# Patient Record
Sex: MALE | Race: OTHER | NOT HISPANIC OR LATINO | Employment: OTHER | URBAN - METROPOLITAN AREA
[De-identification: names, ages, dates, MRNs, and addresses within clinical notes are randomized per-mention and may not be internally consistent; named-entity substitution may affect disease eponyms.]

---

## 2017-02-05 ENCOUNTER — HOSPITAL ENCOUNTER (INPATIENT)
Facility: HOSPITAL | Age: 82
LOS: 4 days | Discharge: HOME HEALTH | DRG: 378 | End: 2017-02-09
Attending: INTERNAL MEDICINE | Admitting: INTERNAL MEDICINE
Payer: MEDICARE

## 2017-02-05 ENCOUNTER — APPOINTMENT (EMERGENCY)
Dept: CT IMAGING | Facility: HOSPITAL | Age: 82
DRG: 378 | End: 2017-02-05
Payer: MEDICARE

## 2017-02-05 DIAGNOSIS — K92.2 ACUTE UPPER GI BLEEDING: Primary | ICD-10-CM

## 2017-02-05 PROBLEM — K27.9 PEPTIC ULCER DISEASE: Status: ACTIVE | Noted: 2017-02-05

## 2017-02-05 PROBLEM — D62 ACUTE BLOOD LOSS ANEMIA: Status: ACTIVE | Noted: 2017-02-05

## 2017-02-05 PROBLEM — R19.5 HEME POSITIVE STOOL: Status: ACTIVE | Noted: 2017-02-05

## 2017-02-05 LAB
ABO GROUP BLD: NORMAL
ALBUMIN SERPL BCP-MCNC: 3.3 G/DL (ref 3.5–5)
ALP SERPL-CCNC: 66 U/L (ref 46–116)
ALT SERPL W P-5'-P-CCNC: 30 U/L (ref 12–78)
ANION GAP SERPL CALCULATED.3IONS-SCNC: 7 MMOL/L (ref 4–13)
APTT PPP: 28 SECONDS (ref 24–36)
AST SERPL W P-5'-P-CCNC: 25 U/L (ref 5–45)
BASOPHILS # BLD AUTO: 0.03 THOUSANDS/ΜL (ref 0–0.1)
BASOPHILS NFR BLD AUTO: 0 % (ref 0–1)
BILIRUB SERPL-MCNC: 0.3 MG/DL (ref 0.2–1)
BILIRUB UR QL STRIP: NEGATIVE
BLD GP AB SCN SERPL QL: NEGATIVE
BUN SERPL-MCNC: 55 MG/DL (ref 5–25)
CALCIUM SERPL-MCNC: 9.9 MG/DL (ref 8.3–10.1)
CHLORIDE SERPL-SCNC: 99 MMOL/L (ref 100–108)
CLARITY UR: CLEAR
CO2 SERPL-SCNC: 29 MMOL/L (ref 21–32)
COLOR UR: YELLOW
CREAT SERPL-MCNC: 1.47 MG/DL (ref 0.6–1.3)
EOSINOPHIL # BLD AUTO: 0.15 THOUSAND/ΜL (ref 0–0.61)
EOSINOPHIL NFR BLD AUTO: 2 % (ref 0–6)
ERYTHROCYTE [DISTWIDTH] IN BLOOD BY AUTOMATED COUNT: 13.2 % (ref 11.6–15.1)
ETHANOL SERPL-MCNC: <3 MG/DL (ref 0–3)
GFR SERPL CREATININE-BSD FRML MDRD: 46.2 ML/MIN/1.73SQ M
GLUCOSE SERPL-MCNC: 116 MG/DL (ref 65–140)
GLUCOSE UR STRIP-MCNC: NEGATIVE MG/DL
HCT VFR BLD AUTO: 26.9 % (ref 36.5–49.3)
HCT VFR BLD AUTO: 30.1 % (ref 36.5–49.3)
HGB BLD-MCNC: 10.4 G/DL (ref 12–17)
HGB BLD-MCNC: 9.4 G/DL (ref 12–17)
HGB UR QL STRIP.AUTO: NEGATIVE
INR PPP: 1.03 (ref 0.86–1.16)
KETONES UR STRIP-MCNC: NEGATIVE MG/DL
LEUKOCYTE ESTERASE UR QL STRIP: NEGATIVE
LYMPHOCYTES # BLD AUTO: 1.85 THOUSANDS/ΜL (ref 0.6–4.47)
LYMPHOCYTES NFR BLD AUTO: 25 % (ref 14–44)
MCH RBC QN AUTO: 33.9 PG (ref 26.8–34.3)
MCHC RBC AUTO-ENTMCNC: 34.6 G/DL (ref 31.4–37.4)
MCV RBC AUTO: 98 FL (ref 82–98)
MONOCYTES # BLD AUTO: 0.72 THOUSAND/ΜL (ref 0.17–1.22)
MONOCYTES NFR BLD AUTO: 10 % (ref 4–12)
NEUTROPHILS # BLD AUTO: 4.66 THOUSANDS/ΜL (ref 1.85–7.62)
NEUTS SEG NFR BLD AUTO: 63 % (ref 43–75)
NITRITE UR QL STRIP: NEGATIVE
PH UR STRIP.AUTO: 6 [PH] (ref 4.5–8)
PLATELET # BLD AUTO: 205 THOUSANDS/UL (ref 149–390)
PMV BLD AUTO: 10.2 FL (ref 8.9–12.7)
POTASSIUM SERPL-SCNC: 3.6 MMOL/L (ref 3.5–5.3)
PROT SERPL-MCNC: 7 G/DL (ref 6.4–8.2)
PROT UR STRIP-MCNC: NEGATIVE MG/DL
PROTHROMBIN TIME: 13.4 SECONDS (ref 12–14.3)
RBC # BLD AUTO: 3.07 MILLION/UL (ref 3.88–5.62)
RH BLD: POSITIVE
SODIUM SERPL-SCNC: 135 MMOL/L (ref 136–145)
SP GR UR STRIP.AUTO: <=1.005 (ref 1–1.03)
UROBILINOGEN UR QL STRIP.AUTO: 0.2 E.U./DL
WBC # BLD AUTO: 7.41 THOUSAND/UL (ref 4.31–10.16)

## 2017-02-05 PROCEDURE — 85014 HEMATOCRIT: CPT | Performed by: INTERNAL MEDICINE

## 2017-02-05 PROCEDURE — 85730 THROMBOPLASTIN TIME PARTIAL: CPT | Performed by: PHYSICIAN ASSISTANT

## 2017-02-05 PROCEDURE — 85610 PROTHROMBIN TIME: CPT | Performed by: PHYSICIAN ASSISTANT

## 2017-02-05 PROCEDURE — 74177 CT ABD & PELVIS W/CONTRAST: CPT

## 2017-02-05 PROCEDURE — 96374 THER/PROPH/DIAG INJ IV PUSH: CPT

## 2017-02-05 PROCEDURE — C9113 INJ PANTOPRAZOLE SODIUM, VIA: HCPCS | Performed by: PHYSICIAN ASSISTANT

## 2017-02-05 PROCEDURE — 99285 EMERGENCY DEPT VISIT HI MDM: CPT

## 2017-02-05 PROCEDURE — 86923 COMPATIBILITY TEST ELECTRIC: CPT

## 2017-02-05 PROCEDURE — 96361 HYDRATE IV INFUSION ADD-ON: CPT

## 2017-02-05 PROCEDURE — 36415 COLL VENOUS BLD VENIPUNCTURE: CPT | Performed by: PHYSICIAN ASSISTANT

## 2017-02-05 PROCEDURE — 85025 COMPLETE CBC W/AUTO DIFF WBC: CPT | Performed by: PHYSICIAN ASSISTANT

## 2017-02-05 PROCEDURE — 86900 BLOOD TYPING SEROLOGIC ABO: CPT | Performed by: PHYSICIAN ASSISTANT

## 2017-02-05 PROCEDURE — 93005 ELECTROCARDIOGRAM TRACING: CPT | Performed by: PHYSICIAN ASSISTANT

## 2017-02-05 PROCEDURE — 86850 RBC ANTIBODY SCREEN: CPT | Performed by: PHYSICIAN ASSISTANT

## 2017-02-05 PROCEDURE — 86901 BLOOD TYPING SEROLOGIC RH(D): CPT | Performed by: PHYSICIAN ASSISTANT

## 2017-02-05 PROCEDURE — C9113 INJ PANTOPRAZOLE SODIUM, VIA: HCPCS | Performed by: INTERNAL MEDICINE

## 2017-02-05 PROCEDURE — 85018 HEMOGLOBIN: CPT | Performed by: INTERNAL MEDICINE

## 2017-02-05 PROCEDURE — 80320 DRUG SCREEN QUANTALCOHOLS: CPT | Performed by: PHYSICIAN ASSISTANT

## 2017-02-05 PROCEDURE — 81003 URINALYSIS AUTO W/O SCOPE: CPT | Performed by: PHYSICIAN ASSISTANT

## 2017-02-05 PROCEDURE — 80053 COMPREHEN METABOLIC PANEL: CPT | Performed by: PHYSICIAN ASSISTANT

## 2017-02-05 RX ORDER — ACETAMINOPHEN 325 MG/1
650 TABLET ORAL 4 TIMES DAILY PRN
Status: DISCONTINUED | OUTPATIENT
Start: 2017-02-05 | End: 2017-02-06

## 2017-02-05 RX ORDER — ACETAMINOPHEN 325 MG/1
TABLET ORAL
Status: COMPLETED
Start: 2017-02-05 | End: 2017-02-05

## 2017-02-05 RX ORDER — IBUPROFEN 600 MG/1
600 TABLET ORAL DAILY
COMMUNITY
End: 2017-02-09 | Stop reason: HOSPADM

## 2017-02-05 RX ORDER — DIAPER,BRIEF,INFANT-TODD,DISP
EACH MISCELLANEOUS 4 TIMES DAILY PRN
Status: DISCONTINUED | OUTPATIENT
Start: 2017-02-05 | End: 2017-02-09 | Stop reason: HOSPADM

## 2017-02-05 RX ORDER — PANTOPRAZOLE SODIUM 40 MG/1
INJECTION, POWDER, FOR SOLUTION INTRAVENOUS
Status: DISPENSED
Start: 2017-02-05 | End: 2017-02-06

## 2017-02-05 RX ORDER — FLUTICASONE PROPIONATE 50 MCG
2 SPRAY, SUSPENSION (ML) NASAL 2 TIMES DAILY
Status: DISCONTINUED | OUTPATIENT
Start: 2017-02-05 | End: 2017-02-09 | Stop reason: HOSPADM

## 2017-02-05 RX ORDER — SODIUM CHLORIDE AND POTASSIUM CHLORIDE .9; .15 G/100ML; G/100ML
125 SOLUTION INTRAVENOUS CONTINUOUS
Status: DISCONTINUED | OUTPATIENT
Start: 2017-02-05 | End: 2017-02-06

## 2017-02-05 RX ORDER — MONTELUKAST SODIUM 10 MG/1
TABLET ORAL
COMMUNITY
Start: 2015-06-17 | End: 2017-03-24 | Stop reason: ALTCHOICE

## 2017-02-05 RX ORDER — THIAMINE HYDROCHLORIDE 100 MG/ML
100 INJECTION, SOLUTION INTRAMUSCULAR; INTRAVENOUS DAILY
Status: DISCONTINUED | OUTPATIENT
Start: 2017-02-06 | End: 2017-02-06

## 2017-02-05 RX ORDER — PANTOPRAZOLE SODIUM 40 MG/1
40 INJECTION, POWDER, FOR SOLUTION INTRAVENOUS ONCE
Status: COMPLETED | OUTPATIENT
Start: 2017-02-05 | End: 2017-02-05

## 2017-02-05 RX ORDER — POLYVINYL ALCOHOL 14 MG/ML
1 SOLUTION/ DROPS OPHTHALMIC
Status: DISCONTINUED | OUTPATIENT
Start: 2017-02-05 | End: 2017-02-09 | Stop reason: HOSPADM

## 2017-02-05 RX ORDER — TRIAMTERENE AND HYDROCHLOROTHIAZIDE 37.5; 25 MG/1; MG/1
CAPSULE ORAL
COMMUNITY
End: 2017-02-09 | Stop reason: HOSPADM

## 2017-02-05 RX ORDER — SODIUM CHLORIDE 9 MG/ML
125 INJECTION, SOLUTION INTRAVENOUS CONTINUOUS
Status: DISCONTINUED | OUTPATIENT
Start: 2017-02-05 | End: 2017-02-05

## 2017-02-05 RX ORDER — POLYETHYLENE GLYCOL 3350 17 G/17G
8.5 POWDER, FOR SOLUTION ORAL DAILY
COMMUNITY
End: 2017-03-24 | Stop reason: ALTCHOICE

## 2017-02-05 RX ORDER — LORAZEPAM 2 MG/ML
0.5 INJECTION INTRAMUSCULAR EVERY 6 HOURS PRN
Status: DISCONTINUED | OUTPATIENT
Start: 2017-02-05 | End: 2017-02-06

## 2017-02-05 RX ORDER — CELECOXIB 100 MG/1
CAPSULE ORAL
COMMUNITY
Start: 2016-06-23 | End: 2017-02-09 | Stop reason: HOSPADM

## 2017-02-05 RX ORDER — NADOLOL 20 MG/1
20 TABLET ORAL DAILY
Status: DISCONTINUED | OUTPATIENT
Start: 2017-02-06 | End: 2017-02-07

## 2017-02-05 RX ADMIN — SODIUM CHLORIDE 125 ML/HR: 0.9 INJECTION, SOLUTION INTRAVENOUS at 16:13

## 2017-02-05 RX ADMIN — IODIXANOL 100 ML: 320 INJECTION, SOLUTION INTRAVASCULAR at 17:43

## 2017-02-05 RX ADMIN — PANTOPRAZOLE SODIUM 40 MG: 40 INJECTION, POWDER, FOR SOLUTION INTRAVENOUS at 16:16

## 2017-02-05 RX ADMIN — LORAZEPAM 0.5 MG: 2 INJECTION INTRAMUSCULAR; INTRAVENOUS at 23:57

## 2017-02-05 RX ADMIN — SODIUM CHLORIDE AND POTASSIUM CHLORIDE 125 ML/HR: .9; .15 SOLUTION INTRAVENOUS at 20:52

## 2017-02-05 RX ADMIN — ACETAMINOPHEN 650 MG: 325 TABLET, FILM COATED ORAL at 23:55

## 2017-02-05 RX ADMIN — SODIUM CHLORIDE 8 MG/HR: 9 INJECTION, SOLUTION INTRAVENOUS at 20:49

## 2017-02-05 RX ADMIN — ACETAMINOPHEN 650 MG: 325 TABLET ORAL at 23:55

## 2017-02-06 ENCOUNTER — ANESTHESIA (INPATIENT)
Dept: PERIOP | Facility: HOSPITAL | Age: 82
DRG: 378 | End: 2017-02-06
Payer: MEDICARE

## 2017-02-06 ENCOUNTER — ANESTHESIA EVENT (INPATIENT)
Dept: PERIOP | Facility: HOSPITAL | Age: 82
DRG: 378 | End: 2017-02-06
Payer: MEDICARE

## 2017-02-06 LAB
ABO GROUP BLD BPU: NORMAL
ANION GAP SERPL CALCULATED.3IONS-SCNC: 9 MMOL/L (ref 4–13)
ATRIAL RATE: 82 BPM
BASOPHILS # BLD AUTO: 0.03 THOUSANDS/ΜL (ref 0–0.1)
BASOPHILS NFR BLD AUTO: 1 % (ref 0–1)
BPU ID: NORMAL
BUN SERPL-MCNC: 56 MG/DL (ref 5–25)
CALCIUM SERPL-MCNC: 8.5 MG/DL (ref 8.3–10.1)
CHLORIDE SERPL-SCNC: 106 MMOL/L (ref 100–108)
CO2 SERPL-SCNC: 25 MMOL/L (ref 21–32)
CREAT SERPL-MCNC: 1.44 MG/DL (ref 0.6–1.3)
EOSINOPHIL # BLD AUTO: 0.09 THOUSAND/ΜL (ref 0–0.61)
EOSINOPHIL NFR BLD AUTO: 2 % (ref 0–6)
ERYTHROCYTE [DISTWIDTH] IN BLOOD BY AUTOMATED COUNT: 13.6 % (ref 11.6–15.1)
FOLATE SERPL-MCNC: >20 NG/ML (ref 3.1–17.5)
GFR SERPL CREATININE-BSD FRML MDRD: 47.3 ML/MIN/1.73SQ M
GLUCOSE SERPL-MCNC: 104 MG/DL (ref 65–140)
HCT VFR BLD AUTO: 24.6 % (ref 36.5–49.3)
HCT VFR BLD AUTO: 25.2 % (ref 36.5–49.3)
HCT VFR BLD AUTO: 28.6 % (ref 36.5–49.3)
HCT VFR BLD AUTO: 28.9 % (ref 36.5–49.3)
HGB BLD-MCNC: 8.2 G/DL (ref 12–17)
HGB BLD-MCNC: 8.7 G/DL (ref 12–17)
HGB BLD-MCNC: 9.7 G/DL (ref 12–17)
HGB BLD-MCNC: 9.8 G/DL (ref 12–17)
LYMPHOCYTES # BLD AUTO: 1.48 THOUSANDS/ΜL (ref 0.6–4.47)
LYMPHOCYTES NFR BLD AUTO: 28 % (ref 14–44)
MCH RBC QN AUTO: 33.2 PG (ref 26.8–34.3)
MCHC RBC AUTO-ENTMCNC: 33.3 G/DL (ref 31.4–37.4)
MCV RBC AUTO: 100 FL (ref 82–98)
MONOCYTES # BLD AUTO: 0.54 THOUSAND/ΜL (ref 0.17–1.22)
MONOCYTES NFR BLD AUTO: 10 % (ref 4–12)
NEUTROPHILS # BLD AUTO: 3.21 THOUSANDS/ΜL (ref 1.85–7.62)
NEUTS SEG NFR BLD AUTO: 59 % (ref 43–75)
P AXIS: 76 DEGREES
PLATELET # BLD AUTO: 150 THOUSANDS/UL (ref 149–390)
PMV BLD AUTO: 10.2 FL (ref 8.9–12.7)
POTASSIUM SERPL-SCNC: 3.6 MMOL/L (ref 3.5–5.3)
PR INTERVAL: 210 MS
QRS AXIS: 13 DEGREES
QRSD INTERVAL: 88 MS
QT INTERVAL: 376 MS
QTC INTERVAL: 439 MS
RBC # BLD AUTO: 2.47 MILLION/UL (ref 3.88–5.62)
SODIUM SERPL-SCNC: 140 MMOL/L (ref 136–145)
T WAVE AXIS: 78 DEGREES
UNIT DISPENSE STATUS: NORMAL
UNIT PRODUCT CODE: NORMAL
UNIT RH: NORMAL
VENTRICULAR RATE: 82 BPM
VIT B12 SERPL-MCNC: 480 PG/ML (ref 100–900)
WBC # BLD AUTO: 5.35 THOUSAND/UL (ref 4.31–10.16)

## 2017-02-06 PROCEDURE — 87077 CULTURE AEROBIC IDENTIFY: CPT | Performed by: INTERNAL MEDICINE

## 2017-02-06 PROCEDURE — 85014 HEMATOCRIT: CPT | Performed by: NURSE PRACTITIONER

## 2017-02-06 PROCEDURE — P9021 RED BLOOD CELLS UNIT: HCPCS

## 2017-02-06 PROCEDURE — 85018 HEMOGLOBIN: CPT | Performed by: NURSE PRACTITIONER

## 2017-02-06 PROCEDURE — C9113 INJ PANTOPRAZOLE SODIUM, VIA: HCPCS | Performed by: INTERNAL MEDICINE

## 2017-02-06 PROCEDURE — 80048 BASIC METABOLIC PNL TOTAL CA: CPT | Performed by: INTERNAL MEDICINE

## 2017-02-06 PROCEDURE — 88305 TISSUE EXAM BY PATHOLOGIST: CPT | Performed by: NURSE PRACTITIONER

## 2017-02-06 PROCEDURE — 82607 VITAMIN B-12: CPT | Performed by: HOSPITALIST

## 2017-02-06 PROCEDURE — 30233N1 TRANSFUSION OF NONAUTOLOGOUS RED BLOOD CELLS INTO PERIPHERAL VEIN, PERCUTANEOUS APPROACH: ICD-10-PCS | Performed by: HOSPITALIST

## 2017-02-06 PROCEDURE — 0W3P8ZZ CONTROL BLEEDING IN GASTROINTESTINAL TRACT, VIA NATURAL OR ARTIFICIAL OPENING ENDOSCOPIC: ICD-10-PCS | Performed by: INTERNAL MEDICINE

## 2017-02-06 PROCEDURE — 85025 COMPLETE CBC W/AUTO DIFF WBC: CPT | Performed by: INTERNAL MEDICINE

## 2017-02-06 PROCEDURE — 0DB68ZX EXCISION OF STOMACH, VIA NATURAL OR ARTIFICIAL OPENING ENDOSCOPIC, DIAGNOSTIC: ICD-10-PCS | Performed by: INTERNAL MEDICINE

## 2017-02-06 PROCEDURE — 3E0G8GC INTRODUCTION OF OTHER THERAPEUTIC SUBSTANCE INTO UPPER GI, VIA NATURAL OR ARTIFICIAL OPENING ENDOSCOPIC: ICD-10-PCS | Performed by: INTERNAL MEDICINE

## 2017-02-06 PROCEDURE — 82746 ASSAY OF FOLIC ACID SERUM: CPT | Performed by: HOSPITALIST

## 2017-02-06 RX ORDER — PROPOFOL 10 MG/ML
INJECTION, EMULSION INTRAVENOUS AS NEEDED
Status: DISCONTINUED | OUTPATIENT
Start: 2017-02-06 | End: 2017-02-06 | Stop reason: SURG

## 2017-02-06 RX ORDER — THIAMINE HYDROCHLORIDE 100 MG/ML
100 INJECTION, SOLUTION INTRAMUSCULAR; INTRAVENOUS DAILY
Status: DISCONTINUED | OUTPATIENT
Start: 2017-02-06 | End: 2017-02-06

## 2017-02-06 RX ORDER — SODIUM CHLORIDE 9 MG/ML
INJECTION, SOLUTION INTRAVENOUS CONTINUOUS PRN
Status: DISCONTINUED | OUTPATIENT
Start: 2017-02-06 | End: 2017-02-06

## 2017-02-06 RX ORDER — DEXTROSE, SODIUM CHLORIDE, AND POTASSIUM CHLORIDE 5; .9; .15 G/100ML; G/100ML; G/100ML
100 INJECTION INTRAVENOUS CONTINUOUS
Status: DISCONTINUED | OUTPATIENT
Start: 2017-02-06 | End: 2017-02-07

## 2017-02-06 RX ORDER — SODIUM CHLORIDE 9 MG/ML
50 INJECTION, SOLUTION INTRAVENOUS CONTINUOUS
Status: DISCONTINUED | OUTPATIENT
Start: 2017-02-06 | End: 2017-02-06

## 2017-02-06 RX ORDER — LORAZEPAM 2 MG/ML
0.5 INJECTION INTRAMUSCULAR EVERY 6 HOURS PRN
Status: DISCONTINUED | OUTPATIENT
Start: 2017-02-06 | End: 2017-02-09 | Stop reason: HOSPADM

## 2017-02-06 RX ADMIN — DEXTROSE, SODIUM CHLORIDE, AND POTASSIUM CHLORIDE 100 ML/HR: 5; .9; .15 INJECTION INTRAVENOUS at 14:17

## 2017-02-06 RX ADMIN — SODIUM CHLORIDE AND POTASSIUM CHLORIDE 125 ML/HR: .9; .15 SOLUTION INTRAVENOUS at 05:33

## 2017-02-06 RX ADMIN — PROPOFOL 50 MG: 10 INJECTION, EMULSION INTRAVENOUS at 12:23

## 2017-02-06 RX ADMIN — MENTHOL 5.4 MG: 5.4 LOZENGE ORAL at 00:06

## 2017-02-06 RX ADMIN — PROPOFOL 50 MG: 10 INJECTION, EMULSION INTRAVENOUS at 12:33

## 2017-02-06 RX ADMIN — THIAMINE HYDROCHLORIDE 100 MG: 100 INJECTION, SOLUTION INTRAMUSCULAR; INTRAVENOUS at 14:17

## 2017-02-06 RX ADMIN — PROPOFOL 50 MG: 10 INJECTION, EMULSION INTRAVENOUS at 12:36

## 2017-02-06 RX ADMIN — POLYVINYL ALCOHOL 1 DROP: 14 SOLUTION/ DROPS OPHTHALMIC at 17:21

## 2017-02-06 RX ADMIN — PROPOFOL 50 MG: 10 INJECTION, EMULSION INTRAVENOUS at 12:29

## 2017-02-06 RX ADMIN — PROPOFOL 50 MG: 10 INJECTION, EMULSION INTRAVENOUS at 12:26

## 2017-02-06 RX ADMIN — FLUTICASONE PROPIONATE 2 SPRAY: 50 SPRAY, METERED NASAL at 08:56

## 2017-02-06 RX ADMIN — SODIUM CHLORIDE 8 MG/HR: 9 INJECTION, SOLUTION INTRAVENOUS at 04:42

## 2017-02-06 RX ADMIN — PROPOFOL 50 MG: 10 INJECTION, EMULSION INTRAVENOUS at 12:39

## 2017-02-06 RX ADMIN — SODIUM CHLORIDE 8 MG/HR: 9 INJECTION, SOLUTION INTRAVENOUS at 14:06

## 2017-02-06 RX ADMIN — NADOLOL 20 MG: 20 TABLET ORAL at 08:56

## 2017-02-06 RX ADMIN — FLUTICASONE PROPIONATE 2 SPRAY: 50 SPRAY, METERED NASAL at 17:21

## 2017-02-06 RX ADMIN — SODIUM CHLORIDE: 0.9 INJECTION, SOLUTION INTRAVENOUS at 12:13

## 2017-02-06 RX ADMIN — POLYVINYL ALCOHOL 1 DROP: 14 SOLUTION/ DROPS OPHTHALMIC at 08:56

## 2017-02-07 ENCOUNTER — APPOINTMENT (INPATIENT)
Dept: RADIOLOGY | Facility: HOSPITAL | Age: 82
DRG: 378 | End: 2017-02-07
Payer: MEDICARE

## 2017-02-07 LAB
ANION GAP SERPL CALCULATED.3IONS-SCNC: 9 MMOL/L (ref 4–13)
BUN SERPL-MCNC: 40 MG/DL (ref 5–25)
CALCIUM SERPL-MCNC: 8.9 MG/DL (ref 8.3–10.1)
CHLORIDE SERPL-SCNC: 112 MMOL/L (ref 100–108)
CO2 SERPL-SCNC: 24 MMOL/L (ref 21–32)
CREAT SERPL-MCNC: 1.43 MG/DL (ref 0.6–1.3)
ERYTHROCYTE [DISTWIDTH] IN BLOOD BY AUTOMATED COUNT: 15 % (ref 11.6–15.1)
GFR SERPL CREATININE-BSD FRML MDRD: 47.7 ML/MIN/1.73SQ M
GLUCOSE SERPL-MCNC: 132 MG/DL (ref 65–140)
HCT VFR BLD AUTO: 28.4 % (ref 36.5–49.3)
HCT VFR BLD AUTO: 30 % (ref 36.5–49.3)
HGB BLD-MCNC: 9.6 G/DL (ref 12–17)
HGB BLD-MCNC: 9.9 G/DL (ref 12–17)
MCH RBC QN AUTO: 32.8 PG (ref 26.8–34.3)
MCHC RBC AUTO-ENTMCNC: 33 G/DL (ref 31.4–37.4)
MCV RBC AUTO: 99 FL (ref 82–98)
PLATELET # BLD AUTO: 165 THOUSANDS/UL (ref 149–390)
PMV BLD AUTO: 10 FL (ref 8.9–12.7)
POTASSIUM SERPL-SCNC: 3.2 MMOL/L (ref 3.5–5.3)
RBC # BLD AUTO: 3.02 MILLION/UL (ref 3.88–5.62)
SODIUM SERPL-SCNC: 145 MMOL/L (ref 136–145)
UREASE TISS QL: NEGATIVE
WBC # BLD AUTO: 5.75 THOUSAND/UL (ref 4.31–10.16)

## 2017-02-07 PROCEDURE — 85027 COMPLETE CBC AUTOMATED: CPT | Performed by: HOSPITALIST

## 2017-02-07 PROCEDURE — 71010 HB CHEST X-RAY 1 VIEW FRONTAL (PORTABLE): CPT

## 2017-02-07 PROCEDURE — G8978 MOBILITY CURRENT STATUS: HCPCS

## 2017-02-07 PROCEDURE — 97116 GAIT TRAINING THERAPY: CPT

## 2017-02-07 PROCEDURE — 97163 PT EVAL HIGH COMPLEX 45 MIN: CPT

## 2017-02-07 PROCEDURE — C9113 INJ PANTOPRAZOLE SODIUM, VIA: HCPCS | Performed by: HOSPITALIST

## 2017-02-07 PROCEDURE — G8979 MOBILITY GOAL STATUS: HCPCS

## 2017-02-07 PROCEDURE — 80048 BASIC METABOLIC PNL TOTAL CA: CPT | Performed by: HOSPITALIST

## 2017-02-07 PROCEDURE — 85014 HEMATOCRIT: CPT | Performed by: NURSE PRACTITIONER

## 2017-02-07 PROCEDURE — 85018 HEMOGLOBIN: CPT | Performed by: NURSE PRACTITIONER

## 2017-02-07 PROCEDURE — C9113 INJ PANTOPRAZOLE SODIUM, VIA: HCPCS | Performed by: INTERNAL MEDICINE

## 2017-02-07 RX ORDER — PANTOPRAZOLE SODIUM 40 MG/1
40 INJECTION, POWDER, FOR SOLUTION INTRAVENOUS EVERY 12 HOURS SCHEDULED
Status: DISCONTINUED | OUTPATIENT
Start: 2017-02-07 | End: 2017-02-09 | Stop reason: HOSPADM

## 2017-02-07 RX ORDER — ALBUTEROL SULFATE 90 UG/1
2 AEROSOL, METERED RESPIRATORY (INHALATION) EVERY 4 HOURS PRN
Status: DISCONTINUED | OUTPATIENT
Start: 2017-02-07 | End: 2017-02-09 | Stop reason: HOSPADM

## 2017-02-07 RX ORDER — POTASSIUM CHLORIDE 14.9 MG/ML
20 INJECTION INTRAVENOUS
Status: COMPLETED | OUTPATIENT
Start: 2017-02-07 | End: 2017-02-08

## 2017-02-07 RX ADMIN — DEXTROSE, SODIUM CHLORIDE, AND POTASSIUM CHLORIDE 100 ML/HR: 5; .9; .15 INJECTION INTRAVENOUS at 01:14

## 2017-02-07 RX ADMIN — POTASSIUM CHLORIDE 20 MEQ: 200 INJECTION, SOLUTION INTRAVENOUS at 08:24

## 2017-02-07 RX ADMIN — FLUTICASONE PROPIONATE 2 SPRAY: 50 SPRAY, METERED NASAL at 08:23

## 2017-02-07 RX ADMIN — PANTOPRAZOLE SODIUM 40 MG: 40 INJECTION, POWDER, FOR SOLUTION INTRAVENOUS at 20:30

## 2017-02-07 RX ADMIN — FLUTICASONE PROPIONATE 2 SPRAY: 50 SPRAY, METERED NASAL at 18:50

## 2017-02-07 RX ADMIN — THIAMINE HYDROCHLORIDE 100 MG: 100 INJECTION, SOLUTION INTRAMUSCULAR; INTRAVENOUS at 14:17

## 2017-02-07 RX ADMIN — POLYVINYL ALCOHOL 1 DROP: 14 SOLUTION/ DROPS OPHTHALMIC at 18:49

## 2017-02-07 RX ADMIN — POLYVINYL ALCOHOL 1 DROP: 14 SOLUTION/ DROPS OPHTHALMIC at 08:23

## 2017-02-07 RX ADMIN — SODIUM CHLORIDE 8 MG/HR: 9 INJECTION, SOLUTION INTRAVENOUS at 00:22

## 2017-02-07 RX ADMIN — POTASSIUM CHLORIDE 20 MEQ: 200 INJECTION, SOLUTION INTRAVENOUS at 10:24

## 2017-02-07 RX ADMIN — DEXTROSE, SODIUM CHLORIDE, AND POTASSIUM CHLORIDE 100 ML/HR: 5; .9; .15 INJECTION INTRAVENOUS at 12:10

## 2017-02-08 LAB
ANION GAP SERPL CALCULATED.3IONS-SCNC: 9 MMOL/L (ref 4–13)
BUN SERPL-MCNC: 24 MG/DL (ref 5–25)
CALCIUM SERPL-MCNC: 8.1 MG/DL (ref 8.3–10.1)
CHLORIDE SERPL-SCNC: 113 MMOL/L (ref 100–108)
CO2 SERPL-SCNC: 25 MMOL/L (ref 21–32)
CREAT SERPL-MCNC: 1.38 MG/DL (ref 0.6–1.3)
ERYTHROCYTE [DISTWIDTH] IN BLOOD BY AUTOMATED COUNT: 15.1 % (ref 11.6–15.1)
EST. AVERAGE GLUCOSE BLD GHB EST-MCNC: 105 MG/DL
FOLATE SERPL-MCNC: >20 NG/ML (ref 3.1–17.5)
GFR SERPL CREATININE-BSD FRML MDRD: 49.7 ML/MIN/1.73SQ M
GLUCOSE SERPL-MCNC: 179 MG/DL (ref 65–140)
HBA1C MFR BLD: 5.3 % (ref 4.2–6.3)
HCT VFR BLD AUTO: 27.7 % (ref 36.5–49.3)
HGB BLD-MCNC: 8.9 G/DL (ref 12–17)
MCH RBC QN AUTO: 32.5 PG (ref 26.8–34.3)
MCHC RBC AUTO-ENTMCNC: 32.1 G/DL (ref 31.4–37.4)
MCV RBC AUTO: 101 FL (ref 82–98)
PLATELET # BLD AUTO: 150 THOUSANDS/UL (ref 149–390)
PMV BLD AUTO: 10 FL (ref 8.9–12.7)
POTASSIUM SERPL-SCNC: 3.2 MMOL/L (ref 3.5–5.3)
RBC # BLD AUTO: 2.74 MILLION/UL (ref 3.88–5.62)
SODIUM SERPL-SCNC: 147 MMOL/L (ref 136–145)
VIT B12 SERPL-MCNC: 925 PG/ML (ref 100–900)
WBC # BLD AUTO: 4.67 THOUSAND/UL (ref 4.31–10.16)

## 2017-02-08 PROCEDURE — C9113 INJ PANTOPRAZOLE SODIUM, VIA: HCPCS | Performed by: HOSPITALIST

## 2017-02-08 PROCEDURE — 80048 BASIC METABOLIC PNL TOTAL CA: CPT | Performed by: HOSPITALIST

## 2017-02-08 PROCEDURE — 82607 VITAMIN B-12: CPT | Performed by: HOSPITALIST

## 2017-02-08 PROCEDURE — 83036 HEMOGLOBIN GLYCOSYLATED A1C: CPT | Performed by: HOSPITALIST

## 2017-02-08 PROCEDURE — 85027 COMPLETE CBC AUTOMATED: CPT | Performed by: HOSPITALIST

## 2017-02-08 PROCEDURE — 82746 ASSAY OF FOLIC ACID SERUM: CPT | Performed by: HOSPITALIST

## 2017-02-08 RX ORDER — POTASSIUM CHLORIDE 20 MEQ/1
40 TABLET, EXTENDED RELEASE ORAL ONCE
Status: COMPLETED | OUTPATIENT
Start: 2017-02-08 | End: 2017-02-08

## 2017-02-08 RX ORDER — THIAMINE MONONITRATE (VIT B1) 100 MG
100 TABLET ORAL DAILY
Status: DISCONTINUED | OUTPATIENT
Start: 2017-02-08 | End: 2017-02-09 | Stop reason: HOSPADM

## 2017-02-08 RX ORDER — POTASSIUM CHLORIDE 14.9 MG/ML
20 INJECTION INTRAVENOUS
Status: ACTIVE | OUTPATIENT
Start: 2017-02-08 | End: 2017-02-08

## 2017-02-08 RX ADMIN — PANTOPRAZOLE SODIUM 40 MG: 40 INJECTION, POWDER, FOR SOLUTION INTRAVENOUS at 20:52

## 2017-02-08 RX ADMIN — POTASSIUM CHLORIDE 40 MEQ: 1500 TABLET, EXTENDED RELEASE ORAL at 14:01

## 2017-02-08 RX ADMIN — POLYVINYL ALCOHOL 1 DROP: 14 SOLUTION/ DROPS OPHTHALMIC at 08:40

## 2017-02-08 RX ADMIN — Medication 100 MG: at 14:44

## 2017-02-08 RX ADMIN — PANTOPRAZOLE SODIUM 40 MG: 40 INJECTION, POWDER, FOR SOLUTION INTRAVENOUS at 08:40

## 2017-02-08 RX ADMIN — POLYVINYL ALCOHOL 1 DROP: 14 SOLUTION/ DROPS OPHTHALMIC at 17:10

## 2017-02-08 RX ADMIN — POTASSIUM CHLORIDE 40 MEQ: 1500 TABLET, EXTENDED RELEASE ORAL at 11:09

## 2017-02-08 RX ADMIN — FLUTICASONE PROPIONATE 2 SPRAY: 50 SPRAY, METERED NASAL at 17:09

## 2017-02-08 RX ADMIN — FLUTICASONE PROPIONATE 2 SPRAY: 50 SPRAY, METERED NASAL at 08:40

## 2017-02-09 VITALS
DIASTOLIC BLOOD PRESSURE: 69 MMHG | HEIGHT: 69 IN | WEIGHT: 182.76 LBS | BODY MASS INDEX: 27.07 KG/M2 | RESPIRATION RATE: 16 BRPM | OXYGEN SATURATION: 100 % | TEMPERATURE: 98 F | HEART RATE: 85 BPM | SYSTOLIC BLOOD PRESSURE: 135 MMHG

## 2017-02-09 LAB
ANION GAP SERPL CALCULATED.3IONS-SCNC: 10 MMOL/L (ref 4–13)
BASOPHILS # BLD AUTO: 0.02 THOUSANDS/ΜL (ref 0–0.1)
BASOPHILS NFR BLD AUTO: 0 % (ref 0–1)
BUN SERPL-MCNC: 21 MG/DL (ref 5–25)
CALCIUM SERPL-MCNC: 8.2 MG/DL (ref 8.3–10.1)
CHLORIDE SERPL-SCNC: 112 MMOL/L (ref 100–108)
CO2 SERPL-SCNC: 25 MMOL/L (ref 21–32)
CREAT SERPL-MCNC: 1.34 MG/DL (ref 0.6–1.3)
EOSINOPHIL # BLD AUTO: 0.23 THOUSAND/ΜL (ref 0–0.61)
EOSINOPHIL NFR BLD AUTO: 5 % (ref 0–6)
ERYTHROCYTE [DISTWIDTH] IN BLOOD BY AUTOMATED COUNT: 15 % (ref 11.6–15.1)
GFR SERPL CREATININE-BSD FRML MDRD: 51.4 ML/MIN/1.73SQ M
GLUCOSE SERPL-MCNC: 104 MG/DL (ref 65–140)
HCT VFR BLD AUTO: 27.5 % (ref 36.5–49.3)
HGB BLD-MCNC: 9 G/DL (ref 12–17)
LYMPHOCYTES # BLD AUTO: 1.17 THOUSANDS/ΜL (ref 0.6–4.47)
LYMPHOCYTES NFR BLD AUTO: 24 % (ref 14–44)
MCH RBC QN AUTO: 33.3 PG (ref 26.8–34.3)
MCHC RBC AUTO-ENTMCNC: 32.7 G/DL (ref 31.4–37.4)
MCV RBC AUTO: 102 FL (ref 82–98)
MONOCYTES # BLD AUTO: 0.68 THOUSAND/ΜL (ref 0.17–1.22)
MONOCYTES NFR BLD AUTO: 14 % (ref 4–12)
NEUTROPHILS # BLD AUTO: 2.73 THOUSANDS/ΜL (ref 1.85–7.62)
NEUTS SEG NFR BLD AUTO: 57 % (ref 43–75)
PLATELET # BLD AUTO: 163 THOUSANDS/UL (ref 149–390)
PMV BLD AUTO: 10.3 FL (ref 8.9–12.7)
POTASSIUM SERPL-SCNC: 3.5 MMOL/L (ref 3.5–5.3)
RBC # BLD AUTO: 2.7 MILLION/UL (ref 3.88–5.62)
SODIUM SERPL-SCNC: 147 MMOL/L (ref 136–145)
WBC # BLD AUTO: 4.83 THOUSAND/UL (ref 4.31–10.16)

## 2017-02-09 PROCEDURE — 80048 BASIC METABOLIC PNL TOTAL CA: CPT | Performed by: HOSPITALIST

## 2017-02-09 PROCEDURE — 85025 COMPLETE CBC W/AUTO DIFF WBC: CPT | Performed by: HOSPITALIST

## 2017-02-09 PROCEDURE — C9113 INJ PANTOPRAZOLE SODIUM, VIA: HCPCS | Performed by: HOSPITALIST

## 2017-02-09 PROCEDURE — 94760 N-INVAS EAR/PLS OXIMETRY 1: CPT

## 2017-02-09 RX ORDER — FLUTICASONE PROPIONATE 50 MCG
2 SPRAY, SUSPENSION (ML) NASAL 2 TIMES DAILY
Qty: 1 BOTTLE | Refills: 0 | Status: SHIPPED | OUTPATIENT
Start: 2017-02-09 | End: 2018-03-30

## 2017-02-09 RX ORDER — ALBUTEROL SULFATE 90 UG/1
2 AEROSOL, METERED RESPIRATORY (INHALATION) EVERY 6 HOURS PRN
Qty: 1 INHALER | Refills: 0 | Status: SHIPPED | OUTPATIENT
Start: 2017-02-09 | End: 2017-03-11

## 2017-02-09 RX ORDER — HYDROCHLOROTHIAZIDE 12.5 MG/1
12.5 CAPSULE, GELATIN COATED ORAL DAILY
Qty: 30 CAPSULE | Refills: 0 | Status: SHIPPED | OUTPATIENT
Start: 2017-02-09 | End: 2017-08-30 | Stop reason: ALTCHOICE

## 2017-02-09 RX ORDER — PANTOPRAZOLE SODIUM 40 MG/1
40 TABLET, DELAYED RELEASE ORAL
Qty: 30 TABLET | Refills: 0 | Status: SHIPPED | OUTPATIENT
Start: 2017-02-09 | End: 2018-03-30

## 2017-02-09 RX ADMIN — PANTOPRAZOLE SODIUM 40 MG: 40 INJECTION, POWDER, FOR SOLUTION INTRAVENOUS at 08:38

## 2017-02-09 RX ADMIN — Medication 100 MG: at 08:38

## 2017-02-09 RX ADMIN — POLYVINYL ALCOHOL 1 DROP: 14 SOLUTION/ DROPS OPHTHALMIC at 08:38

## 2017-02-09 RX ADMIN — FLUTICASONE PROPIONATE 2 SPRAY: 50 SPRAY, METERED NASAL at 08:38

## 2017-02-15 ENCOUNTER — ALLSCRIPTS OFFICE VISIT (OUTPATIENT)
Dept: OTHER | Facility: OTHER | Age: 82
End: 2017-02-15

## 2017-02-15 DIAGNOSIS — E87.1 HYPO-OSMOLALITY AND HYPONATREMIA: ICD-10-CM

## 2017-02-20 ENCOUNTER — APPOINTMENT (OUTPATIENT)
Dept: LAB | Facility: CLINIC | Age: 82
End: 2017-02-20
Payer: MEDICARE

## 2017-02-20 DIAGNOSIS — E87.1 HYPO-OSMOLALITY AND HYPONATREMIA: ICD-10-CM

## 2017-02-20 LAB
ANION GAP SERPL CALCULATED.3IONS-SCNC: 9 MMOL/L (ref 4–13)
BUN SERPL-MCNC: 19 MG/DL (ref 5–25)
CALCIUM SERPL-MCNC: 9.6 MG/DL (ref 8.3–10.1)
CHLORIDE SERPL-SCNC: 100 MMOL/L (ref 100–108)
CO2 SERPL-SCNC: 32 MMOL/L (ref 21–32)
CREAT SERPL-MCNC: 1.43 MG/DL (ref 0.6–1.3)
GFR SERPL CREATININE-BSD FRML MDRD: 47.7 ML/MIN/1.73SQ M
GLUCOSE SERPL-MCNC: 90 MG/DL (ref 65–140)
POTASSIUM SERPL-SCNC: 3.3 MMOL/L (ref 3.5–5.3)
SODIUM SERPL-SCNC: 141 MMOL/L (ref 136–145)

## 2017-02-20 PROCEDURE — 80048 BASIC METABOLIC PNL TOTAL CA: CPT

## 2017-02-20 PROCEDURE — 36415 COLL VENOUS BLD VENIPUNCTURE: CPT

## 2017-03-09 ENCOUNTER — GENERIC CONVERSION - ENCOUNTER (OUTPATIENT)
Dept: OTHER | Facility: OTHER | Age: 82
End: 2017-03-09

## 2017-03-10 ENCOUNTER — GENERIC CONVERSION - ENCOUNTER (OUTPATIENT)
Dept: OTHER | Facility: OTHER | Age: 82
End: 2017-03-10

## 2017-03-12 ENCOUNTER — GENERIC CONVERSION - ENCOUNTER (OUTPATIENT)
Dept: OTHER | Facility: OTHER | Age: 82
End: 2017-03-12

## 2017-03-15 ENCOUNTER — GENERIC CONVERSION - ENCOUNTER (OUTPATIENT)
Dept: OTHER | Facility: OTHER | Age: 82
End: 2017-03-15

## 2017-03-24 ENCOUNTER — APPOINTMENT (EMERGENCY)
Dept: RADIOLOGY | Facility: HOSPITAL | Age: 82
End: 2017-03-24
Payer: MEDICARE

## 2017-03-24 ENCOUNTER — HOSPITAL ENCOUNTER (EMERGENCY)
Facility: HOSPITAL | Age: 82
Discharge: HOME | End: 2017-03-24
Attending: EMERGENCY MEDICINE | Admitting: EMERGENCY MEDICINE
Payer: MEDICARE

## 2017-03-24 ENCOUNTER — APPOINTMENT (EMERGENCY)
Dept: CT IMAGING | Facility: HOSPITAL | Age: 82
End: 2017-03-24
Payer: MEDICARE

## 2017-03-24 VITALS
DIASTOLIC BLOOD PRESSURE: 85 MMHG | TEMPERATURE: 97.6 F | WEIGHT: 182 LBS | BODY MASS INDEX: 26.88 KG/M2 | SYSTOLIC BLOOD PRESSURE: 178 MMHG | OXYGEN SATURATION: 98 % | HEART RATE: 57 BPM | RESPIRATION RATE: 15 BRPM

## 2017-03-24 DIAGNOSIS — N28.1 BILATERAL RENAL CYSTS: ICD-10-CM

## 2017-03-24 DIAGNOSIS — I80.9 PHLEBITIS: ICD-10-CM

## 2017-03-24 DIAGNOSIS — I10 HYPERTENSION: Primary | ICD-10-CM

## 2017-03-24 LAB
ALBUMIN SERPL BCP-MCNC: 3 G/DL (ref 3.5–5)
ALP SERPL-CCNC: 69 U/L (ref 46–116)
ALT SERPL W P-5'-P-CCNC: 24 U/L (ref 12–78)
ANION GAP SERPL CALCULATED.3IONS-SCNC: 8 MMOL/L (ref 4–13)
APTT PPP: 25 SECONDS (ref 24–36)
AST SERPL W P-5'-P-CCNC: 19 U/L (ref 5–45)
BASOPHILS # BLD AUTO: 0.07 THOUSANDS/ΜL (ref 0–0.1)
BASOPHILS NFR BLD AUTO: 1 % (ref 0–1)
BILIRUB SERPL-MCNC: 0.3 MG/DL (ref 0.2–1)
BUN SERPL-MCNC: 20 MG/DL (ref 5–25)
CALCIUM SERPL-MCNC: 9 MG/DL (ref 8.3–10.1)
CHLORIDE SERPL-SCNC: 106 MMOL/L (ref 100–108)
CLARITY, POC: CLEAR
CO2 SERPL-SCNC: 29 MMOL/L (ref 21–32)
COLOR, POC: YELLOW
CREAT SERPL-MCNC: 1.37 MG/DL (ref 0.6–1.3)
EOSINOPHIL # BLD AUTO: 0.28 THOUSAND/ΜL (ref 0–0.61)
EOSINOPHIL NFR BLD AUTO: 5 % (ref 0–6)
ERYTHROCYTE [DISTWIDTH] IN BLOOD BY AUTOMATED COUNT: 14.2 % (ref 11.6–15.1)
EXT BILIRUBIN, UA: NORMAL
EXT BLOOD URINE: NORMAL
EXT GLUCOSE, UA: NORMAL
EXT KETONES: NORMAL
EXT NITRITE, UA: NORMAL
EXT PH, UA: 7
EXT PROTEIN, UA: NORMAL
EXT SPECIFIC GRAVITY, UA: 1.01
EXT UROBILINOGEN: 0.2
GFR SERPL CREATININE-BSD FRML MDRD: 50.1 ML/MIN/1.73SQ M
GLUCOSE SERPL-MCNC: 102 MG/DL (ref 65–140)
HCT VFR BLD AUTO: 34.6 % (ref 36.5–49.3)
HGB BLD-MCNC: 11 G/DL (ref 12–17)
INR PPP: 1.15 (ref 0.86–1.16)
LYMPHOCYTES # BLD AUTO: 1.67 THOUSANDS/ΜL (ref 0.6–4.47)
LYMPHOCYTES NFR BLD AUTO: 27 % (ref 14–44)
MCH RBC QN AUTO: 30.8 PG (ref 26.8–34.3)
MCHC RBC AUTO-ENTMCNC: 31.8 G/DL (ref 31.4–37.4)
MCV RBC AUTO: 97 FL (ref 82–98)
MONOCYTES # BLD AUTO: 0.85 THOUSAND/ΜL (ref 0.17–1.22)
MONOCYTES NFR BLD AUTO: 14 % (ref 4–12)
NEUTROPHILS # BLD AUTO: 3.38 THOUSANDS/ΜL (ref 1.85–7.62)
NEUTS SEG NFR BLD AUTO: 53 % (ref 43–75)
PLATELET # BLD AUTO: 314 THOUSANDS/UL (ref 149–390)
PMV BLD AUTO: 10 FL (ref 8.9–12.7)
POTASSIUM SERPL-SCNC: 3.9 MMOL/L (ref 3.5–5.3)
PROT SERPL-MCNC: 7.2 G/DL (ref 6.4–8.2)
PROTHROMBIN TIME: 14.6 SECONDS (ref 12–14.3)
RBC # BLD AUTO: 3.57 MILLION/UL (ref 3.88–5.62)
SODIUM SERPL-SCNC: 143 MMOL/L (ref 136–145)
TROPONIN I SERPL-MCNC: <0.02 NG/ML
WBC # BLD AUTO: 6.25 THOUSAND/UL (ref 4.31–10.16)
WBC # BLD EST: NORMAL 10*3/UL

## 2017-03-24 PROCEDURE — 80053 COMPREHEN METABOLIC PANEL: CPT | Performed by: EMERGENCY MEDICINE

## 2017-03-24 PROCEDURE — 93005 ELECTROCARDIOGRAM TRACING: CPT | Performed by: EMERGENCY MEDICINE

## 2017-03-24 PROCEDURE — 36415 COLL VENOUS BLD VENIPUNCTURE: CPT | Performed by: EMERGENCY MEDICINE

## 2017-03-24 PROCEDURE — 70450 CT HEAD/BRAIN W/O DYE: CPT

## 2017-03-24 PROCEDURE — 85730 THROMBOPLASTIN TIME PARTIAL: CPT | Performed by: EMERGENCY MEDICINE

## 2017-03-24 PROCEDURE — 71020 HB CHEST X-RAY 2VW FRONTAL&LATL: CPT

## 2017-03-24 PROCEDURE — 85025 COMPLETE CBC W/AUTO DIFF WBC: CPT | Performed by: EMERGENCY MEDICINE

## 2017-03-24 PROCEDURE — 99284 EMERGENCY DEPT VISIT MOD MDM: CPT

## 2017-03-24 PROCEDURE — 85610 PROTHROMBIN TIME: CPT | Performed by: EMERGENCY MEDICINE

## 2017-03-24 PROCEDURE — 74176 CT ABD & PELVIS W/O CONTRAST: CPT

## 2017-03-24 PROCEDURE — 81002 URINALYSIS NONAUTO W/O SCOPE: CPT | Performed by: EMERGENCY MEDICINE

## 2017-03-24 PROCEDURE — 84484 ASSAY OF TROPONIN QUANT: CPT | Performed by: EMERGENCY MEDICINE

## 2017-03-24 RX ORDER — ACETAMINOPHEN 325 MG/1
650 TABLET ORAL EVERY 6 HOURS PRN
COMMUNITY
End: 2018-03-30

## 2017-03-24 RX ORDER — CEPHALEXIN 250 MG/1
500 CAPSULE ORAL ONCE
Status: COMPLETED | OUTPATIENT
Start: 2017-03-24 | End: 2017-03-24

## 2017-03-24 RX ORDER — ACETAMINOPHEN 325 MG/1
650 TABLET ORAL ONCE
Status: COMPLETED | OUTPATIENT
Start: 2017-03-24 | End: 2017-03-24

## 2017-03-24 RX ORDER — FOLIC ACID 1 MG/1
1 TABLET ORAL DAILY
COMMUNITY
End: 2017-08-30 | Stop reason: ALTCHOICE

## 2017-03-24 RX ORDER — COLCHICINE 0.6 MG/1
0.6 TABLET ORAL DAILY
COMMUNITY
End: 2017-08-30 | Stop reason: ALTCHOICE

## 2017-03-24 RX ORDER — ATORVASTATIN CALCIUM 10 MG/1
10 TABLET, FILM COATED ORAL DAILY
COMMUNITY
End: 2017-08-30 | Stop reason: ALTCHOICE

## 2017-03-24 RX ORDER — CEPHALEXIN 500 MG/1
500 CAPSULE ORAL 3 TIMES DAILY
Qty: 21 CAPSULE | Refills: 0 | Status: SHIPPED | OUTPATIENT
Start: 2017-03-24 | End: 2017-03-31

## 2017-03-24 RX ORDER — HYDROCODONE BITARTRATE AND ACETAMINOPHEN 5; 325 MG/1; MG/1
1 TABLET ORAL EVERY 6 HOURS PRN
COMMUNITY
End: 2017-08-30 | Stop reason: ALTCHOICE

## 2017-03-24 RX ORDER — CETIRIZINE HYDROCHLORIDE 10 MG/1
10 TABLET ORAL DAILY
COMMUNITY
End: 2018-03-30

## 2017-03-24 RX ORDER — CLONIDINE HYDROCHLORIDE 0.1 MG/1
0.1 TABLET ORAL 2 TIMES DAILY
COMMUNITY
End: 2017-08-30 | Stop reason: ALTCHOICE

## 2017-03-24 RX ORDER — METOPROLOL TARTRATE 50 MG/1
50 TABLET, FILM COATED ORAL 2 TIMES DAILY
COMMUNITY
End: 2017-08-30 | Stop reason: ALTCHOICE

## 2017-03-24 RX ORDER — CLONIDINE HYDROCHLORIDE 0.1 MG/1
0.1 TABLET ORAL ONCE
Status: COMPLETED | OUTPATIENT
Start: 2017-03-24 | End: 2017-03-24

## 2017-03-24 RX ORDER — LISINOPRIL 40 MG/1
40 TABLET ORAL DAILY
COMMUNITY
End: 2017-08-30 | Stop reason: ALTCHOICE

## 2017-03-24 RX ORDER — AMLODIPINE BESYLATE 10 MG/1
10 TABLET ORAL DAILY
COMMUNITY
End: 2017-08-30 | Stop reason: ALTCHOICE

## 2017-03-24 RX ORDER — ACETAMINOPHEN 160 MG/5ML
650 SUSPENSION, ORAL (FINAL DOSE FORM) ORAL ONCE
Status: DISCONTINUED | OUTPATIENT
Start: 2017-03-24 | End: 2017-03-24

## 2017-03-24 RX ADMIN — ACETAMINOPHEN 650 MG: 325 TABLET, FILM COATED ORAL at 19:27

## 2017-03-24 RX ADMIN — CEPHALEXIN 500 MG: 250 CAPSULE ORAL at 19:27

## 2017-03-24 RX ADMIN — CLONIDINE HYDROCHLORIDE 0.1 MG: 0.1 TABLET ORAL at 18:14

## 2017-03-27 LAB
ATRIAL RATE: 59 BPM
P AXIS: 73 DEGREES
PR INTERVAL: 206 MS
QRS AXIS: 0 DEGREES
QRSD INTERVAL: 80 MS
QT INTERVAL: 448 MS
QTC INTERVAL: 443 MS
T WAVE AXIS: 59 DEGREES
VENTRICULAR RATE: 59 BPM

## 2017-04-07 ENCOUNTER — ALLSCRIPTS OFFICE VISIT (OUTPATIENT)
Dept: OTHER | Facility: OTHER | Age: 82
End: 2017-04-07

## 2017-05-09 ENCOUNTER — ALLSCRIPTS OFFICE VISIT (OUTPATIENT)
Dept: OTHER | Facility: OTHER | Age: 82
End: 2017-05-09

## 2017-05-09 ENCOUNTER — TRANSCRIBE ORDERS (OUTPATIENT)
Dept: ADMINISTRATIVE | Facility: HOSPITAL | Age: 82
End: 2017-05-09

## 2017-05-09 ENCOUNTER — APPOINTMENT (OUTPATIENT)
Dept: LAB | Facility: CLINIC | Age: 82
End: 2017-05-09
Payer: MEDICARE

## 2017-05-09 ENCOUNTER — HOSPITAL ENCOUNTER (OUTPATIENT)
Dept: NON INVASIVE DIAGNOSTICS | Facility: CLINIC | Age: 82
Discharge: HOME | End: 2017-05-09
Payer: MEDICARE

## 2017-05-09 DIAGNOSIS — H25.12 NUCLEAR SCLEROTIC CATARACT OF LEFT EYE: ICD-10-CM

## 2017-05-09 DIAGNOSIS — H25.12 NUCLEAR SCLEROTIC CATARACT OF LEFT EYE: Primary | ICD-10-CM

## 2017-05-09 LAB
ATRIAL RATE: 91 BPM
HCT VFR BLD AUTO: 35.1 % (ref 36.5–49.3)
HGB BLD-MCNC: 11.5 G/DL (ref 12–17)
P AXIS: 61 DEGREES
PR INTERVAL: 182 MS
QRS AXIS: -13 DEGREES
QRSD INTERVAL: 84 MS
QT INTERVAL: 368 MS
QTC INTERVAL: 452 MS
T WAVE AXIS: 46 DEGREES
VENTRICULAR RATE: 91 BPM

## 2017-05-09 PROCEDURE — 93005 ELECTROCARDIOGRAM TRACING: CPT

## 2017-05-09 PROCEDURE — 36415 COLL VENOUS BLD VENIPUNCTURE: CPT

## 2017-05-09 PROCEDURE — 85014 HEMATOCRIT: CPT

## 2017-05-09 PROCEDURE — 85018 HEMOGLOBIN: CPT

## 2017-05-18 ENCOUNTER — ALLSCRIPTS OFFICE VISIT (OUTPATIENT)
Dept: OTHER | Facility: OTHER | Age: 82
End: 2017-05-18

## 2017-05-23 ENCOUNTER — GENERIC CONVERSION - ENCOUNTER (OUTPATIENT)
Dept: FAMILY MEDICINE CLINIC | Facility: CLINIC | Age: 82
End: 2017-05-23

## 2017-08-30 ENCOUNTER — HOSPITAL ENCOUNTER (INPATIENT)
Facility: HOSPITAL | Age: 82
LOS: 3 days | Discharge: NURSING, REHAB OR LONG-TERM CARE | DRG: 897 | End: 2017-09-02
Attending: HOSPITALIST | Admitting: HOSPITALIST
Payer: MEDICARE

## 2017-08-30 ENCOUNTER — APPOINTMENT (EMERGENCY)
Dept: CT IMAGING | Facility: HOSPITAL | Age: 82
DRG: 897 | End: 2017-08-30
Payer: MEDICARE

## 2017-08-30 DIAGNOSIS — Z78.9 POOR TOLERANCE FOR AMBULATION: ICD-10-CM

## 2017-08-30 DIAGNOSIS — R53.1 WEAKNESS: Primary | ICD-10-CM

## 2017-08-30 PROBLEM — R26.2 AMBULATORY DYSFUNCTION: Status: ACTIVE | Noted: 2017-08-30

## 2017-08-30 PROBLEM — F10.930 ALCOHOL WITHDRAWAL SEIZURE WITHOUT COMPLICATION (HCC): Chronic | Status: ACTIVE | Noted: 2017-08-30

## 2017-08-30 PROBLEM — R56.9 ALCOHOL WITHDRAWAL SEIZURE WITHOUT COMPLICATION (HCC): Chronic | Status: ACTIVE | Noted: 2017-08-30

## 2017-08-30 PROBLEM — F10.230 ALCOHOL WITHDRAWAL SEIZURE WITHOUT COMPLICATION (HCC): Chronic | Status: ACTIVE | Noted: 2017-08-30

## 2017-08-30 LAB
ABO GROUP BLD: NORMAL
ALBUMIN SERPL BCP-MCNC: 3.5 G/DL (ref 3.5–5)
ALP SERPL-CCNC: 70 U/L (ref 46–116)
ALT SERPL W P-5'-P-CCNC: 31 U/L (ref 12–78)
ANION GAP SERPL CALCULATED.3IONS-SCNC: 16 MMOL/L (ref 4–13)
APTT PPP: 29 SECONDS (ref 23–35)
AST SERPL W P-5'-P-CCNC: 35 U/L (ref 5–45)
BACTERIA UR QL AUTO: ABNORMAL /HPF
BASOPHILS # BLD AUTO: 0.04 THOUSANDS/ΜL (ref 0–0.1)
BASOPHILS NFR BLD AUTO: 1 % (ref 0–1)
BILIRUB SERPL-MCNC: 0.4 MG/DL (ref 0.2–1)
BILIRUB UR QL STRIP: NEGATIVE
BLD GP AB SCN SERPL QL: NEGATIVE
BUN SERPL-MCNC: 20 MG/DL (ref 5–25)
CALCIUM SERPL-MCNC: 9 MG/DL (ref 8.3–10.1)
CHLORIDE SERPL-SCNC: 102 MMOL/L (ref 100–108)
CLARITY UR: CLEAR
CO2 SERPL-SCNC: 23 MMOL/L (ref 21–32)
COLOR UR: YELLOW
CREAT SERPL-MCNC: 1.44 MG/DL (ref 0.6–1.3)
EOSINOPHIL # BLD AUTO: 0.11 THOUSAND/ΜL (ref 0–0.61)
EOSINOPHIL NFR BLD AUTO: 3 % (ref 0–6)
ERYTHROCYTE [DISTWIDTH] IN BLOOD BY AUTOMATED COUNT: 18.9 % (ref 11.6–15.1)
ETHANOL EXG-MCNC: 0.11 MG/DL
GFR SERPL CREATININE-BSD FRML MDRD: 46 ML/MIN/1.73SQ M
GLUCOSE SERPL-MCNC: 170 MG/DL (ref 65–140)
GLUCOSE UR STRIP-MCNC: NEGATIVE MG/DL
HCT VFR BLD AUTO: 36.7 % (ref 36.5–49.3)
HGB BLD-MCNC: 12.7 G/DL (ref 12–17)
HGB UR QL STRIP.AUTO: ABNORMAL
INR PPP: 1 (ref 0.86–1.16)
KETONES UR STRIP-MCNC: NEGATIVE MG/DL
LEUKOCYTE ESTERASE UR QL STRIP: NEGATIVE
LYMPHOCYTES # BLD AUTO: 1.14 THOUSANDS/ΜL (ref 0.6–4.47)
LYMPHOCYTES NFR BLD AUTO: 30 % (ref 14–44)
MAGNESIUM SERPL-MCNC: 1.2 MG/DL (ref 1.6–2.6)
MCH RBC QN AUTO: 31.7 PG (ref 26.8–34.3)
MCHC RBC AUTO-ENTMCNC: 34.6 G/DL (ref 31.4–37.4)
MCV RBC AUTO: 92 FL (ref 82–98)
MONOCYTES # BLD AUTO: 0.3 THOUSAND/ΜL (ref 0.17–1.22)
MONOCYTES NFR BLD AUTO: 8 % (ref 4–12)
NEUTROPHILS # BLD AUTO: 2.21 THOUSANDS/ΜL (ref 1.85–7.62)
NEUTS SEG NFR BLD AUTO: 58 % (ref 43–75)
NITRITE UR QL STRIP: NEGATIVE
NON-SQ EPI CELLS URNS QL MICRO: ABNORMAL /HPF
NT-PROBNP SERPL-MCNC: 141 PG/ML
PH UR STRIP.AUTO: 7 [PH] (ref 4.5–8)
PLATELET # BLD AUTO: 143 THOUSANDS/UL (ref 149–390)
PMV BLD AUTO: 9.5 FL (ref 8.9–12.7)
POTASSIUM SERPL-SCNC: 4.2 MMOL/L (ref 3.5–5.3)
PROT SERPL-MCNC: 7.5 G/DL (ref 6.4–8.2)
PROT UR STRIP-MCNC: ABNORMAL MG/DL
PROTHROMBIN TIME: 13 SECONDS (ref 12.1–14.4)
RBC # BLD AUTO: 4.01 MILLION/UL (ref 3.88–5.62)
RBC #/AREA URNS AUTO: ABNORMAL /HPF
RH BLD: POSITIVE
SODIUM SERPL-SCNC: 141 MMOL/L (ref 136–145)
SP GR UR STRIP.AUTO: 1.01 (ref 1–1.03)
SPECIMEN EXPIRATION DATE: NORMAL
TROPONIN I SERPL-MCNC: <0.02 NG/ML
TSH SERPL DL<=0.05 MIU/L-ACNC: 3.11 UIU/ML (ref 0.36–3.74)
UROBILINOGEN UR QL STRIP.AUTO: 0.2 E.U./DL
WBC # BLD AUTO: 3.8 THOUSAND/UL (ref 4.31–10.16)
WBC #/AREA URNS AUTO: ABNORMAL /HPF

## 2017-08-30 PROCEDURE — 86850 RBC ANTIBODY SCREEN: CPT | Performed by: PHYSICIAN ASSISTANT

## 2017-08-30 PROCEDURE — 96365 THER/PROPH/DIAG IV INF INIT: CPT

## 2017-08-30 PROCEDURE — 36415 COLL VENOUS BLD VENIPUNCTURE: CPT | Performed by: PHYSICIAN ASSISTANT

## 2017-08-30 PROCEDURE — 93005 ELECTROCARDIOGRAM TRACING: CPT | Performed by: PHYSICIAN ASSISTANT

## 2017-08-30 PROCEDURE — 81001 URINALYSIS AUTO W/SCOPE: CPT | Performed by: PHYSICIAN ASSISTANT

## 2017-08-30 PROCEDURE — 86901 BLOOD TYPING SEROLOGIC RH(D): CPT | Performed by: PHYSICIAN ASSISTANT

## 2017-08-30 PROCEDURE — 96375 TX/PRO/DX INJ NEW DRUG ADDON: CPT

## 2017-08-30 PROCEDURE — 84443 ASSAY THYROID STIM HORMONE: CPT | Performed by: PHYSICIAN ASSISTANT

## 2017-08-30 PROCEDURE — 82075 ASSAY OF BREATH ETHANOL: CPT | Performed by: PHYSICIAN ASSISTANT

## 2017-08-30 PROCEDURE — 86618 LYME DISEASE ANTIBODY: CPT | Performed by: PHYSICIAN ASSISTANT

## 2017-08-30 PROCEDURE — 99285 EMERGENCY DEPT VISIT HI MDM: CPT

## 2017-08-30 PROCEDURE — 86900 BLOOD TYPING SEROLOGIC ABO: CPT | Performed by: PHYSICIAN ASSISTANT

## 2017-08-30 PROCEDURE — 84484 ASSAY OF TROPONIN QUANT: CPT | Performed by: PHYSICIAN ASSISTANT

## 2017-08-30 PROCEDURE — 70450 CT HEAD/BRAIN W/O DYE: CPT

## 2017-08-30 PROCEDURE — 85730 THROMBOPLASTIN TIME PARTIAL: CPT | Performed by: PHYSICIAN ASSISTANT

## 2017-08-30 PROCEDURE — 85610 PROTHROMBIN TIME: CPT | Performed by: PHYSICIAN ASSISTANT

## 2017-08-30 PROCEDURE — 85025 COMPLETE CBC W/AUTO DIFF WBC: CPT | Performed by: PHYSICIAN ASSISTANT

## 2017-08-30 PROCEDURE — 80053 COMPREHEN METABOLIC PANEL: CPT | Performed by: PHYSICIAN ASSISTANT

## 2017-08-30 PROCEDURE — 83880 ASSAY OF NATRIURETIC PEPTIDE: CPT | Performed by: PHYSICIAN ASSISTANT

## 2017-08-30 PROCEDURE — 83735 ASSAY OF MAGNESIUM: CPT | Performed by: PHYSICIAN ASSISTANT

## 2017-08-30 PROCEDURE — 96361 HYDRATE IV INFUSION ADD-ON: CPT

## 2017-08-30 RX ORDER — ONDANSETRON 2 MG/ML
INJECTION INTRAMUSCULAR; INTRAVENOUS
Status: COMPLETED
Start: 2017-08-30 | End: 2017-08-30

## 2017-08-30 RX ORDER — LORAZEPAM 2 MG/ML
1 INJECTION INTRAMUSCULAR EVERY 2 HOUR PRN
Status: DISCONTINUED | OUTPATIENT
Start: 2017-08-30 | End: 2017-09-02 | Stop reason: HOSPADM

## 2017-08-30 RX ORDER — MAGNESIUM SULFATE HEPTAHYDRATE 40 MG/ML
2 INJECTION, SOLUTION INTRAVENOUS ONCE
Status: DISCONTINUED | OUTPATIENT
Start: 2017-08-30 | End: 2017-08-30

## 2017-08-30 RX ORDER — POLYETHYLENE GLYCOL 3350 17 G/17G
17 POWDER, FOR SOLUTION ORAL DAILY
COMMUNITY

## 2017-08-30 RX ORDER — MAGNESIUM SULFATE HEPTAHYDRATE 40 MG/ML
2 INJECTION, SOLUTION INTRAVENOUS ONCE
Status: COMPLETED | OUTPATIENT
Start: 2017-08-30 | End: 2017-08-30

## 2017-08-30 RX ORDER — MINERAL OIL AND PETROLATUM 150; 830 MG/G; MG/G
OINTMENT OPHTHALMIC
Status: DISCONTINUED | OUTPATIENT
Start: 2017-08-30 | End: 2017-09-02 | Stop reason: HOSPADM

## 2017-08-30 RX ORDER — HEPARIN SODIUM 5000 [USP'U]/ML
5000 INJECTION, SOLUTION INTRAVENOUS; SUBCUTANEOUS EVERY 8 HOURS SCHEDULED
Status: DISCONTINUED | OUTPATIENT
Start: 2017-08-30 | End: 2017-09-02 | Stop reason: HOSPADM

## 2017-08-30 RX ORDER — PANTOPRAZOLE SODIUM 40 MG/1
40 TABLET, DELAYED RELEASE ORAL
Status: DISCONTINUED | OUTPATIENT
Start: 2017-08-31 | End: 2017-09-02 | Stop reason: HOSPADM

## 2017-08-30 RX ORDER — NADOLOL 20 MG/1
40 TABLET ORAL DAILY
Status: DISCONTINUED | OUTPATIENT
Start: 2017-08-31 | End: 2017-09-02 | Stop reason: HOSPADM

## 2017-08-30 RX ORDER — FLUTICASONE PROPIONATE 50 MCG
2 SPRAY, SUSPENSION (ML) NASAL 2 TIMES DAILY
Status: DISCONTINUED | OUTPATIENT
Start: 2017-08-30 | End: 2017-09-02 | Stop reason: HOSPADM

## 2017-08-30 RX ORDER — OXAZEPAM 15 MG/1
15 CAPSULE ORAL EVERY 6 HOURS SCHEDULED
Status: DISCONTINUED | OUTPATIENT
Start: 2017-08-30 | End: 2017-08-31

## 2017-08-30 RX ORDER — TRIAMTERENE AND HYDROCHLOROTHIAZIDE 37.5; 25 MG/1; MG/1
TABLET ORAL
COMMUNITY
Start: 2017-05-09 | End: 2017-09-02 | Stop reason: HOSPADM

## 2017-08-30 RX ORDER — POLYETHYLENE GLYCOL 3350 17 G/17G
17 POWDER, FOR SOLUTION ORAL DAILY
Status: DISCONTINUED | OUTPATIENT
Start: 2017-08-31 | End: 2017-09-02 | Stop reason: HOSPADM

## 2017-08-30 RX ORDER — NADOLOL 40 MG/1
TABLET ORAL
COMMUNITY
Start: 2017-05-09 | End: 2018-03-30

## 2017-08-30 RX ORDER — POLYVINYL ALCOHOL 14 MG/ML
1 SOLUTION/ DROPS OPHTHALMIC AS NEEDED
COMMUNITY
End: 2018-03-30

## 2017-08-30 RX ORDER — LORATADINE 10 MG/1
10 TABLET ORAL DAILY
Status: DISCONTINUED | OUTPATIENT
Start: 2017-08-31 | End: 2017-09-02 | Stop reason: HOSPADM

## 2017-08-30 RX ORDER — LORAZEPAM 2 MG/ML
0.5 INJECTION INTRAMUSCULAR ONCE
Status: COMPLETED | OUTPATIENT
Start: 2017-08-30 | End: 2017-08-30

## 2017-08-30 RX ORDER — SODIUM CHLORIDE 9 MG/ML
125 INJECTION, SOLUTION INTRAVENOUS CONTINUOUS
Status: DISCONTINUED | OUTPATIENT
Start: 2017-08-30 | End: 2017-08-30 | Stop reason: CLARIF

## 2017-08-30 RX ORDER — ONDANSETRON 2 MG/ML
4 INJECTION INTRAMUSCULAR; INTRAVENOUS EVERY 4 HOURS PRN
Status: DISCONTINUED | OUTPATIENT
Start: 2017-08-30 | End: 2017-09-02 | Stop reason: HOSPADM

## 2017-08-30 RX ORDER — FOLIC ACID 5 MG/ML
1 INJECTION, SOLUTION INTRAMUSCULAR; INTRAVENOUS; SUBCUTANEOUS DAILY
Status: DISCONTINUED | OUTPATIENT
Start: 2017-08-30 | End: 2017-08-30 | Stop reason: CLARIF

## 2017-08-30 RX ADMIN — ONDANSETRON 4 MG: 2 INJECTION INTRAMUSCULAR; INTRAVENOUS at 21:15

## 2017-08-30 RX ADMIN — FLUTICASONE PROPIONATE 2 SPRAY: 50 SPRAY, METERED NASAL at 21:26

## 2017-08-30 RX ADMIN — HEPARIN SODIUM 5000 UNITS: 5000 INJECTION, SOLUTION INTRAVENOUS; SUBCUTANEOUS at 21:05

## 2017-08-30 RX ADMIN — MAGNESIUM SULFATE HEPTAHYDRATE 2 G: 40 INJECTION, SOLUTION INTRAVENOUS at 16:43

## 2017-08-30 RX ADMIN — SODIUM CHLORIDE 500 ML: 0.9 INJECTION, SOLUTION INTRAVENOUS at 15:28

## 2017-08-30 RX ADMIN — MINERAL OIL AND WHITE PETROLATUM: 150; 830 OINTMENT OPHTHALMIC at 21:27

## 2017-08-30 RX ADMIN — FOLIC ACID: 5 INJECTION, SOLUTION INTRAMUSCULAR; INTRAVENOUS; SUBCUTANEOUS at 21:16

## 2017-08-30 RX ADMIN — OXAZEPAM 15 MG: 15 CAPSULE, GELATIN COATED ORAL at 20:53

## 2017-08-30 RX ADMIN — LORAZEPAM 0.5 MG: 2 INJECTION INTRAMUSCULAR; INTRAVENOUS at 19:01

## 2017-08-31 ENCOUNTER — APPOINTMENT (INPATIENT)
Dept: ULTRASOUND IMAGING | Facility: HOSPITAL | Age: 82
DRG: 897 | End: 2017-08-31
Payer: MEDICARE

## 2017-08-31 LAB
ALBUMIN SERPL BCP-MCNC: 3.1 G/DL (ref 3.5–5)
ALP SERPL-CCNC: 60 U/L (ref 46–116)
ALT SERPL W P-5'-P-CCNC: 25 U/L (ref 12–78)
ANION GAP SERPL CALCULATED.3IONS-SCNC: 11 MMOL/L (ref 4–13)
APTT PPP: 31 SECONDS (ref 23–35)
AST SERPL W P-5'-P-CCNC: 26 U/L (ref 5–45)
ATRIAL RATE: 90 BPM
B BURGDOR IGG SER IA-ACNC: 0.13
B BURGDOR IGM SER IA-ACNC: 0.17
BILIRUB SERPL-MCNC: 0.7 MG/DL (ref 0.2–1)
BUN SERPL-MCNC: 17 MG/DL (ref 5–25)
CALCIUM SERPL-MCNC: 9 MG/DL (ref 8.3–10.1)
CHLORIDE SERPL-SCNC: 105 MMOL/L (ref 100–108)
CO2 SERPL-SCNC: 26 MMOL/L (ref 21–32)
CREAT SERPL-MCNC: 1.28 MG/DL (ref 0.6–1.3)
ERYTHROCYTE [DISTWIDTH] IN BLOOD BY AUTOMATED COUNT: 19 % (ref 11.6–15.1)
GFR SERPL CREATININE-BSD FRML MDRD: 53 ML/MIN/1.73SQ M
GLUCOSE SERPL-MCNC: 119 MG/DL (ref 65–140)
HCT VFR BLD AUTO: 37.3 % (ref 36.5–49.3)
HGB BLD-MCNC: 12.4 G/DL (ref 12–17)
INR PPP: 1.03 (ref 0.86–1.16)
MAGNESIUM SERPL-MCNC: 1.7 MG/DL (ref 1.6–2.6)
MCH RBC QN AUTO: 30.8 PG (ref 26.8–34.3)
MCHC RBC AUTO-ENTMCNC: 33.2 G/DL (ref 31.4–37.4)
MCV RBC AUTO: 93 FL (ref 82–98)
P AXIS: 81 DEGREES
PHOSPHATE SERPL-MCNC: 3.2 MG/DL (ref 2.3–4.1)
PLATELET # BLD AUTO: 129 THOUSANDS/UL (ref 149–390)
PMV BLD AUTO: 10.5 FL (ref 8.9–12.7)
POTASSIUM SERPL-SCNC: 4.1 MMOL/L (ref 3.5–5.3)
PR INTERVAL: 196 MS
PROT SERPL-MCNC: 6.8 G/DL (ref 6.4–8.2)
PROTHROMBIN TIME: 13.3 SECONDS (ref 12.1–14.4)
QRS AXIS: -40 DEGREES
QRSD INTERVAL: 78 MS
QT INTERVAL: 352 MS
QTC INTERVAL: 430 MS
RBC # BLD AUTO: 4.03 MILLION/UL (ref 3.88–5.62)
SODIUM SERPL-SCNC: 142 MMOL/L (ref 136–145)
T WAVE AXIS: 75 DEGREES
VENTRICULAR RATE: 90 BPM
WBC # BLD AUTO: 3.55 THOUSAND/UL (ref 4.31–10.16)

## 2017-08-31 PROCEDURE — G8978 MOBILITY CURRENT STATUS: HCPCS

## 2017-08-31 PROCEDURE — 85610 PROTHROMBIN TIME: CPT | Performed by: NURSE PRACTITIONER

## 2017-08-31 PROCEDURE — G8979 MOBILITY GOAL STATUS: HCPCS

## 2017-08-31 PROCEDURE — G8987 SELF CARE CURRENT STATUS: HCPCS

## 2017-08-31 PROCEDURE — 97166 OT EVAL MOD COMPLEX 45 MIN: CPT

## 2017-08-31 PROCEDURE — 84100 ASSAY OF PHOSPHORUS: CPT | Performed by: NURSE PRACTITIONER

## 2017-08-31 PROCEDURE — 97163 PT EVAL HIGH COMPLEX 45 MIN: CPT

## 2017-08-31 PROCEDURE — 85027 COMPLETE CBC AUTOMATED: CPT | Performed by: NURSE PRACTITIONER

## 2017-08-31 PROCEDURE — G8988 SELF CARE GOAL STATUS: HCPCS

## 2017-08-31 PROCEDURE — 97535 SELF CARE MNGMENT TRAINING: CPT

## 2017-08-31 PROCEDURE — 83735 ASSAY OF MAGNESIUM: CPT | Performed by: NURSE PRACTITIONER

## 2017-08-31 PROCEDURE — 76705 ECHO EXAM OF ABDOMEN: CPT

## 2017-08-31 PROCEDURE — 80053 COMPREHEN METABOLIC PANEL: CPT | Performed by: NURSE PRACTITIONER

## 2017-08-31 PROCEDURE — 85730 THROMBOPLASTIN TIME PARTIAL: CPT | Performed by: NURSE PRACTITIONER

## 2017-08-31 RX ORDER — OXAZEPAM 10 MG
10 CAPSULE ORAL EVERY 6 HOURS SCHEDULED
Status: DISCONTINUED | OUTPATIENT
Start: 2017-08-31 | End: 2017-09-01

## 2017-08-31 RX ORDER — MAGNESIUM SULFATE HEPTAHYDRATE 40 MG/ML
2 INJECTION, SOLUTION INTRAVENOUS ONCE
Status: COMPLETED | OUTPATIENT
Start: 2017-08-31 | End: 2017-08-31

## 2017-08-31 RX ORDER — TRIAMTERENE AND HYDROCHLOROTHIAZIDE 37.5; 25 MG/1; MG/1
1 CAPSULE ORAL DAILY
Status: DISCONTINUED | OUTPATIENT
Start: 2017-08-31 | End: 2017-09-02 | Stop reason: HOSPADM

## 2017-08-31 RX ADMIN — HEPARIN SODIUM 5000 UNITS: 5000 INJECTION, SOLUTION INTRAVENOUS; SUBCUTANEOUS at 13:10

## 2017-08-31 RX ADMIN — OXAZEPAM 10 MG: 10 CAPSULE, GELATIN COATED ORAL at 11:41

## 2017-08-31 RX ADMIN — LORATADINE 10 MG: 10 TABLET ORAL at 08:16

## 2017-08-31 RX ADMIN — HEPARIN SODIUM 5000 UNITS: 5000 INJECTION, SOLUTION INTRAVENOUS; SUBCUTANEOUS at 06:32

## 2017-08-31 RX ADMIN — POLYETHYLENE GLYCOL 3350 17 G: 17 POWDER, FOR SOLUTION ORAL at 08:16

## 2017-08-31 RX ADMIN — TRIAMTERENE AND HYDROCHLOROTHIAZIDE 1 CAPSULE: 25; 37.5 CAPSULE ORAL at 11:38

## 2017-08-31 RX ADMIN — OXAZEPAM 15 MG: 15 CAPSULE, GELATIN COATED ORAL at 06:31

## 2017-08-31 RX ADMIN — PANTOPRAZOLE SODIUM 40 MG: 40 TABLET, DELAYED RELEASE ORAL at 08:16

## 2017-08-31 RX ADMIN — FLUTICASONE PROPIONATE 2 SPRAY: 50 SPRAY, METERED NASAL at 17:10

## 2017-08-31 RX ADMIN — MINERAL OIL AND WHITE PETROLATUM: 150; 830 OINTMENT OPHTHALMIC at 22:11

## 2017-08-31 RX ADMIN — FOLIC ACID: 5 INJECTION, SOLUTION INTRAMUSCULAR; INTRAVENOUS; SUBCUTANEOUS at 22:05

## 2017-08-31 RX ADMIN — PANTOPRAZOLE SODIUM 40 MG: 40 TABLET, DELAYED RELEASE ORAL at 16:43

## 2017-08-31 RX ADMIN — MAGNESIUM SULFATE HEPTAHYDRATE 2 G: 40 INJECTION, SOLUTION INTRAVENOUS at 08:16

## 2017-08-31 RX ADMIN — HEPARIN SODIUM 5000 UNITS: 5000 INJECTION, SOLUTION INTRAVENOUS; SUBCUTANEOUS at 22:05

## 2017-08-31 RX ADMIN — OXAZEPAM 15 MG: 15 CAPSULE, GELATIN COATED ORAL at 00:25

## 2017-08-31 RX ADMIN — NADOLOL 40 MG: 20 TABLET ORAL at 08:16

## 2017-08-31 RX ADMIN — FLUTICASONE PROPIONATE 2 SPRAY: 50 SPRAY, METERED NASAL at 08:17

## 2017-08-31 RX ADMIN — OXAZEPAM 10 MG: 10 CAPSULE, GELATIN COATED ORAL at 17:08

## 2017-09-01 LAB
ALBUMIN SERPL BCP-MCNC: 3.2 G/DL (ref 3.5–5)
ALP SERPL-CCNC: 59 U/L (ref 46–116)
ALT SERPL W P-5'-P-CCNC: 71 U/L (ref 12–78)
ANION GAP SERPL CALCULATED.3IONS-SCNC: 10 MMOL/L (ref 4–13)
AST SERPL W P-5'-P-CCNC: 100 U/L (ref 5–45)
BILIRUB SERPL-MCNC: 1 MG/DL (ref 0.2–1)
BUN SERPL-MCNC: 22 MG/DL (ref 5–25)
CALCIUM SERPL-MCNC: 8.3 MG/DL (ref 8.3–10.1)
CHLORIDE SERPL-SCNC: 103 MMOL/L (ref 100–108)
CO2 SERPL-SCNC: 25 MMOL/L (ref 21–32)
CREAT SERPL-MCNC: 1.37 MG/DL (ref 0.6–1.3)
GFR SERPL CREATININE-BSD FRML MDRD: 48 ML/MIN/1.73SQ M
GLUCOSE SERPL-MCNC: 94 MG/DL (ref 65–140)
POTASSIUM SERPL-SCNC: 3.6 MMOL/L (ref 3.5–5.3)
PROT SERPL-MCNC: 6.9 G/DL (ref 6.4–8.2)
SODIUM SERPL-SCNC: 138 MMOL/L (ref 136–145)

## 2017-09-01 PROCEDURE — 80053 COMPREHEN METABOLIC PANEL: CPT | Performed by: HOSPITALIST

## 2017-09-01 RX ORDER — IODINE/SODIUM IODIDE 2 %
TINCTURE TOPICAL AS NEEDED
Status: DISCONTINUED | OUTPATIENT
Start: 2017-09-01 | End: 2017-09-02 | Stop reason: HOSPADM

## 2017-09-01 RX ORDER — OXAZEPAM 10 MG
10 CAPSULE ORAL EVERY 12 HOURS SCHEDULED
Status: COMPLETED | OUTPATIENT
Start: 2017-09-01 | End: 2017-09-01

## 2017-09-01 RX ADMIN — PANTOPRAZOLE SODIUM 40 MG: 40 TABLET, DELAYED RELEASE ORAL at 08:35

## 2017-09-01 RX ADMIN — NADOLOL 40 MG: 20 TABLET ORAL at 08:35

## 2017-09-01 RX ADMIN — FLUTICASONE PROPIONATE 2 SPRAY: 50 SPRAY, METERED NASAL at 08:35

## 2017-09-01 RX ADMIN — PANTOPRAZOLE SODIUM 40 MG: 40 TABLET, DELAYED RELEASE ORAL at 16:48

## 2017-09-01 RX ADMIN — OXAZEPAM 10 MG: 10 CAPSULE, GELATIN COATED ORAL at 22:02

## 2017-09-01 RX ADMIN — FERRIC OXIDE RED 17.7 APPLICATION: 8; 8 LOTION TOPICAL at 16:49

## 2017-09-01 RX ADMIN — TRIAMTERENE AND HYDROCHLOROTHIAZIDE 1 CAPSULE: 25; 37.5 CAPSULE ORAL at 08:36

## 2017-09-01 RX ADMIN — LORAZEPAM 1 MG: 2 INJECTION INTRAMUSCULAR; INTRAVENOUS at 22:02

## 2017-09-01 RX ADMIN — HEPARIN SODIUM 5000 UNITS: 5000 INJECTION, SOLUTION INTRAVENOUS; SUBCUTANEOUS at 22:02

## 2017-09-01 RX ADMIN — HEPARIN SODIUM 5000 UNITS: 5000 INJECTION, SOLUTION INTRAVENOUS; SUBCUTANEOUS at 13:30

## 2017-09-01 RX ADMIN — HEPARIN SODIUM 5000 UNITS: 5000 INJECTION, SOLUTION INTRAVENOUS; SUBCUTANEOUS at 06:28

## 2017-09-01 RX ADMIN — FOLIC ACID: 5 INJECTION, SOLUTION INTRAMUSCULAR; INTRAVENOUS; SUBCUTANEOUS at 22:01

## 2017-09-01 RX ADMIN — FLUTICASONE PROPIONATE 2 SPRAY: 50 SPRAY, METERED NASAL at 16:47

## 2017-09-01 RX ADMIN — OXAZEPAM 10 MG: 10 CAPSULE, GELATIN COATED ORAL at 06:29

## 2017-09-01 RX ADMIN — OXAZEPAM 10 MG: 10 CAPSULE, GELATIN COATED ORAL at 00:34

## 2017-09-01 RX ADMIN — LORATADINE 10 MG: 10 TABLET ORAL at 08:35

## 2017-09-02 VITALS
WEIGHT: 302.03 LBS | RESPIRATION RATE: 20 BRPM | BODY MASS INDEX: 44.73 KG/M2 | DIASTOLIC BLOOD PRESSURE: 80 MMHG | SYSTOLIC BLOOD PRESSURE: 155 MMHG | HEART RATE: 91 BPM | TEMPERATURE: 98 F | HEIGHT: 69 IN | OXYGEN SATURATION: 98 %

## 2017-09-02 LAB
ANION GAP SERPL CALCULATED.3IONS-SCNC: 13 MMOL/L (ref 4–13)
BUN SERPL-MCNC: 26 MG/DL (ref 5–25)
CALCIUM SERPL-MCNC: 8.5 MG/DL (ref 8.3–10.1)
CHLORIDE SERPL-SCNC: 102 MMOL/L (ref 100–108)
CO2 SERPL-SCNC: 22 MMOL/L (ref 21–32)
CREAT SERPL-MCNC: 1.25 MG/DL (ref 0.6–1.3)
ERYTHROCYTE [DISTWIDTH] IN BLOOD BY AUTOMATED COUNT: 18.4 % (ref 11.6–15.1)
GFR SERPL CREATININE-BSD FRML MDRD: 54 ML/MIN/1.73SQ M
GLUCOSE SERPL-MCNC: 99 MG/DL (ref 65–140)
HCT VFR BLD AUTO: 36 % (ref 36.5–49.3)
HGB BLD-MCNC: 11.8 G/DL (ref 12–17)
MCH RBC QN AUTO: 31.1 PG (ref 26.8–34.3)
MCHC RBC AUTO-ENTMCNC: 32.8 G/DL (ref 31.4–37.4)
MCV RBC AUTO: 95 FL (ref 82–98)
PLATELET # BLD AUTO: 98 THOUSANDS/UL (ref 149–390)
PMV BLD AUTO: 10.2 FL (ref 8.9–12.7)
POTASSIUM SERPL-SCNC: 3.4 MMOL/L (ref 3.5–5.3)
RBC # BLD AUTO: 3.8 MILLION/UL (ref 3.88–5.62)
SODIUM SERPL-SCNC: 137 MMOL/L (ref 136–145)
WBC # BLD AUTO: 3.88 THOUSAND/UL (ref 4.31–10.16)

## 2017-09-02 PROCEDURE — 80048 BASIC METABOLIC PNL TOTAL CA: CPT | Performed by: HOSPITALIST

## 2017-09-02 PROCEDURE — 85027 COMPLETE CBC AUTOMATED: CPT | Performed by: HOSPITALIST

## 2017-09-02 PROCEDURE — 94760 N-INVAS EAR/PLS OXIMETRY 1: CPT

## 2017-09-02 RX ORDER — AMLODIPINE BESYLATE 5 MG/1
5 TABLET ORAL DAILY
Qty: 30 TABLET | Refills: 0 | Status: SHIPPED | OUTPATIENT
Start: 2017-09-02 | End: 2018-03-30 | Stop reason: SDUPTHER

## 2017-09-02 RX ORDER — FOLIC ACID 1 MG/1
1 TABLET ORAL DAILY
Qty: 30 TABLET | Refills: 0 | Status: SHIPPED | OUTPATIENT
Start: 2017-09-02 | End: 2018-03-30

## 2017-09-02 RX ORDER — LANOLIN ALCOHOL/MO/W.PET/CERES
100 CREAM (GRAM) TOPICAL DAILY
Qty: 30 TABLET | Refills: 0 | Status: SHIPPED | OUTPATIENT
Start: 2017-09-02 | End: 2018-03-30

## 2017-09-02 RX ORDER — LORAZEPAM 0.5 MG/1
0.5 TABLET ORAL EVERY 8 HOURS PRN
Qty: 15 TABLET | Refills: 0 | Status: SHIPPED | OUTPATIENT
Start: 2017-09-02 | End: 2018-03-30

## 2017-09-02 RX ADMIN — TRIAMTERENE AND HYDROCHLOROTHIAZIDE 1 CAPSULE: 25; 37.5 CAPSULE ORAL at 08:32

## 2017-09-02 RX ADMIN — NADOLOL 40 MG: 20 TABLET ORAL at 08:31

## 2017-09-02 RX ADMIN — LORATADINE 10 MG: 10 TABLET ORAL at 08:31

## 2017-09-02 RX ADMIN — FLUTICASONE PROPIONATE 2 SPRAY: 50 SPRAY, METERED NASAL at 08:31

## 2017-09-02 RX ADMIN — PANTOPRAZOLE SODIUM 40 MG: 40 TABLET, DELAYED RELEASE ORAL at 08:31

## 2017-09-05 ENCOUNTER — GENERIC CONVERSION - ENCOUNTER (OUTPATIENT)
Dept: OTHER | Facility: OTHER | Age: 82
End: 2017-09-05

## 2017-09-27 ENCOUNTER — ALLSCRIPTS OFFICE VISIT (OUTPATIENT)
Dept: OTHER | Facility: OTHER | Age: 82
End: 2017-09-27

## 2018-01-11 NOTE — MISCELLANEOUS
Message  Spoke with Stanley Fermin ([phone]) from [facility] [address] [address], she verified recent changes in Marquis's medication since his hospital discharge  I spoke to Dr Kera López and informed him that Stanley Fermin stated they discontinued Ibprophan and added Gabapentin 300mg 3x daily to his list      Stanley Leon also informed me that the patient is still not interest in an appt until the second week of May since his discharge of the hospital  She states that patient told her he has a fall last night and she wanted to report this  She stated that Genny Akins was not interested in medical services for his fall as he wasn't feeling any pain  Active Problems    1  Acute pancreatitis, unspecified pancreatitis type (577 0) (K85 9)   2  Allergic rhinitis (477 9) (J30 9)   3  Anemia of chronic disease (285 29) (D63 8)   4  Arthralgia (719 40)   5  Chronic low back pain (724 2,338 29) (M54 5,G89 29)   6  Cough (786 2) (R05)   7  Eczema (692 9) (L30 9)   8  Edema (782 3) (R60 9)   9  Esophageal reflux (530 81) (K21 9)   10  Fatigue (780 79) (R53 83)   11  Gait disturbance (781 2) (R26 9)   12  Gastritis (535 50) (K29 70)   13  Generalized osteoarthritis of multiple sites (715 09) (M15 9)   14  Hyperglycemia (790 29) (R73 9)   15  Hypertension (401 9) (I10)   16  Hypomagnesemia (275 2) (E83 42)   17  Peptic ulcer (533 90) (K27 9)   18  Peripheral neuropathy (356 9) (G62 9)   19  Polymyalgia rheumatica (725) (M35 3)   20  Rheumatoid arthritis (714 0) (M06 9)   21  Seborrhea (706 3) (L21 9)   22  Urine frequency (788 41) (R35 0)    Current Meds   1  AmLODIPine Besylate 10 MG Oral Tablet; Take 1 tablet daily; Therapy: 33QAR6660 to (Evaluate:11Jun2016)  Requested for: 20KJX5335; Last   Rx:17Jun2015 Ordered   2  Cefuroxime Axetil 500 MG Oral Tablet; Take 1 tablet twice daily Recorded   3  Hydrocortisone 2 5 % External Cream; APPLY 2-3 TIMES DAILY TO AFFECTED   AREA(S); Therapy: 50YWE7838 to (Evaluate:11Jun2016)  Requested for: 58LPU5577;  Last Rx: 17KEP7027 Ordered   4  Ibuprofen 600 MG Oral Tablet; 1 TAB DAILY Recorded   5  Ketoconazole 2 % External Shampoo; APPLY TO AFFECTED AREA(S) ONCE DAILY AS   DIRECTED; Therapy: 66BDZ8903 to (Evaluate:24Nov2014); Last Rx:57Gyz4307 Ordered   6  Nadolol 40 MG Oral Tablet; TAKE 1 TABLET DAILY; Therapy: 85GCP3188 to (Last Rx:17Jun2015)  Requested for: 04MIR1750 Ordered   7  Triamterene-HCTZ 37 5-25 MG Oral Capsule; TAKE 1 CAPSULE DAILY  Requested for:   79PYA6905; Last Rx:17Jun2015 Ordered    Allergies    1   Morphine Derivatives    Signatures   Electronically signed by : Kamila Ott, ; Apr 29 2016 10:42AM EST                       (Author)

## 2018-01-12 NOTE — MISCELLANEOUS
History of Present Illness  TCM Communication [facility]: The patient is being contacted for follow-up after hospitalization  Hospital records were reviewed  He was hospitalized at [facility]  The date of admission: 4/24/16, date of discharge: 4/27/16  Diagnosis: Abdominal Pain / Intractable vomiting with nausea / Pancreatitis / Hypertension / Alcohol Abuse with daily alcohol consumption  He was discharged to home  Smoking status was reviewed and is accurate in Social History  He refused a follow up appointment due to he wants to wait the 10 days until he receives his biopsy report from the EGD  He is in touch with GI, Dr Vidya John and has a followup scheduled with him  Patient is still complaining of mild abdominal pain but is on the GI meds as prescribed  Counseling was provided to the patient  Topics counseled included instructions for management, patient and family education and importance of compliance with treatment  Transition of Care appointment declined at this time with Dr Aranda  Communication performed and completed by Charlette Heimlich RN      Active Problems    1  Allergic rhinitis (477 9) (J30 9)   2  Anemia of chronic disease (285 29) (D63 8)   3  Arthralgia (719 40)   4  Chronic low back pain (724 2,338 29) (M54 5,G89 29)   5  Cough (786 2) (R05)   6  Eczema (692 9) (L30 9)   7  Edema (782 3) (R60 9)   8  Esophageal reflux (530 81) (K21 9)   9  Fatigue (780 79) (R53 83)   10  Gait disturbance (781 2) (R26 9)   11  Gastritis (535 50) (K29 70)   12  Generalized osteoarthritis of multiple sites (715 09) (M15 9)   13  Hyperglycemia (790 29) (R73 9)   14  Hypertension (401 9) (I10)   15  Hypomagnesemia (275 2) (E83 42)   16  Peptic ulcer (533 90) (K27 9)   17  Peripheral neuropathy (356 9) (G62 9)   18  Polymyalgia rheumatica (725) (M35 3)   19  Rheumatoid arthritis (714 0) (M06 9)   20  Seborrhea (706 3) (L21 9)   21  Urine frequency (788 41) (R35 0)    Past Medical History    1  Acute sinusitis (461 9) (J01 90)   2  History of Cough (786 2) (R05)   3  History of Diabetes Mellitus (250 00)   4  History of Emphysema (492 8) (J43 9)   5  History of Foot Pain (Soft Tissue) (729 5)   6  History of abdominal pain (V13 89) (Z87 898)   7  History of Irritable Bowel Syndrome With Diarrhea (564 1)   8  History of Prostate Cancer (V10 46)   9  History of Special screening examination for neoplasm of prostate (V76 44) (Z12 5)   10  History of Uncomplicated alcohol abuse (305 00) (F10 10)    Surgical History    1  History of Back Surgery   2  History of Cataract Surgery    Family History  Mother    1  Family history of cardiac disorder (V17 49) (Z82 49)  Father    2  Family history of cardiac disorder (V17 49) (Z82 49)  Family History    3  Family history of Diabetes Mellitus (V18 0)   4  Family history of Hypertension (V17 49)    Social History    · Former smoker (Q65 66) (X41 414)   · Marital History - Currently     Current Meds   1  AmLODIPine Besylate 10 MG Oral Tablet; Take 1 tablet daily; Therapy: 83SNQ4385 to (Evaluate:11Jun2016)  Requested for: 62OYH0616; Last   Rx:17Jun2015 Ordered   2  Cefuroxime Axetil 500 MG Oral Tablet; Take 1 tablet twice daily Recorded   3  Hydrocortisone 2 5 % External Cream; APPLY 2-3 TIMES DAILY TO AFFECTED AREA(S); Therapy: 87VJN9410 to (Evaluate:11Jun2016)  Requested for: 02TFU0824; Last   Rx:17Jun2015 Ordered   4  Ibuprofen 600 MG Oral Tablet; 1 TAB DAILY Recorded   5  Ketoconazole 2 % External Shampoo; APPLY TO AFFECTED AREA(S) ONCE DAILY AS   DIRECTED; Therapy: 90ESR4444 to (Evaluate:24Nov2014); Last Rx:67Qxm2111 Ordered   6  Nadolol 40 MG Oral Tablet; TAKE 1 TABLET DAILY; Therapy: 42MUK7834 to (Last Rx:17Jun2015)  Requested for: 11ZOI9907 Ordered   7  Triamterene-HCTZ 37 5-25 MG Oral Capsule; TAKE 1 CAPSULE DAILY  Requested for:   97SKZ8224; Last Rx:17Jun2015 Ordered    Allergies    1   Morphine Derivatives    Future Appointments    Date/Time Provider Specialty Site   07/06/2016 11:30 AM Morena Aranda Chi, DO Family Medicine [facility]     Signatures   Electronically signed by : Nikunj Palumbo, ; Apr 28 2016  3:00PM EST                       (Author)    Electronically signed by : Johnny Saldaña DO;  Apr 29 2016  9:56AM EST                       (Author)

## 2018-01-12 NOTE — MISCELLANEOUS
Assessment   1  GI bleed (578 9) (K92 2)  2  Hyponatremia (276 1) (E87 1)  3  Chronic low back pain (724 2,338 29) (M54 5,G89 29)  4  Gastritis (535 50) (K29 70)    Plan  Chronic low back pain    · Start: TraMADol HCl - 50 MG Oral Tablet; TAKE 1 TABLET EVERY 6 HOURS AS NEEDED  FOR PAIN  Rx By: Devang Fleming; Dispense: 14 Days ; #:56 Tablet; Refill: 0;For: Chronic low back pain;   JUNI = N; Print Rx  Edema    · Renew: HydroCHLOROthiazide 12 5 MG Oral Tablet; Take 1 tablet daily  Rx By: Devang Fleming; Dispense: 90 Days ; #:90 Tablet; Refill: 1;For: Edema; JUNI = N;   Verified Transmission to [address] [place] [address]; Last Updated By:   System, [employer]; 2/15/2017 3:35:27 PM  Gastritis    · Follow-up visit in 3 months Evaluation and Treatment  Follow-up  Status: Hold For -  Scheduling  Requested for: 72QTM1338  Ordered; For: Gastritis; Ordered By: Devang Fleming  Performed:   Due: 91HYG8523  Gastritis, GI bleed    · Renew: Pantoprazole Sodium 40 MG Oral Tablet Delayed Release; Take 1 tablet twice  daily  Rx By: Devang Fleming; Dispense: 90 Days ; #:180 Tablet Delayed Release; Refill: 1;For:   Gastritis, GI bleed; JUNI = N; Verified Transmission to [address] [place] [address]; Last Updated By: System, [employer]; 2/15/2017 3:30:09 PM  Hyponatremia    · (1) BASIC METABOLIC PROFILE; Status:Active; Requested for:88Ill0130;   Perform:[facility]; POT:[id number]; Ordered; For:Hyponatremia; Ordered   By:Balbir Aranda; Discussion/Summary  Discussion Summary:   Meds reconciled  cont meds from hospital -- refilled today  will try tramadol for pain  f/u in 3 months or as needed  bmp ordered for hyponatremia recheck  History of Present Illness  TCM Communication [facility]: The patient is being contacted for follow-up after hospitalization  Hospital records were reviewed  He was hospitalized at [address] [place] [address]   The date of admission: 02/05/17, date of discharge: 02/09/17, Admitting Diagnosis: Acute Upper GI Bleed, Black Tarry Stools  Diagnosis: Discharge Diagnosis: Peptic Ulcer Disease, HTN, Alcohol Abuse, Heme+ Stool, Acute Blood Loss Anemia  He was discharged to home, with home health services, Spoke with patient: he said [facility] was ther 02/14/17 and said to him "I don't know why I'm here, you're fine "  Medications were not reviewed today  He scheduled a follow up appointment  Follow-up appointments with other specialists: Needs to Followup with GI in 4 weeks  The patient is currently asymptomatic  Patient states feels fine, no complaints  Counseling was provided to the patient  Medications reviewed with patient but he does not want to take some that he was discharged on  Advised him to bring all his meds (pre and post hospitalization) and his discharge list  He states he is all confused and needs to talk to the Dr  about these meds  Topics counseled included instructions for management and importance of compliance with treatment  Communication performed and completed by ALCIRA Warner    HPI: here for jorge from hospital  had gastritis and gi bleed  doing better   meds have been changed significantly  has gi followup scheudled in 3 weeks  c/o back pain today that is chronic but has gotten much worse  since stopping pain meds  Review of Systems  Complete-Male:   Constitutional: No fever or chills, feels well, no tiredness, no recent weight gain or weight loss  Eyes: No complaints of eye pain, no red eyes, no discharge from eyes, no itchy eyes  ENT: no complaints of earache, no hearing loss, no nosebleeds, no nasal discharge, no sore throat, no hoarseness  Cardiovascular: No complaints of slow heart rate, no fast heart rate, no chest pain, no palpitations, no leg claudication, no lower extremity  Respiratory: No complaints of shortness of breath, no wheezing, no cough, no SOB on exertion, no orthopnea or PND     Gastrointestinal: No complaints of abdominal pain, no constipation, no nausea or vomiting, no diarrhea or bloody stools  Genitourinary: No complaints of dysuria, no incontinence, no hesitancy, no nocturia, no genital lesion, no testicular pain  Musculoskeletal: arthralgias and myalgias, but as noted in HPI  Integumentary: No complaints of skin rash or skin lesions, no itching, no skin wound, no dry skin  Neurological: No compliants of headache, no confusion, no convulsions, no numbness or tingling, no dizziness or fainting, no limb weakness, no difficulty walking  Psychiatric: Is not suicidal, no sleep disturbances, no anxiety or depression, no change in personality, no emotional problems  Endocrine: No complaints of proptosis, no hot flashes, no muscle weakness, no erectile dysfunction, no deepening of the voice, no feelings of weakness  Hematologic/Lymphatic: No complaints of swollen glands, no swollen glands in the neck, does not bleed easily, no easy bruising  Active Problems   1  Allergic rhinitis (477 9) (J30 9)  2  Anemia of chronic disease (285 29) (D63 8)  3  Arthralgia (719 40) (M25 50)  4  Chronic low back pain (724 2,338 29) (M54 5,G89 29)  5  Chronic sinusitis (473 9) (J32 9)  6  Cough (786 2) (R05)  7  Eczema (692 9) (L30 9)  8  Edema (782 3) (R60 9)  9  Esophageal reflux (530 81) (K21 9)  10  Fatigue (780 79) (R53 83)  11  Gait disturbance (781 2) (R26 9)  12  Gastritis (535 50) (K29 70)  13  Generalized osteoarthritis of multiple sites (715 09) (M15 9)  14  Hyperglycemia (790 29) (R73 9)  15  Hypertension (401 9) (I10)  16  Hypomagnesemia (275 2) (E83 42)  17  Nausea (787 02) (R11 0)  18  Peptic ulcer (533 90) (K27 9)  19  Peripheral neuropathy (356 9) (G62 9)  20  Polymyalgia rheumatica (725) (M35 3)  21  Rheumatoid arthritis (714 0) (M06 9)  22  Seborrhea (706 3) (L21 9)    Past Medical History   1  Acute sinusitis (461 9) (J01 90)  2  History of Cough (786 2) (R05)  3  History of Diabetes Mellitus (250 00)  4  History of Emphysema (492 8) (J43 9)  5   History of Foot Pain (Soft Tissue) (729 5)  6  History of abdominal pain (V13 89) (Z87 898)  7  History of Irritable Bowel Syndrome With Diarrhea (564 1)  8  History of Prostate Cancer (V10 46)  9  History of Special screening examination for neoplasm of prostate (V76 44) (Z12 5)  10  History of Uncomplicated alcohol abuse (305 00) (F10 10)    Surgical History   1  History of Back Surgery  2  History of Cataract Surgery  Surgical History Reviewed: The surgical history was reviewed and updated today  Family History  Mother   1  Family history of cardiac disorder (V17 49) (Z82 49)  Father   2  Family history of cardiac disorder (V17 49) (Z82 49)  Family History   3  Family history of Diabetes Mellitus (V18 0)  4  Family history of Hypertension (V17 49)  Family History Reviewed: The family history was reviewed and updated today  Social History    · Former smoker (T11 67) (R04 487)   · Lives with wife   · Marital History - Currently   Social History Reviewed: The social history was reviewed and updated today  The social history was reviewed and is unchanged  Current Meds  1  Culturelle CAPS Recorded  2  HydroCHLOROthiazide 12 5 MG Oral Tablet; Take 1 tablet daily; Therapy: 09OHY0864 to (Evaluate:15Jun2017) Recorded  3  Hydrocortisone 2 5 % External Cream; APPLY 2-3 TIMES DAILY TO AFFECTED AREA(S); Therapy: 55VLR6488 to (Evaluate:11Jun2016)  Requested for: 57NHY1940; Last   Rx:17Jun2015 Ordered  4  Ipratropium Bromide 0 03 % Nasal Solution; USE 2 SPRAYS IN EACH NOSTRIL 3 TIMES   DAILY Recorded  5  Ipratropium Bromide 0 03 % Nasal Solution; USE 2 SPRAYS IN EACH NOSTRIL 3 TIMES   DAILY Recorded  6  Ketoconazole 2 % External Shampoo; APPLY TO AFFECTED AREA(S) ONCE DAILY AS   DIRECTED; Therapy: 02SGO4960 to (Evaluate:27Fnb5456)  Requested for: 24Oct2016; Last   Rx:15Lnc7870 Ordered  7  MiraLax Oral Packet Recorded  8  Multi-Vitamin Oral Tablet Recorded  9   Pantoprazole Sodium 40 MG Oral Tablet Delayed Release; Take 1 tablet daily; Therapy: 14NMS8328 to (Evaluate:69Opl6396) Recorded  Medication List Reviewed: The medication list was reviewed and updated today  Allergies   1  Morphine Derivatives    Vitals  Signs   Recorded: 83VYB8287 03:04PM   Heart Rate: 84  Systolic: 997, LUE, Sitting  Diastolic: 60, LUE, Sitting  Height: 5 ft 8 in  Weight: 193 lb 8 0 oz  BMI Calculated: 29 42  BSA Calculated: 2 02    Physical Exam    Constitutional   General appearance: No acute distress, well appearing and well nourished  Ears, Nose, Mouth, and Throat   Otoscopic examination: Tympanic membrance translucent with normal light reflex  Canals patent without erythema  Oropharynx: Normal with no erythema, edema, exudate or lesions  Pulmonary   Respiratory effort: No increased work of breathing or signs of respiratory distress  Auscultation of lungs: Clear to auscultation, equal breath sounds bilaterally, no wheezes, no rales, no rhonci  Cardiovascular   Auscultation of heart: Normal rate and rhythm, normal S1 and S2, without murmurs           Signatures   Electronically signed by : Martin Estevez DO; Feb 15 2017  3:49PM EST                       (Author)    Electronically signed by : Martin Estevez DO; Feb 15 2017  4:07PM EST                       (Author)

## 2018-01-13 VITALS
HEIGHT: 68 IN | TEMPERATURE: 99.9 F | BODY MASS INDEX: 28.95 KG/M2 | HEART RATE: 70 BPM | SYSTOLIC BLOOD PRESSURE: 130 MMHG | DIASTOLIC BLOOD PRESSURE: 60 MMHG | RESPIRATION RATE: 15 BRPM | WEIGHT: 191 LBS

## 2018-01-14 VITALS
DIASTOLIC BLOOD PRESSURE: 82 MMHG | HEART RATE: 72 BPM | SYSTOLIC BLOOD PRESSURE: 160 MMHG | HEIGHT: 68 IN | BODY MASS INDEX: 28.04 KG/M2 | WEIGHT: 185 LBS

## 2018-01-14 VITALS
BODY MASS INDEX: 29.63 KG/M2 | HEIGHT: 68 IN | SYSTOLIC BLOOD PRESSURE: 190 MMHG | WEIGHT: 195.5 LBS | DIASTOLIC BLOOD PRESSURE: 90 MMHG | HEART RATE: 108 BPM

## 2018-01-14 VITALS
DIASTOLIC BLOOD PRESSURE: 60 MMHG | HEIGHT: 68 IN | HEART RATE: 84 BPM | BODY MASS INDEX: 29.33 KG/M2 | SYSTOLIC BLOOD PRESSURE: 120 MMHG | WEIGHT: 193.5 LBS

## 2018-01-15 NOTE — MISCELLANEOUS
Assessment    1  Rheumatoid arthritis (714 0) (M06 9)   2  Fatigue (780 79) (R53 83)   3  Accelerated hypertension (401 0) (I10)   4  Allergic rhinitis (477 9) (J30 9)    Plan  Allergic rhinitis    · Ipratropium Bromide 0 03 % Nasal Solution; USE 2 SPRAYS IN EACH NOSTRIL  3 TIMES DAILY   Rx By: Devorah Olivares; Dispense: 30 Days ; #:1 X 30 ML Bottle; Refill: 3; For: Allergic rhinitis; JUNI = N; Sent To: [facility]  Hypertension    · From  Norvasc 5 MG Oral Tablet TAKE 1 TABLET TWICE DAILY To AmLODIPine  Besylate 5 MG Oral Tablet (Norvasc) TAKE 1 TABLET TWICE DAILY   Rx By: Devorah Olivares; Dispense: 90 Days ; #:180 Tablet; Refill: 0; For: Hypertension; JUNI = N; Sent To: [facility]    Discussion/Summary  Discussion Summary:   Will keep on amlodipine 5 mg BID   cont other meds   discussed chronic pain and will start hydrocodone as needed  narcotic contract signed  Chief Complaint  Chief Complaint Free Text Note Form: here for jorge      History of Present Illness  TCM Communication [facility]: The patient is being contacted for follow-up after hospitalization  Hospital records were reviewed  He was hospitalized at [facility] on 8/30/17 until 9/2/17  Transferred to [address] [place] [address] and discharged on 9/23/17  The Patient was admitted to [facility] with ambulatory dysfunction due to chronic alcoholism and weakness  He was found to be dehydrated and suffered from hypomagnesemia  He was discharged to [address] [place], [place] [address] for short-term rehab with PRN Lorazepam, Thiamine and Folic Acid  Diagnosis: Alcohol Withdrawal Seizure without complication / Ambulatory Dysfunction / Heme Positive Stool / Peptic Ulcer Disease / Acute Blood Loss Anemia / Abdominal Pain / Intractable Vomiting with nausea / Pancreatitis / Hypertension / Alcohol Abuse Daily Alcohol Consumption   He was discharged to home, with home health services, [facility] will be evaluating patient in the home on 9/25/17  Medications reviewed and updated today  He scheduled a follow up appointment  Follow-up appointments with other specialists: No other followup appointments have been recommended  Patient states he is "tired" and is suffering from a generalized rash for several days  His fingers and joints are painful  Post hospital issues: poor medication adherence and Patient is not familiar with his meds and needs med supervision  The [facility]NA will be seeing him today and we will review meds with his nurse, the patient and the med list received from [email or web address]  Counseling was provided to the patient  Topics counseled included instructions for management and importance of compliance with treatment  Transition of Care appointment scheduled with Dr Aranda on 9/27/17  Communication performed and completed by Phyllis Garsia RN   HPI: here for jorge from hospital  for ambulatory dysfunction, malaise and etoh seizures  no acute complaints      Review of Systems  Complete-Male:   Constitutional: No fever or chills, feels well, no tiredness, no recent weight gain or weight loss  Eyes: No complaints of eye pain, no red eyes, no discharge from eyes, no itchy eyes  ENT: no complaints of earache, no hearing loss, no nosebleeds, no nasal discharge, no sore throat, no hoarseness  Cardiovascular: No complaints of slow heart rate, no fast heart rate, no chest pain, no palpitations, no leg claudication, no lower extremity  Respiratory: No complaints of shortness of breath, no wheezing, no cough, no SOB on exertion, no orthopnea or PND  Gastrointestinal: No complaints of abdominal pain, no constipation, no nausea or vomiting, no diarrhea or bloody stools  Genitourinary: No complaints of dysuria, no incontinence, no hesitancy, no nocturia, no genital lesion, no testicular pain  Musculoskeletal: No complaints of arthralgia, no myalgias, no joint swelling or stiffness, no limb pain or swelling     Integumentary: No complaints of skin rash or skin lesions, no itching, no skin wound, no dry skin  Neurological: No compliants of headache, no confusion, no convulsions, no numbness or tingling, no dizziness or fainting, no limb weakness, no difficulty walking  Psychiatric: Is not suicidal, no sleep disturbances, no anxiety or depression, no change in personality, no emotional problems  Endocrine: No complaints of proptosis, no hot flashes, no muscle weakness, no erectile dysfunction, no deepening of the voice, no feelings of weakness  Hematologic/Lymphatic: No complaints of swollen glands, no swollen glands in the neck, does not bleed easily, no easy bruising  Active Problems    1  Accelerated hypertension (401 0) (I10)   2  Allergic rhinitis (477 9) (J30 9)   3  Anemia of chronic disease (285 29) (D63 8)   4  Arthralgia (719 40) (M25 50)   5  Atrial fibrillation (427 31) (I48 91)   6  Cataract (366 9) (H26 9)   7  Chronic low back pain (724 2,338 29) (M54 5,G89 29)   8  Chronic sinusitis (473 9) (J32 9)   9  Cough (786 2) (R05)   10  Eczema (692 9) (L30 9)   11  Edema (782 3) (R60 9)   12  Edema extremities (782 3) (R60 0)   13  Esophageal reflux (530 81) (K21 9)   14  Fatigue (780 79) (R53 83)   15  Gait disturbance (781 2) (R26 9)   16  Gastritis (535 50) (K29 70)   17  Generalized osteoarthritis of multiple sites (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 09) (M15 9)   18  GI bleed (578 9) (K92 2)   19  Hyperglycemia (790 29) (R73 9)   20  Hypertension (401 9) (I10)   21  Hypokalemia (276 8) (E87 6)   22  Hypomagnesemia (275 2) (E83 42)   23  Hyponatremia (276 1) (E87 1)   24  Peptic ulcer (533 90) (K27 9)   25  Peripheral neuropathy (356 9) (G62 9)   26  Polymyalgia rheumatica (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) (M35 3)   27  Rheumatoid arthritis (714 0) (M06 9)   28  Seborrhea (706 3) (L21 9)   29  Seizure (780 39) (R56 9)    Past Medical History    1  History of Acute pancreatitis, unspecified pancreatitis type   2  Acute sinusitis (461 9) (J01 90)   3   History of Buttock wound (877 0) (S31 519A)   4  History of Cough (786 2) (R05)   5  History of Decubitus ulcer of buttock, right, unspecified pressure ulcer stage   6  History of Diabetes Mellitus (250 00)   7  History of Emphysema (492 8) (J43 9)   8  History of Foot Pain (Soft Tissue) (729 5)   9  History of abdominal pain (V13 89) (Z87 898)   10  History of nausea (V12 79) (Z87 898)   11  History of urinary frequency (V13 09) (Z87 898)   12  History of Irritable Bowel Syndrome With Diarrhea (564 1)   13  History of Prostate Cancer (V10 46)   14  History of Special screening examination for neoplasm of prostate (V76 44) (Z12 5)   15  History of Uncomplicated alcohol abuse (305 00) (F10 10)   16  History of Wound of right buttock, initial encounter (877 0) (Z78 996P)    Surgical History    1  History of Back Surgery   2  History of Cataract Surgery  Surgical History Reviewed: The surgical history was reviewed and updated today  Family History  Mother    1  Family history of cardiac disorder (V17 49) (Z82 49)  Father    2  Family history of cardiac disorder (V17 49) (Z82 49)  Family History    3  Family history of Diabetes Mellitus (V18 0)   4  Family history of Hypertension (V17 49)  Family History Reviewed: The family history was reviewed and updated today  Social History    · Former smoker (M73 93) (W96 963)   · Lives with wife   · Marital History - Currently   Social History Reviewed: The social history was reviewed and updated today  The social history was reviewed and is unchanged  Current Meds   1  Culturelle CAPS Recorded   2  Ferrous Sulfate 325 (65 Fe) MG Oral Tablet; TAKE 1 TABLET DAILY; Therapy: (Recorded:45Wlv5096) to Recorded   3  Flonase 50 MCG/ACT SUSP; USE 2 SPRAYS IN EACH NOSTRIL TWICE DAILY; Therapy: (Lorenzo Santana) to Recorded   4  Hydrocortisone 2 5 % External Cream; APPLY 2-3 TIMES DAILY TO AFFECTED AREA(S); Therapy: 97KHT7820 to (Evaluate:11Jun2016)  Requested for: 48ABF3694;  Last Rx: 87ZDE0201 Ordered   5  Ipratropium Bromide 0 03 % Nasal Solution; USE 2 SPRAYS IN EACH NOSTRIL 3 TIMES   DAILY Recorded   6  Ketoconazole 2 % External Shampoo; APPLY TO AFFECTED AREA(S) ONCE DAILY AS   DIRECTED; Therapy: 68UFX8556 to (Evaluate:14Nrv6591)  Requested for: 24Oct2016; Last   Rx:24Oct2016 Ordered   7  Magnesium Oxide 400 MG Oral Tablet; TAKE 1 TABLET DAILY; Therapy: (Milderd Osier) to Recorded   8  Melatonin 3 MG Oral Tablet; TAKE 1 TABLET Bedtime; Therapy: (Milderd Osier) to Recorded   9  MiraLax Oral Packet Recorded   10  Multi-Vitamin Oral Tablet Recorded   11  Nadolol 40 MG Oral Tablet; TAKE 1 TABLET ONCE DAILY; Therapy: 43SDT6050 to (Carter Juarez)  Requested for: 06Aug2017; Last    QS:75OEC7978; Status: ACTIVE - Renewal Denied Ordered   12  Norvasc 5 MG Oral Tablet; TAKE 1 TABLET TWICE DAILY; Therapy: (Milderd Osier) to Recorded   13  Pantoprazole Sodium 40 MG Oral Tablet Delayed Release; Take 1 tablet twice daily; Therapy: 40DAA2267 to (Evaluate:43Ijt3450)  Requested for: 59YQI9752; Last    Rx:94Bfw9765; Status: ACTIVE - Renewal Denied Ordered   14  Polyvinyl Alcohol 1 4 % Ophthalmic Solution; INSTILL 1-2 DROPS INTO AFFECTED    EYE(S)  4 TIMES DAILY AS DIRECTED; Therapy: (Recorded:26Sep2017) to Recorded   15  Potassium Chloride ER 20 MEQ Oral Tablet Extended Release; Take 1 tablet daily; Therapy: (Recorded:26Sep2017) to Recorded   16  Triamterene-HCTZ 37 5-25 MG Oral Tablet; take 1 tablet by mouth once daily; Therapy: 00IEU8115 to (Carter Juarez)  Requested for: 32Ojp1969; Last    Rx:78Kzs8225 Ordered   17  Vision Plus CAPS; Take 1 capsule twice daily; Therapy: (Recorded:26Sep2017) to Recorded  Medication List Reviewed: The medication list was reviewed and updated today  Allergies    1   Morphine Derivatives    Vitals  Signs   Recorded: 56QZY0997 11:42AM   Temperature: 97 F, Tympanic  Heart Rate: 76  Respiration: 16  Systolic: 771, RUE, Sitting  Diastolic: 70, RUE, Sitting  Height: 5 ft 8 in  Weight: 188 lb   BMI Calculated: 28 59  BSA Calculated: 1 99    Physical Exam    Constitutional   General appearance: No acute distress, well appearing and well nourished           Signatures   Electronically signed by : Caty Chaves, ; Sep 25 2017 10:08AM EST                       (Author)    Electronically signed by : Lesley Batista DO; Sep 27 2017  4:03PM EST                       (Author)

## 2018-01-15 NOTE — MISCELLANEOUS
History of Present Illness  TCM Communication  [facility]: The patient is being contacted for follow-up after hospitalization  Hospital records were reviewed  He was hospitalized at [address]  The date of admission: 08/30/17, date of discharge: 09/02/17, Was admitted with ambulatory dysfunction due to chronic alcoholism and weakness  Was found to be dehydrated and was placed on IV fluids  Hypomagnesemia was replaced as well  Was seen by physical therapy which recommended short term rehab  Diagnosis: Alcohol withdrawal seizure without complication - Ambulatory dysfunction - Heme + stools - PUD - Acute blood loss Anemia - Alcohol Abuse  He was discharged to a rehabilitation center, [facility] [place]  Medications were not reviewed today  He did not schedule a follow up appointment  Communication performed and completed by ALCRIA Coleman  Active Problems    1  Accelerated hypertension (401 0) (I10)   2  Allergic rhinitis (477 9) (J30 9)   3  Anemia of chronic disease (285 29) (D63 8)   4  Arthralgia (719 40) (M25 50)   5  Atrial fibrillation (427 31) (I48 91)   6  Cataract (366 9) (H26 9)   7  Chronic low back pain (724 2,338 29) (M54 5,G89 29)   8  Chronic sinusitis (473 9) (J32 9)   9  Cough (786 2) (R05)   10  Eczema (692 9) (L30 9)   11  Edema (782 3) (R60 9)   12  Edema extremities (782 3) (R60 0)   13  Esophageal reflux (530 81) (K21 9)   14  Fatigue (780 79) (R53 83)   15  Gait disturbance (781 2) (R26 9)   16  Gastritis (535 50) (K29 70)   17  Generalized osteoarthritis of multiple sites (715 09) (M15 9)   18  GI bleed (578 9) (K92 2)   19  Hyperglycemia (790 29) (R73 9)   20  Hypertension (401 9) (I10)   21  Hypokalemia (276 8) (E87 6)   22  Hypomagnesemia (275 2) (E83 42)   23  Hyponatremia (276 1) (E87 1)   24  Peptic ulcer (533 90) (K27 9)   25  Peripheral neuropathy (356 9) (G62 9)   26  Polymyalgia rheumatica (725) (M35 3)   27   Rheumatoid arthritis (714 0) (M06 9)   28  Seborrhea (706 3) (L21 9)   29  Seizure (780 39) (R56 9)    Past Medical History    1  History of Acute pancreatitis, unspecified pancreatitis type   2  Acute sinusitis (461 9) (J01 90)   3  History of Buttock wound (877 0) (S31 809A)   4  History of Cough (786 2) (R05)   5  History of Decubitus ulcer of buttock, right, unspecified pressure ulcer stage   6  History of Diabetes Mellitus (250 00)   7  History of Emphysema (492 8) (J43 9)   8  History of Foot Pain (Soft Tissue) (729 5)   9  History of abdominal pain (V13 89) (Z87 898)   10  History of nausea (V12 79) (Z87 898)   11  History of urinary frequency (V13 09) (Z87 898)   12  History of Irritable Bowel Syndrome With Diarrhea (564 1)   13  History of Prostate Cancer (V10 46)   14  History of Special screening examination for neoplasm of prostate (V76 44) (Z12 5)   15  History of Uncomplicated alcohol abuse (305 00) (F10 10)   16  History of Wound of right buttock, initial encounter (877 0) (D53 389C)    Surgical History    1  History of Back Surgery   2  History of Cataract Surgery    Family History  Mother    1  Family history of cardiac disorder (V17 49) (Z82 49)  Father    2  Family history of cardiac disorder (V17 49) (Z82 49)  Family History    3  Family history of Diabetes Mellitus (V18 0)   4  Family history of Hypertension (V17 49)    Social History    · Former smoker (K41 80) (X09 427)   · Lives with wife   · Marital History - Currently     Current Meds   1  Culturelle CAPS Recorded   2  Hydrocortisone 2 5 % External Cream; APPLY 2-3 TIMES DAILY TO AFFECTED AREA(S); Therapy: 55JDC5430 to (Evaluate:11Jun2016)  Requested for: 62NWI7501; Last   Rx:17Jun2015 Ordered   3  Ipratropium Bromide 0 03 % Nasal Solution; USE 2 SPRAYS IN EACH NOSTRIL 3 TIMES   DAILY Recorded   4  Ketoconazole 2 % External Shampoo; APPLY TO AFFECTED AREA(S) ONCE DAILY AS   DIRECTED;    Therapy: 68KAU8522 to (Evaluate:34Int6989)  Requested for: 77MWJ7075; Last   Rx:10Xdl9295 Ordered   5  MiraLax Oral Packet Recorded   6  Multi-Vitamin Oral Tablet Recorded   7  Nadolol 40 MG Oral Tablet; TAKE 1 TABLET ONCE DAILY; Therapy: 25YFH6234 to (Loras Litter)  Requested for: 26Ckl6451; Last   ZP:51BAM2831; Status: ACTIVE - Renewal Denied Ordered   8  Pantoprazole Sodium 40 MG Oral Tablet Delayed Release; Take 1 tablet twice daily; Therapy: 83RUO3605 to (Evaluate:91Qfe6562)  Requested for: 81MTK7768; Last   Rx:95Kxs8846; Status: ACTIVE - Renewal Denied Ordered   9  Triamterene-HCTZ 37 5-25 MG Oral Tablet; take 1 tablet by mouth once daily; Therapy: 73SFA6180 to (Loras Litter)  Requested for: 86Bvc2629; Last   NC:07AVH2781 Ordered    Allergies    1   Morphine Derivatives    Future Appointments    Date/Time Provider Specialty Site   09/18/2017 10:45 AM Bernard Aranda DO Family Medicine [facility]     Signatures   Electronically signed by : Sari Posadas DO; Sep 11 2017 10:45AM EST                       (Author)

## 2018-01-16 NOTE — MISCELLANEOUS
Message  patient notified of normal cxr      Signatures   Electronically signed by : Sergio Martinez DO; Mar 31 2016  3:43PM EST                       (Author)

## 2018-01-17 NOTE — MISCELLANEOUS
Message   Recorded as Task   Date: 07/25/2016 01:02 PM, Created By: Trinidad Villaseñor   Task Name: Follow Up   Assigned To: Balbir Aranda   Regarding Patient: Nazia Valerio, Status: Active   Comment:    Trinidad Villaseñor - 25 Jul 2016 1:02 PM     TASK CREATED  Caller: Self; General Medical Question; [phone] (Home)  PT AWARE YOU ARE OUT [facility] THIS CAN Ane Ruder  PT WANTED TO LET YOU KNOW THE GENERIC CELECOXIB IS AVAIABLE AND VERY REASONABLY PRICED  HE ALSO STATED HE STILL HAS PHLEGM AND IS WONDERING IF IT IS TIME FOR HIM TO GO TO AN ENT  CAN CALL PT AT [address] [place] [address] Rd   Balbir Aranda - 27 Jul 2016 1:01 PM     TASK EDITED                called and discussed  will refer to ent  does not need refill of celebrex at this time          Plan  Chronic sinusitis, Cough    · 1 Ismael Walker MD, [facility] Physician Referral  Consult  Status: Hold For -  Scheduling  Requested for: 03TKQ0623  Care Summary provided  : Yes    Signatures   Electronically signed by : Thierno Moreno DO; Jul 27 2016  1:02PM EST                       (Author)

## 2018-01-22 VITALS
HEART RATE: 76 BPM | SYSTOLIC BLOOD PRESSURE: 140 MMHG | HEIGHT: 68 IN | DIASTOLIC BLOOD PRESSURE: 70 MMHG | RESPIRATION RATE: 16 BRPM | BODY MASS INDEX: 28.49 KG/M2 | WEIGHT: 188 LBS | TEMPERATURE: 97 F

## 2018-03-30 ENCOUNTER — OFFICE VISIT (OUTPATIENT)
Dept: FAMILY MEDICINE CLINIC | Facility: CLINIC | Age: 83
End: 2018-03-30
Payer: MEDICARE

## 2018-03-30 VITALS
DIASTOLIC BLOOD PRESSURE: 62 MMHG | RESPIRATION RATE: 14 BRPM | TEMPERATURE: 97 F | WEIGHT: 186 LBS | SYSTOLIC BLOOD PRESSURE: 128 MMHG | HEIGHT: 69 IN | BODY MASS INDEX: 27.55 KG/M2 | HEART RATE: 87 BPM

## 2018-03-30 DIAGNOSIS — L20.9 ATOPIC DERMATITIS, UNSPECIFIED TYPE: ICD-10-CM

## 2018-03-30 DIAGNOSIS — I10 ESSENTIAL HYPERTENSION: ICD-10-CM

## 2018-03-30 DIAGNOSIS — K29.50 CHRONIC GASTRITIS WITHOUT BLEEDING, UNSPECIFIED GASTRITIS TYPE: ICD-10-CM

## 2018-03-30 DIAGNOSIS — I49.9 IRREGULAR HEART BEAT: Primary | ICD-10-CM

## 2018-03-30 DIAGNOSIS — R73.01 IMPAIRED FASTING GLUCOSE: ICD-10-CM

## 2018-03-30 PROBLEM — R56.9 SEIZURES (HCC): Status: ACTIVE | Noted: 2017-03-10

## 2018-03-30 PROBLEM — R79.89 ELEVATED TROPONIN: Status: ACTIVE | Noted: 2017-03-10

## 2018-03-30 PROBLEM — M13.0 POLYARTICULAR ARTHRITIS: Status: ACTIVE | Noted: 2017-03-15

## 2018-03-30 PROBLEM — F10.931 ALCOHOL WITHDRAWAL SYNDROME, WITH DELIRIUM (HCC): Status: ACTIVE | Noted: 2017-03-10

## 2018-03-30 PROBLEM — R60.0 EDEMA EXTREMITIES: Status: ACTIVE | Noted: 2017-04-07

## 2018-03-30 PROBLEM — R77.8 ELEVATED TROPONIN: Status: ACTIVE | Noted: 2017-03-10

## 2018-03-30 PROBLEM — F10.231 ALCOHOL WITHDRAWAL SYNDROME, WITH DELIRIUM (HCC): Status: ACTIVE | Noted: 2017-03-10

## 2018-03-30 PROBLEM — K92.2 GI BLEED: Status: ACTIVE | Noted: 2017-02-15

## 2018-03-30 PROBLEM — I63.9 ACUTE ISCHEMIC STROKE (HCC): Status: ACTIVE | Noted: 2017-03-10

## 2018-03-30 PROBLEM — E87.1 HYPONATREMIA: Status: ACTIVE | Noted: 2017-02-15

## 2018-03-30 PROBLEM — I48.91 ATRIAL FIBRILLATION (HCC): Status: ACTIVE | Noted: 2017-04-07

## 2018-03-30 PROCEDURE — 93000 ELECTROCARDIOGRAM COMPLETE: CPT | Performed by: FAMILY MEDICINE

## 2018-03-30 PROCEDURE — 99215 OFFICE O/P EST HI 40 MIN: CPT | Performed by: FAMILY MEDICINE

## 2018-03-30 RX ORDER — VALSARTAN 320 MG/1
320 TABLET ORAL DAILY
Refills: 1 | COMMUNITY
Start: 2018-01-08 | End: 2019-02-18

## 2018-03-30 RX ORDER — FERROUS SULFATE 325(65) MG
1 TABLET ORAL DAILY
COMMUNITY
End: 2018-03-30

## 2018-03-30 RX ORDER — PANTOPRAZOLE SODIUM 40 MG/1
40 TABLET, DELAYED RELEASE ORAL DAILY
Qty: 90 TABLET | Refills: 1 | Status: SHIPPED | OUTPATIENT
Start: 2018-03-30 | End: 2018-04-24 | Stop reason: SDUPTHER

## 2018-03-30 RX ORDER — POLYVINYL ALCOHOL 14 MG/ML
1-2 SOLUTION/ DROPS OPHTHALMIC 4 TIMES DAILY
COMMUNITY

## 2018-03-30 RX ORDER — NADOLOL 40 MG/1
40 TABLET ORAL DAILY
Qty: 90 TABLET | Refills: 1 | Status: SHIPPED | OUTPATIENT
Start: 2018-03-30 | End: 2018-12-11

## 2018-03-30 RX ORDER — IPRATROPIUM BROMIDE 21 UG/1
SPRAY, METERED NASAL
COMMUNITY
End: 2018-07-11 | Stop reason: SDUPTHER

## 2018-03-30 RX ORDER — TRIAMTERENE AND HYDROCHLOROTHIAZIDE 37.5; 25 MG/1; MG/1
1 TABLET ORAL DAILY
COMMUNITY
Start: 2017-05-09 | End: 2018-03-30 | Stop reason: SDUPTHER

## 2018-03-30 RX ORDER — HYDROCODONE BITARTRATE AND ACETAMINOPHEN 5; 325 MG/1; MG/1
1 TABLET ORAL 2 TIMES DAILY
COMMUNITY
Start: 2017-09-27 | End: 2018-03-30

## 2018-03-30 RX ORDER — POTASSIUM CHLORIDE 1500 MG/1
1 TABLET, FILM COATED, EXTENDED RELEASE ORAL DAILY
COMMUNITY
End: 2018-03-30

## 2018-03-30 RX ORDER — AMLODIPINE BESYLATE 5 MG/1
5 TABLET ORAL 2 TIMES DAILY
COMMUNITY
End: 2018-03-30

## 2018-03-30 RX ORDER — PANTOPRAZOLE SODIUM 40 MG/1
1 TABLET, DELAYED RELEASE ORAL 2 TIMES DAILY
COMMUNITY
Start: 2016-05-03 | End: 2018-03-30 | Stop reason: SDUPTHER

## 2018-03-30 RX ORDER — LANOLIN ALCOHOL/MO/W.PET/CERES
1 CREAM (GRAM) TOPICAL
COMMUNITY
End: 2018-03-30

## 2018-03-30 RX ORDER — TRIAMTERENE AND HYDROCHLOROTHIAZIDE 37.5; 25 MG/1; MG/1
1 TABLET ORAL DAILY
Qty: 90 TABLET | Refills: 1 | Status: SHIPPED | OUTPATIENT
Start: 2018-03-30 | End: 2018-06-12 | Stop reason: SDUPTHER

## 2018-03-30 RX ORDER — MAGNESIUM OXIDE 400 MG/1
1 TABLET ORAL DAILY
COMMUNITY
End: 2019-09-17

## 2018-03-30 NOTE — PROGRESS NOTES
Assessment/Plan:     Diagnoses and all orders for this visit:    Irregular heart beat  -     POCT ECG  -     nadolol (CORGARD) 40 mg tablet; Take 1 tablet (40 mg total) by mouth daily    Chronic gastritis without bleeding, unspecified gastritis type  -     pantoprazole (PROTONIX) 40 mg tablet; Take 1 tablet (40 mg total) by mouth daily    Essential hypertension  -     triamterene-hydrochlorothiazide (MAXZIDE-25) 37 5-25 mg per tablet; Take 1 tablet by mouth daily    Atopic dermatitis, unspecified type  -     hydrocortisone 2 5 % ointment; Apply topically 2 (two) times a day    Patient overall is doing very well today  We are going to stop his amlodipine and change it to nadolol as he was on before as hopes this will help his morning tachycardia  Will also decrease his pantoprazole from 2 to 3 times a day down to once a day  We refilled his other medication he is up-to-date currently on health maintenance  He will follow up in 3 months or as needed  Spent greater than 40-45 minutes discussing his medicines his conditions and coordinating care        Subjective:     Chief Complaint   Patient presents with    Follow-up     6 month follow up    Rapid Heart Rate     patient complains of high pulse at home and irregular heart beat   Irregular Heart Beat        Patient ID: Faina Gramajo is a [de-identified] y o  male  HPI    The following portions of the patient's history were reviewed and updated as appropriate: allergies, current medications, past family history, past medical history, past social history, past surgical history and problem list     Review of Systems   Constitutional: Negative  HENT: Negative  Eyes: Negative  Respiratory: Negative  Cardiovascular: Negative  Tachycardia   Gastrointestinal: Negative  Endocrine: Negative  Genitourinary: Negative  Musculoskeletal: Negative  Skin: Negative  Allergic/Immunologic: Negative  Neurological: Negative      Hematological: Negative  Psychiatric/Behavioral: Negative  All other systems reviewed and are negative  Objective:    Vitals:    03/30/18 1408   BP: 128/62   BP Location: Left arm   Patient Position: Sitting   Cuff Size: Standard   Pulse: 87   Resp: 14   Temp: (!) 97 °F (36 1 °C)   TempSrc: Tympanic   Weight: 84 4 kg (186 lb)   Height: 5' 9" (1 753 m)          Physical Exam   Constitutional: He is oriented to person, place, and time  He appears well-developed and well-nourished  No distress  HENT:   Head: Normocephalic  Right Ear: External ear normal    Left Ear: External ear normal    Nose: Nose normal    Mouth/Throat: Oropharynx is clear and moist    Eyes: Conjunctivae and EOM are normal  Pupils are equal, round, and reactive to light  Right eye exhibits no discharge  Left eye exhibits no discharge  Neck: Normal range of motion  Cardiovascular: Normal rate, regular rhythm and normal heart sounds  Pulmonary/Chest: Effort normal and breath sounds normal    Abdominal: Soft  Bowel sounds are normal  He exhibits no distension  There is no tenderness  Musculoskeletal: Normal range of motion  Arthritic changes to all joints   Neurological: He is alert and oriented to person, place, and time  No cranial nerve deficit  Skin: Skin is warm and dry  No rash noted  Psychiatric: He has a normal mood and affect  His behavior is normal  Judgment and thought content normal    Nursing note and vitals reviewed

## 2018-04-05 ENCOUNTER — APPOINTMENT (OUTPATIENT)
Dept: LAB | Facility: CLINIC | Age: 83
End: 2018-04-05
Payer: MEDICARE

## 2018-04-05 ENCOUNTER — PATIENT MESSAGE (OUTPATIENT)
Dept: FAMILY MEDICINE CLINIC | Facility: CLINIC | Age: 83
End: 2018-04-05

## 2018-04-05 DIAGNOSIS — I49.9 IRREGULAR HEART BEAT: ICD-10-CM

## 2018-04-05 DIAGNOSIS — L30.9 DERMATITIS: Primary | ICD-10-CM

## 2018-04-05 DIAGNOSIS — R73.01 IMPAIRED FASTING GLUCOSE: ICD-10-CM

## 2018-04-05 DIAGNOSIS — I10 ESSENTIAL HYPERTENSION: ICD-10-CM

## 2018-04-05 LAB
ALBUMIN SERPL BCP-MCNC: 3.8 G/DL (ref 3.5–5)
ALP SERPL-CCNC: 89 U/L (ref 46–116)
ALT SERPL W P-5'-P-CCNC: 16 U/L (ref 12–78)
ANION GAP SERPL CALCULATED.3IONS-SCNC: 5 MMOL/L (ref 4–13)
AST SERPL W P-5'-P-CCNC: 15 U/L (ref 5–45)
BACTERIA UR QL AUTO: NORMAL /HPF
BASOPHILS # BLD AUTO: 0.07 THOUSANDS/ΜL (ref 0–0.1)
BASOPHILS NFR BLD AUTO: 1 % (ref 0–1)
BILIRUB SERPL-MCNC: 0.39 MG/DL (ref 0.2–1)
BILIRUB UR QL STRIP: NEGATIVE
BUN SERPL-MCNC: 33 MG/DL (ref 5–25)
CALCIUM SERPL-MCNC: 9.7 MG/DL (ref 8.3–10.1)
CHLORIDE SERPL-SCNC: 106 MMOL/L (ref 100–108)
CLARITY UR: CLEAR
CO2 SERPL-SCNC: 31 MMOL/L (ref 21–32)
COLOR UR: YELLOW
CREAT SERPL-MCNC: 1.5 MG/DL (ref 0.6–1.3)
EOSINOPHIL # BLD AUTO: 0.43 THOUSAND/ΜL (ref 0–0.61)
EOSINOPHIL NFR BLD AUTO: 7 % (ref 0–6)
ERYTHROCYTE [DISTWIDTH] IN BLOOD BY AUTOMATED COUNT: 15.7 % (ref 11.6–15.1)
EST. AVERAGE GLUCOSE BLD GHB EST-MCNC: 114 MG/DL
GFR SERPL CREATININE-BSD FRML MDRD: 43 ML/MIN/1.73SQ M
GLUCOSE SERPL-MCNC: 93 MG/DL (ref 65–140)
GLUCOSE UR STRIP-MCNC: NEGATIVE MG/DL
HBA1C MFR BLD: 5.6 % (ref 4.2–6.3)
HCT VFR BLD AUTO: 39.2 % (ref 36.5–49.3)
HGB BLD-MCNC: 13.2 G/DL (ref 12–17)
HGB UR QL STRIP.AUTO: NEGATIVE
HYALINE CASTS #/AREA URNS LPF: NORMAL /LPF
KETONES UR STRIP-MCNC: NEGATIVE MG/DL
LEUKOCYTE ESTERASE UR QL STRIP: NEGATIVE
LYMPHOCYTES # BLD AUTO: 2.06 THOUSANDS/ΜL (ref 0.6–4.47)
LYMPHOCYTES NFR BLD AUTO: 32 % (ref 14–44)
MCH RBC QN AUTO: 30.4 PG (ref 26.8–34.3)
MCHC RBC AUTO-ENTMCNC: 33.7 G/DL (ref 31.4–37.4)
MCV RBC AUTO: 90 FL (ref 82–98)
MONOCYTES # BLD AUTO: 0.97 THOUSAND/ΜL (ref 0.17–1.22)
MONOCYTES NFR BLD AUTO: 15 % (ref 4–12)
NEUTROPHILS # BLD AUTO: 2.83 THOUSANDS/ΜL (ref 1.85–7.62)
NEUTS SEG NFR BLD AUTO: 45 % (ref 43–75)
NITRITE UR QL STRIP: NEGATIVE
NON-SQ EPI CELLS URNS QL MICRO: NORMAL /HPF
NRBC BLD AUTO-RTO: 0 /100 WBCS
PH UR STRIP.AUTO: 7 [PH] (ref 4.5–8)
PLATELET # BLD AUTO: 220 THOUSANDS/UL (ref 149–390)
PMV BLD AUTO: 10.8 FL (ref 8.9–12.7)
POTASSIUM SERPL-SCNC: 4.2 MMOL/L (ref 3.5–5.3)
PROT SERPL-MCNC: 8.2 G/DL (ref 6.4–8.2)
PROT UR STRIP-MCNC: ABNORMAL MG/DL
RBC # BLD AUTO: 4.34 MILLION/UL (ref 3.88–5.62)
RBC #/AREA URNS AUTO: NORMAL /HPF
SODIUM SERPL-SCNC: 142 MMOL/L (ref 136–145)
SP GR UR STRIP.AUTO: 1.02 (ref 1–1.03)
T4 FREE SERPL-MCNC: 0.93 NG/DL (ref 0.76–1.46)
TSH SERPL DL<=0.05 MIU/L-ACNC: 4.34 UIU/ML (ref 0.36–3.74)
UROBILINOGEN UR QL STRIP.AUTO: 0.2 E.U./DL
WBC # BLD AUTO: 6.37 THOUSAND/UL (ref 4.31–10.16)
WBC #/AREA URNS AUTO: NORMAL /HPF

## 2018-04-05 PROCEDURE — 84439 ASSAY OF FREE THYROXINE: CPT

## 2018-04-05 PROCEDURE — 36415 COLL VENOUS BLD VENIPUNCTURE: CPT

## 2018-04-05 PROCEDURE — 80053 COMPREHEN METABOLIC PANEL: CPT

## 2018-04-05 PROCEDURE — 84443 ASSAY THYROID STIM HORMONE: CPT

## 2018-04-05 PROCEDURE — 85025 COMPLETE CBC W/AUTO DIFF WBC: CPT

## 2018-04-05 PROCEDURE — 81001 URINALYSIS AUTO W/SCOPE: CPT

## 2018-04-05 PROCEDURE — 83036 HEMOGLOBIN GLYCOSYLATED A1C: CPT

## 2018-04-06 NOTE — TELEPHONE ENCOUNTER
From: Mary Anne Gabriel  To: Kathy Hernandez, DO  Sent: 4/5/2018 10:19 PM EDT  Subject: Prescription Question    Need script for hydrocortisone cream 2 5%  454 gm   You wrote for oit ent my mistake   Email or Cheri mendoza PU  Thanks pietro

## 2018-04-24 ENCOUNTER — TELEPHONE (OUTPATIENT)
Dept: FAMILY MEDICINE CLINIC | Facility: CLINIC | Age: 83
End: 2018-04-24

## 2018-04-24 DIAGNOSIS — K29.50 CHRONIC GASTRITIS WITHOUT BLEEDING, UNSPECIFIED GASTRITIS TYPE: ICD-10-CM

## 2018-04-24 RX ORDER — PANTOPRAZOLE SODIUM 40 MG/1
40 TABLET, DELAYED RELEASE ORAL DAILY
Qty: 90 TABLET | Refills: 1 | Status: SHIPPED | OUTPATIENT
Start: 2018-04-24 | End: 2018-07-03 | Stop reason: SDUPTHER

## 2018-06-12 DIAGNOSIS — I10 ESSENTIAL HYPERTENSION: ICD-10-CM

## 2018-06-12 RX ORDER — TRIAMTERENE AND HYDROCHLOROTHIAZIDE 37.5; 25 MG/1; MG/1
1 TABLET ORAL DAILY
Qty: 90 TABLET | Refills: 0 | Status: SHIPPED | OUTPATIENT
Start: 2018-06-12 | End: 2018-09-10 | Stop reason: SDUPTHER

## 2018-06-14 RX ORDER — TRIAMTERENE AND HYDROCHLOROTHIAZIDE 37.5; 25 MG/1; MG/1
CAPSULE ORAL
Qty: 90 CAPSULE | Refills: 0 | OUTPATIENT
Start: 2018-06-14

## 2018-07-03 ENCOUNTER — OFFICE VISIT (OUTPATIENT)
Dept: FAMILY MEDICINE CLINIC | Facility: CLINIC | Age: 83
End: 2018-07-03
Payer: MEDICARE

## 2018-07-03 VITALS
HEIGHT: 69 IN | BODY MASS INDEX: 27.4 KG/M2 | WEIGHT: 185 LBS | OXYGEN SATURATION: 95 % | SYSTOLIC BLOOD PRESSURE: 140 MMHG | DIASTOLIC BLOOD PRESSURE: 84 MMHG | HEART RATE: 77 BPM

## 2018-07-03 DIAGNOSIS — M15.9 GENERALIZED OSTEOARTHRITIS OF MULTIPLE SITES: ICD-10-CM

## 2018-07-03 DIAGNOSIS — L21.9 SEBORRHEA: ICD-10-CM

## 2018-07-03 DIAGNOSIS — I10 ACCELERATED HYPERTENSION: ICD-10-CM

## 2018-07-03 DIAGNOSIS — K29.50 CHRONIC GASTRITIS WITHOUT BLEEDING, UNSPECIFIED GASTRITIS TYPE: ICD-10-CM

## 2018-07-03 DIAGNOSIS — Z00.00 MEDICARE ANNUAL WELLNESS VISIT, SUBSEQUENT: Primary | ICD-10-CM

## 2018-07-03 PROCEDURE — G0439 PPPS, SUBSEQ VISIT: HCPCS | Performed by: FAMILY MEDICINE

## 2018-07-03 PROCEDURE — 99214 OFFICE O/P EST MOD 30 MIN: CPT | Performed by: FAMILY MEDICINE

## 2018-07-03 RX ORDER — KETOCONAZOLE 20 MG/ML
1 SHAMPOO TOPICAL 2 TIMES WEEKLY
Qty: 120 ML | Refills: 3 | Status: SHIPPED | OUTPATIENT
Start: 2018-07-05 | End: 2019-09-17

## 2018-07-03 RX ORDER — PANTOPRAZOLE SODIUM 40 MG/1
40 TABLET, DELAYED RELEASE ORAL DAILY
Qty: 90 TABLET | Refills: 1 | Status: SHIPPED | OUTPATIENT
Start: 2018-07-03 | End: 2019-04-23 | Stop reason: SDUPTHER

## 2018-07-03 RX ORDER — AMLODIPINE BESYLATE 5 MG/1
5 TABLET ORAL 2 TIMES DAILY
COMMUNITY
End: 2019-02-18

## 2018-07-03 NOTE — PROGRESS NOTES
Assessment/Plan:     Diagnoses and all orders for this visit:    Accelerated hypertension    Seborrhea  -     ketoconazole (NIZORAL) 2 % shampoo; Apply 1 application topically 2 (two) times a week    Chronic gastritis without bleeding, unspecified gastritis type  -     pantoprazole (PROTONIX) 40 mg tablet; Take 1 tablet (40 mg total) by mouth daily    Generalized osteoarthritis of multiple sites    Other orders  -     amLODIPine (NORVASC) 5 mg tablet; Take 5 mg by mouth 2 (two) times a day      patient overall is doing very well  We refilled his ketoconazole shampoo  Will continue the Protonix  He is continue other medications  We discussed his arthritis but is stable even despite his chronic pain  Follow-up in 6 months or sooner if needed      Subjective:     Chief Complaint   Patient presents with    Follow-up     check up for hypertension        Patient ID: Jesus Barrera is a 82 y o  male  Here for checkup chronic issues  Patient states he is feeling very well today  Still has some chronic arthritis pain in issues  States feels much better taking the Protonix then not in terms of stomach symptoms  Also has has seborrhea outbreak and was on ketoconazole the past and would like a refill of this medication        The following portions of the patient's history were reviewed and updated as appropriate: allergies, current medications, past family history, past medical history, past social history, past surgical history and problem list     Review of Systems   Constitutional: Negative  HENT: Negative  Eyes: Negative  Respiratory: Negative  Cardiovascular: Negative  Gastrointestinal: Negative  Endocrine: Negative  Genitourinary: Negative  Musculoskeletal: Positive for arthralgias  Skin: Negative  Allergic/Immunologic: Negative  Neurological: Negative  Hematological: Negative  Psychiatric/Behavioral: Negative  All other systems reviewed and are negative  Objective:    Vitals:    07/03/18 1107   BP: 140/84   BP Location: Left arm   Patient Position: Sitting   Cuff Size: Standard   Pulse: 77   SpO2: 95%   Weight: 83 9 kg (185 lb)   Height: 5' 9" (1 753 m)          Physical Exam   Constitutional: He is oriented to person, place, and time  He appears well-developed and well-nourished  No distress  HENT:   Head: Normocephalic  Right Ear: External ear normal    Left Ear: External ear normal    Nose: Nose normal    Mouth/Throat: Oropharynx is clear and moist    Eyes: Conjunctivae and EOM are normal  Pupils are equal, round, and reactive to light  Right eye exhibits no discharge  Left eye exhibits no discharge  Neck: Normal range of motion  Cardiovascular: Normal rate, regular rhythm and normal heart sounds  Pulmonary/Chest: Effort normal and breath sounds normal    Abdominal: Soft  Bowel sounds are normal  He exhibits no distension  There is no tenderness  Musculoskeletal: Normal range of motion  Using cane   Neurological: He is alert and oriented to person, place, and time  No cranial nerve deficit  Skin: Skin is warm and dry  No rash noted  Psychiatric: He has a normal mood and affect  His behavior is normal  Judgment and thought content normal    Nursing note and vitals reviewed

## 2018-07-03 NOTE — PROGRESS NOTES
Assessment/Plan:     Diagnoses and all orders for this visit:    Medicare annual wellness visit, subsequent    Medicare wellness visit completed for this year  See other note for acute/chronic issues  Follow-up in 6 months or as needed         Subjective:     Chief Complaint   Patient presents with    Follow-up     check up for hypertension        Patient ID: Sadie Griggs is a 82 y o  male  Here for Medicare wellness visit   please see other note for acute/chronic issues        The following portions of the patient's history were reviewed and updated as appropriate: allergies, current medications, past family history, past medical history, past social history, past surgical history and problem list     Review of Systems   Constitutional: Negative  HENT: Negative  Eyes: Negative  Respiratory: Negative  Cardiovascular: Negative  Gastrointestinal: Negative  Endocrine: Negative  Genitourinary: Negative  Musculoskeletal: Positive for arthralgias  Skin: Negative  Allergic/Immunologic: Negative  Neurological: Negative  Hematological: Negative  Psychiatric/Behavioral: Negative  All other systems reviewed and are negative  Objective:    Vitals:    07/03/18 1107   BP: 140/84   BP Location: Left arm   Patient Position: Sitting   Cuff Size: Standard   Pulse: 77   SpO2: 95%   Weight: 83 9 kg (185 lb)   Height: 5' 9" (1 753 m)          Physical Exam   Constitutional: He is oriented to person, place, and time  He appears well-developed and well-nourished  No distress  HENT:   Head: Normocephalic  Right Ear: External ear normal    Left Ear: External ear normal    Nose: Nose normal    Mouth/Throat: Oropharynx is clear and moist    Eyes: Conjunctivae and EOM are normal  Pupils are equal, round, and reactive to light  Right eye exhibits no discharge  Left eye exhibits no discharge  Neck: Normal range of motion     Cardiovascular: Normal rate, regular rhythm and normal heart sounds  Pulmonary/Chest: Effort normal and breath sounds normal    Abdominal: Soft  Bowel sounds are normal  He exhibits no distension  There is no tenderness  Musculoskeletal: Normal range of motion  Using cane  Arthritic changes to major joints   Neurological: He is alert and oriented to person, place, and time  No cranial nerve deficit  Skin: Skin is warm and dry  No rash noted  Psychiatric: He has a normal mood and affect  His behavior is normal  Judgment and thought content normal    Nursing note and vitals reviewed  Assessment and Plan:    Problem List Items Addressed This Visit     Accelerated hypertension    Relevant Medications    amLODIPine (NORVASC) 5 mg tablet    Generalized osteoarthritis of multiple sites      Other Visit Diagnoses     Medicare annual wellness visit, subsequent    -  Primary    Seborrhea        Relevant Medications    ketoconazole (NIZORAL) 2 % shampoo (Start on 7/5/2018)    Chronic gastritis without bleeding, unspecified gastritis type        Relevant Medications    pantoprazole (PROTONIX) 40 mg tablet        Health Maintenance Due   Topic Date Due    Depression Screening PHQ-9  1935    DTaP,Tdap,and Td Vaccines (1 - Tdap) 05/09/1958    Fall Risk  05/09/2002    PNEUMOCOCCAL POLYSACCHARIDE VACCINE AGE 65 AND OVER  05/09/2002    GLAUCOMA SCREENING 72 + YR  05/09/2004    INFLUENZA VACCINE  06/01/2018         HPI:  Ninoska Shin is a 82 y o  male here for his Subsequent Wellness Visit  Patient Active Problem List   Diagnosis    Abdominal pain    Alcohol abuse   daily alcohol consumption    Heme positive stool    Peptic ulcer disease    Acute blood loss anemia    Alcohol withdrawal seizure without complication (HCC)    Ambulatory dysfunction    Accelerated hypertension    Acute ischemic stroke (HCC)    Alcohol withdrawal syndrome, with delirium (HCC)    Allergic rhinitis    Anemia of chronic disease    Arthralgia    Atrial fibrillation ([facility] )    Chronic sinusitis    Eczema    Edema    Edema extremities    Elevated troponin    Gait disturbance    Generalized osteoarthritis of multiple sites    GI bleed    Hyperglycemia    Hypomagnesemia    Hyponatremia    Peripheral neuropathy    Polyarticular arthritis    Polymyalgia rheumatica (HCC)    Rheumatoid arthritis (HCC)    Seizures (HCC)     Past Medical History:   Diagnosis Date    Alcohol abuse     Alcohol withdrawal seizure without complication (HCC)     Arthritis     Asthma     CVA (cerebral vascular accident) ([facility] )     Decubitus ulcer of buttock     last assessed 07/20/16    Diabetes ([place] [address] )     Disease of thyroid gland     Duodenal ulcer     Emphysema lung (HCC)     Esophageal varices (HCC)     GERD (gastroesophageal reflux disease)     History of transfusion     Hypertension     Irritable bowel syndrome with diarrhea     Kidney stones     Prostate cancer ([place] [address] )     Urinary frequency     resolved 02/15/17     Past Surgical History:   Procedure Laterality Date    BACK SURGERY      CATARACT EXTRACTION      ESOPHAGOGASTRODUODENOSCOPY N/A 2/6/2017    Procedure: ESOPHAGOGASTRODUODENOSCOPY (EGD); Surgeon: Robin Mae MD;  Location:  MAIN OR;  Service:     AL ESOPHAGOGASTRODUODENOSCOPY TRANSORAL DIAGNOSTIC N/A 4/26/2016    Procedure: ESOPHAGOGASTRODUODENOSCOPY (EGD);   Surgeon: Elly Charles MD;  Location:  MAIN OR;  Service: Gastroenterology    TONSILLECTOMY       Family History   Problem Relation Age of Onset    Heart disease Mother         cardiac disorder    Stroke Father     Heart disease Father         cardiac disorder    Heart disease Sister     Diabetes Family     Hypertension Family      History   Smoking Status    Former Smoker    Quit date: 1980   Smokeless Tobacco    Never Used     Comment: quit 20 years ago     History   Alcohol Use    Yes     Comment: 3-4 mixed drinks vodka per night/ 2L per week      History   Drug Use No       Current Outpatient Prescriptions   Medication Sig Dispense Refill    amLODIPine (NORVASC) 5 mg tablet Take 5 mg by mouth 2 (two) times a day      hydrocortisone 2 5 % ointment Apply topically 2 (two) times a day 454 g 1    Multiple Vitamins-Minerals (VISION PLUS) CAPS Take 1 capsule by mouth 2 (two) times a day      nadolol (CORGARD) 40 mg tablet Take 1 tablet (40 mg total) by mouth daily 90 tablet 1    pantoprazole (PROTONIX) 40 mg tablet Take 1 tablet (40 mg total) by mouth daily 90 tablet 1    polyethylene glycol (MIRALAX) 17 g packet Take 17 g by mouth daily      polyvinyl alcohol (LIQUIFILM TEARS) 1 4 % ophthalmic solution Apply 1-2 drops to eye 4 (four) times a day      triamterene-hydrochlorothiazide (MAXZIDE-25) 37 5-25 mg per tablet Take 1 tablet by mouth daily 90 tablet 0    ipratropium (ATROVENT) 0 03 % nasal spray into each nostril Use 2 sprays in each nostril 3x daily      [START ON 7/5/2018] ketoconazole (NIZORAL) 2 % shampoo Apply 1 application topically 2 (two) times a week 120 mL 03    magnesium oxide (MAG-OX) 400 mg tablet Take 1 tablet by mouth daily      valsartan (DIOVAN) 320 MG tablet Take 320 mg by mouth daily  1     No current facility-administered medications for this visit  Allergies   Allergen Reactions    Morphine And Related        There is no immunization history on file for this patient  Patient Care Team:  Harinder Alfredo, DO as PCP - General      Medicare Screening Tests and Risk Assessments:  AWV Clinical     ISAR:   Previous hospitalizations?:  Yes   How many hospitalizations have you had in the last year?:  1-2       Once in a Lifetime Medicare Screening:   EKG performed?:  Yes    AAA screening performed? (if performed, please add date to Health Maintenance):  Yes       Medicare Screening Tests and Risk Assessment:   AAA Risk Assessment    None Indicated:  Yes    Osteoporosis Risk Assessment    :  Yes    Age over 48:   Yes     Alcohol use:  Yes   Low calcium diet: Yes    HIV Risk Assessment    None indicated:  Yes        Drug and Alcohol Use:   Tobacco use    Cigarettes:  former smoker    Smokeless:  never used smokeless tobacco    Tobacco use duration    Tobacco Cessation Readiness    Alcohol use    Alcohol use:  never    Alcohol Treatment Readiness   Illicit Drug Use    Drug type:  no sedative use       Diet & Exercise:   Diet   What is your diet?:  Regular, Limited junk food   How many servings a day of the following:   Exercise    Do you currently exercise?:  currently not exercising       Cognitive Impairment Screening:   Anxiety screenings preformed:  Yes    Depression screening preformed:  Yes    Depression screening results:  no significant symptoms   Cognitive Impairment Screening    Do you have difficulty learning or retaining new information?:  Yes Do you have difficulty handling new tasks?:  No   Do you have difficulty with reasoning?:  Yes Do you have difficulty with spatial ability and orientation?:  No   Do you have difficulty with language?:  No Do you have difficulty with behavior?:  No       Functional Ability/Level of Safety:   Hearing    Hearing difficulties:  No Bilateral:  normal   Left:  normal Right:  normal   Hearing Impairment Assessment    Hearing status:  No impairment   Current Activities    Status:  unlimited ADL's, unlimited IADL's, unlimited social activities, limited driving   Help needed with the folllowing:    Using the phone:  No Transportation:  No   Shopping:  No Preparing Meals:  No   Doing Housework:  No Doing Laundry:  No   Managing Medications:  No Managing Money:  No   ADL    Feeding:  Independant   Oral hygiene and Facial grooming:  Independant   Bathing:  Independant   Upper Body Dressing:  Independant   Lower Body Dressing:  Independant   Toileting:  Independant   Bed Mobility:  Independant   Fall Risk   Have you fallen in the last 12 months?:  No Are you unsteady on your feet?:  No    Are you taking any medications that may cause fatigue or dizziness?:  No    Do you rush to the bathroom potentially risking a fall?:  No   Injury History   Polypharmacy:  No Antidepressant Use:  No   Sedative Use:  No Antihypertensive Use:  Yes   Previous Fall:  No Alcohol Use:  No   Deconditioning:  No Visual Impairment:  No   Cogitive Impairment:  No Mmobility Impairment:  Yes   Postural Hypotension:  No Urinary Incontinence:  No       Home Safety:   Are there hazards in your environment?:  No   Home Safety Risk Factors   Unfamilar with surroundings:  No Uneven floors:  No   Stairs or handrail saftey risk:  No Loose rugs:  No   Household clutter:  No Poor household lighting:  No   No grab bars in bathroom:  No Further evaluation needed:  No       Advanced Directives:   Advanced Directives    Living Will:  No Durable POA for healthcare:  No   Advanced directive:  No    Patient's End of Life Decisions    Reviewed with patient:  Yes Provider agrees with end of life decisions:  Yes       Urinary Incontinence:       Glaucoma:            Provider Screening     Preventative Screening/Counseling:   Cardiovascular Screening/Counseling:   (Labs Q5 years, EKG optional one-time)   General:  Screening Current           Diabetes Screening/Counseling:   (2 tests/year if Pre-Diabetes or 1 test/year if no Diabetes)   General:  Screening Current           Colorectal Cancer Screening/Counseling:   (FOBT Q1 yr; Flex Sig Q4 yrs or Q10 yrs after Screening Colonoscopy; Screening Colonoscpy Q2 yrs High Risk or Q10 yrs Low Risk; Barium Enema Q2 yrs High Risk or Q4 yrs Low Risk)   General:  Screening Not Indicated           Prostate Cancer Screening/Counseling:   (Annual)    General:  Screening Current          Breast Cancer Screening/Counseling:   (Baseline Age 28 - 43; Annual Age 36+)         Cervical Cancer Screening/Counseling:   (Annual for High Risk or Childbearing Age with Abnormal Pap in Last 3 yrs;  Every 2 all others)         Osteoporosis Screening/Counseling:   (Every 2 Yrs if at risk or more if medically necessary)   General:  Screening Not Indicated           AAA Screening/Counseling:   (Once per Lifetime with risk factors)    General:  Screening Not Indicated           Glaucoma Screening/Counseling:   (Annual)   General:  Screening Current          HIV Screening/Counseling:   (Voluntary;  Once annually for high risk OR 3 times for Pregnancy at diagnosis of IUP; 3rd trimester; and at Labor   General:  Screening Not Indicated           Hepatitis C Screening:             Immunizations:   Influenza (annual):  Patient Declines   Pneumococcal (Once in a Lifetime):  Patient Declines   Hepatitis B Series (medium to high risk patients scheduled series):  Patient Declines   Zostavax (Medicare D Coverage, Pt >66 yo):  Patient Declines   TD (Non-Medicare Wellness  Visit required):  Patient Declines   Tdap (Non-Medicare Wellness Visit required):  Patient Declines       Other Preventative Couseling (Non-Medicare Wellness Visit Required):   nutrition counseling performed, fall prevention education provided, Increased physical activity counseling given       Referrals (Non-Medicare Wellness Visit Required):       Medical Equipment/Suppliers:

## 2018-07-11 DIAGNOSIS — J30.0 VASOMOTOR RHINITIS: Primary | ICD-10-CM

## 2018-07-11 RX ORDER — IPRATROPIUM BROMIDE 21 UG/1
SPRAY, METERED NASAL
Qty: 30 ML | Refills: 3 | Status: SHIPPED | OUTPATIENT
Start: 2018-07-11 | End: 2018-12-21 | Stop reason: SDUPTHER

## 2018-09-08 DIAGNOSIS — I10 ESSENTIAL HYPERTENSION: ICD-10-CM

## 2018-09-09 RX ORDER — TRIAMTERENE AND HYDROCHLOROTHIAZIDE 37.5; 25 MG/1; MG/1
TABLET ORAL
Qty: 90 TABLET | Refills: 0 | OUTPATIENT
Start: 2018-09-09

## 2018-09-10 DIAGNOSIS — I10 ESSENTIAL HYPERTENSION: ICD-10-CM

## 2018-09-10 RX ORDER — TRIAMTERENE AND HYDROCHLOROTHIAZIDE 37.5; 25 MG/1; MG/1
1 TABLET ORAL DAILY
Qty: 90 TABLET | Refills: 1 | Status: SHIPPED | OUTPATIENT
Start: 2018-09-10 | End: 2019-03-12 | Stop reason: SDUPTHER

## 2018-12-11 ENCOUNTER — OFFICE VISIT (OUTPATIENT)
Dept: FAMILY MEDICINE CLINIC | Facility: CLINIC | Age: 83
End: 2018-12-11
Payer: MEDICARE

## 2018-12-11 VITALS
SYSTOLIC BLOOD PRESSURE: 164 MMHG | HEART RATE: 66 BPM | HEIGHT: 69 IN | RESPIRATION RATE: 16 BRPM | DIASTOLIC BLOOD PRESSURE: 86 MMHG | WEIGHT: 186.4 LBS | BODY MASS INDEX: 27.61 KG/M2

## 2018-12-11 DIAGNOSIS — M15.9 GENERALIZED OSTEOARTHRITIS OF MULTIPLE SITES: ICD-10-CM

## 2018-12-11 DIAGNOSIS — K21.9 GASTROESOPHAGEAL REFLUX DISEASE WITHOUT ESOPHAGITIS: ICD-10-CM

## 2018-12-11 DIAGNOSIS — I10 ESSENTIAL HYPERTENSION: Primary | ICD-10-CM

## 2018-12-11 PROCEDURE — 99214 OFFICE O/P EST MOD 30 MIN: CPT | Performed by: FAMILY MEDICINE

## 2018-12-11 RX ORDER — CARVEDILOL 12.5 MG/1
12.5 TABLET ORAL 2 TIMES DAILY WITH MEALS
Qty: 60 TABLET | Refills: 1 | Status: SHIPPED | OUTPATIENT
Start: 2018-12-11 | End: 2019-02-22 | Stop reason: SDUPTHER

## 2018-12-11 NOTE — PROGRESS NOTES
Assessment/Plan:     Diagnoses and all orders for this visit:    Essential hypertension  -     carvedilol (COREG) 12 5 mg tablet; Take 1 tablet (12 5 mg total) by mouth 2 (two) times a day with meals    Generalized osteoarthritis of multiple sites    Gastroesophageal reflux disease without esophagitis      will stop nadolol and start carvedilol  Otherwise he will continue his current medications  We discussed his ibuprofen use and his stomach  He will need to continue on his Protonix for this  He declined blood work today  He will follow up in 6 months or sooner if need      Subjective:     Chief Complaint   Patient presents with    Follow-up     checkup        Patient ID: Shelly Blanco is a 82 y o  male  Patient here for checkup chronic conditions  Reports this past year has been very good for him in 1 of the best he has had in many years  He does report that he needs to stop his nadolol as it is too costly for him now  He reports his blood pressures being elevated between 710 and 713 systolic  Still has the generalized aches and pains but he is managing with ibuprofen and feels stable        The following portions of the patient's history were reviewed and updated as appropriate: allergies, current medications, past family history, past medical history, past social history, past surgical history and problem list     Review of Systems   Constitutional: Negative  HENT: Negative  Eyes: Negative  Respiratory: Negative  Cardiovascular: Negative  Gastrointestinal: Negative  Endocrine: Negative  Genitourinary: Negative  Musculoskeletal: Positive for arthralgias  Skin: Negative  Allergic/Immunologic: Negative  Neurological: Negative  Hematological: Negative  Psychiatric/Behavioral: Negative  All other systems reviewed and are negative          Objective:    Vitals:    12/11/18 1106   BP: 164/86   BP Location: Left arm   Patient Position: Sitting   Cuff Size: Standard Pulse: 66   Resp: 16   Weight: 84 6 kg (186 lb 6 4 oz)   Height: 5' 9" (1 753 m)          Physical Exam   Constitutional: He is oriented to person, place, and time  He appears well-developed and well-nourished  No distress  HENT:   Head: Normocephalic  Right Ear: External ear normal    Left Ear: External ear normal    Nose: Nose normal    Mouth/Throat: Oropharynx is clear and moist    Eyes: Pupils are equal, round, and reactive to light  Conjunctivae and EOM are normal  Right eye exhibits no discharge  Left eye exhibits no discharge  Neck: Normal range of motion  Cardiovascular: Normal rate, regular rhythm and normal heart sounds  Pulmonary/Chest: Effort normal and breath sounds normal    Abdominal: Soft  Bowel sounds are normal  He exhibits no distension  There is no tenderness  Musculoskeletal: Normal range of motion  Neurological: He is alert and oriented to person, place, and time  No cranial nerve deficit  Skin: Skin is warm and dry  No rash noted  Psychiatric: He has a normal mood and affect  His behavior is normal  Judgment and thought content normal    Nursing note and vitals reviewed

## 2018-12-21 DIAGNOSIS — J30.0 VASOMOTOR RHINITIS: ICD-10-CM

## 2018-12-21 RX ORDER — IPRATROPIUM BROMIDE 21 UG/1
SPRAY, METERED NASAL
Qty: 30 ML | Refills: 3 | Status: SHIPPED | OUTPATIENT
Start: 2018-12-21 | End: 2019-07-01 | Stop reason: SDUPTHER

## 2019-02-04 ENCOUNTER — TELEPHONE (OUTPATIENT)
Dept: FAMILY MEDICINE CLINIC | Facility: CLINIC | Age: 84
End: 2019-02-04

## 2019-02-04 NOTE — TELEPHONE ENCOUNTER
I called patient to verify mychart message and patient stated his new medication has been working well and he was trying to attach a chart to show you his blood pressure he has been tracking, but was unable to attach the file  Patient plans on dropping off his chart some time this week for you to view   MG

## 2019-02-08 DIAGNOSIS — I10 ESSENTIAL HYPERTENSION: ICD-10-CM

## 2019-02-08 RX ORDER — CARVEDILOL 12.5 MG/1
TABLET ORAL
Qty: 60 TABLET | Refills: 1 | OUTPATIENT
Start: 2019-02-08

## 2019-02-18 DIAGNOSIS — I10 ACCELERATED HYPERTENSION: Primary | ICD-10-CM

## 2019-02-18 RX ORDER — LOSARTAN POTASSIUM 50 MG/1
50 TABLET ORAL DAILY
Qty: 30 TABLET | Refills: 1 | Status: SHIPPED | OUTPATIENT
Start: 2019-02-18 | End: 2019-02-18 | Stop reason: SDUPTHER

## 2019-02-18 RX ORDER — LOSARTAN POTASSIUM 50 MG/1
50 TABLET ORAL DAILY
Qty: 30 TABLET | Refills: 1 | Status: SHIPPED | OUTPATIENT
Start: 2019-02-18 | End: 2019-04-12 | Stop reason: SDUPTHER

## 2019-02-22 DIAGNOSIS — I10 ESSENTIAL HYPERTENSION: ICD-10-CM

## 2019-02-22 RX ORDER — CARVEDILOL 12.5 MG/1
12.5 TABLET ORAL 2 TIMES DAILY WITH MEALS
Qty: 60 TABLET | Refills: 1 | Status: SHIPPED | OUTPATIENT
Start: 2019-02-22 | End: 2019-07-01 | Stop reason: SDUPTHER

## 2019-03-12 DIAGNOSIS — I10 ESSENTIAL HYPERTENSION: ICD-10-CM

## 2019-03-12 RX ORDER — TRIAMTERENE AND HYDROCHLOROTHIAZIDE 37.5; 25 MG/1; MG/1
1 TABLET ORAL DAILY
Qty: 90 TABLET | Refills: 1 | Status: SHIPPED | OUTPATIENT
Start: 2019-03-12 | End: 2019-09-09 | Stop reason: SDUPTHER

## 2019-04-09 DIAGNOSIS — K29.50 CHRONIC GASTRITIS WITHOUT BLEEDING, UNSPECIFIED GASTRITIS TYPE: ICD-10-CM

## 2019-04-09 RX ORDER — PANTOPRAZOLE SODIUM 40 MG/1
TABLET, DELAYED RELEASE ORAL
Qty: 90 TABLET | Refills: 1 | OUTPATIENT
Start: 2019-04-09

## 2019-04-12 DIAGNOSIS — I10 ACCELERATED HYPERTENSION: ICD-10-CM

## 2019-04-12 RX ORDER — LOSARTAN POTASSIUM 50 MG/1
50 TABLET ORAL DAILY
Qty: 90 TABLET | Refills: 1 | Status: SHIPPED | OUTPATIENT
Start: 2019-04-12 | End: 2019-10-28 | Stop reason: SDUPTHER

## 2019-04-23 DIAGNOSIS — K29.50 CHRONIC GASTRITIS WITHOUT BLEEDING, UNSPECIFIED GASTRITIS TYPE: ICD-10-CM

## 2019-04-23 RX ORDER — PANTOPRAZOLE SODIUM 40 MG/1
40 TABLET, DELAYED RELEASE ORAL DAILY
Qty: 90 TABLET | Refills: 1 | Status: SHIPPED | OUTPATIENT
Start: 2019-04-23 | End: 2019-10-22 | Stop reason: SDUPTHER

## 2019-07-01 DIAGNOSIS — I10 ESSENTIAL HYPERTENSION: ICD-10-CM

## 2019-07-01 DIAGNOSIS — J30.0 VASOMOTOR RHINITIS: ICD-10-CM

## 2019-07-01 RX ORDER — CARVEDILOL 12.5 MG/1
12.5 TABLET ORAL 2 TIMES DAILY WITH MEALS
Qty: 180 TABLET | Refills: 1 | Status: SHIPPED | OUTPATIENT
Start: 2019-07-01 | End: 2020-01-17 | Stop reason: SDUPTHER

## 2019-07-01 RX ORDER — IPRATROPIUM BROMIDE 21 UG/1
SPRAY, METERED NASAL
Qty: 30 ML | Refills: 3 | Status: SHIPPED | OUTPATIENT
Start: 2019-07-01 | End: 2020-01-12 | Stop reason: SDUPTHER

## 2019-08-19 ENCOUNTER — TELEPHONE (OUTPATIENT)
Dept: FAMILY MEDICINE CLINIC | Facility: CLINIC | Age: 84
End: 2019-08-19

## 2019-08-19 NOTE — TELEPHONE ENCOUNTER
Left message for patient informing that he due for his yearly medicare physical  Last medicare physical was 7/2018  [facility] due

## 2019-08-31 DIAGNOSIS — I10 ESSENTIAL HYPERTENSION: ICD-10-CM

## 2019-09-03 RX ORDER — TRIAMTERENE AND HYDROCHLOROTHIAZIDE 37.5; 25 MG/1; MG/1
TABLET ORAL
Qty: 90 TABLET | Refills: 1 | OUTPATIENT
Start: 2019-09-03

## 2019-09-09 DIAGNOSIS — I10 ESSENTIAL HYPERTENSION: ICD-10-CM

## 2019-09-09 RX ORDER — TRIAMTERENE AND HYDROCHLOROTHIAZIDE 37.5; 25 MG/1; MG/1
1 TABLET ORAL DAILY
Qty: 90 TABLET | Refills: 1 | Status: SHIPPED | OUTPATIENT
Start: 2019-09-09 | End: 2019-09-17 | Stop reason: SDUPTHER

## 2019-09-09 NOTE — TELEPHONE ENCOUNTER
Spoke with spouse Josephine Campbell and asked her to have Mihaela call our office back for an appt

## 2019-09-17 ENCOUNTER — OFFICE VISIT (OUTPATIENT)
Dept: FAMILY MEDICINE CLINIC | Facility: CLINIC | Age: 84
End: 2019-09-17
Payer: MEDICARE

## 2019-09-17 VITALS
TEMPERATURE: 98.6 F | WEIGHT: 182 LBS | RESPIRATION RATE: 15 BRPM | OXYGEN SATURATION: 97 % | DIASTOLIC BLOOD PRESSURE: 80 MMHG | SYSTOLIC BLOOD PRESSURE: 158 MMHG | HEART RATE: 80 BPM | BODY MASS INDEX: 26.96 KG/M2 | HEIGHT: 69 IN

## 2019-09-17 DIAGNOSIS — Z00.00 MEDICARE ANNUAL WELLNESS VISIT, SUBSEQUENT: Primary | ICD-10-CM

## 2019-09-17 DIAGNOSIS — I10 ESSENTIAL HYPERTENSION: ICD-10-CM

## 2019-09-17 DIAGNOSIS — M06.9 RHEUMATOID ARTHRITIS, INVOLVING UNSPECIFIED SITE, UNSPECIFIED RHEUMATOID FACTOR PRESENCE: ICD-10-CM

## 2019-09-17 DIAGNOSIS — R79.89 ABNORMAL TSH: ICD-10-CM

## 2019-09-17 DIAGNOSIS — Z13.6 SCREENING FOR CARDIOVASCULAR CONDITION: ICD-10-CM

## 2019-09-17 PROBLEM — F10.231 ALCOHOL WITHDRAWAL SYNDROME, WITH DELIRIUM (HCC): Status: RESOLVED | Noted: 2017-03-10 | Resolved: 2019-09-17

## 2019-09-17 PROBLEM — R56.9 ALCOHOL WITHDRAWAL SEIZURE WITHOUT COMPLICATION (HCC): Chronic | Status: RESOLVED | Noted: 2017-08-30 | Resolved: 2019-09-17

## 2019-09-17 PROBLEM — D62 ACUTE BLOOD LOSS ANEMIA: Status: RESOLVED | Noted: 2017-02-05 | Resolved: 2019-09-17

## 2019-09-17 PROBLEM — F10.931 ALCOHOL WITHDRAWAL SYNDROME, WITH DELIRIUM (HCC): Status: RESOLVED | Noted: 2017-03-10 | Resolved: 2019-09-17

## 2019-09-17 PROBLEM — K27.9 PEPTIC ULCER DISEASE: Status: RESOLVED | Noted: 2017-02-05 | Resolved: 2019-09-17

## 2019-09-17 PROBLEM — F10.930 ALCOHOL WITHDRAWAL SEIZURE WITHOUT COMPLICATION (HCC): Chronic | Status: RESOLVED | Noted: 2017-08-30 | Resolved: 2019-09-17

## 2019-09-17 PROBLEM — R77.8 ELEVATED TROPONIN: Status: RESOLVED | Noted: 2017-03-10 | Resolved: 2019-09-17

## 2019-09-17 PROBLEM — K92.2 GI BLEED: Status: RESOLVED | Noted: 2017-02-15 | Resolved: 2019-09-17

## 2019-09-17 PROBLEM — F10.230 ALCOHOL WITHDRAWAL SEIZURE WITHOUT COMPLICATION (HCC): Chronic | Status: RESOLVED | Noted: 2017-08-30 | Resolved: 2019-09-17

## 2019-09-17 PROBLEM — R60.0 EDEMA EXTREMITIES: Status: RESOLVED | Noted: 2017-04-07 | Resolved: 2019-09-17

## 2019-09-17 PROBLEM — R56.9 SEIZURES (HCC): Status: RESOLVED | Noted: 2017-03-10 | Resolved: 2019-09-17

## 2019-09-17 PROBLEM — R19.5 HEME POSITIVE STOOL: Status: RESOLVED | Noted: 2017-02-05 | Resolved: 2019-09-17

## 2019-09-17 PROCEDURE — G0439 PPPS, SUBSEQ VISIT: HCPCS | Performed by: FAMILY MEDICINE

## 2019-09-17 RX ORDER — TRIAMTERENE AND HYDROCHLOROTHIAZIDE 37.5; 25 MG/1; MG/1
1 TABLET ORAL DAILY
Qty: 90 TABLET | Refills: 1 | Status: SHIPPED | OUTPATIENT
Start: 2019-09-17 | End: 2020-06-19 | Stop reason: SDUPTHER

## 2019-09-17 RX ORDER — MINERAL OIL AND WHITE PETROLATUM 150; 830 MG/G; MG/G
OINTMENT OPHTHALMIC EVERY 6 HOURS PRN
COMMUNITY
Start: 2017-03-16

## 2019-09-17 RX ORDER — ASPIRIN 325 MG
325 TABLET ORAL 2 TIMES DAILY
COMMUNITY

## 2019-09-17 NOTE — PROGRESS NOTES
Assessment/Plan:     Diagnoses and all orders for this visit:    Medicare annual wellness visit, subsequent    Essential hypertension  -     CBC and differential; Future  -     triamterene-hydrochlorothiazide (MAXZIDE-25) 37 5-25 mg per tablet; Take 1 tablet by mouth daily    Abnormal TSH  -     CBC and differential; Future  -     TSH, 3rd generation with Free T4 reflex; Future    Rheumatoid arthritis, involving unspecified site, unspecified rheumatoid factor presence ([facility] [address] )    Screening for cardiovascular condition  -     CBC and differential; Future  -     Comprehensive metabolic panel; Future  -     Lipid panel; Future  -     Urinalysis with reflex to microscopic    Other orders  -     mineral oil-white petrolatum (LUBRIFRESH P M ) ophthalmic ointment; Apply topically every 6 (six) hours as needed  -     aspirin 325 mg tablet; Take 325 mg by mouth 2 (two) times a day  -     IBUPROFEN PO            Subjective:     Chief Complaint   Patient presents with    Medicare Wellness Visit     subsequent         Patient ID: Meron Celis is a 82 y o  male  Patient here today for Medicare wellness visit  He reports no acute physical complaints other than chronic joint pain with exertion  And worsening balance  States he did balance therapy in the past him to help but he did not like the strain on his joints  Otherwise he is stop taking his tram drink was he ran out his blood pressure has been slightly elevated since then but no other physical complaints      The following portions of the patient's history were reviewed and updated as appropriate: allergies, current medications, past family history, past medical history, past social history, past surgical history and problem list     Review of Systems   Constitutional: Negative  HENT: Negative  Eyes: Negative  Respiratory: Negative  Cardiovascular: Negative  Gastrointestinal: Negative  Endocrine: Negative  Genitourinary: Negative  Musculoskeletal: Positive for arthralgias and gait problem  Skin: Negative  Allergic/Immunologic: Negative  Hematological: Negative  Psychiatric/Behavioral: Negative  All other systems reviewed and are negative  Objective:    Vitals:    09/17/19 1104   BP: 158/80   BP Location: Left arm   Patient Position: Sitting   Cuff Size: Standard   Pulse: 80   Resp: 15   Temp: 98 6 °F (37 °C)   TempSrc: Tympanic   SpO2: 97%   Weight: 82 6 kg (182 lb)   Height: 5' 9" (1 753 m)          Physical Exam   Constitutional: He is oriented to person, place, and time  He appears well-developed and well-nourished  No distress  HENT:   Head: Normocephalic  Right Ear: External ear normal    Left Ear: External ear normal    Nose: Nose normal    Mouth/Throat: Oropharynx is clear and moist    Eyes: Pupils are equal, round, and reactive to light  Conjunctivae and EOM are normal  Right eye exhibits no discharge  Left eye exhibits no discharge  Neck: Normal range of motion  Cardiovascular: Normal rate, regular rhythm and normal heart sounds  Pulmonary/Chest: Effort normal and breath sounds normal    Abdominal: Soft  Bowel sounds are normal  He exhibits no distension  There is no tenderness  Musculoskeletal: Normal range of motion  Arthritic changes   Neurological: He is alert and oriented to person, place, and time  No cranial nerve deficit  Skin: Skin is warm and dry  No rash noted  Psychiatric: He has a normal mood and affect  His behavior is normal  Judgment and thought content normal    Nursing note and vitals reviewed         Assessment and Plan:     Problem List Items Addressed This Visit        Musculoskeletal and Integument    Rheumatoid arthritis ([address] )    Relevant Medications    IBUPROFEN PO      Other Visit Diagnoses     Medicare annual wellness visit, subsequent    -  Primary    Essential hypertension        Relevant Medications    triamterene-hydrochlorothiazide (MAXZIDE-25) 37 5-25 mg per tablet    Other Relevant Orders    CBC and differential    Abnormal TSH        Relevant Orders    CBC and differential    TSH, 3rd generation with Free T4 reflex    Screening for cardiovascular condition        Relevant Orders    CBC and differential    Comprehensive metabolic panel    Lipid panel    Urinalysis with reflex to microscopic           Preventive health issues were discussed with patient, and age appropriate screening tests were ordered as noted in patient's After Visit Summary  Personalized health advice and appropriate referrals for health education or preventive services given if needed, as noted in patient's After Visit Summary       History of Present Illness:     Patient presents for Medicare Annual Wellness visit    Patient Care Team:  Nelda Rose DO as PCP - General     Problem List:     Patient Active Problem List   Diagnosis    Abdominal pain    Ambulatory dysfunction    Accelerated hypertension    Acute ischemic stroke ([facility] [address] )    Allergic rhinitis    Anemia of chronic disease    Atrial fibrillation (HCC)    Chronic sinusitis    Eczema    Edema    Gait disturbance    Generalized osteoarthritis of multiple sites    Hyperglycemia    Hypomagnesemia    Hyponatremia    Peripheral neuropathy    Polyarticular arthritis    Rheumatoid arthritis ([facility] [address] )    GERD (gastroesophageal reflux disease)      Past Medical and Surgical History:     Past Medical History:   Diagnosis Date    Alcohol abuse     Alcohol withdrawal seizure without complication ([facility] [address] )     Arthritis     Asthma     CVA (cerebral vascular accident) ([facility] [address] )     Decubitus ulcer of buttock     last assessed 07/20/16    Diabetes ([facility] [address] )     Disease of thyroid gland     Duodenal ulcer     Emphysema lung (HCC)     Esophageal varices (HCC)     GERD (gastroesophageal reflux disease)     History of transfusion     Hypertension     Irritable bowel syndrome with diarrhea     Kidney stones     Prostate cancer ([facility] [address] )     Urinary frequency     resolved 02/15/17     Past Surgical History:   Procedure Laterality Date    BACK SURGERY      CATARACT EXTRACTION      ESOPHAGOGASTRODUODENOSCOPY N/A 2017    Procedure: ESOPHAGOGASTRODUODENOSCOPY (EGD); Surgeon: Lisa Bender MD;  Location:  MAIN OR;  Service:     [place] ESOPHAGOGASTRODUODENOSCOPY TRANSORAL DIAGNOSTIC N/A 2016    Procedure: ESOPHAGOGASTRODUODENOSCOPY (EGD);   Surgeon: Brinda Laguna MD;  Location:  MAIN OR;  Service: Gastroenterology    TONSILLECTOMY        Family History:     Family History   Problem Relation Age of Onset    Heart disease Mother         cardiac disorder    Stroke Father     Heart disease Father         cardiac disorder    Heart disease Sister     Diabetes Family     Hypertension Family       Social History:     Social History     Socioeconomic History    Marital status: /Civil Union     Spouse name: None    Number of children: None    Years of education: None    Highest education level: None   Occupational History    None   Social Needs    Financial resource strain: None    Food insecurity:     Worry: None     Inability: None    Transportation needs:     Medical: None     Non-medical: None   Tobacco Use    Smoking status: Former Smoker     Last attempt to quit: 1980     Years since quittin 7    Smokeless tobacco: Never Used    Tobacco comment: quit 20 years ago   Substance and Sexual Activity    Alcohol use: Yes     Comment: 3-4 mixed drinks vodka per night/ 2L per week    Drug use: No    Sexual activity: None   Lifestyle    Physical activity:     Days per week: None     Minutes per session: None    Stress: None   Relationships    Social connections:     Talks on phone: None     Gets together: None     Attends Orthodox service: None     Active member of club or organization: None     Attends meetings of clubs or organizations: None     Relationship status: None    Intimate partner violence:     Fear of current or ex partner: None     Emotionally abused: None     Physically abused: None     Forced sexual activity: None   Other Topics Concern    None   Social History Narrative    Lives with Wife       Medications and Allergies:     Current Outpatient Medications   Medication Sig Dispense Refill    aspirin 325 mg tablet Take 325 mg by mouth 2 (two) times a day      carvedilol (COREG) 12 5 mg tablet Take 1 tablet (12 5 mg total) by mouth 2 (two) times a day with meals 180 tablet 1    hydrocortisone 2 5 % ointment Apply topically 2 (two) times a day 454 g 1    IBUPROFEN PO       ipratropium (ATROVENT) 0 03 % nasal spray 2 sprays each nostril tid 30 mL 3    losartan (COZAAR) 50 mg tablet Take 1 tablet (50 mg total) by mouth daily 90 tablet 1    mineral oil-white petrolatum (LUBRIFRESH P M ) ophthalmic ointment Apply topically every 6 (six) hours as needed      Multiple Vitamins-Minerals (VISION PLUS) CAPS Take 1 capsule by mouth 2 (two) times a day      pantoprazole (PROTONIX) 40 mg tablet Take 1 tablet (40 mg total) by mouth daily 90 tablet 1    polyethylene glycol (MIRALAX) 17 g packet Take 17 g by mouth daily      polyvinyl alcohol (LIQUIFILM TEARS) 1 4 % ophthalmic solution Apply 1-2 drops to eye 4 (four) times a day      triamterene-hydrochlorothiazide (MAXZIDE-25) 37 5-25 mg per tablet Take 1 tablet by mouth daily 90 tablet 1     No current facility-administered medications for this visit  Allergies   Allergen Reactions    Morphine And Related       Immunizations: There is no immunization history on file for this patient  Health Maintenance: There are no preventive care reminders to display for this patient        Topic Date Due    INFLUENZA VACCINE  07/01/2019      Medicare Health Risk Assessment:     /80 (BP Location: Left arm, Patient Position: Sitting, Cuff Size: Standard)   Pulse 80   Temp 98 6 °F (37 °C) (Tympanic)   Resp 15   Ht 5' 9" (1 753 m)   Wt 82 6 kg (182 lb)   SpO2 97%   BMI 26 88 kg/m²      Samaria Stephenson is here for his Subsequent Wellness visit  Last Medicare Wellness visit information reviewed, patient interviewed and updates made to the record today  Health Risk Assessment:   Patient rates overall health as good  Patient feels that their physical health rating is same  Eyesight was rated as same  Hearing was rated as same  Patient feels that their emotional and mental health rating is same  Pain experienced in the last 7 days has been some  Patient's pain rating has been 4/10  Patient states that he has experienced no weight loss or gain in last 6 months  Depression Screening:   PHQ-2 Score: 0      Fall Risk Screening: In the past year, patient has experienced: no history of falling in past year      Home Safety:  Patient has trouble with stairs inside or outside of their home  Patient has working smoke alarms and has working carbon monoxide detector  Home safety hazards include: none  Nutrition:   Current diet is Regular and Limited junk food  Medications:   Patient is currently taking over-the-counter supplements  OTC medications include: see medication list  Patient is able to manage medications  Activities of Daily Living (ADLs)/Instrumental Activities of Daily Living (IADLs):   Walk and transfer into and out of bed and chair?: Yes  Dress and groom yourself?: Yes    Bathe or shower yourself?: Yes    Feed yourself?  Yes  Do your laundry/housekeeping?: Yes  Manage your money, pay your bills and track your expenses?: Yes  Make your own meals?: Yes    Do your own shopping?: Yes    Previous Hospitalizations:   Any hospitalizations or ED visits within the last 12 months?: No      Advance Care Planning:   Living will: No    Durable POA for healthcare: No    Advanced directive: Yes    Advanced directive counseling given: No    Five wishes given: No    Patient declined ACP directive: Yes    End of Life Decisions reviewed with patient: Yes    Provider agrees with end of life decisions: Yes      PREVENTIVE SCREENINGS      Cardiovascular Screening:    General: Screening Current      Diabetes Screening:     General: Screening Current      Colorectal Cancer Screening:     General: Screening Not Indicated      Prostate Cancer Screening:    General: History Prostate Cancer and Screening Not Indicated      Osteoporosis Screening:    General: Screening Not Indicated      Abdominal Aortic Aneurysm (AAA) Screening:    Risk factors include: tobacco use        General: Screening Not Indicated      Lung Cancer Screening:     General: Screening Not Indicated      Hepatitis C Screening:    General: Screening Not Indicated    Other Counseling Topics:   Alcohol use counseling, car/seat belt/driving safety, skin self-exam, sunscreen and calcium and vitamin D intake and regular weightbearing exercise         Balbir Page, DO

## 2019-10-22 DIAGNOSIS — K29.50 CHRONIC GASTRITIS WITHOUT BLEEDING, UNSPECIFIED GASTRITIS TYPE: ICD-10-CM

## 2019-10-22 RX ORDER — PANTOPRAZOLE SODIUM 40 MG/1
40 TABLET, DELAYED RELEASE ORAL DAILY
Qty: 90 TABLET | Refills: 1 | Status: SHIPPED | OUTPATIENT
Start: 2019-10-22 | End: 2020-04-20 | Stop reason: SDUPTHER

## 2019-10-28 DIAGNOSIS — I10 ACCELERATED HYPERTENSION: ICD-10-CM

## 2019-10-28 RX ORDER — LOSARTAN POTASSIUM 50 MG/1
50 TABLET ORAL DAILY
Qty: 90 TABLET | Refills: 1 | Status: SHIPPED | OUTPATIENT
Start: 2019-10-28 | End: 2020-05-18 | Stop reason: SDUPTHER

## 2019-11-21 DIAGNOSIS — J30.0 VASOMOTOR RHINITIS: ICD-10-CM

## 2019-11-21 RX ORDER — IPRATROPIUM BROMIDE 21 UG/1
SPRAY, METERED NASAL
Refills: 3 | OUTPATIENT
Start: 2019-11-21

## 2019-12-23 DIAGNOSIS — I10 ESSENTIAL HYPERTENSION: ICD-10-CM

## 2019-12-23 RX ORDER — CARVEDILOL 12.5 MG/1
TABLET ORAL
Qty: 180 TABLET | Refills: 1 | OUTPATIENT
Start: 2019-12-23

## 2019-12-25 DIAGNOSIS — J30.0 VASOMOTOR RHINITIS: ICD-10-CM

## 2019-12-27 RX ORDER — IPRATROPIUM BROMIDE 21 UG/1
SPRAY, METERED NASAL
Refills: 3 | OUTPATIENT
Start: 2019-12-27

## 2020-01-10 ENCOUNTER — OFFICE VISIT (OUTPATIENT)
Dept: FAMILY MEDICINE CLINIC | Facility: CLINIC | Age: 85
End: 2020-01-10
Payer: MEDICARE

## 2020-01-10 ENCOUNTER — APPOINTMENT (EMERGENCY)
Dept: CT IMAGING | Facility: HOSPITAL | Age: 85
End: 2020-01-10
Payer: MEDICARE

## 2020-01-10 ENCOUNTER — APPOINTMENT (EMERGENCY)
Dept: RADIOLOGY | Facility: HOSPITAL | Age: 85
End: 2020-01-10
Payer: MEDICARE

## 2020-01-10 ENCOUNTER — HOSPITAL ENCOUNTER (EMERGENCY)
Facility: HOSPITAL | Age: 85
Discharge: HOME | End: 2020-01-10
Attending: EMERGENCY MEDICINE | Admitting: EMERGENCY MEDICINE
Payer: MEDICARE

## 2020-01-10 VITALS
RESPIRATION RATE: 19 BRPM | TEMPERATURE: 97.8 F | DIASTOLIC BLOOD PRESSURE: 96 MMHG | SYSTOLIC BLOOD PRESSURE: 196 MMHG | HEART RATE: 86 BPM | OXYGEN SATURATION: 95 %

## 2020-01-10 VITALS
HEART RATE: 98 BPM | HEIGHT: 69 IN | OXYGEN SATURATION: 97 % | WEIGHT: 181 LBS | SYSTOLIC BLOOD PRESSURE: 158 MMHG | TEMPERATURE: 96.7 F | BODY MASS INDEX: 26.81 KG/M2 | DIASTOLIC BLOOD PRESSURE: 90 MMHG

## 2020-01-10 DIAGNOSIS — R06.02 SOB (SHORTNESS OF BREATH): Primary | ICD-10-CM

## 2020-01-10 DIAGNOSIS — Z72.89 CHRONIC ALCOHOL USE: ICD-10-CM

## 2020-01-10 DIAGNOSIS — F10.10 ALCOHOL ABUSE: ICD-10-CM

## 2020-01-10 DIAGNOSIS — I63.9 CEREBROVASCULAR ACCIDENT (CVA), UNSPECIFIED MECHANISM (HCC): ICD-10-CM

## 2020-01-10 DIAGNOSIS — R31.9 HEMATURIA: ICD-10-CM

## 2020-01-10 DIAGNOSIS — R55 PRE-SYNCOPE: ICD-10-CM

## 2020-01-10 DIAGNOSIS — E86.0 DEHYDRATION: Primary | ICD-10-CM

## 2020-01-10 DIAGNOSIS — I10 ACCELERATED HYPERTENSION: ICD-10-CM

## 2020-01-10 DIAGNOSIS — R53.1 GENERALIZED WEAKNESS: ICD-10-CM

## 2020-01-10 DIAGNOSIS — R10.13 EPIGASTRIC PAIN: ICD-10-CM

## 2020-01-10 DIAGNOSIS — R42 DIZZINESS: ICD-10-CM

## 2020-01-10 PROBLEM — F10.90 CHRONIC ALCOHOL USE: Status: ACTIVE | Noted: 2020-01-10

## 2020-01-10 LAB
ALBUMIN SERPL BCP-MCNC: 3.9 G/DL (ref 3.5–5)
ALP SERPL-CCNC: 72 U/L (ref 46–116)
ALT SERPL W P-5'-P-CCNC: 46 U/L (ref 12–78)
ANION GAP SERPL CALCULATED.3IONS-SCNC: 13 MMOL/L (ref 4–13)
APTT PPP: 28 SECONDS (ref 23–37)
AST SERPL W P-5'-P-CCNC: 53 U/L (ref 5–45)
BACTERIA UR QL AUTO: ABNORMAL /HPF
BASOPHILS # BLD AUTO: 0.06 THOUSANDS/ΜL (ref 0–0.1)
BASOPHILS NFR BLD AUTO: 1 % (ref 0–1)
BILIRUB SERPL-MCNC: 0.5 MG/DL (ref 0.2–1)
BILIRUB UR QL STRIP: NEGATIVE
BUN SERPL-MCNC: 30 MG/DL (ref 5–25)
CALCIUM SERPL-MCNC: 9.5 MG/DL (ref 8.3–10.1)
CHLORIDE SERPL-SCNC: 101 MMOL/L (ref 100–108)
CLARITY UR: CLEAR
CLARITY, POC: CLEAR
CO2 SERPL-SCNC: 26 MMOL/L (ref 21–32)
COLOR UR: YELLOW
COLOR, POC: ABNORMAL
CREAT SERPL-MCNC: 1.76 MG/DL (ref 0.6–1.3)
EOSINOPHIL # BLD AUTO: 0.38 THOUSAND/ΜL (ref 0–0.61)
EOSINOPHIL NFR BLD AUTO: 8 % (ref 0–6)
ERYTHROCYTE [DISTWIDTH] IN BLOOD BY AUTOMATED COUNT: 16.3 % (ref 11.6–15.1)
ETHANOL SERPL-MCNC: 96 MG/DL (ref 0–3)
EXT BILIRUBIN, UA: ABNORMAL
EXT BLOOD URINE: ABNORMAL
EXT GLUCOSE, UA: ABNORMAL
EXT KETONES: ABNORMAL
EXT NITRITE, UA: ABNORMAL
EXT PH, UA: 6
EXT PROTEIN, UA: ABNORMAL
EXT SPECIFIC GRAVITY, UA: 1.02
EXT UROBILINOGEN: 0.2
GFR SERPL CREATININE-BSD FRML MDRD: 35 ML/MIN/1.73SQ M
GLUCOSE SERPL-MCNC: 94 MG/DL (ref 65–140)
GLUCOSE UR STRIP-MCNC: NEGATIVE MG/DL
HCT VFR BLD AUTO: 39.7 % (ref 36.5–49.3)
HGB BLD-MCNC: 13.4 G/DL (ref 12–17)
HGB UR QL STRIP.AUTO: ABNORMAL
HYALINE CASTS #/AREA URNS LPF: ABNORMAL /LPF
IMM GRANULOCYTES # BLD AUTO: 0.01 THOUSAND/UL (ref 0–0.2)
IMM GRANULOCYTES NFR BLD AUTO: 0 % (ref 0–2)
INR PPP: 1 (ref 0.84–1.19)
KETONES UR STRIP-MCNC: ABNORMAL MG/DL
LEUKOCYTE ESTERASE UR QL STRIP: NEGATIVE
LYMPHOCYTES # BLD AUTO: 1.1 THOUSANDS/ΜL (ref 0.6–4.47)
LYMPHOCYTES NFR BLD AUTO: 23 % (ref 14–44)
MCH RBC QN AUTO: 33.2 PG (ref 26.8–34.3)
MCHC RBC AUTO-ENTMCNC: 33.8 G/DL (ref 31.4–37.4)
MCV RBC AUTO: 98 FL (ref 82–98)
MONOCYTES # BLD AUTO: 0.51 THOUSAND/ΜL (ref 0.17–1.22)
MONOCYTES NFR BLD AUTO: 10 % (ref 4–12)
NEUTROPHILS # BLD AUTO: 2.83 THOUSANDS/ΜL (ref 1.85–7.62)
NEUTS SEG NFR BLD AUTO: 58 % (ref 43–75)
NITRITE UR QL STRIP: NEGATIVE
NON-SQ EPI CELLS URNS QL MICRO: ABNORMAL /HPF
NRBC BLD AUTO-RTO: 0 /100 WBCS
PH UR STRIP.AUTO: 6 [PH]
PLATELET # BLD AUTO: 141 THOUSANDS/UL (ref 149–390)
PMV BLD AUTO: 9.5 FL (ref 8.9–12.7)
POTASSIUM SERPL-SCNC: 4.5 MMOL/L (ref 3.5–5.3)
PROT SERPL-MCNC: 8.5 G/DL (ref 6.4–8.2)
PROT UR STRIP-MCNC: ABNORMAL MG/DL
PROTHROMBIN TIME: 12.9 SECONDS (ref 11.6–14.5)
RBC # BLD AUTO: 4.04 MILLION/UL (ref 3.88–5.62)
RBC #/AREA URNS AUTO: ABNORMAL /HPF
SODIUM SERPL-SCNC: 140 MMOL/L (ref 136–145)
SP GR UR STRIP.AUTO: 1.02 (ref 1–1.03)
TROPONIN I SERPL-MCNC: <0.02 NG/ML
UROBILINOGEN UR QL STRIP.AUTO: 0.2 E.U./DL
WBC # BLD AUTO: 4.89 THOUSAND/UL (ref 4.31–10.16)
WBC # BLD EST: ABNORMAL 10*3/UL
WBC #/AREA URNS AUTO: ABNORMAL /HPF

## 2020-01-10 PROCEDURE — 70450 CT HEAD/BRAIN W/O DYE: CPT

## 2020-01-10 PROCEDURE — 85730 THROMBOPLASTIN TIME PARTIAL: CPT | Performed by: PHYSICIAN ASSISTANT

## 2020-01-10 PROCEDURE — 96374 THER/PROPH/DIAG INJ IV PUSH: CPT

## 2020-01-10 PROCEDURE — 93005 ELECTROCARDIOGRAM TRACING: CPT

## 2020-01-10 PROCEDURE — 36415 COLL VENOUS BLD VENIPUNCTURE: CPT

## 2020-01-10 PROCEDURE — 85025 COMPLETE CBC W/AUTO DIFF WBC: CPT

## 2020-01-10 PROCEDURE — 80053 COMPREHEN METABOLIC PANEL: CPT

## 2020-01-10 PROCEDURE — 80320 DRUG SCREEN QUANTALCOHOLS: CPT | Performed by: PHYSICIAN ASSISTANT

## 2020-01-10 PROCEDURE — 71046 X-RAY EXAM CHEST 2 VIEWS: CPT

## 2020-01-10 PROCEDURE — 99214 OFFICE O/P EST MOD 30 MIN: CPT | Performed by: FAMILY MEDICINE

## 2020-01-10 PROCEDURE — 81001 URINALYSIS AUTO W/SCOPE: CPT | Performed by: PHYSICIAN ASSISTANT

## 2020-01-10 PROCEDURE — 84484 ASSAY OF TROPONIN QUANT: CPT

## 2020-01-10 PROCEDURE — 85610 PROTHROMBIN TIME: CPT | Performed by: PHYSICIAN ASSISTANT

## 2020-01-10 PROCEDURE — 99285 EMERGENCY DEPT VISIT HI MDM: CPT

## 2020-01-10 PROCEDURE — 99285 EMERGENCY DEPT VISIT HI MDM: CPT | Performed by: PHYSICIAN ASSISTANT

## 2020-01-10 RX ORDER — MECLIZINE HYDROCHLORIDE 25 MG/1
25 TABLET ORAL 3 TIMES DAILY PRN
Qty: 15 TABLET | Refills: 0 | Status: SHIPPED | OUTPATIENT
Start: 2020-01-10

## 2020-01-10 RX ORDER — LORAZEPAM 1 MG/1
1 TABLET ORAL ONCE
Status: COMPLETED | OUTPATIENT
Start: 2020-01-10 | End: 2020-01-10

## 2020-01-10 RX ORDER — SODIUM CHLORIDE 9 MG/ML
125 INJECTION, SOLUTION INTRAVENOUS CONTINUOUS
Status: DISCONTINUED | OUTPATIENT
Start: 2020-01-10 | End: 2020-01-10 | Stop reason: HOSPADM

## 2020-01-10 RX ORDER — ONDANSETRON 2 MG/ML
4 INJECTION INTRAMUSCULAR; INTRAVENOUS ONCE
Status: COMPLETED | OUTPATIENT
Start: 2020-01-10 | End: 2020-01-10

## 2020-01-10 RX ADMIN — ONDANSETRON 4 MG: 2 INJECTION INTRAMUSCULAR; INTRAVENOUS at 14:28

## 2020-01-10 RX ADMIN — LORAZEPAM 1 MG: 1 TABLET ORAL at 14:33

## 2020-01-10 RX ADMIN — SODIUM CHLORIDE 125 ML/HR: 0.9 INJECTION, SOLUTION INTRAVENOUS at 13:07

## 2020-01-10 NOTE — PROGRESS NOTES
Terry Barreto 1935 male MRN: [id number]    Family Medicine Acute Visit    ASSESSMENT/PLAN  Problem List Items Addressed This Visit        Cardiovascular and Mediastinum    Accelerated hypertension    CVA (cerebral vascular accident) ([address] )    Pre-syncope       Other    Abdominal pain    Chronic alcohol use    SOB (shortness of breath) - Primary          81 yo male here for acute visit  Complains of SOB, pre syncope, abdominal pain, just not feeling well  Feels like he is going to pass out, something is just not right  He has a significant history of alcohol abuse, states he had a drink this morning  He drink every day- he does not count how much he drinks  Advised patient given his medical history and very concerning symptoms he should go to the ER for more urgent evaluation  Offered to call EMS  Patient initially very hesitant to go to ER  Advised him on concern for worsening symptoms and poor outcome  He finally agreed and his wife will take him for evaluation  Future Appointments   Date Time Provider Kimberly Moore   3/19/2020 10:30 AM DO JOELLEN [facility]          SUBJECTIVE  CC: Cough (upset stomach,sinus )      HPI:  Terry Barerto is a 82 y o  male who presents for sick visit,  States he got up this morning and did not feel right  He felt like he was going to fall over  Tried to eat and drink this morning- still having upset stomach  Feels SOB  He keeps having episodes of near passing out  Drinks on a daily basis-he says he doesn't count  Feels like he could vomit but he can't  No diarrhea  No fever  He states he has had stroke in the past   States he use to smoke years ago but quit           Review of Systems   Constitutional: Positive for fatigue  Negative for chills and fever  HENT: Positive for congestion and postnasal drip  Negative for rhinorrhea and sinus pain  Eyes: Negative for photophobia and visual disturbance  Respiratory: Positive for shortness of breath  Negative for cough  Cardiovascular: Negative for chest pain, palpitations and leg swelling  Gastrointestinal: Positive for nausea  Negative for abdominal pain, constipation, diarrhea and vomiting  Genitourinary: Negative for difficulty urinating and dysuria  Musculoskeletal: Negative for arthralgias and myalgias  Skin: Negative for rash  Neurological: Positive for dizziness and syncope  Historical Information   The patient history was reviewed as follows:  Past Medical History:   Diagnosis Date    Alcohol abuse     Alcohol withdrawal seizure without complication ([place] [address] )     Arthritis     Asthma     CVA (cerebral vascular accident) ([place] [address] )     Decubitus ulcer of buttock     last assessed 07/20/16    Diabetes ([place] [address] )     Disease of thyroid gland     Duodenal ulcer     Emphysema lung ([place] [address] )     Esophageal varices (HCC)     GERD (gastroesophageal reflux disease)     History of transfusion     Hypertension     Irritable bowel syndrome with diarrhea     Kidney stones     Prostate cancer ([place] [address] )     Urinary frequency     resolved 02/15/17         Past Surgical History:   Procedure Laterality Date    BACK SURGERY      CATARACT EXTRACTION      ESOPHAGOGASTRODUODENOSCOPY N/A 2/6/2017    Procedure: ESOPHAGOGASTRODUODENOSCOPY (EGD); Surgeon: Feliberto Arita MD;  Location:  MAIN OR;  Service:     [place] ESOPHAGOGASTRODUODENOSCOPY TRANSORAL DIAGNOSTIC N/A 4/26/2016    Procedure: ESOPHAGOGASTRODUODENOSCOPY (EGD);   Surgeon: Brooke De La Garza MD;  Location:  MAIN OR;  Service: Gastroenterology    TONSILLECTOMY       Family History   Problem Relation Age of Onset    Heart disease Mother         cardiac disorder    Stroke Father     Heart disease Father         cardiac disorder    Heart disease Sister     Diabetes Family     Hypertension Family       Social History   Social History     Substance and Sexual Activity   Alcohol Use Yes    Frequency: 4 or more times a week    Drinks per session: 10 or more    Binge frequency: Daily or almost daily    Comment: 3-4 mixed drinks vodka per night/ 2L per week     Social History     Substance and Sexual Activity   Drug Use No     Social History     Tobacco Use   Smoking Status Former Smoker    Last attempt to quit: Barbara Wilson Years since quittin 0   Smokeless Tobacco Never Used   Tobacco Comment    quit 20 years ago       Medications:     Current Outpatient Medications:     aspirin 325 mg tablet, Take 325 mg by mouth 2 (two) times a day, Disp: , Rfl:     carvedilol (COREG) 12 5 mg tablet, Take 1 tablet (12 5 mg total) by mouth 2 (two) times a day with meals, Disp: 180 tablet, Rfl: 1    hydrocortisone 2 5 % ointment, Apply topically 2 (two) times a day, Disp: 454 g, Rfl: 1    IBUPROFEN PO, , Disp: , Rfl:     ipratropium (ATROVENT) 0 03 % nasal spray, 2 sprays each nostril tid, Disp: 30 mL, Rfl: 3    losartan (COZAAR) 50 mg tablet, Take 1 tablet (50 mg total) by mouth daily, Disp: 90 tablet, Rfl: 1    mineral oil-white petrolatum (LUBRIFRESH P M ) ophthalmic ointment, Apply topically every 6 (six) hours as needed, Disp: , Rfl:     Multiple Vitamins-Minerals (VISION PLUS) CAPS, Take 1 capsule by mouth 2 (two) times a day, Disp: , Rfl:     pantoprazole (PROTONIX) 40 mg tablet, Take 1 tablet (40 mg total) by mouth daily, Disp: 90 tablet, Rfl: 1    polyethylene glycol (MIRALAX) 17 g packet, Take 17 g by mouth daily, Disp: , Rfl:     polyvinyl alcohol (LIQUIFILM TEARS) 1 4 % ophthalmic solution, Apply 1-2 drops to eye 4 (four) times a day, Disp: , Rfl:     triamterene-hydrochlorothiazide (MAXZIDE-25) 37 5-25 mg per tablet, Take 1 tablet by mouth daily, Disp: 90 tablet, Rfl: 1    Allergies   Allergen Reactions    Morphine And Related        OBJECTIVE  Vitals:   Vitals:    01/10/20 1133   BP: 158/90   BP Location: Left arm   Patient Position: Sitting   Cuff Size: Large   Pulse: 98   Temp: (!) 96 7 °F (35 9 °C)   TempSrc: Tympanic   SpO2: 97%   Weight: 82 1 kg (181 lb)   Height: 5' 9" (1 753 m)         Physical Exam   Constitutional: He is oriented to person, place, and time  He appears well-developed and well-nourished  HENT:   Head: Normocephalic and atraumatic  Right Ear: External ear normal    Left Ear: External ear normal    Nose: Mucosal edema and rhinorrhea present  Mouth/Throat: Oropharynx is clear and moist    Eyes: Pupils are equal, round, and reactive to light  Conjunctivae and EOM are normal    Neck: Normal range of motion  Neck supple  Cardiovascular: Normal rate, regular rhythm and normal heart sounds  No murmur heard  Pulmonary/Chest: Effort normal  No respiratory distress  He has decreased breath sounds  He has no wheezes  Abdominal: Soft  Bowel sounds are normal  He exhibits no distension  Musculoskeletal: Normal range of motion  He exhibits no edema  Neurological: He is alert and oriented to person, place, and time  Skin: Skin is warm and dry  Psychiatric: He has a normal mood and affect  His behavior is normal         BMI Counseling: Body mass index is 26 73 kg/m²  The BMI is above normal  Nutrition recommendations include reducing portion sizes  Exercise recommendations include strength training exercises            Bernice Villa DO    1/10/2020

## 2020-01-10 NOTE — ED NOTES
PAC notified of hypertension  No further orders at this time        Frances Andrade RN  01/10/20 5575

## 2020-01-10 NOTE — ED PROVIDER NOTES
History  Chief Complaint   Patient presents with    Weakness - Generalized     pt presents to ED from PCP c/o weakness for the past 2 weeks  with SOB and vomiting  Patient is an 81y/o M with h/o HTN, alcohol abuse, asthma, DM that presents to the ED with dizziness, weakness, fatigue for 2 weeks  He was seen by his PCP today and sent to the ED for further evaluation  He states he has had nausea, vomited today after eating soup, fatigue, weakness and feels off balance  He denies fevers, chills, urinary symptoms, abdominal pain  His last BM was today  No blood in stool, no dark stools  Patient states he does drink vodka daily and drank before coming to the ER  He denies h/o liver disease  He denies headaches  Patient states he has had SOB on exertion  He also states he has had multiple  Near syncopal events in the past couple weeks  History provided by:  Patient  Malaise - 7 years or greater   Severity:  Moderate  Onset quality:  Gradual  Duration:  2 weeks  Timing:  Constant  Progression:  Partially resolved  Chronicity:  New  Context: not drug use and not recent infection    Relieved by:  Nothing  Worsened by:  Nothing  Ineffective treatments:  None tried  Associated symptoms: anorexia, difficulty walking, dizziness, nausea, near-syncope, shortness of breath and vomiting    Associated symptoms: no abdominal pain, no aphasia, no chest pain, no cough, no diarrhea, no drooling, no dysuria, no numbness in extremities, no falls, no fever, no foul-smelling urine, no frequency, no headaches, no loss of consciousness, no melena, no seizures and no urgency    Risk factors: diabetes    Risk factors: no anemia, no congestive heart failure, no coronary artery disease and no new medications        Prior to Admission Medications   Prescriptions Last Dose Informant Patient Reported? Taking?    IBUPROFEN PO  Self Yes No   Multiple Vitamins-Minerals (VISION PLUS) CAPS  Self Yes No   Sig: Take 1 capsule by mouth 2 (two) times a day   aspirin 325 mg tablet  Self Yes No   Sig: Take 325 mg by mouth 2 (two) times a day   carvedilol (COREG) 12 5 mg tablet  Self No No   Sig: Take 1 tablet (12 5 mg total) by mouth 2 (two) times a day with meals   hydrocortisone 2 5 % ointment  Self No No   Sig: Apply topically 2 (two) times a day   ipratropium (ATROVENT) 0 03 % nasal spray  Self No No   Si sprays each nostril tid   losartan (COZAAR) 50 mg tablet  Self No No   Sig: Take 1 tablet (50 mg total) by mouth daily   mineral oil-white petrolatum (LUBRIFRESH P M ) ophthalmic ointment  Self Yes No   Sig: Apply topically every 6 (six) hours as needed   pantoprazole (PROTONIX) 40 mg tablet  Self No No   Sig: Take 1 tablet (40 mg total) by mouth daily   polyethylene glycol (MIRALAX) 17 g packet  Self Yes No   Sig: Take 17 g by mouth daily   polyvinyl alcohol (LIQUIFILM TEARS) 1 4 % ophthalmic solution  Self Yes No   Sig: Apply 1-2 drops to eye 4 (four) times a day   triamterene-hydrochlorothiazide (MAXZIDE-25) 37 5-25 mg per tablet  Self No No   Sig: Take 1 tablet by mouth daily      Facility-Administered Medications: None       Past Medical History:   Diagnosis Date    Alcohol abuse     Alcohol withdrawal seizure without complication (HCC)     Arthritis     Asthma     [facility] [address] [facility]     Decubitus ulcer of buttock     last assessed 16    Diabetes ([facility] [address] [facility]     Disease of thyroid gland     Duodenal ulcer     Emphysema lung (HCC)     Esophageal varices (HCC)     GERD (gastroesophageal reflux disease)     History of transfusion     Hypertension     Irritable bowel syndrome with diarrhea     Kidney stones     Prostate cancer ([facility] [address] [facility]     Urinary frequency     resolved 02/15/17       Past Surgical History:   Procedure Laterality Date    BACK SURGERY      CATARACT EXTRACTION      ESOPHAGOGASTRODUODENOSCOPY N/A 2017    Procedure: ESOPHAGOGASTRODUODENOSCOPY (EGD);   Surgeon: Hyacinth Lino MD;  Location: [facility];  Service:     CT ESOPHAGOGASTRODUODENOSCOPY TRANSORAL DIAGNOSTIC N/A 2016    Procedure: ESOPHAGOGASTRODUODENOSCOPY (EGD); Surgeon: Tawanna Owens MD;  Location:  MAIN OR;  Service: Gastroenterology    TONSILLECTOMY         Family History   Problem Relation Age of Onset    Heart disease Mother         cardiac disorder    Stroke Father     Heart disease Father         cardiac disorder    Heart disease Sister     Diabetes Family     Hypertension Family      I have reviewed and agree with the history as documented  Social History     Tobacco Use    Smoking status: Former Smoker     Last attempt to quit: 1980     Years since quittin 0    Smokeless tobacco: Never Used    Tobacco comment: quit 20 years ago   Substance Use Topics    Alcohol use: Yes     Frequency: 4 or more times a week     Drinks per session: 10 or more     Binge frequency: Daily or almost daily     Comment: 3-4 mixed drinks vodka per night/ 2L per week    Drug use: No        Review of Systems   Constitutional: Positive for fatigue  Negative for chills and fever  HENT: Positive for postnasal drip  Negative for drooling  Eyes: Negative for visual disturbance  Respiratory: Positive for shortness of breath  Negative for cough  Cardiovascular: Positive for near-syncope  Negative for chest pain, palpitations and leg swelling  Gastrointestinal: Positive for anorexia, nausea and vomiting  Negative for abdominal pain, blood in stool, constipation, diarrhea and melena  Genitourinary: Negative for dysuria, frequency and urgency  Musculoskeletal: Negative for back pain, falls and neck pain  Skin: Negative for color change and rash  Neurological: Positive for dizziness, syncope, weakness and light-headedness  Negative for seizures, loss of consciousness, facial asymmetry, numbness and headaches  Psychiatric/Behavioral: Negative for confusion  All other systems reviewed and are negative        Physical Exam  Physical Exam   Constitutional: He is oriented to person, place, and time  He appears well-developed and well-nourished  He is cooperative  He does not appear ill  No distress  HENT:   Head: Normocephalic and atraumatic  Right Ear: Hearing and tympanic membrane normal    Left Ear: Hearing and tympanic membrane normal    Nose: Nose normal    Mouth/Throat: Oropharynx is clear and moist and mucous membranes are normal    Eyes: Pupils are equal, round, and reactive to light  Conjunctivae and EOM are normal    Neck: Normal range of motion  Cardiovascular: Normal rate, regular rhythm and normal heart sounds  No murmur heard  Pulmonary/Chest: Effort normal and breath sounds normal  He has no wheezes  He has no rhonchi  He has no rales  Abdominal: Soft  Normal appearance and bowel sounds are normal  There is no tenderness  Musculoskeletal: Normal range of motion  He exhibits no edema or tenderness  Neurological: He is alert and oriented to person, place, and time  He displays tremor  No cranial nerve deficit or sensory deficit  GCS eye subscore is 4  GCS verbal subscore is 5  GCS motor subscore is 6  Skin: Skin is warm and dry  No rash noted  He is not diaphoretic  There is pallor  Nursing note and vitals reviewed        Vital Signs  ED Triage Vitals   Temperature Pulse Respirations Blood Pressure SpO2   01/10/20 1243 01/10/20 1243 01/10/20 1243 01/10/20 1244 01/10/20 1243   97 8 °F (36 6 °C) 89 18 (!) 217/102 97 %      Temp Source Heart Rate Source Patient Position - Orthostatic VS BP Location FiO2 (%)   01/10/20 1243 01/10/20 1243 01/10/20 1315 01/10/20 1243 --   Temporal Monitor Sitting Left arm       Pain Score       --                  Vitals:    01/10/20 1445 01/10/20 1500 01/10/20 1515 01/10/20 1530   BP: (!) 192/103 (!) 187/92 (!) 188/97 (!) 196/96   Pulse: 104 84 90 86   Patient Position - Orthostatic VS: Sitting Sitting Sitting Sitting         Visual Acuity  Visual Acuity      Most Recent Value   L Pupil Size (mm)  4   R Pupil Size (mm)  4          ED Medications  Medications   sodium chloride 0 9 % infusion (0 mL/hr Intravenous Stopped 1/10/20 1427)   sodium chloride 0 9 % bolus 1,000 mL (0 mL Intravenous Stopped 1/10/20 1544)   ondansetron (ZOFRAN) injection 4 mg (4 mg Intravenous Given 1/10/20 1428)   LORazepam (ATIVAN) tablet 1 mg (1 mg Oral Given 1/10/20 1433)       Diagnostic Studies  Results Reviewed     Procedure Component Value Units Date/Time    Urine Microscopic [[id number]]  (Abnormal) Collected:  01/10/20 1506    Lab Status:  Final result Specimen:  Urine, Clean Catch Updated:  01/10/20 1521     RBC, UA 0-1 /hpf      WBC, UA None Seen /hpf      Epithelial Cells None Seen /hpf      Bacteria, UA None Seen /hpf      Hyaline Casts, UA 2-4 /lpf     UA w Reflex to Microscopic w Reflex to Culture [[id number]]  (Abnormal) Collected:  01/10/20 1506    Lab Status:  Final result Specimen:  Urine, Clean Catch Updated:  01/10/20 1514     Color, UA Yellow     Clarity, UA Clear     Specific Tropic, UA 1 020     pH, UA 6 0     Leukocytes, UA Negative     Nitrite, UA Negative     Protein,  (2+) mg/dl      Glucose, UA Negative mg/dl      Ketones, UA 15 (1+) mg/dl      Urobilinogen, UA 0 2 E U /dl      Bilirubin, UA Negative     Blood, UA Small    POCT urinalysis dipstick [[id number]]  (Abnormal) Resulted:  01/10/20 1444    Lab Status:  Final result Specimen:  Urine Updated:  01/10/20 1445     Color, UA yelllow     Clarity, UA clear     Glucose, UA (Ref: Negative) Neg     Bilirubin, UA (Ref: Negative) Neg     Ketones, UA (Ref: Negative) Moderate     Spec Grav, UA (Ref:1 003-1 030) 1 020     Blood, UA (Ref: Negative) Moderate     pH, UA (Ref: 4 5-8 0) 6 0     Protein, UA (Ref: Negative) 300+     Urobilinogen, UA (Ref: 0 2- 1 0) 0 2      Leukocytes, UA (Ref: Negative) Neg     Nitrite, UA (Ref: Negative) Neg    Protime-INR [[id number]]  (Normal) Collected:  01/10/20 1301    Lab Status:  Final result Specimen:  Blood Updated:  01/10/20 1316     Protime 12 9 seconds      INR 1 00    APTT [[id number]]  (Normal) Collected:  01/10/20 1301    Lab Status:  Final result Specimen:  Blood Updated:  01/10/20 1316     PTT 28 seconds     Comprehensive metabolic panel [[id number]]  (Abnormal) Collected:  01/10/20 1259    Lab Status:  Final result Specimen:  Blood from Arm, Left Updated:  01/10/20 1315     Sodium 140 mmol/L      Potassium 4 5 mmol/L      Chloride 101 mmol/L      CO2 26 mmol/L      ANION GAP 13 mmol/L      BUN 30 mg/dL      Creatinine 1 76 mg/dL      Glucose 94 mg/dL      Calcium 9 5 mg/dL      AST 53 U/L      ALT 46 U/L      Alkaline Phosphatase 72 U/L      Total Protein 8 5 g/dL      Albumin 3 9 g/dL      Total Bilirubin 0 50 mg/dL      eGFR 35 ml/min/1 73sq m     Narrative:       [place] guidelines for Chronic Kidney Disease (CKD):     Stage 1 with normal or high GFR (GFR > 90 mL/min/1 73 square meters)    Stage 2 Mild CKD (GFR = 60-89 mL/min/1 73 square meters)    Stage 3A Moderate CKD (GFR = 45-59 mL/min/1 73 square meters)    Stage 3B Moderate CKD (GFR = 30-44 mL/min/1 73 square meters)    Stage 4 Severe CKD (GFR = 15-29 mL/min/1 73 square meters)    Stage 5 End Stage CKD (GFR <15 mL/min/1 73 square meters)  Note: GFR calculation is accurate only with a steady state creatinine    Troponin I [[id number]]  (Normal) Collected:  01/10/20 1246    Lab Status:  Final result Specimen:  Blood from Arm, Left Updated:  01/10/20 1315     Troponin I <0 02 ng/mL     Ethanol [[id number]]  (Abnormal) Collected:  01/10/20 1259    Lab Status:  Final result Specimen:  Blood Updated:  01/10/20 1315     Ethanol Lvl 96 mg/dL     CBC and differential [[id number]]  (Abnormal) Collected:  01/10/20 1246    Lab Status:  Final result Specimen:  Blood from Arm, Left Updated:  01/10/20 1259     WBC 4 89 Thousand/uL      RBC 4 04 Million/uL      Hemoglobin 13 4 g/dL      Hematocrit 39 7 %      MCV 98 fL MCH 33 2 pg      MCHC 33 8 g/dL      RDW 16 3 %      MPV 9 5 fL      Platelets 244 Thousands/uL      nRBC 0 /100 WBCs      Neutrophils Relative 58 %      Immat GRANS % 0 %      Lymphocytes Relative 23 %      Monocytes Relative 10 %      Eosinophils Relative 8 %      Basophils Relative 1 %      Neutrophils Absolute 2 83 Thousands/µL      Immature Grans Absolute 0 01 Thousand/uL      Lymphocytes Absolute 1 10 Thousands/µL      Monocytes Absolute 0 51 Thousand/µL      Eosinophils Absolute 0 38 Thousand/µL      Basophils Absolute 0 06 Thousands/µL                  CT head without contrast   Final Result by Aide Boone MD (01/10 4373)      No acute intracranial abnormality  Stable exam                   Workstation performed: [id number]         XR chest 2 views   ED Interpretation by Gian Meek PA-C (01/10 1413)   No acute abnormalities  Final Result by Jovanni Mancini DO (01/10 1519)      No acute cardiopulmonary disease  Workstation performed: [id number]                    Procedures  ECG 12 Lead Documentation Only  Date/Time: 1/10/2020 12:17 PM  Performed by: Gian Meek PA-C  Authorized by: Gian Meek PA-C     Indications / Diagnosis:  Weakness  Patient location:  ED  Previous ECG:     Previous ECG:  Unavailable  Rate:     ECG rate:  129  Rhythm:     Rhythm: sinus tachycardia    Ectopy:     Ectopy: PAC    ST segments:     ST segments:  Normal  T waves:     T waves: normal               ED Course  ED Course as of Emir 10 1615   Fri Emir 10, 2020   1419 Patient states he feels nauseated, will give zofran and crackers  He states he needs something solid in his stomach  1419 Discussed labs with patient, will give 1 liter of saline  [address] patient BCARES to help quit drinking, he declines               HEART Risk Score      Most Recent Value   History  0 Filed at: 01/10/2020 1420   ECG  0 Filed at: 01/10/2020 1420   Age  2 Filed at: 01/10/2020 1420   Risk Factors  1 Filed at: 01/10/2020 1420   Troponin  0 Filed at: 01/10/2020 1420   Heart Score Risk Calculator   History  0 Filed at: 01/10/2020 1420   ECG  0 Filed at: 01/10/2020 1420   Age  2 Filed at: 01/10/2020 1420   Risk Factors  1 Filed at: 01/10/2020 1420   Troponin  0 Filed at: 01/10/2020 1420   HEART Score  3 Filed at: 01/10/2020 1420   HEART Score  3 Filed at: 01/10/2020 1420                            MDM  Number of Diagnoses or Management Options  Alcohol abuse: established and worsening  Dehydration: new and requires workup  Dizziness: new and requires workup  Generalized weakness: new and requires workup  Hematuria: new and requires workup  Diagnosis management comments: Patient with dizziness, weakness, will check labs to r/o electrolyte abnormality, anemia, dehydration, liver disease  Patient tremors worsening in the ER, will give ativan for withdrawal symptoms  Small amount of blood in urine, will refer to urology  Patient given script for meclizine advised f/u with PCP if dizziness does not improve  Advised multivitamin since patient drinks alcohol daily, could be due to vitamin deficiency  Patient given [employer] info to help quit drinking          Amount and/or Complexity of Data Reviewed  Clinical lab tests: ordered and reviewed  Tests in the radiology section of CPT®: ordered and reviewed    Patient Progress  Patient progress: stable        Disposition  Final diagnoses:   Dehydration   Dizziness   Alcohol abuse   Generalized weakness   Hematuria     Time reflects when diagnosis was documented in both MDM as applicable and the Disposition within this note     Time User Action Codes Description Comment    1/10/2020  3:25 PM Brian Packer [E86 0] Dehydration     1/10/2020  3:25 PM Salas Fraction Add [R42] Dizziness     1/10/2020  3:26 PM Salas Fraction Add [F10 10] Alcohol abuse     1/10/2020  3:28 PM Salas Fraction Add [R53 1] Generalized weakness     1/10/2020  3:32 PM Dinora Forde Add [R31 9] Hematuria       ED Disposition     ED Disposition Condition Date/Time Comment    Discharge Stable Fri Emir 10, 2020  3:25 PM Kiran Alatorre discharge to home/self care  Follow-up Information     Follow up With Specialties Details Why Contact Info    Sho Savage DO Family Medicine Call in 3 days For recheck What Cheer Gordon Jewell MD Urology Call in 1 day for blood in urine   [facility]  [address] [place] [address] [address] [address] [phone]            Discharge Medication List as of 1/10/2020  3:32 PM      START taking these medications    Details   meclizine (ANTIVERT) 25 mg tablet Take 1 tablet (25 mg total) by mouth 3 (three) times a day as needed for dizziness, Starting Fri 1/10/2020, Normal         CONTINUE these medications which have NOT CHANGED    Details   aspirin 325 mg tablet Take 325 mg by mouth 2 (two) times a day, Historical Med      carvedilol (COREG) 12 5 mg tablet Take 1 tablet (12 5 mg total) by mouth 2 (two) times a day with meals, Starting Mon 7/1/2019, Normal      hydrocortisone 2 5 % ointment Apply topically 2 (two) times a day, Starting Fri 3/30/2018, Print      IBUPROFEN PO Historical Med      ipratropium (ATROVENT) 0 03 % nasal spray 2 sprays each nostril tid, Normal      losartan (COZAAR) 50 mg tablet Take 1 tablet (50 mg total) by mouth daily, Starting Mon 10/28/2019, Normal      mineral oil-white petrolatum (LUBRIFRESH P M ) ophthalmic ointment Apply topically every 6 (six) hours as needed, Starting Thu 3/16/2017, Historical Med      Multiple Vitamins-Minerals (VISION PLUS) CAPS Take 1 capsule by mouth 2 (two) times a day, Historical Med      pantoprazole (PROTONIX) 40 mg tablet Take 1 tablet (40 mg total) by mouth daily, Starting Tue 10/22/2019, Normal      polyethylene glycol (MIRALAX) 17 g packet Take 17 g by mouth daily, Historical Med      polyvinyl alcohol (LIQUIFILM TEARS) 1 4 % ophthalmic solution Apply 1-2 drops to eye 4 (four) times a day, Historical Med      triamterene-hydrochlorothiazide (MAXZIDE-25) 37 5-25 mg per tablet Take 1 tablet by mouth daily, Starting Tue 9/17/2019, Normal           No discharge procedures on file      ED Provider  Electronically Signed by           Elfego Silvestre PA-C  01/10/20 3018

## 2020-01-10 NOTE — DISCHARGE INSTRUCTIONS
Rest, increase fluids  Call [facility] for alcohol detox  Take meclizine as needed for dizziness  Follow up with family doctor in 3 days for recheck

## 2020-01-10 NOTE — ED NOTES
Tech at bedside assisting patient with urinal  Tech to perform urine dip     Burak Aguayo RN  01/10/20 6754

## 2020-01-10 NOTE — ED NOTES
Called [facility]   Faxed facesheet per request  Tylor Chua to f/u with patient     Frances Andrade RN  01/10/20 5932

## 2020-01-12 DIAGNOSIS — J30.0 VASOMOTOR RHINITIS: ICD-10-CM

## 2020-01-13 LAB
ATRIAL RATE: 87 BPM
P AXIS: 69 DEGREES
PR INTERVAL: 186 MS
QRS AXIS: -33 DEGREES
QRSD INTERVAL: 82 MS
QT INTERVAL: 354 MS
QTC INTERVAL: 518 MS
T WAVE AXIS: 67 DEGREES
VENTRICULAR RATE: 129 BPM

## 2020-01-13 PROCEDURE — 93010 ELECTROCARDIOGRAM REPORT: CPT | Performed by: INTERNAL MEDICINE

## 2020-01-13 RX ORDER — IPRATROPIUM BROMIDE 21 UG/1
SPRAY, METERED NASAL
Qty: 30 ML | Refills: 3 | Status: SHIPPED | OUTPATIENT
Start: 2020-01-13 | End: 2020-05-29 | Stop reason: SDUPTHER

## 2020-01-17 DIAGNOSIS — I10 ESSENTIAL HYPERTENSION: ICD-10-CM

## 2020-01-17 RX ORDER — CARVEDILOL 12.5 MG/1
12.5 TABLET ORAL 2 TIMES DAILY WITH MEALS
Qty: 180 TABLET | Refills: 1 | Status: SHIPPED | OUTPATIENT
Start: 2020-01-17 | End: 2020-05-18 | Stop reason: SDUPTHER

## 2020-04-06 ENCOUNTER — TELEPHONE (OUTPATIENT)
Dept: FAMILY MEDICINE CLINIC | Facility: CLINIC | Age: 85
End: 2020-04-06

## 2020-04-20 DIAGNOSIS — K29.50 CHRONIC GASTRITIS WITHOUT BLEEDING, UNSPECIFIED GASTRITIS TYPE: ICD-10-CM

## 2020-04-20 RX ORDER — PANTOPRAZOLE SODIUM 40 MG/1
40 TABLET, DELAYED RELEASE ORAL DAILY
Qty: 90 TABLET | Refills: 1 | Status: SHIPPED | OUTPATIENT
Start: 2020-04-20 | End: 2020-12-08 | Stop reason: SDUPTHER

## 2020-04-27 DIAGNOSIS — I10 ACCELERATED HYPERTENSION: ICD-10-CM

## 2020-04-27 RX ORDER — LOSARTAN POTASSIUM 50 MG/1
TABLET ORAL
Qty: 90 TABLET | Refills: 1 | OUTPATIENT
Start: 2020-04-27

## 2020-05-18 DIAGNOSIS — I10 ESSENTIAL HYPERTENSION: ICD-10-CM

## 2020-05-18 DIAGNOSIS — I10 ACCELERATED HYPERTENSION: ICD-10-CM

## 2020-05-18 RX ORDER — LOSARTAN POTASSIUM 50 MG/1
50 TABLET ORAL DAILY
Qty: 90 TABLET | Refills: 1 | Status: SHIPPED | OUTPATIENT
Start: 2020-05-18 | End: 2020-05-26 | Stop reason: SDUPTHER

## 2020-05-18 RX ORDER — CARVEDILOL 12.5 MG/1
12.5 TABLET ORAL 2 TIMES DAILY WITH MEALS
Qty: 180 TABLET | Refills: 1 | Status: SHIPPED | OUTPATIENT
Start: 2020-05-18 | End: 2020-11-17 | Stop reason: SDUPTHER

## 2020-05-26 DIAGNOSIS — I10 ACCELERATED HYPERTENSION: ICD-10-CM

## 2020-05-26 RX ORDER — LOSARTAN POTASSIUM 50 MG/1
50 TABLET ORAL DAILY
Qty: 90 TABLET | Refills: 1 | Status: SHIPPED | OUTPATIENT
Start: 2020-05-26 | End: 2020-12-08 | Stop reason: SDUPTHER

## 2020-05-29 DIAGNOSIS — J30.0 VASOMOTOR RHINITIS: ICD-10-CM

## 2020-05-29 RX ORDER — IPRATROPIUM BROMIDE 21 UG/1
SPRAY, METERED NASAL
Qty: 30 ML | Refills: 2 | Status: SHIPPED | OUTPATIENT
Start: 2020-05-29 | End: 2020-10-09 | Stop reason: SDUPTHER

## 2020-06-19 DIAGNOSIS — I10 ESSENTIAL HYPERTENSION: ICD-10-CM

## 2020-06-19 RX ORDER — TRIAMTERENE AND HYDROCHLOROTHIAZIDE 37.5; 25 MG/1; MG/1
1 TABLET ORAL DAILY
Qty: 90 TABLET | Refills: 0 | Status: SHIPPED | OUTPATIENT
Start: 2020-06-19 | End: 2020-09-23 | Stop reason: SDUPTHER

## 2020-06-29 ENCOUNTER — ANTICOAG VISIT (OUTPATIENT)
Dept: FAMILY MEDICINE CLINIC | Facility: CLINIC | Age: 85
End: 2020-06-29

## 2020-06-29 ENCOUNTER — TRANSITIONAL CARE MANAGEMENT (OUTPATIENT)
Dept: FAMILY MEDICINE CLINIC | Facility: CLINIC | Age: 85
End: 2020-06-29

## 2020-07-06 ENCOUNTER — TRANSITIONAL CARE MANAGEMENT (OUTPATIENT)
Dept: FAMILY MEDICINE CLINIC | Facility: CLINIC | Age: 85
End: 2020-07-06

## 2020-09-13 DIAGNOSIS — J30.0 VASOMOTOR RHINITIS: ICD-10-CM

## 2020-09-13 DIAGNOSIS — I10 ESSENTIAL HYPERTENSION: ICD-10-CM

## 2020-09-14 RX ORDER — TRIAMTERENE AND HYDROCHLOROTHIAZIDE 37.5; 25 MG/1; MG/1
TABLET ORAL
Qty: 90 TABLET | Refills: 0 | OUTPATIENT
Start: 2020-09-14

## 2020-09-14 RX ORDER — IPRATROPIUM BROMIDE 21 UG/1
SPRAY, METERED NASAL
Refills: 0 | OUTPATIENT
Start: 2020-09-14

## 2020-09-23 DIAGNOSIS — I10 ESSENTIAL HYPERTENSION: ICD-10-CM

## 2020-09-23 RX ORDER — TRIAMTERENE AND HYDROCHLOROTHIAZIDE 37.5; 25 MG/1; MG/1
1 TABLET ORAL DAILY
Qty: 90 TABLET | Refills: 1 | Status: SHIPPED | OUTPATIENT
Start: 2020-09-23 | End: 2021-03-23 | Stop reason: SDUPTHER

## 2020-10-09 DIAGNOSIS — J30.0 VASOMOTOR RHINITIS: ICD-10-CM

## 2020-10-09 RX ORDER — IPRATROPIUM BROMIDE 21 UG/1
SPRAY, METERED NASAL
Qty: 30 ML | Refills: 3 | Status: SHIPPED | OUTPATIENT
Start: 2020-10-09 | End: 2021-03-05 | Stop reason: SDUPTHER

## 2020-11-17 DIAGNOSIS — I10 ESSENTIAL HYPERTENSION: ICD-10-CM

## 2020-11-17 RX ORDER — CARVEDILOL 12.5 MG/1
12.5 TABLET ORAL 2 TIMES DAILY WITH MEALS
Qty: 180 TABLET | Refills: 1 | Status: SHIPPED | OUTPATIENT
Start: 2020-11-17 | End: 2021-08-18 | Stop reason: SDUPTHER

## 2020-12-08 DIAGNOSIS — I10 ACCELERATED HYPERTENSION: ICD-10-CM

## 2020-12-08 DIAGNOSIS — K29.50 CHRONIC GASTRITIS WITHOUT BLEEDING, UNSPECIFIED GASTRITIS TYPE: ICD-10-CM

## 2020-12-08 RX ORDER — LOSARTAN POTASSIUM 50 MG/1
50 TABLET ORAL DAILY
Qty: 90 TABLET | Refills: 0 | Status: CANCELLED | OUTPATIENT
Start: 2020-12-08

## 2020-12-08 RX ORDER — PANTOPRAZOLE SODIUM 40 MG/1
40 TABLET, DELAYED RELEASE ORAL DAILY
Qty: 90 TABLET | Refills: 1 | Status: SHIPPED | OUTPATIENT
Start: 2020-12-08 | End: 2021-08-02 | Stop reason: SDUPTHER

## 2020-12-08 RX ORDER — LOSARTAN POTASSIUM 50 MG/1
50 TABLET ORAL DAILY
Qty: 90 TABLET | Refills: 1 | Status: SHIPPED | OUTPATIENT
Start: 2020-12-08 | End: 2021-06-06 | Stop reason: SDUPTHER

## 2021-01-03 DIAGNOSIS — J30.0 VASOMOTOR RHINITIS: ICD-10-CM

## 2021-01-04 RX ORDER — IPRATROPIUM BROMIDE 21 UG/1
SPRAY, METERED NASAL
Refills: 1 | OUTPATIENT
Start: 2021-01-04

## 2021-02-12 DIAGNOSIS — Z23 ENCOUNTER FOR IMMUNIZATION: ICD-10-CM

## 2021-02-17 DIAGNOSIS — J30.0 VASOMOTOR RHINITIS: ICD-10-CM

## 2021-02-17 RX ORDER — IPRATROPIUM BROMIDE 21 UG/1
SPRAY, METERED NASAL
Refills: 1 | OUTPATIENT
Start: 2021-02-17

## 2021-03-05 DIAGNOSIS — L20.9 ATOPIC DERMATITIS, UNSPECIFIED TYPE: ICD-10-CM

## 2021-03-05 DIAGNOSIS — J30.0 VASOMOTOR RHINITIS: ICD-10-CM

## 2021-03-05 RX ORDER — IPRATROPIUM BROMIDE 21 UG/1
SPRAY, METERED NASAL
Qty: 30 ML | Refills: 1 | Status: SHIPPED | OUTPATIENT
Start: 2021-03-05 | End: 2021-05-21 | Stop reason: SDUPTHER

## 2021-03-22 DIAGNOSIS — L20.9 ATOPIC DERMATITIS, UNSPECIFIED TYPE: ICD-10-CM

## 2021-03-23 DIAGNOSIS — I10 ESSENTIAL HYPERTENSION: ICD-10-CM

## 2021-03-23 RX ORDER — TRIAMTERENE AND HYDROCHLOROTHIAZIDE 37.5; 25 MG/1; MG/1
1 TABLET ORAL DAILY
Qty: 90 TABLET | Refills: 1 | Status: SHIPPED | OUTPATIENT
Start: 2021-03-23 | End: 2021-09-27 | Stop reason: SDUPTHER

## 2021-03-30 DIAGNOSIS — J30.0 VASOMOTOR RHINITIS: ICD-10-CM

## 2021-03-30 RX ORDER — IPRATROPIUM BROMIDE 21 UG/1
SPRAY, METERED NASAL
Refills: 1 | OUTPATIENT
Start: 2021-03-30

## 2021-05-06 DIAGNOSIS — J30.0 VASOMOTOR RHINITIS: ICD-10-CM

## 2021-05-06 RX ORDER — IPRATROPIUM BROMIDE 21 UG/1
SPRAY, METERED NASAL
Refills: 1 | OUTPATIENT
Start: 2021-05-06

## 2021-05-18 DIAGNOSIS — J30.0 VASOMOTOR RHINITIS: ICD-10-CM

## 2021-05-18 RX ORDER — IPRATROPIUM BROMIDE 21 UG/1
SPRAY, METERED NASAL
Qty: 30 ML | Refills: 0 | Status: CANCELLED | OUTPATIENT
Start: 2021-05-18

## 2021-05-21 DIAGNOSIS — J30.0 VASOMOTOR RHINITIS: ICD-10-CM

## 2021-05-21 RX ORDER — IPRATROPIUM BROMIDE 21 UG/1
SPRAY, METERED NASAL
Qty: 30 ML | Refills: 3 | Status: SHIPPED | OUTPATIENT
Start: 2021-05-21 | End: 2021-10-28 | Stop reason: SDUPTHER

## 2021-06-06 DIAGNOSIS — I10 ACCELERATED HYPERTENSION: ICD-10-CM

## 2021-06-07 RX ORDER — LOSARTAN POTASSIUM 50 MG/1
50 TABLET ORAL DAILY
Qty: 90 TABLET | Refills: 1 | Status: SHIPPED | OUTPATIENT
Start: 2021-06-07 | End: 2021-11-30 | Stop reason: SDUPTHER

## 2021-08-02 DIAGNOSIS — K29.50 CHRONIC GASTRITIS WITHOUT BLEEDING, UNSPECIFIED GASTRITIS TYPE: ICD-10-CM

## 2021-08-02 RX ORDER — PANTOPRAZOLE SODIUM 40 MG/1
40 TABLET, DELAYED RELEASE ORAL DAILY
Qty: 90 TABLET | Refills: 1 | Status: SHIPPED | OUTPATIENT
Start: 2021-08-02 | End: 2022-08-04 | Stop reason: SDUPTHER

## 2021-08-18 DIAGNOSIS — I10 ESSENTIAL HYPERTENSION: ICD-10-CM

## 2021-08-18 RX ORDER — CARVEDILOL 12.5 MG/1
12.5 TABLET ORAL 2 TIMES DAILY WITH MEALS
Qty: 180 TABLET | Refills: 0 | Status: SHIPPED | OUTPATIENT
Start: 2021-08-18 | End: 2021-11-26 | Stop reason: SDUPTHER

## 2021-09-27 DIAGNOSIS — I10 ESSENTIAL HYPERTENSION: ICD-10-CM

## 2021-09-27 RX ORDER — TRIAMTERENE AND HYDROCHLOROTHIAZIDE 37.5; 25 MG/1; MG/1
1 TABLET ORAL DAILY
Qty: 90 TABLET | Refills: 1 | Status: SHIPPED | OUTPATIENT
Start: 2021-09-27 | End: 2022-03-21 | Stop reason: SDUPTHER

## 2021-10-28 DIAGNOSIS — J30.0 VASOMOTOR RHINITIS: ICD-10-CM

## 2021-10-28 DIAGNOSIS — L20.9 ATOPIC DERMATITIS, UNSPECIFIED TYPE: Primary | ICD-10-CM

## 2021-10-28 RX ORDER — IPRATROPIUM BROMIDE 21 UG/1
SPRAY, METERED NASAL
Qty: 30 ML | Refills: 3 | Status: SHIPPED | OUTPATIENT
Start: 2021-10-28 | End: 2022-03-21

## 2021-10-28 RX ORDER — KETOCONAZOLE 20 MG/ML
1 SHAMPOO TOPICAL ONCE
Qty: 120 ML | Refills: 0 | Status: SHIPPED | OUTPATIENT
Start: 2021-10-28 | End: 2022-05-24 | Stop reason: SDUPTHER

## 2021-10-28 RX ORDER — KETOCONAZOLE 20 MG/ML
1 SHAMPOO TOPICAL ONCE
COMMUNITY
End: 2021-10-28 | Stop reason: SDUPTHER

## 2021-11-05 ENCOUNTER — RA CDI HCC (OUTPATIENT)
Dept: OTHER | Facility: HOSPITAL | Age: 86
End: 2021-11-05

## 2021-11-08 PROBLEM — D69.6 THROMBOCYTOPENIA, UNSPECIFIED (HCC): Status: ACTIVE | Noted: 2021-11-08

## 2021-11-11 ENCOUNTER — OFFICE VISIT (OUTPATIENT)
Dept: FAMILY MEDICINE CLINIC | Facility: CLINIC | Age: 86
End: 2021-11-11
Payer: MEDICARE

## 2021-11-11 VITALS
RESPIRATION RATE: 20 BRPM | DIASTOLIC BLOOD PRESSURE: 98 MMHG | BODY MASS INDEX: 26.48 KG/M2 | HEIGHT: 69 IN | WEIGHT: 178.8 LBS | HEART RATE: 87 BPM | SYSTOLIC BLOOD PRESSURE: 148 MMHG | OXYGEN SATURATION: 98 % | TEMPERATURE: 98.9 F

## 2021-11-11 DIAGNOSIS — N18.30 STAGE 3 CHRONIC KIDNEY DISEASE, UNSPECIFIED WHETHER STAGE 3A OR 3B CKD (HCC): ICD-10-CM

## 2021-11-11 DIAGNOSIS — D69.6 THROMBOCYTOPENIA, UNSPECIFIED (HCC): ICD-10-CM

## 2021-11-11 DIAGNOSIS — S41.111A SKIN TEAR OF RIGHT UPPER ARM WITHOUT COMPLICATION, INITIAL ENCOUNTER: ICD-10-CM

## 2021-11-11 DIAGNOSIS — F10.20 UNCOMPLICATED ALCOHOL DEPENDENCE (HCC): ICD-10-CM

## 2021-11-11 DIAGNOSIS — M06.9 RHEUMATOID ARTHRITIS OF OTHER SITE, UNSPECIFIED WHETHER RHEUMATOID FACTOR PRESENT (HCC): ICD-10-CM

## 2021-11-11 DIAGNOSIS — R56.9 SEIZURES (HCC): ICD-10-CM

## 2021-11-11 DIAGNOSIS — Z00.00 MEDICARE ANNUAL WELLNESS VISIT, SUBSEQUENT: Primary | ICD-10-CM

## 2021-11-11 DIAGNOSIS — J44.9 CHRONIC OBSTRUCTIVE PULMONARY DISEASE, UNSPECIFIED COPD TYPE (HCC): ICD-10-CM

## 2021-11-11 DIAGNOSIS — S09.90XA INJURY OF HEAD, INITIAL ENCOUNTER: ICD-10-CM
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
## 2021-11-11 DIAGNOSIS — R55 SYNCOPE, UNSPECIFIED SYNCOPE TYPE: ICD-10-CM

## 2021-11-11 DIAGNOSIS — I10 ESSENTIAL (PRIMARY) HYPERTENSION: ICD-10-CM

## 2021-11-11 DIAGNOSIS — I48.0 PAROXYSMAL ATRIAL FIBRILLATION WITH RVR (HCC): ICD-10-CM

## 2021-11-11 DIAGNOSIS — E78.5 HYPERLIPIDEMIA, UNSPECIFIED HYPERLIPIDEMIA TYPE: ICD-10-CM

## 2021-11-11 PROBLEM — F10.21 ALCOHOL DEPENDENCE IN REMISSION (HCC): Status: ACTIVE | Noted: 2021-11-11

## 2021-11-11 PROCEDURE — G0439 PPPS, SUBSEQ VISIT: HCPCS | Performed by: FAMILY MEDICINE

## 2021-11-11 PROCEDURE — 1123F ACP DISCUSS/DSCN MKR DOCD: CPT | Performed by: FAMILY MEDICINE

## 2021-11-26 DIAGNOSIS — I10 ESSENTIAL HYPERTENSION: ICD-10-CM

## 2021-11-26 DIAGNOSIS — L20.9 ATOPIC DERMATITIS, UNSPECIFIED TYPE: ICD-10-CM

## 2021-11-26 RX ORDER — CARVEDILOL 12.5 MG/1
12.5 TABLET ORAL 2 TIMES DAILY WITH MEALS
Qty: 180 TABLET | Refills: 0 | Status: SHIPPED | OUTPATIENT
Start: 2021-11-26 | End: 2022-02-23 | Stop reason: SDUPTHER

## 2021-11-30 DIAGNOSIS — I10 ACCELERATED HYPERTENSION: ICD-10-CM

## 2021-11-30 RX ORDER — LOSARTAN POTASSIUM 50 MG/1
50 TABLET ORAL DAILY
Qty: 90 TABLET | Refills: 1 | Status: SHIPPED | OUTPATIENT
Start: 2021-11-30 | End: 2022-05-29

## 2022-02-23 DIAGNOSIS — I10 ESSENTIAL HYPERTENSION: ICD-10-CM

## 2022-02-23 RX ORDER — CARVEDILOL 12.5 MG/1
12.5 TABLET ORAL 2 TIMES DAILY WITH MEALS
Qty: 180 TABLET | Refills: 0 | Status: SHIPPED | OUTPATIENT
Start: 2022-02-23 | End: 2022-05-24 | Stop reason: SDUPTHER

## 2022-03-21 DIAGNOSIS — J30.0 VASOMOTOR RHINITIS: ICD-10-CM

## 2022-03-21 DIAGNOSIS — I10 ESSENTIAL HYPERTENSION: ICD-10-CM

## 2022-03-21 RX ORDER — TRIAMTERENE AND HYDROCHLOROTHIAZIDE 37.5; 25 MG/1; MG/1
1 TABLET ORAL DAILY
Qty: 90 TABLET | Refills: 1 | Status: SHIPPED | OUTPATIENT
Start: 2022-03-21 | End: 2022-06-20 | Stop reason: SDUPTHER

## 2022-03-21 RX ORDER — IPRATROPIUM BROMIDE 21 UG/1
SPRAY, METERED NASAL
Qty: 30 ML | Refills: 3 | Status: SHIPPED | OUTPATIENT
Start: 2022-03-21 | End: 2022-04-25 | Stop reason: SDUPTHER

## 2022-03-30 ENCOUNTER — TELEPHONE (OUTPATIENT)
Dept: FAMILY MEDICINE CLINIC | Facility: CLINIC | Age: 87
End: 2022-03-30

## 2022-03-30 NOTE — TELEPHONE ENCOUNTER
Corinamiller received a letter form [facility] (letter in chart) stating the inpratropium spray is not covered  He needs this medication and is asking that you reach out to them to inform them  Patient can be reached at [phone]

## 2022-04-25 ENCOUNTER — TELEPHONE (OUTPATIENT)
Dept: FAMILY MEDICINE CLINIC | Facility: CLINIC | Age: 87
End: 2022-04-25

## 2022-04-25 DIAGNOSIS — J30.0 VASOMOTOR RHINITIS: ICD-10-CM

## 2022-04-25 RX ORDER — IPRATROPIUM BROMIDE 21 UG/1
SPRAY, METERED NASAL
Qty: 30 ML | Refills: 3 | Status: SHIPPED | OUTPATIENT
Start: 2022-04-25

## 2022-04-25 NOTE — TELEPHONE ENCOUNTER
Called the patient; He states the pharmacy is not the problem, it's the "stupid insurance"  He states that the order is for a 30 day supply, but the Atrovent only lasts 28 days  Routing back to PCP to see if he can correct the order

## 2022-04-25 NOTE — TELEPHONE ENCOUNTER
Patient is calling for refill of Atrovent 0 03% nasal spray, his insurance is not covering this  There is a letter in his chart regarding it  Im not sure what needs to be done but patient is frustrated he had tried to call the insurance but they were no help to him  Patient uses [facility] in [place]  Please advise patient can be reached at [phone]

## 2022-04-25 NOTE — TELEPHONE ENCOUNTER
Please let the patient know I sent a refill to the pharmacy   I will need to possibly fill out paperwork  And/or see what the cost is out of pocket  But could not do any of that until the order needed to be refilled

## 2022-05-24 DIAGNOSIS — I10 ESSENTIAL HYPERTENSION: ICD-10-CM

## 2022-05-24 DIAGNOSIS — L20.9 ATOPIC DERMATITIS, UNSPECIFIED TYPE: ICD-10-CM

## 2022-05-24 RX ORDER — CARVEDILOL 12.5 MG/1
12.5 TABLET ORAL 2 TIMES DAILY WITH MEALS
Qty: 180 TABLET | Refills: 1 | Status: SHIPPED | OUTPATIENT
Start: 2022-05-24

## 2022-05-24 RX ORDER — KETOCONAZOLE 20 MG/ML
1 SHAMPOO TOPICAL ONCE
Qty: 120 ML | Refills: 1 | Status: SHIPPED | OUTPATIENT
Start: 2022-05-24 | End: 2022-05-24

## 2022-05-25 ENCOUNTER — TELEPHONE (OUTPATIENT)
Dept: FAMILY MEDICINE CLINIC | Facility: CLINIC | Age: 87
End: 2022-05-25

## 2022-05-29 DIAGNOSIS — I10 ACCELERATED HYPERTENSION: ICD-10-CM

## 2022-05-29 RX ORDER — LOSARTAN POTASSIUM 50 MG/1
TABLET ORAL
Qty: 90 TABLET | Refills: 1 | Status: SHIPPED | OUTPATIENT
Start: 2022-05-29

## 2022-06-20 DIAGNOSIS — I10 ESSENTIAL HYPERTENSION: ICD-10-CM

## 2022-06-20 RX ORDER — TRIAMTERENE AND HYDROCHLOROTHIAZIDE 37.5; 25 MG/1; MG/1
1 TABLET ORAL DAILY
Qty: 90 TABLET | Refills: 1 | Status: SHIPPED | OUTPATIENT
Start: 2022-06-20

## 2022-08-04 DIAGNOSIS — K29.50 CHRONIC GASTRITIS WITHOUT BLEEDING, UNSPECIFIED GASTRITIS TYPE: ICD-10-CM

## 2022-08-04 RX ORDER — PANTOPRAZOLE SODIUM 40 MG/1
40 TABLET, DELAYED RELEASE ORAL DAILY
Qty: 90 TABLET | Refills: 1 | Status: SHIPPED | OUTPATIENT
Start: 2022-08-04

## 2022-08-22 DIAGNOSIS — I10 ESSENTIAL HYPERTENSION: ICD-10-CM

## 2022-08-22 RX ORDER — CARVEDILOL 12.5 MG/1
12.5 TABLET ORAL 2 TIMES DAILY WITH MEALS
Qty: 180 TABLET | Refills: 1 | Status: SHIPPED | OUTPATIENT
Start: 2022-08-22 | End: 2022-08-25 | Stop reason: SDUPTHER

## 2022-08-25 DIAGNOSIS — I10 ESSENTIAL HYPERTENSION: ICD-10-CM

## 2022-08-25 RX ORDER — CARVEDILOL 12.5 MG/1
12.5 TABLET ORAL 2 TIMES DAILY WITH MEALS
Qty: 180 TABLET | Refills: 1 | Status: SHIPPED | OUTPATIENT
Start: 2022-08-25

## 2022-08-25 NOTE — TELEPHONE ENCOUNTER
Patient states that the pharmacy told him that they did not receive a refill request from us from 8/22    Can we resend this?     Thank you

## 2022-10-09 DIAGNOSIS — J30.0 VASOMOTOR RHINITIS: ICD-10-CM

## 2022-10-10 RX ORDER — IPRATROPIUM BROMIDE 21 UG/1
SPRAY, METERED NASAL
Qty: 30 ML | Refills: 3 | Status: SHIPPED | OUTPATIENT
Start: 2022-10-10 | End: 2022-10-24 | Stop reason: SDUPTHER

## 2022-10-24 DIAGNOSIS — J30.0 VASOMOTOR RHINITIS: ICD-10-CM

## 2022-10-24 RX ORDER — IPRATROPIUM BROMIDE 21 UG/1
SPRAY, METERED NASAL
Qty: 30 ML | Refills: 3 | Status: SHIPPED | OUTPATIENT
Start: 2022-10-24

## 2022-11-19 DIAGNOSIS — I10 ACCELERATED HYPERTENSION: ICD-10-CM

## 2022-11-21 RX ORDER — LOSARTAN POTASSIUM 50 MG/1
TABLET ORAL
Qty: 90 TABLET | Refills: 1 | Status: SHIPPED | OUTPATIENT
Start: 2022-11-21 | End: 2022-11-28 | Stop reason: SDUPTHER

## 2022-11-28 DIAGNOSIS — I10 ACCELERATED HYPERTENSION: ICD-10-CM

## 2022-11-28 RX ORDER — LOSARTAN POTASSIUM 50 MG/1
50 TABLET ORAL DAILY
Qty: 30 TABLET | Refills: 0 | Status: SHIPPED | OUTPATIENT
Start: 2022-11-28

## 2022-12-15 DIAGNOSIS — I10 ACCELERATED HYPERTENSION: ICD-10-CM

## 2022-12-15 DIAGNOSIS — I10 ESSENTIAL HYPERTENSION: ICD-10-CM

## 2022-12-15 RX ORDER — LOSARTAN POTASSIUM 50 MG/1
50 TABLET ORAL DAILY
Qty: 30 TABLET | Refills: 0 | Status: CANCELLED | OUTPATIENT
Start: 2022-12-15

## 2022-12-15 RX ORDER — TRIAMTERENE AND HYDROCHLOROTHIAZIDE 37.5; 25 MG/1; MG/1
1 TABLET ORAL DAILY
Qty: 90 TABLET | Refills: 1 | Status: CANCELLED | OUTPATIENT
Start: 2022-12-15

## 2022-12-15 NOTE — TELEPHONE ENCOUNTER
Can you call pt back and schedule an appointment? He has not been seen since 11/11/21 and does not have an upcoming appointment   Once he has an appointment in for an AWV we can send in refills

## 2022-12-16 DIAGNOSIS — I10 ESSENTIAL HYPERTENSION: ICD-10-CM

## 2022-12-16 RX ORDER — TRIAMTERENE AND HYDROCHLOROTHIAZIDE 37.5; 25 MG/1; MG/1
1 TABLET ORAL DAILY
Qty: 90 TABLET | Refills: 1 | Status: SHIPPED | OUTPATIENT
Start: 2022-12-16

## 2022-12-25 DIAGNOSIS — I10 ACCELERATED HYPERTENSION: ICD-10-CM

## 2022-12-27 RX ORDER — LOSARTAN POTASSIUM 50 MG/1
50 TABLET ORAL DAILY
Qty: 30 TABLET | Refills: 0 | Status: SHIPPED | OUTPATIENT
Start: 2022-12-27

## 2023-01-23 ENCOUNTER — TELEPHONE (OUTPATIENT)
Dept: FAMILY MEDICINE CLINIC | Facility: CLINIC | Age: 88
End: 2023-01-23

## 2023-01-23 ENCOUNTER — HOSPITAL ENCOUNTER (EMERGENCY)
Facility: HOSPITAL | Age: 88
Discharge: HOME | End: 2023-01-23
Attending: EMERGENCY MEDICINE

## 2023-01-23 ENCOUNTER — APPOINTMENT (EMERGENCY)
Dept: RADIOLOGY | Facility: HOSPITAL | Age: 88
End: 2023-01-23

## 2023-01-23 VITALS
TEMPERATURE: 98.7 F | SYSTOLIC BLOOD PRESSURE: 184 MMHG | HEART RATE: 84 BPM | WEIGHT: 180 LBS | HEIGHT: 69 IN | DIASTOLIC BLOOD PRESSURE: 87 MMHG | OXYGEN SATURATION: 97 % | BODY MASS INDEX: 26.66 KG/M2 | RESPIRATION RATE: 18 BRPM

## 2023-01-23 DIAGNOSIS — J40 BRONCHITIS: Primary | ICD-10-CM

## 2023-01-23 LAB
FLUAV RNA RESP QL NAA+PROBE: NEGATIVE
FLUBV RNA RESP QL NAA+PROBE: NEGATIVE
RSV RNA RESP QL NAA+PROBE: NEGATIVE
SARS-COV-2 RNA RESP QL NAA+PROBE: NEGATIVE

## 2023-01-23 RX ORDER — DOXYCYCLINE HYCLATE 100 MG/1
100 CAPSULE ORAL 2 TIMES DAILY
Qty: 14 CAPSULE | Refills: 0 | Status: SHIPPED | OUTPATIENT
Start: 2023-01-23 | End: 2023-01-30

## 2023-01-23 NOTE — ED PROVIDER NOTES
History  Chief Complaint   Patient presents with   • Cough     Pt to er with reports of congestion and coughing for a week, but "today is terrible"  Denies chest pain sob  Having trouble sleeping      This is an 28-year-old male who is a former smoker with a history of COPD who presents with a 1 week history of cough and congestion and generalized weakness  Did have a fever initially but has resolved  Room air saturation currently is 97%      History provided by:  Patient  Medical Problem  Location:  Upper respiratory  Quality:  Cough and congestion  Severity:  Moderate  Onset quality:  Gradual  Duration:  1 week  Timing:  Constant  Progression:  Waxing and waning  Chronicity:  New  Context:  Upper respiratory cough and congestion for 1 week  Associated symptoms: congestion and cough        Prior to Admission Medications   Prescriptions Last Dose Informant Patient Reported? Taking?    IBUPROFEN PO   Yes No   Multiple Vitamins-Minerals (VISION PLUS) CAPS   Yes No   Sig: Take 1 capsule by mouth 2 (two) times a day   aspirin 325 mg tablet   Yes No   Sig: Take 325 mg by mouth 2 (two) times a day   carvedilol (COREG) 12 5 mg tablet   No No   Sig: Take 1 tablet (12 5 mg total) by mouth 2 (two) times a day with meals   hydrocortisone 2 5 % ointment   No No   Sig: Apply topically 2 (two) times a day   ipratropium (ATROVENT) 0 03 % nasal spray   No No   Si SPRAYS EACH NOSTRIL 3 TIMES A DAY   ketoconazole (NIZORAL) 2 % shampoo   No No   Sig: Apply 1 application topically once for 1 dose   losartan (COZAAR) 50 mg tablet   No No   Sig: Take 1 tablet (50 mg total) by mouth daily   meclizine (ANTIVERT) 25 mg tablet   No No   Sig: Take 1 tablet (25 mg total) by mouth 3 (three) times a day as needed for dizziness   Patient not taking: Reported on 2021    mineral oil-white petrolatum (LUBRIFRESH P M ) ophthalmic ointment   Yes No   Sig: Apply topically every 6 (six) hours as needed   pantoprazole (PROTONIX) 40 mg tablet No No   Sig: Take 1 tablet (40 mg total) by mouth daily   polyethylene glycol (MIRALAX) 17 g packet   Yes No   Sig: Take 17 g by mouth daily   polyvinyl alcohol (LIQUIFILM TEARS) 1 4 % ophthalmic solution   Yes No   Sig: Apply 1-2 drops to eye 4 (four) times a day   triamterene-hydrochlorothiazide (MAXZIDE-25) 37 5-25 mg per tablet   No No   Sig: Take 1 tablet by mouth daily      Facility-Administered Medications: None       Past Medical History:   Diagnosis Date   • Alcohol abuse    • Alcohol withdrawal seizure without complication (HCC)    • Arthritis    • Asthma    • [facility]    • Decubitus ulcer of buttock     last assessed 07/20/16   • Diabetes ([facility] )    • Disease of thyroid gland    • Duodenal ulcer    • Emphysema lung (HCC)    • Esophageal varices (HCC)    • GERD (gastroesophageal reflux disease)    • History of transfusion    • Hypertension    • Irritable bowel syndrome with diarrhea    • Kidney stones    • Prostate cancer ([facility] )    • Urinary frequency     resolved 02/15/17       Past Surgical History:   Procedure Laterality Date   • BACK SURGERY     • CATARACT EXTRACTION     • ESOPHAGOGASTRODUODENOSCOPY N/A 2/6/2017    Procedure: ESOPHAGOGASTRODUODENOSCOPY (EGD); Surgeon: Marissa Saravia MD;  Location:  MAIN OR;  Service:    • AL ESOPHAGOGASTRODUODENOSCOPY TRANSORAL DIAGNOSTIC N/A 4/26/2016    Procedure: ESOPHAGOGASTRODUODENOSCOPY (EGD); Surgeon: Stanislav Villagomez MD;  Location:  MAIN OR;  Service: Gastroenterology   • TONSILLECTOMY         Family History   Problem Relation Age of Onset   • Heart disease Mother         cardiac disorder   • Stroke Father    • Heart disease Father         cardiac disorder   • Heart disease Sister    • Diabetes Family    • Hypertension Family      I have reviewed and agree with the history as documented      E-Cigarette/Vaping   • E-Cigarette Use Never User      E-Cigarette/Vaping Substances   • Nicotine No    • THC No    • CBD No    • Flavoring No    • Other No    • Unknown No      Social History     Tobacco Use   • Smoking status: Former     Types: Cigarettes     Quit date:      Years since quittin 0   • Smokeless tobacco: Never   • Tobacco comments:     quit 20 years ago   Vaping Use   • Vaping Use: Never used   Substance Use Topics   • Alcohol use: Yes     Alcohol/week: 14 0 standard drinks     Types: 14 Standard drinks or equivalent per week     Comment: 3-4 mixed drinks vodka per night/ 2L per week   • Drug use: No       Review of Systems   HENT: Positive for congestion  Respiratory: Positive for cough  Neurological: Positive for weakness  All other systems reviewed and are negative  Physical Exam  Physical Exam  Vitals and nursing note reviewed  Constitutional:       Appearance: He is not toxic-appearing or diaphoretic  HENT:      Head: Normocephalic and atraumatic  Right Ear: Tympanic membrane, ear canal and external ear normal       Left Ear: Tympanic membrane, ear canal and external ear normal       Nose: Congestion present  Eyes:      General: No scleral icterus  Right eye: No discharge  Left eye: No discharge  Extraocular Movements: Extraocular movements intact  Pupils: Pupils are equal, round, and reactive to light  Cardiovascular:      Rate and Rhythm: Normal rate  Pulses: Normal pulses  Heart sounds: No murmur heard  No friction rub  No gallop  Pulmonary:      Effort: Pulmonary effort is normal  No respiratory distress  Breath sounds: No stridor  Rhonchi present  No wheezing  Abdominal:      General: There is no distension  Palpations: Abdomen is soft  Tenderness: There is no abdominal tenderness  There is no guarding or rebound  Musculoskeletal:         General: No swelling, tenderness, deformity or signs of injury  Normal range of motion  Cervical back: Normal range of motion and neck supple  No rigidity or tenderness        Right lower leg: No edema  Left lower leg: No edema  Skin:     General: Skin is warm and dry  Coloration: Skin is not jaundiced  Findings: No bruising, erythema or rash  Neurological:      Mental Status: He is alert and oriented to person, place, and time  Cranial Nerves: No cranial nerve deficit  Sensory: No sensory deficit  Coordination: Coordination normal       Gait: Gait normal    Psychiatric:         Mood and Affect: Mood normal          Behavior: Behavior normal          Thought Content: Thought content normal          Vital Signs  ED Triage Vitals   Temperature Pulse Respirations Blood Pressure SpO2   01/23/23 1035 01/23/23 1035 01/23/23 1035 01/23/23 1035 01/23/23 1035   98 7 °F (37 1 °C) 84 18 (!) 184/87 97 %      Temp Source Heart Rate Source Patient Position - Orthostatic VS BP Location FiO2 (%)   01/23/23 1035 01/23/23 1035 -- -- --   Oral Monitor         Pain Score       01/23/23 1033       No Pain           Vitals:    01/23/23 1035   BP: (!) 184/87   Pulse: 84         Visual Acuity      ED Medications  Medications - No data to display    Diagnostic Studies  Results Reviewed     Procedure Component Value Units Date/Time    COVID/FLU/RSV [[id number]]  (Normal) Collected: 01/23/23 1036    Lab Status: Final result Specimen: Nares from Nose Updated: 01/23/23 1118     SARS-CoV-2 Negative     INFLUENZA A PCR Negative     INFLUENZA B PCR Negative     RSV PCR Negative    Narrative:      FOR PEDIATRIC PATIENTS - copy/paste COVID Guidelines URL to browser: [email or web address]    SARS-CoV-2 assay is a Nucleic Acid Amplification assay intended for the  qualitative detection of nucleic acid from SARS-CoV-2 in nasopharyngeal  swabs  Results are for the presumptive identification of SARS-CoV-2 RNA      Positive results are indicative of infection with SARS-CoV-2, the virus  causing COVID-19, but do not rule out bacterial infection or co-infection  with other viruses  Laboratories within the [place] and its  territories are required to report all positive results to the appropriate  public health authorities  Negative results do not preclude SARS-CoV-2  infection and should not be used as the sole basis for treatment or other  patient management decisions  Negative results must be combined with  clinical observations, patient history, and epidemiological information  This test has not been FDA cleared or approved  This test has been authorized by FDA under an Emergency Use Authorization  (EUA)  This test is only authorized for the duration of time the  declaration that circumstances exist justifying the authorization of the  emergency use of an in vitro diagnostic tests for detection of SARS-CoV-2  virus and/or diagnosis of COVID-19 infection under section 564(b)(1) of  the Act, 21 U  S C  201PYC-3(K)(2), unless the authorization is terminated  or revoked sooner  The test has been validated but independent review by FDA  and CLIA is pending  Test performed using [employer] GeneXpert: This RT-PCR assay targets N2,  a region unique to SARS-CoV-2  A conserved region in the E-gene was chosen  for pan-Sarbecovirus detection which includes SARS-CoV-2  According to CMS-2020-01-R, this platform meets the definition of high-throughput technology  XR chest 1 view portable   ED Interpretation by Niki Shi DO (01/23 1128)   No acute infiltrate or congestive heart failure                 Procedures  Procedures         ED Course                                             Medical Decision Making  Upper respiratory symptoms we will check swab to rule out RSV flu COVID also chest x-ray to rule out pneumonia    Amount and/or Complexity of Data Reviewed  Radiology: ordered            Disposition  Final diagnoses:   Bronchitis     Time reflects when diagnosis was documented in both MDM as applicable and the Disposition within this note     Time User Action Codes Description Comment    1/23/2023 11:31 AM Lawsonpricila Lidya Add [J40] Bronchitis       ED Disposition     ED Disposition   Discharge    Condition   Stable    Date/Time   Mon Jan 23, 2023 11:31 AM    Comment   Van Cortés discharge to home/self care  Follow-up Information     Follow up With Specialties Details Why Contact Info    Rhea Greenberg DO Family Medicine   [address]  [address]  [place] [address]  [phone]            Patient's Medications   Discharge Prescriptions    DOXYCYCLINE HYCLATE (VIBRAMYCIN) 100 MG CAPSULE    Take 1 capsule (100 mg total) by mouth 2 (two) times a day for 7 days       Start Date: 1/23/2023 End Date: 1/30/2023       Order Dose: 100 mg       Quantity: 14 capsule    Refills: 0       No discharge procedures on file      PDMP Review     None          ED Provider  Electronically Signed by           Amparo Hameed DO  01/23/23 1138

## 2023-01-23 NOTE — TELEPHONE ENCOUNTER
Patient called to schedule an appt  He has had congestion for 2 wks, but this morning he woke up worse and is having trouble catching his breath  He said he felt woozy and cannot stop coughing  I told him that he probably should go to the ER since he feels this bad  I told him I would leave a message for you

## 2023-03-06 DIAGNOSIS — I10 ACCELERATED HYPERTENSION: ICD-10-CM

## 2023-03-06 DIAGNOSIS — I10 ESSENTIAL HYPERTENSION: ICD-10-CM

## 2023-03-06 RX ORDER — LOSARTAN POTASSIUM 50 MG/1
50 TABLET ORAL DAILY
Qty: 30 TABLET | Refills: 0 | Status: SHIPPED | OUTPATIENT
Start: 2023-03-06 | End: 2023-03-15 | Stop reason: SDUPTHER

## 2023-03-06 RX ORDER — CARVEDILOL 12.5 MG/1
12.5 TABLET ORAL 2 TIMES DAILY WITH MEALS
Qty: 180 TABLET | Refills: 1 | Status: SHIPPED | OUTPATIENT
Start: 2023-03-06

## 2023-03-15 DIAGNOSIS — I10 ACCELERATED HYPERTENSION: ICD-10-CM

## 2023-03-15 DIAGNOSIS — J30.0 VASOMOTOR RHINITIS: ICD-10-CM

## 2023-03-15 RX ORDER — IPRATROPIUM BROMIDE 21 UG/1
SPRAY, METERED NASAL
Refills: 3 | OUTPATIENT
Start: 2023-03-15

## 2023-03-15 RX ORDER — LOSARTAN POTASSIUM 50 MG/1
50 TABLET ORAL DAILY
Qty: 30 TABLET | Refills: 0 | Status: SHIPPED | OUTPATIENT
Start: 2023-03-15

## 2023-03-31 DIAGNOSIS — I10 ACCELERATED HYPERTENSION: ICD-10-CM

## 2023-03-31 RX ORDER — LOSARTAN POTASSIUM 50 MG/1
50 TABLET ORAL DAILY
Qty: 30 TABLET | Refills: 0 | Status: SHIPPED | OUTPATIENT
Start: 2023-03-31

## 2023-04-03 DIAGNOSIS — J30.0 VASOMOTOR RHINITIS: ICD-10-CM

## 2023-04-03 RX ORDER — IPRATROPIUM BROMIDE 21 UG/1
SPRAY, METERED NASAL
Qty: 30 ML | Refills: 3 | Status: SHIPPED | OUTPATIENT
Start: 2023-04-03

## 2023-04-06 ENCOUNTER — RA CDI HCC (OUTPATIENT)
Dept: OTHER | Facility: HOSPITAL | Age: 88
End: 2023-04-06

## 2023-04-06 NOTE — PROGRESS NOTES
[facility]  coding opportunities       Chart reviewed, no opportunity found: CHART REVIEWED, NO OPPORTUNITY FOUND        Patients Insurance     Medicare Insurance: Medicare

## 2023-04-12 PROBLEM — F10.21 ALCOHOL DEPENDENCE IN REMISSION (HCC): Status: RESOLVED | Noted: 2021-11-11 | Resolved: 2023-04-12

## 2023-04-12 PROBLEM — R06.02 SOB (SHORTNESS OF BREATH): Status: RESOLVED | Noted: 2020-01-10 | Resolved: 2023-04-12

## 2023-04-12 PROBLEM — R56.9 SEIZURES (HCC): Status: RESOLVED | Noted: 2017-03-10 | Resolved: 2023-04-12

## 2023-04-12 PROBLEM — R55 PRE-SYNCOPE: Status: RESOLVED | Noted: 2020-01-10 | Resolved: 2023-04-12

## 2023-04-12 PROBLEM — F10.90 CHRONIC ALCOHOL USE: Status: RESOLVED | Noted: 2020-01-10 | Resolved: 2023-04-12

## 2023-05-03 ENCOUNTER — TELEPHONE (OUTPATIENT)
Dept: FAMILY MEDICINE CLINIC | Facility: CLINIC | Age: 88
End: 2023-05-03

## 2023-05-03 DIAGNOSIS — I10 ACCELERATED HYPERTENSION: ICD-10-CM

## 2023-05-03 RX ORDER — LOSARTAN POTASSIUM 50 MG/1
50 TABLET ORAL DAILY
Qty: 90 TABLET | Refills: 1 | Status: SHIPPED | OUTPATIENT
Start: 2023-05-03

## 2023-05-14 DIAGNOSIS — K29.50 CHRONIC GASTRITIS WITHOUT BLEEDING, UNSPECIFIED GASTRITIS TYPE: ICD-10-CM

## 2023-05-14 RX ORDER — PANTOPRAZOLE SODIUM 40 MG/1
TABLET, DELAYED RELEASE ORAL
Qty: 30 TABLET | Refills: 0 | Status: SHIPPED | OUTPATIENT
Start: 2023-05-14

## 2023-06-15 DIAGNOSIS — I10 ESSENTIAL HYPERTENSION: ICD-10-CM

## 2023-06-15 RX ORDER — TRIAMTERENE AND HYDROCHLOROTHIAZIDE 37.5; 25 MG/1; MG/1
TABLET ORAL
Qty: 90 TABLET | Refills: 1 | Status: SHIPPED | OUTPATIENT
Start: 2023-06-15

## 2023-06-15 RX ORDER — TRIAMTERENE AND HYDROCHLOROTHIAZIDE 37.5; 25 MG/1; MG/1
1 TABLET ORAL DAILY
Qty: 90 TABLET | Refills: 1 | OUTPATIENT
Start: 2023-06-15

## 2023-08-14 ENCOUNTER — TELEPHONE (OUTPATIENT)
Dept: FAMILY MEDICINE CLINIC | Facility: CLINIC | Age: 88
End: 2023-08-14

## 2023-08-14 NOTE — TELEPHONE ENCOUNTER
Patient's wife Derrick Cristobal called. She states that patient is currently at [address] states that the providers at [address] are not giving her information in regards to what is wrong with her . Derrick Cristobal is requesting a call back from you if this is something you can look into.     Thank you

## 2023-08-15 ENCOUNTER — TELEPHONE (OUTPATIENT)
Dept: FAMILY MEDICINE CLINIC | Facility: CLINIC | Age: 88
End: 2023-08-15

## 2023-08-15 NOTE — TELEPHONE ENCOUNTER
Jared Maldonado called and wanted you to know she is sorry she missed your call last night. However, she wanted you to know that Soha Dimas is in [facility] and has been diagnosed with Covid. She stated that they will see you soon.     Thank you

## 2023-08-26 ENCOUNTER — HOSPITAL ENCOUNTER (INPATIENT)
Facility: HOSPITAL | Age: 88
LOS: 20 days | Discharge: NURSING, REHAB OR LONG-TERM CARE | DRG: 193 | End: 2023-09-15
Attending: EMERGENCY MEDICINE | Admitting: FAMILY MEDICINE
Payer: MEDICARE

## 2023-08-26 ENCOUNTER — APPOINTMENT (EMERGENCY)
Dept: RADIOLOGY | Facility: HOSPITAL | Age: 88
DRG: 193 | End: 2023-08-26
Payer: MEDICARE

## 2023-08-26 DIAGNOSIS — M25.521 PAIN AND SWELLING OF RIGHT ELBOW: ICD-10-CM

## 2023-08-26 DIAGNOSIS — S31.000A WOUND OF SACRAL REGION, INITIAL ENCOUNTER: ICD-10-CM

## 2023-08-26 DIAGNOSIS — N17.9 AKI (ACUTE KIDNEY INJURY) (HCC): ICD-10-CM

## 2023-08-26 DIAGNOSIS — J18.9 PNEUMONIA: Primary | ICD-10-CM

## 2023-08-26 DIAGNOSIS — K81.9 CHOLECYSTITIS: ICD-10-CM

## 2023-08-26 DIAGNOSIS — K63.89 COLON DISTENTION: ICD-10-CM

## 2023-08-26 DIAGNOSIS — R50.9 FEVER: ICD-10-CM

## 2023-08-26 DIAGNOSIS — K56.2 VOLVULUS (HCC): ICD-10-CM

## 2023-08-26 DIAGNOSIS — M25.421 PAIN AND SWELLING OF RIGHT ELBOW: ICD-10-CM

## 2023-08-26 DIAGNOSIS — N30.90 CYSTITIS: ICD-10-CM

## 2023-08-26 DIAGNOSIS — I48.91 ATRIAL FIBRILLATION, UNSPECIFIED TYPE (HCC): ICD-10-CM

## 2023-08-26 LAB
ALBUMIN SERPL BCP-MCNC: 2.7 G/DL (ref 3.5–5)
ALP SERPL-CCNC: 89 U/L (ref 34–104)
ALT SERPL W P-5'-P-CCNC: 40 U/L (ref 7–52)
ANION GAP SERPL CALCULATED.3IONS-SCNC: 8 MMOL/L
APTT PPP: 43 SECONDS (ref 23–37)
AST SERPL W P-5'-P-CCNC: 25 U/L (ref 13–39)
ATRIAL RATE: 85 BPM
BASOPHILS # BLD AUTO: 0.04 THOUSANDS/ÂΜL (ref 0–0.1)
BASOPHILS NFR BLD AUTO: 0 % (ref 0–1)
BILIRUB SERPL-MCNC: 0.53 MG/DL (ref 0.2–1)
BUN SERPL-MCNC: 40 MG/DL (ref 5–25)
CALCIUM ALBUM COR SERPL-MCNC: 9.4 MG/DL (ref 8.3–10.1)
CALCIUM SERPL-MCNC: 8.4 MG/DL (ref 8.4–10.2)
CARDIAC TROPONIN I PNL SERPL HS: 33 NG/L
CHLORIDE SERPL-SCNC: 105 MMOL/L (ref 96–108)
CO2 SERPL-SCNC: 25 MMOL/L (ref 21–32)
CREAT SERPL-MCNC: 1.76 MG/DL (ref 0.6–1.3)
EOSINOPHIL # BLD AUTO: 0.1 THOUSAND/ÂΜL (ref 0–0.61)
EOSINOPHIL NFR BLD AUTO: 1 % (ref 0–6)
ERYTHROCYTE [DISTWIDTH] IN BLOOD BY AUTOMATED COUNT: 13.5 % (ref 11.6–15.1)
GFR SERPL CREATININE-BSD FRML MDRD: 34 ML/MIN/1.73SQ M
GLUCOSE SERPL-MCNC: 132 MG/DL (ref 65–140)
HCT VFR BLD AUTO: 28.3 % (ref 36.5–49.3)
HGB BLD-MCNC: 9.4 G/DL (ref 12–17)
IMM GRANULOCYTES # BLD AUTO: 0.04 THOUSAND/UL (ref 0–0.2)
IMM GRANULOCYTES NFR BLD AUTO: 0 % (ref 0–2)
INR PPP: 1.72 (ref 0.84–1.19)
LACTATE SERPL-SCNC: 0.8 MMOL/L (ref 0.5–2)
LYMPHOCYTES # BLD AUTO: 1.18 THOUSANDS/ÂΜL (ref 0.6–4.47)
LYMPHOCYTES NFR BLD AUTO: 11 % (ref 14–44)
MCH RBC QN AUTO: 32.1 PG (ref 26.8–34.3)
MCHC RBC AUTO-ENTMCNC: 33.2 G/DL (ref 31.4–37.4)
MCV RBC AUTO: 97 FL (ref 82–98)
MONOCYTES # BLD AUTO: 1.61 THOUSAND/ÂΜL (ref 0.17–1.22)
MONOCYTES NFR BLD AUTO: 15 % (ref 4–12)
NEUTROPHILS # BLD AUTO: 7.71 THOUSANDS/ÂΜL (ref 1.85–7.62)
NEUTS SEG NFR BLD AUTO: 73 % (ref 43–75)
NRBC BLD AUTO-RTO: 0 /100 WBCS
P AXIS: 69 DEGREES
PLATELET # BLD AUTO: 249 THOUSANDS/UL (ref 149–390)
PMV BLD AUTO: 10.8 FL (ref 8.9–12.7)
POTASSIUM SERPL-SCNC: 4.3 MMOL/L (ref 3.5–5.3)
PR INTERVAL: 174 MS
PROCALCITONIN SERPL-MCNC: 0.38 NG/ML
PROT SERPL-MCNC: 6.2 G/DL (ref 6.4–8.4)
PROTHROMBIN TIME: 21.1 SECONDS (ref 11.6–14.5)
QRS AXIS: -52 DEGREES
QRSD INTERVAL: 76 MS
QT INTERVAL: 368 MS
QTC INTERVAL: 437 MS
RBC # BLD AUTO: 2.93 MILLION/UL (ref 3.88–5.62)
SODIUM SERPL-SCNC: 138 MMOL/L (ref 135–147)
T WAVE AXIS: 64 DEGREES
VENTRICULAR RATE: 85 BPM
WBC # BLD AUTO: 10.68 THOUSAND/UL (ref 4.31–10.16)

## 2023-08-26 PROCEDURE — 85730 THROMBOPLASTIN TIME PARTIAL: CPT

## 2023-08-26 PROCEDURE — 36415 COLL VENOUS BLD VENIPUNCTURE: CPT

## 2023-08-26 PROCEDURE — 99285 EMERGENCY DEPT VISIT HI MDM: CPT

## 2023-08-26 PROCEDURE — 93005 ELECTROCARDIOGRAM TRACING: CPT

## 2023-08-26 PROCEDURE — 85610 PROTHROMBIN TIME: CPT

## 2023-08-26 PROCEDURE — 83605 ASSAY OF LACTIC ACID: CPT

## 2023-08-26 PROCEDURE — 80053 COMPREHEN METABOLIC PANEL: CPT

## 2023-08-26 PROCEDURE — 96374 THER/PROPH/DIAG INJ IV PUSH: CPT

## 2023-08-26 PROCEDURE — 87040 BLOOD CULTURE FOR BACTERIA: CPT

## 2023-08-26 PROCEDURE — 71045 X-RAY EXAM CHEST 1 VIEW: CPT

## 2023-08-26 PROCEDURE — 84484 ASSAY OF TROPONIN QUANT: CPT

## 2023-08-26 PROCEDURE — 85025 COMPLETE CBC W/AUTO DIFF WBC: CPT

## 2023-08-26 PROCEDURE — 84145 PROCALCITONIN (PCT): CPT

## 2023-08-26 RX ORDER — CEFTRIAXONE 1 G/50ML
1000 INJECTION, SOLUTION INTRAVENOUS ONCE
Status: COMPLETED | OUTPATIENT
Start: 2023-08-26 | End: 2023-08-26

## 2023-08-26 RX ORDER — CETIRIZINE HYDROCHLORIDE 10 MG/1
10 TABLET ORAL DAILY
COMMUNITY
End: 2023-09-15

## 2023-08-26 RX ORDER — FOLIC ACID 1 MG/1
1 TABLET ORAL DAILY
COMMUNITY

## 2023-08-26 RX ADMIN — CEFTRIAXONE 1000 MG: 1 INJECTION, SOLUTION INTRAVENOUS at 22:19

## 2023-08-27 PROBLEM — N18.32 STAGE 3B CHRONIC KIDNEY DISEASE (HCC): Status: ACTIVE | Noted: 2021-11-11

## 2023-08-27 PROBLEM — H34.8112 CENTRAL RETINAL VEIN OCCLUSION OF RIGHT EYE: Status: ACTIVE | Noted: 2023-08-27

## 2023-08-27 PROBLEM — Z86.16 HISTORY OF COVID-19: Status: ACTIVE | Noted: 2023-08-27

## 2023-08-27 PROBLEM — N30.90 CYSTITIS: Status: ACTIVE | Noted: 2023-08-27

## 2023-08-27 PROBLEM — J18.9 COMMUNITY ACQUIRED PNEUMONIA OF RIGHT LOWER LOBE OF LUNG: Status: ACTIVE | Noted: 2023-08-27

## 2023-08-27 PROBLEM — D72.829 LEUKOCYTOSIS: Status: ACTIVE | Noted: 2023-08-27

## 2023-08-27 PROBLEM — F10.11 HISTORY OF ALCOHOL ABUSE: Status: ACTIVE | Noted: 2023-08-27

## 2023-08-27 LAB
2HR DELTA HS TROPONIN: -2 NG/L
ANION GAP SERPL CALCULATED.3IONS-SCNC: 7 MMOL/L
BACTERIA UR QL AUTO: ABNORMAL /HPF
BILIRUB UR QL STRIP: NEGATIVE
BUN SERPL-MCNC: 38 MG/DL (ref 5–25)
CALCIUM SERPL-MCNC: 8.5 MG/DL (ref 8.4–10.2)
CARDIAC TROPONIN I PNL SERPL HS: 31 NG/L
CHLORIDE SERPL-SCNC: 105 MMOL/L (ref 96–108)
CLARITY UR: CLEAR
CO2 SERPL-SCNC: 25 MMOL/L (ref 21–32)
COLOR UR: YELLOW
CREAT SERPL-MCNC: 1.62 MG/DL (ref 0.6–1.3)
ERYTHROCYTE [DISTWIDTH] IN BLOOD BY AUTOMATED COUNT: 13.3 % (ref 11.6–15.1)
GFR SERPL CREATININE-BSD FRML MDRD: 37 ML/MIN/1.73SQ M
GLUCOSE SERPL-MCNC: 133 MG/DL (ref 65–140)
GLUCOSE UR STRIP-MCNC: NEGATIVE MG/DL
HCT VFR BLD AUTO: 27.9 % (ref 36.5–49.3)
HGB BLD-MCNC: 9.1 G/DL (ref 12–17)
HGB UR QL STRIP.AUTO: ABNORMAL
KETONES UR STRIP-MCNC: ABNORMAL MG/DL
L PNEUMO1 AG UR QL IA.RAPID: NEGATIVE
LEUKOCYTE ESTERASE UR QL STRIP: ABNORMAL
MCH RBC QN AUTO: 31.9 PG (ref 26.8–34.3)
MCHC RBC AUTO-ENTMCNC: 32.6 G/DL (ref 31.4–37.4)
MCV RBC AUTO: 98 FL (ref 82–98)
MUCOUS THREADS UR QL AUTO: ABNORMAL
NITRITE UR QL STRIP: NEGATIVE
NON-SQ EPI CELLS URNS QL MICRO: ABNORMAL /HPF
PH UR STRIP.AUTO: 6 [PH]
PLATELET # BLD AUTO: 259 THOUSANDS/UL (ref 149–390)
PMV BLD AUTO: 10.8 FL (ref 8.9–12.7)
POTASSIUM SERPL-SCNC: 4.2 MMOL/L (ref 3.5–5.3)
PROCALCITONIN SERPL-MCNC: 0.4 NG/ML
PROT UR STRIP-MCNC: ABNORMAL MG/DL
RBC # BLD AUTO: 2.85 MILLION/UL (ref 3.88–5.62)
RBC #/AREA URNS AUTO: ABNORMAL /HPF
S PNEUM AG UR QL: NEGATIVE
SODIUM SERPL-SCNC: 137 MMOL/L (ref 135–147)
SP GR UR STRIP.AUTO: 1.02 (ref 1–1.03)
UROBILINOGEN UR STRIP-ACNC: <2 MG/DL
WBC # BLD AUTO: 10.26 THOUSAND/UL (ref 4.31–10.16)
WBC #/AREA URNS AUTO: ABNORMAL /HPF

## 2023-08-27 PROCEDURE — 99223 1ST HOSP IP/OBS HIGH 75: CPT | Performed by: STUDENT IN AN ORGANIZED HEALTH CARE EDUCATION/TRAINING PROGRAM

## 2023-08-27 PROCEDURE — 87205 SMEAR GRAM STAIN: CPT | Performed by: STUDENT IN AN ORGANIZED HEALTH CARE EDUCATION/TRAINING PROGRAM

## 2023-08-27 PROCEDURE — 87181 SC STD AGAR DILUTION PER AGT: CPT

## 2023-08-27 PROCEDURE — 87186 SC STD MICRODIL/AGAR DIL: CPT

## 2023-08-27 PROCEDURE — 85027 COMPLETE CBC AUTOMATED: CPT | Performed by: PHYSICIAN ASSISTANT

## 2023-08-27 PROCEDURE — 81001 URINALYSIS AUTO W/SCOPE: CPT

## 2023-08-27 PROCEDURE — 84145 PROCALCITONIN (PCT): CPT | Performed by: PHYSICIAN ASSISTANT

## 2023-08-27 PROCEDURE — 87081 CULTURE SCREEN ONLY: CPT | Performed by: PHYSICIAN ASSISTANT

## 2023-08-27 PROCEDURE — 80048 BASIC METABOLIC PNL TOTAL CA: CPT | Performed by: PHYSICIAN ASSISTANT

## 2023-08-27 PROCEDURE — 87086 URINE CULTURE/COLONY COUNT: CPT

## 2023-08-27 PROCEDURE — 87449 NOS EACH ORGANISM AG IA: CPT | Performed by: PHYSICIAN ASSISTANT

## 2023-08-27 PROCEDURE — 87077 CULTURE AEROBIC IDENTIFY: CPT

## 2023-08-27 PROCEDURE — 87070 CULTURE OTHR SPECIMN AEROBIC: CPT | Performed by: STUDENT IN AN ORGANIZED HEALTH CARE EDUCATION/TRAINING PROGRAM

## 2023-08-27 RX ORDER — SODIUM CHLORIDE, SODIUM GLUCONATE, SODIUM ACETATE, POTASSIUM CHLORIDE, MAGNESIUM CHLORIDE, SODIUM PHOSPHATE, DIBASIC, AND POTASSIUM PHOSPHATE .53; .5; .37; .037; .03; .012; .00082 G/100ML; G/100ML; G/100ML; G/100ML; G/100ML; G/100ML; G/100ML
1000 INJECTION, SOLUTION INTRAVENOUS ONCE
Status: COMPLETED | OUTPATIENT
Start: 2023-08-27 | End: 2023-08-28

## 2023-08-27 RX ORDER — CEFTRIAXONE 1 G/50ML
1000 INJECTION, SOLUTION INTRAVENOUS EVERY 24 HOURS
Status: DISCONTINUED | OUTPATIENT
Start: 2023-08-27 | End: 2023-08-27

## 2023-08-27 RX ORDER — SENNOSIDES 8.6 MG
1 TABLET ORAL
Status: DISCONTINUED | OUTPATIENT
Start: 2023-08-27 | End: 2023-09-15 | Stop reason: HOSPADM

## 2023-08-27 RX ORDER — MAGNESIUM HYDROXIDE/ALUMINUM HYDROXICE/SIMETHICONE 120; 1200; 1200 MG/30ML; MG/30ML; MG/30ML
30 SUSPENSION ORAL EVERY 6 HOURS PRN
Status: DISCONTINUED | OUTPATIENT
Start: 2023-08-27 | End: 2023-09-15 | Stop reason: HOSPADM

## 2023-08-27 RX ORDER — ACETAMINOPHEN 325 MG/1
650 TABLET ORAL EVERY 6 HOURS PRN
Status: DISCONTINUED | OUTPATIENT
Start: 2023-08-27 | End: 2023-09-15 | Stop reason: HOSPADM

## 2023-08-27 RX ORDER — CEFEPIME HYDROCHLORIDE 2 G/50ML
2000 INJECTION, SOLUTION INTRAVENOUS EVERY 12 HOURS
Status: DISCONTINUED | OUTPATIENT
Start: 2023-08-27 | End: 2023-08-30

## 2023-08-27 RX ORDER — POLYETHYLENE GLYCOL 3350 17 G/17G
17 POWDER, FOR SOLUTION ORAL DAILY
Status: DISCONTINUED | OUTPATIENT
Start: 2023-08-27 | End: 2023-09-03

## 2023-08-27 RX ORDER — SODIUM CHLORIDE, SODIUM GLUCONATE, SODIUM ACETATE, POTASSIUM CHLORIDE, MAGNESIUM CHLORIDE, SODIUM PHOSPHATE, DIBASIC, AND POTASSIUM PHOSPHATE .53; .5; .37; .037; .03; .012; .00082 G/100ML; G/100ML; G/100ML; G/100ML; G/100ML; G/100ML; G/100ML
50 INJECTION, SOLUTION INTRAVENOUS CONTINUOUS
Status: DISPENSED | OUTPATIENT
Start: 2023-08-27 | End: 2023-08-28

## 2023-08-27 RX ORDER — GUAIFENESIN 600 MG/1
600 TABLET, EXTENDED RELEASE ORAL 2 TIMES DAILY
Status: DISCONTINUED | OUTPATIENT
Start: 2023-08-27 | End: 2023-09-15 | Stop reason: HOSPADM

## 2023-08-27 RX ORDER — FLUTICASONE PROPIONATE 50 MCG
1 SPRAY, SUSPENSION (ML) NASAL DAILY
Status: DISCONTINUED | OUTPATIENT
Start: 2023-08-27 | End: 2023-09-15 | Stop reason: HOSPADM

## 2023-08-27 RX ORDER — FLUTICASONE PROPIONATE 50 MCG
1 SPRAY, SUSPENSION (ML) NASAL DAILY
COMMUNITY

## 2023-08-27 RX ORDER — FOLIC ACID 1 MG/1
1 TABLET ORAL DAILY
Status: DISCONTINUED | OUTPATIENT
Start: 2023-08-27 | End: 2023-09-15 | Stop reason: HOSPADM

## 2023-08-27 RX ORDER — CARVEDILOL 12.5 MG/1
25 TABLET ORAL 2 TIMES DAILY WITH MEALS
Status: DISCONTINUED | OUTPATIENT
Start: 2023-08-27 | End: 2023-08-28

## 2023-08-27 RX ORDER — ONDANSETRON 2 MG/ML
4 INJECTION INTRAMUSCULAR; INTRAVENOUS EVERY 6 HOURS PRN
Status: DISCONTINUED | OUTPATIENT
Start: 2023-08-27 | End: 2023-09-15 | Stop reason: HOSPADM

## 2023-08-27 RX ORDER — LANOLIN ALCOHOL/MO/W.PET/CERES
400 CREAM (GRAM) TOPICAL DAILY
Status: DISCONTINUED | OUTPATIENT
Start: 2023-08-27 | End: 2023-09-15 | Stop reason: HOSPADM

## 2023-08-27 RX ORDER — LOSARTAN POTASSIUM 50 MG/1
50 TABLET ORAL DAILY
Status: DISCONTINUED | OUTPATIENT
Start: 2023-08-27 | End: 2023-08-29

## 2023-08-27 RX ORDER — PANTOPRAZOLE SODIUM 40 MG/1
40 TABLET, DELAYED RELEASE ORAL
Status: DISCONTINUED | OUTPATIENT
Start: 2023-08-27 | End: 2023-09-06

## 2023-08-27 RX ORDER — LORATADINE 10 MG/1
10 TABLET ORAL DAILY
Status: DISCONTINUED | OUTPATIENT
Start: 2023-08-27 | End: 2023-09-09

## 2023-08-27 RX ADMIN — ASPIRIN 81 MG: 81 TABLET, COATED ORAL at 08:08

## 2023-08-27 RX ADMIN — GUAIFENESIN 600 MG: 600 TABLET ORAL at 08:08

## 2023-08-27 RX ADMIN — CARVEDILOL 25 MG: 12.5 TABLET, FILM COATED ORAL at 17:07

## 2023-08-27 RX ADMIN — CEFEPIME HYDROCHLORIDE 2000 MG: 2 INJECTION, SOLUTION INTRAVENOUS at 17:08

## 2023-08-27 RX ADMIN — FLUTICASONE PROPIONATE 1 SPRAY: 50 SPRAY, METERED NASAL at 08:07

## 2023-08-27 RX ADMIN — ACETAMINOPHEN 325MG 650 MG: 325 TABLET ORAL at 07:09

## 2023-08-27 RX ADMIN — APIXABAN 2.5 MG: 2.5 TABLET, FILM COATED ORAL at 17:07

## 2023-08-27 RX ADMIN — SODIUM CHLORIDE, SODIUM GLUCONATE, SODIUM ACETATE, POTASSIUM CHLORIDE, MAGNESIUM CHLORIDE, SODIUM PHOSPHATE, DIBASIC, AND POTASSIUM PHOSPHATE 1000 ML: .53; .5; .37; .037; .03; .012; .00082 INJECTION, SOLUTION INTRAVENOUS at 12:48

## 2023-08-27 RX ADMIN — ACETAMINOPHEN 325MG 650 MG: 325 TABLET ORAL at 17:07

## 2023-08-27 RX ADMIN — FOLIC ACID 1 MG: 1 TABLET ORAL at 08:07

## 2023-08-27 RX ADMIN — POLYETHYLENE GLYCOL 3350 17 G: 17 POWDER, FOR SOLUTION ORAL at 08:08

## 2023-08-27 RX ADMIN — MAGNESIUM OXIDE TAB 400 MG (241.3 MG ELEMENTAL MG) 400 MG: 400 (241.3 MG) TAB at 08:08

## 2023-08-27 RX ADMIN — SODIUM CHLORIDE, SODIUM GLUCONATE, SODIUM ACETATE, POTASSIUM CHLORIDE, MAGNESIUM CHLORIDE, SODIUM PHOSPHATE, DIBASIC, AND POTASSIUM PHOSPHATE 50 ML/HR: .53; .5; .37; .037; .03; .012; .00082 INJECTION, SOLUTION INTRAVENOUS at 02:43

## 2023-08-27 RX ADMIN — LORATADINE 10 MG: 10 TABLET ORAL at 08:07

## 2023-08-27 RX ADMIN — SENNOSIDES 8.6 MG: 8.6 TABLET, FILM COATED ORAL at 20:58

## 2023-08-27 RX ADMIN — APIXABAN 2.5 MG: 2.5 TABLET, FILM COATED ORAL at 08:08

## 2023-08-27 RX ADMIN — LOSARTAN POTASSIUM 50 MG: 50 TABLET, FILM COATED ORAL at 08:08

## 2023-08-27 RX ADMIN — PANTOPRAZOLE SODIUM 40 MG: 40 TABLET, DELAYED RELEASE ORAL at 05:38

## 2023-08-27 RX ADMIN — GUAIFENESIN 600 MG: 600 TABLET ORAL at 17:07

## 2023-08-27 RX ADMIN — CARVEDILOL 25 MG: 12.5 TABLET, FILM COATED ORAL at 07:09

## 2023-08-27 NOTE — ASSESSMENT & PLAN NOTE
Lab Results   Component Value Date    EGFR 34 08/26/2023    EGFR 43 (L) 04/14/2023    EGFR 35 01/10/2020    CREATININE 1.76 (H) 08/26/2023    CREATININE 1.58 (H) 04/14/2023    CREATININE 1.76 (H) 01/10/2020   · Baseline creatinine 1.6-1.9  · Creatinine on admission 1.76  · Patient admits to decreased p.o. intake due to poor appetite and distaste for food, placed on gentle IV fluids overnight  · Trend BMP daily

## 2023-08-27 NOTE — PLAN OF CARE
Problem: Potential for Falls  Goal: Patient will remain free of falls  Description: INTERVENTIONS:  - Educate patient/family on patient safety including physical limitations  - Instruct patient to call for assistance with activity   - Consult OT/PT to assist with strengthening/mobility   - Keep Call bell within reach  - Keep bed low and locked with side rails adjusted as appropriate  - Keep care items and personal belongings within reach  - Initiate and maintain comfort rounds  - Make Fall Risk Sign visible to staff  - Offer Toileting every 2 Hours, in advance of need  - Initiate/Maintain bed alarm  - Obtain necessary fall risk management equipment:   - Apply yellow socks and bracelet for high fall risk patients  - Consider moving patient to room near nurses station  Outcome: Progressing     Problem: PAIN - ADULT  Goal: Verbalizes/displays adequate comfort level or baseline comfort level  Description: Interventions:  - Encourage patient to monitor pain and request assistance  - Assess pain using appropriate pain scale  - Administer analgesics based on type and severity of pain and evaluate response  - Implement non-pharmacological measures as appropriate and evaluate response  - Consider cultural and social influences on pain and pain management  - Notify physician/advanced practitioner if interventions unsuccessful or patient reports new pain  Outcome: Progressing     Problem: INFECTION - ADULT  Goal: Absence or prevention of progression during hospitalization  Description: INTERVENTIONS:  - Assess and monitor for signs and symptoms of infection  - Monitor lab/diagnostic results  - Monitor all insertion sites, i.e. indwelling lines, tubes, and drains  - Monitor endotracheal if appropriate and nasal secretions for changes in amount and color  - [place] appropriate cooling/warming therapies per order  - Administer medications as ordered  - Instruct and encourage patient and family to use good hand hygiene technique  - Identify and instruct in appropriate isolation precautions for identified infection/condition  Outcome: Progressing  Goal: Absence of fever/infection during neutropenic period  Description: INTERVENTIONS:  - Monitor WBC    Outcome: Progressing     Problem: SAFETY ADULT  Goal: Maintain or return to baseline ADL function  Description: INTERVENTIONS:  -  Assess patient's ability to carry out ADLs; assess patient's baseline for ADL function and identify physical deficits which impact ability to perform ADLs (bathing, care of mouth/teeth, toileting, grooming, dressing, etc.)  - Assess/evaluate cause of self-care deficits   - Assess range of motion  - Assess patient's mobility; develop plan if impaired  - Assess patient's need for assistive devices and provide as appropriate  - Encourage maximum independence but intervene and supervise when necessary  - Involve family in performance of ADLs  - Assess for home care needs following discharge   - Consider OT consult to assist with ADL evaluation and planning for discharge  - Provide patient education as appropriate  Outcome: Progressing  Goal: Maintains/Returns to pre admission functional level  Description: INTERVENTIONS:  - Perform BMAT or MOVE assessment daily.   - Set and communicate daily mobility goal to care team and patient/family/caregiver. - Collaborate with rehabilitation services on mobility goals if consulted  - Perform Range of Motion 3 times a day. - Reposition patient every 2 hours.   - Dangle patient 2 times a day  - Stand patient 2 times a day  - Ambulate patient 2 times a day  - Out of bed to chair 2 times a day   - Out of bed for meals 2 times a day  - Out of bed for toileting  - Record patient progress and toleration of activity level   Outcome: Progressing

## 2023-08-27 NOTE — ASSESSMENT & PLAN NOTE
· Hemoglobin 9.4 on admission  · Hemoglobin ranging 10-12 during recent [facility] admission  · No signs of active bleeding  · Trend CBC

## 2023-08-27 NOTE — ASSESSMENT & PLAN NOTE
· Patient reports dysuria and difficulty urinating   · UA with innumerable WBCs and innumerable bacteria  · Continue ceftriaxone, no prior pseudomonal or ESBL growth  · Urine culture, pending  · Urinary retention protocol

## 2023-08-27 NOTE — ASSESSMENT & PLAN NOTE
· Presents with fever, worsening cough, and dyspnea  · Procalcitonin elevated patient with leukocytosis  · Chest x-ray with questionable right lower lobe pneumonia on unofficial read, await final read  · Continue ceftriaxone as patient not severe CAP  · Urine strep and Legionella studies  · Sputum culture and Gram stain  · Respiratory protocol  · Airway clearance protocol

## 2023-08-27 NOTE — PLAN OF CARE
Problem: Potential for Falls  Goal: Patient will remain free of falls  Description: INTERVENTIONS:  - Educate patient/family on patient safety including physical limitations  - Instruct patient to call for assistance with activity   - Consult OT/PT to assist with strengthening/mobility   - Keep Call bell within reach  - Keep bed low and locked with side rails adjusted as appropriate  - Keep care items and personal belongings within reach  - Initiate and maintain comfort rounds  - Make Fall Risk Sign visible to staff  - Offer Toileting every 2 Hours, in advance of need  - Initiate/Maintain bed alarm  - Obtain necessary fall risk management equipment:   - Apply yellow socks and bracelet for high fall risk patients  - Consider moving patient to room near nurses station  Outcome: Progressing     Problem: PAIN - ADULT  Goal: Verbalizes/displays adequate comfort level or baseline comfort level  Description: Interventions:  - Encourage patient to monitor pain and request assistance  - Assess pain using appropriate pain scale  - Administer analgesics based on type and severity of pain and evaluate response  - Implement non-pharmacological measures as appropriate and evaluate response  - Consider cultural and social influences on pain and pain management  - Notify physician/advanced practitioner if interventions unsuccessful or patient reports new pain  Outcome: Progressing     Problem: INFECTION - ADULT  Goal: Absence or prevention of progression during hospitalization  Description: INTERVENTIONS:  - Assess and monitor for signs and symptoms of infection  - Monitor lab/diagnostic results  - Monitor all insertion sites, i.e. indwelling lines, tubes, and drains  - Monitor endotracheal if appropriate and nasal secretions for changes in amount and color  - [place] appropriate cooling/warming therapies per order  - Administer medications as ordered  - Instruct and encourage patient and family to use good hand hygiene technique  - Identify and instruct in appropriate isolation precautions for identified infection/condition  Outcome: Progressing  Goal: Absence of fever/infection during neutropenic period  Description: INTERVENTIONS:  - Monitor WBC    Outcome: Progressing     Problem: SAFETY ADULT  Goal: Maintain or return to baseline ADL function  Description: INTERVENTIONS:  -  Assess patient's ability to carry out ADLs; assess patient's baseline for ADL function and identify physical deficits which impact ability to perform ADLs (bathing, care of mouth/teeth, toileting, grooming, dressing, etc.)  - Assess/evaluate cause of self-care deficits   - Assess range of motion  - Assess patient's mobility; develop plan if impaired  - Assess patient's need for assistive devices and provide as appropriate  - Encourage maximum independence but intervene and supervise when necessary  - Involve family in performance of ADLs  - Assess for home care needs following discharge   - Consider OT consult to assist with ADL evaluation and planning for discharge  - Provide patient education as appropriate  Outcome: Progressing  Goal: Maintains/Returns to pre admission functional level  Description: INTERVENTIONS:  - Perform BMAT or MOVE assessment daily.   - Set and communicate daily mobility goal to care team and patient/family/caregiver. - Collaborate with rehabilitation services on mobility goals if consulted  - Perform Range of Motion 3 times a day. - Reposition patient every 2 hours.   - Dangle patient 2 times a day  - Stand patient 2 times a day  - Ambulate patient 2 times a day  - Out of bed to chair 2 times a day   - Out of bed for meals 2 times a day  - Out of bed for toileting  - Record patient progress and toleration of activity level   Outcome: Progressing     Problem: DISCHARGE PLANNING  Goal: Discharge to home or other facility with appropriate resources  Description: INTERVENTIONS:  - Identify barriers to discharge w/patient and caregiver  - Arrange for needed discharge resources and transportation as appropriate  - Identify discharge learning needs (meds, wound care, etc.)  - Arrange for interpretive services to assist at discharge as needed  - Refer to Case Management Department for coordinating discharge planning if the patient needs post-hospital services based on physician/advanced practitioner order or complex needs related to functional status, cognitive ability, or social support system  Outcome: Progressing     Problem: Knowledge Deficit  Goal: Patient/family/caregiver demonstrates understanding of disease process, treatment plan, medications, and discharge instructions  Description: Complete learning assessment and assess knowledge base.   Interventions:  - Provide teaching at level of understanding  - Provide teaching via preferred learning methods  Outcome: Progressing     Problem: Prexisting or High Potential for Compromised Skin Integrity  Goal: Skin integrity is maintained or improved  Description: INTERVENTIONS:  - Identify patients at risk for skin breakdown  - Assess and monitor skin integrity  - Assess and monitor nutrition and hydration status  - Monitor labs   - Assess for incontinence   - Turn and reposition patient  - Assist with mobility/ambulation  - Relieve pressure over bony prominences  - Avoid friction and shearing  - Provide appropriate hygiene as needed including keeping skin clean and dry  - Evaluate need for skin moisturizer/barrier cream  - Collaborate with interdisciplinary team   - Patient/family teaching  - Consider wound care consult   Outcome: Progressing     Problem: MOBILITY - ADULT  Goal: Maintain or return to baseline ADL function  Description: INTERVENTIONS:  -  Assess patient's ability to carry out ADLs; assess patient's baseline for ADL function and identify physical deficits which impact ability to perform ADLs (bathing, care of mouth/teeth, toileting, grooming, dressing, etc.)  - Assess/evaluate cause of self-care deficits   - Assess range of motion  - Assess patient's mobility; develop plan if impaired  - Assess patient's need for assistive devices and provide as appropriate  - Encourage maximum independence but intervene and supervise when necessary  - Involve family in performance of ADLs  - Assess for home care needs following discharge   - Consider OT consult to assist with ADL evaluation and planning for discharge  - Provide patient education as appropriate  Outcome: Progressing  Goal: Maintains/Returns to pre admission functional level  Description: INTERVENTIONS:  - Perform BMAT or MOVE assessment daily.   - Set and communicate daily mobility goal to care team and patient/family/caregiver. - Collaborate with rehabilitation services on mobility goals if consulted  - Perform Range of Motion 3 times a day. - Reposition patient every 2 hours.   - Dangle patient 2 times a day  - Stand patient 2 times a day  - Ambulate patient 2 times a day  - Out of bed to chair 2 times a day   - Out of bed for meals 2 times a day  - Out of bed for toileting  - Record patient progress and toleration of activity level   Outcome: Progressing

## 2023-08-27 NOTE — ASSESSMENT & PLAN NOTE
· Went into A-fib with RVR this morning August 28  · Was given IV Lopressor and Cardizem  · Home regimen: Coreg 25 Mg twice daily  · Started on Eliquis 2.5 mg twice daily for anticoagulation during hospitalization at [facility] 8/8-8/23  · Cardiology consult  · Continue home medications

## 2023-08-27 NOTE — ASSESSMENT & PLAN NOTE
· History of alcohol abuse with withdrawal seizures, has not drink since prior to [facility] hospitalization 8/8

## 2023-08-27 NOTE — ASSESSMENT & PLAN NOTE
· Home regimen: Coreg 25 Mg twice daily  · Started on Eliquis 2.5 mg twice daily for anticoagulation during hospitalization at [address] [facility] 8/8-8/23  · Continue home medications

## 2023-08-27 NOTE — ASSESSMENT & PLAN NOTE
· Seen by ophthalmology during recent [facility] admission, diagnosed with CRVO and ocular hypertension  · Patient recommended for formal evaluation outpatient of OCT macular degeneration, discussion of possible anti-VEGF therapy  · Encouraged ophthalmology follow-up outpatient

## 2023-08-27 NOTE — ASSESSMENT & PLAN NOTE
· WBC 10.68 K on admission  · Likely secondary to pneumonia and cystitis   · Continue ceftriaxone  · Blood cultures are negative to date  · U/c pending

## 2023-08-27 NOTE — ASSESSMENT & PLAN NOTE
· Hemoglobin 9.4 on admission  · Hemoglobin ranging 10-12 during recent [facility] admission  · No signs of active bleeding  · Trend CBC

## 2023-08-27 NOTE — H&P
[facility]  H&P  Name: Esteban Day 82 y.o. male I MRN: [id number]  Unit/Bed#: -01 I Date of Admission: 8/26/2023   Date of Service: 8/27/2023 I Hospital Day: 1      Assessment/Plan   * Community acquired pneumonia of right lower lobe of lung  Assessment & Plan  · Presents with fever, worsening cough, and dyspnea  · Procalcitonin elevated patient with leukocytosis  · Chest x-ray with questionable right lower lobe pneumonia on unofficial read, await final read  · Continue ceftriaxone as patient not severe CAP  · Urine strep and Legionella studies  · Sputum culture and Gram stain  · Respiratory protocol  · Airway clearance protocol    Leukocytosis  Assessment & Plan  · WBC 10.68 K on admission  · Likely secondary to pneumonia and cystitis   · Continue ceftriaxone  · Blood cultures x2, pending  · U/c pending     Cystitis  Assessment & Plan  · Patient reports dysuria and difficulty urinating   · UA with innumerable WBCs and innumerable bacteria  · Continue ceftriaxone, no prior pseudomonal or ESBL growth  · Urine culture, pending  · Urinary retention protocol    History of COVID-19  Assessment & Plan  · Diagnosed with COVID-19 during recent admit  · Received 3 days of remdesivir and IV fluids  · No evidence of pneumonia during admission  · Completed quarantine while inpatient    Anemia of chronic disease  Assessment & Plan  · Hemoglobin 9.4 on admission  · Hemoglobin ranging 10-12 during recent [facility] admission  · No signs of active bleeding  · Trend CBC    Stage 3b chronic kidney disease [facility])  Assessment & Plan  Lab Results   Component Value Date    EGFR 34 08/26/2023    EGFR 43 (L) 04/14/2023    EGFR 35 01/10/2020    CREATININE 1.76 (H) 08/26/2023    CREATININE 1.58 (H) 04/14/2023    CREATININE 1.76 (H) 01/10/2020   · Baseline creatinine 1.6-1.9  · Creatinine on admission 1.76  · Patient admits to decreased p.o. intake due to poor appetite and distaste for food, placed on gentle IV fluids overnight  · Trend BMP daily    Atrial fibrillation (HCC)  Assessment & Plan  · Home regimen: Coreg 25 Mg twice daily  · Started on Eliquis 2.5 mg twice daily for anticoagulation during hospitalization at [address] [facility] 8/8-8/23  · Continue home medications    Chronic obstructive pulmonary disease, unspecified COPD type ([address])  Assessment & Plan  · Not on maintenance inhalers outpatient  · No signs of acute exacerbation on admission  · If patient were to develop wheezing in setting of pneumonia, would start Xopenex/Atrovent nebulizers    History of alcohol abuse  Assessment & Plan  · History of alcohol abuse with withdrawal seizures, has not drink since prior to [facility] hospitalization 8/8    Central retinal vein occlusion of right eye  Assessment & Plan  · Seen by ophthalmology during recent [address] [facility] admission, diagnosed with CRVO and ocular hypertension  · Patient recommended for formal evaluation outpatient of OCT macular degeneration, discussion of possible anti-VEGF therapy  · Encouraged ophthalmology follow-up outpatient        VTE Pharmacologic Prophylaxis: VTE Score: 5 High Risk (Score >/= 5) - Pharmacological DVT Prophylaxis Ordered: apixaban (Eliquis). Sequential Compression Devices Ordered. Code Status: Level 3 - DNAR and DNI   Discussion with family: Patient declines call to his wife as he states she is currently sleeping. He would like her to be called later in the day. Anticipated Length of Stay: Patient will be admitted on an inpatient basis with an anticipated length of stay of greater than 2 midnights secondary to Community-acquired pneumonia.     Total Time Spent on Date of Encounter in care of patient: 75 minutes This time was spent on one or more of the following: performing physical exam; counseling and coordination of care; obtaining or reviewing history; documenting in the medical record; reviewing/ordering tests, medications or procedures; communicating with other healthcare professionals and discussing with patient's family/caregivers. Chief Complaint: " They told me I had a fever"    History of Present Illness:  Arnol Disla is a 82 y.o. male with a PMH of recent admission at [address] [facility] [address] 8/8 to 8/23, paroxysmal A-fib, CKD stage IIIb, anemia, and COPD who presents with fever at [facility] that onset this evening. Patient was given Tylenol prior to arrival to the ED. Patient reports ongoing cough for the last 7 weeks with acute worsening in the last couple of days. Endorses associated dyspnea. States he is unable to mobilize anything with coughing. Endorses anorexia and constipation, but denies any abdominal pain, nausea, or vomiting. He states he has no appetite as he has a distaste for the food. Denies chest pain. Endorses dysuria and difficulty urinating. Reports leg swelling at baseline. Reports difficulty with mobilization, states he requires walker and two-person assist.    Recent hospitalization at [facility] was for hyponatremia (s/d to beer potomania), TOBI, alcohol withdrawal managed with barbiturate load and CIWA protocol, severe headache with left-sided weakness and AMS with negative stroke work-up, hypertensive urgency, CRVO of right eye, and COVID-19. Review of Systems:  Review of Systems   Constitutional: Positive for appetite change and fever. Negative for chills. HENT: Negative for congestion. Respiratory: Positive for cough and shortness of breath. Cardiovascular: Positive for leg swelling. Negative for chest pain. Gastrointestinal: Positive for constipation. Negative for abdominal pain, diarrhea, nausea and vomiting. Genitourinary: Positive for difficulty urinating and dysuria. Musculoskeletal: Positive for gait problem. Neurological: Negative for weakness and numbness. All other systems reviewed and are negative.       Past Medical and Surgical History:   Past Medical History:   Diagnosis Date   • Alcohol abuse    • Alcohol withdrawal seizure without complication (HCC)    • Arthritis    • Asthma    • CVA (cerebral vascular accident) ([address])    • Decubitus ulcer of buttock     last assessed 07/20/16   • Diabetes [facility])    • Disease of thyroid gland    • Duodenal ulcer    • Emphysema lung ([address])    • Esophageal varices (HCC)    • GERD (gastroesophageal reflux disease)    • History of transfusion    • Hypertension    • Irritable bowel syndrome with diarrhea    • Kidney stones    • Prostate cancer ([address])    • Urinary frequency     resolved 02/15/17       Past Surgical History:   Procedure Laterality Date   • BACK SURGERY     • CATARACT EXTRACTION     • ESOPHAGOGASTRODUODENOSCOPY N/A 2/6/2017    Procedure: ESOPHAGOGASTRODUODENOSCOPY (EGD); Surgeon: Jearl Phalen, MD;  Location:  MAIN OR;  Service:    • CA ESOPHAGOGASTRODUODENOSCOPY TRANSORAL DIAGNOSTIC N/A 4/26/2016    Procedure: ESOPHAGOGASTRODUODENOSCOPY (EGD); Surgeon: Chloe Strickland MD;  Location:  MAIN OR;  Service: Gastroenterology   • TONSILLECTOMY         Meds/Allergies:  Prior to Admission medications    Medication Sig Start Date End Date Taking?  Authorizing Provider   apixaban (ELIQUIS) 2.5 mg Take 2.5 mg by mouth 2 (two) times a day   Yes Historical Provider, MD   Aspirin 81 MG CAPS Take 81 mg by mouth in the morning   Yes Historical Provider, MD   carvedilol (COREG) 12.5 mg tablet Take 1 tablet (12.5 mg total) by mouth 2 (two) times a day with meals  Patient taking differently: Take 25 mg by mouth 2 (two) times a day with meals 3/6/23  Yes Balbir Aranda, DO   cetirizine (ZyrTEC) 10 mg tablet Take 10 mg by mouth daily   Yes Historical Provider, MD   fluticasone (FLONASE) 50 mcg/act nasal spray 1 spray into each nostril daily   Yes Historical Provider, MD   folic acid (FOLVITE) 1 mg tablet Take 1 mg by mouth daily   Yes Historical Provider, MD   losartan (COZAAR) 50 mg tablet Take 1 tablet (50 mg total) by mouth daily 5/3/23  Yes Balbir Aranda, DO   MAGNESIUM OXIDE 400 PO Take 400 mg by mouth in the morning   Yes Historical Provider, MD   pantoprazole (PROTONIX) 40 mg tablet TAKE 1 TABLET BY MOUTH EVERY DAY 5/14/23  Yes Balbir Aranda, DO   polyvinyl alcohol (LIQUIFILM TEARS) 1.4 % ophthalmic solution Apply 1-2 drops to eye 4 (four) times a day   Yes Historical Provider, MD   fluticasone 50 mcg/actuation diskus inhaler Inhale 1 puff 2 (two) times a day Rinse mouth after use.  8/27/23 Yes Historical Provider, MD   magnesium hydroxide (MILK OF MAGNESIA) 400 mg/5 mL oral suspension Take 5 mL by mouth daily as needed for constipation    Historical Provider, MD   hydrocortisone 2.5 % ointment Apply topically 2 (two) times a day  Patient not taking: Reported on 8/26/2023 11/26/21 8/27/23  Leyda Aranda, DO   IBUPROFEN PO if needed  8/27/23  Historical Provider, MD   ipratropium (ATROVENT) 0.03 % nasal spray 2 SPRAYS EACH NOSTRIL 3 TIMES A DAY  Patient taking differently: 2 sprays into each nostril 3 (three) times a day 2 SPRAYS EACH NOSTRIL 3 TIMES A DAY 4/3/23 8/27/23  Balbir Aranda, DO   ketoconazole (NIZORAL) 2 % shampoo Apply 1 application topically once for 1 dose  Patient taking differently: Apply 1 application. topically if needed 5/24/22 8/27/23  Leyda Aranda, DO     I have reveiwed home medications using records provided by [facility]. Allergies:    Allergies   Allergen Reactions   • Morphine And Related        Social History:  Marital Status: /Civil Union   Occupation: Retired  Patient Pre-hospital Living Situation: [address]: [employer]  Patient Pre-hospital Level of Mobility: walks with walker two-person assist  Patient Pre-hospital Diet Restrictions: Regular diet  Substance Use History:   Social History     Substance and Sexual Activity   Alcohol Use Yes   • Alcohol/week: 14.0 standard drinks of alcohol   • Types: 14 Standard drinks or equivalent per week    Comment: 3-4 mixed drinks vodka per night/ 2L per week     Social History     Tobacco Use   Smoking Status Former   • Types: Cigarettes   • Quit date:    • Years since quittin.6   Smokeless Tobacco Never   Tobacco Comments    quit 20 years ago     Social History     Substance and Sexual Activity   Drug Use No       Family History:  Family History   Problem Relation Age of Onset   • Heart disease Mother         cardiac disorder   • Stroke Father    • Heart disease Father         cardiac disorder   • Heart disease Sister    • Diabetes Family    • Hypertension Family        Physical Exam:     Vitals:   Blood Pressure: 167/82 (23)  Pulse: 85 (23)  Temperature: 99 °F (37.2 °C) (23)  Temp Source: Oral (23)  Respirations: 18 (23)  Height: 5' 8" (172.7 cm) (23)  Weight - Scale: 91.5 kg (201 lb 11.5 oz) (23)  SpO2: 97 % (23)    Physical Exam  Vitals and nursing note reviewed. Constitutional:       Comments: Ill-appearing but nontoxic. Pleasant and conversational.   Felecia Raker:      Head: Normocephalic. Nose: Nose normal.      Mouth/Throat:      Mouth: Mucous membranes are dry. Eyes:      Extraocular Movements: Extraocular movements intact. Conjunctiva/sclera: Conjunctivae normal.   Cardiovascular:      Rate and Rhythm: Normal rate and regular rhythm. Pulses: Normal pulses. Heart sounds: No murmur heard. Pulmonary:      Effort: Pulmonary effort is normal.      Comments: Harsh wet nonproductive cough on exam.  Upper airway rhonchi. No rales, wheezes, rhonchi in lower airways. SPO2 stable on room air. Abdominal:      General: Abdomen is flat. Palpations: Abdomen is soft. Tenderness: There is no abdominal tenderness. There is no guarding or rebound. Musculoskeletal:         General: Normal range of motion. Cervical back: Normal range of motion. Right lower leg: Edema present. Left lower leg: Edema present. Skin:     General: Skin is warm and dry. Coloration: Skin is not pale. Neurological:      General: No focal deficit present. Mental Status: He is alert. Comments: Oriented to self, place, month, year, president   Psychiatric:         Mood and Affect: Mood normal.         Thought Content: Thought content normal.          Additional Data:     Lab Results:  Results from last 7 days   Lab Units 08/26/23 2052   WBC Thousand/uL 10.68*   HEMOGLOBIN g/dL 9.4*   HEMATOCRIT % 28.3*   PLATELETS Thousands/uL 249   NEUTROS PCT % 73   LYMPHS PCT % 11*   MONOS PCT % 15*   EOS PCT % 1     Results from last 7 days   Lab Units 08/26/23 2052   SODIUM mmol/L 138   POTASSIUM mmol/L 4.3   CHLORIDE mmol/L 105   CO2 mmol/L 25   BUN mg/dL 40*   CREATININE mg/dL 1.76*   ANION GAP mmol/L 8   CALCIUM mg/dL 8.4   ALBUMIN g/dL 2.7*   TOTAL BILIRUBIN mg/dL 0.53   ALK PHOS U/L 89   ALT U/L 40   AST U/L 25   GLUCOSE RANDOM mg/dL 132     Results from last 7 days   Lab Units 08/26/23 2052   INR  1.72*             Results from last 7 days   Lab Units 08/26/23 2052   LACTIC ACID mmol/L 0.8   PROCALCITONIN ng/ml 0.38*       Lines/Drains:  Invasive Devices     Peripheral Intravenous Line  Duration           Peripheral IV 08/26/23 Right;Ventral (anterior) Hand <1 day          Drain  Duration           External Urinary Catheter Medium <1 day                    Imaging: Personally reviewed the following imaging: Chest x-ray with questionable right lower lobe consolidation  XR chest portable    (Results Pending)       EKG and Other Studies Reviewed on Admission:   · EKG: NSR with no acute ischemia. ** Please Note: This note has been constructed using a voice recognition system.  **

## 2023-08-27 NOTE — ASSESSMENT & PLAN NOTE
· Diagnosed with COVID-19 during recent admit  · Received 3 days of remdesivir and IV fluids  · No evidence of pneumonia during admission  · Completed quarantine while inpatient

## 2023-08-27 NOTE — ASSESSMENT & PLAN NOTE
· Seen by ophthalmology during recent [address] [facility] admission, diagnosed with CRVO and ocular hypertension  · Patient recommended for formal evaluation outpatient of OCT macular degeneration, discussion of possible anti-VEGF therapy  · Encouraged ophthalmology follow-up outpatient

## 2023-08-27 NOTE — ASSESSMENT & PLAN NOTE
· Not on maintenance inhalers outpatient  · No signs of acute exacerbation on admission  · If patient were to develop wheezing in setting of pneumonia, would start Xopenex/Atrovent nebulizers

## 2023-08-27 NOTE — ED PROVIDER NOTES
History  Chief Complaint   Patient presents with   • Fever     Pt having fevers at SNF- EMS reports 101.3. This is an 81 y/o male with PMH HTN, emphysema, paroxysmal A-Fib on eliquis, alcohol use disorder, CRVO who presents to the ER today for 2 days of worsening cough and fevers that started today. Per chart review, on 8/8 patient was in [facility] for acute alcohol withdrawal and had a stroke workup, was found to have CVRO while there, hyponatremia, started on eliquis for his a fib. He also was found to have covid on admission and received remdesivir for 3 days. He was then discharged to [facility] where he has been since his discharge on 8/23 and since then has developed the cough and fever. The SNF gave him 650 mg tylenol prior to calling the ambulance. Patient admits to some mild chest pain and difficulty breathing as well as the cough and fever. He admits to some mild congestion as well. As well as pain with urination. He denies any abdominal pain, nausea, vomiting, coughing up blood, changes in bowel movements. He is unsure if anyone at his SNF is sick. Patient is alerted and oriented x 4. He presents with a POLST form that states comfort measures only, however when I asked him he said he wants all testing and medications performed/given. He does not want to be resuscitated or intubated. History provided by:  Patient   used: No        Prior to Admission Medications   Prescriptions Last Dose Informant Patient Reported? Taking?    IBUPROFEN PO Unknown Self Yes No   Sig: if needed   MAGNESIUM OXIDE 400 PO 8/26/2023  Yes Yes   Sig: Take 400 mg by mouth in the morning   apixaban (ELIQUIS) 2.5 mg 8/26/2023  Yes Yes   Sig: Take 2.5 mg by mouth 2 (two) times a day   aspirin 325 mg tablet 8/26/2023 Self Yes Yes   Sig: Take 325 mg by mouth if needed   carvedilol (COREG) 12.5 mg tablet 8/26/2023 Self No Yes   Sig: Take 1 tablet (12.5 mg total) by mouth 2 (two) times a day with meals Patient taking differently: Take 25 mg by mouth 2 (two) times a day with meals   cetirizine (ZyrTEC) 10 mg tablet 2023  Yes Yes   Sig: Take 10 mg by mouth daily   fluticasone 50 mcg/actuation diskus inhaler 2023  Yes Yes   Sig: Inhale 1 puff 2 (two) times a day Rinse mouth after use. folic acid (FOLVITE) 1 mg tablet 2023  Yes Yes   Sig: Take 1 mg by mouth daily   hydrocortisone 2.5 % ointment Unknown Self No No   Sig: Apply topically 2 (two) times a day   Patient not taking: Reported on 2023   ipratropium (ATROVENT) 0.03 % nasal spray  Self No No   Si SPRAYS EACH NOSTRIL 3 TIMES A DAY   Patient taking differently: 2 sprays into each nostril 3 (three) times a day 2 SPRAYS EACH NOSTRIL 3 TIMES A DAY   ketoconazole (NIZORAL) 2 % shampoo  Self No No   Sig: Apply 1 application topically once for 1 dose   Patient taking differently: Apply 1 application.  topically if needed   losartan (COZAAR) 50 mg tablet 2023  No Yes   Sig: Take 1 tablet (50 mg total) by mouth daily   magnesium hydroxide (MILK OF MAGNESIA) 400 mg/5 mL oral suspension Unknown  Yes No   Sig: Take 5 mL by mouth daily as needed for constipation   pantoprazole (PROTONIX) 40 mg tablet 2023  No Yes   Sig: TAKE 1 TABLET BY MOUTH EVERY DAY   polyvinyl alcohol (LIQUIFILM TEARS) 1.4 % ophthalmic solution 2023 Self Yes Yes   Sig: Apply 1-2 drops to eye 4 (four) times a day      Facility-Administered Medications: None       Past Medical History:   Diagnosis Date   • Alcohol abuse    • Alcohol withdrawal seizure without complication (HCC)    • Arthritis    • Asthma    • CVA (cerebral vascular accident) ([address])    • Decubitus ulcer of buttock     last assessed 16   • Diabetes ([address])    • Disease of thyroid gland    • Duodenal ulcer    • Emphysema lung (HCC)    • Esophageal varices (HCC)    • GERD (gastroesophageal reflux disease)    • History of transfusion    • Hypertension    • Irritable bowel syndrome with diarrhea    • Kidney stones    • Prostate cancer [facility])    • Urinary frequency     resolved 02/15/17       Past Surgical History:   Procedure Laterality Date   • BACK SURGERY     • CATARACT EXTRACTION     • ESOPHAGOGASTRODUODENOSCOPY N/A 2017    Procedure: ESOPHAGOGASTRODUODENOSCOPY (EGD); Surgeon: Jefry Mederos MD;  Location:  MAIN OR;  Service:    • WI ESOPHAGOGASTRODUODENOSCOPY TRANSORAL DIAGNOSTIC N/A 2016    Procedure: ESOPHAGOGASTRODUODENOSCOPY (EGD); Surgeon: Vincent Peterson MD;  Location:  MAIN OR;  Service: Gastroenterology   • TONSILLECTOMY         Family History   Problem Relation Age of Onset   • Heart disease Mother         cardiac disorder   • Stroke Father    • Heart disease Father         cardiac disorder   • Heart disease Sister    • Diabetes Family    • Hypertension Family      I have reviewed and agree with the history as documented. E-Cigarette/Vaping   • E-Cigarette Use Never User      E-Cigarette/Vaping Substances   • Nicotine No    • THC No    • CBD No    • Flavoring No    • Other No    • Unknown No      Social History     Tobacco Use   • Smoking status: Former     Types: Cigarettes     Quit date:      Years since quittin.6   • Smokeless tobacco: Never   • Tobacco comments:     quit 20 years ago   Vaping Use   • Vaping Use: Never used   Substance Use Topics   • Alcohol use: Yes     Alcohol/week: 14.0 standard drinks of alcohol     Types: 14 Standard drinks or equivalent per week     Comment: 3-4 mixed drinks vodka per night/ 2L per week   • Drug use: No       Review of Systems   Constitutional: Positive for fever. Negative for chills. HENT: Positive for congestion. Negative for rhinorrhea and sore throat. Respiratory: Positive for cough and shortness of breath. Negative for chest tightness. Cardiovascular: Positive for chest pain. Gastrointestinal: Negative for abdominal pain, nausea and vomiting. Genitourinary: Positive for dysuria.  Negative for difficulty urinating, frequency and urgency. Skin: Negative for color change. Neurological: Negative for headaches. Psychiatric/Behavioral: Negative for behavioral problems and sleep disturbance. All other systems reviewed and are negative. Physical Exam  Physical Exam  Vitals and nursing note reviewed. Constitutional:       General: He is awake. Appearance: Normal appearance. He is well-developed. He is ill-appearing (chronically). HENT:      Head: Normocephalic and atraumatic. Right Ear: External ear normal.      Left Ear: External ear normal.      Nose: Nose normal.      Mouth/Throat:      Lips: Pink. Pharynx: Oropharynx is clear. Uvula midline. Comments: Poor dentition throughout  Eyes:      General: No scleral icterus. Extraocular Movements: Extraocular movements intact. Cardiovascular:      Rate and Rhythm: Normal rate and regular rhythm. Heart sounds: Normal heart sounds, S1 normal and S2 normal. No murmur heard. No gallop. Pulmonary:      Effort: Pulmonary effort is normal. No tachypnea. Breath sounds: No wheezing, rhonchi or rales. Comments: Extensive cough heard on exam  Musculoskeletal:         General: Normal range of motion. Cervical back: Normal range of motion. Skin:     General: Skin is warm and dry. Neurological:      General: No focal deficit present. Mental Status: He is alert and oriented to person, place, and time. GCS: GCS eye subscore is 4. GCS verbal subscore is 5. GCS motor subscore is 6. Psychiatric:         Attention and Perception: Attention and perception normal.         Mood and Affect: Mood normal.         Behavior: Behavior normal. Behavior is cooperative.          Vital Signs  ED Triage Vitals [08/26/23 2030]   Temperature Pulse Respirations Blood Pressure SpO2   100.5 °F (38.1 °C) 84 20 146/66 96 %      Temp Source Heart Rate Source Patient Position - Orthostatic VS BP Location FiO2 (%)   Oral Monitor Lying Right arm -- Pain Score       9           Vitals:    08/26/23 2030 08/26/23 2200   BP: 146/66 142/65   Pulse: 84 82   Patient Position - Orthostatic VS: Lying Sitting         Visual Acuity      ED Medications  Medications   cefTRIAXone (ROCEPHIN) IVPB (premix in dextrose) 1,000 mg 50 mL (1,000 mg Intravenous New Bag 8/26/23 2219)       Diagnostic Studies  Results Reviewed     Procedure Component Value Units Date/Time    HS Troponin I 4hr [[id number]]     Lab Status: No result Specimen: Blood     Procalcitonin [[id number]]  (Abnormal) Collected: 08/26/23 2052    Lab Status: Final result Specimen: Blood from Arm, Right Updated: 08/26/23 2145     Procalcitonin 0.38 ng/ml     Protime-INR [[id number]]  (Abnormal) Collected: 08/26/23 2052    Lab Status: Final result Specimen: Blood from Arm, Right Updated: 08/26/23 2132     Protime 21.1 seconds      INR 1.72    APTT [[id number]]  (Abnormal) Collected: 08/26/23 2052    Lab Status: Final result Specimen: Blood from Arm, Right Updated: 08/26/23 2132     PTT 43 seconds     HS Troponin I 2hr [[id number]]     Lab Status: No result Specimen: Blood     HS Troponin 0hr (reflex protocol) [[id number]]  (Normal) Collected: 08/26/23 2052    Lab Status: Final result Specimen: Blood from Arm, Right Updated: 08/26/23 2130     hs TnI 0hr 33 ng/L     Comprehensive metabolic panel [[id number]]  (Abnormal) Collected: 08/26/23 2052    Lab Status: Final result Specimen: Blood from Arm, Right Updated: 08/26/23 2121     Sodium 138 mmol/L      Potassium 4.3 mmol/L      Chloride 105 mmol/L      CO2 25 mmol/L      ANION GAP 8 mmol/L      BUN 40 mg/dL      Creatinine 1.76 mg/dL      Glucose 132 mg/dL      Calcium 8.4 mg/dL      Corrected Calcium 9.4 mg/dL      AST 25 U/L      ALT 40 U/L      Alkaline Phosphatase 89 U/L      Total Protein 6.2 g/dL      Albumin 2.7 g/dL      Total Bilirubin 0.53 mg/dL      eGFR 34 ml/min/1.73sq m     Narrative:      [place] guidelines for Chronic Kidney Disease (CKD):   •  Stage 1 with normal or high GFR (GFR > 90 mL/min/1.73 square meters)  •  Stage 2 Mild CKD (GFR = 60-89 mL/min/1.73 square meters)  •  Stage 3A Moderate CKD (GFR = 45-59 mL/min/1.73 square meters)  •  Stage 3B Moderate CKD (GFR = 30-44 mL/min/1.73 square meters)  •  Stage 4 Severe CKD (GFR = 15-29 mL/min/1.73 square meters)  •  Stage 5 End Stage CKD (GFR <15 mL/min/1.73 square meters)  Note: GFR calculation is accurate only with a steady state creatinine    Lactic acid [[id number]]  (Normal) Collected: 08/26/23 2052    Lab Status: Final result Specimen: Blood from Arm, Right Updated: 08/26/23 2121     LACTIC ACID 0.8 mmol/L     Narrative:      Result may be elevated if tourniquet was used during collection. Blood culture #1 [[id number]] Collected: 08/26/23 2052    Lab Status: In process Specimen: Blood from Arm, Left Updated: 08/26/23 2106    Blood culture #2 [[id number]] Collected: 08/26/23 2052    Lab Status:  In process Specimen: Blood from Arm, Right Updated: 08/26/23 2106    CBC and differential [[id number]]  (Abnormal) Collected: 08/26/23 2052    Lab Status: Final result Specimen: Blood from Arm, Right Updated: 08/26/23 2104     WBC 10.68 Thousand/uL      RBC 2.93 Million/uL      Hemoglobin 9.4 g/dL      Hematocrit 28.3 %      MCV 97 fL      MCH 32.1 pg      MCHC 33.2 g/dL      RDW 13.5 %      MPV 10.8 fL      Platelets 976 Thousands/uL      nRBC 0 /100 WBCs      Neutrophils Relative 73 %      Immat GRANS % 0 %      Lymphocytes Relative 11 %      Monocytes Relative 15 %      Eosinophils Relative 1 %      Basophils Relative 0 %      Neutrophils Absolute 7.71 Thousands/µL      Immature Grans Absolute 0.04 Thousand/uL      Lymphocytes Absolute 1.18 Thousands/µL      Monocytes Absolute 1.61 Thousand/µL      Eosinophils Absolute 0.10 Thousand/µL      Basophils Absolute 0.04 Thousands/µL     UA w Reflex to Microscopic w Reflex to Culture [[id number]]     Lab Status: No result Specimen: Urine XR chest portable    (Results Pending)              Procedures  ECG 12 Lead Documentation Only    Date/Time: 8/26/2023 8:30 PM    Performed by: Priyank Suresh PA-C  Authorized by: Priyank Suresh PA-C    Indications / Diagnosis:  Cough  Patient location:  ED  Previous ECG:     Previous ECG:  Compared to current    Comparison ECG info:  Normal sinus rhythm when compared to EKG 1/10/2020 ventricular rate decreased by 44 bpm no other obvious changes noted    Similarity:  Changes noted  Interpretation:     Interpretation: normal    Rate:     ECG rate:  85    ECG rate assessment: normal    Rhythm:     Rhythm: sinus rhythm    Ectopy:     Ectopy: none    ST segments:     ST segments:  Normal             ED Course  ED Course as of 08/26/23 2342   Sat Aug 26, 2023   2213 TT [facility]             HEART Risk Score    Flowsheet Row Most Recent Value   Heart Score Risk Calculator    History 0 Filed at: 08/26/2023 2338   ECG 0 Filed at: 08/26/2023 2338   Age 2 Filed at: 08/26/2023 2338   Risk Factors 2 Filed at: 08/26/2023 2338   Troponin 0 Filed at: 08/26/2023 2338   HEART Score 4 Filed at: 08/26/2023 2338                     Initial Sepsis Screening     Row Name 08/26/23 2206                Is the patient's history suggestive of a new or worsening infection? Yes (Proceed)  -LM        Suspected source of infection pneumonia  -LM        Indicate SIRS criteria --        Are two or more of the above signs & symptoms of infection both present and new to the patient? No  -LM              User Key  (r) = Recorded By, (t) = Taken By, (c) = Cosigned By    [address] [place] [address] Name Provider Type    LM Priyank Suresh PA-C Physician Assistant                SBIRT 22yo+    Flowsheet Row Most Recent Value   Initial Alcohol Screen: US AUDIT-C     1. How often do you have a drink containing alcohol? 0 Filed at: 08/26/2023 2034   2. How many drinks containing alcohol do you have on a typical day you are drinking?   0 Filed at: 08/26/2023 2034   3a. Male UNDER 65: How often do you have five or more drinks on one occasion? 0 Filed at: 08/26/2023 2034   3b. FEMALE Any Age, or MALE 65+: How often do you have 4 or more drinks on one occassion? 0 Filed at: 08/26/2023 2034   Audit-C Score 0 Filed at: 08/26/2023 2034   LOKI: How many times in the past year have you. .. Used an illegal drug or used a prescription medication for non-medical reasons? Never Filed at: 08/26/2023 2034                    Medical Decision Making  83 y/o male here for fever, cough  Differential diagnosis including but not limited to: pneumonia, UTI, sepsis, metabolic disturbance, electrolyte abnormality   Assessment: pneumonia  Plan: heart score 4. Febrile, elevated procalcitonin and leukocytosis, does not meet sepsis criteria but due to risk factors will admit for IV antibiotics. Started ceftriaxone. Discussed case with supa cullen from inpatient hospital medicine team and patient accepted under Dr Lyric Marie service. Amount and/or Complexity of Data Reviewed  Labs: ordered. Radiology: ordered. Risk  Prescription drug management. Decision regarding hospitalization. Disposition  Final diagnoses:   Pneumonia     Time reflects when diagnosis was documented in both MDM as applicable and the Disposition within this note     Time User Action Codes Description Comment    8/26/2023 10:29 PM Ada Vaughn Add [J18.9] Pneumonia       ED Disposition     ED Disposition   Admit    Condition   Stable    Date/Time   Sat Aug 26, 2023 10:30 PM    Comment   Case was discussed with Yakelin Santana and the patient's admission status was agreed to be Admission Status: inpatient status to the service of Dr. Lyric Marie .            Follow-up Information    None         Current Discharge Medication List      CONTINUE these medications which have NOT CHANGED    Details   apixaban (ELIQUIS) 2.5 mg Take 2.5 mg by mouth 2 (two) times a day      aspirin 325 mg tablet Take 325 mg by mouth if needed      carvedilol (COREG) 12.5 mg tablet Take 1 tablet (12.5 mg total) by mouth 2 (two) times a day with meals  Qty: 180 tablet, Refills: 1    Associated Diagnoses: Essential hypertension      cetirizine (ZyrTEC) 10 mg tablet Take 10 mg by mouth daily      fluticasone 50 mcg/actuation diskus inhaler Inhale 1 puff 2 (two) times a day Rinse mouth after use. folic acid (FOLVITE) 1 mg tablet Take 1 mg by mouth daily      losartan (COZAAR) 50 mg tablet Take 1 tablet (50 mg total) by mouth daily  Qty: 90 tablet, Refills: 1    Associated Diagnoses: Accelerated hypertension      MAGNESIUM OXIDE 400 PO Take 400 mg by mouth in the morning      pantoprazole (PROTONIX) 40 mg tablet TAKE 1 TABLET BY MOUTH EVERY DAY  Qty: 30 tablet, Refills: 0    Associated Diagnoses: Chronic gastritis without bleeding, unspecified gastritis type      polyvinyl alcohol (LIQUIFILM TEARS) 1.4 % ophthalmic solution Apply 1-2 drops to eye 4 (four) times a day      hydrocortisone 2.5 % ointment Apply topically 2 (two) times a day  Qty: 454 g, Refills: 1    Associated Diagnoses: Atopic dermatitis, unspecified type      IBUPROFEN PO if needed      ipratropium (ATROVENT) 0.03 % nasal spray 2 SPRAYS EACH NOSTRIL 3 TIMES A DAY  Qty: 30 mL, Refills: 3    Associated Diagnoses: Vasomotor rhinitis      ketoconazole (NIZORAL) 2 % shampoo Apply 1 application topically once for 1 dose  Qty: 120 mL, Refills: 1    Associated Diagnoses: Atopic dermatitis, unspecified type      magnesium hydroxide (MILK OF MAGNESIA) 400 mg/5 mL oral suspension Take 5 mL by mouth daily as needed for constipation             No discharge procedures on file.     PDMP Review     None          ED Provider  Electronically Signed by           Shannan Fuller PA-C  08/26/23 8700

## 2023-08-27 NOTE — ASSESSMENT & PLAN NOTE
· Presents with fever, worsening cough, and dyspnea  · Procalcitonin elevated patient with leukocytosis  · Chest x-ray is unremarkable  · Continue ceftriaxone as patient not severe CAP  · Urine strep and Legionella studies is negative  · Sputum culture and Gram stain  · Respiratory protocol  · Airway clearance protocol

## 2023-08-27 NOTE — ASSESSMENT & PLAN NOTE
· WBC 10.68 K on admission  · Likely secondary to pneumonia  · Continue ceftriaxone  · Blood cultures x2, pending

## 2023-08-27 NOTE — ASSESSMENT & PLAN NOTE
Lab Results   Component Value Date    EGFR 35 08/28/2023    EGFR 37 08/27/2023    EGFR 34 08/26/2023    CREATININE 1.71 (H) 08/28/2023    CREATININE 1.62 (H) 08/27/2023    CREATININE 1.76 (H) 08/26/2023   · Baseline creatinine 1.6-1.9  · Creatinine on admission 1.76  · Trend BMP daily

## 2023-08-27 NOTE — ASSESSMENT & PLAN NOTE
· History of alcohol abuse with withdrawal seizures, has not drink since prior to [facility] hospitalization 8/8

## 2023-08-28 LAB
ANION GAP SERPL CALCULATED.3IONS-SCNC: 9 MMOL/L
ATRIAL RATE: 136 BPM
ATRIAL RATE: 150 BPM
ATRIAL RATE: 150 BPM
ATRIAL RATE: 85 BPM
BUN SERPL-MCNC: 37 MG/DL (ref 5–25)
CALCIUM SERPL-MCNC: 8.6 MG/DL (ref 8.4–10.2)
CHLORIDE SERPL-SCNC: 105 MMOL/L (ref 96–108)
CO2 SERPL-SCNC: 24 MMOL/L (ref 21–32)
CREAT SERPL-MCNC: 1.71 MG/DL (ref 0.6–1.3)
ERYTHROCYTE [DISTWIDTH] IN BLOOD BY AUTOMATED COUNT: 13.3 % (ref 11.6–15.1)
GFR SERPL CREATININE-BSD FRML MDRD: 35 ML/MIN/1.73SQ M
GLUCOSE SERPL-MCNC: 131 MG/DL (ref 65–140)
GLUCOSE SERPL-MCNC: 161 MG/DL (ref 65–140)
HCT VFR BLD AUTO: 29.6 % (ref 36.5–49.3)
HGB BLD-MCNC: 9.5 G/DL (ref 12–17)
MAGNESIUM SERPL-MCNC: 1.8 MG/DL (ref 1.9–2.7)
MCH RBC QN AUTO: 31.3 PG (ref 26.8–34.3)
MCHC RBC AUTO-ENTMCNC: 32.1 G/DL (ref 31.4–37.4)
MCV RBC AUTO: 97 FL (ref 82–98)
MRSA NOSE QL CULT: NORMAL
P AXIS: 58 DEGREES
P AXIS: 69 DEGREES
PLATELET # BLD AUTO: 265 THOUSANDS/UL (ref 149–390)
PMV BLD AUTO: 10.5 FL (ref 8.9–12.7)
POTASSIUM SERPL-SCNC: 4.1 MMOL/L (ref 3.5–5.3)
PR INTERVAL: 174 MS
PR INTERVAL: 192 MS
PROCALCITONIN SERPL-MCNC: 0.51 NG/ML
QRS AXIS: -24 DEGREES
QRS AXIS: -36 DEGREES
QRS AXIS: -39 DEGREES
QRS AXIS: -52 DEGREES
QRSD INTERVAL: 76 MS
QRSD INTERVAL: 80 MS
QRSD INTERVAL: 80 MS
QRSD INTERVAL: 84 MS
QT INTERVAL: 266 MS
QT INTERVAL: 268 MS
QT INTERVAL: 268 MS
QT INTERVAL: 368 MS
QTC INTERVAL: 385 MS
QTC INTERVAL: 391 MS
QTC INTERVAL: 397 MS
QTC INTERVAL: 437 MS
RBC # BLD AUTO: 3.04 MILLION/UL (ref 3.88–5.62)
SODIUM SERPL-SCNC: 138 MMOL/L (ref 135–147)
T WAVE AXIS: 62 DEGREES
T WAVE AXIS: 64 DEGREES
T WAVE AXIS: 71 DEGREES
T WAVE AXIS: 73 DEGREES
VENTRICULAR RATE: 124 BPM
VENTRICULAR RATE: 130 BPM
VENTRICULAR RATE: 132 BPM
VENTRICULAR RATE: 85 BPM
WBC # BLD AUTO: 10.82 THOUSAND/UL (ref 4.31–10.16)

## 2023-08-28 PROCEDURE — 93010 ELECTROCARDIOGRAM REPORT: CPT | Performed by: INTERNAL MEDICINE

## 2023-08-28 PROCEDURE — 84145 PROCALCITONIN (PCT): CPT | Performed by: INTERNAL MEDICINE

## 2023-08-28 PROCEDURE — 99223 1ST HOSP IP/OBS HIGH 75: CPT | Performed by: INTERNAL MEDICINE

## 2023-08-28 PROCEDURE — 99232 SBSQ HOSP IP/OBS MODERATE 35: CPT | Performed by: INTERNAL MEDICINE

## 2023-08-28 PROCEDURE — 93005 ELECTROCARDIOGRAM TRACING: CPT

## 2023-08-28 PROCEDURE — 83735 ASSAY OF MAGNESIUM: CPT | Performed by: STUDENT IN AN ORGANIZED HEALTH CARE EDUCATION/TRAINING PROGRAM

## 2023-08-28 PROCEDURE — 80048 BASIC METABOLIC PNL TOTAL CA: CPT | Performed by: STUDENT IN AN ORGANIZED HEALTH CARE EDUCATION/TRAINING PROGRAM

## 2023-08-28 PROCEDURE — 85027 COMPLETE CBC AUTOMATED: CPT | Performed by: STUDENT IN AN ORGANIZED HEALTH CARE EDUCATION/TRAINING PROGRAM

## 2023-08-28 PROCEDURE — 82948 REAGENT STRIP/BLOOD GLUCOSE: CPT

## 2023-08-28 RX ORDER — METOPROLOL TARTRATE 5 MG/5ML
2.5 INJECTION INTRAVENOUS ONCE
Status: COMPLETED | OUTPATIENT
Start: 2023-08-28 | End: 2023-08-28

## 2023-08-28 RX ORDER — METOPROLOL TARTRATE 50 MG/1
50 TABLET, FILM COATED ORAL EVERY 6 HOURS
Status: DISCONTINUED | OUTPATIENT
Start: 2023-08-28 | End: 2023-08-30

## 2023-08-28 RX ORDER — MAGNESIUM SULFATE 1 G/100ML
1 INJECTION INTRAVENOUS ONCE
Status: COMPLETED | OUTPATIENT
Start: 2023-08-28 | End: 2023-08-28

## 2023-08-28 RX ORDER — MAGNESIUM SULFATE 1 G/100ML
1 INJECTION INTRAVENOUS ONCE
Status: COMPLETED | OUTPATIENT
Start: 2023-08-28 | End: 2023-08-29

## 2023-08-28 RX ORDER — METOPROLOL TARTRATE 5 MG/5ML
5 INJECTION INTRAVENOUS ONCE
Status: DISCONTINUED | OUTPATIENT
Start: 2023-08-28 | End: 2023-08-28

## 2023-08-28 RX ORDER — SACCHAROMYCES BOULARDII 250 MG
250 CAPSULE ORAL 2 TIMES DAILY
Status: DISCONTINUED | OUTPATIENT
Start: 2023-08-28 | End: 2023-09-15 | Stop reason: HOSPADM

## 2023-08-28 RX ORDER — LEVALBUTEROL INHALATION SOLUTION 1.25 MG/3ML
1.25 SOLUTION RESPIRATORY (INHALATION) EVERY 6 HOURS PRN
Status: DISCONTINUED | OUTPATIENT
Start: 2023-08-28 | End: 2023-09-01

## 2023-08-28 RX ORDER — ACETAMINOPHEN 325 MG/1
325 TABLET ORAL ONCE
Status: COMPLETED | OUTPATIENT
Start: 2023-08-28 | End: 2023-08-28

## 2023-08-28 RX ORDER — DILTIAZEM HYDROCHLORIDE 5 MG/ML
10 INJECTION INTRAVENOUS ONCE
Status: COMPLETED | OUTPATIENT
Start: 2023-08-28 | End: 2023-08-28

## 2023-08-28 RX ADMIN — CEFEPIME HYDROCHLORIDE 2000 MG: 2 INJECTION, SOLUTION INTRAVENOUS at 05:06

## 2023-08-28 RX ADMIN — DILTIAZEM HYDROCHLORIDE 10 MG: 5 INJECTION INTRAVENOUS at 07:35

## 2023-08-28 RX ADMIN — SENNOSIDES 8.6 MG: 8.6 TABLET, FILM COATED ORAL at 21:55

## 2023-08-28 RX ADMIN — ACETAMINOPHEN 325MG 325 MG: 325 TABLET ORAL at 06:24

## 2023-08-28 RX ADMIN — ACETAMINOPHEN 325MG 650 MG: 325 TABLET ORAL at 02:48

## 2023-08-28 RX ADMIN — ASPIRIN 81 MG: 81 TABLET, COATED ORAL at 08:02

## 2023-08-28 RX ADMIN — METOPROLOL TARTRATE 2.5 MG: 1 INJECTION, SOLUTION INTRAVENOUS at 05:51

## 2023-08-28 RX ADMIN — FOLIC ACID 1 MG: 1 TABLET ORAL at 08:01

## 2023-08-28 RX ADMIN — FLUTICASONE PROPIONATE 1 SPRAY: 50 SPRAY, METERED NASAL at 07:43

## 2023-08-28 RX ADMIN — ACETAMINOPHEN 325MG 650 MG: 325 TABLET ORAL at 19:15

## 2023-08-28 RX ADMIN — GUAIFENESIN 600 MG: 600 TABLET ORAL at 08:01

## 2023-08-28 RX ADMIN — MAGNESIUM SULFATE IN DEXTROSE 1 G: 10 INJECTION, SOLUTION INTRAVENOUS at 05:34

## 2023-08-28 RX ADMIN — MAGNESIUM OXIDE TAB 400 MG (241.3 MG ELEMENTAL MG) 400 MG: 400 (241.3 MG) TAB at 08:01

## 2023-08-28 RX ADMIN — ONDANSETRON 4 MG: 2 INJECTION INTRAMUSCULAR; INTRAVENOUS at 17:48

## 2023-08-28 RX ADMIN — APIXABAN 2.5 MG: 2.5 TABLET, FILM COATED ORAL at 17:19

## 2023-08-28 RX ADMIN — METOPROLOL TARTRATE 2.5 MG: 1 INJECTION, SOLUTION INTRAVENOUS at 19:08

## 2023-08-28 RX ADMIN — APIXABAN 2.5 MG: 2.5 TABLET, FILM COATED ORAL at 08:02

## 2023-08-28 RX ADMIN — Medication 250 MG: at 21:55

## 2023-08-28 RX ADMIN — PANTOPRAZOLE SODIUM 40 MG: 40 TABLET, DELAYED RELEASE ORAL at 05:41

## 2023-08-28 RX ADMIN — LOSARTAN POTASSIUM 50 MG: 50 TABLET, FILM COATED ORAL at 08:02

## 2023-08-28 RX ADMIN — LORATADINE 10 MG: 10 TABLET ORAL at 08:02

## 2023-08-28 RX ADMIN — CEFEPIME HYDROCHLORIDE 2000 MG: 2 INJECTION, SOLUTION INTRAVENOUS at 16:18

## 2023-08-28 RX ADMIN — MAGNESIUM SULFATE IN DEXTROSE 1 G: 10 INJECTION, SOLUTION INTRAVENOUS at 21:58

## 2023-08-28 RX ADMIN — GUAIFENESIN 600 MG: 600 TABLET ORAL at 17:19

## 2023-08-28 RX ADMIN — METOPROLOL TARTRATE 2.5 MG: 1 INJECTION, SOLUTION INTRAVENOUS at 05:28

## 2023-08-28 RX ADMIN — CARVEDILOL 25 MG: 12.5 TABLET, FILM COATED ORAL at 06:25

## 2023-08-28 RX ADMIN — METOPROLOL TARTRATE 50 MG: 50 TABLET ORAL at 16:45

## 2023-08-28 RX ADMIN — METOPROLOL TARTRATE 50 MG: 50 TABLET ORAL at 21:55

## 2023-08-28 RX ADMIN — POLYETHYLENE GLYCOL 3350 17 G: 17 POWDER, FOR SOLUTION ORAL at 08:00

## 2023-08-28 NOTE — PROGRESS NOTES
[facility]  Progress Note  Name: Steven Iyer  MRN: [id number]  Unit/Bed#: -01 I Date of Admission: 8/26/2023   Date of Service: 8/28/2023 I Hospital Day: 2    Assessment/Plan   Cystitis  Assessment & Plan  · Patient reports dysuria and difficulty urinating   · UA with innumerable WBCs and innumerable bacteria  · Continue ceftriaxone, no prior pseudomonal or ESBL growth  · Urine culture, pending  · Urinary retention protocol    History of alcohol abuse  Assessment & Plan  · History of alcohol abuse with withdrawal seizures, has not drink since prior to [facility] hospitalization 8/8    History of COVID-19  Assessment & Plan  · Diagnosed with COVID-19 during recent admit  · Received 3 days of remdesivir and IV fluids  · No evidence of pneumonia during admission  · Completed quarantine while inpatient    Central retinal vein occlusion of right eye  Assessment & Plan  · Seen by ophthalmology during recent [facility] admission, diagnosed with CRVO and ocular hypertension  · Patient recommended for formal evaluation outpatient of OCT macular degeneration, discussion of possible anti-VEGF therapy  · Encouraged ophthalmology follow-up outpatient    Leukocytosis  Assessment & Plan  · WBC 10.68 K on admission  · Likely secondary to pneumonia and cystitis   · Continue ceftriaxone  · Blood cultures are negative to date  · U/c pending     Stage 3b chronic kidney disease [facility])  Assessment & Plan  Lab Results   Component Value Date    EGFR 35 08/28/2023    EGFR 37 08/27/2023    EGFR 34 08/26/2023    CREATININE 1.71 (H) 08/28/2023    CREATININE 1.62 (H) 08/27/2023    CREATININE 1.76 (H) 08/26/2023   · Baseline creatinine 1.6-1.9  · Creatinine on admission 1.76  · Trend BMP daily    Chronic obstructive pulmonary disease, unspecified COPD type ([address])  Assessment & Plan  · Not on maintenance inhalers outpatient  · No signs of acute exacerbation on admission  · If patient were to develop wheezing in setting of pneumonia, would start Xopenex/Atrovent nebulizers    Atrial fibrillation [facility])  Assessment & Plan  · Went into A-fib with RVR this morning August 28  · Was given IV Lopressor and Cardizem  · Home regimen: Coreg 25 Mg twice daily  · Started on Eliquis 2.5 mg twice daily for anticoagulation during hospitalization at [facility] 8/8-8/23  · Cardiology consult  · Continue home medications    Anemia of chronic disease  Assessment & Plan  · Hemoglobin 9.4 on admission  · Hemoglobin ranging 10-12 during recent [facility] admission  · No signs of active bleeding  · Trend CBC    * Community acquired pneumonia of right lower lobe of lung  Assessment & Plan  · Presents with fever, worsening cough, and dyspnea  · Procalcitonin elevated patient with leukocytosis  · Chest x-ray is unremarkable  · Continue ceftriaxone as patient not severe CAP  · Urine strep and Legionella studies is negative  · Sputum culture and Gram stain  · Respiratory protocol  · Airway clearance protocol           Labs & Imaging: I have personally reviewed pertinent reports. VTE Prophylaxis: in place. Code Status:   Level 3 - DNAR and DNI    Patient Centered Rounds: I have performed bedside rounds with nursing staff today. Discussions with Specialists or Other Care Team Provider: CM    Education and Discussions with Family / Patient: States he will update his wife    Current Length of Stay: 2 day(s)    Current Patient Status: Inpatient   Certification Statement: The patient will continue to require additional inpatient hospital stay due to see my assessment and plan. Subjective:   Patient is seen and examined at bedside. Denies any new complaints. Denies any chest pain, palpitation. Afebrile  All other ROS are negative. Objective:    Vitals: Blood pressure 138/88, pulse 87, temperature 99.4 °F (37.4 °C), resp. rate 16, height 5' 8" (1.727 m), weight 91.5 kg (201 lb 11.5 oz), SpO2 99 %. ,Body mass index is 30.67 kg/m². SPO2 RA Rest    Flowsheet Row ED to Hosp-Admission (Current) from 8/26/2023 in [facility]   SpO2 99 %   SpO2 Activity At Rest   O2 Device None (Room air)   O2 Flow Rate --        I&O:     Intake/Output Summary (Last 24 hours) at 8/28/2023 1211  Last data filed at 8/28/2023 0800  Gross per 24 hour   Intake 240 ml   Output --   Net 240 ml       Physical Exam:    General- Alert, lying comfortably in bed. Not in any acute distress. Neck- Supple, No JVD  CVS- irregular, S1 and S2 normal  Chest- Bilateral Air entry, No rhochi, crackles or wheezing present. Abdomen- soft, nontender, not distended, no guarding or rigidity, BS+  Extremities-  No pedal edema, No calf tenderness                         Normal ROM in all extremities. CNS-   Alert, awake and orientedx3. No focal deficits present.     Invasive Devices     Peripheral Intravenous Line  Duration           Peripheral IV 08/26/23 Right;Ventral (anterior) Hand 1 day          Drain  Duration           External Urinary Catheter Medium 1 day                      Social History  reviewed  Family History   Problem Relation Age of Onset   • Heart disease Mother         cardiac disorder   • Stroke Father    • Heart disease Father         cardiac disorder   • Heart disease Sister    • Diabetes Family    • Hypertension Family     reviewed    Meds:  Current Facility-Administered Medications   Medication Dose Route Frequency Provider Last Rate Last Admin   • acetaminophen (TYLENOL) tablet 650 mg  650 mg Oral Q6H PRN Henri Smith PA-C   650 mg at 08/28/23 0248   • aluminum-magnesium hydroxide-simethicone (MAALOX) oral suspension 30 mL  30 mL Oral Q6H PRN Henri Smith PA-C       • apixaban Roselind Fines) tablet 2.5 mg  2.5 mg Oral BID Henri Smith PA-C   2.5 mg at 08/28/23 0802   • aspirin (ECOTRIN LOW STRENGTH) EC tablet 81 mg  81 mg Oral Daily Henri Smith PA-C   81 mg at 08/28/23 0802   • carvedilol (COREG) tablet 25 mg  25 mg Oral BID With Meals Jeanne Cardoso PA-C   25 mg at 08/28/23 9343   • cefepime (MAXIPIME) IVPB (premix in dextrose) 2,000 mg 50 mL  2,000 mg Intravenous Q12H Jesus Glez  mL/hr at 08/28/23 0506 2,000 mg at 08/28/23 0506   • fluticasone (FLONASE) 50 mcg/act nasal spray 1 spray  1 spray Nasal Daily Jeanne Cardoso PA-C   1 spray at 64/50/42 6327   • folic acid (FOLVITE) tablet 1 mg  1 mg Oral Daily Jeanne Cardoso PA-C   1 mg at 08/28/23 0801   • guaiFENesin (MUCINEX) 12 hr tablet 600 mg  600 mg Oral BID Jeanne Cardoso PA-C   600 mg at 08/28/23 0801   • ipratropium (ATROVENT) 0.02 % inhalation solution 0.5 mg  0.5 mg Nebulization Q6H PRN Azalia Vieyra PA-C       • levalbuterol (XOPENEX) inhalation solution 1.25 mg  1.25 mg Nebulization Q6H PRN Azalia Vieyra PA-C       • loratadine (CLARITIN) tablet 10 mg  10 mg Oral Daily Jeanne Cardoso PA-C   10 mg at 08/28/23 5562   • losartan (COZAAR) tablet 50 mg  50 mg Oral Daily Jeanne Cardoso PA-C   50 mg at 08/28/23 3537   • magnesium Oxide (MAG-OX) tablet 400 mg  400 mg Oral Daily Jeanne Cardoso PA-C   400 mg at 08/28/23 0801   • ondansetron (ZOFRAN) injection 4 mg  4 mg Intravenous Q6H PRN Jeanne Cardoso PA-C       • pantoprazole (PROTONIX) EC tablet 40 mg  40 mg Oral Daily Before Breakfast Jeanne Cardoso PA-C   40 mg at 08/28/23 0541   • polyethylene glycol (MIRALAX) packet 17 g  17 g Oral Daily Jeanne Cardoso PA-C   17 g at 08/28/23 0800   • senna (SENOKOT) tablet 8.6 mg  1 tablet Oral HS Jeanne Cardoso PA-C   8.6 mg at 08/27/23 2058      Medications Prior to Admission   Medication   • apixaban (ELIQUIS) 2.5 mg   • Aspirin 81 MG CAPS   • carvedilol (COREG) 12.5 mg tablet   • cetirizine (ZyrTEC) 10 mg tablet   • fluticasone (FLONASE) 50 mcg/act nasal spray   • folic acid (FOLVITE) 1 mg tablet   • losartan (COZAAR) 50 mg tablet   • MAGNESIUM OXIDE 400 PO   • pantoprazole (PROTONIX) 40 mg tablet   • polyvinyl alcohol (LIQUIFILM TEARS) 1.4 % ophthalmic solution   • magnesium hydroxide (MILK OF MAGNESIA) 400 mg/5 mL oral suspension       Labs:  Results from last 7 days   Lab Units 08/28/23 0247 08/27/23 0447 08/26/23 2052   WBC Thousand/uL 10.82* 10.26* 10.68*   HEMOGLOBIN g/dL 9.5* 9.1* 9.4*   HEMATOCRIT % 29.6* 27.9* 28.3*   PLATELETS Thousands/uL 265 259 249   NEUTROS PCT %  --   --  73   LYMPHS PCT %  --   --  11*   MONOS PCT %  --   --  15*   EOS PCT %  --   --  1     Results from last 7 days   Lab Units 08/28/23 0247 08/27/23 0447 08/26/23 2052   POTASSIUM mmol/L 4.1 4.2 4.3   CHLORIDE mmol/L 105 105 105   CO2 mmol/L 24 25 25   BUN mg/dL 37* 38* 40*   CREATININE mg/dL 1.71* 1.62* 1.76*   CALCIUM mg/dL 8.6 8.5 8.4   ALK PHOS U/L  --   --  89   ALT U/L  --   --  40   AST U/L  --   --  25     Lab Results   Component Value Date    TROPONINI <0.02 01/10/2020    TROPONINI <0.02 08/30/2017    TROPONINI <0.02 03/24/2017     Results from last 7 days   Lab Units 08/26/23 2052   INR  1.72*     Lab Results   Component Value Date    BLOODCX No Growth at 24 hrs. 08/26/2023    BLOODCX No Growth at 24 hrs. 08/26/2023    URINECX >100,000 cfu/ml Gram Negative Reid Enteric Like (A) 08/27/2023         Imaging:  Results for orders placed during the hospital encounter of 08/26/23    XR chest portable    Narrative  CHEST    INDICATION:   fever, cough. COVID-positive 8/12/2023. COMPARISON: CXR 1/23/2023, abdomen CT 3/24/2017. EXAM PERFORMED/VIEWS:  XR CHEST PORTABLE. FINDINGS:    Cardiomediastinal silhouette normal.    Lungs clear. No effusion or pneumothorax. Upper abdomen normal. Moderate left glenohumeral degenerative disease. Impression  No acute cardiopulmonary disease. Workstation performed: [id number]    Results for orders placed during the hospital encounter of 01/10/20    XR chest 2 views    Narrative  CHEST    INDICATION:   Chest Pain.     COMPARISON:  3/24/2017    EXAM PERFORMED/VIEWS:  XR CHEST PA & LATERAL  Images: 2    FINDINGS:    Cardiomediastinal silhouette appears unremarkable. No congestive failure. No pneumothorax. No pneumonia. No effusions. Osseous structures appear within normal limits for patient age. Impression  No acute cardiopulmonary disease. Workstation performed: [id number]      Last 24 Hours Medication List:   Current Facility-Administered Medications   Medication Dose Route Frequency Provider Last Rate   • acetaminophen  650 mg Oral Q6H PRN Jeanne Cardoso PA-C     • aluminum-magnesium hydroxide-simethicone  30 mL Oral Q6H PRN Jeanne Cardoso PA-C     • apixaban  2.5 mg Oral BID Jeanne Cardoso PA-C     • aspirin  81 mg Oral Daily Jeanne Cardoso PA-C     • carvedilol  25 mg Oral BID With Meals Jeanne Cardoso PA-C     • cefepime  2,000 mg Intravenous Q12H Jesus Glez MD 2,000 mg (08/28/23 0506)   • fluticasone  1 spray Nasal Daily Jeanne Cardoso PA-C     • folic acid  1 mg Oral Daily Jeanne Cardoso PA-C     • guaiFENesin  600 mg Oral BID Jeanne Cardoso PA-C     • ipratropium  0.5 mg Nebulization Q6H PRN Azalia Vieyra PA-C     • levalbuterol  1.25 mg Nebulization Q6H PRN Azalia Vieyra PA-C     • loratadine  10 mg Oral Daily Jeanne Cardoso PA-C     • losartan  50 mg Oral Daily Jeanne Cardoso PA-C     • magnesium Oxide  400 mg Oral Daily Jeanne Cardoso PA-C     • ondansetron  4 mg Intravenous Q6H PRN Jeanne Cardoso PA-C     • pantoprazole  40 mg Oral Daily Before Breakfast Jeanne Cardoso PA-C     • polyethylene glycol  17 g Oral Daily Jeanne Cardoso PA-C     • senna  1 tablet Oral HS Jeanne Cardoso PA-C          Today, Patient Was Seen By: David Liu MD    ** Please Note: Dictation voice to text software may have been used in the creation of this document.  **

## 2023-08-28 NOTE — RAPID RESPONSE
Rapid Response Note  Krystina Barton 82 y.o. male MRN: [id number]  Unit/Bed#: -01 Encounter: [id number]    Rapid Response Notification(s):   Response called date/time:  8/28/2023 5:17 AM  Response team arrival date/time:  8/28/2023 5:19 AM  Response end date/time:  8/28/2023 5:40 AM  Level of care:  Medr  [facility]  Primary reason for rapid response call:  Acute change in heart rate    Rapid Response Intervention(s):   Airway:  None  Breathing:  None  Circulation:  Electrocardiogram  Medications administered: lopressor. Assessment:   · AF RVR    Plan:   · Patient here for PNA, hx of AF RVR per notes, home carvedilol continued and he has been receiving per chart review   · Noted on Lance to be tachy into the 130-140s, EKG reveals AF RVR   · Per RN staff, has been febrile and coughing, which is likely the etiology of his RVR flare   · Lopressor 2.5mg IV x1 with improvement of HR to 90-110s briefly, with return to 130s on EKG   · Will admin additional Lopressor 2.5mg IV   · Continuous telemetry   · SLIM AP present and assisted with RRT     Rapid Response Outcome:   Transfer:  Remain on floor  Primary service notified of transfer: Yes    Code Status: Level 3 (DNAR and DNI)      Family notified of transfer: yes  Family member contacted: by primary team      Background/Situation:   Krystina Barton is a 82 y.o. male who presents as RRT for AF RVR. He has a hx of same. Currently admitted for PNA, has been coughing and intermittently febrile per staff. AF RVR on EKG. Short improvement with Lopressor 2.5 x1, however refractory to the 130s. Will trial additional Lopressor 2.5mg. per staff, he is at his neurological baseline. HD stable. He is appropriate to remain on MST. Review of Systems   Respiratory: Positive for cough.         Objective:   Vitals:    08/27/23 1830 08/27/23 2021 08/28/23 0253 08/28/23 0511   BP:  163/82  147/81   Pulse: 86 85  (!) 139   Resp: Temp: 98.5 °F (36.9 °C) 97.8 °F (36.6 °C) 100 °F (37.8 °C) 99.4 °F (37.4 °C)   TempSrc:   Oral    SpO2: 95% 96%  95%   Weight:       Height:         Physical Exam  Vitals reviewed. Constitutional:       General: He is awake. Appearance: He is ill-appearing. Pulmonary:      Comments: Loose cough  Abdominal:      Palpations: Abdomen is soft. Tenderness: There is no abdominal tenderness. Genitourinary:     Comments: + castanon  Neurological:      Mental Status: He is alert. Comments: Awake and alert   At his neurological baseline per RN staff    Psychiatric:         Behavior: Behavior is cooperative. Portions of the record may have been created with voice recognition software. Occasional wrong word or "sound a like" substitutions may have occurred due to the inherent limitations of voice recognition software. Read the chart carefully and recognize, using context, where substitutions have occurred.     [facility]

## 2023-08-28 NOTE — PLAN OF CARE
Problem: Potential for Falls  Goal: Patient will remain free of falls  Description: INTERVENTIONS:  - Educate patient/family on patient safety including physical limitations  - Instruct patient to call for assistance with activity   - Consult OT/PT to assist with strengthening/mobility   - Keep Call bell within reach  - Keep bed low and locked with side rails adjusted as appropriate  - Keep care items and personal belongings within reach  - Initiate and maintain comfort rounds  - Make Fall Risk Sign visible to staff  - Offer Toileting every 2 Hours, in advance of need  - Initiate/Maintain bed alarm  - Obtain necessary fall risk management equipment:   - Apply yellow socks and bracelet for high fall risk patients  - Consider moving patient to room near nurses station  Outcome: Progressing     Problem: PAIN - ADULT  Goal: Verbalizes/displays adequate comfort level or baseline comfort level  Description: Interventions:  - Encourage patient to monitor pain and request assistance  - Assess pain using appropriate pain scale  - Administer analgesics based on type and severity of pain and evaluate response  - Implement non-pharmacological measures as appropriate and evaluate response  - Consider cultural and social influences on pain and pain management  - Notify physician/advanced practitioner if interventions unsuccessful or patient reports new pain  Outcome: Progressing     Problem: INFECTION - ADULT  Goal: Absence or prevention of progression during hospitalization  Description: INTERVENTIONS:  - Assess and monitor for signs and symptoms of infection  - Monitor lab/diagnostic results  - Monitor all insertion sites, i.e. indwelling lines, tubes, and drains  - Monitor endotracheal if appropriate and nasal secretions for changes in amount and color  - [place] appropriate cooling/warming therapies per order  - Administer medications as ordered  - Instruct and encourage patient and family to use good hand hygiene technique  - Identify and instruct in appropriate isolation precautions for identified infection/condition  Outcome: Progressing  Goal: Absence of fever/infection during neutropenic period  Description: INTERVENTIONS:  - Monitor WBC    Outcome: Progressing

## 2023-08-28 NOTE — CONSULTS
Consultation - Cardiology   Justina De Oliveira 82 y.o. male MRN: [id number]  Unit/Bed#: -01 Encounter: [id number]      Assessment:  Principal Problem:    Community acquired pneumonia of right lower lobe of lung  Active Problems:    Anemia of chronic disease    Atrial fibrillation ([address])    Chronic obstructive pulmonary disease, unspecified COPD type ([address])    Stage 3b chronic kidney disease ([address])    Leukocytosis    Central retinal vein occlusion of right eye    History of COVID-19    History of alcohol abuse    Cystitis      Plan:    1. Atrial fibrillation with rapid ventricular response - His heart rates have been uncontrolled in the setting of his febrile illness and pneumonia. For better heart rate control we are going to discontinue carvedilol and start metoprolol 50 mg every 6 hours. We will titrate as needed and as tolerated. Continue Eliquis for stroke prevention. LV function preserved. 2.  Hypertension - His blood pressure is evaded and the acuity of his illness. As stated we are changing beta-blocker to metoprolol. Continue losartan. History of Present Illness   Physician Requesting Consult: Lew Ahuja MD     Reason for Consult / Principal Problem: Atrial fibrillation    HPI: Justina De Oliveira is a 82y.o. year old male who carries a history of paroxysmal atrial fibrillation who comes in to the hospital with signs of infection that included fever, shortness of breath and cough. He is currently being treated for pneumonia. Of note patient was recently at [facility] with an altered mental status, left-sided weakness, COVID-19 and hyponatremia. During that hospitalization he was in atrial fibrillation. He had not been on anticoagulation in the past given what appears to be his chronic alcohol use, but Eliquis was started during his hospitalization.   He looks like he had issues with the change in mental status associated with his hyponatremia and alcohol withdrawal.  He has also been on Coreg 25 mg twice daily. Now during this hospitalization he is found to have rapid heart rates and his A-fib is not under control. Yesterday he was given an IV dose of metoprolol and diltiazem, but his heart rates remain elevated. Anish Uriostegui remains asymptomatic from a cardiac standpoint. He denies palpitations or any chest symptoms. No chest pain or any symptoms of angina. Currently denies any shortness of breath. No increasing signs of CHF. He has had no history of lightheadedness or syncope. Consults    Review of Systems: Please refer to the [facility] for all cardiac and pulmonary review of systems. All other 10 systems were reviewed and are negative. Historical Information   Past Medical History:   Diagnosis Date   • Alcohol abuse    • Alcohol withdrawal seizure without complication ([address])    • Arthritis    • Asthma    • CVA (cerebral vascular accident) ([address])    • Decubitus ulcer of buttock     last assessed 07/20/16   • Diabetes ([address])    • Disease of thyroid gland    • Duodenal ulcer    • Emphysema lung ([address])    • Esophageal varices (HCC)    • GERD (gastroesophageal reflux disease)    • History of transfusion    • Hypertension    • Irritable bowel syndrome with diarrhea    • Kidney stones    • Prostate cancer ([address])    • Urinary frequency     resolved 02/15/17     Past Surgical History:   Procedure Laterality Date   • BACK SURGERY     • CATARACT EXTRACTION     • ESOPHAGOGASTRODUODENOSCOPY N/A 2/6/2017    Procedure: ESOPHAGOGASTRODUODENOSCOPY (EGD); Surgeon: Rosario Quiroz MD;  Location:  MAIN OR;  Service:    • [place] ESOPHAGOGASTRODUODENOSCOPY TRANSORAL DIAGNOSTIC N/A 4/26/2016    Procedure: ESOPHAGOGASTRODUODENOSCOPY (EGD);   Surgeon: Florence Ch MD;  Location:  MAIN OR;  Service: Gastroenterology   • TONSILLECTOMY       Social History     Substance and Sexual Activity   Alcohol Use Yes   • Alcohol/week: 14.0 standard drinks of alcohol   • Types: 14 Standard drinks or equivalent per week    Comment: 3-4 mixed drinks vodka per night/ 2L per week     Social History     Substance and Sexual Activity   Drug Use No     Social History     Tobacco Use   Smoking Status Former   • Types: Cigarettes   • Quit date:    • Years since quittin.6   Smokeless Tobacco Never   Tobacco Comments    quit 20 years ago     Family History: non-contributory    Meds/Allergies   all current active meds have been reviewed  Allergies   Allergen Reactions   • Morphine And Related          Current Facility-Administered Medications:   •  acetaminophen (TYLENOL) tablet 650 mg, 650 mg, Oral, Q6H PRN, Tesfaye Medrano PA-C, 650 mg at 23 0248  •  aluminum-magnesium hydroxide-simethicone (MAALOX) oral suspension 30 mL, 30 mL, Oral, Q6H PRN, Tesfaye Medrano PA-C  •  apixaban (ELIQUIS) tablet 2.5 mg, 2.5 mg, Oral, BID, Tesfaye Medrano PA-C, 2.5 mg at 23 0802  •  aspirin (ECOTRIN LOW STRENGTH) EC tablet 81 mg, 81 mg, Oral, Daily, Tesfaye Medrano PA-C, 81 mg at 23 1022  •  carvedilol (COREG) tablet 25 mg, 25 mg, Oral, BID With Meals, Tesfaye Medrano PA-C, 25 mg at 23 1617  •  cefepime (MAXIPIME) IVPB (premix in dextrose) 2,000 mg 50 mL, 2,000 mg, Intravenous, Q12H, Hieu Streeter MD, Last Rate: 100 mL/hr at 23 1618, 2,000 mg at 23 1618  •  fluticasone (FLONASE) 50 mcg/act nasal spray 1 spray, 1 spray, Nasal, Daily, Tesfaye Medrano PA-C, 1 spray at 55/70/44 1951  •  folic acid (FOLVITE) tablet 1 mg, 1 mg, Oral, Daily, Tesfaye Medrano PA-C, 1 mg at 23 0801  •  guaiFENesin (MUCINEX) 12 hr tablet 600 mg, 600 mg, Oral, BID, Tesfaye Medrano PA-C, 600 mg at 23 3398  •  ipratropium (ATROVENT) 0.02 % inhalation solution 0.5 mg, 0.5 mg, Nebulization, Q6H PRN, Luis Daniel Garland PA-C  •  levalbuterol (XOPENEX) inhalation solution 1.25 mg, 1.25 mg, Nebulization, Q6H PRN, Luis Daniel Garland PA-C  •  loratadine (CLARITIN) tablet 10 mg, 10 mg, Oral, Daily, Tesfaye Medrano PA-C, 10 mg at 08/28/23 0802  •  losartan (COZAAR) tablet 50 mg, 50 mg, Oral, Daily, ARSEN Oliva-C, 50 mg at 08/28/23 3403  •  magnesium Oxide (MAG-OX) tablet 400 mg, 400 mg, Oral, Daily, TesfayeARSEN Otero-C, 400 mg at 08/28/23 0801  •  ondansetron (ZOFRAN) injection 4 mg, 4 mg, Intravenous, Q6H PRN, Tesfaye Medrano PA-C  •  pantoprazole (PROTONIX) EC tablet 40 mg, 40 mg, Oral, Daily Before Breakfast, ARSEN Oliva-C, 40 mg at 08/28/23 0541  •  polyethylene glycol (MIRALAX) packet 17 g, 17 g, Oral, Daily, ARSEN Oliva-C, 17 g at 08/28/23 0800  •  senna (SENOKOT) tablet 8.6 mg, 1 tablet, Oral, HS, ARSEN Oliva-C, 8.6 mg at 08/27/23 2058    Objective   Vitals: Blood pressure 144/89, pulse (!) 125, temperature 99.4 °F (37.4 °C), resp. rate 22, height 5' 8" (1.727 m), weight 91.5 kg (201 lb 11.5 oz), SpO2 98 %. , Body mass index is 30.67 kg/m².,   Orthostatic Blood Pressures    Flowsheet Row Most Recent Value   Blood Pressure 144/89 filed at 08/28/2023 1535   Patient Position - Orthostatic VS Lying filed at 08/26/2023 2353            Intake/Output Summary (Last 24 hours) at 8/28/2023 1620  Last data filed at 8/28/2023 1225  Gross per 24 hour   Intake 265 ml   Output --   Net 265 ml       Invasive Devices     Peripheral Intravenous Line  Duration           Peripheral IV 08/26/23 Right;Ventral (anterior) Hand 1 day                Physical Exam:  GEN: Edmundo Gonzales appears well, alert and oriented x 3, pleasant and cooperative, appears tired.   HEENT: pupils equal, round, and reactive to light; extraocular muscles intact  NECK: supple, no carotid bruits   HEART: Irregularly irregular, tachy S1 and S2, no significant murmurs, clicks, gallops or rubs   LUNGS: Diminished bilaterally; no wheezes, rales, or rhonchi   ABDOMEN: normal bowel sounds, soft, no tenderness, no distention  EXTREMITIES: peripheral pulses normal; no clubbing, cyanosis, or edema  NEURO: no focal findings   SKIN: normal without suspicious lesions on exposed skin    Lab Results:   Admission on 08/26/2023   Component Date Value   • Ventricular Rate 08/26/2023 85    • Atrial Rate 08/26/2023 85    • TN Interval 08/26/2023 174    • QRSD Interval 08/26/2023 76    • QT Interval 08/26/2023 368    • QTC Interval 08/26/2023 437    • P Axis 08/26/2023 69    • QRS Axis 08/26/2023 -52    • T Wave Axis 08/26/2023 64    • WBC 08/26/2023 10.68 (H)    • RBC 08/26/2023 2.93 (L)    • Hemoglobin 08/26/2023 9.4 (L)    • Hematocrit 08/26/2023 28.3 (L)    • MCV 08/26/2023 97    • MCH 08/26/2023 32.1    • MCHC 08/26/2023 33.2    • RDW 08/26/2023 13.5    • MPV 08/26/2023 10.8    • Platelets 20/94/7407 249    • nRBC 08/26/2023 0    • Neutrophils Relative 08/26/2023 73    • Immat GRANS % 08/26/2023 0    • Lymphocytes Relative 08/26/2023 11 (L)    • Monocytes Relative 08/26/2023 15 (H)    • Eosinophils Relative 08/26/2023 1    • Basophils Relative 08/26/2023 0    • Neutrophils Absolute 08/26/2023 7.71 (H)    • Immature Grans Absolute 08/26/2023 0.04    • Lymphocytes Absolute 08/26/2023 1.18    • Monocytes Absolute 08/26/2023 1.61 (H)    • Eosinophils Absolute 08/26/2023 0.10    • Basophils Absolute 08/26/2023 0.04    • Sodium 08/26/2023 138    • Potassium 08/26/2023 4.3    • Chloride 08/26/2023 105    • CO2 08/26/2023 25    • ANION GAP 08/26/2023 8    • BUN 08/26/2023 40 (H)    • Creatinine 08/26/2023 1.76 (H)    • Glucose 08/26/2023 132    • Calcium 08/26/2023 8.4    • Corrected Calcium 08/26/2023 9.4    • AST 08/26/2023 25    • ALT 08/26/2023 40    • Alkaline Phosphatase 08/26/2023 89    • Total Protein 08/26/2023 6.2 (L)    • Albumin 08/26/2023 2.7 (L)    • Total Bilirubin 08/26/2023 0.53    • eGFR 08/26/2023 34    • LACTIC ACID 08/26/2023 0.8    • Procalcitonin 08/26/2023 0.38 (H)    • Protime 08/26/2023 21.1 (H)    • INR 08/26/2023 1.72 (H)    • PTT 08/26/2023 43 (H)    • Blood Culture 08/26/2023 No Growth at 24 hrs.     • Blood Culture 08/26/2023 No Growth at 24 hrs.     • Color, UA 08/27/2023 Yellow    • Clarity, UA 08/27/2023 Clear    • Specific Gravity, UA 08/27/2023 1.020    • pH, UA 08/27/2023 6.0    • Leukocytes, UA 08/27/2023 Large (A)    • Nitrite, UA 08/27/2023 Negative    • Protein, UA 08/27/2023 100 (2+) (A)    • Glucose, UA 08/27/2023 Negative    • Ketones, UA 08/27/2023 Trace (A)    • Urobilinogen, UA 08/27/2023 <2.0    • Bilirubin, UA 08/27/2023 Negative    • Occult Blood, UA 08/27/2023 Small (A)    • hs TnI 0hr 08/26/2023 33    • hs TnI 2hr 08/26/2023 31    • Delta 2hr hsTnI 08/26/2023 -2    • MRSA Culture Only 08/27/2023 No Methicillin Resistant Staphlyococcus aureus (MRSA) isolated    • Legionella Urinary Antig* 08/27/2023 Negative    • Strep pneumoniae antigen* 08/27/2023 Negative    • RBC, UA 08/27/2023 4-10 (A)    • WBC, UA 08/27/2023 Innumerable (A)    • Epithelial Cells 08/27/2023 None Seen    • Bacteria, UA 08/27/2023 Innumerable (A)    • MUCUS THREADS 08/27/2023 None Seen    • Urine Culture 08/27/2023 >100,000 cfu/ml Escherichia coli (A)    • Procalcitonin 08/27/2023 0.40 (H)    • WBC 08/27/2023 10.26 (H)    • RBC 08/27/2023 2.85 (L)    • Hemoglobin 08/27/2023 9.1 (L)    • Hematocrit 08/27/2023 27.9 (L)    • MCV 08/27/2023 98    • MCH 08/27/2023 31.9    • MCHC 08/27/2023 32.6    • RDW 08/27/2023 13.3    • Platelets 76/36/3712 259    • MPV 08/27/2023 10.8    • Sodium 08/27/2023 137    • Potassium 08/27/2023 4.2    • Chloride 08/27/2023 105    • CO2 08/27/2023 25    • ANION GAP 08/27/2023 7    • BUN 08/27/2023 38 (H)    • Creatinine 08/27/2023 1.62 (H)    • Glucose 08/27/2023 133    • Calcium 08/27/2023 8.5    • eGFR 08/27/2023 37    • Gram Stain Result 08/27/2023 3+ Polys (A)    • Gram Stain Result 08/27/2023 2+ Gram positive rods (A)    • Gram Stain Result 08/27/2023 2+ Gram positive cocci in pairs (A)    • Gram Stain Result 08/27/2023 1+ Gram negative rods (A)    • Gram Stain Result 08/27/2023 2+ Epithelial cells per low power field (A) • Sodium 08/28/2023 138    • Potassium 08/28/2023 4.1    • Chloride 08/28/2023 105    • CO2 08/28/2023 24    • ANION GAP 08/28/2023 9    • BUN 08/28/2023 37 (H)    • Creatinine 08/28/2023 1.71 (H)    • Glucose 08/28/2023 131    • Calcium 08/28/2023 8.6    • eGFR 08/28/2023 35    • WBC 08/28/2023 10.82 (H)    • RBC 08/28/2023 3.04 (L)    • Hemoglobin 08/28/2023 9.5 (L)    • Hematocrit 08/28/2023 29.6 (L)    • MCV 08/28/2023 97    • MCH 08/28/2023 31.3    • MCHC 08/28/2023 32.1    • RDW 08/28/2023 13.3    • Platelets 65/53/9457 265    • MPV 08/28/2023 10.5    • Magnesium 08/28/2023 1.8 (L)    • Ventricular Rate 08/28/2023 124    • Atrial Rate 08/28/2023 150    • QRSD Interval 08/28/2023 80    • QT Interval 08/28/2023 268    • QTC Interval 08/28/2023 385    • QRS [place] 08/28/2023 -24    • T Wave Axis 08/28/2023 62    • Ventricular Rate 08/28/2023 132    • Atrial Rate 08/28/2023 150    • NC Interval 08/28/2023 192    • QRSD Interval 08/28/2023 84    • QT Interval 08/28/2023 268    • QTC Interval 08/28/2023 397    • P Axis 08/28/2023 58    • QRS Axis 08/28/2023 -36    • T Wave Axis 08/28/2023 71    • Ventricular Rate 08/28/2023 130    • Atrial Rate 08/28/2023 136    • QRSD Interval 08/28/2023 80    • QT Interval 08/28/2023 266    • QTC Interval 08/28/2023 391    • QRS Axis 08/28/2023 -39    • T Wave Axis 08/28/2023 73    • POC Glucose 08/28/2023 161 (H)    • Procalcitonin 08/28/2023 0.51 (H)      Labs & Results:        Results from last 7 days   Lab Units 08/28/23 0247 08/27/23 0447 08/26/23 2052   WBC Thousand/uL 10.82* 10.26* 10.68*   HEMOGLOBIN g/dL 9.5* 9.1* 9.4*   HEMATOCRIT % 29.6* 27.9* 28.3*   PLATELETS Thousands/uL 265 259 249         Results from last 7 days   Lab Units 08/28/23 0247 08/27/23 0447 08/26/23 2052   POTASSIUM mmol/L 4.1 4.2 4.3   CHLORIDE mmol/L 105 105 105   CO2 mmol/L 24 25 25   BUN mg/dL 37* 38* 40*   CREATININE mg/dL 1.71* 1.62* 1.76*   CALCIUM mg/dL 8.6 8.5 8.4   ALK PHOS U/L  --   -- 89   ALT U/L  --   --  40   AST U/L  --   --  25     Results from last 7 days   Lab Units 08/26/23 2052   INR  1.72*   PTT seconds 43*     Results from last 7 days   Lab Units 08/28/23  0247   MAGNESIUM mg/dL 1.8*         Imaging: I have personally reviewed pertinent films in PACS  XR chest portable    Result Date: 8/27/2023  Narrative: CHEST INDICATION:   fever, cough. COVID-positive 8/12/2023. COMPARISON: CXR 1/23/2023, abdomen CT 3/24/2017. EXAM PERFORMED/VIEWS:  XR CHEST PORTABLE. FINDINGS: Cardiomediastinal silhouette normal. Lungs clear. No effusion or pneumothorax. Upper abdomen normal. Moderate left glenohumeral degenerative disease. Impression: No acute cardiopulmonary disease. Workstation performed: [id number]     VAS CAROTID DUPLEX BILATERAL COMPLETE    Result Date: 8/21/2023  Narrative: This result has an attachment that is not available. Table formatting from the original result was not included. Ultrasound Study: Carotid Duplex Reason for Study: change in vision, ?amarosis fugax Technical Quality: adequate Please Note:   1) Measurement of carotid stenosis is based on velocity parameters that correlate the residual internal carotid diameter with NASCET-based stenosis levels.   2) Stenosis or occlusion may factitiously elevate the blood flow velocity on the opposite side thereby upgrading a lesser degree of stenosis.    3) Calcifications with acoustic shadowing may "mask" a significant stenosis and another modality such as MRA or CTA may be needed for better "visualization/evaluation." 4) Presumed occlusion should be verified by a different imaging modality such as CTA. Interpretation: B-mode two-dimensional vascular structure imaging, Doppler spectral analysis, and color flow Doppler imaging were performed. Peak systolic velocities (PSV in cm/sec), end diastolic velocity (EDV in cm/sec), and waveform and arterial characteristics are as described below.  RIGHT: Doppler spectral analysis: Minimal peak systolic velocity (PSV) for the common carotid is 84 cm/sec; maximum peak systolic velocity for the internal carotid is  66 with an end diastolic velocity of  08;  the external carotid PSV is 93; and the vertebral artery PSV is 48.  The ICA/CCA ratio is normal.  On B mode imaging, irregular heterogenous plaque is seen in proximal ICA.   LEFT: Doppler spectral analysis: Minimal peak systolic velocity (PSV) for the common carotid is 85 cm/sec; maximum peak systolic velocity for the internal carotid is  65 with an end diastolic velocity of  20;  the external carotid PSV is 96; and the vertebral artery PSV is 57.  The ICA/CCA ratio is normal.  On B mode imaging, irregular heterogenous plaque is seen in proximal ICA.      Impression: RIGHT internal carotid artery with <50 % stenosis, which is hemodynamically insignificant. LEFT internal carotid artery with <50 % stenosis, which is hemodynamically insignificant. Right vertebral artery with normally directed antegrade flow.     Left vertebral artery with normally directed antegrade flow.      XR chest portable    Result Date: 8/12/2023  Narrative: Chest x-ray single view. History fever. Comparison 8/10/2023. FINDINGS: Heart and mediastinum are normal. Lungs are clear. Diaphragm is normal. The bony thorax is intact. There are chest leads. Impression: IMPRESSION: Normal chest. No change Workstation:[id number]    ECHO 2D COMPLETE WITH SALINE    Result Date: 8/10/2023  Narrative: This result has an attachment that is not available. Left ventricle is normal in size. There is mild concentric hypertrophy. Systolic function is normal with an ejection fraction of 60-65%. Wall motion is within normal limits. There is grade I (mild) diastolic dysfunction and normal left atrial pressure. Right ventricle was not well visualized. Systolic function is normal. Left atrium volume index is normal. The bubble study is negative x 3. Trace aortic regurgitation. Trivial pericardial effusion. Left Ventricle Left ventricle is normal in size. There is mild concentric hypertrophy. Systolic function is normal with an ejection fraction of 60-65%. Wall motion is within normal limits. There is grade I (mild) diastolic dysfunction and normal left atrial pressure. Right Ventricle Right ventricle was not well visualized. Systolic function is normal. Left Atrium Left atrium volume index is normal. The bubble study is negative x 3. Right Atrium Right atrium was not well visualized. IVC/SVC The inferior vena cava demonstrates a diameter of <=21 mm and collapses >50%; therefore, the right atrial pressure is estimated at 0-5 mmHg. Mitral Valve The leaflets are mildly thickened. There is trace regurgitation. There is no evidence of mitral valve stenosis. Tricuspid Valve Tricuspid valve structure is normal. There is trace regurgitation. There is no evidence of tricuspid valve stenosis. Aortic Valve The aortic valve is trileaflet. There is trace regurgitation. There is no evidence of aortic valve stenosis. Pulmonic Valve Pulmonic valve structure is normal. There is mild regurgitation. There is no evidence of pulmonic valve stenosis. Ascending Aorta The aorta appears normal in size. Pericardium There is a trivial pericardial effusion. The pericardium has a fat pad. Study Details A complete 2D echocardiogram was performed. Color flow, Pulse Wave and Continuous Wave Doppler was performed and analyzed. Saline (bubble) contrast was used during the study. The study had technical difficulties. The study was difficult due to patient's uncooperativeness, clinical status and body habitus. XR CHEST PORTABLE    Result Date: 8/10/2023  Narrative: Portable erect chest 0702 hours Indication: Rule out infection. This study is compared to 3/11/2017 and 6/20/2020. Heart is normal in size. Lungs are clear. No pleural effusions are seen. Impression: Impression: No active disease.  Workstation:[id number]    MRI BRAIN W WO CONTRAST    Result Date: 8/9/2023  Narrative: History : Mental status change   Procedure: MRI of the Brain with and without IV contrast   Contrast: 16ml Dotarem   Technique: Unenhanced Sagittal T1 FLAIR, axial diffusion, susceptibility weighted images, T1/ T2 FLAIR. Gadolinium enhanced axial T2, T1 and 3D BRAVO images.   Comparison: CT study done one day earlier and MRI done 3/10/2017. FINDINGS:   Brain parenchyma: There are scattered areas of increased T2-weighted signal in the deep and subcortical white matter. Findings are nonspecific but could be secondary to small vessel ischemic disease, demyelinating lesions, Lyme disease, sarcoidosis or vascular disorders. Please note that similar findings have been described in patients who suffer from migraine headaches. There is no pathological enhancement with contrast.   Ventricles: Prominent but age-appropriate   Extra-axial spaces: Prominent but age-appropriate   Intracranial Hemorrhage: None   Midline shift: None   Calvarium and Skull base: Small joint effusion involving the left occipital condyle-C1 joint of doubtful clinical significance.   Orbits: Evidence of prior bilateral cataract surgery   Sella: Unremarkable   Paranasal Sinuses: Minimal mucosal thickening   Mastoids: Grossly patent       Impression: IMPRESSION: No intracranial mass or pathological enhancement acute infarct. Small vessel ischemic disease. [id number]    CT head chest abdomen pelvis w wo contrast    Result Date: 8/8/2023  Narrative: CT chest, abdomen and pelvis History: Upper abdominal pain, hypertensive, and left arm weakness Comparison:CT chest 6/20/2020 Technique: CT of the chest, abdomen and pelvis was performed after the intravenous administration of 90 Omni 350. Unenhanced imaging of the chest was also performed. Findings: Lungs: Normal Pleura: Normal Airways: Normal Mediastinum/Lymph nodes: Normal Cardiovascular: No evidence of aortic dissection or other acute cardiovascular pathology.  Moderate coronary artery and thoracic aortic calcification Thyroid/chest wall: Normal Liver: Normal Gallbladder/Biliary tract: Cholelithiasis without evidence of pericholecystic inflammation or biliary ductal dilatation Pancreas: Normal Spleen: Normal Adrenals: Normal Kidneys: No acute findings. A few renal cysts are noted GI tract: Normal Peritoneum/Lymph nodes: Normal Vessels: Atherosclerotic abdominal aorta with 2.8 cm infrarenal bulge Pelvis: Radiation seeds in the prostate Abdominal Wall: Normal Bones: Spinal degenerative change    Impression: Impression: No evidence of aortic dissection or other acute abnormality. Workstation:[id number]    CT head wo contrast    Result Date: 8/8/2023  Narrative: HEAD CT HISTORY: Neuro deficit, acute, stroke suspected Stroke Alert TECHNIQUE: Unenhanced CT scan of the head was performed according to departmental technique. Images were reviewed in soft tissue and bone algorithms with axial sections, sagittal and coronal reformations. Priors for comparison: 03/09/2017. FINDINGS: Examination is motion degraded, which can obscure pathology. Brain parenchyma: CEREBRUM -- Mild diffuse parenchymal volume loss. Periventricular and deep white matter hypoattenuation which is nonspecific. ASPECTS = 10. CEREBELLUM --  Suboptimally evaluated with CT due to artifact. No tonsillar depression or samanta mass effect is evident. BRAINSTEM --  Poorly assessed due to beam hardening artifact. Ventricular system, basal cisterns and extra-axial spaces: Appropriate to overall degree of parenchymal volume loss. Major arterial structures: Poorly assessed on noncontrast CT. There are intracranial arterial calcifications. Areas of varying intraluminal attenuation within the intracranial arteries could be artifactual but are ultimately indeterminate in the absence of intravenous contrast. Major venous structures: Poorly assessed on noncontrast CT. Intracranial hemorrhage: No definitive acute intracranial hemorrhage. Midline shift: No samanta midline shift is evident. Skull base and Calvarium: No depressed calvarial fracture. Paranasal sinuses and mastoid air cells: Mucoperiosteal thickening, most extensively involving the inferior aspect of the RIGHT maxillary sinus. Partially visualized dental caries. Visualized orbits: No retrobulbar hematoma. Sella: No samanta expansion of the bony sella. Impression: IMPRESSION: 1.  Periventricular and patchy deep white matter hypodensity which is nonspecific but which most commonly represents senescent and/or microvascular ischemic changes of white matter. 2. No acute intracranial hemorrhage is identified. 3. ASPECTS = 10. Reported to Dr. Marta Villegas at 2302 hours. Workstation:[id number]      EKG: Atrial fibrillation with rapid ventricular response. Telemetry review: Atrial fibrillation with rapid ventricular response. Counseling / Coordination of Care  Total floor / unit time spent today 40 minutes. Greater than 50% of total time was spent with the patient and / or family counseling and / or coordination of care.

## 2023-08-28 NOTE — NURSING NOTE
While in Pt room giving medication I noticed the HR was elevated at times in the 130's. After receiving a BP, I called for a rapid response. At this time HR was still in the 130's, instructed to give 2.5 of lopressor per ICU NP and obtain ECG.

## 2023-08-28 NOTE — CASE MANAGEMENT
Case Management Assessment & Discharge Planning Note    Patient name Benjy Rao  Location /-27 MRN [id number]  : 1935 Date 2023       Current Admission Date: 2023  Current Admission Diagnosis:Community acquired pneumonia of right lower lobe of lung   Patient Active Problem List    Diagnosis Date Noted   • Leukocytosis 2023   • Community acquired pneumonia of right lower lobe of lung 2023   • Central retinal vein occlusion of right eye 2023   • History of COVID-19 2023   • History of alcohol abuse 2023   • Cystitis 2023   • Chronic obstructive pulmonary disease, unspecified COPD type ([address]) 2021   • Stage 3b chronic kidney disease ([address]) 2021   • Thrombocytopenia, unspecified ([address]) 2021   • CVA (cerebral vascular accident) ([address]) 01/10/2020   • GERD (gastroesophageal reflux disease) 2018   • Ambulatory dysfunction 2017   • Accelerated hypertension 2017   • Atrial fibrillation ([address]) 2017   • 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   • Acute ischemic stroke ([address]) 03/10/2017   • Hyponatremia 02/15/2017   • Chronic sinusitis 2016   • Rheumatoid arthritis ([address]) 2016   • Allergic rhinitis 2015   • Generalized osteoarthritis of multiple sites 2014   • Hyperglycemia 2014   • Eczema 2013   • Anemia of chronic disease 2013   • Edema 2013   • Gait disturbance 2013   • Hypomagnesemia 2013   • Peripheral neuropathy 2013      LOS (days): 2  Geometric Mean LOS (GMLOS) (days): 2.90  Days to GMLOS:1.2     OBJECTIVE:    Risk of Unplanned Readmission Score: 20.96         Current admission status: Inpatient       Preferred Pharmacy:   [facility], [address] [address]  Phone: [phone] Fax: [phone] [facility].  [address] [facility] [place] [address]  Phone: [phone] Fax: [phone]    Primary Care Provider: John Ventura DO    Primary Insurance: MEDICARE  Secondary Insurance: Susan Ashton    ASSESSMENT:  [address], [address] Representative - Spouse   Primary Phone: [phone] (Home)               Advance Directives  Does patient have a [address]?: No  Was patient offered paperwork?: Yes (declined)  Does patient currently have a Health Care decision maker?: Yes, please see Health Care Proxy section  Does patient have Advance Directives?: No  Was patient offered paperwork?: Yes    Readmission Root Cause  30 Day Readmission: No    Patient Information  Admitted from[de-identified] Facility ([address])  Mental Status: Alert  During Assessment patient was accompanied by: Not accompanied during assessment  Assessment information provided by[de-identified] Patient, Spouse  Primary Caregiver: Self  Support Systems: Spouse/significant other  Home entry access options.  Select all that apply.: Stairs  Number of steps to enter home.: 3  Type of Current Residence: 2 story home  Upon entering residence, is there a bedroom on the main floor (no further steps)?: No  A bedroom is located on the following floor levels of residence (select all that apply):: 2nd Floor  Upon entering residence, is there a bathroom on the main floor (no further steps)?: No  Indicate which floors of current residence have a bathroom (select all the apply):: 2nd Floor  Number of steps to 2nd floor from main floor: One Flight  In the last 12 months, was there a time when you were not able to pay the mortgage or rent on time?: No  In the last 12 months, how many places have you lived?: 1  In the last 12 months, was there a time when you did not have a steady place to sleep or slept in a shelter (including now)?: No  Homeless/housing insecurity resource given?: N/A  Living Arrangements: Lives w/ Spouse/significant other    Activities of Daily Living Prior to Admission  Functional Status: Assistance  Completes ADLs independently?: No  Level of ADL dependence: Assistance  Ambulates independently?: No  Level of ambulatory dependence: Assistance  Does patient use assisted devices?: Yes  Assisted Devices (DME) used: Harriett Jeo  Does patient currently own DME?: Yes  What DME does the patient currently own?: Straight Cane  Does patient have a history of Outpatient Therapy (PT/OT)?: No  Does the patient have a history of Short-Term Rehab?: Yes ([facility])  Does patient have a history of HHC?: Yes (hx [facility])  Does patient currently have [facility]?: No    Patient Information Continued  Does patient have prescription coverage?: Yes  Within the past 12 months, you worried that your food would run out before you got the money to buy more.: Never true  Within the past 12 months, the food you bought just didn't last and you didn't have money to get more.: Never true  Food insecurity resource given?: N/A  Does patient receive dialysis treatments?: No  Does patient have a history of substance abuse?: No  Does patient have a history of Mental Health Diagnosis?: No    Means of Transportation  In the past 12 months, has lack of transportation kept you from medical appointments or from getting medications?: No  In the past 12 months, has lack of transportation kept you from meetings, work, or from getting things needed for daily living?: No  Was application for public transport provided?: N/A    DISCHARGE DETAILS:    Discharge planning discussed with[de-identified] patient and Taylor Friend (wife)  Freedom of Choice: Yes  Comments - [place] of Choice: Taylor Friend states plan is for pt to return to [facility] for rehab  CM contacted family/caregiver?: Yes  Were Treatment Team discharge recommendations reviewed with patient/caregiver?: Yes  Did patient/caregiver verbalize understanding of patient care needs?: Yes  Were patient/caregiver advised of the risks associated with not following Treatment Team discharge recommendations?: Yes    Contacts  Patient Contacts: Nathan Elena (Wife)  Relationship to Patient[de-identified] Family  Contact Method: Phone  Phone Number: [phone]  Reason/Outcome: Discharge Planning, Referral    Requested [address]         Is the patient interested in [facility] at discharge?: No    DME Referral Provided  Referral made for DME?: No    Other Referral/Resources/Interventions Provided:  Interventions: Short Term Rehab  Referral Comments: Referral to [address] [place] [address]    Treatment Team Recommendation: Short Term Rehab  Discharge Destination Plan[de-identified] Short Term Rehab  Transport at Discharge : BLS Ambulance     Additional Comments: Met with pt to discuss the role of CM and to discuss any help pt may need prior to dc. Pt was admitted from [address] [place] [address] where he was receiving rehab. CM confirmed with pt and pt's wife Nathan Elena; plan is to return to [address] referral sent. [address] [place] [address] accepted. Prior to admission to rehab pt was living with his wife in a 2 story home with 3 ALPESH. Pt performed ADL's indptly pta, pt uses a cane. Pt has a hx of [facility]. Hx of rehab at [address] called [facility] and spoke to therapist Nancy to discuss pt's prior level of functioning. Per Jennifer Lo, pt was receiving mod-max assist x2 with ADL's with use of a RW and WC. Pt requires assist x2 for transfers; stand pivot.

## 2023-08-28 NOTE — PLAN OF CARE
Problem: Potential for Falls  Goal: Patient will remain free of falls  Description: INTERVENTIONS:  - Educate patient/family on patient safety including physical limitations  - Instruct patient to call for assistance with activity   - Consult OT/PT to assist with strengthening/mobility   - Keep Call bell within reach  - Keep bed low and locked with side rails adjusted as appropriate  - Keep care items and personal belongings within reach  - Initiate and maintain comfort rounds  - Make Fall Risk Sign visible to staff  - Offer Toileting every 2 Hours, in advance of need  - Initiate/Maintain bed alarm  - Obtain necessary fall risk management equipment:   - Apply yellow socks and bracelet for high fall risk patients  - Consider moving patient to room near nurses station  Outcome: Progressing     Problem: PAIN - ADULT  Goal: Verbalizes/displays adequate comfort level or baseline comfort level  Description: Interventions:  - Encourage patient to monitor pain and request assistance  - Assess pain using appropriate pain scale  - Administer analgesics based on type and severity of pain and evaluate response  - Implement non-pharmacological measures as appropriate and evaluate response  - Consider cultural and social influences on pain and pain management  - Notify physician/advanced practitioner if interventions unsuccessful or patient reports new pain  Outcome: Progressing     Problem: INFECTION - ADULT  Goal: Absence or prevention of progression during hospitalization  Description: INTERVENTIONS:  - Assess and monitor for signs and symptoms of infection  - Monitor lab/diagnostic results  - Monitor all insertion sites, i.e. indwelling lines, tubes, and drains  - Monitor endotracheal if appropriate and nasal secretions for changes in amount and color  - [place] appropriate cooling/warming therapies per order  - Administer medications as ordered  - Instruct and encourage patient and family to use good hand hygiene technique  - Identify and instruct in appropriate isolation precautions for identified infection/condition  Outcome: Progressing  Goal: Absence of fever/infection during neutropenic period  Description: INTERVENTIONS:  - Monitor WBC    Outcome: Progressing     Problem: SAFETY ADULT  Goal: Maintain or return to baseline ADL function  Description: INTERVENTIONS:  -  Assess patient's ability to carry out ADLs; assess patient's baseline for ADL function and identify physical deficits which impact ability to perform ADLs (bathing, care of mouth/teeth, toileting, grooming, dressing, etc.)  - Assess/evaluate cause of self-care deficits   - Assess range of motion  - Assess patient's mobility; develop plan if impaired  - Assess patient's need for assistive devices and provide as appropriate  - Encourage maximum independence but intervene and supervise when necessary  - Involve family in performance of ADLs  - Assess for home care needs following discharge   - Consider OT consult to assist with ADL evaluation and planning for discharge  - Provide patient education as appropriate  Outcome: Progressing  Goal: Maintains/Returns to pre admission functional level  Description: INTERVENTIONS:  - Perform BMAT or MOVE assessment daily.   - Set and communicate daily mobility goal to care team and patient/family/caregiver. - Collaborate with rehabilitation services on mobility goals if consulted  - Perform Range of Motion 3 times a day. - Reposition patient every 2 hours.   - Dangle patient 2 times a day  - Stand patient 2 times a day  - Ambulate patient 2 times a day  - Out of bed to chair 2 times a day   - Out of bed for meals 2 times a day  - Out of bed for toileting  - Record patient progress and toleration of activity level   Outcome: Progressing     Problem: DISCHARGE PLANNING  Goal: Discharge to home or other facility with appropriate resources  Description: INTERVENTIONS:  - Identify barriers to discharge w/patient and caregiver  - Arrange for needed discharge resources and transportation as appropriate  - Identify discharge learning needs (meds, wound care, etc.)  - Arrange for interpretive services to assist at discharge as needed  - Refer to Case Management Department for coordinating discharge planning if the patient needs post-hospital services based on physician/advanced practitioner order or complex needs related to functional status, cognitive ability, or social support system  Outcome: Progressing     Problem: Knowledge Deficit  Goal: Patient/family/caregiver demonstrates understanding of disease process, treatment plan, medications, and discharge instructions  Description: Complete learning assessment and assess knowledge base.   Interventions:  - Provide teaching at level of understanding  - Provide teaching via preferred learning methods  Outcome: Progressing     Problem: Prexisting or High Potential for Compromised Skin Integrity  Goal: Skin integrity is maintained or improved  Description: INTERVENTIONS:  - Identify patients at risk for skin breakdown  - Assess and monitor skin integrity  - Assess and monitor nutrition and hydration status  - Monitor labs   - Assess for incontinence   - Turn and reposition patient  - Assist with mobility/ambulation  - Relieve pressure over bony prominences  - Avoid friction and shearing  - Provide appropriate hygiene as needed including keeping skin clean and dry  - Evaluate need for skin moisturizer/barrier cream  - Collaborate with interdisciplinary team   - Patient/family teaching  - Consider wound care consult   Outcome: Progressing     Problem: MOBILITY - ADULT  Goal: Maintain or return to baseline ADL function  Description: INTERVENTIONS:  -  Assess patient's ability to carry out ADLs; assess patient's baseline for ADL function and identify physical deficits which impact ability to perform ADLs (bathing, care of mouth/teeth, toileting, grooming, dressing, etc.)  - Assess/evaluate cause of self-care deficits   - Assess range of motion  - Assess patient's mobility; develop plan if impaired  - Assess patient's need for assistive devices and provide as appropriate  - Encourage maximum independence but intervene and supervise when necessary  - Involve family in performance of ADLs  - Assess for home care needs following discharge   - Consider OT consult to assist with ADL evaluation and planning for discharge  - Provide patient education as appropriate  Outcome: Progressing  Goal: Maintains/Returns to pre admission functional level  Description: INTERVENTIONS:  - Perform BMAT or MOVE assessment daily.   - Set and communicate daily mobility goal to care team and patient/family/caregiver. - Collaborate with rehabilitation services on mobility goals if consulted  - Perform Range of Motion 3 times a day. - Reposition patient every 2 hours.   - Dangle patient 2 times a day  - Stand patient 2 times a day  - Ambulate patient 2 times a day  - Out of bed to chair 2 times a day   - Out of bed for meals 2 times a day  - Out of bed for toileting  - Record patient progress and toleration of activity level   Outcome: Progressing

## 2023-08-29 ENCOUNTER — APPOINTMENT (INPATIENT)
Dept: RADIOLOGY | Facility: HOSPITAL | Age: 88
DRG: 193 | End: 2023-08-29
Payer: MEDICARE

## 2023-08-29 LAB
ANION GAP SERPL CALCULATED.3IONS-SCNC: 8 MMOL/L
BASOPHILS # BLD AUTO: 0.04 THOUSANDS/ÂΜL (ref 0–0.1)
BASOPHILS NFR BLD AUTO: 0 % (ref 0–1)
BUN SERPL-MCNC: 40 MG/DL (ref 5–25)
CALCIUM SERPL-MCNC: 8.4 MG/DL (ref 8.4–10.2)
CHLORIDE SERPL-SCNC: 104 MMOL/L (ref 96–108)
CO2 SERPL-SCNC: 23 MMOL/L (ref 21–32)
CREAT SERPL-MCNC: 1.85 MG/DL (ref 0.6–1.3)
EOSINOPHIL # BLD AUTO: 0.07 THOUSAND/ÂΜL (ref 0–0.61)
EOSINOPHIL NFR BLD AUTO: 1 % (ref 0–6)
ERYTHROCYTE [DISTWIDTH] IN BLOOD BY AUTOMATED COUNT: 13.6 % (ref 11.6–15.1)
GFR SERPL CREATININE-BSD FRML MDRD: 32 ML/MIN/1.73SQ M
GLUCOSE SERPL-MCNC: 124 MG/DL (ref 65–140)
HCT VFR BLD AUTO: 27 % (ref 36.5–49.3)
HGB BLD-MCNC: 8.7 G/DL (ref 12–17)
IMM GRANULOCYTES # BLD AUTO: 0.06 THOUSAND/UL (ref 0–0.2)
IMM GRANULOCYTES NFR BLD AUTO: 1 % (ref 0–2)
LYMPHOCYTES # BLD AUTO: 0.94 THOUSANDS/ÂΜL (ref 0.6–4.47)
LYMPHOCYTES NFR BLD AUTO: 9 % (ref 14–44)
MCH RBC QN AUTO: 31.8 PG (ref 26.8–34.3)
MCHC RBC AUTO-ENTMCNC: 32.2 G/DL (ref 31.4–37.4)
MCV RBC AUTO: 99 FL (ref 82–98)
MONOCYTES # BLD AUTO: 1.43 THOUSAND/ÂΜL (ref 0.17–1.22)
MONOCYTES NFR BLD AUTO: 14 % (ref 4–12)
NEUTROPHILS # BLD AUTO: 8.03 THOUSANDS/ÂΜL (ref 1.85–7.62)
NEUTS SEG NFR BLD AUTO: 75 % (ref 43–75)
NRBC BLD AUTO-RTO: 0 /100 WBCS
PLATELET # BLD AUTO: 262 THOUSANDS/UL (ref 149–390)
PMV BLD AUTO: 10.4 FL (ref 8.9–12.7)
POTASSIUM SERPL-SCNC: 3.8 MMOL/L (ref 3.5–5.3)
PROCALCITONIN SERPL-MCNC: 0.69 NG/ML
RBC # BLD AUTO: 2.74 MILLION/UL (ref 3.88–5.62)
SODIUM SERPL-SCNC: 135 MMOL/L (ref 135–147)
WBC # BLD AUTO: 10.57 THOUSAND/UL (ref 4.31–10.16)

## 2023-08-29 PROCEDURE — 99232 SBSQ HOSP IP/OBS MODERATE 35: CPT | Performed by: INTERNAL MEDICINE

## 2023-08-29 PROCEDURE — 97163 PT EVAL HIGH COMPLEX 45 MIN: CPT

## 2023-08-29 PROCEDURE — 71045 X-RAY EXAM CHEST 1 VIEW: CPT

## 2023-08-29 PROCEDURE — 99233 SBSQ HOSP IP/OBS HIGH 50: CPT | Performed by: INTERNAL MEDICINE

## 2023-08-29 PROCEDURE — 84145 PROCALCITONIN (PCT): CPT | Performed by: INTERNAL MEDICINE

## 2023-08-29 PROCEDURE — 97167 OT EVAL HIGH COMPLEX 60 MIN: CPT

## 2023-08-29 PROCEDURE — 85025 COMPLETE CBC W/AUTO DIFF WBC: CPT | Performed by: INTERNAL MEDICINE

## 2023-08-29 PROCEDURE — 80048 BASIC METABOLIC PNL TOTAL CA: CPT | Performed by: INTERNAL MEDICINE

## 2023-08-29 PROCEDURE — 97530 THERAPEUTIC ACTIVITIES: CPT

## 2023-08-29 RX ORDER — DILTIAZEM HYDROCHLORIDE 60 MG/1
60 TABLET, FILM COATED ORAL EVERY 6 HOURS SCHEDULED
Status: DISCONTINUED | OUTPATIENT
Start: 2023-08-29 | End: 2023-08-30

## 2023-08-29 RX ADMIN — FLUTICASONE PROPIONATE 1 SPRAY: 50 SPRAY, METERED NASAL at 09:14

## 2023-08-29 RX ADMIN — ACETAMINOPHEN 325MG 650 MG: 325 TABLET ORAL at 16:00

## 2023-08-29 RX ADMIN — GUAIFENESIN 600 MG: 600 TABLET ORAL at 17:02

## 2023-08-29 RX ADMIN — ASPIRIN 81 MG: 81 TABLET, COATED ORAL at 09:11

## 2023-08-29 RX ADMIN — CEFEPIME HYDROCHLORIDE 2000 MG: 2 INJECTION, SOLUTION INTRAVENOUS at 04:21

## 2023-08-29 RX ADMIN — GUAIFENESIN 600 MG: 600 TABLET ORAL at 09:12

## 2023-08-29 RX ADMIN — DILTIAZEM HYDROCHLORIDE 60 MG: 60 TABLET, FILM COATED ORAL at 16:58

## 2023-08-29 RX ADMIN — ACETAMINOPHEN 325MG 650 MG: 325 TABLET ORAL at 01:57

## 2023-08-29 RX ADMIN — PANTOPRAZOLE SODIUM 40 MG: 40 TABLET, DELAYED RELEASE ORAL at 05:48

## 2023-08-29 RX ADMIN — METOPROLOL TARTRATE 50 MG: 50 TABLET ORAL at 22:39

## 2023-08-29 RX ADMIN — LOSARTAN POTASSIUM 50 MG: 50 TABLET, FILM COATED ORAL at 09:11

## 2023-08-29 RX ADMIN — METOPROLOL TARTRATE 50 MG: 50 TABLET ORAL at 09:24

## 2023-08-29 RX ADMIN — METOPROLOL TARTRATE 50 MG: 50 TABLET ORAL at 16:58

## 2023-08-29 RX ADMIN — CEFEPIME HYDROCHLORIDE 2000 MG: 2 INJECTION, SOLUTION INTRAVENOUS at 16:58

## 2023-08-29 RX ADMIN — APIXABAN 2.5 MG: 2.5 TABLET, FILM COATED ORAL at 09:12

## 2023-08-29 RX ADMIN — POLYETHYLENE GLYCOL 3350 17 G: 17 POWDER, FOR SOLUTION ORAL at 09:12

## 2023-08-29 RX ADMIN — APIXABAN 2.5 MG: 2.5 TABLET, FILM COATED ORAL at 17:02

## 2023-08-29 RX ADMIN — LORATADINE 10 MG: 10 TABLET ORAL at 09:11

## 2023-08-29 RX ADMIN — Medication 250 MG: at 17:02

## 2023-08-29 RX ADMIN — METOPROLOL TARTRATE 50 MG: 50 TABLET ORAL at 04:24

## 2023-08-29 RX ADMIN — MAGNESIUM OXIDE TAB 400 MG (241.3 MG ELEMENTAL MG) 400 MG: 400 (241.3 MG) TAB at 09:11

## 2023-08-29 RX ADMIN — SENNOSIDES 8.6 MG: 8.6 TABLET, FILM COATED ORAL at 22:39

## 2023-08-29 RX ADMIN — Medication 250 MG: at 09:12

## 2023-08-29 RX ADMIN — FOLIC ACID 1 MG: 1 TABLET ORAL at 09:11

## 2023-08-29 NOTE — PLAN OF CARE
Problem: PHYSICAL THERAPY ADULT  Goal: Performs mobility at highest level of function for planned discharge setting. See evaluation for individualized goals. Description: Treatment/Interventions: Functional transfer training, LE strengthening/ROM, Therapeutic exercise, Endurance training, Patient/family training, Equipment eval/education, Bed mobility, Gait training          See flowsheet documentation for full assessment, interventions and recommendations. 8/29/2023 1615 by Raphael Kate PT  Note:    Problem List: Decreased strength, Decreased endurance, Impaired balance, Decreased mobility, Decreased cognition, Orthopedic restrictions, Pain  Assessment: Johnnie Alanis is a 82 y.o. Male who presents to [address] [place] [address] on 8/26/2023 from [address] [place] [address] STR w/ c/o fevers and diagnosis of community-acquired pneumonia of RLL. Orders for PT eval and treat received. Pt presents w/ comorbidities of Afib, COPD, CKD Stage 3, hx CVA, recent COVID. At baseline pt is independent, has been A x 2 for SPT. Upon evaluation, pt presents w/ the following deficits: weakness, impaired skin integrity, impaired balance, impaired hearing, impaired vision, decreased endurance and pain limiting functional mobility. Upon eval, pt requires max A x 2 for bed mobility, supervision to maintain sitting EOB. Based on this PT evaluation today, patient's discharge recommendation is for Level II. During this admission, pt would benefit from continued skilled inpatient PT in the acute care setting in order to address the abovementioned deficits to maximize function and mobility before DC from acute care.        Patient will: Perform all bed mobility tasks w/ maxx1 to improve pt's independence w/ repositioning for decrease risk of skin breakdown, Perform all transfers w/ maxx1 consistently from various height surfaces in order to improve I w/ engagement w/ real-world environments/situations, Ambulate at least 15 ft. with roller walker max A x 2 w/o LOB to facilitate return and engagement w/ previous living environment and Tolerate 3 hr OOB to faciliate upright tolerance             See flowsheet documentation for full assessment.

## 2023-08-29 NOTE — ASSESSMENT & PLAN NOTE
· Presents with fever, worsening cough, and dyspnea  · Procalcitonin elevated patient with leukocytosis  · Chest x-ray is unremarkable  · Continue cefepime   · Urine strep and Legionella studies is negative  · Sputum culture results reviewed  · Blood cultures are negative to date  · Respiratory protocol  · Airway clearance protocol

## 2023-08-29 NOTE — ASSESSMENT & PLAN NOTE
· History of alcohol abuse with withdrawal seizures, has not drink since prior to [facility] hospitalization 8/8

## 2023-08-29 NOTE — ASSESSMENT & PLAN NOTE
· Patient reports dysuria and difficulty urinating   · UA with innumerable WBCs and innumerable bacteria  · Continue cefepime  · Urine culture grew E. coli  · Urinary retention protocol  · Trend WBC and fever curve

## 2023-08-29 NOTE — PROGRESS NOTES
[facility]  Progress Note  Name: Darrius Jones  MRN: [id number]  Unit/Bed#: -01 I Date of Admission: 8/26/2023   Date of Service: 8/29/2023  Hospital Day: 3    Assessment/Plan   Cystitis  Assessment & Plan  · Patient reports dysuria and difficulty urinating   · UA with innumerable WBCs and innumerable bacteria  · Continue cefepime  · Urine culture grew E. coli  · Urinary retention protocol  · Trend WBC and fever curve    History of alcohol abuse  Assessment & Plan  · History of alcohol abuse with withdrawal seizures, has not drink since prior to [facility] hospitalization 8/8    History of COVID-19  Assessment & Plan  · Diagnosed with COVID-19 during recent admit  · Received 3 days of remdesivir and IV fluids  · No evidence of pneumonia during admission  · Completed quarantine while inpatient    Central retinal vein occlusion of right eye  Assessment & Plan  · Seen by ophthalmology during recent [facility] admission, diagnosed with CRVO and ocular hypertension  · Patient recommended for formal evaluation outpatient of OCT macular degeneration, discussion of possible anti-VEGF therapy  · Encouraged ophthalmology follow-up outpatient    Leukocytosis  Assessment & Plan  · WBC 10.68 K on admission  · Likely secondary to pneumonia and cystitis   · Continue cefepime  · Blood cultures are negative to date  · U/c growing E. coli    Stage 3b chronic kidney disease [facility])  Assessment & Plan  Lab Results   Component Value Date    EGFR 32 08/29/2023    EGFR 35 08/28/2023    EGFR 37 08/27/2023    CREATININE 1.85 (H) 08/29/2023    CREATININE 1.71 (H) 08/28/2023    CREATININE 1.62 (H) 08/27/2023   · Baseline creatinine 1.6-1.9  · Creatinine on admission 1.76  · Trend BMP daily    Chronic obstructive pulmonary disease, unspecified COPD type ([address])  Assessment & Plan  · Not on maintenance inhalers outpatient  · No signs of acute exacerbation on admission  · If patient were to develop wheezing in setting of pneumonia, would start Xopenex/Atrovent nebulizers    Atrial fibrillation [facility])  Assessment & Plan  · Went into A-fib with RVR on August 28 and was given IV Lopressor and Cardizem  · Home regimen: Coreg 25 Mg twice daily  · Started on Eliquis 2.5 mg twice daily for anticoagulation during hospitalization at [facility] 8/8-8/23  · Cardiology consult appreciated  · Coreg was discontinued and patient is started on Lopressor 50 mg p.o. every 6 hours  · Continue home medications    Anemia of chronic disease  Assessment & Plan  · Hemoglobin 9.4 on admission  · Hemoglobin ranging 10-12 during recent [facility] admission  · No signs of active bleeding  · Trend CBC    * Community acquired pneumonia of right lower lobe of lung  Assessment & Plan  · Presents with fever, worsening cough, and dyspnea  · Procalcitonin elevated patient with leukocytosis  · Chest x-ray is unremarkable  · Continue cefepime   · Urine strep and Legionella studies is negative  · Sputum culture results reviewed  · Blood cultures are negative to date  · Respiratory protocol  · Airway clearance protocol           Labs & Imaging: I have personally reviewed pertinent reports. VTE Prophylaxis: in place. Code Status:   Level 3 - DNAR and DNI    Patient Centered Rounds: I have performed bedside rounds with nursing staff today. Discussions with Specialists or Other Care Team Provider: Cardiology    Education and Discussions with Family / Patient: Daughter at bedside    Current Length of Stay: 3 day(s)    Current Patient Status: Inpatient   Certification Statement: The patient will continue to require additional inpatient hospital stay due to see my assessment and plan. Subjective:   Patient is seen and examined at bedside. Denies any new complaints. Still heart rates are uncontrolled. Afebrile  All other ROS are negative. Objective:    Vitals: Blood pressure 120/68, pulse 78, temperature 98.2 °F (36.8 °C), resp. rate 18, height 5' 8" (1.727 m), weight 91.5 kg (201 lb 11.5 oz), SpO2 97 %. ,Body mass index is 30.67 kg/m². SPO2 RA Rest    Flowsheet Row ED to Hosp-Admission (Current) from 8/26/2023 in [facility] Med Surg Unit   SpO2 97 %   SpO2 Activity At Rest   O2 Device None (Room air)   O2 Flow Rate --        I&O:     Intake/Output Summary (Last 24 hours) at 8/29/2023 0915  Last data filed at 8/28/2023 1824  Gross per 24 hour   Intake 145 ml   Output --   Net 145 ml       Physical Exam:    General- Alert, lying comfortably in bed. Not in any acute distress. Neck- Supple, No JVD  CVS- regular, S1 and S2 normal  Chest- Bilateral Air entry, No rhochi, crackles or wheezing present. Abdomen- soft, nontender, not distended, no guarding or rigidity, BS+  Extremities-  No pedal edema, No calf tenderness  CNS-   Alert, awake and orientedx3. No focal deficits present.     Invasive Devices     Peripheral Intravenous Line  Duration           Peripheral IV 08/26/23 Right;Ventral (anterior) Hand 2 days                      Social History  reviewed  Family History   Problem Relation Age of Onset   • Heart disease Mother         cardiac disorder   • Stroke Father    • Heart disease Father         cardiac disorder   • Heart disease Sister    • Diabetes Family    • Hypertension Family     reviewed    Meds:  Current Facility-Administered Medications   Medication Dose Route Frequency Provider Last Rate Last Admin   • acetaminophen (TYLENOL) tablet 650 mg  650 mg Oral Q6H PRN Kae Black PA-C   650 mg at 08/29/23 0157   • aluminum-magnesium hydroxide-simethicone (MAALOX) oral suspension 30 mL  30 mL Oral Q6H PRN Kae Black PA-C       • apixaban Rigo Wade) tablet 2.5 mg  2.5 mg Oral BID Kae Black PA-C   2.5 mg at 08/29/23 0912   • aspirin (ECOTRIN LOW STRENGTH) EC tablet 81 mg  81 mg Oral Daily Kae Black PA-C   81 mg at 08/29/23 0911   • cefepime (MAXIPIME) IVPB (premix in dextrose) 2,000 mg 50 mL  2,000 mg Intravenous Q12H Jessneia Lui  mL/hr at 08/29/23 0421 2,000 mg at 08/29/23 0421   • fluticasone (FLONASE) 50 mcg/act nasal spray 1 spray  1 spray Nasal Daily Watdalila Reyes PA-C   1 spray at 81/98/76 5173   • folic acid (FOLVITE) tablet 1 mg  1 mg Oral Daily Watt KATE ReyesC   1 mg at 08/29/23 8735   • guaiFENesin (MUCINEX) 12 hr tablet 600 mg  600 mg Oral BID Watt KATE ReyesC   600 mg at 08/29/23 7479   • ipratropium (ATROVENT) 0.02 % inhalation solution 0.5 mg  0.5 mg Nebulization Q6H PRN Codi Diallo PA-C       • levalbuterol (XOPENEX) inhalation solution 1.25 mg  1.25 mg Nebulization Q6H PRN Codi Diallo PA-C       • loratadine (CLARITIN) tablet 10 mg  10 mg Oral Daily Watt KATE ReyesC   10 mg at 08/29/23 3866   • losartan (COZAAR) tablet 50 mg  50 mg Oral Daily Watt KATE ReyesC   50 mg at 08/29/23 6255   • magnesium Oxide (MAG-OX) tablet 400 mg  400 mg Oral Daily Watt KATE ReyesC   400 mg at 08/29/23 9515   • metoprolol tartrate (LOPRESSOR) tablet 50 mg  50 mg Oral Q6H Lori Corona MD   50 mg at 08/29/23 0424   • ondansetron (ZOFRAN) injection 4 mg  4 mg Intravenous Q6H PRN WatKATE RaglandC   4 mg at 08/28/23 1748   • pantoprazole (PROTONIX) EC tablet 40 mg  40 mg Oral Daily Before Breakfast KATE RojasC   40 mg at 08/29/23 0548   • polyethylene glycol (MIRALAX) packet 17 g  17 g Oral Daily Watt KATE ReyesC   17 g at 08/29/23 9564   • saccharomyces boulardii (FLORASTOR) capsule 250 mg  250 mg Oral BID Codi Diallo PA-C   250 mg at 08/29/23 6591   • senna (SENOKOT) tablet 8.6 mg  1 tablet Oral HS Jay Reyes PA-C   8.6 mg at 08/28/23 4750      Medications Prior to Admission   Medication   • apixaban (ELIQUIS) 2.5 mg   • Aspirin 81 MG CAPS   • carvedilol (COREG) 12.5 mg tablet   • cetirizine (ZyrTEC) 10 mg tablet   • fluticasone (FLONASE) 50 mcg/act nasal spray   • folic acid (FOLVITE) 1 mg tablet   • losartan (COZAAR) 50 mg tablet   • MAGNESIUM OXIDE 400 PO   • pantoprazole (PROTONIX) 40 mg tablet   • polyvinyl alcohol (LIQUIFILM TEARS) 1.4 % ophthalmic solution   • magnesium hydroxide (MILK OF MAGNESIA) 400 mg/5 mL oral suspension       Labs:  Results from last 7 days   Lab Units 08/29/23 0421 08/28/23 0247 08/27/23 0447 08/26/23 2052   WBC Thousand/uL 10.57* 10.82* 10.26* 10.68*   HEMOGLOBIN g/dL 8.7* 9.5* 9.1* 9.4*   HEMATOCRIT % 27.0* 29.6* 27.9* 28.3*   PLATELETS Thousands/uL 262 265 259 249   NEUTROS PCT % 75  --   --  73   LYMPHS PCT % 9*  --   --  11*   MONOS PCT % 14*  --   --  15*   EOS PCT % 1  --   --  1     Results from last 7 days   Lab Units 08/29/23 0421 08/28/23 0247 08/27/23 0447 08/26/23 2052   POTASSIUM mmol/L 3.8 4.1 4.2 4.3   CHLORIDE mmol/L 104 105 105 105   CO2 mmol/L 23 24 25 25   BUN mg/dL 40* 37* 38* 40*   CREATININE mg/dL 1.85* 1.71* 1.62* 1.76*   CALCIUM mg/dL 8.4 8.6 8.5 8.4   ALK PHOS U/L  --   --   --  89   ALT U/L  --   --   --  40   AST U/L  --   --   --  25     Lab Results   Component Value Date    TROPONINI <0.02 01/10/2020    TROPONINI <0.02 08/30/2017    TROPONINI <0.02 03/24/2017     Results from last 7 days   Lab Units 08/26/23 2052   INR  1.72*     Lab Results   Component Value Date    BLOODCX No Growth at 48 hrs. 08/26/2023    BLOODCX No Growth at 48 hrs. 08/26/2023    URINECX >100,000 cfu/ml Escherichia coli (A) 08/27/2023         Imaging:  Results for orders placed during the hospital encounter of 08/26/23    XR chest portable    Narrative  CHEST    INDICATION:   fever, cough. COVID-positive 8/12/2023. COMPARISON: CXR 1/23/2023, abdomen CT 3/24/2017. EXAM PERFORMED/VIEWS:  XR CHEST PORTABLE. FINDINGS:    Cardiomediastinal silhouette normal.    Lungs clear. No effusion or pneumothorax. Upper abdomen normal. Moderate left glenohumeral degenerative disease. Impression  No acute cardiopulmonary disease.           Workstation performed: [id number]    Results for orders placed during the hospital encounter of 01/10/20    XR chest 2 views    Narrative  CHEST    INDICATION:   Chest Pain. COMPARISON:  3/24/2017    EXAM PERFORMED/VIEWS:  XR CHEST PA & LATERAL  Images: 2    FINDINGS:    Cardiomediastinal silhouette appears unremarkable. No congestive failure. No pneumothorax. No pneumonia. No effusions. Osseous structures appear within normal limits for patient age. Impression  No acute cardiopulmonary disease.         Workstation performed: [id number]      Last 24 Hours Medication List:   Current Facility-Administered Medications   Medication Dose Route Frequency Provider Last Rate   • acetaminophen  650 mg Oral Q6H PRN Carole Barriga PA-C     • aluminum-magnesium hydroxide-simethicone  30 mL Oral Q6H PRN Carole Barriga PA-C     • apixaban  2.5 mg Oral BID Carole Barriga PA-C     • aspirin  81 mg Oral Daily Carole Barriga PA-C     • cefepime  2,000 mg Intravenous Q12H Karely Quinn MD 2,000 mg (08/29/23 0421)   • fluticasone  1 spray Nasal Daily Carole Barriga PA-C     • folic acid  1 mg Oral Daily Carole Barriga PA-C     • guaiFENesin  600 mg Oral BID Carole Barriga PA-C     • ipratropium  0.5 mg Nebulization Q6H PRN Magda Lynn PA-C     • levalbuterol  1.25 mg Nebulization Q6H PRN Magda Lynn PA-C     • loratadine  10 mg Oral Daily Carole Barriga PA-C     • losartan  50 mg Oral Daily Carole Barriga PA-C     • magnesium Oxide  400 mg Oral Daily Carole Barriga PA-C     • metoprolol tartrate  50 mg Oral Q6H Candy Matthew MD     • ondansetron  4 mg Intravenous Q6H PRN Carole Barriga PA-C     • pantoprazole  40 mg Oral Daily Before Breakfast Carole Barriga PA-C     • polyethylene glycol  17 g Oral Daily Carole Barriga PA-C     • saccharomyces boulardii  250 mg Oral BID Magda Lynn PA-C     • senna  1 tablet Oral HS Carole Barriga PA-C          Today, Patient Was Seen By: Noelle Klein MD    ** Please Note: Dictation voice to text software may have been used in the creation of this document.  **

## 2023-08-29 NOTE — PLAN OF CARE
Problem: Potential for Falls  Goal: Patient will remain free of falls  Description: INTERVENTIONS:  - Educate patient/family on patient safety including physical limitations  - Instruct patient to call for assistance with activity   - Consult OT/PT to assist with strengthening/mobility   - Keep Call bell within reach  - Keep bed low and locked with side rails adjusted as appropriate  - Keep care items and personal belongings within reach  - Initiate and maintain comfort rounds  - Make Fall Risk Sign visible to staff  - Offer Toileting every 2 Hours, in advance of need  - Initiate/Maintain bed alarm  - Obtain necessary fall risk management equipment:   - Apply yellow socks and bracelet for high fall risk patients  - Consider moving patient to room near nurses station  Outcome: Progressing     Problem: INFECTION - ADULT  Goal: Absence or prevention of progression during hospitalization  Description: INTERVENTIONS:  - Assess and monitor for signs and symptoms of infection  - Monitor lab/diagnostic results  - Monitor all insertion sites, i.e. indwelling lines, tubes, and drains  - Monitor endotracheal if appropriate and nasal secretions for changes in amount and color  - [place] appropriate cooling/warming therapies per order  - Administer medications as ordered  - Instruct and encourage patient and family to use good hand hygiene technique  - Identify and instruct in appropriate isolation precautions for identified infection/condition  Outcome: Progressing  Goal: Absence of fever/infection during neutropenic period  Description: INTERVENTIONS:  - Monitor WBC    Outcome: Progressing

## 2023-08-29 NOTE — PLAN OF CARE
Problem: PAIN - ADULT  Goal: Verbalizes/displays adequate comfort level or baseline comfort level  Description: Interventions:  - Encourage patient to monitor pain and request assistance  - Assess pain using appropriate pain scale  - Administer analgesics based on type and severity of pain and evaluate response  - Implement non-pharmacological measures as appropriate and evaluate response  - Consider cultural and social influences on pain and pain management  - Notify physician/advanced practitioner if interventions unsuccessful or patient reports new pain  Outcome: Progressing     Problem: INFECTION - ADULT  Goal: Absence or prevention of progression during hospitalization  Description: INTERVENTIONS:  - Assess and monitor for signs and symptoms of infection  - Monitor lab/diagnostic results  - Monitor all insertion sites, i.e. indwelling lines, tubes, and drains  - Monitor endotracheal if appropriate and nasal secretions for changes in amount and color  - [place] appropriate cooling/warming therapies per order  - Administer medications as ordered  - Instruct and encourage patient and family to use good hand hygiene technique  - Identify and instruct in appropriate isolation precautions for identified infection/condition  Outcome: Progressing     Problem: Knowledge Deficit  Goal: Patient/family/caregiver demonstrates understanding of disease process, treatment plan, medications, and discharge instructions  Description: Complete learning assessment and assess knowledge base.   Interventions:  - Provide teaching at level of understanding  - Provide teaching via preferred learning methods  Outcome: Progressing     Problem: DISCHARGE PLANNING  Goal: Discharge to home or other facility with appropriate resources  Description: INTERVENTIONS:  - Identify barriers to discharge w/patient and caregiver  - Arrange for needed discharge resources and transportation as appropriate  - Identify discharge learning needs (meds, wound care, etc.)  - Arrange for interpretive services to assist at discharge as needed  - Refer to Case Management Department for coordinating discharge planning if the patient needs post-hospital services based on physician/advanced practitioner order or complex needs related to functional status, cognitive ability, or social support system  Outcome: Progressing

## 2023-08-29 NOTE — PLAN OF CARE
Problem: Potential for Falls  Goal: Patient will remain free of falls  Description: INTERVENTIONS:  - Educate patient/family on patient safety including physical limitations  - Instruct patient to call for assistance with activity   - Consult OT/PT to assist with strengthening/mobility   - Keep Call bell within reach  - Keep bed low and locked with side rails adjusted as appropriate  - Keep care items and personal belongings within reach  - Initiate and maintain comfort rounds  - Make Fall Risk Sign visible to staff  - Offer Toileting every 2 Hours, in advance of need  - Initiate/Maintain bed alarm  - Obtain necessary fall risk management equipment:   - Apply yellow socks and bracelet for high fall risk patients  - Consider moving patient to room near nurses station  Outcome: Progressing     Problem: PAIN - ADULT  Goal: Verbalizes/displays adequate comfort level or baseline comfort level  Description: Interventions:  - Encourage patient to monitor pain and request assistance  - Assess pain using appropriate pain scale  - Administer analgesics based on type and severity of pain and evaluate response  - Implement non-pharmacological measures as appropriate and evaluate response  - Consider cultural and social influences on pain and pain management  - Notify physician/advanced practitioner if interventions unsuccessful or patient reports new pain  Outcome: Progressing     Problem: INFECTION - ADULT  Goal: Absence or prevention of progression during hospitalization  Description: INTERVENTIONS:  - Assess and monitor for signs and symptoms of infection  - Monitor lab/diagnostic results  - Monitor all insertion sites, i.e. indwelling lines, tubes, and drains  - Monitor endotracheal if appropriate and nasal secretions for changes in amount and color  - [place] appropriate cooling/warming therapies per order  - Administer medications as ordered  - Instruct and encourage patient and family to use good hand hygiene technique  - Identify and instruct in appropriate isolation precautions for identified infection/condition  Outcome: Progressing  Goal: Absence of fever/infection during neutropenic period  Description: INTERVENTIONS:  - Monitor WBC    Outcome: Progressing     Problem: SAFETY ADULT  Goal: Maintain or return to baseline ADL function  Description: INTERVENTIONS:  -  Assess patient's ability to carry out ADLs; assess patient's baseline for ADL function and identify physical deficits which impact ability to perform ADLs (bathing, care of mouth/teeth, toileting, grooming, dressing, etc.)  - Assess/evaluate cause of self-care deficits   - Assess range of motion  - Assess patient's mobility; develop plan if impaired  - Assess patient's need for assistive devices and provide as appropriate  - Encourage maximum independence but intervene and supervise when necessary  - Involve family in performance of ADLs  - Assess for home care needs following discharge   - Consider OT consult to assist with ADL evaluation and planning for discharge  - Provide patient education as appropriate  Outcome: Progressing  Goal: Maintains/Returns to pre admission functional level  Description: INTERVENTIONS:  - Perform BMAT or MOVE assessment daily.   - Set and communicate daily mobility goal to care team and patient/family/caregiver. - Collaborate with rehabilitation services on mobility goals if consulted  - Perform Range of Motion 3 times a day. - Reposition patient every 2 hours.   - Dangle patient 2 times a day  - Stand patient 2 times a day  - Ambulate patient 2 times a day  - Out of bed to chair 2 times a day   - Out of bed for meals 2 times a day  - Out of bed for toileting  - Record patient progress and toleration of activity level   Outcome: Progressing     Problem: DISCHARGE PLANNING  Goal: Discharge to home or other facility with appropriate resources  Description: INTERVENTIONS:  - Identify barriers to discharge w/patient and caregiver  - Arrange for needed discharge resources and transportation as appropriate  - Identify discharge learning needs (meds, wound care, etc.)  - Arrange for interpretive services to assist at discharge as needed  - Refer to Case Management Department for coordinating discharge planning if the patient needs post-hospital services based on physician/advanced practitioner order or complex needs related to functional status, cognitive ability, or social support system  Outcome: Progressing     Problem: Knowledge Deficit  Goal: Patient/family/caregiver demonstrates understanding of disease process, treatment plan, medications, and discharge instructions  Description: Complete learning assessment and assess knowledge base.   Interventions:  - Provide teaching at level of understanding  - Provide teaching via preferred learning methods  Outcome: Progressing     Problem: Prexisting or High Potential for Compromised Skin Integrity  Goal: Skin integrity is maintained or improved  Description: INTERVENTIONS:  - Identify patients at risk for skin breakdown  - Assess and monitor skin integrity  - Assess and monitor nutrition and hydration status  - Monitor labs   - Assess for incontinence   - Turn and reposition patient  - Assist with mobility/ambulation  - Relieve pressure over bony prominences  - Avoid friction and shearing  - Provide appropriate hygiene as needed including keeping skin clean and dry  - Evaluate need for skin moisturizer/barrier cream  - Collaborate with interdisciplinary team   - Patient/family teaching  - Consider wound care consult   Outcome: Progressing     Problem: MOBILITY - ADULT  Goal: Maintain or return to baseline ADL function  Description: INTERVENTIONS:  -  Assess patient's ability to carry out ADLs; assess patient's baseline for ADL function and identify physical deficits which impact ability to perform ADLs (bathing, care of mouth/teeth, toileting, grooming, dressing, etc.)  - Assess/evaluate cause of self-care deficits   - Assess range of motion  - Assess patient's mobility; develop plan if impaired  - Assess patient's need for assistive devices and provide as appropriate  - Encourage maximum independence but intervene and supervise when necessary  - Involve family in performance of ADLs  - Assess for home care needs following discharge   - Consider OT consult to assist with ADL evaluation and planning for discharge  - Provide patient education as appropriate  Outcome: Progressing  Goal: Maintains/Returns to pre admission functional level  Description: INTERVENTIONS:  - Perform BMAT or MOVE assessment daily.   - Set and communicate daily mobility goal to care team and patient/family/caregiver. - Collaborate with rehabilitation services on mobility goals if consulted  - Perform Range of Motion 3 times a day. - Reposition patient every 2 hours.   - Dangle patient 2 times a day  - Stand patient 2 times a day  - Ambulate patient 2 times a day  - Out of bed to chair 2 times a day   - Out of bed for meals 2 times a day  - Out of bed for toileting  - Record patient progress and toleration of activity level   Outcome: Progressing

## 2023-08-29 NOTE — PLAN OF CARE
Problem: OCCUPATIONAL THERAPY ADULT  Goal: Performs self-care activities at highest level of function for planned discharge setting. See evaluation for individualized goals. Description: Treatment Interventions: ADL retraining, Functional transfer training, UE strengthening/ROM, Endurance training, Patient/family training, Equipment evaluation/education, Compensatory technique education, Continued evaluation          See flowsheet documentation for full assessment, interventions and recommendations. Note: Limitation: Decreased ADL status, Decreased UE strength, Decreased UE ROM, Decreased endurance, Decreased cognition, Decreased self-care trans, Decreased high-level ADLs  Prognosis: Poor  Assessment: Pt is a 82 y.o. male seen for OT evaluation at [facility], admitted 8/26/2023 w/ Community acquired pneumonia of right lower lobe of lung. OT completed extensive review of pt's medical and social history. Comorbidities affecting pt's functional performance at time of assessment include: a-fib, COPD, leukocytosis, central retinal vein occlusion of R eye, recent hx of covid-19, h/o alcohol abuse, cystitis, ambulatory dysfunction, h/o CVA, peripheral neuropathy. Personal factors affecting pt at time of IE include: steps to enter environment, limited home support, behavioral pattern, difficulty performing ADLS, difficulty performing IADLS , decreased initiation and engagement  and environment. Prior to admission, pt was at [facility]. Prior to STR, pt was independent with ADLs/Mobility & living with his wife in a multi-level home. Upon evaluation: Pt requires Max Ax2 for bed mobility, DENIS for functional mobility/transfers, Mod A for UB ADLs and Max/Total A for LB ADLS 2* the following deficits impacting occupational performance: weakness, decreased strength, decreased balance, decreased tolerance, impaired memory, impaired problem solving and increased pain.  Full objective findings from OT assessment regarding body systems outlined above. Pt to benefit from continued skilled OT tx while in the hospital to address deficits as defined above and maximize level of functional independence w/ ADL's and functional mobility. Occupational Performance areas to address include: bathing/shower, toilet hygiene, medication management, functional mobility, community mobility and clothing management. Based on findings, pt is of high complexity. The patient's raw score on the AM-PAC Daily Activity inpatient short form is 11, standardized score is 29.04, less than 39.4. Patients at this level are likely to benefit from DC to post-acute rehabilitation services. However, please refer to therapist recommendation for discharge planning given other factors that may influence destination. At this time, OT recommendations at time of discharge are DC with Level II resources.

## 2023-08-29 NOTE — ASSESSMENT & PLAN NOTE
· WBC 10.68 K on admission  · Likely secondary to pneumonia and cystitis   · Continue cefepime  · Blood cultures are negative to date  · U/c growing E. coli

## 2023-08-29 NOTE — ASSESSMENT & PLAN NOTE
· Seen by ophthalmology during recent [facility] admission, diagnosed with CRVO and ocular hypertension  · Patient recommended for formal evaluation outpatient of OCT macular degeneration, discussion of possible anti-VEGF therapy  · Encouraged ophthalmology follow-up outpatient

## 2023-08-29 NOTE — ASSESSMENT & PLAN NOTE
· Went into A-fib with RVR on August 28 and was given IV Lopressor and Cardizem  · Home regimen: Coreg 25 Mg twice daily  · Started on Eliquis 2.5 mg twice daily for anticoagulation during hospitalization at [facility] 8/8-8/23  · Cardiology consult appreciated  · Coreg was discontinued and patient is started on Lopressor 50 mg p.o. every 6 hours  · Continue home medications

## 2023-08-29 NOTE — ASSESSMENT & PLAN NOTE
Lab Results   Component Value Date    EGFR 32 08/29/2023    EGFR 35 08/28/2023    EGFR 37 08/27/2023    CREATININE 1.85 (H) 08/29/2023    CREATININE 1.71 (H) 08/28/2023    CREATININE 1.62 (H) 08/27/2023   · Baseline creatinine 1.6-1.9  · Creatinine on admission 1.76  · Trend BMP daily

## 2023-08-29 NOTE — ASSESSMENT & PLAN NOTE
· Hemoglobin 9.4 on admission  · Hemoglobin ranging 10-12 during recent [facility] admission  · No signs of active bleeding  · Trend CBC

## 2023-08-29 NOTE — OCCUPATIONAL THERAPY NOTE
Occupational Therapy Evaluation     Patient Name: Talita Henriquez  UDQCE'J Date: 8/29/2023  Problem List  Principal Problem:    Community acquired pneumonia of right lower lobe of lung  Active Problems:    Anemia of chronic disease    Atrial fibrillation ([address])    Chronic obstructive pulmonary disease, unspecified COPD type ([address])    Stage 3b chronic kidney disease ([address])    Leukocytosis    Central retinal vein occlusion of right eye    History of COVID-19    History of alcohol abuse    Cystitis    Past Medical History  Past Medical History:   Diagnosis Date    Alcohol abuse     Alcohol withdrawal seizure without complication ([address])     Arthritis     Asthma     CVA (cerebral vascular accident) ([address])     Decubitus ulcer of buttock     last assessed 07/20/16    Diabetes ([address])     Disease of thyroid gland     Duodenal ulcer     Emphysema lung (HCC)     Esophageal varices (HCC)     GERD (gastroesophageal reflux disease)     History of transfusion     Hypertension     Irritable bowel syndrome with diarrhea     Kidney stones     Prostate cancer ([address])     Urinary frequency     resolved 02/15/17     Past Surgical History  Past Surgical History:   Procedure Laterality Date    BACK SURGERY      CATARACT EXTRACTION      ESOPHAGOGASTRODUODENOSCOPY N/A 2/6/2017    Procedure: ESOPHAGOGASTRODUODENOSCOPY (EGD); Surgeon: Esther Orta MD;  Location:  MAIN OR;  Service:     OK ESOPHAGOGASTRODUODENOSCOPY TRANSORAL DIAGNOSTIC N/A 4/26/2016    Procedure: ESOPHAGOGASTRODUODENOSCOPY (EGD); Surgeon: Heaven Garcia MD;  Location:  MAIN OR;  Service: Gastroenterology    TONSILLECTOMY               08/29/23 1515   OT Last Visit   OT Visit Date 08/29/23   Note Type   Note type Evaluation   Pain Assessment   Pain Assessment Tool Pinon-Baker FACES   Pinon-Baker FACES Pain Rating 8   Pain Location/Orientation Orientation: Left; Location: [facility]   Hospital Pain Intervention(s) Repositioned; Emotional support   Restrictions/Precautions   Weight Bearing Precautions Per Order No   Other Precautions Cognitive; Bed Alarm; Fall Risk;Pain;Contact/isolation   Home Living   Type of Home Other (Comment)  (STR)   Home Equipment Walker; Wheelchair-manual   Additional Comments Prior to STR- pt as living with spouse in a 3 SH with 2 ALPESH & was independent with mobility & ADLs   Prior Function   Level of [place] Needs assistance with ADLs; Needs assistance with IADLS;Needs assistance with functional mobility   Lives With Spouse   IADLs Family/Friend/Other provides transportation   Comments Prior to STR pt was independent with ADLs/Mobility   General   Additional Pertinent History Pt at [facility] for STR beginning last Wednesday 8/23/23. Prior to STR, pt at [facility] for 2wks   Family/Caregiver Present Yes   Additional General Comments Spouse   Subjective   Subjective "I feel lousy"   ADL   Eating Assistance 7  Independent   Grooming Assistance 5  Supervision/Setup   UB Bathing Assistance 3  Moderate Assistance   LB Bathing Assistance 2  Maximal [place] 3  Moderate Assistance   LB Dressing Assistance 1  Total Assistance   Toileting Assistance  1  Total Assistance   Bed Mobility   Rolling R 2  Maximal assistance   Additional items Assist x 2; Increased time required;Verbal cues;LE management; Bedrails   Rolling L 2  Maximal assistance   Additional items Assist x 2; Increased time required;Verbal cues;LE management; Bedrails   Supine to Sit 2  Maximal assistance   Additional items Assist x 2; Increased time required;Verbal cues;LE management; Bedrails;HOB elevated   Sit to Supine 2  Maximal assistance   Additional items Assist x 2; Increased time required;Verbal cues;LE management   Additional Comments Pt sat EOB x7 min with supervision.  At times required Min A when having increased pain/fatiguing   Transfers   Sit to Stand Unable to assess   Additional Comments DENIS standing d/t pt's high pain level for L knee   Balance   Static Sitting Fair   Dynamic Sitting Fair -   Activity Tolerance   Activity Tolerance Patient limited by pain   Medical Staff Made Aware PT Nancy   Nurse Made Aware JANE Shaw   RUVAZQUEZ Assessment   RUE Assessment X  (Increased pain with UE when rolling)   LUE Assessment   LUE Assessment X  (Increased pain with UE when rolling)   Cognition   Overall Cognitive Status Impaired   Arousal/Participation Alert   Attention Within functional limits   Orientation Level Oriented to person;Oriented to place;Oriented to time;Disoriented to situation   Memory Decreased recall of precautions;Decreased recall of recent events   Following Commands Follows one step commands with increased time or repetition   Assessment   Limitation Decreased ADL status; Decreased UE strength;Decreased UE ROM; Decreased endurance;Decreased cognition;Decreased self-care trans;Decreased high-level ADLs   Prognosis Poor   Assessment Pt is a 82 y.o. male seen for OT evaluation at [facility], admitted 8/26/2023 w/ Community acquired pneumonia of right lower lobe of lung. OT completed extensive review of pt's medical and social history. Comorbidities affecting pt's functional performance at time of assessment include: a-fib, COPD, leukocytosis, central retinal vein occlusion of R eye, recent hx of covid-19, h/o alcohol abuse, cystitis, ambulatory dysfunction, h/o CVA, peripheral neuropathy. Personal factors affecting pt at time of IE include: steps to enter environment, limited home support, behavioral pattern, difficulty performing ADLS, difficulty performing IADLS , decreased initiation and engagement  and environment. Prior to admission, pt was at [facility]. Prior to STR, pt was independent with ADLs/Mobility & living with his wife in a multi-level home.  Upon evaluation: Pt requires Max Ax2 for bed mobility, DENIS for functional mobility/transfers, Mod A for UB ADLs and Max/Total A for LB ADLS 2* the following deficits impacting occupational performance: weakness, decreased strength, decreased balance, decreased tolerance, impaired memory, impaired problem solving and increased pain. Full objective findings from OT assessment regarding body systems outlined above. Pt to benefit from continued skilled OT tx while in the hospital to address deficits as defined above and maximize level of functional independence w/ ADL's and functional mobility. Occupational Performance areas to address include: bathing/shower, toilet hygiene, medication management, functional mobility, community mobility and clothing management. Based on findings, pt is of high complexity. The patient's raw score on the AM-PAC Daily Activity inpatient short form is 11, standardized score is 29.04, less than 39.4. Patients at this level are likely to benefit from DC to post-acute rehabilitation services. However, please refer to therapist recommendation for discharge planning given other factors that may influence destination. At this time, OT recommendations at time of discharge are DC with Level II resources. Goals   Patient Goals Pt wants to have less pain   Plan   Treatment Interventions ADL retraining;Functional transfer training;UE strengthening/ROM; Endurance training;Patient/family training;Equipment evaluation/education; Compensatory technique education;Continued evaluation   Goal Expiration Date 09/08/23   OT Treatment Day 0   OT Frequency 3-5x/wk   Recommendation   UB Rehab Discharge Recommendation (PT/OT) Level 2   AM-PAC Daily Activity Inpatient   Lower Body Dressing 1   Bathing 2   Toileting 1   Upper Body Dressing 2   Grooming 2   Eating 3   Daily Activity Raw Score 11   Daily Activity Standardized Score (Calc for Raw Score >=11) 29.04   AM-[facility] Applied Cognition Inpatient   Following a Speech/Presentation 3   Understanding Ordinary Conversation 3   Taking Medications 2   Remembering Where Things Are Placed or Put Away 2   Remembering List of 4-5 Errands 2   Taking Care of Complicated Tasks 2   Applied Cognition Raw Score 14 Applied Cognition Standardized Score 32.02   End of Consult   Education Provided Yes;Family or social support of family present for education by provider   Patient Position at End of Consult Supine;Bed/Chair alarm activated; All needs within reach   Nurse Communication Nurse aware of consult     Pt will achieve the following goals within 10 days. *Pt will complete UB bathing and dressing with S while sitting EOB. *Pt will complete LB bathing and dressing with Mod A & DME PRN. *Pt will complete toileting w/ Max A w/ G hygiene/thoroughness using DME PRN    *Pt will complete bed mobility with Mod Ax1, with HOB elevated & use of side rails PRN to prep for purposeful tasks    *Pt will sit on EOB for 10+ minutes with S for increased safety with seated activity tolerance during ADL tasks. *Pt will demonstrate increased activity tolerance >20 min in order to complete ADL routine.     Thanh Villarreal, OTR/L

## 2023-08-29 NOTE — PHYSICAL THERAPY NOTE
PHYSICAL THERAPY EVALUATION NOTE    Patient Name: Justina De Oliveira  DZFKK'I Date: 2023    AGE:   82 y.o. Mrn:   [id number]  ADMIT DX:  Pneumonia [J18.9]  Fever [R50.9]    Past Medical History:   Diagnosis Date    Alcohol abuse     Alcohol withdrawal seizure without complication ([address])     Arthritis     Asthma     CVA (cerebral vascular accident) ([address])     Decubitus ulcer of buttock     last assessed 16    Diabetes ([address])     Disease of thyroid gland     Duodenal ulcer     Emphysema lung (HCC)     Esophageal varices (HCC)     GERD (gastroesophageal reflux disease)     History of transfusion     Hypertension     Irritable bowel syndrome with diarrhea     Kidney stones     Prostate cancer ([address])     Urinary frequency     resolved 02/15/17     Length Of Stay: 3  PHYSICAL THERAPY EVALUATION :   Patient's identity confirmed via 2 patient identifiers (full name and ) at start of session       23 1516   PT Last Visit   PT Visit Date 23   Note Type   Note type Evaluation   Pain Assessment   Pain Assessment Tool FLACC   Pain Location/Orientation Orientation: Left; Location: [facility]   Hospital Pain Intervention(s) Repositioned; Ambulation/increased activity; Emotional support   Pain Rating: FLACC (Rest) - Face 0   Pain Rating: FLACC (Rest) - Legs 0   Pain Rating: FLACC (Rest) - Activity 0   Pain Rating: FLACC (Rest) - Cry 0   Pain Rating: FLACC (Rest) - Consolability 0   Score: FLACC (Rest) 0   Pain Rating: FLACC (Activity) - Face 1   Pain Rating: FLACC (Activity) - Legs 1   Pain Rating: FLACC (Activity) - Activity 1   Pain Rating: FLACC (Activity) - Cry 1   Pain Rating: FLACC (Activity) - Consolability 1   Score: FLACC (Activity) 5   Restrictions/Precautions   Weight Bearing Precautions Per Order No   Other Precautions Contact/isolation;Cognitive; Chair Alarm; Bed Alarm;Multiple lines; Fall Risk;Pain;Visual impairment  (Condom catheter)   Home Living   Type of Home Other (Comment)  ([place])   Home Equipment Walker; Wheelchair-manual   Additional Comments Pt currently at [facility] for STR. Prior to that, pt resides w/ wife in a 3 SH w/ 3 ALPESH, independent without AD. Prior Function   Level of [place] Needs assistance with ADLs; Needs assistance with IADLS;Needs assistance with functional mobility   Lives With Spouse   IADLs Family/Friend/Other provides transportation   General   Family/Caregiver Present Yes  (Wife, Elizabeth Munoz)   Cognition   Overall Cognitive Status Impaired   Arousal/Participation Responsive   Attention Within functional limits   Orientation Level Oriented to person;Oriented to place;Oriented to time;Disoriented to situation   Memory Decreased recall of precautions;Decreased recall of recent events   Following Commands Follows one step commands with increased time or repetition   Comments Pt cooperative, limited due to significant L knee pain   RLE Assessment   RLE Assessment WFL   Strength RLE   RLE Overall Strength 3+/5   LLE Assessment   LLE Assessment WFL   Strength LLE   LLE Overall Strength 3-/5  (limited due to L knee pain)   Vision-Basic Assessment   Current Vision Wears glasses all the time   Bed Mobility   Supine to Sit 2  Maximal assistance   Additional items Assist x 2; Increased time required;Verbal cues   Sit to Supine 2  Maximal assistance   Additional items Assist x 2; Increased time required   Additional Comments Pt sat EOB x ~7 minutes w/ supervision. Transfers   Sit to Stand Unable to assess   Additional Comments Pt w/ significant L knee pain   Balance   Static Sitting Fair   Dynamic Sitting Fair -   Activity Tolerance   Activity Tolerance Patient limited by pain   Medical Staff Made Aware SUNIL Ford   Nurse Made Aware JANE Shaw   Assessment   Problem List Decreased strength;Decreased endurance; Impaired balance;Decreased mobility; Decreased cognition;Orthopedic restrictions;Pain   Assessment Judi Hoffman Ousmane Vogt is a 82 y.o. Male who presents to [address] [place] [address] on 8/26/2023 from [address] [place] [address] STR w/ c/o fevers and diagnosis of community-acquired pneumonia of RLL. Orders for PT eval and treat received. Pt presents w/ comorbidities of Afib, COPD, CKD Stage 3, hx CVA, recent COVID. At baseline pt is independent, has been A x 2 for SPT. Upon evaluation, pt presents w/ the following deficits: weakness, impaired skin integrity, impaired balance, impaired hearing, impaired vision, decreased endurance and pain limiting functional mobility. Upon eval, pt requires max A x 2 for bed mobility, supervision to maintain sitting EOB. Based on this PT evaluation today, patient's discharge recommendation is for Level II. During this admission, pt would benefit from continued skilled inpatient PT in the acute care setting in order to address the abovementioned deficits to maximize function and mobility before DC from acute care.        Patient will: Perform all bed mobility tasks w/ maxx1 to improve pt's independence w/ repositioning for decrease risk of skin breakdown, Perform all transfers w/ maxx1 consistently from various height surfaces in order to improve I w/ engagement w/ real-world environments/situations, Ambulate at least 15 ft. with roller walker max A x 2 w/o LOB to facilitate return and engagement w/ previous living environment and Tolerate 3 hr OOB to faciliate upright tolerance   Goals   Patient Goals to  have less pain, get OOB   STG Expiration Date 09/08/23   Short Term Goal #1 Patient will: Perform all bed mobility tasks w/ maxx1 to improve pt's independence w/ repositioning for decrease risk of skin breakdown, Perform all transfers w/ maxx1 consistently from various height surfaces in order to improve I w/ engagement w/ real-world environments/situations, Ambulate at least 15 ft. with roller walker max A x 2 w/o LOB to facilitate return and engagement w/ previous living environment and Tolerate 3 hr OOB to faciliate upright tolerance   Plan   Treatment/Interventions Functional transfer training;LE strengthening/ROM; Therapeutic exercise; Endurance training;Patient/family training;Equipment eval/education; Bed mobility;Gait training   PT Frequency 2-3x/wk   Recommendation   UB Rehab Discharge Recommendation (PT/OT) Level 2   Additional Comments Pt may benefit from orthopedic consult to address subacute L knee pain (wife reports started at [facility] during last admission)   AM-PAC Basic Mobility Inpatient   Turning in Flat Bed Without Bedrails 1   Lying on Back to Sitting on Edge of Flat Bed Without Bedrails 1   Moving Bed to Chair 1   Standing Up From Chair Using Arms 1   Walk in Room 1   Climb 3-5 Stairs With Railing 1   Basic Mobility Inpatient Raw Score 6   Turning Head Towards Sound 3   Follow Simple Instructions 3   Low Function Basic Mobility Raw Score  12   Low Function Basic Mobility Standardized Score  18.33   Highest Level Of Mobility   JH-HLM Goal 2: Bed activities/Dependent transfer   JH-HLM Achieved 3: Sit at edge of bed   Additional Treatment Session   Start Time 1508   End Time 1516   Treatment Assessment Pt agreeable to participate in PT intervention. Pt is able to participate in rolling both L and R during session. He requires max A x 2 for rolling w/ cues for technique. Pt reports overnight that he was able to actually sleep while sidelying. Placed pt on R side w/ pillow between knees, pt reporting being more comfortable   Additional Treatment Day 1   End of Consult   Patient Position at End of Consult Supine;Bed/Chair alarm activated; All needs within reach         The patient's AM-PAC Basic Mobility Inpatient Short Form Raw Score is 6, Standardized Score is  . A standardized score less than 38.32 (raw score of 16) suggests the patient may benefit from discharge to post-acute rehabilitation services which may not coincide with above PT recommendations.  However please refer to therapist recommendation for discharge planning given other factors that may influence destination.     Pt would benefit from skilled inpatient PT during this admission in order to facilitate progress towards goals to maximize functional independence    Marianna Hernandez, PT, DPT

## 2023-08-29 NOTE — PROGRESS NOTES
Cardiology Progress Note - Thomas Chamberlain 82 y.o. male MRN: [id number]    Unit/Bed#: -01 Encounter: [id number]      Assessment:  Principal Problem:    Community acquired pneumonia of right lower lobe of lung  Active Problems:    Anemia of chronic disease    Atrial fibrillation (HCC)    Chronic obstructive pulmonary disease, unspecified COPD type ([address])    Stage 3b chronic kidney disease ([address])    Leukocytosis    Central retinal vein occlusion of right eye    History of COVID-19    History of alcohol abuse    Cystitis      Plan:    1. Atrial fibrillation with rapid ventricular response - His heart rates have been uncontrolled in the setting of his febrile illness and pneumonia. Yesterday we discontinued carvedilol and started metoprolol 50 mg every 6 hours. It is still elevated today and we will add diltiazem 60 mg every 6 hours. We will eventually transition to long-acting agents. We will titrate as needed and as tolerated. Continue Eliquis for stroke prevention. LV function preserved.     2. Hypertension - His blood pressure was elevated upon arrival but has improved. Patient is on losartan as well but we will discontinue this as we add diltiazem and uptitrate metoprolol.       Subjective:   Patient seen and examined. No significant events overnight. Patient's heart rates did improve overnight but then went rapid during the day today. Remains asymptomatic from an atrial fibrillation standpoint. Objective:     Vitals: Blood pressure 122/75, pulse (!) 111, temperature 98.9 °F (37.2 °C), resp. rate 18, height 5' 8" (1.727 m), weight 91.5 kg (201 lb 11.5 oz), SpO2 96 %. , Body mass index is 30.67 kg/m².,   Orthostatic Blood Pressures    Flowsheet Row Most Recent Value   Blood Pressure 122/75 filed at 08/29/2023 8134   Patient Position - Orthostatic VS Lying filed at 08/26/2023 2357            Intake/Output Summary (Last 24 hours) at 8/29/2023 1604  Last data filed at 8/28/2023 1824  Gross per 24 hour   Intake 120 ml   Output --   Net 120 ml       Telemetry review: Atrial fibrillation with rapid ventricular response. Overnight heart rates were in the 90s to low 100s, but currently in the 130s.       Physical Exam:    GEN: Ronna Lopez appears well, alert and oriented x 3, pleasant and cooperative   HEENT: pupils equal, round, and reactive to light; extraocular muscles intact  NECK: supple, no carotid bruits   HEART: irregular rhythm, tachy S1 and S2, no murmurs, clicks, gallops or rubs   LUNGS: Diminished bilaterally; no wheezes, rales, or rhonchi   ABDOMEN: normal bowel sounds, soft, no tenderness, no distention  EXTREMITIES: peripheral pulses normal; no clubbing, cyanosis, or edema  NEURO: no focal findings   SKIN: normal without suspicious lesions on exposed skin    Medications:      Current Facility-Administered Medications:   •  acetaminophen (TYLENOL) tablet 650 mg, 650 mg, Oral, Q6H PRN, Loral Murconstance, PA-C, 650 mg at 08/29/23 1600  •  aluminum-magnesium hydroxide-simethicone (MAALOX) oral suspension 30 mL, 30 mL, Oral, Q6H PRN, Loral Murconstance, PA-C  •  apixaban [facility]) tablet 2.5 mg, 2.5 mg, Oral, BID, Loral Murcontsance, PA-C, 2.5 mg at 08/29/23 4920  •  aspirin (ECOTRIN LOW STRENGTH) EC tablet 81 mg, 81 mg, Oral, Daily, Loral Murconstance, PA-C, 81 mg at 08/29/23 5751  •  cefepime (MAXIPIME) IVPB (premix in dextrose) 2,000 mg 50 mL, 2,000 mg, Intravenous, Q12H, Ángela Luna MD, Last Rate: 100 mL/hr at 08/29/23 0421, 2,000 mg at 08/29/23 0421  •  diltiazem (CARDIZEM) tablet 60 mg, 60 mg, Oral, Q6H [address] [facility], MD  •  fluticasone (FLONASE) 50 mcg/act nasal spray 1 spray, 1 spray, Nasal, Daily, Loral Cheyenne PA-C, 1 spray at 02/21/55 5904  •  folic acid (FOLVITE) tablet 1 mg, 1 mg, Oral, Daily, Loral LOLI Quinn, 1 mg at 08/29/23 1793  •  guaiFENesin (MUCINEX) 12 hr tablet 600 mg, 600 mg, Oral, BID, Loral LOLI Quinn, 600 mg at 08/29/23 3817  •  ipratropium (ATROVENT) 0.02 % inhalation solution 0.5 mg, 0.5 mg, Nebulization, Q6H PRN, Gabino Thompson PA-C  •  levalbuterol (XOPENEX) inhalation solution 1.25 mg, 1.25 mg, Nebulization, Q6H PRN, Gabino Thompson PA-C  •  loratadine (CLARITIN) tablet 10 mg, 10 mg, Oral, Daily, Joyce Verma PA-C, 10 mg at 08/29/23 1187  •  losartan (COZAAR) tablet 50 mg, 50 mg, Oral, Daily, Joyce Verma PA-C, 50 mg at 08/29/23 3961  •  magnesium Oxide (MAG-OX) tablet 400 mg, 400 mg, Oral, Daily, Joyce Verma PA-C, 400 mg at 08/29/23 0351  •  metoprolol tartrate (LOPRESSOR) tablet 50 mg, 50 mg, Oral, Q6H, Flores Beck MD, 50 mg at 08/29/23 9127  •  ondansetron [facility]) injection 4 mg, 4 mg, Intravenous, Q6H PRN, Joyce Verma PA-C, 4 mg at 08/28/23 1748  •  pantoprazole (PROTONIX) EC tablet 40 mg, 40 mg, Oral, Daily Before Breakfast, Joyce Verma PA-C, 40 mg at 08/29/23 0516  •  polyethylene glycol (MIRALAX) packet 17 g, 17 g, Oral, Daily, Joyce Verma PA-C, 17 g at 08/29/23 9033  •  saccharomyces boulardii (FLORASTOR) capsule 250 mg, 250 mg, Oral, BID, Wandy Tuttle PA-C, 250 mg at 08/29/23 7931  •  senna (SENOKOT) tablet 8.6 mg, 1 tablet, Oral, HS, Joyce Verma PA-C, 8.6 mg at 08/28/23 2155     Labs & Results:        Results from last 7 days   Lab Units 08/29/23  0421 08/28/23  0247 08/27/23  0447   WBC Thousand/uL 10.57* 10.82* 10.26*   HEMOGLOBIN g/dL 8.7* 9.5* 9.1*   HEMATOCRIT % 27.0* 29.6* 27.9*   PLATELETS Thousands/uL 262 265 259         Results from last 7 days   Lab Units 08/29/23  0421 08/28/23 0247 08/27/23 0447 08/26/23 2052   POTASSIUM mmol/L 3.8 4.1 4.2 4.3   CHLORIDE mmol/L 104 105 105 105   CO2 mmol/L 23 24 25 25   BUN mg/dL 40* 37* 38* 40*   CREATININE mg/dL 1.85* 1.71* 1.62* 1.76*   CALCIUM mg/dL 8.4 8.6 8.5 8.4   ALK PHOS U/L  --   --   --  89   ALT U/L  --   --   --  40   AST U/L  --   --   --  25     Results from last 7 days   Lab Units 08/26/23 2052   INR  1.72*   PTT seconds 43* Results from last 7 days   Lab Units 08/28/23  0247   MAGNESIUM mg/dL 1.8*         EKG personally reviewed by Thea Ojeda MD.  Atrial fibrillation with rapid ventricular response. Counseling / Coordination of Care  Total floor / unit time spent today 25 minutes. Greater than 50% of total time was spent with the patient and / or family counseling and / or coordination of care.

## 2023-08-30 LAB
ANION GAP SERPL CALCULATED.3IONS-SCNC: 8 MMOL/L
BACTERIA SPT RESP CULT: ABNORMAL
BACTERIA UR CULT: ABNORMAL
BASOPHILS # BLD AUTO: 0.04 THOUSANDS/ÂΜL (ref 0–0.1)
BASOPHILS NFR BLD AUTO: 0 % (ref 0–1)
BUN SERPL-MCNC: 48 MG/DL (ref 5–25)
CALCIUM SERPL-MCNC: 8.5 MG/DL (ref 8.4–10.2)
CHLORIDE SERPL-SCNC: 103 MMOL/L (ref 96–108)
CO2 SERPL-SCNC: 23 MMOL/L (ref 21–32)
CREAT SERPL-MCNC: 1.95 MG/DL (ref 0.6–1.3)
EOSINOPHIL # BLD AUTO: 0.18 THOUSAND/ÂΜL (ref 0–0.61)
EOSINOPHIL NFR BLD AUTO: 2 % (ref 0–6)
ERYTHROCYTE [DISTWIDTH] IN BLOOD BY AUTOMATED COUNT: 13.4 % (ref 11.6–15.1)
GFR SERPL CREATININE-BSD FRML MDRD: 30 ML/MIN/1.73SQ M
GLUCOSE SERPL-MCNC: 135 MG/DL (ref 65–140)
GRAM STN SPEC: ABNORMAL
HCT VFR BLD AUTO: 26 % (ref 36.5–49.3)
HGB BLD-MCNC: 8.4 G/DL (ref 12–17)
IMM GRANULOCYTES # BLD AUTO: 0.09 THOUSAND/UL (ref 0–0.2)
IMM GRANULOCYTES NFR BLD AUTO: 1 % (ref 0–2)
LYMPHOCYTES # BLD AUTO: 0.9 THOUSANDS/ÂΜL (ref 0.6–4.47)
LYMPHOCYTES NFR BLD AUTO: 7 % (ref 14–44)
MAGNESIUM SERPL-MCNC: 2.1 MG/DL (ref 1.9–2.7)
MCH RBC QN AUTO: 31.6 PG (ref 26.8–34.3)
MCHC RBC AUTO-ENTMCNC: 32.3 G/DL (ref 31.4–37.4)
MCV RBC AUTO: 98 FL (ref 82–98)
MONOCYTES # BLD AUTO: 1.56 THOUSAND/ÂΜL (ref 0.17–1.22)
MONOCYTES NFR BLD AUTO: 13 % (ref 4–12)
NEUTROPHILS # BLD AUTO: 9.35 THOUSANDS/ÂΜL (ref 1.85–7.62)
NEUTS SEG NFR BLD AUTO: 77 % (ref 43–75)
NRBC BLD AUTO-RTO: 0 /100 WBCS
PLATELET # BLD AUTO: 312 THOUSANDS/UL (ref 149–390)
PMV BLD AUTO: 10.3 FL (ref 8.9–12.7)
POTASSIUM SERPL-SCNC: 3.7 MMOL/L (ref 3.5–5.3)
PROCALCITONIN SERPL-MCNC: 0.75 NG/ML
RBC # BLD AUTO: 2.66 MILLION/UL (ref 3.88–5.62)
SODIUM SERPL-SCNC: 134 MMOL/L (ref 135–147)
WBC # BLD AUTO: 12.12 THOUSAND/UL (ref 4.31–10.16)

## 2023-08-30 PROCEDURE — 84145 PROCALCITONIN (PCT): CPT | Performed by: INTERNAL MEDICINE

## 2023-08-30 PROCEDURE — 93005 ELECTROCARDIOGRAM TRACING: CPT

## 2023-08-30 PROCEDURE — 99232 SBSQ HOSP IP/OBS MODERATE 35: CPT | Performed by: INTERNAL MEDICINE

## 2023-08-30 PROCEDURE — 80048 BASIC METABOLIC PNL TOTAL CA: CPT | Performed by: INTERNAL MEDICINE

## 2023-08-30 PROCEDURE — 83735 ASSAY OF MAGNESIUM: CPT | Performed by: INTERNAL MEDICINE

## 2023-08-30 PROCEDURE — 85025 COMPLETE CBC W/AUTO DIFF WBC: CPT | Performed by: INTERNAL MEDICINE

## 2023-08-30 RX ORDER — METOPROLOL TARTRATE 50 MG/1
50 TABLET, FILM COATED ORAL EVERY 12 HOURS SCHEDULED
Status: DISCONTINUED | OUTPATIENT
Start: 2023-08-30 | End: 2023-09-06

## 2023-08-30 RX ORDER — BISACODYL 10 MG
10 SUPPOSITORY, RECTAL RECTAL DAILY PRN
Status: DISCONTINUED | OUTPATIENT
Start: 2023-08-30 | End: 2023-09-04

## 2023-08-30 RX ORDER — DILTIAZEM HYDROCHLORIDE 240 MG/1
240 CAPSULE, COATED, EXTENDED RELEASE ORAL DAILY
Status: DISCONTINUED | OUTPATIENT
Start: 2023-08-30 | End: 2023-09-15 | Stop reason: HOSPADM

## 2023-08-30 RX ORDER — OXYCODONE HYDROCHLORIDE 5 MG/1
5 TABLET ORAL EVERY 6 HOURS PRN
Status: DISCONTINUED | OUTPATIENT
Start: 2023-08-30 | End: 2023-09-15 | Stop reason: HOSPADM

## 2023-08-30 RX ORDER — SODIUM CHLORIDE 9 MG/ML
75 INJECTION, SOLUTION INTRAVENOUS CONTINUOUS
Status: DISPENSED | OUTPATIENT
Start: 2023-08-30 | End: 2023-08-30

## 2023-08-30 RX ADMIN — METOPROLOL TARTRATE 50 MG: 50 TABLET ORAL at 21:08

## 2023-08-30 RX ADMIN — BISACODYL 10 MG: 10 SUPPOSITORY RECTAL at 12:32

## 2023-08-30 RX ADMIN — SENNOSIDES 8.6 MG: 8.6 TABLET, FILM COATED ORAL at 21:08

## 2023-08-30 RX ADMIN — METOPROLOL TARTRATE 50 MG: 50 TABLET ORAL at 04:41

## 2023-08-30 RX ADMIN — GUAIFENESIN 600 MG: 600 TABLET ORAL at 09:40

## 2023-08-30 RX ADMIN — APIXABAN 2.5 MG: 2.5 TABLET, FILM COATED ORAL at 18:44

## 2023-08-30 RX ADMIN — DILTIAZEM HYDROCHLORIDE 60 MG: 60 TABLET, FILM COATED ORAL at 06:00

## 2023-08-30 RX ADMIN — OXYCODONE HYDROCHLORIDE 5 MG: 5 TABLET ORAL at 18:44

## 2023-08-30 RX ADMIN — SODIUM CHLORIDE 75 ML/HR: 0.9 INJECTION, SOLUTION INTRAVENOUS at 07:40

## 2023-08-30 RX ADMIN — ACETAMINOPHEN 325MG 650 MG: 325 TABLET ORAL at 04:42

## 2023-08-30 RX ADMIN — CEFEPIME HYDROCHLORIDE 2000 MG: 2 INJECTION, SOLUTION INTRAVENOUS at 04:44

## 2023-08-30 RX ADMIN — DILTIAZEM HYDROCHLORIDE 240 MG: 240 CAPSULE, COATED, EXTENDED RELEASE ORAL at 12:32

## 2023-08-30 RX ADMIN — GUAIFENESIN 600 MG: 600 TABLET ORAL at 18:44

## 2023-08-30 RX ADMIN — MAGNESIUM OXIDE TAB 400 MG (241.3 MG ELEMENTAL MG) 400 MG: 400 (241.3 MG) TAB at 09:40

## 2023-08-30 RX ADMIN — ASPIRIN 81 MG: 81 TABLET, COATED ORAL at 09:40

## 2023-08-30 RX ADMIN — Medication 250 MG: at 18:44

## 2023-08-30 RX ADMIN — LORATADINE 10 MG: 10 TABLET ORAL at 09:40

## 2023-08-30 RX ADMIN — FLUTICASONE PROPIONATE 1 SPRAY: 50 SPRAY, METERED NASAL at 09:46

## 2023-08-30 RX ADMIN — APIXABAN 2.5 MG: 2.5 TABLET, FILM COATED ORAL at 09:39

## 2023-08-30 RX ADMIN — DILTIAZEM HYDROCHLORIDE 60 MG: 60 TABLET, FILM COATED ORAL at 00:41

## 2023-08-30 RX ADMIN — POLYETHYLENE GLYCOL 3350 17 G: 17 POWDER, FOR SOLUTION ORAL at 09:39

## 2023-08-30 RX ADMIN — FOLIC ACID 1 MG: 1 TABLET ORAL at 09:39

## 2023-08-30 RX ADMIN — PANTOPRAZOLE SODIUM 40 MG: 40 TABLET, DELAYED RELEASE ORAL at 06:00

## 2023-08-30 RX ADMIN — ERTAPENEM SODIUM 500 MG: 1 INJECTION, POWDER, LYOPHILIZED, FOR SOLUTION INTRAMUSCULAR; INTRAVENOUS at 10:39

## 2023-08-30 RX ADMIN — Medication 250 MG: at 09:39

## 2023-08-30 RX ADMIN — OXYCODONE HYDROCHLORIDE 5 MG: 5 TABLET ORAL at 09:38

## 2023-08-30 RX ADMIN — METOPROLOL TARTRATE 50 MG: 50 TABLET ORAL at 09:39

## 2023-08-30 NOTE — PLAN OF CARE
Problem: Potential for Falls  Goal: Patient will remain free of falls  Description: INTERVENTIONS:  - Educate patient/family on patient safety including physical limitations  - Instruct patient to call for assistance with activity   - Consult OT/PT to assist with strengthening/mobility   - Keep Call bell within reach  - Keep bed low and locked with side rails adjusted as appropriate  - Keep care items and personal belongings within reach  - Initiate and maintain comfort rounds  - Make Fall Risk Sign visible to staff  - Offer Toileting every 2 Hours, in advance of need  - Initiate/Maintain bed alarm  - Obtain necessary fall risk management equipment:   - Apply yellow socks and bracelet for high fall risk patients  - Consider moving patient to room near nurses station  Outcome: Progressing     Problem: PAIN - ADULT  Goal: Verbalizes/displays adequate comfort level or baseline comfort level  Description: Interventions:  - Encourage patient to monitor pain and request assistance  - Assess pain using appropriate pain scale  - Administer analgesics based on type and severity of pain and evaluate response  - Implement non-pharmacological measures as appropriate and evaluate response  - Consider cultural and social influences on pain and pain management  - Notify physician/advanced practitioner if interventions unsuccessful or patient reports new pain  Outcome: Progressing     Problem: INFECTION - ADULT  Goal: Absence or prevention of progression during hospitalization  Description: INTERVENTIONS:  - Assess and monitor for signs and symptoms of infection  - Monitor lab/diagnostic results  - Monitor all insertion sites, i.e. indwelling lines, tubes, and drains  - Monitor endotracheal if appropriate and nasal secretions for changes in amount and color  - Douglas appropriate cooling/warming therapies per order  - Administer medications as ordered  - Instruct and encourage patient and family to use good hand hygiene technique  - Identify and instruct in appropriate isolation precautions for identified infection/condition  Outcome: Progressing  Goal: Absence of fever/infection during neutropenic period  Description: INTERVENTIONS:  - Monitor WBC    Outcome: Progressing

## 2023-08-30 NOTE — ASSESSMENT & PLAN NOTE
· Seen by ophthalmology during recent [facility] admission, diagnosed with CRVO and ocular hypertension  · Patient recommended for formal evaluation outpatient of OCT macular degeneration, discussion of possible anti-VEGF therapy  · Encouraged ophthalmology follow-up outpatient

## 2023-08-30 NOTE — ASSESSMENT & PLAN NOTE
· Hemoglobin 9.4 on admission  · Hemoglobin ranging 10-12 during recent [facility] admission  · No signs of active bleeding  · Trend CBC

## 2023-08-30 NOTE — PROGRESS NOTES
[facility]  Progress Note  Name: Ridge Castanon  MRN: [id number]  Unit/Bed#: -01 I Date of Admission: 8/26/2023   Date of Service: 8/30/2023 I Hospital Day: 4    Assessment/Plan   Cystitis  Assessment & Plan  · Patient reports dysuria and difficulty urinating   · UA with innumerable WBCs and innumerable bacteria  · Urine culture grew ESBL E. coli  · Urinary retention protocol  · We will switch to Invanz  · Trend WBC and fever curve    History of alcohol abuse  Assessment & Plan  · History of alcohol abuse with withdrawal seizures, has not drink since prior to [facility] hospitalization 8/8    History of COVID-19  Assessment & Plan  · Diagnosed with COVID-19 during recent admit  · Received 3 days of remdesivir and IV fluids  · No evidence of pneumonia during admission  · Completed quarantine while inpatient    Central retinal vein occlusion of right eye  Assessment & Plan  · Seen by ophthalmology during recent [facility] admission, diagnosed with CRVO and ocular hypertension  · Patient recommended for formal evaluation outpatient of OCT macular degeneration, discussion of possible anti-VEGF therapy  · Encouraged ophthalmology follow-up outpatient    Leukocytosis  Assessment & Plan  · WBC 10.68 K on admission  · Likely secondary to pneumonia and cystitis   · Blood cultures are negative to date  · U/c growing ESBL E. Coli  · Switch to Invanz    Stage 3b chronic kidney disease [facility])  Assessment & Plan  Lab Results   Component Value Date    EGFR 30 08/30/2023    EGFR 32 08/29/2023    EGFR 35 08/28/2023    CREATININE 1.95 (H) 08/30/2023    CREATININE 1.85 (H) 08/29/2023    CREATININE 1.71 (H) 08/28/2023   · Baseline creatinine 1.6-1.9  · Creatinine on admission 1.76  · Trend BMP daily    Chronic obstructive pulmonary disease, unspecified COPD type ([address])  Assessment & Plan  · Not on maintenance inhalers outpatient  · No signs of acute exacerbation on admission  · If patient were to develop wheezing in setting of pneumonia, would start Xopenex/Atrovent nebulizers    Atrial fibrillation [facility])  Assessment & Plan  · Went into A-fib with RVR on August 28 and was given IV Lopressor and Cardizem  · Home regimen: Coreg 25 Mg twice daily  · Started on Eliquis 2.5 mg twice daily for anticoagulation during hospitalization at [address] [facility] 8/8-8/23  · Cardiology consult appreciated  · Patient converted to normal sinus rhythm  · Cardiology switched patient to Lopressor 50 mg every 12 hours and Cardizem  mg daily  · Continue home medications    Anemia of chronic disease  Assessment & Plan  · Hemoglobin 9.4 on admission  · Hemoglobin ranging 10-12 during recent [facility] admission  · No signs of active bleeding  · Trend CBC    * Community acquired pneumonia of right lower lobe of lung  Assessment & Plan  · Presents with fever, worsening cough, and dyspnea  · Procalcitonin elevated patient with leukocytosis  · Chest x-ray is unremarkable  · Urine strep and Legionella studies is negative  · Sputum culture results reviewed  · Blood cultures are negative to date  · Respiratory protocol  · Airway clearance protocol  · On Invanz           Labs & Imaging: I have personally reviewed pertinent reports. VTE Prophylaxis: in place. Code Status:   Level 3 - DNAR and DNI    Patient Centered Rounds: I have performed bedside rounds with nursing staff today. Discussions with Specialists or Other Care Team Provider: Cardiology    Education and Discussions with Family / Patient: Daughter on phone    Current Length of Stay: 4 day(s)    Current Patient Status: Inpatient   Certification Statement: The patient will continue to require additional inpatient hospital stay due to see my assessment and plan. Subjective:   Patient is seen and examined at bedside. No new complaints. Afebrile. Converted to normal sinus rhythm  All other ROS are negative.     Objective:    Vitals: Blood pressure 139/65, pulse 78, temperature 97.8 °F (36.6 °C), temperature source Oral, resp. rate 18, height 5' 8" (1.727 m), weight 91.5 kg (201 lb 11.5 oz), SpO2 97 %. ,Body mass index is 30.67 kg/m². SPO2 RA Rest    Flowsheet Row ED to Hosp-Admission (Current) from 8/26/2023 in [facility]   SpO2 97 %   SpO2 Activity At Rest   O2 Device None (Room air)   O2 Flow Rate --        I&O:     Intake/Output Summary (Last 24 hours) at 8/30/2023 1045  Last data filed at 8/30/2023 0938  Gross per 24 hour   Intake 280 ml   Output 600 ml   Net -320 ml       Physical Exam:    General- Alert, lying comfortably in bed. Not in any acute distress. Neck- Supple, No JVD  CVS- regular, S1 and S2 normal  Chest- Bilateral Air entry, decreased at bases  Abdomen- soft, nontender, not distended, no guarding or rigidity, BS+  Extremities-  No pedal edema, No calf tenderness  CNS-   Alert, awake and orientedx3. No focal deficits present. Invasive Devices     Peripheral Intravenous Line  Duration           Peripheral IV 08/30/23 Distal;Dorsal (posterior); Right Forearm <1 day          Drain  Duration           External Urinary Catheter <1 day                      Social History  reviewed  Family History   Problem Relation Age of Onset   • Heart disease Mother         cardiac disorder   • Stroke Father    • Heart disease Father         cardiac disorder   • Heart disease Sister    • Diabetes Family    • Hypertension Family     reviewed    Meds:  Current Facility-Administered Medications   Medication Dose Route Frequency Provider Last Rate Last Admin   • acetaminophen (TYLENOL) tablet 650 mg  650 mg Oral Q6H PRN [place] Kaitlin, PA-C   650 mg at 08/30/23 0442   • aluminum-magnesium hydroxide-simethicone (MAALOX) oral suspension 30 mL  30 mL Oral Q6H PRN [place] Kaitlin, PA-C       • apixaban Luster Bouquet) tablet 2.5 mg  2.5 mg Oral BID [place] Kaitlin, PA-C   2.5 mg at 08/30/23 6185   • aspirin (ECOTRIN LOW STRENGTH) EC tablet 81 mg  81 mg Oral Daily Katerin Hassan PA-C   81 mg at 08/30/23 0940   • bisacodyl (DULCOLAX) rectal suppository 10 mg  10 mg Rectal Daily PRN Daymon Skiff, MD       • diltiazem (CARDIZEM CD) 24 hr capsule 240 mg  240 mg Oral Daily Clemens Kehr Hosford, PA-C       • ertapenem Thersa Bahai) 500 mg in sodium chloride 0.9 % 50 mL IVPB  500 mg Intravenous Q24H Daymon Skiff,  mL/hr at 08/30/23 1039 500 mg at 08/30/23 1039   • fluticasone (FLONASE) 50 mcg/act nasal spray 1 spray  1 spray Nasal Daily Katerin Hassan PA-C   1 spray at 64/83/83 3662   • folic acid (FOLVITE) tablet 1 mg  1 mg Oral Daily Katerin Hassan PA-C   1 mg at 08/30/23 3366   • guaiFENesin (MUCINEX) 12 hr tablet 600 mg  600 mg Oral BID Katerin Hassan PA-C   600 mg at 08/30/23 0940   • ipratropium (ATROVENT) 0.02 % inhalation solution 0.5 mg  0.5 mg Nebulization Q6H PRN Reji Blackman PA-C       • levalbuterol (XOPENEX) inhalation solution 1.25 mg  1.25 mg Nebulization Q6H PRN Reji Blackman PA-C       • loratadine (CLARITIN) tablet 10 mg  10 mg Oral Daily Katerin Hassan PA-C   10 mg at 08/30/23 0940   • magnesium Oxide (MAG-OX) tablet 400 mg  400 mg Oral Daily Katerin Hassan PA-C   400 mg at 08/30/23 0940   • metoprolol tartrate (LOPRESSOR) tablet 50 mg  50 mg Oral Q12H [address] Jassi Holguin PA-C   50 mg at 08/30/23 0027   • ondansetron (ZOFRAN) injection 4 mg  4 mg Intravenous Q6H PRN Katerin Hassan PA-C   4 mg at 08/28/23 1748   • oxyCODONE (ROXICODONE) IR tablet 5 mg  5 mg Oral Q6H PRN Daymon Skiff, MD   5 mg at 08/30/23 1167   • pantoprazole (PROTONIX) EC tablet 40 mg  40 mg Oral Daily Before Breakfast Katerin Hassan PA-C   40 mg at 08/30/23 0600   • polyethylene glycol (MIRALAX) packet 17 g  17 g Oral Daily Katerin Hassan PA-C   17 g at 08/30/23 9893   • saccharomyces boulardii (FLORASTOR) capsule 250 mg  250 mg Oral BID Reji Blackman PA-C   250 mg at 08/30/23 6490   • senna (SENOKOT) tablet 8.6 mg  1 tablet Oral HS Joyce Verma PA-C   8.6 mg at 08/29/23 2239   • sodium chloride 0.9 % infusion  75 mL/hr Intravenous Continuous Lia Martin MD 75 mL/hr at 08/30/23 0740 75 mL/hr at 08/30/23 0740      Medications Prior to Admission   Medication   • apixaban (ELIQUIS) 2.5 mg   • Aspirin 81 MG CAPS   • carvedilol (COREG) 12.5 mg tablet   • cetirizine (ZyrTEC) 10 mg tablet   • fluticasone (FLONASE) 50 mcg/act nasal spray   • folic acid (FOLVITE) 1 mg tablet   • losartan (COZAAR) 50 mg tablet   • MAGNESIUM OXIDE 400 PO   • pantoprazole (PROTONIX) 40 mg tablet   • polyvinyl alcohol (LIQUIFILM TEARS) 1.4 % ophthalmic solution   • magnesium hydroxide (MILK OF MAGNESIA) 400 mg/5 mL oral suspension       Labs:  Results from last 7 days   Lab Units 08/30/23  0436 08/29/23 0421 08/28/23 0247 08/27/23 0447 08/26/23 2052   WBC Thousand/uL 12.12* 10.57* 10.82*   < > 10.68*   HEMOGLOBIN g/dL 8.4* 8.7* 9.5*   < > 9.4*   HEMATOCRIT % 26.0* 27.0* 29.6*   < > 28.3*   PLATELETS Thousands/uL 312 262 265   < > 249   NEUTROS PCT % 77* 75  --   --  73   LYMPHS PCT % 7* 9*  --   --  11*   MONOS PCT % 13* 14*  --   --  15*   EOS PCT % 2 1  --   --  1    < > = values in this interval not displayed. Results from last 7 days   Lab Units 08/30/23  0436 08/29/23 0421 08/28/23 0247 08/27/23 0447 08/26/23 2052   POTASSIUM mmol/L 3.7 3.8 4.1   < > 4.3   CHLORIDE mmol/L 103 104 105   < > 105   CO2 mmol/L 23 23 24   < > 25   BUN mg/dL 48* 40* 37*   < > 40*   CREATININE mg/dL 1.95* 1.85* 1.71*   < > 1.76*   CALCIUM mg/dL 8.5 8.4 8.6   < > 8.4   ALK PHOS U/L  --   --   --   --  89   ALT U/L  --   --   --   --  40   AST U/L  --   --   --   --  25    < > = values in this interval not displayed.      Lab Results   Component Value Date    TROPONINI <0.02 01/10/2020    TROPONINI <0.02 08/30/2017    TROPONINI <0.02 03/24/2017     Results from last 7 days   Lab Units 08/26/23 2052   INR  1.72*     Lab Results   Component Value Date    BLOODCX No Growth at 72 hrs. 08/26/2023    BLOODCX No Growth at 72 hrs. 08/26/2023    URINECX >100,000 cfu/ml Escherichia coli ESBL (A) 08/27/2023    SPUTUMCULTUR 2+ Growth of 08/27/2023         Imaging:  Results for orders placed during the hospital encounter of 08/26/23    XR chest portable    Narrative  CHEST    INDICATION:   SOB.    COMPARISON: 8/26/2023    EXAM PERFORMED/VIEWS:  XR CHEST PORTABLE      FINDINGS:    Cardiomediastinal silhouette appears unremarkable. The lungs are clear. No pneumothorax or pleural effusion. Osseous structures appear within normal limits for patient age. Impression  No acute cardiopulmonary disease. Workstation performed: [id number]    Results for orders placed during the hospital encounter of 01/10/20    XR chest 2 views    Narrative  CHEST    INDICATION:   Chest Pain. COMPARISON:  3/24/2017    EXAM PERFORMED/VIEWS:  XR CHEST PA & LATERAL  Images: 2    FINDINGS:    Cardiomediastinal silhouette appears unremarkable. No congestive failure. No pneumothorax. No pneumonia. No effusions. Osseous structures appear within normal limits for patient age. Impression  No acute cardiopulmonary disease.         Workstation performed: [id number]      Last 24 Hours Medication List:   Current Facility-Administered Medications   Medication Dose Route Frequency Provider Last Rate   • acetaminophen  650 mg Oral Q6H PRN Keith Valencia PA-C     • aluminum-magnesium hydroxide-simethicone  30 mL Oral Q6H PRN Keith Valencia PA-C     • apixaban  2.5 mg Oral BID Keith Valencia PA-C     • aspirin  81 mg Oral Daily Keith Valencia PA-C     • bisacodyl  10 mg Rectal Daily PRN Marien Hodgkins, MD     • diltiazem  240 mg Oral Daily Frank Holguin PA-C     • ertapenem  500 mg Intravenous Q24H Marien Hodgkins,  mg (08/30/23 1039)   • fluticasone  1 spray Nasal Daily Keith Valencia PA-C     • folic acid  1 mg Oral Daily Ketih Valencia PA-C     • guaiFENesin  600 mg Oral BID Carole Barriga PA-C     • ipratropium  0.5 mg Nebulization Q6H PRN Magda Lynn PA-C     • levalbuterol  1.25 mg Nebulization Q6H PRN Magda Lynn PA-C     • loratadine  10 mg Oral Daily Carole Barriga PA-C     • magnesium Oxide  400 mg Oral Daily Carole Barriga PA-C     • met[facility]rate  [facility]     • ondansetron  4 mg Intravenous Q6H PRN Carole Barriga PA-C     • oxyCODONE  5 mg Oral Q6H PRN Noelle Klein MD     • pantoprazole  40 mg Oral Daily Before Breakfast Carole Barriga PA-C     • polyethylene glycol  17 g Oral Daily Carole Barriga PA-C     • saccharomyces boulardii  250 mg Oral BID Magda Lynn PA-C     • senna  1 tablet Oral HS Carole Barriga PA-C     • sodium chloride  75 mL/hr Intravenous Continuous Noelle Klein MD 75 mL/hr (08/30/23 0740)        Today, Patient Was Seen By: Noelle Klein MD    ** Please Note: Dictation voice to text software may have been used in the creation of this document.  **

## 2023-08-30 NOTE — CONSULTS
Consultation - Infectious Disease   Gerri Preston 82 y.o. male MRN: [id number]  Unit/Bed#: -01 Encounter: [id number]      Assessment/Plan     Assessment:  31-year-old man with fever, urinary tract infection, CKD, leukocytosis    Plan:  1) UTI - Pt. with initial improvement on cefepime, despite this agent being listed as effective for this organism. Agree with transition to meropenem, potential suboptimal effect of cefepime may explain patient's leukocytosis present today. Regardless, patient without signs of samanta pyelonephritis, and so would treat for a total of 7 days (with today as day 0 given results of antimicrobial susceptibility testing), with transition to oral therapy on discharge.    ===> Would CONTINUE ERTAPENEM, and follow patient closely for toxicity while on this agent. Would plan for a total of 7 days or so of treatment (i.e. until 6 September 2023). Antibiotic stop date for ertapenem placed in EMR. Patient is slated for discharge prior to this date, would prescribe amoxicillin/clavulanate 875/125 mg p.o. twice daily to complete course of therapy. 2) Fever - 2/2 #1, will follow as marker of clinical improvement. 3) Leukocytosis - 2/2 #1, will follow as marker of clinical improvement. 4) CKD - Will dose abx for pt's renal function, and avoid nephrotoxic agents as able.      ===> Discussed w/ 1' team   ===> Will sign off, but please do not hesitate to contact us with further questions, or if a change in the patient's clinical status warrants.    ===> Patient does NOT need to schedule f/u with ID on d/c UNLESS a change in clinical status or a recrudescence of symptoms warrants, but was given our contact information should any issues with prescribed post-d/c treatment arise.       History of Present Illness   Physician Requesting Consult: Al Roy MD  HPI:   Briefly, this 31-year-old man with atrial fibrillation, COPD, stage III CKD, history of ethanol abuse and recent LVH hospitalization complicated by ethanol withdrawal presented to the [address] [place] [address] emergency department on 8/26 from the facility to which he had been discharged with complaints of fever. Work-up at the time of admission was notable for normal white blood cell count (10,680, now 12,000), as well as elevated creatinine (1.76, now 1.95). Chest x-ray showed some lower lobe infiltrates, and urinalysis was notable for greater than 100 white blood cells per high-powered field. Sputum culture obtained this hospitalization grew only normal respiratory juve. The patient was started on cefepime for presumed pneumonia versus UTI (of note he had a recent SARS-CoV-2 diagnosis which was treated during his prior hospitalization) however antimicrobial susceptibility testing on urine culture done on admission shows growth of an ESBL producing strain of Escherichia coli. Of note, patient is a poor historian sometimes perseverating and repeating questions which just been answered -- PMH, ROS are supplemented by discussion with medical staff, review of chart. Inpatient consult to Infectious Diseases  Consult performed by: Justina Loja MD  Consult ordered by: Paz Carmona MD          Review of Systems   A complete review of systems was done and is negative except as per the HPI.      Historical Information   Past Medical History:   Diagnosis Date   • Alcohol abuse    • Alcohol withdrawal seizure without complication ([address])    • Arthritis    • Asthma    • CVA (cerebral vascular accident) ([address])    • Decubitus ulcer of buttock     last assessed 07/20/16   • Diabetes [facility])    • Disease of thyroid gland    • Duodenal ulcer    • Emphysema lung ([address])    • Esophageal varices (HCC)    • GERD (gastroesophageal reflux disease)    • History of transfusion    • Hypertension    • Irritable bowel syndrome with diarrhea    • Kidney stones    • Prostate cancer ([address])    • Urinary frequency     resolved 02/15/17     Past Surgical History:   Procedure Laterality Date   • BACK SURGERY     • CATARACT EXTRACTION     • ESOPHAGOGASTRODUODENOSCOPY N/A 2017    Procedure: ESOPHAGOGASTRODUODENOSCOPY (EGD); Surgeon: Jovi Hope MD;  Location:  MAIN OR;  Service:    • [place] ESOPHAGOGASTRODUODENOSCOPY TRANSORAL DIAGNOSTIC N/A 2016    Procedure: ESOPHAGOGASTRODUODENOSCOPY (EGD); Surgeon: Alvin Albarado MD;  Location:  MAIN OR;  Service: Gastroenterology   • TONSILLECTOMY       Social History   Social History     Substance and Sexual Activity   Alcohol Use Yes   • Alcohol/week: 14.0 standard drinks of alcohol   • Types: 14 Standard drinks or equivalent per week    Comment: 3-4 mixed drinks vodka per night/ 2L per week     Social History     Substance and Sexual Activity   Drug Use No     E-Cigarette/Vaping   • E-Cigarette Use Never User      E-Cigarette/Vaping Substances   • Nicotine No    • THC No    • CBD No    • Flavoring No    • Other No    • Unknown No      Social History     Tobacco Use   Smoking Status Former   • Types: Cigarettes   • Quit date:    • Years since quittin.6   Smokeless Tobacco Never   Tobacco Comments    quit 20 years ago     Family History: non-contributory    Meds/Allergies   all current active meds have been reviewed    Allergies   Allergen Reactions   • Morphine And Related        Objective       Intake/Output Summary (Last 24 hours) at 2023 1422  Last data filed at 2023 0938  Gross per 24 hour   Intake 280 ml   Output 600 ml   Net -320 ml       Invasive Devices:   Peripheral IV 23 Distal;Dorsal (posterior); Right Forearm (Active)       External Urinary Catheter (Active)   Output (mL) 400 mL 23 0401       Physical Exam  Gen: AA, NAD, conversant but confused, VS reviewed  ENT: MMM  CV: No m/r/g, S1, S2  Pulm: Lungs CTAB, no respiratory distress  ABD: S/NT/DT  Skin: No rash on exposed skin      Lab Results: I have personally reviewed pertinent labs.   Imaging Studies: I have personally reviewed pertinent reports. and I have personally reviewed pertinent films in PACS  EKG, Pathology, and Other Studies: I have personally reviewed pertinent reports.

## 2023-08-30 NOTE — ASSESSMENT & PLAN NOTE
· History of alcohol abuse with withdrawal seizures, has not drink since prior to [facility] hospitalization 8/8

## 2023-08-30 NOTE — ASSESSMENT & PLAN NOTE
· Went into A-fib with RVR on August 28 and was given IV Lopressor and Cardizem  · Home regimen: Coreg 25 Mg twice daily  · Started on Eliquis 2.5 mg twice daily for anticoagulation during hospitalization at [address] [place] [address] 8/8-8/23  · Cardiology consult appreciated  · Patient converted to normal sinus rhythm  · Cardiology switched patient to Lopressor 50 mg every 12 hours and Cardizem  mg daily  · Continue home medications

## 2023-08-30 NOTE — PLAN OF CARE
Problem: PAIN - ADULT  Goal: Verbalizes/displays adequate comfort level or baseline comfort level  Description: Interventions:  - Encourage patient to monitor pain and request assistance  - Assess pain using appropriate pain scale  - Administer analgesics based on type and severity of pain and evaluate response  - Implement non-pharmacological measures as appropriate and evaluate response  - Consider cultural and social influences on pain and pain management  - Notify physician/advanced practitioner if interventions unsuccessful or patient reports new pain  Outcome: Progressing     Problem: INFECTION - ADULT  Goal: Absence or prevention of progression during hospitalization  Description: INTERVENTIONS:  - Assess and monitor for signs and symptoms of infection  - Monitor lab/diagnostic results  - Monitor all insertion sites, i.e. indwelling lines, tubes, and drains  - Monitor endotracheal if appropriate and nasal secretions for changes in amount and color  - [place] appropriate cooling/warming therapies per order  - Administer medications as ordered  - Instruct and encourage patient and family to use good hand hygiene technique  - Identify and instruct in appropriate isolation precautions for identified infection/condition  Outcome: Progressing     Problem: Prexisting or High Potential for Compromised Skin Integrity  Goal: Skin integrity is maintained or improved  Description: INTERVENTIONS:  - Identify patients at risk for skin breakdown  - Assess and monitor skin integrity  - Assess and monitor nutrition and hydration status  - Monitor labs   - Assess for incontinence   - Turn and reposition patient  - Assist with mobility/ambulation  - Relieve pressure over bony prominences  - Avoid friction and shearing  - Provide appropriate hygiene as needed including keeping skin clean and dry  - Evaluate need for skin moisturizer/barrier cream  - Collaborate with interdisciplinary team   - Patient/family teaching  - Consider wound care consult   Outcome: Progressing

## 2023-08-30 NOTE — ASSESSMENT & PLAN NOTE
· Patient reports dysuria and difficulty urinating   · UA with innumerable WBCs and innumerable bacteria  · Urine culture grew ESBL E. coli  · Urinary retention protocol  · We will switch to Invanz  · Trend WBC and fever curve

## 2023-08-30 NOTE — PLAN OF CARE
Problem: Potential for Falls  Goal: Patient will remain free of falls  Description: INTERVENTIONS:  - Educate patient/family on patient safety including physical limitations  - Instruct patient to call for assistance with activity   - Consult OT/PT to assist with strengthening/mobility   - Keep Call bell within reach  - Keep bed low and locked with side rails adjusted as appropriate  - Keep care items and personal belongings within reach  - Initiate and maintain comfort rounds  - Make Fall Risk Sign visible to staff  - Offer Toileting every 2 Hours, in advance of need  - Initiate/Maintain bed alarm  - Obtain necessary fall risk management equipment:   - Apply yellow socks and bracelet for high fall risk patients  - Consider moving patient to room near nurses station  Outcome: Progressing     Problem: PAIN - ADULT  Goal: Verbalizes/displays adequate comfort level or baseline comfort level  Description: Interventions:  - Encourage patient to monitor pain and request assistance  - Assess pain using appropriate pain scale  - Administer analgesics based on type and severity of pain and evaluate response  - Implement non-pharmacological measures as appropriate and evaluate response  - Consider cultural and social influences on pain and pain management  - Notify physician/advanced practitioner if interventions unsuccessful or patient reports new pain  Outcome: Progressing     Problem: INFECTION - ADULT  Goal: Absence or prevention of progression during hospitalization  Description: INTERVENTIONS:  - Assess and monitor for signs and symptoms of infection  - Monitor lab/diagnostic results  - Monitor all insertion sites, i.e. indwelling lines, tubes, and drains  - Monitor endotracheal if appropriate and nasal secretions for changes in amount and color  - [place] appropriate cooling/warming therapies per order  - Administer medications as ordered  - Instruct and encourage patient and family to use good hand hygiene technique  - Identify and instruct in appropriate isolation precautions for identified infection/condition  Outcome: Progressing  Goal: Absence of fever/infection during neutropenic period  Description: INTERVENTIONS:  - Monitor WBC    Outcome: Progressing     Problem: SAFETY ADULT  Goal: Maintain or return to baseline ADL function  Description: INTERVENTIONS:  -  Assess patient's ability to carry out ADLs; assess patient's baseline for ADL function and identify physical deficits which impact ability to perform ADLs (bathing, care of mouth/teeth, toileting, grooming, dressing, etc.)  - Assess/evaluate cause of self-care deficits   - Assess range of motion  - Assess patient's mobility; develop plan if impaired  - Assess patient's need for assistive devices and provide as appropriate  - Encourage maximum independence but intervene and supervise when necessary  - Involve family in performance of ADLs  - Assess for home care needs following discharge   - Consider OT consult to assist with ADL evaluation and planning for discharge  - Provide patient education as appropriate  Outcome: Progressing  Goal: Maintains/Returns to pre admission functional level  Description: INTERVENTIONS:  - Perform BMAT or MOVE assessment daily.   - Set and communicate daily mobility goal to care team and patient/family/caregiver. - Collaborate with rehabilitation services on mobility goals if consulted  - Perform Range of Motion 3 times a day. - Reposition patient every 2 hours.   - Dangle patient 2 times a day  - Stand patient 2 times a day  - Ambulate patient 2 times a day  - Out of bed to chair 2 times a day   - Out of bed for meals 2 times a day  - Out of bed for toileting  - Record patient progress and toleration of activity level   Outcome: Progressing     Problem: DISCHARGE PLANNING  Goal: Discharge to home or other facility with appropriate resources  Description: INTERVENTIONS:  - Identify barriers to discharge w/patient and caregiver  - Arrange for needed discharge resources and transportation as appropriate  - Identify discharge learning needs (meds, wound care, etc.)  - Arrange for interpretive services to assist at discharge as needed  - Refer to Case Management Department for coordinating discharge planning if the patient needs post-hospital services based on physician/advanced practitioner order or complex needs related to functional status, cognitive ability, or social support system  Outcome: Progressing     Problem: Knowledge Deficit  Goal: Patient/family/caregiver demonstrates understanding of disease process, treatment plan, medications, and discharge instructions  Description: Complete learning assessment and assess knowledge base.   Interventions:  - Provide teaching at level of understanding  - Provide teaching via preferred learning methods  Outcome: Progressing     Problem: Prexisting or High Potential for Compromised Skin Integrity  Goal: Skin integrity is maintained or improved  Description: INTERVENTIONS:  - Identify patients at risk for skin breakdown  - Assess and monitor skin integrity  - Assess and monitor nutrition and hydration status  - Monitor labs   - Assess for incontinence   - Turn and reposition patient  - Assist with mobility/ambulation  - Relieve pressure over bony prominences  - Avoid friction and shearing  - Provide appropriate hygiene as needed including keeping skin clean and dry  - Evaluate need for skin moisturizer/barrier cream  - Collaborate with interdisciplinary team   - Patient/family teaching  - Consider wound care consult   Outcome: Progressing     Problem: MOBILITY - ADULT  Goal: Maintain or return to baseline ADL function  Description: INTERVENTIONS:  -  Assess patient's ability to carry out ADLs; assess patient's baseline for ADL function and identify physical deficits which impact ability to perform ADLs (bathing, care of mouth/teeth, toileting, grooming, dressing, etc.)  - Assess/evaluate cause of self-care deficits   - Assess range of motion  - Assess patient's mobility; develop plan if impaired  - Assess patient's need for assistive devices and provide as appropriate  - Encourage maximum independence but intervene and supervise when necessary  - Involve family in performance of ADLs  - Assess for home care needs following discharge   - Consider OT consult to assist with ADL evaluation and planning for discharge  - Provide patient education as appropriate  Outcome: Progressing  Goal: Maintains/Returns to pre admission functional level  Description: INTERVENTIONS:  - Perform BMAT or MOVE assessment daily.   - Set and communicate daily mobility goal to care team and patient/family/caregiver. - Collaborate with rehabilitation services on mobility goals if consulted  - Perform Range of Motion 3 times a day. - Reposition patient every 2 hours.   - Dangle patient 2 times a day  - Stand patient 2 times a day  - Ambulate patient 2 times a day  - Out of bed to chair 2 times a day   - Out of bed for meals 2 times a day  - Out of bed for toileting  - Record patient progress and toleration of activity level   Outcome: Progressing

## 2023-08-30 NOTE — ASSESSMENT & PLAN NOTE
· WBC 10.68 K on admission  · Likely secondary to pneumonia and cystitis   · Blood cultures are negative to date  · U/c growing ESBL E. Coli  · Switch to [employer]

## 2023-08-30 NOTE — ASSESSMENT & PLAN NOTE
· Presents with fever, worsening cough, and dyspnea  · Procalcitonin elevated patient with leukocytosis  · Chest x-ray is unremarkable  · Urine strep and Legionella studies is negative  · Sputum culture results reviewed  · Blood cultures are negative to date  · Respiratory protocol  · Airway clearance protocol  · On Invanz

## 2023-08-30 NOTE — ASSESSMENT & PLAN NOTE
Lab Results   Component Value Date    EGFR 30 08/30/2023    EGFR 32 08/29/2023    EGFR 35 08/28/2023    CREATININE 1.95 (H) 08/30/2023    CREATININE 1.85 (H) 08/29/2023    CREATININE 1.71 (H) 08/28/2023   · Baseline creatinine 1.6-1.9  · Creatinine on admission 1.76  · Trend BMP daily

## 2023-08-30 NOTE — PROGRESS NOTES
Progress Note - Cardiology   Will Berumen 82 y.o. male MRN: [id number]  Unit/Bed#: -01 Encounter: [id number]        Problem List:  Principal Problem:    Community acquired pneumonia of right lower lobe of lung  Active Problems:    Anemia of chronic disease    Atrial fibrillation ([address])    Chronic obstructive pulmonary disease, unspecified COPD type ([address])    Stage 3b chronic kidney disease ([address])    Leukocytosis    Central retinal vein occlusion of right eye    History of COVID-19    History of alcohol abuse    Cystitis      Assessment:  1. Paroxysmal atrial fibrillation   a. Initial diagnosis August 2023  b. CV score 5, patient on 2.5 twice daily of Eliquis  c. 8/2023 Echo: LVEF 60 to 65%, normal left atrial size, negative bubble study  d. Converted to sinus rhythm 8/30/2023  2. Community-acquired pneumonia  3. Urinary tract infection  4. Chronic kidney disease, baseline creatinine 1.6-1.9  5. Hypertension  6. Emphysema  7. History of alcohol use with prior withdrawal seizures, currently in remission  8. History of central retinal vein occlusion August 2023  a. 8/10/2023 Echo with negative bubble study    Telemetry 8/30/2023          Plan/ Discussion:  • EKG to confirm NSR  • Change metoprolol to 50 Q 12  • Consolidate diltiazem to 240 QD long-acting  • Continue Eliquis 2.5 twice daily (age > 80, Cr > 1.5, Wt > 60 kg)    Subjective:  Overall feeling poorly however having no chest pain or chest pressure. No dizziness.   Feels as if he is having some trouble thinking    Vitals:  Vitals:    08/26/23 2030   Weight: 91.5 kg (201 lb 11.5 oz)   ,  Vitals:    08/30/23 0441 08/30/23 0600 08/30/23 0600 08/30/23 0801   BP: 128/66 124/63 124/63 139/65   BP Location:    Left arm   Pulse: 85  77 78   Resp:    18   Temp:   98.6 °F (37 °C) 97.8 °F (36.6 °C)   TempSrc:    Oral   SpO2: 95%  97% 97%   Weight:       Height:           Exam:  General: Alert awake and oriented, no acute distress  Heart:  Regular rate and rhythm, no murmurs, Normal S1, no edema    Respiratory effort/ Lungs:  Breathing comfortably on room air, clear bilaterally without wheezing, rales, crackles   Abdominal: Non-tender to palpation, + bowel sounds, soft, no masses or distension  Lower Limbs:  No edema            Telemetry:       Normal sinus rhythm, , Heart Rate 80s    Medications:    Current Facility-Administered Medications:   •  acetaminophen (TYLENOL) tablet 650 mg, 650 mg, Oral, Q6H PRN, Earna Karlie PA-C, 650 mg at 08/30/23 5326  •  aluminum-magnesium hydroxide-simethicone (MAALOX) oral suspension 30 mL, 30 mL, Oral, Q6H PRN, Gil Ziegler PA-C  •  apixaban [place] [employer]) tablet 2.5 mg, 2.5 mg, Oral, BID, Earna Karlie PA-C, 2.5 mg at 08/29/23 1702  •  aspirin (ECOTRIN LOW STRENGTH) EC tablet 81 mg, 81 mg, Oral, Daily, Earna Karlie PA-C, 81 mg at 08/29/23 0911  •  diltiazem (CARDIZEM) tablet 60 mg, 60 mg, Oral, Q6H [address], Alexis Ya MD, 60 mg at 08/30/23 0600  •  ertapenem (INVanz) 500 mg in sodium chloride 0.9 % 50 mL IVPB, 500 mg, Intravenous, Q24H, Fiorella Thomas MD  •  fluticasone (FLONASE) 50 mcg/act nasal spray 1 spray, 1 spray, Nasal, Daily, ARSEN Lara-C, 1 spray at 25/38/12 7652  •  folic acid (FOLVITE) tablet 1 mg, 1 mg, Oral, Daily, Earna Karlie PA-C, 1 mg at 08/29/23 4538  •  guaiFENesin (MUCINEX) 12 hr tablet 600 mg, 600 mg, Oral, BID, Earna Karlie, PA-C, 600 mg at 08/29/23 1702  •  ipratropium (ATROVENT) 0.02 % inhalation solution 0.5 mg, 0.5 mg, Nebulization, Q6H PRN, Jg Chaves PA-C  •  levalbuterol (XOPENEX) inhalation solution 1.25 mg, 1.25 mg, Nebulization, Q6H PRN, Jg Chaves PA-C  •  loratadine (CLARITIN) tablet 10 mg, 10 mg, Oral, Daily, Gil Ziegler PA-C, 10 mg at 08/29/23 9645  •  magnesium Oxide (MAG-OX) tablet 400 mg, 400 mg, Oral, Daily, Gil Ziegler PA-C, 400 mg at 08/29/23 3192  •  metoprolol tartrate (LOPRESSOR) tablet 50 mg, 50 mg, Oral, Q6H, Alexis Ya MD, 50 mg at 08/30/23 0441  •  ondansetron [facility]) injection 4 mg, 4 mg, Intravenous, Q6H PRN, María Pancoast, PA-C, 4 mg at 08/28/23 1748  •  pantoprazole (PROTONIX) EC tablet 40 mg, 40 mg, Oral, Daily Before Breakfast, María Pancoast, PA-C, 40 mg at 08/30/23 0600  •  polyethylene glycol (MIRALAX) packet 17 g, 17 g, Oral, Daily, María Pancoast, PA-C, 17 g at 08/29/23 3916  •  saccharomyces boulardii (FLORASTOR) capsule 250 mg, 250 mg, Oral, BID, Wandy Tuttle, PA-C, 250 mg at 08/29/23 1702  •  senna (SENOKOT) tablet 8.6 mg, 1 tablet, Oral, HS, María Pancoast, PA-C, 8.6 mg at 08/29/23 2239  •  sodium chloride 0.9 % infusion, 75 mL/hr, Intravenous, Continuous, Any Harrison MD, Last Rate: 75 mL/hr at 08/30/23 0740, 75 mL/hr at 08/30/23 0740      Labs/Data:        Results from last 7 days   Lab Units 08/30/23 0436 08/29/23 0421 08/28/23  0247   WBC Thousand/uL 12.12* 10.57* 10.82*   HEMOGLOBIN g/dL 8.4* 8.7* 9.5*   HEMATOCRIT % 26.0* 27.0* 29.6*   PLATELETS Thousands/uL 312 262 265     Results from last 7 days   Lab Units 08/30/23  0436 08/29/23 0421 08/28/23  0247   POTASSIUM mmol/L 3.7 3.8 4.1   CHLORIDE mmol/L 103 104 105   CO2 mmol/L 23 23 24   BUN mg/dL 48* 40* 37*   CREATININE mg/dL 1.95* 1.85* 1.71*

## 2023-08-31 PROBLEM — G93.40 ACUTE ENCEPHALOPATHY: Status: ACTIVE | Noted: 2023-08-31

## 2023-08-31 LAB
ANION GAP SERPL CALCULATED.3IONS-SCNC: 8 MMOL/L
ATRIAL RATE: 85 BPM
BASOPHILS # BLD AUTO: 0.05 THOUSANDS/ÂΜL (ref 0–0.1)
BASOPHILS NFR BLD AUTO: 0 % (ref 0–1)
BUN SERPL-MCNC: 54 MG/DL (ref 5–25)
CALCIUM SERPL-MCNC: 8.6 MG/DL (ref 8.4–10.2)
CHLORIDE SERPL-SCNC: 105 MMOL/L (ref 96–108)
CO2 SERPL-SCNC: 22 MMOL/L (ref 21–32)
CREAT SERPL-MCNC: 2.49 MG/DL (ref 0.6–1.3)
EOSINOPHIL # BLD AUTO: 0.11 THOUSAND/ÂΜL (ref 0–0.61)
EOSINOPHIL NFR BLD AUTO: 1 % (ref 0–6)
ERYTHROCYTE [DISTWIDTH] IN BLOOD BY AUTOMATED COUNT: 13.5 % (ref 11.6–15.1)
GFR SERPL CREATININE-BSD FRML MDRD: 22 ML/MIN/1.73SQ M
GLUCOSE SERPL-MCNC: 108 MG/DL (ref 65–140)
HCT VFR BLD AUTO: 26.3 % (ref 36.5–49.3)
HGB BLD-MCNC: 8.4 G/DL (ref 12–17)
IMM GRANULOCYTES # BLD AUTO: 0.1 THOUSAND/UL (ref 0–0.2)
IMM GRANULOCYTES NFR BLD AUTO: 1 % (ref 0–2)
LYMPHOCYTES # BLD AUTO: 0.74 THOUSANDS/ÂΜL (ref 0.6–4.47)
LYMPHOCYTES NFR BLD AUTO: 6 % (ref 14–44)
MCH RBC QN AUTO: 31.7 PG (ref 26.8–34.3)
MCHC RBC AUTO-ENTMCNC: 31.9 G/DL (ref 31.4–37.4)
MCV RBC AUTO: 99 FL (ref 82–98)
MONOCYTES # BLD AUTO: 1.79 THOUSAND/ÂΜL (ref 0.17–1.22)
MONOCYTES NFR BLD AUTO: 13 % (ref 4–12)
NEUTROPHILS # BLD AUTO: 10.78 THOUSANDS/ÂΜL (ref 1.85–7.62)
NEUTS SEG NFR BLD AUTO: 79 % (ref 43–75)
NRBC BLD AUTO-RTO: 0 /100 WBCS
P AXIS: 98 DEGREES
PLATELET # BLD AUTO: 297 THOUSANDS/UL (ref 149–390)
PMV BLD AUTO: 10.6 FL (ref 8.9–12.7)
POTASSIUM SERPL-SCNC: 3.8 MMOL/L (ref 3.5–5.3)
PR INTERVAL: 176 MS
QRS AXIS: -15 DEGREES
QRSD INTERVAL: 76 MS
QT INTERVAL: 382 MS
QTC INTERVAL: 454 MS
RBC # BLD AUTO: 2.65 MILLION/UL (ref 3.88–5.62)
SODIUM SERPL-SCNC: 135 MMOL/L (ref 135–147)
T WAVE AXIS: 56 DEGREES
VENTRICULAR RATE: 85 BPM
WBC # BLD AUTO: 13.57 THOUSAND/UL (ref 4.31–10.16)

## 2023-08-31 PROCEDURE — 85025 COMPLETE CBC W/AUTO DIFF WBC: CPT | Performed by: INTERNAL MEDICINE

## 2023-08-31 PROCEDURE — 99232 SBSQ HOSP IP/OBS MODERATE 35: CPT | Performed by: INTERNAL MEDICINE

## 2023-08-31 PROCEDURE — 93010 ELECTROCARDIOGRAM REPORT: CPT | Performed by: INTERNAL MEDICINE

## 2023-08-31 PROCEDURE — 80048 BASIC METABOLIC PNL TOTAL CA: CPT | Performed by: INTERNAL MEDICINE

## 2023-08-31 RX ORDER — SODIUM PHOSPHATE,MONO-DIBASIC 19G-7G/118
1 ENEMA (ML) RECTAL ONCE
Status: COMPLETED | OUTPATIENT
Start: 2023-08-31 | End: 2023-08-31

## 2023-08-31 RX ORDER — SODIUM CHLORIDE 9 MG/ML
75 INJECTION, SOLUTION INTRAVENOUS CONTINUOUS
Status: DISCONTINUED | OUTPATIENT
Start: 2023-08-31 | End: 2023-09-01

## 2023-08-31 RX ADMIN — BISACODYL 10 MG: 10 SUPPOSITORY RECTAL at 09:36

## 2023-08-31 RX ADMIN — DILTIAZEM HYDROCHLORIDE 240 MG: 240 CAPSULE, COATED, EXTENDED RELEASE ORAL at 11:32

## 2023-08-31 RX ADMIN — APIXABAN 2.5 MG: 2.5 TABLET, FILM COATED ORAL at 17:22

## 2023-08-31 RX ADMIN — METOPROLOL TARTRATE 50 MG: 50 TABLET ORAL at 21:44

## 2023-08-31 RX ADMIN — ACETAMINOPHEN 325MG 650 MG: 325 TABLET ORAL at 10:15

## 2023-08-31 RX ADMIN — SODIUM PHOSPHATE 1 ENEMA: 7; 19 ENEMA RECTAL at 14:22

## 2023-08-31 RX ADMIN — GUAIFENESIN 600 MG: 600 TABLET ORAL at 17:22

## 2023-08-31 RX ADMIN — PANTOPRAZOLE SODIUM 40 MG: 40 TABLET, DELAYED RELEASE ORAL at 05:44

## 2023-08-31 RX ADMIN — FOLIC ACID 1 MG: 1 TABLET ORAL at 10:15

## 2023-08-31 RX ADMIN — Medication 250 MG: at 17:22

## 2023-08-31 RX ADMIN — OXYCODONE HYDROCHLORIDE 5 MG: 5 TABLET ORAL at 07:15

## 2023-08-31 RX ADMIN — SODIUM CHLORIDE 75 ML/HR: 0.9 INJECTION, SOLUTION INTRAVENOUS at 13:45

## 2023-08-31 RX ADMIN — LORATADINE 10 MG: 10 TABLET ORAL at 10:15

## 2023-08-31 RX ADMIN — APIXABAN 2.5 MG: 2.5 TABLET, FILM COATED ORAL at 10:15

## 2023-08-31 RX ADMIN — SENNOSIDES 8.6 MG: 8.6 TABLET, FILM COATED ORAL at 21:44

## 2023-08-31 RX ADMIN — ERTAPENEM SODIUM 500 MG: 1 INJECTION, POWDER, LYOPHILIZED, FOR SOLUTION INTRAMUSCULAR; INTRAVENOUS at 10:06

## 2023-08-31 RX ADMIN — MAGNESIUM OXIDE TAB 400 MG (241.3 MG ELEMENTAL MG) 400 MG: 400 (241.3 MG) TAB at 10:15

## 2023-08-31 RX ADMIN — GUAIFENESIN 600 MG: 600 TABLET ORAL at 10:15

## 2023-08-31 RX ADMIN — Medication 250 MG: at 10:15

## 2023-08-31 RX ADMIN — ASPIRIN 81 MG: 81 TABLET, COATED ORAL at 10:15

## 2023-08-31 RX ADMIN — METOPROLOL TARTRATE 50 MG: 50 TABLET ORAL at 10:15

## 2023-08-31 NOTE — ASSESSMENT & PLAN NOTE
· Went into A-fib with RVR on August 28 and was given IV Lopressor and Cardizem  · Home regimen: Coreg 25 Mg twice daily  · Started on Eliquis 2.5 mg twice daily for anticoagulation during hospitalization at [facility] 8/8-8/23  · Cardiology consult appreciated  · Patient converted to normal sinus rhythm  · Cardiology switched patient to Lopressor 50 mg every 12 hours and Cardizem  mg daily  · Continue home medications

## 2023-08-31 NOTE — ASSESSMENT & PLAN NOTE
· Patient reports dysuria and difficulty urinating   · UA with innumerable WBCs and innumerable bacteria  · Urine culture grew ESBL E. coli  · Urinary retention protocol  · Continue Invanz  · Trend WBC and fever curve  · ID consult appreciated

## 2023-08-31 NOTE — PROGRESS NOTES
[address] [facility]  Progress Note  Name: Shyam Paz  MRN: [id number]  Unit/Bed#: -01 I Date of Admission: 8/26/2023   Date of Service: 8/31/2023 I Hospital Day: 5    Assessment/Plan   Acute encephalopathy  Assessment & Plan  Patient is confused likely acute metabolic encephalopathy in setting of UTI versus hospital delirium  Frequent reorientation  Continue IV antibiotics  Trend WBC and fever curve  Neurochecks    Cystitis  Assessment & Plan  · Patient reports dysuria and difficulty urinating   · UA with innumerable WBCs and innumerable bacteria  · Urine culture grew ESBL E. coli  · Urinary retention protocol  · Continue Invanz  · Trend WBC and fever curve  · ID consult appreciated    History of alcohol abuse  Assessment & Plan  · History of alcohol abuse with withdrawal seizures, has not drink since prior to [facility] hospitalization 8/8    History of COVID-19  Assessment & Plan  · Diagnosed with COVID-19 during recent admit  · Received 3 days of remdesivir and IV fluids  · No evidence of pneumonia during admission  · Completed quarantine while inpatient    Central retinal vein occlusion of right eye  Assessment & Plan  · Seen by ophthalmology during recent [address] [facility] admission, diagnosed with CRVO and ocular hypertension  · Patient recommended for formal evaluation outpatient of OCT macular degeneration, discussion of possible anti-VEGF therapy  · Encouraged ophthalmology follow-up outpatient    Leukocytosis  Assessment & Plan  · WBC 10.68 K on admission  · Likely secondary to pneumonia and cystitis   · Blood cultures are negative to date  · U/c growing ESBL E. Coli  · Continue to Invanz    Stage 3b chronic kidney disease [facility])  Assessment & Plan  Lab Results   Component Value Date    EGFR 22 08/31/2023    EGFR 30 08/30/2023    EGFR 32 08/29/2023    CREATININE 2.49 (H) 08/31/2023    CREATININE 1.95 (H) 08/30/2023    CREATININE 1.85 (H) 08/29/2023   · Baseline creatinine 1.6-1.9  · Creatinine on admission 1.76  · Creatinine this morning is 2.49  · Gentle IV fluids  · Trend BMP daily    Chronic obstructive pulmonary disease, unspecified COPD type ([address])  Assessment & Plan  · Not on maintenance inhalers outpatient  · No signs of acute exacerbation on admission  · If patient were to develop wheezing in setting of pneumonia, would start Xopenex/Atrovent nebulizers    Atrial fibrillation [facility])  Assessment & Plan  · Went into A-fib with RVR on August 28 and was given IV Lopressor and Cardizem  · Home regimen: Coreg 25 Mg twice daily  · Started on Eliquis 2.5 mg twice daily for anticoagulation during hospitalization at [address] [facility] 8/8-8/23  · Cardiology consult appreciated  · Patient converted to normal sinus rhythm  · Cardiology switched patient to Lopressor 50 mg every 12 hours and Cardizem  mg daily  · Continue home medications    Anemia of chronic disease  Assessment & Plan  · Hemoglobin 9.4 on admission  · Hemoglobin ranging 10-12 during recent [facility] admission  · No signs of active bleeding  · Trend CBC    * Community acquired pneumonia of right lower lobe of lung  Assessment & Plan  · Presents with fever, worsening cough, and dyspnea  · Procalcitonin elevated patient with leukocytosis  · Chest x-ray is unremarkable  · Urine strep and Legionella studies is negative  · Sputum culture results reviewed  · Blood cultures are negative to date  · Respiratory protocol  · Airway clearance protocol  · On Invanz           Labs & Imaging: I have personally reviewed pertinent reports. VTE Prophylaxis: in place. Code Status:   Level 3 - DNAR and DNI    Patient Centered Rounds: I have performed bedside rounds with nursing staff today.     Discussions with Specialists or Other Care Team Provider: CM    Education and Discussions with Family / Patient: Wife at bedside    Current Length of Stay: 5 day(s)    Current Patient Status: Inpatient   Certification Statement: The patient will continue to require additional inpatient hospital stay due to see my assessment and plan. Subjective:   Patient is seen and examined at bedside. Patient is confused this morning. Afebrile  All other ROS are negative. Objective:    Vitals: Blood pressure 159/85, pulse 100, temperature (!) 100.6 °F (38.1 °C), resp. rate 18, height 5' 8" (1.727 m), weight 91.5 kg (201 lb 11.5 oz), SpO2 92 %. ,Body mass index is 30.67 kg/m². SPO2 RA Rest    Flowsheet Row ED to Hosp-Admission (Current) from 8/26/2023 in [address] [facility] Med Surg Unit   SpO2 92 %   SpO2 Activity At Rest   O2 Device None (Room air)   O2 Flow Rate --        I&O:     Intake/Output Summary (Last 24 hours) at 8/31/2023 1341  Last data filed at 8/31/2023 0300  Gross per 24 hour   Intake 60 ml   Output --   Net 60 ml       Physical Exam:    General- Alert, lying comfortably in bed. Not in any acute distress. Neck- Supple, No JVD  CVS- regular, S1 and S2 normal  Chest- Bilateral Air entry, No rhochi, crackles or wheezing present. Abdomen- soft, nontender, not distended, no guarding or rigidity, BS+  Extremities-  No pedal edema, No calf tenderness  CNS-   Alert, awake and orientedx1. No focal deficits present. Invasive Devices     Peripheral Intravenous Line  Duration           Peripheral IV 08/30/23 Distal;Dorsal (posterior); Right Forearm 1 day          Drain  Duration           External Urinary Catheter 2 days                      Social History  reviewed  Family History   Problem Relation Age of Onset   • Heart disease Mother         cardiac disorder   • Stroke Father    • Heart disease Father         cardiac disorder   • Heart disease Sister    • Diabetes Family    • Hypertension Family     reviewed    Meds:  Current Facility-Administered Medications   Medication Dose Route Frequency Provider Last Rate Last Admin   • acetaminophen (TYLENOL) tablet 650 mg  650 mg Oral Q6H PRN Phuc Mendoza PA-C   650 mg at 08/31/23 1015   • aluminum-magnesium hydroxide-simethicone (MAALOX) oral suspension 30 mL  30 mL Oral Q6H PRN Godfrey Fernandez PA-C       • apixaban Tarry Collette) tablet 2.5 mg  2.5 mg Oral BID Godfrey Fernandez PA-C   2.5 mg at 08/31/23 1015   • aspirin (ECOTRIN LOW STRENGTH) EC tablet 81 mg  81 mg Oral Daily Godfrey Fernandez PA-C   81 mg at 08/31/23 1015   • bisacodyl (DULCOLAX) rectal suppository 10 mg  10 mg Rectal Daily PRN Wolfgang Carolina MD   10 mg at 08/31/23 0936   • diltiazem (CARDIZEM CD) 24 hr capsule 240 mg  240 mg Oral Daily Caitlin Sajan Holguin PA-C   240 mg at 08/31/23 1132   • ertapenem (INVanz) 500 mg in sodium chloride 0.9 % 50 mL IVPB  500 mg Intravenous Q24H Adria Lopez  mL/hr at 08/31/23 1006 500 mg at 08/31/23 1006   • fluticasone (FLONASE) 50 mcg/act nasal spray 1 spray  1 spray Nasal Daily Godfrey Fernandez PA-C   1 spray at 21/89/83 3024   • folic acid (FOLVITE) tablet 1 mg  1 mg Oral Daily Godfrey Fernandez PA-C   1 mg at 08/31/23 1015   • guaiFENesin (MUCINEX) 12 hr tablet 600 mg  600 mg Oral BID Godfrey Fernandez PA-C   600 mg at 08/31/23 1015   • ipratropium (ATROVENT) 0.02 % inhalation solution 0.5 mg  0.5 mg Nebulization Q6H PRN Mayra Dominguez PA-C       • levalbuterol (XOPENEX) inhalation solution 1.25 mg  1.25 mg Nebulization Q6H PRN Mayra Dominguez PA-C       • loratadine (CLARITIN) tablet 10 mg  10 mg Oral Daily Godfrey Fernandez PA-C   10 mg at 08/31/23 1015   • magnesium Oxide (MAG-OX) tablet 400 mg  400 mg Oral Daily Godfrey Fernandez PA-C   400 mg at 08/31/23 1015   • metoprolol tartrate (LOPRESSOR) tablet 50 mg  50 mg Oral Q12H [address] Jassi Holguin PA-C   50 mg at 08/31/23 1015   • ondansetron (ZOFRAN) injection 4 mg  4 mg Intravenous Q6H PRN Godfrey Fernandez PA-C   4 mg at 08/28/23 1748   • oxyCODONE (ROXICODONE) IR tablet 5 mg  5 mg Oral Q6H PRN Wolfgang Carolina MD   5 mg at 08/31/23 0715   • pantoprazole (PROTONIX) EC tablet 40 mg  40 mg Oral Daily Before Breakfast Gabbi Ottolizet PA-C   40 mg at 08/31/23 0544   • polyethylene glycol (MIRALAX) packet 17 g  17 g Oral Daily Gabbi Litten PA-C   17 g at 08/30/23 7754   • saccharomyces boulardii (FLORASTOR) capsule 250 mg  250 mg Oral BID Vesta Yaritza, PA-C   250 mg at 08/31/23 1015   • senna (SENOKOT) tablet 8.6 mg  1 tablet Oral HS Gabbi Ottolizet, PA-C   8.6 mg at 08/30/23 2108   • sodium chloride 0.9 % infusion  75 mL/hr Intravenous Continuous Martín Kuo MD       • sodium phosphate-biphosphate (FLEET) enema 1 enema  1 enema Rectal Once Martín Kuo MD          Medications Prior to Admission   Medication   • apixaban (ELIQUIS) 2.5 mg   • Aspirin 81 MG CAPS   • carvedilol (COREG) 12.5 mg tablet   • cetirizine (ZyrTEC) 10 mg tablet   • fluticasone (FLONASE) 50 mcg/act nasal spray   • folic acid (FOLVITE) 1 mg tablet   • losartan (COZAAR) 50 mg tablet   • MAGNESIUM OXIDE 400 PO   • pantoprazole (PROTONIX) 40 mg tablet   • polyvinyl alcohol (LIQUIFILM TEARS) 1.4 % ophthalmic solution   • magnesium hydroxide (MILK OF MAGNESIA) 400 mg/5 mL oral suspension       Labs:  Results from last 7 days   Lab Units 08/31/23 0335 08/30/23 0436 08/29/23  0421   WBC Thousand/uL 13.57* 12.12* 10.57*   HEMOGLOBIN g/dL 8.4* 8.4* 8.7*   HEMATOCRIT % 26.3* 26.0* 27.0*   PLATELETS Thousands/uL 297 312 262   NEUTROS PCT % 79* 77* 75   LYMPHS PCT % 6* 7* 9*   MONOS PCT % 13* 13* 14*   EOS PCT % 1 2 1     Results from last 7 days   Lab Units 08/31/23 0335 08/30/23  0436 08/29/23  0421 08/27/23 0447 08/26/23  2052   POTASSIUM mmol/L 3.8 3.7 3.8   < > 4.3   CHLORIDE mmol/L 105 103 104   < > 105   CO2 mmol/L 22 23 23   < > 25   BUN mg/dL 54* 48* 40*   < > 40*   CREATININE mg/dL 2.49* 1.95* 1.85*   < > 1.76*   CALCIUM mg/dL 8.6 8.5 8.4   < > 8.4   ALK PHOS U/L  --   --   --   --  89   ALT U/L  --   --   --   --  40   AST U/L  --   --   --   --  25    < > = values in this interval not displayed.      Lab Results   Component Value Date    TROPONINI <0.02 01/10/2020    TROPONINI <0.02 08/30/2017    TROPONINI <0.02 03/24/2017     Results from last 7 days   Lab Units 08/26/23 2052   INR  1.72*     Lab Results   Component Value Date    BLOODCX No Growth After 4 Days. 08/26/2023    BLOODCX No Growth After 4 Days. 08/26/2023    URINECX >100,000 cfu/ml Escherichia coli ESBL (A) 08/27/2023    SPUTUMCULTUR 2+ Growth of 08/27/2023         Imaging:  Results for orders placed during the hospital encounter of 08/26/23    XR chest portable    Narrative  CHEST    INDICATION:   SOB.    COMPARISON: 8/26/2023    EXAM PERFORMED/VIEWS:  XR CHEST PORTABLE      FINDINGS:    Cardiomediastinal silhouette appears unremarkable. The lungs are clear. No pneumothorax or pleural effusion. Osseous structures appear within normal limits for patient age. Impression  No acute cardiopulmonary disease. Workstation performed: [id number]    Results for orders placed during the hospital encounter of 01/10/20    XR chest 2 views    Narrative  CHEST    INDICATION:   Chest Pain. COMPARISON:  3/24/2017    EXAM PERFORMED/VIEWS:  XR CHEST PA & LATERAL  Images: 2    FINDINGS:    Cardiomediastinal silhouette appears unremarkable. No congestive failure. No pneumothorax. No pneumonia. No effusions. Osseous structures appear within normal limits for patient age. Impression  No acute cardiopulmonary disease.         Workstation performed: [id number]      Last 24 Hours Medication List:   Current Facility-Administered Medications   Medication Dose Route Frequency Provider Last Rate   • acetaminophen  650 mg Oral Q6H PRN Vena Ashly, PA-C     • aluminum-magnesium hydroxide-simethicone  30 mL Oral Q6H PRN Vena Ashly, PA-C     • apixaban  2.5 mg Oral BID Vena Ashly, PA-C     • aspirin  81 mg Oral Daily Vena Ashly, PA-C     • bisacodyl  10 mg Rectal Daily PRN Ja Soriano MD     • diltiazem  240 mg Oral Daily KATE SmithC • ertapenem  500 mg Intravenous Q24H Julia Byrd  mg (08/31/23 1006)   • fluticasone  1 spray Nasal Daily Henri Smith PA-C     • folic acid  1 mg Oral Daily Henri Smith PA-C     • guaiFENesin  600 mg Oral BID Henri Smith PA-C     • ipratropium  0.5 mg Nebulization Q6H PRN Alok Lucero PA-C     • levalbuterol  1.25 mg Nebulization Q6H PRN Alok Lucero PA-C     • loratadine  10 mg Oral Daily Henri Smith PA-C     • magnesium Oxide  400 mg Oral Daily Henri Smith PA-C     • metoprolol tartrate  50 mg Oral Q12H [address] [facility] LOLI Holguin     • ondansetron  4 mg Intravenous Q6H PRN Henri Smith PA-C     • oxyCODONE  5 mg Oral Q6H PRN Liza Nguyen MD     • pantoprazole  40 mg Oral Daily Before Breakfast Henri Smith PA-C     • polyethylene glycol  17 g Oral Daily Henri Smith PA-C     • saccharomyces boulardii  250 mg Oral BID Alok Lucero PA-C     • senna  1 tablet Oral HS Henri Smith PA-C     • sodium chloride  75 mL/hr Intravenous Continuous Liza Nguyen MD     • sodium phosphate-biphosphate  1 enema Rectal Once Liza Nguyen MD          Today, Patient Was Seen By: Liza Nguyen MD    ** Please Note: Dictation voice to text software may have been used in the creation of this document.  **

## 2023-08-31 NOTE — ASSESSMENT & PLAN NOTE
· Seen by ophthalmology during recent [facility] admission, diagnosed with CRVO and ocular hypertension  · Patient recommended for formal evaluation outpatient of OCT macular degeneration, discussion of possible anti-VEGF therapy  · Encouraged ophthalmology follow-up outpatient

## 2023-08-31 NOTE — ASSESSMENT & PLAN NOTE
Patient is confused likely acute metabolic encephalopathy in setting of UTI versus hospital delirium  Frequent reorientation  Continue IV antibiotics  Trend WBC and fever curve  Neurochecks

## 2023-08-31 NOTE — ASSESSMENT & PLAN NOTE
· History of alcohol abuse with withdrawal seizures, has not drink since prior to [facility] hospitalization 8/8

## 2023-08-31 NOTE — ASSESSMENT & PLAN NOTE
Lab Results   Component Value Date    EGFR 22 08/31/2023    EGFR 30 08/30/2023    EGFR 32 08/29/2023    CREATININE 2.49 (H) 08/31/2023    CREATININE 1.95 (H) 08/30/2023    CREATININE 1.85 (H) 08/29/2023   · Baseline creatinine 1.6-1.9  · Creatinine on admission 1.76  · Creatinine this morning is 2.49  · Gentle IV fluids  · Trend BMP daily

## 2023-08-31 NOTE — ASSESSMENT & PLAN NOTE
· Hemoglobin 9.4 on admission  · Hemoglobin ranging 10-12 during recent [facility] admission  · No signs of active bleeding  · Trend CBC

## 2023-08-31 NOTE — PLAN OF CARE
Problem: Potential for Falls  Goal: Patient will remain free of falls  Description: INTERVENTIONS:  - Educate patient/family on patient safety including physical limitations  - Instruct patient to call for assistance with activity   - Consult OT/PT to assist with strengthening/mobility   - Keep Call bell within reach  - Keep bed low and locked with side rails adjusted as appropriate  - Keep care items and personal belongings within reach  - Initiate and maintain comfort rounds  - Make Fall Risk Sign visible to staff  - Offer Toileting every 2 Hours, in advance of need  - Initiate/Maintain bed alarm  - Obtain necessary fall risk management equipment:   - Apply yellow socks and bracelet for high fall risk patients  - Consider moving patient to room near nurses station  Outcome: Progressing     Problem: PAIN - ADULT  Goal: Verbalizes/displays adequate comfort level or baseline comfort level  Description: Interventions:  - Encourage patient to monitor pain and request assistance  - Assess pain using appropriate pain scale  - Administer analgesics based on type and severity of pain and evaluate response  - Implement non-pharmacological measures as appropriate and evaluate response  - Consider cultural and social influences on pain and pain management  - Notify physician/advanced practitioner if interventions unsuccessful or patient reports new pain  Outcome: Progressing     Problem: INFECTION - ADULT  Goal: Absence or prevention of progression during hospitalization  Description: INTERVENTIONS:  - Assess and monitor for signs and symptoms of infection  - Monitor lab/diagnostic results  - Monitor all insertion sites, i.e. indwelling lines, tubes, and drains  - Monitor endotracheal if appropriate and nasal secretions for changes in amount and color  - [place] appropriate cooling/warming therapies per order  - Administer medications as ordered  - Instruct and encourage patient and family to use good hand hygiene technique  - Identify and instruct in appropriate isolation precautions for identified infection/condition  Outcome: Progressing  Goal: Absence of fever/infection during neutropenic period  Description: INTERVENTIONS:  - Monitor WBC    Outcome: Progressing     Problem: SAFETY ADULT  Goal: Maintain or return to baseline ADL function  Description: INTERVENTIONS:  -  Assess patient's ability to carry out ADLs; assess patient's baseline for ADL function and identify physical deficits which impact ability to perform ADLs (bathing, care of mouth/teeth, toileting, grooming, dressing, etc.)  - Assess/evaluate cause of self-care deficits   - Assess range of motion  - Assess patient's mobility; develop plan if impaired  - Assess patient's need for assistive devices and provide as appropriate  - Encourage maximum independence but intervene and supervise when necessary  - Involve family in performance of ADLs  - Assess for home care needs following discharge   - Consider OT consult to assist with ADL evaluation and planning for discharge  - Provide patient education as appropriate  Outcome: Progressing  Goal: Maintains/Returns to pre admission functional level  Description: INTERVENTIONS:  - Perform BMAT or MOVE assessment daily.   - Set and communicate daily mobility goal to care team and patient/family/caregiver. - Collaborate with rehabilitation services on mobility goals if consulted  - Perform Range of Motion 3 times a day. - Reposition patient every 2 hours.   - Dangle patient 2 times a day  - Stand patient 2 times a day  - Ambulate patient 2 times a day  - Out of bed to chair 2 times a day   - Out of bed for meals 2 times a day  - Out of bed for toileting  - Record patient progress and toleration of activity level   Outcome: Progressing     Problem: DISCHARGE PLANNING  Goal: Discharge to home or other facility with appropriate resources  Description: INTERVENTIONS:  - Identify barriers to discharge w/patient and caregiver  - Arrange for needed discharge resources and transportation as appropriate  - Identify discharge learning needs (meds, wound care, etc.)  - Arrange for interpretive services to assist at discharge as needed  - Refer to Case Management Department for coordinating discharge planning if the patient needs post-hospital services based on physician/advanced practitioner order or complex needs related to functional status, cognitive ability, or social support system  Outcome: Progressing     Problem: Knowledge Deficit  Goal: Patient/family/caregiver demonstrates understanding of disease process, treatment plan, medications, and discharge instructions  Description: Complete learning assessment and assess knowledge base.   Interventions:  - Provide teaching at level of understanding  - Provide teaching via preferred learning methods  Outcome: Progressing     Problem: Prexisting or High Potential for Compromised Skin Integrity  Goal: Skin integrity is maintained or improved  Description: INTERVENTIONS:  - Identify patients at risk for skin breakdown  - Assess and monitor skin integrity  - Assess and monitor nutrition and hydration status  - Monitor labs   - Assess for incontinence   - Turn and reposition patient  - Assist with mobility/ambulation  - Relieve pressure over bony prominences  - Avoid friction and shearing  - Provide appropriate hygiene as needed including keeping skin clean and dry  - Evaluate need for skin moisturizer/barrier cream  - Collaborate with interdisciplinary team   - Patient/family teaching  - Consider wound care consult   Outcome: Progressing     Problem: MOBILITY - ADULT  Goal: Maintain or return to baseline ADL function  Description: INTERVENTIONS:  -  Assess patient's ability to carry out ADLs; assess patient's baseline for ADL function and identify physical deficits which impact ability to perform ADLs (bathing, care of mouth/teeth, toileting, grooming, dressing, etc.)  - Assess/evaluate cause of self-care deficits   - Assess range of motion  - Assess patient's mobility; develop plan if impaired  - Assess patient's need for assistive devices and provide as appropriate  - Encourage maximum independence but intervene and supervise when necessary  - Involve family in performance of ADLs  - Assess for home care needs following discharge   - Consider OT consult to assist with ADL evaluation and planning for discharge  - Provide patient education as appropriate  Outcome: Progressing  Goal: Maintains/Returns to pre admission functional level  Description: INTERVENTIONS:  - Perform BMAT or MOVE assessment daily.   - Set and communicate daily mobility goal to care team and patient/family/caregiver. - Collaborate with rehabilitation services on mobility goals if consulted  - Perform Range of Motion 3 times a day. - Reposition patient every 2 hours.   - Dangle patient 2 times a day  - Stand patient 2 times a day  - Ambulate patient 2 times a day  - Out of bed to chair 2 times a day   - Out of bed for meals 2 times a day  - Out of bed for toileting  - Record patient progress and toleration of activity level   Outcome: Progressing

## 2023-08-31 NOTE — ASSESSMENT & PLAN NOTE
· WBC 10.68 K on admission  · Likely secondary to pneumonia and cystitis   · Blood cultures are negative to date  · U/c growing ESBL E. Coli  · Continue to [employer]

## 2023-08-31 NOTE — NURSING NOTE
Patient during bedside report exihibited delirium and hallucinations by says people in the room were laughing at him. Patient asked if I could see him as I "you are no answering me and you cannot hear me speaking". Patient was screaming I'm in pain, provided PRN oxy and repositioned.

## 2023-09-01 ENCOUNTER — APPOINTMENT (INPATIENT)
Dept: RADIOLOGY | Facility: HOSPITAL | Age: 88
DRG: 193 | End: 2023-09-01
Payer: MEDICARE

## 2023-09-01 LAB
ANION GAP SERPL CALCULATED.3IONS-SCNC: 12 MMOL/L
BACTERIA BLD CULT: NORMAL
BACTERIA BLD CULT: NORMAL
BASOPHILS # BLD AUTO: 0.03 THOUSANDS/ÂΜL (ref 0–0.1)
BASOPHILS NFR BLD AUTO: 0 % (ref 0–1)
BUN SERPL-MCNC: 55 MG/DL (ref 5–25)
CALCIUM SERPL-MCNC: 8.8 MG/DL (ref 8.4–10.2)
CHLORIDE SERPL-SCNC: 105 MMOL/L (ref 96–108)
CO2 SERPL-SCNC: 20 MMOL/L (ref 21–32)
CREAT SERPL-MCNC: 2.58 MG/DL (ref 0.6–1.3)
EOSINOPHIL # BLD AUTO: 0.11 THOUSAND/ÂΜL (ref 0–0.61)
EOSINOPHIL NFR BLD AUTO: 1 % (ref 0–6)
ERYTHROCYTE [DISTWIDTH] IN BLOOD BY AUTOMATED COUNT: 13.6 % (ref 11.6–15.1)
GFR SERPL CREATININE-BSD FRML MDRD: 21 ML/MIN/1.73SQ M
GLUCOSE SERPL-MCNC: 137 MG/DL (ref 65–140)
HCT VFR BLD AUTO: 29 % (ref 36.5–49.3)
HGB BLD-MCNC: 9.2 G/DL (ref 12–17)
IMM GRANULOCYTES # BLD AUTO: 0.22 THOUSAND/UL (ref 0–0.2)
IMM GRANULOCYTES NFR BLD AUTO: 1 % (ref 0–2)
LYMPHOCYTES # BLD AUTO: 0.92 THOUSANDS/ÂΜL (ref 0.6–4.47)
LYMPHOCYTES NFR BLD AUTO: 6 % (ref 14–44)
MAGNESIUM SERPL-MCNC: 2.1 MG/DL (ref 1.9–2.7)
MCH RBC QN AUTO: 31.6 PG (ref 26.8–34.3)
MCHC RBC AUTO-ENTMCNC: 31.7 G/DL (ref 31.4–37.4)
MCV RBC AUTO: 100 FL (ref 82–98)
MONOCYTES # BLD AUTO: 2.04 THOUSAND/ÂΜL (ref 0.17–1.22)
MONOCYTES NFR BLD AUTO: 13 % (ref 4–12)
NEUTROPHILS # BLD AUTO: 12.25 THOUSANDS/ÂΜL (ref 1.85–7.62)
NEUTS SEG NFR BLD AUTO: 79 % (ref 43–75)
NRBC BLD AUTO-RTO: 0 /100 WBCS
PLATELET # BLD AUTO: 365 THOUSANDS/UL (ref 149–390)
PMV BLD AUTO: 10.2 FL (ref 8.9–12.7)
POTASSIUM SERPL-SCNC: 3.7 MMOL/L (ref 3.5–5.3)
RBC # BLD AUTO: 2.91 MILLION/UL (ref 3.88–5.62)
SODIUM SERPL-SCNC: 137 MMOL/L (ref 135–147)
WBC # BLD AUTO: 15.57 THOUSAND/UL (ref 4.31–10.16)

## 2023-09-01 PROCEDURE — 85025 COMPLETE CBC W/AUTO DIFF WBC: CPT | Performed by: INTERNAL MEDICINE

## 2023-09-01 PROCEDURE — 94760 N-INVAS EAR/PLS OXIMETRY 1: CPT

## 2023-09-01 PROCEDURE — 71045 X-RAY EXAM CHEST 1 VIEW: CPT

## 2023-09-01 PROCEDURE — 83735 ASSAY OF MAGNESIUM: CPT | Performed by: INTERNAL MEDICINE

## 2023-09-01 PROCEDURE — 99232 SBSQ HOSP IP/OBS MODERATE 35: CPT | Performed by: INTERNAL MEDICINE

## 2023-09-01 PROCEDURE — 94640 AIRWAY INHALATION TREATMENT: CPT

## 2023-09-01 PROCEDURE — 94669 MECHANICAL CHEST WALL OSCILL: CPT

## 2023-09-01 PROCEDURE — 94664 DEMO&/EVAL PT USE INHALER: CPT

## 2023-09-01 PROCEDURE — 80048 BASIC METABOLIC PNL TOTAL CA: CPT | Performed by: INTERNAL MEDICINE

## 2023-09-01 PROCEDURE — 87040 BLOOD CULTURE FOR BACTERIA: CPT | Performed by: INTERNAL MEDICINE

## 2023-09-01 PROCEDURE — 92610 EVALUATE SWALLOWING FUNCTION: CPT

## 2023-09-01 PROCEDURE — 94668 MNPJ CHEST WALL SBSQ: CPT

## 2023-09-01 RX ORDER — FORMOTEROL FUMARATE 20 UG/2ML
20 SOLUTION RESPIRATORY (INHALATION)
Status: DISCONTINUED | OUTPATIENT
Start: 2023-09-01 | End: 2023-09-09

## 2023-09-01 RX ORDER — FUROSEMIDE 10 MG/ML
40 INJECTION INTRAMUSCULAR; INTRAVENOUS ONCE
Status: COMPLETED | OUTPATIENT
Start: 2023-09-01 | End: 2023-09-01

## 2023-09-01 RX ORDER — BUDESONIDE 0.5 MG/2ML
0.5 INHALANT ORAL
Status: DISCONTINUED | OUTPATIENT
Start: 2023-09-01 | End: 2023-09-09

## 2023-09-01 RX ORDER — METRONIDAZOLE 500 MG/100ML
500 INJECTION, SOLUTION INTRAVENOUS EVERY 8 HOURS
Status: DISCONTINUED | OUTPATIENT
Start: 2023-09-01 | End: 2023-09-02

## 2023-09-01 RX ORDER — LEVALBUTEROL INHALATION SOLUTION 1.25 MG/3ML
1.25 SOLUTION RESPIRATORY (INHALATION)
Status: DISCONTINUED | OUTPATIENT
Start: 2023-09-01 | End: 2023-09-09

## 2023-09-01 RX ORDER — SODIUM CHLORIDE, SODIUM LACTATE, POTASSIUM CHLORIDE, CALCIUM CHLORIDE 600; 310; 30; 20 MG/100ML; MG/100ML; MG/100ML; MG/100ML
100 INJECTION, SOLUTION INTRAVENOUS CONTINUOUS
Status: DISCONTINUED | OUTPATIENT
Start: 2023-09-01 | End: 2023-09-01

## 2023-09-01 RX ADMIN — METRONIDAZOLE 500 MG: 500 SOLUTION INTRAVENOUS at 23:09

## 2023-09-01 RX ADMIN — SENNOSIDES 8.6 MG: 8.6 TABLET, FILM COATED ORAL at 21:46

## 2023-09-01 RX ADMIN — METOPROLOL TARTRATE 50 MG: 50 TABLET ORAL at 21:46

## 2023-09-01 RX ADMIN — ACETAMINOPHEN 325MG 650 MG: 325 TABLET ORAL at 13:14

## 2023-09-01 RX ADMIN — ASPIRIN 81 MG: 81 TABLET, COATED ORAL at 08:26

## 2023-09-01 RX ADMIN — POLYETHYLENE GLYCOL 3350 17 G: 17 POWDER, FOR SOLUTION ORAL at 08:26

## 2023-09-01 RX ADMIN — LEVALBUTEROL HYDROCHLORIDE 1.25 MG: 1.25 SOLUTION RESPIRATORY (INHALATION) at 08:42

## 2023-09-01 RX ADMIN — FORMOTEROL FUMARATE DIHYDRATE 20 MCG: 20 SOLUTION RESPIRATORY (INHALATION) at 08:42

## 2023-09-01 RX ADMIN — PANTOPRAZOLE SODIUM 40 MG: 40 TABLET, DELAYED RELEASE ORAL at 05:42

## 2023-09-01 RX ADMIN — ERTAPENEM SODIUM 500 MG: 1 INJECTION, POWDER, LYOPHILIZED, FOR SOLUTION INTRAMUSCULAR; INTRAVENOUS at 09:15

## 2023-09-01 RX ADMIN — IPRATROPIUM BROMIDE 0.5 MG: 0.5 SOLUTION RESPIRATORY (INHALATION) at 08:42

## 2023-09-01 RX ADMIN — FLUTICASONE PROPIONATE 1 SPRAY: 50 SPRAY, METERED NASAL at 08:52

## 2023-09-01 RX ADMIN — FORMOTEROL FUMARATE DIHYDRATE 20 MCG: 20 SOLUTION RESPIRATORY (INHALATION) at 20:25

## 2023-09-01 RX ADMIN — BISACODYL 10 MG: 10 SUPPOSITORY RECTAL at 09:15

## 2023-09-01 RX ADMIN — APIXABAN 2.5 MG: 2.5 TABLET, FILM COATED ORAL at 18:32

## 2023-09-01 RX ADMIN — APIXABAN 2.5 MG: 2.5 TABLET, FILM COATED ORAL at 08:26

## 2023-09-01 RX ADMIN — GUAIFENESIN 600 MG: 600 TABLET ORAL at 08:26

## 2023-09-01 RX ADMIN — BUDESONIDE 0.5 MG: 0.5 INHALANT ORAL at 08:42

## 2023-09-01 RX ADMIN — Medication 250 MG: at 08:27

## 2023-09-01 RX ADMIN — LEVALBUTEROL HYDROCHLORIDE 1.25 MG: 1.25 SOLUTION RESPIRATORY (INHALATION) at 20:25

## 2023-09-01 RX ADMIN — LEVALBUTEROL HYDROCHLORIDE 1.25 MG: 1.25 SOLUTION RESPIRATORY (INHALATION) at 03:41

## 2023-09-01 RX ADMIN — IPRATROPIUM BROMIDE 0.5 MG: 0.5 SOLUTION RESPIRATORY (INHALATION) at 03:41

## 2023-09-01 RX ADMIN — LEVALBUTEROL HYDROCHLORIDE 1.25 MG: 1.25 SOLUTION RESPIRATORY (INHALATION) at 14:13

## 2023-09-01 RX ADMIN — METOPROLOL TARTRATE 50 MG: 50 TABLET ORAL at 08:26

## 2023-09-01 RX ADMIN — LORATADINE 10 MG: 10 TABLET ORAL at 08:26

## 2023-09-01 RX ADMIN — IPRATROPIUM BROMIDE 0.5 MG: 0.5 SOLUTION RESPIRATORY (INHALATION) at 14:13

## 2023-09-01 RX ADMIN — METRONIDAZOLE 500 MG: 500 SOLUTION INTRAVENOUS at 15:30

## 2023-09-01 RX ADMIN — ACETAMINOPHEN 325MG 650 MG: 325 TABLET ORAL at 08:42

## 2023-09-01 RX ADMIN — IPRATROPIUM BROMIDE 0.5 MG: 0.5 SOLUTION RESPIRATORY (INHALATION) at 20:25

## 2023-09-01 RX ADMIN — GUAIFENESIN 600 MG: 600 TABLET ORAL at 18:32

## 2023-09-01 RX ADMIN — DILTIAZEM HYDROCHLORIDE 240 MG: 240 CAPSULE, COATED, EXTENDED RELEASE ORAL at 09:14

## 2023-09-01 RX ADMIN — FOLIC ACID 1 MG: 1 TABLET ORAL at 08:26

## 2023-09-01 RX ADMIN — FUROSEMIDE 40 MG: 10 INJECTION, SOLUTION INTRAMUSCULAR; INTRAVENOUS at 15:13

## 2023-09-01 RX ADMIN — ACETAMINOPHEN 325MG 650 MG: 325 TABLET ORAL at 18:32

## 2023-09-01 RX ADMIN — MAGNESIUM OXIDE TAB 400 MG (241.3 MG ELEMENTAL MG) 400 MG: 400 (241.3 MG) TAB at 08:26

## 2023-09-01 RX ADMIN — BUDESONIDE 0.5 MG: 0.5 INHALANT ORAL at 20:25

## 2023-09-01 RX ADMIN — Medication 250 MG: at 18:32

## 2023-09-01 NOTE — ASSESSMENT & PLAN NOTE
Metoprolol 100 mg tab  Last refill: 12/17/18   Future appt: none  Last appt: 6/27/18            Patient is confused likely acute metabolic encephalopathy in setting of UTI versus hospital delirium  Frequent reorientation  Continue IV antibiotics  Trend WBC and fever curve  Neurochecks

## 2023-09-01 NOTE — PLAN OF CARE
Problem: PAIN - ADULT  Goal: Verbalizes/displays adequate comfort level or baseline comfort level  Description: Interventions:  - Encourage patient to monitor pain and request assistance  - Assess pain using appropriate pain scale  - Administer analgesics based on type and severity of pain and evaluate response  - Implement non-pharmacological measures as appropriate and evaluate response  - Consider cultural and social influences on pain and pain management  - Notify physician/advanced practitioner if interventions unsuccessful or patient reports new pain  Outcome: Progressing     Problem: INFECTION - ADULT  Goal: Absence or prevention of progression during hospitalization  Description: INTERVENTIONS:  - Assess and monitor for signs and symptoms of infection  - Monitor lab/diagnostic results  - Monitor all insertion sites, i.e. indwelling lines, tubes, and drains  - Monitor endotracheal if appropriate and nasal secretions for changes in amount and color  - [place] appropriate cooling/warming therapies per order  - Administer medications as ordered  - Instruct and encourage patient and family to use good hand hygiene technique  - Identify and instruct in appropriate isolation precautions for identified infection/condition  Outcome: Progressing     Problem: Knowledge Deficit  Goal: Patient/family/caregiver demonstrates understanding of disease process, treatment plan, medications, and discharge instructions  Description: Complete learning assessment and assess knowledge base.   Interventions:  - Provide teaching at level of understanding  - Provide teaching via preferred learning methods  Outcome: Progressing     Problem: DISCHARGE PLANNING  Goal: Discharge to home or other facility with appropriate resources  Description: INTERVENTIONS:  - Identify barriers to discharge w/patient and caregiver  - Arrange for needed discharge resources and transportation as appropriate  - Identify discharge learning needs (meds, wound care, etc.)  - Arrange for interpretive services to assist at discharge as needed  - Refer to Case Management Department for coordinating discharge planning if the patient needs post-hospital services based on physician/advanced practitioner order or complex needs related to functional status, cognitive ability, or social support system  Outcome: Progressing

## 2023-09-01 NOTE — ASSESSMENT & PLAN NOTE
· Went into A-fib with RVR on August 28 and was given IV Lopressor and Cardizem  · Home regimen: Coreg 25 Mg twice daily  · Started on Eliquis 2.5 mg twice daily for anticoagulation during hospitalization at [facility] 8/8-8/23  · Cardiology consult appreciated  · Patient converted to normal sinus rhythm  · Cardiology switched patient to Lopressor 50 mg every 12 hours and Cardizem  mg daily  · Continue home medications

## 2023-09-01 NOTE — PROGRESS NOTES
[facility]  Progress Note  Name: Eliezer Lopez  MRN: [id number]  Unit/Bed#: -01 I Date of Admission: 8/26/2023   Date of Service: 9/1/2023 I Hospital Day: 6    Assessment/Plan   Acute encephalopathy  Assessment & Plan  Patient is confused likely acute metabolic encephalopathy in setting of UTI versus hospital delirium  Frequent reorientation  Continue IV antibiotics  Trend WBC and fever curve  Neurochecks    Cystitis  Assessment & Plan  · Patient reports dysuria and difficulty urinating   · UA with innumerable WBCs and innumerable bacteria  · Urine culture grew ESBL E. coli  · Urinary retention protocol  · Continue Invanz  · Trend WBC and fever curve  · ID consult appreciated    History of alcohol abuse  Assessment & Plan  · History of alcohol abuse with withdrawal seizures, has not drink since prior to [facility] hospitalization 8/8    History of COVID-19  Assessment & Plan  · Diagnosed with COVID-19 during recent admit  · Received 3 days of remdesivir and IV fluids  · No evidence of pneumonia during admission  · Completed quarantine while inpatient    Central retinal vein occlusion of right eye  Assessment & Plan  · Seen by ophthalmology during recent [facility] admission, diagnosed with CRVO and ocular hypertension  · Patient recommended for formal evaluation outpatient of OCT macular degeneration, discussion of possible anti-VEGF therapy  · Encouraged ophthalmology follow-up outpatient    Leukocytosis  Assessment & Plan  · WBC 10.68 K on admission  · Likely secondary to pneumonia and cystitis   · Blood cultures are negative to date  · U/c growing ESBL E. Coli  · Continue to Invanz    Stage 3b chronic kidney disease [facility])  Assessment & Plan  Lab Results   Component Value Date    EGFR 21 09/01/2023    EGFR 22 08/31/2023    EGFR 30 08/30/2023    CREATININE 2.58 (H) 09/01/2023    CREATININE 2.49 (H) 08/31/2023    CREATININE 1.95 (H) 08/30/2023   · Baseline creatinine 1.6-1.9  · Creatinine on admission 1.76  · Creatinine this morning is 2.58  · Trend BMP daily    Chronic obstructive pulmonary disease, unspecified COPD type ([address])  Assessment & Plan  · Not on maintenance inhalers outpatient  · No signs of acute exacerbation on admission  · If patient were to develop wheezing in setting of pneumonia, would start Xopenex/Atrovent nebulizers    Atrial fibrillation [facility])  Assessment & Plan  · Went into A-fib with RVR on August 28 and was given IV Lopressor and Cardizem  · Home regimen: Coreg 25 Mg twice daily  · Started on Eliquis 2.5 mg twice daily for anticoagulation during hospitalization at [address] [facility] 8/8-8/23  · Cardiology consult appreciated  · Patient converted to normal sinus rhythm  · Cardiology switched patient to Lopressor 50 mg every 12 hours and Cardizem  mg daily  · Continue home medications    Anemia of chronic disease  Assessment & Plan  · Hemoglobin 9.4 on admission  · Hemoglobin ranging 10-12 during recent [facility] admission  · No signs of active bleeding  · Trend CBC    * Community acquired pneumonia of right lower lobe of lung  Assessment & Plan  · Presents with fever, worsening cough, and dyspnea  · Procalcitonin elevated patient with leukocytosis  · Chest x-ray is unremarkable  · Urine strep and Legionella studies is negative  · Sputum culture results reviewed  · Blood cultures are negative to date  · Respiratory protocol  · Airway clearance protocol  · On Invanz and will add Flagyl  · Patient is spiking fever. Will repeat blood cultures  · Speech swallow evaluation  · Repeat chest x-ray is unremarkable           Labs & Imaging: I have personally reviewed pertinent reports. VTE Prophylaxis: in place. Code Status:   Level 3 - DNAR and DNI    Patient Centered Rounds: I have performed bedside rounds with nursing staff today.     Discussions with Specialists or Other Care Team Provider: LUIS F    Current Length of Stay: 6 day(s)    Current Patient Status: Inpatient   Certification Statement: The patient will continue to require additional inpatient hospital stay due to see my assessment and plan. Subjective:   Patient is seen and examined at bedside. Patient is having generalized weakness. Febrile. All other ROS are negative. Objective:    Vitals: Blood pressure 116/79, pulse 89, temperature 99.5 °F (37.5 °C), temperature source Oral, resp. rate 18, height 5' 8" (1.727 m), weight 91.5 kg (201 lb 11.5 oz), SpO2 98 %. ,Body mass index is 30.67 kg/m². SPO2 RA Rest    Flowsheet Row ED to Hosp-Admission (Current) from 8/26/2023 in [facility]   SpO2 98 %   SpO2 Activity At Rest   O2 Device None (Room air)   O2 Flow Rate --        I&O:     Intake/Output Summary (Last 24 hours) at 9/1/2023 1418  Last data filed at 9/1/2023 1301  Gross per 24 hour   Intake 280 ml   Output --   Net 280 ml       Physical Exam:    General- Alert, lying comfortably in bed. Not in any acute distress. Neck- Supple, No JVD  CVS- regular, S1 and S2 normal  Chest- Bilateral Air entry, decreased at bases with some wheezing present  Abdomen- soft, nontender, not distended, no guarding or rigidity, BS+  Extremities-  No pedal edema, No calf tenderness  CNS-   Alert, awake and orientedx2. No focal deficits present. Invasive Devices     Peripheral Intravenous Line  Duration           Peripheral IV 08/30/23 Distal;Dorsal (posterior); Right Forearm 2 days                      Social History  reviewed  Family History   Problem Relation Age of Onset   • Heart disease Mother         cardiac disorder   • Stroke Father    • Heart disease Father         cardiac disorder   • Heart disease Sister    • Diabetes Family    • Hypertension Family     reviewed    Meds:  Current Facility-Administered Medications   Medication Dose Route Frequency Provider Last Rate Last Admin   • acetaminophen (TYLENOL) tablet 650 mg  650 mg Oral Q6H PRN Shakir Perez Kedar Farr PA-C   650 mg at 09/01/23 1314   • aluminum-magnesium hydroxide-simethicone (MAALOX) oral suspension 30 mL  30 mL Oral Q6H PRN Gil Ziegler PA-C       • apixaban Sofía Art) tablet 2.5 mg  2.5 mg Oral BID Gil Ziegler PA-C   2.5 mg at 09/01/23 3303   • aspirin (ECOTRIN LOW STRENGTH) EC tablet 81 mg  81 mg Oral Daily Gil Ziegler PA-C   81 mg at 09/01/23 1511   • bisacodyl (DULCOLAX) rectal suppository 10 mg  10 mg Rectal Daily PRN Fiorella Thomas MD   10 mg at 09/01/23 0915   • budesonide (PULMICORT) inhalation solution 0.5 mg  0.5 mg Nebulization Q12H Gil Ziegler PA-C   0.5 mg at 09/01/23 2439   • diltiazem (CARDIZEM CD) 24 hr capsule 240 mg  240 mg Oral Daily Ivis Holguin PA-C   240 mg at 09/01/23 0914   • ertapenem (INVanz) 500 mg in sodium chloride 0.9 % 50 mL IVPB  500 mg Intravenous Q24H Satya Serrano  mL/hr at 09/01/23 0915 500 mg at 09/01/23 0915   • fluticasone (FLONASE) 50 mcg/act nasal spray 1 spray  1 spray Nasal Daily Gil Ziegler PA-C   1 spray at 98/10/58 8029   • folic acid (FOLVITE) tablet 1 mg  1 mg Oral Daily Gil Ziegler PA-C   1 mg at 09/01/23 4067   • formoterol (PERFOROMIST) nebulizer solution 20 mcg  20 mcg Nebulization Q12H Gil Ziegler PA-C   20 mcg at 09/01/23 3482   • guaiFENesin (MUCINEX) 12 hr tablet 600 mg  600 mg Oral BID Gil Ziegler PA-C   600 mg at 09/01/23 3163   • ipratropium (ATROVENT) 0.02 % inhalation solution 0.5 mg  0.5 mg Nebulization TID Fiorella Thomas MD   0.5 mg at 09/01/23 1413   • levalbuterol (XOPENEX) inhalation solution 1.25 mg  1.25 mg Nebulization TID Fiorella Thomas MD   1.25 mg at 09/01/23 1413   • loratadine (CLARITIN) tablet 10 mg  10 mg Oral Daily Gil Ziegler PA-C   10 mg at 09/01/23 2603   • magnesium Oxide (MAG-OX) tablet 400 mg  400 mg Oral Daily Gil Ziegler PA-C   400 mg at 09/01/23 7073   • metoprolol tartrate (LOPRESSOR) tablet 50 mg  50 mg Oral Q12H [facility] LOLI Holguin   50 mg at 09/01/23 6382   • metroNIDAZOLE (FLAGYL) IVPB (premix) 500 mg 100 mL  500 mg Intravenous Q8H Alena Minaya MD       • ondansetron (ZOFRAN) injection 4 mg  4 mg Intravenous Q6H PRN Екатерина Quinn PA-C   4 mg at 08/28/23 1748   • oxyCODONE (ROXICODONE) IR tablet 5 mg  5 mg Oral Q6H PRN Alena Minaya MD   5 mg at 08/31/23 0715   • pantoprazole (PROTONIX) EC tablet 40 mg  40 mg Oral Daily Before Breakfast Екатерина Quinn PA-C   40 mg at 09/01/23 0542   • polyethylene glycol (MIRALAX) packet 17 g  17 g Oral Daily Екатерина Quinn PA-C   17 g at 09/01/23 5798   • saccharomyces boulardii (FLORASTOR) capsule 250 mg  250 mg Oral BID Lindy Braswell PA-C   250 mg at 09/01/23 0827   • senna (SENOKOT) tablet 8.6 mg  1 tablet Oral HS Екатерина Quinn PA-C   8.6 mg at 08/31/23 2144      Medications Prior to Admission   Medication   • apixaban (ELIQUIS) 2.5 mg   • Aspirin 81 MG CAPS   • carvedilol (COREG) 12.5 mg tablet   • cetirizine (ZyrTEC) 10 mg tablet   • fluticasone (FLONASE) 50 mcg/act nasal spray   • folic acid (FOLVITE) 1 mg tablet   • losartan (COZAAR) 50 mg tablet   • MAGNESIUM OXIDE 400 PO   • pantoprazole (PROTONIX) 40 mg tablet   • polyvinyl alcohol (LIQUIFILM TEARS) 1.4 % ophthalmic solution   • magnesium hydroxide (MILK OF MAGNESIA) 400 mg/5 mL oral suspension       Labs:  Results from last 7 days   Lab Units 09/01/23  0334 08/31/23  0335 08/30/23  0436   WBC Thousand/uL 15.57* 13.57* 12.12*   HEMOGLOBIN g/dL 9.2* 8.4* 8.4*   HEMATOCRIT % 29.0* 26.3* 26.0*   PLATELETS Thousands/uL 365 297 312   NEUTROS PCT % 79* 79* 77*   LYMPHS PCT % 6* 6* 7*   MONOS PCT % 13* 13* 13*   EOS PCT % 1 1 2     Results from last 7 days   Lab Units 09/01/23  0334 08/31/23  0335 08/30/23  0436 08/27/23  0447 08/26/23 2052   POTASSIUM mmol/L 3.7 3.8 3.7   < > 4.3   CHLORIDE mmol/L 105 105 103   < > 105   CO2 mmol/L 20* 22 23   < > 25   BUN mg/dL 55* 54* 48*   < > 40*   CREATININE mg/dL 2.58* 2.49* 1.95*   < > 1.76*   CALCIUM mg/dL 8.8 8.6 8.5   < > 8.4   ALK PHOS U/L  --   --   --   --  89   ALT U/L  --   --   --   --  40   AST U/L  --   --   --   --  25    < > = values in this interval not displayed. Lab Results   Component Value Date    TROPONINI <0.02 01/10/2020    TROPONINI <0.02 08/30/2017    TROPONINI <0.02 03/24/2017     Results from last 7 days   Lab Units 08/26/23 2052   INR  1.72*     Lab Results   Component Value Date    BLOODCX No Growth After 5 Days. 08/26/2023    BLOODCX No Growth After 5 Days. 08/26/2023    URINECX >100,000 cfu/ml Escherichia coli ESBL (A) 08/27/2023    SPUTUMCULTUR 2+ Growth of 08/27/2023         Imaging:  Results for orders placed during the hospital encounter of 08/26/23    XR chest portable    Narrative  CHEST    INDICATION:   wheezing. COVID-positive 8/12/2023. COMPARISON: CXR 8/29/2023, abdomen CT 3/24/2017. EXAM PERFORMED/VIEWS:  XR CHEST PORTABLE. FINDINGS:    Cardiomediastinal silhouette normal.    Lungs clear. No effusion or pneumothorax. Upper abdomen normal. Bones normal for age. Impression  No acute cardiopulmonary disease. Workstation performed: [id number]    Results for orders placed during the hospital encounter of 01/10/20    XR chest 2 views    Narrative  CHEST    INDICATION:   Chest Pain. COMPARISON:  3/24/2017    EXAM PERFORMED/VIEWS:  XR CHEST PA & LATERAL  Images: 2    FINDINGS:    Cardiomediastinal silhouette appears unremarkable. No congestive failure. No pneumothorax. No pneumonia. No effusions. Osseous structures appear within normal limits for patient age. Impression  No acute cardiopulmonary disease.         Workstation performed: [id number]      Last 24 Hours Medication List:   Current Facility-Administered Medications   Medication Dose Route Frequency Provider Last Rate   • acetaminophen  650 mg Oral Q6H PRN Moses Irizarry PA-C     • aluminum-magnesium hydroxide-simethicone  30 mL Oral Q6H PRN Pati Tate Gera Valera PA-C     • apixaban  2.5 mg Oral BID Earna KATE ZieglerC     • aspirin  81 mg Oral Daily Earna LOLI Ziegler     • bisacodyl  10 mg Rectal Daily PRN Fiorella Thomas MD     • budesonide  0.5 mg Nebulization Q12H Earna KATE ZieglerC     • diltiazem  240 mg Oral Daily KATE WinnC     • ertapenem  500 mg Intravenous Q24H Satya Serrano  mg (09/01/23 0915)   • fluticasone  1 spray Nasal Daily Earna KATE ZieglerC     • folic acid  1 mg Oral Daily Earna KATE ZieglerC     • formoterol  20 mcg Nebulization Q12H Earradha Ziegler PA-C     • guaiFENesin  600 mg Oral BID Earna KATE ZieglerC     • ipratropium  0.5 mg Nebulization TID Fiorella Thomas MD     • levalbuterol  1.25 mg Nebulization TID Fiorella Thomas MD     • loratadine  10 mg Oral Daily Earna KATE ZieglerC     • magnesium Oxide  400 mg Oral Daily Earna KATE ZieglerC     • metoprolol tartrate  50 mg Oral Q12H [facility] Jassi Holguin PA-C     • metroNIDAZOLE  500 mg Intravenous Q8H Fiorella Thomas MD     • ondansetron  4 mg Intravenous Q6H PRN Gil Ziegler PA-C     • oxyCODONE  5 mg Oral Q6H PRN Fiorella Thomas MD     • pantoprazole  40 mg Oral Daily Before Breakfast Gil Ziegler PA-C     • polyethylene glycol  17 g Oral Daily Dylonna LOLI Ziegler     • saccharomyces boulardii  250 mg Oral BID Jg Druze, PANileC     • senna  1 tablet Oral HS Gil Ziegler PA-C          Today, Patient Was Seen By: Fiorella Thomas MD    ** Please Note: Dictation voice to text software may have been used in the creation of this document.  **

## 2023-09-01 NOTE — ASSESSMENT & PLAN NOTE
· Presents with fever, worsening cough, and dyspnea  · Procalcitonin elevated patient with leukocytosis  · Chest x-ray is unremarkable  · Urine strep and Legionella studies is negative  · Sputum culture results reviewed  · Blood cultures are negative to date  · Respiratory protocol  · Airway clearance protocol  · On Invanz and will add Flagyl  · Patient is spiking fever.   Will repeat blood cultures  · Speech swallow evaluation  · Repeat chest x-ray is unremarkable

## 2023-09-01 NOTE — ASSESSMENT & PLAN NOTE
· Hemoglobin 9.4 on admission  · Hemoglobin ranging 10-12 during recent [facility] admission  · No signs of active bleeding  · Trend CBC

## 2023-09-01 NOTE — ASSESSMENT & PLAN NOTE
· Seen by ophthalmology during recent [facility] admission, diagnosed with CRVO and ocular hypertension  · Patient recommended for formal evaluation outpatient of OCT macular degeneration, discussion of possible anti-VEGF therapy  · Encouraged ophthalmology follow-up outpatient

## 2023-09-01 NOTE — PLAN OF CARE
Problem: Potential for Falls  Goal: Patient will remain free of falls  Description: INTERVENTIONS:  - Educate patient/family on patient safety including physical limitations  - Instruct patient to call for assistance with activity   - Consult OT/PT to assist with strengthening/mobility   - Keep Call bell within reach  - Keep bed low and locked with side rails adjusted as appropriate  - Keep care items and personal belongings within reach  - Initiate and maintain comfort rounds  - Make Fall Risk Sign visible to staff  - Offer Toileting every 2 Hours, in advance of need  - Initiate/Maintain bed alarm  - Obtain necessary fall risk management equipment:   - Apply yellow socks and bracelet for high fall risk patients  - Consider moving patient to room near nurses station  9/1/2023 0854 by Stacie Rios RN  Outcome: Progressing  9/1/2023 0854 by Stacie Rios RN  Outcome: Progressing     Problem: INFECTION - ADULT  Goal: Absence or prevention of progression during hospitalization  Description: INTERVENTIONS:  - Assess and monitor for signs and symptoms of infection  - Monitor lab/diagnostic results  - Monitor all insertion sites, i.e. indwelling lines, tubes, and drains  - Monitor endotracheal if appropriate and nasal secretions for changes in amount and color  - [place] appropriate cooling/warming therapies per order  - Administer medications as ordered  - Instruct and encourage patient and family to use good hand hygiene technique  - Identify and instruct in appropriate isolation precautions for identified infection/condition  9/1/2023 0854 by Stacie Rios RN  Outcome: Progressing  9/1/2023 0854 by Stacie Rios RN  Outcome: Progressing     Problem: Prexisting or High Potential for Compromised Skin Integrity  Goal: Skin integrity is maintained or improved  Description: INTERVENTIONS:  - Identify patients at risk for skin breakdown  - Assess and monitor skin integrity  - Assess and monitor nutrition and hydration status  - Monitor labs   - Assess for incontinence   - Turn and reposition patient  - Assist with mobility/ambulation  - Relieve pressure over bony prominences  - Avoid friction and shearing  - Provide appropriate hygiene as needed including keeping skin clean and dry  - Evaluate need for skin moisturizer/barrier cream  - Collaborate with interdisciplinary team   - Patient/family teaching  - Consider wound care consult   9/1/2023 0854 by Erlinda Mckenzie RN  Outcome: Progressing  9/1/2023 0854 by Erlinda Mckenzie RN  Outcome: Progressing

## 2023-09-01 NOTE — ASSESSMENT & PLAN NOTE
· History of alcohol abuse with withdrawal seizures, has not drink since prior to [facility] hospitalization 8/8

## 2023-09-01 NOTE — ASSESSMENT & PLAN NOTE
Lab Results   Component Value Date    EGFR 21 09/01/2023    EGFR 22 08/31/2023    EGFR 30 08/30/2023    CREATININE 2.58 (H) 09/01/2023    CREATININE 2.49 (H) 08/31/2023    CREATININE 1.95 (H) 08/30/2023   · Baseline creatinine 1.6-1.9  · Creatinine on admission 1.76  · Creatinine this morning is 2.58  · Trend BMP daily

## 2023-09-01 NOTE — SPEECH THERAPY NOTE
Speech Language/Pathology    Speech-Language Pathology Bedside Swallow Evaluation      Patient Name: Krystina Barton    CYGXE'R Date: 9/1/2023     Problem List  Principal Problem:    Community acquired pneumonia of right lower lobe of lung  Active Problems:    Anemia of chronic disease    Atrial fibrillation ([address])    Chronic obstructive pulmonary disease, unspecified COPD type ([address])    Stage 3b chronic kidney disease ([address])    Leukocytosis    Central retinal vein occlusion of right eye    History of COVID-19    History of alcohol abuse    Cystitis    Acute encephalopathy      Past Medical History  Past Medical History:   Diagnosis Date   • Alcohol abuse    • Alcohol withdrawal seizure without complication ([address])    • Arthritis    • Asthma    • CVA (cerebral vascular accident) ([address])    • Decubitus ulcer of buttock     last assessed 07/20/16   • Diabetes ([address])    • Disease of thyroid gland    • Duodenal ulcer    • Emphysema lung (HCC)    • Esophageal varices (HCC)    • GERD (gastroesophageal reflux disease)    • History of transfusion    • Hypertension    • Irritable bowel syndrome with diarrhea    • Kidney stones    • Prostate cancer ([address])    • Urinary frequency     resolved 02/15/17       Past Surgical History  Past Surgical History:   Procedure Laterality Date   • BACK SURGERY     • CATARACT EXTRACTION     • ESOPHAGOGASTRODUODENOSCOPY N/A 2/6/2017    Procedure: ESOPHAGOGASTRODUODENOSCOPY (EGD); Surgeon: Jeremy Rees MD;  Location:  MAIN OR;  Service:    • NH ESOPHAGOGASTRODUODENOSCOPY TRANSORAL DIAGNOSTIC N/A 4/26/2016    Procedure: ESOPHAGOGASTRODUODENOSCOPY (EGD); Surgeon: Annita Huerta MD;  Location:  MAIN OR;  Service: Gastroenterology   • TONSILLECTOMY         Summary   Pt presented with s/s suggestive of mild oral and suspected minimal pharyngeal dysphagia. Mildly prolonged anterior mastication w/ reduced bolus break down.  Pt w/ c/o oropharyngeal difficulties w/ dry solids, states he has been attempting order soft/moist foods though has been difficult. Improved oral manipulation of soft solids. Swallows w/ ?able delay and reduced hyo-laryngeal movement to palpation. Pt w/ cough x2 during evaluation, not timed s/p swallows or suspected related to swallow. No overt s/s aspiration s/p swallows today. S/w pt and pt's wife regarding potential dysphagia diet modification to ease oral difficulties and c/o globus sensation w/ dry material. Pt agreeable at this time. Education initiated on strategies to optimize swallow safety and s/s aspiration to notify medical team of should they arise. Pt demonstrated understanding and denied questions/concerns at this time. If ongoing concerns for aspiration 2/2 medical presentation (RLL pna and fevers), consider VBS to further assess. Risk/s for Aspiration: Mild risk      Recommended Diet: soft/level 3 diet and thin liquids   Recommended Form of Meds: As tolerated   Aspiration precautions and swallowing strategies: upright posture, only feed when fully alert, slow rate of feeding and small bites/sips  Other Recommendations: Continue frequent oral care        Current Medical Status  Pt is a 82 y.o. male who presented to [facility] with a PMH of recent admission at [address] 8/8 to 8/23, paroxysmal A-fib, CKD stage IIIb, anemia, and COPD who presents with fever at [facility] that onset this evening. Patient was given Tylenol prior to arrival to the ED. Patient reports ongoing cough for the last 7 weeks with acute worsening in the last couple of days. Endorses associated dyspnea. States he is unable to mobilize anything with coughing. Endorses anorexia and constipation, but denies any abdominal pain, nausea, or vomiting. He states he has no appetite as he has a distaste for the food. Denies chest pain. Endorses dysuria and difficulty urinating. Reports leg swelling at baseline.   Reports difficulty with mobilization, states he requires walker and two-person assist.     Recent hospitalization at [facility] was for hyponatremia (s/d to beer potomania), TOBI, alcohol withdrawal managed with barbiturate load and CIWA protocol, severe headache with left-sided weakness and AMS with negative stroke work-up, hypertensive urgency, CRVO of right eye, and COVID-19.    .    Current Precautions:  Fall  Aspiration  Contact    Allergies:  No known food allergies    Past medical history:  Please see H&P for details    Special Studies:  CXR 9/1: No acute cardiopulmonary disease.     Social/Education/Vocational Hx:  Pt lives [employer] [facility]    Swallow Information   Current Risks for Dysphagia & Aspiration: age, respiratory status  Current Symptoms/Concerns: RLL pna, coughing, fevers  Current Diet: regular diet and thin liquids   Baseline Diet: regular diet and thin liquids      Baseline Assessment   Behavior/Cognition: alert  Speech/Language Status: able to participate in conversation and able to follow commands  Patient Positioning: upright in bed  Pain Status/Interventions/Response to Interventions:  No report of or nonverbal indications of pain. Swallow Mechanism Exam  Facial: symmetrical  Labial: WFL  Lingual: WFL  Velum: symmetrical  Mandible: adequate ROM  Dentition: limited dentition and poor oral hygiene  Vocal quality:clear/adequate   Volitional Cough: strong/productive   Respiratory Status: on RA        Consistencies Assessed and Performance   Consistencies Administered: thin liquids, puree, soft solids and hard solids    Oral Stage: mild  Mastication was mild prolonged/reduced with the materials administered today. Bolus formation and transfer mld prolonged/slowed. Pt requires liquid wash to aid in formation and transfer of dry solids. No overt s/s reduced oral control. Pharyngeal Stage: minimal  Swallow Mechanics:  Swallowing initiation w/ ?able delay. Laryngeal rise was palpated and judged to be reduced.   No coughing, throat clearing, change in vocal quality or respiratory status noted today. Esophageal Concerns: none reported    Strategies and Efficacy: Liquid wash beneficial in cohesive bolus formation and transfer     Summary and Recommendations (see above)    Results Reviewed with: patient, RN, MD and family     Treatment Recommended: Yes     Frequency of treatment: As able/appropriate     Patient Stated Goal: "I've been trying to get added moisture to my food"     Dysphagia LTG  -Patient will demonstrate safe and effective oral intake (without overt s/s significant oral/pharyngeal dysphagia including s/s penetration or aspiration) for the highest appropriate diet level.      Short Term Goals:    -Pt will tolerate Dysphagia 3/advanced (dental soft) diet and thin liquid with no significant s/s oral or pharyngeal dysphagia across 1-3 diagnostic session/s.    -Patient will tolerate trials of upgraded food and/or liquid texture with no significant s/s of oral or pharyngeal dysphagia including aspiration across 1-3 diagnostic sessions     -Patient will comply with a Video/Modified Barium Swallow study for more complete assessment of swallowing anatomy/physiology/aspiration risk and to assess efficacy of treatment techniques so as to best guide treatment plan      Speech Therapy Prognosis   Prognosis: good    Prognosis Considerations: age, medical status and prior medical history

## 2023-09-02 ENCOUNTER — APPOINTMENT (INPATIENT)
Dept: RADIOLOGY | Facility: HOSPITAL | Age: 88
DRG: 193 | End: 2023-09-02
Payer: MEDICARE

## 2023-09-02 ENCOUNTER — APPOINTMENT (INPATIENT)
Dept: CT IMAGING | Facility: HOSPITAL | Age: 88
DRG: 193 | End: 2023-09-02
Payer: MEDICARE

## 2023-09-02 LAB
ALBUMIN SERPL BCP-MCNC: 2.7 G/DL (ref 3.5–5)
ALP SERPL-CCNC: 94 U/L (ref 34–104)
ALT SERPL W P-5'-P-CCNC: 24 U/L (ref 7–52)
AMORPH URATE CRY URNS QL MICRO: ABNORMAL
ANION GAP SERPL CALCULATED.3IONS-SCNC: 8 MMOL/L
AST SERPL W P-5'-P-CCNC: 32 U/L (ref 13–39)
BACTERIA UR QL AUTO: ABNORMAL /HPF
BASOPHILS # BLD AUTO: 0.04 THOUSANDS/ÂΜL (ref 0–0.1)
BASOPHILS NFR BLD AUTO: 0 % (ref 0–1)
BILIRUB SERPL-MCNC: 0.44 MG/DL (ref 0.2–1)
BILIRUB UR QL STRIP: NEGATIVE
BUN SERPL-MCNC: 68 MG/DL (ref 5–25)
CALCIUM ALBUM COR SERPL-MCNC: 9.8 MG/DL (ref 8.3–10.1)
CALCIUM SERPL-MCNC: 8.8 MG/DL (ref 8.4–10.2)
CHLORIDE SERPL-SCNC: 105 MMOL/L (ref 96–108)
CLARITY UR: ABNORMAL
CO2 SERPL-SCNC: 21 MMOL/L (ref 21–32)
COLOR UR: YELLOW
CREAT SERPL-MCNC: 3.39 MG/DL (ref 0.6–1.3)
EOSINOPHIL # BLD AUTO: 0.3 THOUSAND/ÂΜL (ref 0–0.61)
EOSINOPHIL NFR BLD AUTO: 2 % (ref 0–6)
ERYTHROCYTE [DISTWIDTH] IN BLOOD BY AUTOMATED COUNT: 13.6 % (ref 11.6–15.1)
GFR SERPL CREATININE-BSD FRML MDRD: 15 ML/MIN/1.73SQ M
GLUCOSE SERPL-MCNC: 174 MG/DL (ref 65–140)
GLUCOSE UR STRIP-MCNC: NEGATIVE MG/DL
HCT VFR BLD AUTO: 24.7 % (ref 36.5–49.3)
HGB BLD-MCNC: 8 G/DL (ref 12–17)
HGB UR QL STRIP.AUTO: ABNORMAL
HYALINE CASTS #/AREA URNS LPF: ABNORMAL /LPF
IMM GRANULOCYTES # BLD AUTO: 0.2 THOUSAND/UL (ref 0–0.2)
IMM GRANULOCYTES NFR BLD AUTO: 1 % (ref 0–2)
KETONES UR STRIP-MCNC: NEGATIVE MG/DL
LEUKOCYTE ESTERASE UR QL STRIP: NEGATIVE
LYMPHOCYTES # BLD AUTO: 1.15 THOUSANDS/ÂΜL (ref 0.6–4.47)
LYMPHOCYTES NFR BLD AUTO: 7 % (ref 14–44)
MCH RBC QN AUTO: 31.5 PG (ref 26.8–34.3)
MCHC RBC AUTO-ENTMCNC: 32.4 G/DL (ref 31.4–37.4)
MCV RBC AUTO: 97 FL (ref 82–98)
MONOCYTES # BLD AUTO: 2.12 THOUSAND/ÂΜL (ref 0.17–1.22)
MONOCYTES NFR BLD AUTO: 14 % (ref 4–12)
NEUTROPHILS # BLD AUTO: 11.76 THOUSANDS/ÂΜL (ref 1.85–7.62)
NEUTS SEG NFR BLD AUTO: 76 % (ref 43–75)
NITRITE UR QL STRIP: NEGATIVE
NON-SQ EPI CELLS URNS QL MICRO: ABNORMAL /HPF
NRBC BLD AUTO-RTO: 0 /100 WBCS
PH UR STRIP.AUTO: 5.5 [PH]
PLATELET # BLD AUTO: 352 THOUSANDS/UL (ref 149–390)
PMV BLD AUTO: 10.5 FL (ref 8.9–12.7)
POTASSIUM SERPL-SCNC: 3.7 MMOL/L (ref 3.5–5.3)
PROCALCITONIN SERPL-MCNC: 3.11 NG/ML
PROT SERPL-MCNC: 6.5 G/DL (ref 6.4–8.4)
PROT UR STRIP-MCNC: ABNORMAL MG/DL
RBC # BLD AUTO: 2.54 MILLION/UL (ref 3.88–5.62)
RBC #/AREA URNS AUTO: ABNORMAL /HPF
SODIUM SERPL-SCNC: 134 MMOL/L (ref 135–147)
SP GR UR STRIP.AUTO: 1.02 (ref 1–1.03)
UROBILINOGEN UR STRIP-ACNC: <2 MG/DL
WBC # BLD AUTO: 15.57 THOUSAND/UL (ref 4.31–10.16)
WBC #/AREA URNS AUTO: ABNORMAL /HPF

## 2023-09-02 PROCEDURE — 99223 1ST HOSP IP/OBS HIGH 75: CPT | Performed by: PHYSICIAN ASSISTANT

## 2023-09-02 PROCEDURE — 99223 1ST HOSP IP/OBS HIGH 75: CPT | Performed by: INTERNAL MEDICINE

## 2023-09-02 PROCEDURE — 99232 SBSQ HOSP IP/OBS MODERATE 35: CPT | Performed by: INTERNAL MEDICINE

## 2023-09-02 PROCEDURE — 85025 COMPLETE CBC W/AUTO DIFF WBC: CPT | Performed by: INTERNAL MEDICINE

## 2023-09-02 PROCEDURE — 94640 AIRWAY INHALATION TREATMENT: CPT

## 2023-09-02 PROCEDURE — 73080 X-RAY EXAM OF ELBOW: CPT

## 2023-09-02 PROCEDURE — 94760 N-INVAS EAR/PLS OXIMETRY 1: CPT

## 2023-09-02 PROCEDURE — 81001 URINALYSIS AUTO W/SCOPE: CPT | Performed by: INTERNAL MEDICINE

## 2023-09-02 PROCEDURE — 71250 CT THORAX DX C-: CPT

## 2023-09-02 PROCEDURE — 74176 CT ABD & PELVIS W/O CONTRAST: CPT

## 2023-09-02 PROCEDURE — G1004 CDSM NDSC: HCPCS

## 2023-09-02 PROCEDURE — 84145 PROCALCITONIN (PCT): CPT | Performed by: INTERNAL MEDICINE

## 2023-09-02 PROCEDURE — 99222 1ST HOSP IP/OBS MODERATE 55: CPT | Performed by: ORTHOPAEDIC SURGERY

## 2023-09-02 PROCEDURE — 80053 COMPREHEN METABOLIC PANEL: CPT | Performed by: INTERNAL MEDICINE

## 2023-09-02 PROCEDURE — 20605 DRAIN/INJ JOINT/BURSA W/O US: CPT | Performed by: ORTHOPAEDIC SURGERY

## 2023-09-02 PROCEDURE — 0R9L3ZX DRAINAGE OF RIGHT ELBOW JOINT, PERCUTANEOUS APPROACH, DIAGNOSTIC: ICD-10-PCS | Performed by: ORTHOPAEDIC SURGERY

## 2023-09-02 RX ORDER — ALBUMIN (HUMAN) 12.5 G/50ML
25 SOLUTION INTRAVENOUS EVERY 6 HOURS
Status: COMPLETED | OUTPATIENT
Start: 2023-09-02 | End: 2023-09-03

## 2023-09-02 RX ADMIN — METOPROLOL TARTRATE 50 MG: 50 TABLET ORAL at 09:48

## 2023-09-02 RX ADMIN — LEVALBUTEROL HYDROCHLORIDE 1.25 MG: 1.25 SOLUTION RESPIRATORY (INHALATION) at 07:50

## 2023-09-02 RX ADMIN — METRONIDAZOLE 500 MG: 500 SOLUTION INTRAVENOUS at 06:09

## 2023-09-02 RX ADMIN — METRONIDAZOLE 500 MG: 500 SOLUTION INTRAVENOUS at 14:31

## 2023-09-02 RX ADMIN — GUAIFENESIN 600 MG: 600 TABLET ORAL at 09:50

## 2023-09-02 RX ADMIN — PIPERACILLIN AND TAZOBACTAM 2.25 G: 2; .25 INJECTION, POWDER, FOR SOLUTION INTRAVENOUS at 20:28

## 2023-09-02 RX ADMIN — BUDESONIDE 0.5 MG: 0.5 INHALANT ORAL at 07:50

## 2023-09-02 RX ADMIN — IPRATROPIUM BROMIDE 0.5 MG: 0.5 SOLUTION RESPIRATORY (INHALATION) at 13:44

## 2023-09-02 RX ADMIN — ERTAPENEM SODIUM 500 MG: 1 INJECTION, POWDER, LYOPHILIZED, FOR SOLUTION INTRAMUSCULAR; INTRAVENOUS at 09:53

## 2023-09-02 RX ADMIN — APIXABAN 2.5 MG: 2.5 TABLET, FILM COATED ORAL at 17:52

## 2023-09-02 RX ADMIN — LORATADINE 10 MG: 10 TABLET ORAL at 09:50

## 2023-09-02 RX ADMIN — FOLIC ACID 1 MG: 1 TABLET ORAL at 09:49

## 2023-09-02 RX ADMIN — GUAIFENESIN 600 MG: 600 TABLET ORAL at 17:52

## 2023-09-02 RX ADMIN — ALBUMIN (HUMAN) 25 G: 0.25 INJECTION, SOLUTION INTRAVENOUS at 20:15

## 2023-09-02 RX ADMIN — MAGNESIUM OXIDE TAB 400 MG (241.3 MG ELEMENTAL MG) 400 MG: 400 (241.3 MG) TAB at 09:50

## 2023-09-02 RX ADMIN — SENNOSIDES 8.6 MG: 8.6 TABLET, FILM COATED ORAL at 21:54

## 2023-09-02 RX ADMIN — IPRATROPIUM BROMIDE 0.5 MG: 0.5 SOLUTION RESPIRATORY (INHALATION) at 07:50

## 2023-09-02 RX ADMIN — ASPIRIN 81 MG: 81 TABLET, COATED ORAL at 09:49

## 2023-09-02 RX ADMIN — OXYCODONE HYDROCHLORIDE 5 MG: 5 TABLET ORAL at 01:44

## 2023-09-02 RX ADMIN — Medication 250 MG: at 09:48

## 2023-09-02 RX ADMIN — Medication 250 MG: at 17:52

## 2023-09-02 RX ADMIN — ALBUMIN (HUMAN) 25 G: 0.25 INJECTION, SOLUTION INTRAVENOUS at 16:47

## 2023-09-02 RX ADMIN — FLUTICASONE PROPIONATE 1 SPRAY: 50 SPRAY, METERED NASAL at 09:50

## 2023-09-02 RX ADMIN — ACETAMINOPHEN 325MG 650 MG: 325 TABLET ORAL at 20:04

## 2023-09-02 RX ADMIN — FORMOTEROL FUMARATE DIHYDRATE 20 MCG: 20 SOLUTION RESPIRATORY (INHALATION) at 07:51

## 2023-09-02 RX ADMIN — PANTOPRAZOLE SODIUM 40 MG: 40 TABLET, DELAYED RELEASE ORAL at 06:09

## 2023-09-02 RX ADMIN — DILTIAZEM HYDROCHLORIDE 240 MG: 240 CAPSULE, COATED, EXTENDED RELEASE ORAL at 09:53

## 2023-09-02 RX ADMIN — APIXABAN 2.5 MG: 2.5 TABLET, FILM COATED ORAL at 09:49

## 2023-09-02 RX ADMIN — POLYETHYLENE GLYCOL 3350 17 G: 17 POWDER, FOR SOLUTION ORAL at 09:50

## 2023-09-02 RX ADMIN — OXYCODONE HYDROCHLORIDE 5 MG: 5 TABLET ORAL at 09:49

## 2023-09-02 RX ADMIN — LEVALBUTEROL HYDROCHLORIDE 1.25 MG: 1.25 SOLUTION RESPIRATORY (INHALATION) at 13:44

## 2023-09-02 RX ADMIN — METOPROLOL TARTRATE 50 MG: 50 TABLET ORAL at 20:04

## 2023-09-02 NOTE — ASSESSMENT & PLAN NOTE
· History of alcohol abuse with withdrawal seizures, has not drink since prior to [facility] hospitalization 8/8

## 2023-09-02 NOTE — ASSESSMENT & PLAN NOTE
· Went into A-fib with RVR on August 28 and was given IV Lopressor and Cardizem  · Home regimen: Coreg 25 Mg twice daily  · Started on Eliquis 2.5 mg twice daily for anticoagulation during hospitalization at [address] [facility] 8/8-8/23  · Cardiology consult appreciated  · Patient converted to normal sinus rhythm  · Cardiology switched patient to Lopressor 50 mg every 12 hours and Cardizem  mg daily  · Continue home medications

## 2023-09-02 NOTE — PLAN OF CARE
Problem: Potential for Falls  Goal: Patient will remain free of falls  Description: INTERVENTIONS:  - Educate patient/family on patient safety including physical limitations  - Instruct patient to call for assistance with activity   - Consult OT/PT to assist with strengthening/mobility   - Keep Call bell within reach  - Keep bed low and locked with side rails adjusted as appropriate  - Keep care items and personal belongings within reach  - Initiate and maintain comfort rounds  - Make Fall Risk Sign visible to staff  - Offer Toileting every 2 Hours, in advance of need  - Initiate/Maintain bed alarm  - Obtain necessary fall risk management equipment:   - Apply yellow socks and bracelet for high fall risk patients  - Consider moving patient to room near nurses station  Outcome: Progressing     Problem: PAIN - ADULT  Goal: Verbalizes/displays adequate comfort level or baseline comfort level  Description: Interventions:  - Encourage patient to monitor pain and request assistance  - Assess pain using appropriate pain scale  - Administer analgesics based on type and severity of pain and evaluate response  - Implement non-pharmacological measures as appropriate and evaluate response  - Consider cultural and social influences on pain and pain management  - Notify physician/advanced practitioner if interventions unsuccessful or patient reports new pain  Outcome: Progressing     Problem: INFECTION - ADULT  Goal: Absence or prevention of progression during hospitalization  Description: INTERVENTIONS:  - Assess and monitor for signs and symptoms of infection  - Monitor lab/diagnostic results  - Monitor all insertion sites, i.e. indwelling lines, tubes, and drains  - Monitor endotracheal if appropriate and nasal secretions for changes in amount and color  - [place] appropriate cooling/warming therapies per order  - Administer medications as ordered  - Instruct and encourage patient and family to use good hand hygiene technique  - Identify and instruct in appropriate isolation precautions for identified infection/condition  Outcome: Progressing     Problem: Prexisting or High Potential for Compromised Skin Integrity  Goal: Skin integrity is maintained or improved  Description: INTERVENTIONS:  - Identify patients at risk for skin breakdown  - Assess and monitor skin integrity  - Assess and monitor nutrition and hydration status  - Monitor labs   - Assess for incontinence   - Turn and reposition patient  - Assist with mobility/ambulation  - Relieve pressure over bony prominences  - Avoid friction and shearing  - Provide appropriate hygiene as needed including keeping skin clean and dry  - Evaluate need for skin moisturizer/barrier cream  - Collaborate with interdisciplinary team   - Patient/family teaching  - Consider wound care consult   Outcome: Progressing     Problem: Knowledge Deficit  Goal: Patient/family/caregiver demonstrates understanding of disease process, treatment plan, medications, and discharge instructions  Description: Complete learning assessment and assess knowledge base.   Interventions:  - Provide teaching at level of understanding  - Provide teaching via preferred learning methods  Outcome: Progressing     Problem: DISCHARGE PLANNING  Goal: Discharge to home or other facility with appropriate resources  Description: INTERVENTIONS:  - Identify barriers to discharge w/patient and caregiver  - Arrange for needed discharge resources and transportation as appropriate  - Identify discharge learning needs (meds, wound care, etc.)  - Arrange for interpretive services to assist at discharge as needed  - Refer to Case Management Department for coordinating discharge planning if the patient needs post-hospital services based on physician/advanced practitioner order or complex needs related to functional status, cognitive ability, or social support system  Outcome: Progressing

## 2023-09-02 NOTE — ASSESSMENT & PLAN NOTE
· Hemoglobin 9.4 on admission  · Hemoglobin ranging 10-12 during recent [facility] admission  · No signs of active bleeding  · Trend CBC

## 2023-09-02 NOTE — PROGRESS NOTES
[address]  Progress Note  Name: Kasandra Alvarado  MRN: [id number]  Unit/Bed#: -01 I Date of Admission: 8/26/2023   Date of Service: 9/2/2023 I Hospital Day: 7    Assessment/Plan   Acute encephalopathy  Assessment & Plan  Patient is confused likely acute metabolic encephalopathy in setting of UTI versus hospital delirium  Frequent reorientation  Trend WBC and fever curve  Neurochecks  Orthopedic consult appreciated for right elbow pain  Orthopedics did attempt aspiration of right olecranon bursa with less than 1 cc fluid obtained.   CT chest abdomen pelvis without contrast  Continue on IV antibiotics    Cystitis  Assessment & Plan  · Patient reports dysuria and difficulty urinating   · UA with innumerable WBCs and innumerable bacteria  · Urine culture grew ESBL E. coli  · Urinary retention protocol  · Continue Invanz  · Trend WBC and fever curve  · ID consult appreciated    History of alcohol abuse  Assessment & Plan  · History of alcohol abuse with withdrawal seizures, has not drink since prior to [facility] hospitalization 8/8    History of COVID-19  Assessment & Plan  · Diagnosed with COVID-19 during recent admit  · Received 3 days of remdesivir and IV fluids  · No evidence of pneumonia during admission  · Completed quarantine while inpatient    Central retinal vein occlusion of right eye  Assessment & Plan  · Seen by ophthalmology during recent [facility] admission, diagnosed with CRVO and ocular hypertension  · Patient recommended for formal evaluation outpatient of OCT macular degeneration, discussion of possible anti-VEGF therapy  · Encouraged ophthalmology follow-up outpatient    Leukocytosis  Assessment & Plan  · WBC 10.68 K on admission  · Likely secondary to pneumonia and cystitis   · Blood cultures are negative to date  · U/c growing ESBL E. Coli  · Continue to Invanz    Stage 3b chronic kidney disease [facility])  Assessment & Plan  Lab Results   Component Value Date    EGFR 15 09/02/2023    EGFR 21 09/01/2023    EGFR 22 08/31/2023    CREATININE 3.39 (H) 09/02/2023    CREATININE 2.58 (H) 09/01/2023    CREATININE 2.49 (H) 08/31/2023   · Baseline creatinine 1.6-1.9  · Creatinine on admission 1.76  · Patient received a dose of Lasix on September 1  · Creatinine this morning is 3.39  · Urinary retention protocol  · Nephrology consult  · Trend BMP daily    Chronic obstructive pulmonary disease, unspecified COPD type ([address])  Assessment & Plan  · Not on maintenance inhalers outpatient  · No signs of acute exacerbation on admission  · If patient were to develop wheezing in setting of pneumonia, would start Xopenex/Atrovent nebulizers    Atrial fibrillation ([address])  Assessment & Plan  · Went into A-fib with RVR on August 28 and was given IV Lopressor and Cardizem  · Home regimen: Coreg 25 Mg twice daily  · Started on Eliquis 2.5 mg twice daily for anticoagulation during hospitalization at [facility] 8/8-8/23  · Cardiology consult appreciated  · Patient converted to normal sinus rhythm  · Cardiology switched patient to Lopressor 50 mg every 12 hours and Cardizem  mg daily  · Continue home medications    Anemia of chronic disease  Assessment & Plan  · Hemoglobin 9.4 on admission  · Hemoglobin ranging 10-12 during recent [facility] admission  · No signs of active bleeding  · Trend CBC    * Community acquired pneumonia of right lower lobe of lung  Assessment & Plan  · Presents with fever, worsening cough, and dyspnea  · Procalcitonin elevated patient with leukocytosis  · Chest x-ray is unremarkable  · Urine strep and Legionella studies is negative  · Sputum culture results reviewed  · Blood cultures are negative to date  · Respiratory protocol  · Airway clearance protocol  · On Invanz and will add Flagyl  · Patient is spiking fever. Follow-up repeat blood culture results  · Speech swallow evaluation.   Patient switched to dysphagia diet  · Repeat chest x-ray is unremarkable Labs & Imaging: I have personally reviewed pertinent reports. VTE Prophylaxis: in place. Code Status:   Level 3 - DNAR and DNI    Patient Centered Rounds: I have performed bedside rounds with nursing staff today. Discussions with Specialists or Other Care Team Provider: LUIS F    Education and Discussions with Family / Patient: Attempted to call daughter and wife on phone-no response    Current Length of Stay: 7 day(s)    Current Patient Status: Inpatient   Certification Statement: The patient will continue to require additional inpatient hospital stay due to see my assessment and plan. Subjective:   Patient is seen and examined at bedside. Patient is following commands. Having fevers. Feeling weak  All other ROS are negative. Objective:    Vitals: Blood pressure 127/69, pulse (!) 118, temperature (!) 101.4 °F (38.6 °C), resp. rate (!) 24, height 5' 8" (1.727 m), weight 91.5 kg (201 lb 11.5 oz), SpO2 96 %. ,Body mass index is 30.67 kg/m². SPO2 RA Rest    Flowsheet Row ED to Hosp-Admission (Current) from 8/26/2023 in [facility]   SpO2 96 %   SpO2 Activity At Rest   O2 Device None (Room air)   O2 Flow Rate --        I&O:     Intake/Output Summary (Last 24 hours) at 9/2/2023 1500  Last data filed at 9/2/2023 0930  Gross per 24 hour   Intake 240 ml   Output --   Net 240 ml       Physical Exam:    General- Alert, lying comfortably in bed. Not in any acute distress. Neck- Supple, No JVD  CVS- regular, S1 and S2 normal  Chest- Bilateral Air entry, decreased at bases  Abdomen- soft, nontender, not distended, no guarding or rigidity, BS+  Extremities-has pedal edema, No calf tenderness  CNS-   Alert, awake. confused. No focal deficits present. Invasive Devices     Peripheral Intravenous Line  Duration           Peripheral IV 08/30/23 Distal;Dorsal (posterior); Right Forearm 3 days    Peripheral IV 09/01/23 Dorsal (posterior); Left Forearm <1 day Social History  reviewed  Family History   Problem Relation Age of Onset   • Heart disease Mother         cardiac disorder   • Stroke Father    • Heart disease Father         cardiac disorder   • Heart disease Sister    • Diabetes Family    • Hypertension Family     reviewed    Meds:  Current Facility-Administered Medications   Medication Dose Route Frequency Provider Last Rate Last Admin   • acetaminophen (TYLENOL) tablet 650 mg  650 mg Oral Q6H PRN Lily Torres PA-C   650 mg at 09/01/23 1832   • aluminum-magnesium hydroxide-simethicone (MAALOX) oral suspension 30 mL  30 mL Oral Q6H PRN Lily Torres PA-C       • apixaban (ELIQUIS) tablet 2.5 mg  2.5 mg Oral BID Lily Torres PA-C   2.5 mg at 09/02/23 0949   • aspirin (ECOTRIN LOW STRENGTH) EC tablet 81 mg  81 mg Oral Daily Lily Torres PA-C   81 mg at 09/02/23 2151   • bisacodyl (DULCOLAX) rectal suppository 10 mg  10 mg Rectal Daily PRN Darrian Shin MD   10 mg at 09/01/23 0915   • budesonide (PULMICORT) inhalation solution 0.5 mg  0.5 mg Nebulization Q12H Lily Torres PA-C   0.5 mg at 09/02/23 0750   • diltiazem (CARDIZEM CD) 24 hr capsule 240 mg  240 mg Oral Daily Jan Holguin PA-C   240 mg at 09/02/23 3194   • ertapenem (INVanz) 500 mg in sodium chloride 0.9 % 50 mL IVPB  500 mg Intravenous Q24H Dela Schlatter,  mL/hr at 09/02/23 0953 500 mg at 09/02/23 0953   • fluticasone (FLONASE) 50 mcg/act nasal spray 1 spray  1 spray Nasal Daily Lily Torres PA-C   1 spray at 35/13/33 6458   • folic acid (FOLVITE) tablet 1 mg  1 mg Oral Daily Lily Torres PA-C   1 mg at 09/02/23 3402   • formoterol (PERFOROMIST) nebulizer solution 20 mcg  20 mcg Nebulization Q12H Lily Torres PA-C   20 mcg at 09/02/23 0751   • guaiFENesin (MUCINEX) 12 hr tablet 600 mg  600 mg Oral BID Lily Torres PA-C   600 mg at 09/02/23 0950   • ipratropium (ATROVENT) 0.02 % inhalation solution 0.5 mg  0.5 mg Nebulization TID Prosper S Jaylyn Real MD   0.5 mg at 09/02/23 1344   • levalbuterol ([place]) inhalation solution 1.25 mg  1.25 mg Nebulization TID Agus Bliss MD   1.25 mg at 09/02/23 1344   • loratadine (CLARITIN) tablet 10 mg  10 mg Oral Daily Amanda Be PA-C   10 mg at 09/02/23 3950   • magnesium Oxide (MAG-OX) tablet 400 mg  400 mg Oral Daily Amanda Be PA-C   400 mg at 09/02/23 0950   • metoprolol tartrate (LOPRESSOR) tablet 50 mg  50 mg Oral Q12H [address] [facility] LOLI Holguin   50 mg at 09/02/23 6137   • metroNIDAZOLE (FLAGYL) IVPB (premix) 500 mg 100 mL  500 mg Intravenous Q8H Agus Bliss  mL/hr at 09/02/23 1431 500 mg at 09/02/23 1431   • ondansetron (ZOFRAN) injection 4 mg  4 mg Intravenous Q6H PRN Amanda Be PA-C   4 mg at 08/28/23 1748   • oxyCODONE (ROXICODONE) IR tablet 5 mg  5 mg Oral Q6H PRN Agus Bliss MD   5 mg at 09/02/23 0949   • pantoprazole (PROTONIX) EC tablet 40 mg  40 mg Oral Daily Before Breakfast Amanda Be PA-C   40 mg at 09/02/23 9669   • polyethylene glycol (MIRALAX) packet 17 g  17 g Oral Daily Amanda Be PA-C   17 g at 09/02/23 8128   • saccharomyces boulardii (FLORASTOR) capsule 250 mg  250 mg Oral BID Adelita Allred PA-C   250 mg at 09/02/23 6383   • senna (SENOKOT) tablet 8.6 mg  1 tablet Oral HS Amanda Be PA-C   8.6 mg at 09/01/23 2146      Medications Prior to Admission   Medication   • apixaban (ELIQUIS) 2.5 mg   • Aspirin 81 MG CAPS   • carvedilol (COREG) 12.5 mg tablet   • cetirizine (ZyrTEC) 10 mg tablet   • fluticasone (FLONASE) 50 mcg/act nasal spray   • folic acid (FOLVITE) 1 mg tablet   • losartan (COZAAR) 50 mg tablet   • MAGNESIUM OXIDE 400 PO   • pantoprazole (PROTONIX) 40 mg tablet   • polyvinyl alcohol (LIQUIFILM TEARS) 1.4 % ophthalmic solution   • magnesium hydroxide (MILK OF MAGNESIA) 400 mg/5 mL oral suspension       Labs:  Results from last 7 days   Lab Units 09/02/23  0336 09/01/23  0334 08/31/23  0335   WBC Thousand/uL 15.57* 15.57* 13.57*   HEMOGLOBIN g/dL 8.0* 9.2* 8.4*   HEMATOCRIT % 24.7* 29.0* 26.3*   PLATELETS Thousands/uL 352 365 297   NEUTROS PCT % 76* 79* 79*   LYMPHS PCT % 7* 6* 6*   MONOS PCT % 14* 13* 13*   EOS PCT % 2 1 1     Results from last 7 days   Lab Units 09/02/23  0336 09/01/23  0334 08/31/23  0335 08/27/23 0447 08/26/23 2052   POTASSIUM mmol/L 3.7 3.7 3.8   < > 4.3   CHLORIDE mmol/L 105 105 105   < > 105   CO2 mmol/L 21 20* 22   < > 25   BUN mg/dL 68* 55* 54*   < > 40*   CREATININE mg/dL 3.39* 2.58* 2.49*   < > 1.76*   CALCIUM mg/dL 8.8 8.8 8.6   < > 8.4   ALK PHOS U/L 94  --   --   --  89   ALT U/L 24  --   --   --  40   AST U/L 32  --   --   --  25    < > = values in this interval not displayed. Lab Results   Component Value Date    TROPONINI <0.02 01/10/2020    TROPONINI <0.02 08/30/2017    TROPONINI <0.02 03/24/2017     Results from last 7 days   Lab Units 08/26/23 2052   INR  1.72*     Lab Results   Component Value Date    BLOODCX Received in Microbiology Lab. Culture in Progress. 09/01/2023    BLOODCX Received in Microbiology Lab. Culture in Progress. 09/01/2023    BLOODCX No Growth After 5 Days. 08/26/2023    BLOODCX No Growth After 5 Days. 08/26/2023    URINECX >100,000 cfu/ml Escherichia coli ESBL (A) 08/27/2023    SPUTUMCULTUR 2+ Growth of 08/27/2023         Imaging:  Results for orders placed during the hospital encounter of 08/26/23    XR chest portable    Narrative  CHEST    INDICATION:   wheezing. COVID-positive 8/12/2023. COMPARISON: CXR 8/29/2023, abdomen CT 3/24/2017. EXAM PERFORMED/VIEWS:  XR CHEST PORTABLE. FINDINGS:    Cardiomediastinal silhouette normal.    Lungs clear. No effusion or pneumothorax. Upper abdomen normal. Bones normal for age. Impression  No acute cardiopulmonary disease.           Workstation performed: [id number]    Results for orders placed during the hospital encounter of 01/10/20    XR chest 2 views    Narrative  CHEST    INDICATION:   Chest Pain.    COMPARISON:  3/24/2017    EXAM PERFORMED/VIEWS:  XR CHEST PA & LATERAL  Images: 2    FINDINGS:    Cardiomediastinal silhouette appears unremarkable. No congestive failure. No pneumothorax. No pneumonia. No effusions. Osseous structures appear within normal limits for patient age. Impression  No acute cardiopulmonary disease.         Workstation performed: [id number]      Last 24 Hours Medication List:   Current Facility-Administered Medications   Medication Dose Route Frequency Provider Last Rate   • acetaminophen  650 mg Oral Q6H PRN Jackie Hills PA-C     • aluminum-magnesium hydroxide-simethicone  30 mL Oral Q6H PRN Jackie Hills PA-C     • apixaban  2.5 mg Oral BID Jackie Hills PA-C     • aspirin  81 mg Oral Daily Jackie Hills PA-C     • bisacodyl  10 mg Rectal Daily PRN Triny De La O MD     • budesonide  0.5 mg Nebulization Q12H Jackie Hills PA-C     • diltiazem  240 mg Oral Daily [facility]     • ertapenem  500 mg Intravenous Q24H Jayne Contreras  mg (09/02/23 3199)   • fluticasone  1 spray Nasal Daily Jackie Hills PA-C     • folic acid  1 mg Oral Daily Jackie Hills PA-C     • formoterol  20 mcg Nebulization Q12H Jackie Hills PA-C     • guaiFENesin  600 mg Oral BID Jackie Hills PA-C     • ipratropium  0.5 mg Nebulization TID Triny De La O MD     • levalbuterol  1.25 mg Nebulization TID Triny De La O MD     • loratadine  10 mg Oral Daily Jackie Hills PA-C     • magnesium Oxide  400 mg Oral Daily Jackie Hills PA-C     • metoprolol tartrate  50 mg Oral Q12H [address] Jassi Holguin PA-C     • metroNIDAZOLE  500 mg Intravenous Q8H Triny De La O  mg (09/02/23 1431)   • ondansetron  4 mg Intravenous Q6H PRN Jackie Hills PA-C     • oxyCODONE  5 mg Oral Q6H PRN Triny De La O MD     • pantoprazole  40 mg Oral Daily Before Breakfast Jackie Hills PA-C     • polyethylene glycol  17 g Oral Daily James Pritchett PA-C     • saccharomyces boulardii  250 mg Oral BID Dalila Holloway PA-C     • senna  1 tablet Oral HS James Pritchett PA-C          Today, Patient Was Seen By: Mushtaq Whitt MD    ** Please Note: Dictation voice to text software may have been used in the creation of this document.  **

## 2023-09-02 NOTE — ASSESSMENT & PLAN NOTE
· Seen by ophthalmology during recent [facility] admission, diagnosed with CRVO and ocular hypertension  · Patient recommended for formal evaluation outpatient of OCT macular degeneration, discussion of possible anti-VEGF therapy  · Encouraged ophthalmology follow-up outpatient

## 2023-09-02 NOTE — ASSESSMENT & PLAN NOTE
· Presents with fever, worsening cough, and dyspnea  · Procalcitonin elevated patient with leukocytosis  · Chest x-ray is unremarkable  · Urine strep and Legionella studies is negative  · Sputum culture results reviewed  · Blood cultures are negative to date  · Respiratory protocol  · Airway clearance protocol  · On Invanz and will add Flagyl  · Patient is spiking fever. Follow-up repeat blood culture results  · Speech swallow evaluation.   Patient switched to dysphagia diet  · Repeat chest x-ray is unremarkable

## 2023-09-02 NOTE — CONSULTS
Consultation - Nephrology   Valarie Aviles 82 y.o. male MRN: [id number]  Unit/Bed#: -01 Encounter: [id number]        HISTORY OF PRESENT ILLNESS:  Requesting Physician: Rosalinda Aiken MD  Reason for Consult: Acute kidney injury     Valarie Aviles is a 82y.o. year old male who was admitted for change in mental status originally presented on August 27 from skilled nursing facility    Patient has a history of paroxysmal atrial fibrillation, stage IIIa chronic kidney disease with a baseline creatinine 1.2, COPD who originally was being treated for a community-acquired pneumonia and was thought to have cystitis, patient also has a change in mental status that is associated with this that has actually been getting worse. Patient has a history of alcohol use.   CT scan from August 8 shows normal liver morphology, no acute findings in the kidneys and a few renal cysts    Patient is now oliguric with lower extremity edema, distended abdomen, and worsening mental status and remains febrile with a worsening creatinine    A 12 point review of systems could not be done secondary to patient's mental status    PAST MEDICAL HISTORY:  Past Medical History:   Diagnosis Date   • Alcohol abuse    • Alcohol withdrawal seizure without complication ([address])    • Arthritis    • Asthma    • CVA (cerebral vascular accident) ([address])    • Decubitus ulcer of buttock     last assessed 07/20/16   • Diabetes ([address])    • Disease of thyroid gland    • Duodenal ulcer    • Emphysema lung (HCC)    • Esophageal varices (HCC)    • GERD (gastroesophageal reflux disease)    • History of transfusion    • Hypertension    • Irritable bowel syndrome with diarrhea    • Kidney stones    • Prostate cancer ([address])    • Urinary frequency     resolved 02/15/17       PAST SURGICAL HISTORY:  Past Surgical History:   Procedure Laterality Date   • BACK SURGERY     • CATARACT EXTRACTION     • ESOPHAGOGASTRODUODENOSCOPY N/A 2/6/2017    Procedure: ESOPHAGOGASTRODUODENOSCOPY (EGD); Surgeon: Sundar Mcclellan MD;  Location:  MAIN OR;  Service:    • RI ESOPHAGOGASTRODUODENOSCOPY TRANSORAL DIAGNOSTIC N/A 2016    Procedure: ESOPHAGOGASTRODUODENOSCOPY (EGD);   Surgeon: Lucian Eid MD;  Location:  MAIN OR;  Service: Gastroenterology   • TONSILLECTOMY         ALLERGIES:  Allergies   Allergen Reactions   • Morphine And Related        SOCIAL HISTORY:  Social History     Substance and Sexual Activity   Alcohol Use Yes   • Alcohol/week: 14.0 standard drinks of alcohol   • Types: 14 Standard drinks or equivalent per week    Comment: 3-4 mixed drinks vodka per night/ 2L per week     Social History     Substance and Sexual Activity   Drug Use No     Social History     Tobacco Use   Smoking Status Former   • Types: Cigarettes   • Quit date:    • Years since quittin.6   Smokeless Tobacco Never   Tobacco Comments    quit 20 years ago       FAMILY HISTORY:  Family History   Problem Relation Age of Onset   • Heart disease Mother         cardiac disorder   • Stroke Father    • Heart disease Father         cardiac disorder   • Heart disease Sister    • Diabetes Family    • Hypertension Family        MEDICATIONS:    Current Facility-Administered Medications:   •  acetaminophen (TYLENOL) tablet 650 mg, 650 mg, Oral, Q6H PRN, ARSEN Oliva-C, 650 mg at 23 1832  •  aluminum-magnesium hydroxide-simethicone (MAALOX) oral suspension 30 mL, 30 mL, Oral, Q6H PRN, Tesfaye Medrano PA-C  •  apixaban (ELIQUIS) tablet 2.5 mg, 2.5 mg, Oral, BID, Tesfaye Medrano PA-C, 2.5 mg at 23 8877  •  aspirin (ECOTRIN LOW STRENGTH) EC tablet 81 mg, 81 mg, Oral, Daily, KATE OlivaC, 81 mg at 23 8198  •  bisacodyl (DULCOLAX) rectal suppository 10 mg, 10 mg, Rectal, Daily PRN, Agata Lee MD, 10 mg at 23 0915  •  budesonide (PULMICORT) inhalation solution 0.5 mg, 0.5 mg, Nebulization, Q12H, KATE OlivaC, 0.5 mg at 23 3509  •  diltiazem (CARDIZEM CD) 24 hr capsule 240 mg, 240 mg, Oral, Daily, Cameron Holguin PA-C, 240 mg at 09/02/23 3709  •  ertapenem (INVanz) 500 mg in sodium chloride 0.9 % 50 mL IVPB, 500 mg, Intravenous, Q24H, Brittany Guzman MD, Last Rate: 100 mL/hr at 09/02/23 0953, 500 mg at 09/02/23 0953  •  fluticasone (FLONASE) 50 mcg/act nasal spray 1 spray, 1 spray, Nasal, Daily, Kae Black PA-C, 1 spray at 71/55/65 8401  •  folic acid (FOLVITE) tablet 1 mg, 1 mg, Oral, Daily, Kae Black PA-C, 1 mg at 09/02/23 0551  •  formoterol (PERFOROMIST) nebulizer solution 20 mcg, 20 mcg, Nebulization, Q12H, Kae Black PA-C, 20 mcg at 09/02/23 6261  •  guaiFENesin (MUCINEX) 12 hr tablet 600 mg, 600 mg, Oral, BID, Kae Black PA-C, 600 mg at 09/02/23 1805  •  ipratropium (ATROVENT) 0.02 % inhalation solution 0.5 mg, 0.5 mg, Nebulization, TID, Bina Magdaleno MD, 0.5 mg at 09/02/23 1344  •  levalbuterol (XOPENEX) inhalation solution 1.25 mg, 1.25 mg, Nebulization, TID, Bina Magdaleno MD, 1.25 mg at 09/02/23 1344  •  loratadine (CLARITIN) tablet 10 mg, 10 mg, Oral, Daily, Kae Black PA-C, 10 mg at 09/02/23 6861  •  magnesium Oxide (MAG-OX) tablet 400 mg, 400 mg, Oral, Daily, Kae Black PA-C, 400 mg at 09/02/23 0950  •  metoprolol tartrate (LOPRESSOR) tablet 50 mg, 50 mg, Oral, Q12H [address], Jassi Holguin PA-C, 50 mg at 09/02/23 0948  •  metroNIDAZOLE (FLAGYL) IVPB (premix) 500 mg 100 mL, 500 mg, Intravenous, Q8H, Bina Magdaleno MD, Last Rate: 200 mL/hr at 09/02/23 1431, 500 mg at 09/02/23 1431  •  ondansetron (ZOFRAN) injection 4 mg, 4 mg, Intravenous, Q6H PRN, Kae Black PA-C, 4 mg at 08/28/23 1748  •  oxyCODONE (ROXICODONE) IR tablet 5 mg, 5 mg, Oral, Q6H PRN, Bina Magdaleno MD, 5 mg at 09/02/23 0922  •  pantoprazole (PROTONIX) EC tablet 40 mg, 40 mg, Oral, Daily Before Breakfast, Kae Black PA-C, 40 mg at 09/02/23 8475  •  polyethylene glycol (MIRALAX) packet 17 g, 17 g, Oral, Daily, Kae Black PA-C, 17 g at 09/02/23 9411  •  saccharomyces boulardii (FLORASTOR) capsule 250 mg, 250 mg, Oral, BID, Wandy Tuttle PA-C, 250 mg at 09/02/23 4450  •  senna (SENOKOT) tablet 8.6 mg, 1 tablet, Oral, HS, Kae Black PA-C, 8.6 mg at 09/01/23 2146    REVIEW OF SYSTEMS:    A 12 point review of systems was not performed due to patients mental status    OBJECTIVE:    Vitals:    09/02/23 0106 09/02/23 0915 09/02/23 1308 09/02/23 1323   BP:  129/65 136/70 127/69   BP Location:       Pulse: 86 (!) 122 93 (!) 118   Resp:  20 (!) 24 (!) 24   Temp: 98.2 °F (36.8 °C) 100.4 °F (38 °C) (!) 100.8 °F (38.2 °C) (!) 101.4 °F (38.6 °C)   TempSrc:       SpO2: 98% 96% 94% 96%   Weight:       Height:            Temp:  [97.7 °F (36.5 °C)-101.4 °F (38.6 °C)] 101.4 °F (38.6 °C)  HR:  [] 118  Resp:  [20-24] 24  BP: (127-136)/(64-70) 127/69  SpO2:  [94 %-99 %] 96 %   Body mass index is 30.67 kg/m². I/O last 3 completed shifts:   In: 280 [P.O.:280]  Out: -   I/O this shift:  In: 240 [P.O.:240]  Out: -     Weight (last 2 days)     None           Physical exam:    General: no acute distress, cooperative  Eyes: conjunctivae pink, anicteric sclerae  ENT: lips and mucous membranes moist, no exudates, normal external ears  Neck: ROM intact, no JVD  Chest: No respiratory distress, no accessory muscle use  CVS: normal rate, non pericardial friction rub  Abdomen: soft, non-tender, non-distended, normoactive bowel sounds  Extremities: no edema of both legs  Skin: no rash  Neuro: awake, alert, oriented, grossly intact  Psych:  Pleasant affect      Invasive Devices:      Lab Results:   Results from last 7 days   Lab Units 09/02/23  0336 09/01/23  1429 09/01/23  0334 08/31/23  0335 08/30/23  0436 08/29/23  0421 08/28/23  0247 08/27/23  0447 08/27/23  0211 08/26/23  2052   WBC Thousand/uL 15.57*  --  15.57* 13.57* 12.12* 10.57* 10.82*   < >  --  10.68*   HEMOGLOBIN g/dL 8.0*  --  9.2* 8.4* 8.4* 8. 7* 9.5*   < >  --  9.4*   HEMATOCRIT % 24.7*  --  29.0* 26.3* 26.0* 27.0* 29.6*   < >  --  28.3*   PLATELETS Thousands/uL 352  --  365 297 312 262 265   < >  --  249   POTASSIUM mmol/L 3.7  --  3.7 3.8 3.7 3.8 4.1   < >  --  4.3   CHLORIDE mmol/L 105  --  105 105 103 104 105   < >  --  105   CO2 mmol/L 21  --  20* 22 23 23 24   < >  --  25   BUN mg/dL 68*  --  55* 54* 48* 40* 37*   < >  --  40*   CREATININE mg/dL 3.39*  --  2.58* 2.49* 1.95* 1.85* 1.71*   < >  --  1.76*   CALCIUM mg/dL 8.8  --  8.8 8.6 8.5 8.4 8.6   < >  --  8.4   MAGNESIUM mg/dL  --   --  2.1  --  2.1  --  1.8*  --   --   --    ALK PHOS U/L 94  --   --   --   --   --   --   --   --  89   ALT U/L 24  --   --   --   --   --   --   --   --  40   AST U/L 32  --   --   --   --   --   --   --   --  25   BLOOD CULTURE   --  Received in Microbiology Lab. Culture in Progress. Received in Microbiology Lab. Culture in Progress. --   --   --   --   --   --   --  No Growth After 5 Days. No Growth After 5 Days. NITRITE UA   --   --   --   --   --   --   --   --  Negative  --    BLOOD UA   --   --   --   --   --   --   --   --  Small*  --    LEUKOCYTES UA   --   --   --   --   --   --   --   --  Large*  --     < > = values in this interval not displayed.        ASSESSMENT & PLAN    #1  Oliguric acute kidney injury with unknown etiology  • Likely in the setting of decreased effective arterial blood volume in the setting of sepsis with relative hypotension  • Repeat urinalysis  • CT of the abdomen pelvis being done without contrast given distention  • We will start albumin 25 g every 6 hours x4 doses  • Check a bladder scan and urinary retention protocol with low threshold for Villasenor catheter placement  • Continue goal-directed medical therapy for sepsis  • Needs strict intakes and outputs  • Low threshold for higher level of care  • Autoregulatory could be contributing was on losartan up until August 29  #2 change in mental status in the setting of alcohol abuse, community-acquired pneumonia and cystitis  • Has persistent fevers  • Currently on Invanz  • Infectious disease following  #3 A-fib with RVR  •  Intermittently has a heart rate that goes up to 120  #4 sepsis with fevers, tachycardia, community-acquired pneumonia and cystitis  • Getting a CT of the abdomen and pelvis now because of his distended abdomen and despite being on antibiotics not improving  #5 edema in the lower extremities as well as abdominal distention  • Has been volume expanded  • Attempted 40 mg of IV Lasix yesterday without any improvement in urine output  • Start albumin 25 g every 6 hours  • Await CT scan results  • Further recommendations going forward        Portions of the record may have been created with voice recognition software. Occasional wrong word or "sound a like" substitutions may have occurred due to the inherent limitations of voice recognition software. Read the chart carefully and recognize, using context, where substitutions have occurred. If you have any questions, please contact the dictating provider.

## 2023-09-02 NOTE — ASSESSMENT & PLAN NOTE
Lab Results   Component Value Date    EGFR 15 09/02/2023    EGFR 21 09/01/2023    EGFR 22 08/31/2023    CREATININE 3.39 (H) 09/02/2023    CREATININE 2.58 (H) 09/01/2023    CREATININE 2.49 (H) 08/31/2023   · Baseline creatinine 1.6-1.9  · Creatinine on admission 1.76  · Patient received a dose of Lasix on September 1  · Creatinine this morning is 3.39  · Urinary retention protocol  · Nephrology consult  · Trend BMP daily

## 2023-09-02 NOTE — CONSULTS
Orthopedics   Kellen Em 82 y.o. male MRN: [id number]  Unit/Bed#: -01      Chief Complaint:   Right elbow pain    HPI:  82 y.o.male complaining of right elbow pain. Review Of Systems:   · Skin: Normal  · Neuro: See HPI  · Musculoskeletal: See HPI  · 14 point review of systems negative except as stated above     Past Medical History:   Past Medical History:   Diagnosis Date   • Alcohol abuse    • Alcohol withdrawal seizure without complication ([address])    • Arthritis    • Asthma    • CVA (cerebral vascular accident) ([address])    • Decubitus ulcer of buttock     last assessed 07/20/16   • Diabetes ([address])    • Disease of thyroid gland    • Duodenal ulcer    • Emphysema lung ([address])    • Esophageal varices (HCC)    • GERD (gastroesophageal reflux disease)    • History of transfusion    • Hypertension    • Irritable bowel syndrome with diarrhea    • Kidney stones    • Prostate cancer ([address])    • Urinary frequency     resolved 02/15/17       Past Surgical History:   Past Surgical History:   Procedure Laterality Date   • BACK SURGERY     • CATARACT EXTRACTION     • ESOPHAGOGASTRODUODENOSCOPY N/A 2/6/2017    Procedure: ESOPHAGOGASTRODUODENOSCOPY (EGD); Surgeon: Chris Mckeon MD;  Location:  MAIN OR;  Service:    • CT ESOPHAGOGASTRODUODENOSCOPY TRANSORAL DIAGNOSTIC N/A 4/26/2016    Procedure: ESOPHAGOGASTRODUODENOSCOPY (EGD);   Surgeon: Monalisa Ruano MD;  Location:  MAIN OR;  Service: Gastroenterology   • TONSILLECTOMY         Family History:  Family history reviewed and non-contributory  Family History   Problem Relation Age of Onset   • Heart disease Mother         cardiac disorder   • Stroke Father    • Heart disease Father         cardiac disorder   • Heart disease Sister    • Diabetes Family    • Hypertension Family        Social History:  Social History     Socioeconomic History   • Marital status: /Civil Union     Spouse name: None   • Number of children: None   • Years of education: None   • Highest education level: None   Occupational History   • None   Tobacco Use   • Smoking status: Former     Types: Cigarettes     Quit date: 1980     Years since quittin.6   • Smokeless tobacco: Never   • Tobacco comments:     quit 20 years ago   Vaping Use   • Vaping Use: Never used   Substance and Sexual Activity   • Alcohol use: Yes     Alcohol/week: 14.0 standard drinks of alcohol     Types: 14 Standard drinks or equivalent per week     Comment: 3-4 mixed drinks vodka per night/ 2L per week   • Drug use: No   • Sexual activity: Not Currently   Other Topics Concern   • None   Social History Narrative    Lives with Wife     Social Determinants of Health     Financial Resource Strain: Low Risk  (2023)    Overall Financial Resource Strain (CARDIA)    • Difficulty of Paying Living Expenses: Not very hard   Food Insecurity: No Food Insecurity (2023)    Hunger Vital Sign    • Worried About Running Out of Food in the Last Year: Never true    • Ran Out of Food in the Last Year: Never true   Transportation Needs: No Transportation Needs (2023)    PRAPARE - Transportation    • Lack of Transportation (Medical): No    • Lack of Transportation (Non-Medical): No   Physical Activity: Not on file   Stress: Not on file   Social Connections: Not on file   Intimate Partner Violence: Not At Risk (2023)    Humiliation, Afraid, Rape, and Kick questionnaire    • Fear of Current or Ex-Partner: No    • Emotionally Abused: No    • Physically Abused: No    • Sexually Abused: No   Housing Stability: Low Risk  (2023)    Housing Stability Vital Sign    • Unable to Pay for Housing in the Last Year: No    • Number of [address] in the Last Year: 1    • Unstable Housing in the Last Year: No       Allergies:    Allergies   Allergen Reactions   • Morphine And Related            Labs:  0   Lab Value Date/Time    HCT 24.7 (L) 2023 0336    HCT 29.0 (L) 2023 0334    HCT 26.3 (L) 2023 0335    HCT 40.9 12/22/2015 1248    HCT 31.5 (L) 08/28/2014 0600    HCT 37.8 08/27/2014 0618    HGB 8.0 (L) 09/02/2023 0336    HGB 9.2 (L) 09/01/2023 0334    HGB 8.4 (L) 08/31/2023 0335    HGB 14.0 12/22/2015 1248    HGB 10.0 (L) 08/28/2014 0600    HGB 12.1 08/27/2014 0618    INR 1.72 (H) 08/26/2023 2052    INR 1.06 08/26/2014 1320    WBC 15.57 (H) 09/02/2023 0336    WBC 15.57 (H) 09/01/2023 0334    WBC 13.57 (H) 08/31/2023 0335    WBC 8.80 12/22/2015 1248    WBC 5.42 08/28/2014 0600    WBC 7.12 08/27/2014 0618    ESR 76 (H) 04/14/2023 0938    CRP 6.5 (H) 01/19/2016 1257       Meds:    Current Facility-Administered Medications:   •  acetaminophen (TYLENOL) tablet 650 mg, 650 mg, Oral, Q6H PRN, Maximus Malagon PA-C, 650 mg at 09/01/23 1832  •  aluminum-magnesium hydroxide-simethicone (MAALOX) oral suspension 30 mL, 30 mL, Oral, Q6H PRN, Maximus Malagon PA-C  •  apixaban (ELIQUIS) tablet 2.5 mg, 2.5 mg, Oral, BID, Maximus Malagon PA-C, 2.5 mg at 09/01/23 1832  •  aspirin (ECOTRIN LOW STRENGTH) EC tablet 81 mg, 81 mg, Oral, Daily, Maximus Malagon PA-C, 81 mg at 09/01/23 7811  •  bisacodyl (DULCOLAX) rectal suppository 10 mg, 10 mg, Rectal, Daily PRN, Ed Qureshi MD, 10 mg at 09/01/23 0915  •  budesonide (PULMICORT) inhalation solution 0.5 mg, 0.5 mg, Nebulization, Q12H, Maximus Malagon PA-C, 0.5 mg at 09/02/23 0750  •  diltiazem (CARDIZEM CD) 24 hr capsule 240 mg, 240 mg, Oral, Daily, Kandi Holguin PA-C, 240 mg at 09/01/23 4352  •  ertapenem (INVanz) 500 mg in sodium chloride 0.9 % 50 mL IVPB, 500 mg, Intravenous, Q24H, Nanci Potts MD, Last Rate: 100 mL/hr at 09/01/23 0915, 500 mg at 09/01/23 0915  •  fluticasone (FLONASE) 50 mcg/act nasal spray 1 spray, 1 spray, Nasal, Daily, Maximus Malagon PA-C, 1 spray at 36/62/84 4631  •  folic acid (FOLVITE) tablet 1 mg, 1 mg, Oral, Daily, Maximus Malagon PA-C, 1 mg at 09/01/23 6789  •  formoterol (PERFOROMIST) nebulizer solution 20 mcg, 20 mcg, Nebulization, Q12H, Moses Irizarry PA-C, 20 mcg at 09/02/23 9098  •  guaiFENesin (MUCINEX) 12 hr tablet 600 mg, 600 mg, Oral, BID, Moses Irizarry PA-C, 600 mg at 09/01/23 1832  •  ipratropium (ATROVENT) 0.02 % inhalation solution 0.5 mg, 0.5 mg, Nebulization, TID, Jayne Crocker MD, 0.5 mg at 09/02/23 0750  •  levalbuterol ([place]) inhalation solution 1.25 mg, 1.25 mg, Nebulization, TID, Jayne Crocker MD, 1.25 mg at 09/02/23 0750  •  loratadine (CLARITIN) tablet 10 mg, 10 mg, Oral, Daily, Moses Irizarry PA-C, 10 mg at 09/01/23 3978  •  magnesium Oxide (MAG-OX) tablet 400 mg, 400 mg, Oral, Daily, Moses Irizarry PA-C, 400 mg at 09/01/23 9003  •  metoprolol tartrate (LOPRESSOR) tablet 50 mg, 50 mg, Oral, Q12H [address], [facility], LLOI, 50 mg at 09/01/23 2146  •  metroNIDAZOLE (FLAGYL) IVPB (premix) 500 mg 100 mL, 500 mg, Intravenous, Q8H, Jayne Crocker MD, Last Rate: 200 mL/hr at 09/02/23 0609, 500 mg at 09/02/23 9949  •  ondansetron (ZOFRAN) injection 4 mg, 4 mg, Intravenous, Q6H PRN, Moses Irizarry PA-C, 4 mg at 08/28/23 1748  •  oxyCODONE (ROXICODONE) IR tablet 5 mg, 5 mg, Oral, Q6H PRN, Jayne Crocker MD, 5 mg at 09/02/23 0144  •  pantoprazole (PROTONIX) EC tablet 40 mg, 40 mg, Oral, Daily Before Breakfast, Moses Irizarry PA-C, 40 mg at 09/02/23 6606  •  polyethylene glycol (MIRALAX) packet 17 g, 17 g, Oral, Daily, Moses Irizarry PA-C, 17 g at 09/01/23 2960  •  saccharomyces boulardii (FLORASTOR) capsule 250 mg, 250 mg, Oral, BID, Wandy Tuttle, PA-C, 250 mg at 09/01/23 1832  •  senna (SENOKOT) tablet 8.6 mg, 1 tablet, Oral, HS, Moses Edie, PA-C, 8.6 mg at 09/01/23 2146    Blood Culture:   Lab Results   Component Value Date    BLOODCX Received in Microbiology Lab. Culture in Progress. 09/01/2023    BLOODCX Received in Microbiology Lab. Culture in Progress. 09/01/2023       Wound Culture:   No results found for: "WOUNDCULT"    Ins and Outs:  I/O last 24 hours:   In: 280 [P.O.:280]  Out: -           Physical Exam:   /64 (BP Location: Left arm)   Pulse 86   Temp 98.2 °F (36.8 °C)   Resp 18   Ht 5' 8" (1.727 m)   Wt 91.5 kg (201 lb 11.5 oz)   SpO2 98%   BMI 30.67 kg/m²   Gen: Alert and oriented to person, place, time  HEENT: EOMI, eyes clear, moist mucus membranes, hearing intact  Respiratory: Bilateral chest rise. No audible wheezing found  Cardiovascular: Regular Rate and Rhythm  Abdomen: soft nontender/nondistended  Musculoskeletal: right upper extremity  · Skin no active drainage  · TTP over olecrenon bursa with mild warmth and erythema  · Full active & passive ROM at elbow  · SILT m/r/u.   · Motor intact ain/pin/m/r/u,   · 2+ rad pulse      Tertiary: no tenderness over all other joints/long bones as except already stated. Radiology:   I personally reviewed the films. XR R elbow shows elbow djd with no fractures. [unfilled]    Assessment:  88 y.o.male R olecrenon bursitis    Plan:   · Attempt was made at aspiration of right olecranon bursa but only less than 1 cc was obtained for aseptic technique. Therefore this was not enough for culture and was not sent. This should still allow some drainage of the olecranon bursa through the hole from the 18-gauge needle  · Recommend antibiotics per medical team for olecranon bursitis in case there is an infectious component.   Will defer to the primary team for antibiotic choice   · We will follow clinically but currently does not appear to need operative intervention    Ramila Holley DO

## 2023-09-02 NOTE — CONSULTS
Consultation - [address] [place] [address] 82 y.o. male MRN: [id number]  Unit/Bed#: -01 Encounter: [id number]               Assessment/Plan     Distended Abdomen, CT findings    1. Cholelithiasis with possible impacted stone at the neck, gallbladder wall thickening and/or pericholecystic edema. Findings raise suspicious for acute cholecystitis given the clinical setting. Correlation with ultrasound recommended. 2. 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 3. Severely tortuous sigmoid colon with a long segment of sigmoid which appears collapsed. Large portion of the sigmoid proximal to the collapsed segment appears air-filled and distended. However, the colon further upstream from the sigmoid (descending   and proximal) does not appear distended to indicate a colonic obstruction. Furthermore there is no evidence for volvulus. Although I am not strongly suspicious, cannot completely exclude stricturing of this long sigmoid segment or more distal rectal   thickening. Follow-up may be considered. 4. Limited evaluation of the lungs due to motion without convincing evidence for pneumonia. 5. 1.8 cm saccular abdominal aortic aneurysm on the left (601/82), previously about 1 cm in retrospect.   6. New, small amount of pelvic free fluid.   7. Small pericardial effusion.    -Pt unable to talk much 2/2 encephalopathy, hx obtained from nursing and staff-with distended abdomen since last night.  WBC elevated this am   -Has been tolerating diet, ate breakfast, some bites of lunch without any nausea/vomting/pain  -+flauts, no real BMs  -RUQ US pending, LFTs normal  -Trend labs and vital signs  -Pt has been on abx, currently on zoysn    Sepsis  -evidenced by wbc, tachycardia, worsening TOBI on CKD  -presumed source was UTI  -pt has been on abx, but now with worsening wbc  -procal 3.11  -no LA noted    Acute encephalopathy  -Pt with metabolic encephalopathy in setting of UTI  -trend labs  -treat with abx  -IVF  -ID on board    Cystitis  -Cx showed E coli  -rentention protocol  -trend labs    Etoh abuse  -withdrawal seizures  -last hospitalzation was on 8/8    Covid 19  -Diagnosed with COVID-19 during recent admit  -received 3 days of remdesivir and IV fluids    Hx of prostate ca  -marlin mets noted on CT      Oliguric acute kidney injury  -likely in setting of sepsis and hypotension      History of Present Illness    Admit Date:  8/26/2023  Hospital Day:  7 days  Primary Service:  Hospitalist  Attending Provider:  Orville Franco MD  Physician Requesting Consult: Orville Franco MD  Reason for Consult / Principal Problem:   Hx and PE limited by:  Encephalopathy   HPI: Yusra Moseley is a 82 y.o. male with a PMH of recent admission at [facility] 8/8 to 8/23, paroxysmal A-fib, CKD stage IIIb, anemia, and COPD presented to the hospital on 8/27 for fevers at the  SNF. Upon admission, he was noted to have pneumonia of the R lower lobe and wbc of 10 and recent covid 19. He had rapid response called (for A fib with RVR) and was noted to have cystitis, encephalopathy. He has been treated with abx. Has hx of etoh abuse with withdrawal seizures. Admitted to [facility] on 8/8 for this withdrawal seizures  Urine cx showed E coli . ID was consulted as well - being treated for UTI since sputum cx showed normal resp juve. Last night nurses noted distended abdomen. No nausea or vomiting  Pt tolerated breakfast, however nursing says he has to be fed. He does not want to eat much  No real BMs, passing flatus, has mucous stools  Not sure when his last colonscopy was. Note from 2014 states pt refused colonscopy at the time.          Inpatient consult to Acute Care Surgery  Consult performed by: Carmelita Stuart PA-C  Consult ordered by: Orville Franco MD          Review of Systems    Not obtained 2/2 pt's mental status, see hpi for details    Historical Information   Past Medical History:   Diagnosis Date   • Alcohol abuse    • Alcohol withdrawal seizure without complication ([address])    • Arthritis    • Asthma    • CVA (cerebral vascular accident) ([address])    • Decubitus ulcer of buttock     last assessed 16   • Diabetes ([address])    • Disease of thyroid gland    • Duodenal ulcer    • Emphysema lung ([address])    • Esophageal varices (HCC)    • GERD (gastroesophageal reflux disease)    • History of transfusion    • Hypertension    • Irritable bowel syndrome with diarrhea    • Kidney stones    • Prostate cancer ([address])    • Urinary frequency     resolved 02/15/17     Past Surgical History:   Procedure Laterality Date   • BACK SURGERY     • CATARACT EXTRACTION     • ESOPHAGOGASTRODUODENOSCOPY N/A 2017    Procedure: ESOPHAGOGASTRODUODENOSCOPY (EGD); Surgeon: Jovi Hope MD;  Location:  MAIN OR;  Service:    • NE ESOPHAGOGASTRODUODENOSCOPY TRANSORAL DIAGNOSTIC N/A 2016    Procedure: ESOPHAGOGASTRODUODENOSCOPY (EGD);   Surgeon: Alvin Albarado MD;  Location:  MAIN OR;  Service: Gastroenterology   • TONSILLECTOMY       Social History   Social History     Substance and Sexual Activity   Alcohol Use Yes   • Alcohol/week: 14.0 standard drinks of alcohol   • Types: 14 Standard drinks or equivalent per week    Comment: 3-4 mixed drinks vodka per night/ 2L per week     Social History     Substance and Sexual Activity   Drug Use No     E-Cigarette/Vaping   • E-Cigarette Use Never User      E-Cigarette/Vaping Substances   • Nicotine No    • THC No    • CBD No    • Flavoring No    • Other No    • Unknown No      Social History     Tobacco Use   Smoking Status Former   • Types: Cigarettes   • Quit date:    • Years since quittin.6   Smokeless Tobacco Never   Tobacco Comments    quit 20 years ago     Family History:   Family History   Problem Relation Age of Onset   • Heart disease Mother         cardiac disorder   • Stroke Father • Heart disease Father         cardiac disorder   • Heart disease Sister    • Diabetes Family    • Hypertension Family        Meds/Allergies   all current active meds have been reviewed and current meds:   Current Facility-Administered Medications   Medication Dose Route Frequency   • acetaminophen (TYLENOL) tablet 650 mg  650 mg Oral Q6H PRN   • albumin human (FLEXBUMIN) 25 % injection 25 g  25 g Intravenous Q6H   • aluminum-magnesium hydroxide-simethicone (MAALOX) oral suspension 30 mL  30 mL Oral Q6H PRN   • aspirin (ECOTRIN LOW STRENGTH) EC tablet 81 mg  81 mg Oral Daily   • bisacodyl (DULCOLAX) rectal suppository 10 mg  10 mg Rectal Daily PRN   • budesonide (PULMICORT) inhalation solution 0.5 mg  0.5 mg Nebulization Q12H   • diltiazem (CARDIZEM CD) 24 hr capsule 240 mg  240 mg Oral Daily   • fluticasone (FLONASE) 50 mcg/act nasal spray 1 spray  1 spray Nasal Daily   • folic acid (FOLVITE) tablet 1 mg  1 mg Oral Daily   • formoterol (PERFOROMIST) nebulizer solution 20 mcg  20 mcg Nebulization Q12H   • guaiFENesin (MUCINEX) 12 hr tablet 600 mg  600 mg Oral BID   • ipratropium (ATROVENT) 0.02 % inhalation solution 0.5 mg  0.5 mg Nebulization TID   • levalbuterol (XOPENEX) inhalation solution 1.25 mg  1.25 mg Nebulization TID   • loratadine (CLARITIN) tablet 10 mg  10 mg Oral Daily   • magnesium Oxide (MAG-OX) tablet 400 mg  400 mg Oral Daily   • metoprolol tartrate (LOPRESSOR) tablet 50 mg  50 mg Oral Q12H LIGIA   • ondansetron (ZOFRAN) injection 4 mg  4 mg Intravenous Q6H PRN   • oxyCODONE (ROXICODONE) IR tablet 5 mg  5 mg Oral Q6H PRN   • pantoprazole (PROTONIX) EC tablet 40 mg  40 mg Oral Daily Before Breakfast   • piperacillin-tazobactam (ZOSYN) 2.25 g in sodium chloride 0.9 % 50 mL IVPB  2.25 g Intravenous Q6H   • polyethylene glycol (MIRALAX) packet 17 g  17 g Oral Daily   • saccharomyces boulardii (FLORASTOR) capsule 250 mg  250 mg Oral BID   • senna (SENOKOT) tablet 8.6 mg  1 tablet Oral HS       Allergies Allergen Reactions   • Morphine And Related        Objective   Temp:  [98.2 °F (36.8 °C)-101.4 °F (38.6 °C)] 101.1 °F (38.4 °C)  HR:  [] 75  Resp:  [20-24] 24  BP: (109-136)/(64-70) 109/66    Intake/Output Summary (Last 24 hours) at 9/2/2023 1831  Last data filed at 9/2/2023 0930  Gross per 24 hour   Intake 240 ml   Output --   Net 240 ml       Physical Exam  Awake, not oriented, confused  MMM, external nose normal, trachea midline, neck supple  Heart rate ireegularly irregular  Tachycardic  Lungs with diminished breath sounds, no rales  Abdomen distended, mildly taut, +BS  No guarding, no tenderness on my exam  R knee ttp  No swelling in knee joint, but he does have some pedal edema  no redness in extremities      Lab Results:   CBC:   Lab Results   Component Value Date    WBC 15.57 (H) 09/02/2023    HGB 8.0 (L) 09/02/2023    HCT 24.7 (L) 09/02/2023    MCV 97 09/02/2023     09/02/2023    RBC 2.54 (L) 09/02/2023    MCH 31.5 09/02/2023    MCHC 32.4 09/02/2023    RDW 13.6 09/02/2023    MPV 10.5 09/02/2023    NRBC 0 09/02/2023   , CMP:   Lab Results   Component Value Date    SODIUM 134 (L) 09/02/2023    K 3.7 09/02/2023     09/02/2023    CO2 21 09/02/2023    BUN 68 (H) 09/02/2023    CREATININE 3.39 (H) 09/02/2023    CALCIUM 8.8 09/02/2023    AST 32 09/02/2023    ALT 24 09/02/2023    ALKPHOS 94 09/02/2023    EGFR 15 09/02/2023     Imaging Studies: I have personally reviewed pertinent reports. EKG, Pathology, and Other Studies: I have personally reviewed pertinent reports. Counseling / Coordination of Care  Total floor / unit time spent today 15 minutes. Greater than 50% of total time was spent with the patient and / or family counseling and / or coordination of care.  A description of the counseling / coordination of care: 30mins

## 2023-09-02 NOTE — PROCEDURES
Aspiration of the right elbow was performed with aseptic technique using alcohol to cleanse the area and an 18-gauge needle to aspirate with a 10 cc syringe. This was completed successfully but only 1 cc was obtained. There was a Band-Aid and dry dressing placed.

## 2023-09-02 NOTE — ASSESSMENT & PLAN NOTE
Patient is confused likely acute metabolic encephalopathy in setting of UTI versus hospital delirium  Frequent reorientation  Trend WBC and fever curve  Neurochecks  Orthopedic consult appreciated for right elbow pain  Orthopedics did attempt aspiration of right olecranon bursa with less than 1 cc fluid obtained.   CT chest abdomen pelvis without contrast  Continue on IV antibiotics

## 2023-09-02 NOTE — PLAN OF CARE
Problem: Potential for Falls  Goal: Patient will remain free of falls  Description: INTERVENTIONS:  - Educate patient/family on patient safety including physical limitations  - Instruct patient to call for assistance with activity   - Consult OT/PT to assist with strengthening/mobility   - Keep Call bell within reach  - Keep bed low and locked with side rails adjusted as appropriate  - Keep care items and personal belongings within reach  - Initiate and maintain comfort rounds  - Make Fall Risk Sign visible to staff  - Offer Toileting every 2 Hours, in advance of need  - Initiate/Maintain bed alarm  - Obtain necessary fall risk management equipment:   - Apply yellow socks and bracelet for high fall risk patients  - Consider moving patient to room near nurses station  Outcome: Progressing     Problem: PAIN - ADULT  Goal: Verbalizes/displays adequate comfort level or baseline comfort level  Description: Interventions:  - Encourage patient to monitor pain and request assistance  - Assess pain using appropriate pain scale  - Administer analgesics based on type and severity of pain and evaluate response  - Implement non-pharmacological measures as appropriate and evaluate response  - Consider cultural and social influences on pain and pain management  - Notify physician/advanced practitioner if interventions unsuccessful or patient reports new pain  Outcome: Progressing     Problem: INFECTION - ADULT  Goal: Absence or prevention of progression during hospitalization  Description: INTERVENTIONS:  - Assess and monitor for signs and symptoms of infection  - Monitor lab/diagnostic results  - Monitor all insertion sites, i.e. indwelling lines, tubes, and drains  - Monitor endotracheal if appropriate and nasal secretions for changes in amount and color  - [place] appropriate cooling/warming therapies per order  - Administer medications as ordered  - Instruct and encourage patient and family to use good hand hygiene technique  - Identify and instruct in appropriate isolation precautions for identified infection/condition  Outcome: Progressing  Goal: Absence of fever/infection during neutropenic period  Description: INTERVENTIONS:  - Monitor WBC    Outcome: Progressing     Problem: SAFETY ADULT  Goal: Maintain or return to baseline ADL function  Description: INTERVENTIONS:  -  Assess patient's ability to carry out ADLs; assess patient's baseline for ADL function and identify physical deficits which impact ability to perform ADLs (bathing, care of mouth/teeth, toileting, grooming, dressing, etc.)  - Assess/evaluate cause of self-care deficits   - Assess range of motion  - Assess patient's mobility; develop plan if impaired  - Assess patient's need for assistive devices and provide as appropriate  - Encourage maximum independence but intervene and supervise when necessary  - Involve family in performance of ADLs  - Assess for home care needs following discharge   - Consider OT consult to assist with ADL evaluation and planning for discharge  - Provide patient education as appropriate  Outcome: Progressing  Goal: Maintains/Returns to pre admission functional level  Description: INTERVENTIONS:  - Perform BMAT or MOVE assessment daily.   - Set and communicate daily mobility goal to care team and patient/family/caregiver. - Collaborate with rehabilitation services on mobility goals if consulted  - Perform Range of Motion 3 times a day. - Reposition patient every 2 hours.   - Dangle patient 2 times a day  - Stand patient 2 times a day  - Ambulate patient 2 times a day  - Out of bed to chair 2 times a day   - Out of bed for meals 2 times a day  - Out of bed for toileting  - Record patient progress and toleration of activity level   Outcome: Progressing     Problem: DISCHARGE PLANNING  Goal: Discharge to home or other facility with appropriate resources  Description: INTERVENTIONS:  - Identify barriers to discharge w/patient and caregiver  - Arrange for needed discharge resources and transportation as appropriate  - Identify discharge learning needs (meds, wound care, etc.)  - Arrange for interpretive services to assist at discharge as needed  - Refer to Case Management Department for coordinating discharge planning if the patient needs post-hospital services based on physician/advanced practitioner order or complex needs related to functional status, cognitive ability, or social support system  Outcome: Progressing     Problem: Knowledge Deficit  Goal: Patient/family/caregiver demonstrates understanding of disease process, treatment plan, medications, and discharge instructions  Description: Complete learning assessment and assess knowledge base.   Interventions:  - Provide teaching at level of understanding  - Provide teaching via preferred learning methods  Outcome: Progressing     Problem: Prexisting or High Potential for Compromised Skin Integrity  Goal: Skin integrity is maintained or improved  Description: INTERVENTIONS:  - Identify patients at risk for skin breakdown  - Assess and monitor skin integrity  - Assess and monitor nutrition and hydration status  - Monitor labs   - Assess for incontinence   - Turn and reposition patient  - Assist with mobility/ambulation  - Relieve pressure over bony prominences  - Avoid friction and shearing  - Provide appropriate hygiene as needed including keeping skin clean and dry  - Evaluate need for skin moisturizer/barrier cream  - Collaborate with interdisciplinary team   - Patient/family teaching  - Consider wound care consult   Outcome: Progressing     Problem: MOBILITY - ADULT  Goal: Maintain or return to baseline ADL function  Description: INTERVENTIONS:  -  Assess patient's ability to carry out ADLs; assess patient's baseline for ADL function and identify physical deficits which impact ability to perform ADLs (bathing, care of mouth/teeth, toileting, grooming, dressing, etc.)  - Assess/evaluate cause of self-care deficits   - Assess range of motion  - Assess patient's mobility; develop plan if impaired  - Assess patient's need for assistive devices and provide as appropriate  - Encourage maximum independence but intervene and supervise when necessary  - Involve family in performance of ADLs  - Assess for home care needs following discharge   - Consider OT consult to assist with ADL evaluation and planning for discharge  - Provide patient education as appropriate  Outcome: Progressing  Goal: Maintains/Returns to pre admission functional level  Description: INTERVENTIONS:  - Perform BMAT or MOVE assessment daily.   - Set and communicate daily mobility goal to care team and patient/family/caregiver. - Collaborate with rehabilitation services on mobility goals if consulted  - Perform Range of Motion 3 times a day. - Reposition patient every 2 hours.   - Dangle patient 2 times a day  - Stand patient 2 times a day  - Ambulate patient 2 times a day  - Out of bed to chair 2 times a day   - Out of bed for meals 2 times a day  - Out of bed for toileting  - Record patient progress and toleration of activity level   Outcome: Progressing

## 2023-09-03 ENCOUNTER — APPOINTMENT (INPATIENT)
Dept: ULTRASOUND IMAGING | Facility: HOSPITAL | Age: 88
DRG: 193 | End: 2023-09-03
Payer: MEDICARE

## 2023-09-03 PROBLEM — R93.5 ABNORMAL COMPUTED TOMOGRAPHY ANGIOGRAPHY (CTA) OF ABDOMEN AND PELVIS: Status: ACTIVE | Noted: 2023-09-03

## 2023-09-03 PROBLEM — M25.421 ELBOW SWELLING, RIGHT: Status: ACTIVE | Noted: 2023-09-03

## 2023-09-03 LAB
ALBUMIN SERPL BCP-MCNC: 3.1 G/DL (ref 3.5–5)
ALP SERPL-CCNC: 71 U/L (ref 34–104)
ALT SERPL W P-5'-P-CCNC: 15 U/L (ref 7–52)
ANION GAP SERPL CALCULATED.3IONS-SCNC: 12 MMOL/L
AST SERPL W P-5'-P-CCNC: 19 U/L (ref 13–39)
BASOPHILS # BLD AUTO: 0.04 THOUSANDS/ÂΜL (ref 0–0.1)
BASOPHILS NFR BLD AUTO: 0 % (ref 0–1)
BILIRUB SERPL-MCNC: 0.69 MG/DL (ref 0.2–1)
BUN SERPL-MCNC: 76 MG/DL (ref 5–25)
CALCIUM ALBUM COR SERPL-MCNC: 9.6 MG/DL (ref 8.3–10.1)
CALCIUM SERPL-MCNC: 8.9 MG/DL (ref 8.4–10.2)
CHLORIDE SERPL-SCNC: 105 MMOL/L (ref 96–108)
CO2 SERPL-SCNC: 19 MMOL/L (ref 21–32)
CREAT SERPL-MCNC: 3.63 MG/DL (ref 0.6–1.3)
EOSINOPHIL # BLD AUTO: 0.22 THOUSAND/ÂΜL (ref 0–0.61)
EOSINOPHIL NFR BLD AUTO: 2 % (ref 0–6)
ERYTHROCYTE [DISTWIDTH] IN BLOOD BY AUTOMATED COUNT: 13.8 % (ref 11.6–15.1)
GFR SERPL CREATININE-BSD FRML MDRD: 14 ML/MIN/1.73SQ M
GLUCOSE SERPL-MCNC: 129 MG/DL (ref 65–140)
HCT VFR BLD AUTO: 23.8 % (ref 36.5–49.3)
HGB BLD-MCNC: 7.6 G/DL (ref 12–17)
IMM GRANULOCYTES # BLD AUTO: 0.18 THOUSAND/UL (ref 0–0.2)
IMM GRANULOCYTES NFR BLD AUTO: 1 % (ref 0–2)
LIPASE SERPL-CCNC: 22 U/L (ref 11–82)
LYMPHOCYTES # BLD AUTO: 1.11 THOUSANDS/ÂΜL (ref 0.6–4.47)
LYMPHOCYTES NFR BLD AUTO: 9 % (ref 14–44)
MAGNESIUM SERPL-MCNC: 2.1 MG/DL (ref 1.9–2.7)
MCH RBC QN AUTO: 31.3 PG (ref 26.8–34.3)
MCHC RBC AUTO-ENTMCNC: 31.9 G/DL (ref 31.4–37.4)
MCV RBC AUTO: 98 FL (ref 82–98)
MONOCYTES # BLD AUTO: 1.59 THOUSAND/ÂΜL (ref 0.17–1.22)
MONOCYTES NFR BLD AUTO: 13 % (ref 4–12)
NEUTROPHILS # BLD AUTO: 9.36 THOUSANDS/ÂΜL (ref 1.85–7.62)
NEUTS SEG NFR BLD AUTO: 75 % (ref 43–75)
NRBC BLD AUTO-RTO: 0 /100 WBCS
PLATELET # BLD AUTO: 323 THOUSANDS/UL (ref 149–390)
PMV BLD AUTO: 10.2 FL (ref 8.9–12.7)
POTASSIUM SERPL-SCNC: 3.9 MMOL/L (ref 3.5–5.3)
PROCALCITONIN SERPL-MCNC: 2.83 NG/ML
PROT SERPL-MCNC: 6.5 G/DL (ref 6.4–8.4)
RBC # BLD AUTO: 2.43 MILLION/UL (ref 3.88–5.62)
SODIUM SERPL-SCNC: 136 MMOL/L (ref 135–147)
WBC # BLD AUTO: 12.5 THOUSAND/UL (ref 4.31–10.16)

## 2023-09-03 PROCEDURE — 76705 ECHO EXAM OF ABDOMEN: CPT

## 2023-09-03 PROCEDURE — 84145 PROCALCITONIN (PCT): CPT | Performed by: INTERNAL MEDICINE

## 2023-09-03 PROCEDURE — 99232 SBSQ HOSP IP/OBS MODERATE 35: CPT | Performed by: INTERNAL MEDICINE

## 2023-09-03 PROCEDURE — 87147 CULTURE TYPE IMMUNOLOGIC: CPT | Performed by: INTERNAL MEDICINE

## 2023-09-03 PROCEDURE — 85025 COMPLETE CBC W/AUTO DIFF WBC: CPT | Performed by: INTERNAL MEDICINE

## 2023-09-03 PROCEDURE — 99233 SBSQ HOSP IP/OBS HIGH 50: CPT | Performed by: INTERNAL MEDICINE

## 2023-09-03 PROCEDURE — 87070 CULTURE OTHR SPECIMN AEROBIC: CPT | Performed by: INTERNAL MEDICINE

## 2023-09-03 PROCEDURE — 83735 ASSAY OF MAGNESIUM: CPT | Performed by: INTERNAL MEDICINE

## 2023-09-03 PROCEDURE — 94640 AIRWAY INHALATION TREATMENT: CPT

## 2023-09-03 PROCEDURE — 94762 N-INVAS EAR/PLS OXIMTRY CONT: CPT

## 2023-09-03 PROCEDURE — 0M933ZZ DRAINAGE OF RIGHT ELBOW BURSA AND LIGAMENT, PERCUTANEOUS APPROACH: ICD-10-PCS | Performed by: ORTHOPAEDIC SURGERY

## 2023-09-03 PROCEDURE — 94760 N-INVAS EAR/PLS OXIMETRY 1: CPT

## 2023-09-03 PROCEDURE — 83690 ASSAY OF LIPASE: CPT | Performed by: INTERNAL MEDICINE

## 2023-09-03 PROCEDURE — 80053 COMPREHEN METABOLIC PANEL: CPT | Performed by: INTERNAL MEDICINE

## 2023-09-03 PROCEDURE — 87040 BLOOD CULTURE FOR BACTERIA: CPT | Performed by: INTERNAL MEDICINE

## 2023-09-03 PROCEDURE — 23931 I&D UPR A/E BURSA: CPT | Performed by: ORTHOPAEDIC SURGERY

## 2023-09-03 PROCEDURE — 87205 SMEAR GRAM STAIN: CPT | Performed by: INTERNAL MEDICINE

## 2023-09-03 PROCEDURE — 99232 SBSQ HOSP IP/OBS MODERATE 35: CPT | Performed by: SURGERY

## 2023-09-03 PROCEDURE — 99232 SBSQ HOSP IP/OBS MODERATE 35: CPT | Performed by: ORTHOPAEDIC SURGERY

## 2023-09-03 PROCEDURE — 99222 1ST HOSP IP/OBS MODERATE 55: CPT | Performed by: INTERNAL MEDICINE

## 2023-09-03 RX ORDER — LACTULOSE 20 G/30ML
20 SOLUTION ORAL 2 TIMES DAILY
Status: DISCONTINUED | OUTPATIENT
Start: 2023-09-03 | End: 2023-09-04

## 2023-09-03 RX ORDER — POLYETHYLENE GLYCOL 3350 17 G/17G
17 POWDER, FOR SOLUTION ORAL 2 TIMES DAILY
Status: DISCONTINUED | OUTPATIENT
Start: 2023-09-03 | End: 2023-09-03

## 2023-09-03 RX ADMIN — LEVALBUTEROL HYDROCHLORIDE 1.25 MG: 1.25 SOLUTION RESPIRATORY (INHALATION) at 13:32

## 2023-09-03 RX ADMIN — OXYCODONE HYDROCHLORIDE 5 MG: 5 TABLET ORAL at 09:16

## 2023-09-03 RX ADMIN — PIPERACILLIN AND TAZOBACTAM 2.25 G: 2; .25 INJECTION, POWDER, FOR SOLUTION INTRAVENOUS at 23:54

## 2023-09-03 RX ADMIN — FOLIC ACID 1 MG: 1 TABLET ORAL at 09:25

## 2023-09-03 RX ADMIN — BUDESONIDE 0.5 MG: 0.5 INHALANT ORAL at 07:24

## 2023-09-03 RX ADMIN — POLYETHYLENE GLYCOL 3350 17 G: 17 POWDER, FOR SOLUTION ORAL at 09:15

## 2023-09-03 RX ADMIN — OXYCODONE HYDROCHLORIDE 5 MG: 5 TABLET ORAL at 01:03

## 2023-09-03 RX ADMIN — LACTULOSE SOLUTION USP, 10 G/15 ML 200 G: 10 SOLUTION ORAL; RECTAL at 13:58

## 2023-09-03 RX ADMIN — PIPERACILLIN AND TAZOBACTAM 2.25 G: 2; .25 INJECTION, POWDER, FOR SOLUTION INTRAVENOUS at 17:30

## 2023-09-03 RX ADMIN — Medication 250 MG: at 09:25

## 2023-09-03 RX ADMIN — LACTULOSE 20 G: 20 SOLUTION ORAL at 21:29

## 2023-09-03 RX ADMIN — IPRATROPIUM BROMIDE 0.5 MG: 0.5 SOLUTION RESPIRATORY (INHALATION) at 19:33

## 2023-09-03 RX ADMIN — ALBUMIN (HUMAN) 25 G: 0.25 INJECTION, SOLUTION INTRAVENOUS at 09:15

## 2023-09-03 RX ADMIN — ALBUMIN (HUMAN) 25 G: 0.25 INJECTION, SOLUTION INTRAVENOUS at 21:01

## 2023-09-03 RX ADMIN — BUDESONIDE 0.5 MG: 0.5 INHALANT ORAL at 19:33

## 2023-09-03 RX ADMIN — PIPERACILLIN AND TAZOBACTAM 2.25 G: 2; .25 INJECTION, POWDER, FOR SOLUTION INTRAVENOUS at 12:09

## 2023-09-03 RX ADMIN — FORMOTEROL FUMARATE DIHYDRATE 20 MCG: 20 SOLUTION RESPIRATORY (INHALATION) at 19:33

## 2023-09-03 RX ADMIN — VANCOMYCIN HYDROCHLORIDE 2000 MG: 5 INJECTION, POWDER, LYOPHILIZED, FOR SOLUTION INTRAVENOUS at 13:14

## 2023-09-03 RX ADMIN — LEVALBUTEROL HYDROCHLORIDE 1.25 MG: 1.25 SOLUTION RESPIRATORY (INHALATION) at 07:24

## 2023-09-03 RX ADMIN — MAGNESIUM OXIDE TAB 400 MG (241.3 MG ELEMENTAL MG) 400 MG: 400 (241.3 MG) TAB at 09:25

## 2023-09-03 RX ADMIN — FORMOTEROL FUMARATE DIHYDRATE 20 MCG: 20 SOLUTION RESPIRATORY (INHALATION) at 07:24

## 2023-09-03 RX ADMIN — ALBUMIN (HUMAN) 25 G: 0.25 INJECTION, SOLUTION INTRAVENOUS at 02:24

## 2023-09-03 RX ADMIN — APIXABAN 2.5 MG: 2.5 TABLET, FILM COATED ORAL at 13:13

## 2023-09-03 RX ADMIN — Medication 250 MG: at 17:27

## 2023-09-03 RX ADMIN — LACTULOSE 20 G: 20 SOLUTION ORAL at 13:13

## 2023-09-03 RX ADMIN — SENNOSIDES 8.6 MG: 8.6 TABLET, FILM COATED ORAL at 21:01

## 2023-09-03 RX ADMIN — GUAIFENESIN 600 MG: 600 TABLET ORAL at 17:27

## 2023-09-03 RX ADMIN — ALBUMIN (HUMAN) 25 G: 0.25 INJECTION, SOLUTION INTRAVENOUS at 16:43

## 2023-09-03 RX ADMIN — DILTIAZEM HYDROCHLORIDE 240 MG: 240 CAPSULE, COATED, EXTENDED RELEASE ORAL at 09:38

## 2023-09-03 RX ADMIN — LEVALBUTEROL HYDROCHLORIDE 1.25 MG: 1.25 SOLUTION RESPIRATORY (INHALATION) at 19:33

## 2023-09-03 RX ADMIN — IPRATROPIUM BROMIDE 0.5 MG: 0.5 SOLUTION RESPIRATORY (INHALATION) at 07:24

## 2023-09-03 RX ADMIN — LACTULOSE SOLUTION USP, 10 G/15 ML 200 G: 10 SOLUTION ORAL; RECTAL at 21:01

## 2023-09-03 RX ADMIN — APIXABAN 2.5 MG: 2.5 TABLET, FILM COATED ORAL at 21:01

## 2023-09-03 RX ADMIN — METOPROLOL TARTRATE 50 MG: 50 TABLET ORAL at 21:01

## 2023-09-03 RX ADMIN — ASPIRIN 81 MG: 81 TABLET, COATED ORAL at 09:25

## 2023-09-03 RX ADMIN — METOPROLOL TARTRATE 50 MG: 50 TABLET ORAL at 09:17

## 2023-09-03 RX ADMIN — ACETAMINOPHEN 325MG 650 MG: 325 TABLET ORAL at 09:17

## 2023-09-03 RX ADMIN — IPRATROPIUM BROMIDE 0.5 MG: 0.5 SOLUTION RESPIRATORY (INHALATION) at 13:32

## 2023-09-03 RX ADMIN — LORATADINE 10 MG: 10 TABLET ORAL at 09:25

## 2023-09-03 RX ADMIN — PIPERACILLIN AND TAZOBACTAM 2.25 G: 2; .25 INJECTION, POWDER, FOR SOLUTION INTRAVENOUS at 05:53

## 2023-09-03 RX ADMIN — ACETAMINOPHEN 325MG 650 MG: 325 TABLET ORAL at 22:20

## 2023-09-03 RX ADMIN — GUAIFENESIN 600 MG: 600 TABLET ORAL at 09:25

## 2023-09-03 RX ADMIN — FLUTICASONE PROPIONATE 1 SPRAY: 50 SPRAY, METERED NASAL at 09:26

## 2023-09-03 RX ADMIN — PIPERACILLIN AND TAZOBACTAM 2.25 G: 2; .25 INJECTION, POWDER, FOR SOLUTION INTRAVENOUS at 00:25

## 2023-09-03 RX ADMIN — PANTOPRAZOLE SODIUM 40 MG: 40 TABLET, DELAYED RELEASE ORAL at 06:06

## 2023-09-03 NOTE — PROGRESS NOTES
Johnnie Alanis is a 82 y.o. male who is currently ordered Vancomycin IV with management by the Pharmacy Consult service. Relevant clinical data and objective / subjective history reviewed. Vancomycin Assessment:  Indication and Goal AUC/Trough: Bone/joint infection (goal -600, trough >10), -600, trough >10  Clinical Status:  New start   Micro:     Renal Function:  SCr: 3.63 mg/dL (baseline Scr 1.5-1.7)   CrCl: 16 mL/min  Renal replacement: Not on dialysis  Days of Therapy: 1  Current Dose: 1250mg IV Q12H  Vancomycin Plan:  New Dosing: vancomycin 2000mg (25mg/kg) IV once then 750mg (10mg/kg) daily prn when random level < 15  Next Level: With AM labs at 0600 on 9/4/23  Renal Function Monitoring: Daily BMP and [place] will continue to follow closely for s/sx of nephrotoxicity, infusion reactions and appropriateness of therapy. BMP and CBC will be ordered per protocol. We will continue to follow the patient’s culture results and clinical progress daily.     Sarahy Davila, Pharmacist

## 2023-09-03 NOTE — PLAN OF CARE
Problem: Potential for Falls  Goal: Patient will remain free of falls  Description: INTERVENTIONS:  - Educate patient/family on patient safety including physical limitations  - Instruct patient to call for assistance with activity   - Consult OT/PT to assist with strengthening/mobility   - Keep Call bell within reach  - Keep bed low and locked with side rails adjusted as appropriate  - Keep care items and personal belongings within reach  - Initiate and maintain comfort rounds  - Make Fall Risk Sign visible to staff  - Offer Toileting every 2 Hours, in advance of need  - Initiate/Maintain bed alarm  - Obtain necessary fall risk management equipment:   - Apply yellow socks and bracelet for high fall risk patients  - Consider moving patient to room near nurses station  Outcome: Progressing     Problem: PAIN - ADULT  Goal: Verbalizes/displays adequate comfort level or baseline comfort level  Description: Interventions:  - Encourage patient to monitor pain and request assistance  - Assess pain using appropriate pain scale  - Administer analgesics based on type and severity of pain and evaluate response  - Implement non-pharmacological measures as appropriate and evaluate response  - Consider cultural and social influences on pain and pain management  - Notify physician/advanced practitioner if interventions unsuccessful or patient reports new pain  Outcome: Progressing     Problem: INFECTION - ADULT  Goal: Absence or prevention of progression during hospitalization  Description: INTERVENTIONS:  - Assess and monitor for signs and symptoms of infection  - Monitor lab/diagnostic results  - Monitor all insertion sites, i.e. indwelling lines, tubes, and drains  - Monitor endotracheal if appropriate and nasal secretions for changes in amount and color  - [place] appropriate cooling/warming therapies per order  - Administer medications as ordered  - Instruct and encourage patient and family to use good hand hygiene technique  - Identify and instruct in appropriate isolation precautions for identified infection/condition  Outcome: Progressing  Goal: Absence of fever/infection during neutropenic period  Description: INTERVENTIONS:  - Monitor WBC    Outcome: Progressing     Problem: SAFETY ADULT  Goal: Maintain or return to baseline ADL function  Description: INTERVENTIONS:  -  Assess patient's ability to carry out ADLs; assess patient's baseline for ADL function and identify physical deficits which impact ability to perform ADLs (bathing, care of mouth/teeth, toileting, grooming, dressing, etc.)  - Assess/evaluate cause of self-care deficits   - Assess range of motion  - Assess patient's mobility; develop plan if impaired  - Assess patient's need for assistive devices and provide as appropriate  - Encourage maximum independence but intervene and supervise when necessary  - Involve family in performance of ADLs  - Assess for home care needs following discharge   - Consider OT consult to assist with ADL evaluation and planning for discharge  - Provide patient education as appropriate  Outcome: Progressing  Goal: Maintains/Returns to pre admission functional level  Description: INTERVENTIONS:  - Perform BMAT or MOVE assessment daily.   - Set and communicate daily mobility goal to care team and patient/family/caregiver. - Collaborate with rehabilitation services on mobility goals if consulted  - Perform Range of Motion 3 times a day. - Reposition patient every 2 hours.   - Dangle patient 2 times a day  - Stand patient 2 times a day  - Ambulate patient 2 times a day  - Out of bed to chair 2 times a day   - Out of bed for meals 2 times a day  - Out of bed for toileting  - Record patient progress and toleration of activity level   Outcome: Progressing     Problem: DISCHARGE PLANNING  Goal: Discharge to home or other facility with appropriate resources  Description: INTERVENTIONS:  - Identify barriers to discharge w/patient and caregiver  - Arrange for needed discharge resources and transportation as appropriate  - Identify discharge learning needs (meds, wound care, etc.)  - Arrange for interpretive services to assist at discharge as needed  - Refer to Case Management Department for coordinating discharge planning if the patient needs post-hospital services based on physician/advanced practitioner order or complex needs related to functional status, cognitive ability, or social support system  Outcome: Progressing     Problem: Knowledge Deficit  Goal: Patient/family/caregiver demonstrates understanding of disease process, treatment plan, medications, and discharge instructions  Description: Complete learning assessment and assess knowledge base.   Interventions:  - Provide teaching at level of understanding  - Provide teaching via preferred learning methods  Outcome: Progressing     Problem: Prexisting or High Potential for Compromised Skin Integrity  Goal: Skin integrity is maintained or improved  Description: INTERVENTIONS:  - Identify patients at risk for skin breakdown  - Assess and monitor skin integrity  - Assess and monitor nutrition and hydration status  - Monitor labs   - Assess for incontinence   - Turn and reposition patient  - Assist with mobility/ambulation  - Relieve pressure over bony prominences  - Avoid friction and shearing  - Provide appropriate hygiene as needed including keeping skin clean and dry  - Evaluate need for skin moisturizer/barrier cream  - Collaborate with interdisciplinary team   - Patient/family teaching  - Consider wound care consult   Outcome: Progressing     Problem: MOBILITY - ADULT  Goal: Maintain or return to baseline ADL function  Description: INTERVENTIONS:  -  Assess patient's ability to carry out ADLs; assess patient's baseline for ADL function and identify physical deficits which impact ability to perform ADLs (bathing, care of mouth/teeth, toileting, grooming, dressing, etc.)  - Assess/evaluate cause of self-care deficits   - Assess range of motion  - Assess patient's mobility; develop plan if impaired  - Assess patient's need for assistive devices and provide as appropriate  - Encourage maximum independence but intervene and supervise when necessary  - Involve family in performance of ADLs  - Assess for home care needs following discharge   - Consider OT consult to assist with ADL evaluation and planning for discharge  - Provide patient education as appropriate  Outcome: Progressing  Goal: Maintains/Returns to pre admission functional level  Description: INTERVENTIONS:  - Perform BMAT or MOVE assessment daily.   - Set and communicate daily mobility goal to care team and patient/family/caregiver. - Collaborate with rehabilitation services on mobility goals if consulted  - Perform Range of Motion 3 times a day. - Reposition patient every 2 hours.   - Dangle patient 2 times a day  - Stand patient 2 times a day  - Ambulate patient 2 times a day  - Out of bed to chair 2 times a day   - Out of bed for meals 2 times a day  - Out of bed for toileting  - Record patient progress and toleration of activity level   Outcome: Progressing

## 2023-09-03 NOTE — ASSESSMENT & PLAN NOTE
· Patient reports dysuria and difficulty urinating   · UA with innumerable WBCs and innumerable bacteria  · Urine culture grew ESBL E. coli  · Urinary retention protocol  · Trend WBC and fever curve  · On Zosyn

## 2023-09-03 NOTE — ASSESSMENT & PLAN NOTE
· Presents with fever, worsening cough, and dyspnea  · Procalcitonin elevated patient with leukocytosis  · Chest x-ray is unremarkable  · Urine strep and Legionella studies is negative  · Sputum culture results reviewed  · Blood cultures are negative to date  · Respiratory protocol  · Airway clearance protocol  · Patient is spiking fever. Follow-up repeat blood culture results  · Speech swallow evaluation.   Patient switched to dysphagia diet  · Repeat chest x-ray is unremarkable

## 2023-09-03 NOTE — CONSULTS
Consultation - Vanessa Arcos 82 y.o. male MRN: [id number]  Unit/Bed#: -01 Encounter: [id number]      Assessment/Plan    62-year-old male with recent hospitalization at [facility] August 2023 with COVID and new onset of A-fib. Admitted 8/27 from skilled nursing facility due to fever, worsening cough and anorexia  Treated initially for pneumonia and UTI with no improvement. Remains febrile with increasing WBCs, acute on chronic renal failure and change in mental status  GI consulted for abdominal distention and abnormal CT scan findings    1. Constipation  2. Abnormal CT scan with sigmoid colon stricturing  CT scan of the chest abdomen and pelvis without contrast performed to evaluate for other source of infection. Noted to have cholelithiasis, pericholecystic fluid and mild wall thickening consistent with acute cholecystitis. LFTs normal.  Surgery has been consulted but low suspicion for acute cholecystitis. Right upper quadrant ultrasound pending  Sigmoid colon stricture but no obstruction noted. No clinical signs of acute obstruction such as abdominal pain, nausea or vomiting  Unclear if patient has had previous colonoscopy- no colonoscopy report found on review of records  Will treat constipation, follow serial abdominal exams  Would benefit from colonoscopy but given acute illness, advanced age and chronic health issues risks  outweigh benefits. Would defer at this time  - will change to p.o. lactulose  -Lactulose enemas today and tomorrow  -Serial abdominal exams  -Okay to resume diet if patient is awake enough to eat      3. Persistent fever  4. Community-acquired pneumonia  5. UTI/cystitis  6.   Septic arthritis right elbow        Inpatient consult to gastroenterology  Consult performed by: TOD Hightower  Consult ordered by: Martín Kuo MD        Physician Requesting Consult: Martín Kuo MD  Reason for Consult / Principal Problem: Abnormal CT scan    HPI: Gerri Preston is a 82y.o. year old male with PMH including hypertension, CKD, COPD, chronic EtOH, duodenal ulcer 2017 due to NSAID use. Recent [facility] hospitalization 8/8/23 through 8/23/2023 with left-sided weakness and headache. Acute CVA was ruled out however patient tested positive for COVID 8/12 and was treated with remdesivir. Developed new onset atrial fibrillation and was started on Eliquis for anticoagulation. Discharged to skilled nursing facility    Presented to ED 8/27 from SNF with fever and ongoing productive cough. Facility also reports anorexia and constipation  Treated for right lower lobe pneumonia. UTI also positive for ESBL. Currently on IV Zosyn  Persistent fevers since admission despite antibiotics. Temp 101.2 today  Blood cultures negative on admission, repeated 9/1, negative x24 hours  WBC normal at 10.6 on admission but has progressively increased-15.5 yesterday, 12.5 today  Infectious disease previously following but has signed off  Has also developed acute on chronic renal failure  LFTs normal  Developed right elbow swelling, Ortho consulted and tapped effusion. Started on IV vancomycin today 9/3 for possible septic bursitis  CT scan of the chest abdomen and pelvis without contrast performed to evaluate for other source of infection. Noted to have cholelithiasis, pericholecystic fluid and mild wall thickening consistent with acute cholecystitis. Severely tortuous sigmoid colon with long segment of sigmoid appears collapsed. Sigmoid colon proximal to collapse segment air-filled and distended. No evidence of obstruction or volvulus. Also noted to have left iliac chain lymph node adenopathy    Patient with increasing lethargy for the past 2 to 3 days. Responsive to verbal stimuli but minimal verbalization, does not respond to questions or follow commands  Abdomen distended but soft. Bowel sounds present, passing flatus per RN.   No bowel movement since admission  Was previously eating without difficulty, no nausea or vomiting    Review of Systems   Unable to perform ROS: Mental status change     Historical Information   Past Medical History:   Diagnosis Date   • Alcohol abuse    • Alcohol withdrawal seizure without complication ([address])    • Arthritis    • Asthma    • CVA (cerebral vascular accident) ([address])    • Decubitus ulcer of buttock     last assessed 16   • Diabetes ([address])    • Disease of thyroid gland    • Duodenal ulcer    • Emphysema lung ([address])    • Esophageal varices (HCC)    • GERD (gastroesophageal reflux disease)    • History of transfusion    • Hypertension    • Irritable bowel syndrome with diarrhea    • Kidney stones    • Prostate cancer ([address])    • Urinary frequency     resolved 02/15/17     Past Surgical History:   Procedure Laterality Date   • BACK SURGERY     • CATARACT EXTRACTION     • ESOPHAGOGASTRODUODENOSCOPY N/A 2017    Procedure: ESOPHAGOGASTRODUODENOSCOPY (EGD); Surgeon: Deon Leyva MD;  Location:  MAIN OR;  Service:    • SC ESOPHAGOGASTRODUODENOSCOPY TRANSORAL DIAGNOSTIC N/A 2016    Procedure: ESOPHAGOGASTRODUODENOSCOPY (EGD);   Surgeon: Arthur Lopez MD;  Location:  MAIN OR;  Service: Gastroenterology   • TONSILLECTOMY       Social History   Social History     Substance and Sexual Activity   Alcohol Use Yes   • Alcohol/week: 14.0 standard drinks of alcohol   • Types: 14 Standard drinks or equivalent per week    Comment: 3-4 mixed drinks vodka per night/ 2L per week     Social History     Substance and Sexual Activity   Drug Use No     Social History     Tobacco Use   Smoking Status Former   • Types: Cigarettes   • Quit date:    • Years since quittin.7   Smokeless Tobacco Never   Tobacco Comments    quit 20 years ago     Family History   Problem Relation Age of Onset   • Heart disease Mother         cardiac disorder   • Stroke Father    • Heart disease Father         cardiac disorder   • Heart disease Sister    • Diabetes Family    • Hypertension Family        Meds/Allergies   Current Facility-Administered Medications   Medication Dose Route Frequency   • acetaminophen (TYLENOL) tablet 650 mg  650 mg Oral Q6H PRN   • albumin human (FLEXBUMIN) 25 % injection 25 g  25 g Intravenous Q6H   • aluminum-magnesium hydroxide-simethicone (MAALOX) oral suspension 30 mL  30 mL Oral Q6H PRN   • aspirin (ECOTRIN LOW STRENGTH) EC tablet 81 mg  81 mg Oral Daily   • bisacodyl (DULCOLAX) rectal suppository 10 mg  10 mg Rectal Daily PRN   • budesonide (PULMICORT) inhalation solution 0.5 mg  0.5 mg Nebulization Q12H   • diltiazem (CARDIZEM CD) 24 hr capsule 240 mg  240 mg Oral Daily   • fluticasone (FLONASE) 50 mcg/act nasal spray 1 spray  1 spray Nasal Daily   • folic acid (FOLVITE) tablet 1 mg  1 mg Oral Daily   • formoterol (PERFOROMIST) nebulizer solution 20 mcg  20 mcg Nebulization Q12H   • guaiFENesin (MUCINEX) 12 hr tablet 600 mg  600 mg Oral BID   • ipratropium (ATROVENT) 0.02 % inhalation solution 0.5 mg  0.5 mg Nebulization TID   • levalbuterol (XOPENEX) inhalation solution 1.25 mg  1.25 mg Nebulization TID   • loratadine (CLARITIN) tablet 10 mg  10 mg Oral Daily   • magnesium Oxide (MAG-OX) tablet 400 mg  400 mg Oral Daily   • metoprolol tartrate (LOPRESSOR) tablet 50 mg  50 mg Oral Q12H LIGIA   • ondansetron (ZOFRAN) injection 4 mg  4 mg Intravenous Q6H PRN   • oxyCODONE (ROXICODONE) IR tablet 5 mg  5 mg Oral Q6H PRN   • pantoprazole (PROTONIX) EC tablet 40 mg  40 mg Oral Daily Before Breakfast   • piperacillin-tazobactam (ZOSYN) 2.25 g in sodium chloride 0.9 % 50 mL IVPB  2.25 g Intravenous Q6H   • polyethylene glycol (MIRALAX) packet 17 g  17 g Oral BID   • saccharomyces boulardii (FLORASTOR) capsule 250 mg  250 mg Oral BID   • senna (SENOKOT) tablet 8.6 mg  1 tablet Oral HS     Medications Prior to Admission   Medication   • apixaban (ELIQUIS) 2.5 mg   • Aspirin 81 MG CAPS   • carvedilol (COREG) 12.5 mg tablet   • cetirizine (ZyrTEC) 10 mg tablet   • fluticasone (FLONASE) 50 mcg/act nasal spray   • folic acid (FOLVITE) 1 mg tablet   • losartan (COZAAR) 50 mg tablet   • MAGNESIUM OXIDE 400 PO   • pantoprazole (PROTONIX) 40 mg tablet   • polyvinyl alcohol (LIQUIFILM TEARS) 1.4 % ophthalmic solution   • magnesium hydroxide (MILK OF MAGNESIA) 400 mg/5 mL oral suspension       Allergies   Allergen Reactions   • Morphine And Related            Physical Exam   Constitutional: Lethargic, awakens to verbal stimuli, moans with exam but not responding to questions or commands  HENT: WNL  Head: Normocephalic and atraumatic. Eyes: anicteric  Neck: Normal range of motion. Neck supple. Cardiovascular: Normal rate and regular rhythm. S1 and S2 normal, no murmur rub or gallop  Pulmonary/Chest: Effort normal and breath sounds normal.   Abdominal: Distended but soft, nontender with palpation. Bowel sounds present, passing gas per nursing. No BM but mucusy discharge via rectum  Musculoskeletal: Normal range of motion. Extremities: Generalized edema of extremities  Neurological: Lethargic, arouses with verbal stimuli but not responding to questions  Skin: Skin is warm and dry. Psychiatric: normal mood and affect. Lab Results:   No results displayed because visit has over 200 results. Imaging Studies: I have personally reviewed pertinent reports. EKG, Pathology, and Other Studies: I have personally reviewed pertinent reports. Counseling / Coordination of Care  Total floor / unit time spent today 45 minutes.

## 2023-09-03 NOTE — PROGRESS NOTES
Abby Ayon  82 y.o.  male  1935  mrn [id number]    Assessment/Plan:    Fever/ESBL UTI/Olecrenon bursitis    Patient admitted with fever. UA + and cultures + ESBL UTI. Patient started on meropenem and defervesced but then developed increasing WBC and fever. Patient noted to have warm swollen R olecranon bursa, which has been tapped twice now for purulent material which has not been sent for culture. He remains febrile,  I suspect either from the infectious or inflammatory olecrenon bursitis. Most common organism for olecrenon bursitis is Staph aureus. He has had adequate coverage for MSSA, but not for MRSA, would repeat blood cultures and start vanco.  I have asked nursing/primary team  to try to express some fluid and send for culture. As for CT findings in abdomen. Tolerating diet, nl lft's doubt gb disease. No clinical signs of bowel obstruction. - continue meropenem for ESBL UTI  - send blood cultures  - start vancomycin adjusted for renal function  - monitor for toxicities while on this medication  - culture elbow drainage  - follow wbc and fever curve  - if fevers persists despite abx, consider inflammatory causes of olecrenon bursitis and consider course of steroids, would give vancomycin 48 hours though and await repeat blood cultures    D/W primary team, will follow     Subjective:  Since last seen for treatment of ESBL UTI. Patient developed fevers and swelling of his R olecrenon bursa. This has been drained twice by ortho. No cultures have been sent. CT also done, concerning for acute choley/bowel obstruction.       Objective:    Tc 102  Eyes: anicteric  Cor: rrr s1s2  Lungs: cta  Abd: soft  Ext: diffuse edema  R elbow, with swollen olecranon bursa, some tenderness  Joint: no erythema, swelling or erythema     Labs:  CBC w/diff  Recent Labs     09/03/23  0221   WBC 12.50*   HGB 7.6*   HCT 23.8*      NEUTOPHILPCT 75   LYMPHOPCT 9*   MONOPCT 13*   EOSPCT 2     BMP  Recent Labs 09/03/23 0221   K 3.9      CO2 19*   BUN 76*   CREATININE 3.63*   CALCIUM 8.9     CMP  Recent Labs     09/03/23 0221   K 3.9      CO2 19*   BUN 76*   CREATININE 3.63*   CALCIUM 8.9   ALKPHOS 71   ALT 15   AST 19       .labrc    Cultures:  Lab Results   Component Value Date    BLOODCX No Growth at 24 hrs. 09/01/2023    BLOODCX No Growth at 24 hrs. 09/01/2023    BLOODCX No Growth After 5 Days. 08/26/2023    BLOODCX No Growth After 5 Days.  08/26/2023     No results found for: "WOUNDCULT"  Lab Results   Component Value Date    URINECX >100,000 cfu/ml Escherichia coli ESBL (A) 08/27/2023     Lab Results   Component Value Date    SPUTUMCULTUR 2+ Growth of 08/27/2023       MED: reviewed       Current Facility-Administered Medications:   •  acetaminophen (TYLENOL) tablet 650 mg, 650 mg, Oral, Q6H PRN, Tesfaye Medrano PA-C, 650 mg at 09/03/23 7596  •  albumin human (FLEXBUMIN) 25 % injection 25 g, 25 g, Intravenous, Q6H, Juan Shoemaker, DO, 25 g at 09/03/23 0915  •  aluminum-magnesium hydroxide-simethicone (MAALOX) oral suspension 30 mL, 30 mL, Oral, Q6H PRN, Tesfaye Medrano PA-C  •  aspirin (ECOTRIN LOW STRENGTH) EC tablet 81 mg, 81 mg, Oral, Daily, Tesfaye Medrano PA-C, 81 mg at 09/03/23 4734  •  bisacodyl (DULCOLAX) rectal suppository 10 mg, 10 mg, Rectal, Daily PRN, Agata Lee MD, 10 mg at 09/01/23 0915  •  budesonide (PULMICORT) inhalation solution 0.5 mg, 0.5 mg, Nebulization, Q12H, Tesfaye Medrano PA-C, 0.5 mg at 09/03/23 9006  •  diltiazem (CARDIZEM CD) 24 hr capsule 240 mg, 240 mg, Oral, Daily, Suzanna Holguin PA-C, 240 mg at 09/03/23 5415  •  fluticasone (FLONASE) 50 mcg/act nasal spray 1 spray, 1 spray, Nasal, Daily, Tesfaye Medrano PA-C, 1 spray at 45/60/89 3728  •  folic acid (FOLVITE) tablet 1 mg, 1 mg, Oral, Daily, Tesfaye Medrano PA-C, 1 mg at 09/03/23 5289  •  formoterol (PERFOROMIST) nebulizer solution 20 mcg, 20 mcg, Nebulization, Q12H, Tesfaye Medrano PA-C, 20 mcg at 09/03/23 0724  •  guaiFENesin (MUCINEX) 12 hr tablet 600 mg, 600 mg, Oral, BID, María Lawson, PA-C, 600 mg at 09/03/23 6108  •  ipratropium (ATROVENT) 0.02 % inhalation solution 0.5 mg, 0.5 mg, Nebulization, TID, Any Harrison MD, 0.5 mg at 09/03/23 1396  •  levalbuterol (Kathlean Fiddler) inhalation solution 1.25 mg, 1.25 mg, Nebulization, TID, Any Harrison MD, 1.25 mg at 09/03/23 5268  •  loratadine (CLARITIN) tablet 10 mg, 10 mg, Oral, Daily, María Lawson PA-C, 10 mg at 09/03/23 6048  •  magnesium Oxide (MAG-OX) tablet 400 mg, 400 mg, Oral, Daily, María Lawson PA-C, 400 mg at 09/03/23 4336  •  metoprolol tartrate (LOPRESSOR) tablet 50 mg, 50 mg, Oral, Q12H [facility], PA-C, 50 mg at 09/03/23 3616  •  ondansetron (ZOFRAN) injection 4 mg, 4 mg, Intravenous, Q6H PRN, María Lawson PA-C, 4 mg at 08/28/23 1748  •  oxyCODONE (ROXICODONE) IR tablet 5 mg, 5 mg, Oral, Q6H PRN, Any Harrison MD, 5 mg at 09/03/23 9739  •  pantoprazole (PROTONIX) EC tablet 40 mg, 40 mg, Oral, Daily Before Breakfast, María Lawson PA-C, 40 mg at 09/03/23 0606  •  piperacillin-tazobactam (ZOSYN) 2.25 g in sodium chloride 0.9 % 50 mL IVPB, 2.25 g, Intravenous, Q6H, Any Harrison MD, Last Rate: 100 mL/hr at 09/03/23 0553, 2.25 g at 09/03/23 0553  •  polyethylene glycol (MIRALAX) packet 17 g, 17 g, Oral, BID, Any Harrison MD  •  saccharomyces boulardii (FLORASTOR) capsule 250 mg, 250 mg, Oral, BID, Wandy Tuttle PA-C, 250 mg at 09/03/23 3525  •  senna (SENOKOT) tablet 8.6 mg, 1 tablet, Oral, HS, María Lawson PA-C, 8.6 mg at 09/02/23 7749  •  vancomycin (VANCOCIN) 2,000 mg in sodium chloride 0.9 % 500 mL IVPB, 25 mg/kg (Adjusted), Intravenous, Once, Any Harrison MD    Principal Problem:    Community acquired pneumonia of right lower lobe of lung  Active Problems:    Anemia of chronic disease    Atrial fibrillation (HCC)    Chronic obstructive pulmonary disease, unspecified COPD type ([address])    Stage 3b chronic kidney disease ([address])    Leukocytosis    Central retinal vein occlusion of right eye    History of COVID-19    History of alcohol abuse    Cystitis    Acute encephalopathy    Elbow swelling, right      Maximilian Parks MD

## 2023-09-03 NOTE — ASSESSMENT & PLAN NOTE
· History of alcohol abuse with withdrawal seizures, has not drink since prior to [facility] hospitalization 8/8

## 2023-09-03 NOTE — PROGRESS NOTES
Orthopedics   Arnol Disla 82 y.o. male MRN: [id number]  Unit/Bed#: -01      HPI:  82 y.o.male complaining of right elbow pain. He notes minimal change from yesterday. He is not able to communicate any other complaints. Past Medical History:   Past Medical History:   Diagnosis Date   • Alcohol abuse    • Alcohol withdrawal seizure without complication ([address])    • Arthritis    • Asthma    • CVA (cerebral vascular accident) ([address])    • Decubitus ulcer of buttock     last assessed 07/20/16   • Diabetes ([address])    • Disease of thyroid gland    • Duodenal ulcer    • Emphysema lung ([address])    • Esophageal varices (HCC)    • GERD (gastroesophageal reflux disease)    • History of transfusion    • Hypertension    • Irritable bowel syndrome with diarrhea    • Kidney stones    • Prostate cancer ([address])    • Urinary frequency     resolved 02/15/17       Past Surgical History:   Past Surgical History:   Procedure Laterality Date   • BACK SURGERY     • CATARACT EXTRACTION     • ESOPHAGOGASTRODUODENOSCOPY N/A 2/6/2017    Procedure: ESOPHAGOGASTRODUODENOSCOPY (EGD); Surgeon: Jaswinder Mccormack MD;  Location:  MAIN OR;  Service:    • ID ESOPHAGOGASTRODUODENOSCOPY TRANSORAL DIAGNOSTIC N/A 4/26/2016    Procedure: ESOPHAGOGASTRODUODENOSCOPY (EGD);   Surgeon: Joe Royal MD;  Location:  MAIN OR;  Service: Gastroenterology   • TONSILLECTOMY         Family History:  Family history reviewed and non-contributory  Family History   Problem Relation Age of Onset   • Heart disease Mother         cardiac disorder   • Stroke Father    • Heart disease Father         cardiac disorder   • Heart disease Sister    • Diabetes Family    • Hypertension Family        Social History:  Social History     Socioeconomic History   • Marital status: /Civil Union     Spouse name: None   • Number of children: None   • Years of education: None   • Highest education level: None   Occupational History   • None   Tobacco Use   • Smoking status: Former     Types: Cigarettes     Quit date: 1980     Years since quittin.7   • Smokeless tobacco: Never   • Tobacco comments:     quit 20 years ago   Vaping Use   • Vaping Use: Never used   Substance and Sexual Activity   • Alcohol use: Yes     Alcohol/week: 14.0 standard drinks of alcohol     Types: 14 Standard drinks or equivalent per week     Comment: 3-4 mixed drinks vodka per night/ 2L per week   • Drug use: No   • Sexual activity: Not Currently   Other Topics Concern   • None   Social History Narrative    Lives with Wife     Social Determinants of Health     Financial Resource Strain: Low Risk  (2023)    Overall Financial Resource Strain (CARDIA)    • Difficulty of Paying Living Expenses: Not very hard   Food Insecurity: No Food Insecurity (2023)    Hunger Vital Sign    • Worried About Running Out of Food in the Last Year: Never true    • Ran Out of Food in the Last Year: Never true   Transportation Needs: No Transportation Needs (2023)    PRAPARE - Transportation    • Lack of Transportation (Medical): No    • Lack of Transportation (Non-Medical): No   Physical Activity: Not on file   Stress: Not on file   Social Connections: Not on file   Intimate Partner Violence: Not At Risk (2023)    Humiliation, Afraid, Rape, and Kick questionnaire    • Fear of Current or Ex-Partner: No    • Emotionally Abused: No    • Physically Abused: No    • Sexually Abused: No   Housing Stability: Low Risk  (2023)    Housing Stability Vital Sign    • Unable to Pay for Housing in the Last Year: No    • Number of [address] in the Last Year: 1    • Unstable Housing in the Last Year: No       Allergies:    Allergies   Allergen Reactions   • Morphine And Related            Labs:  0   Lab Value Date/Time    HCT 23.8 (L) 2023 0221    HCT 24.7 (L) 2023 0336    HCT 29.0 (L) 2023 0334    HCT 40.9 2015 1248    HCT 31.5 (L) 2014 0600    HCT 37.8 2014 0618    HGB 7.6 (L) 09/03/2023 0221    HGB 8.0 (L) 09/02/2023 0336    HGB 9.2 (L) 09/01/2023 0334    HGB 14.0 12/22/2015 1248    HGB 10.0 (L) 08/28/2014 0600    HGB 12.1 08/27/2014 0618    INR 1.72 (H) 08/26/2023 2052    INR 1.06 08/26/2014 1320    WBC 12.50 (H) 09/03/2023 0221    WBC 15.57 (H) 09/02/2023 0336    WBC 15.57 (H) 09/01/2023 0334    WBC 8.80 12/22/2015 1248    WBC 5.42 08/28/2014 0600    WBC 7.12 08/27/2014 0618    ESR 76 (H) 04/14/2023 0938    CRP 6.5 (H) 01/19/2016 1257       Meds:    Current Facility-Administered Medications:   •  acetaminophen (TYLENOL) tablet 650 mg, 650 mg, Oral, Q6H PRN, Kae Black PA-C, 650 mg at 09/03/23 6538  •  albumin human (FLEXBUMIN) 25 % injection 25 g, 25 g, Intravenous, Q6H, Juan Shoemaker DO, 25 g at 09/03/23 0915  •  aluminum-magnesium hydroxide-simethicone (MAALOX) oral suspension 30 mL, 30 mL, Oral, Q6H PRN, Kae Black PA-C  •  aspirin (ECOTRIN LOW STRENGTH) EC tablet 81 mg, 81 mg, Oral, Daily, Kae Black PA-C, 81 mg at 09/03/23 5269  •  bisacodyl (DULCOLAX) rectal suppository 10 mg, 10 mg, Rectal, Daily PRN, Bina Magdaleno MD, 10 mg at 09/01/23 0915  •  budesonide (PULMICORT) inhalation solution 0.5 mg, 0.5 mg, Nebulization, Q12H, Kae Black PA-C, 0.5 mg at 09/03/23 2164  •  diltiazem (CARDIZEM CD) 24 hr capsule 240 mg, 240 mg, Oral, Daily, Cameron Holguin PA-C, 240 mg at 09/03/23 0094  •  fluticasone (FLONASE) 50 mcg/act nasal spray 1 spray, 1 spray, Nasal, Daily, Kae Black PA-C, 1 spray at 45/05/15 3676  •  folic acid (FOLVITE) tablet 1 mg, 1 mg, Oral, Daily, Kae Black PA-C, 1 mg at 09/03/23 4955  •  formoterol (PERFOROMIST) nebulizer solution 20 mcg, 20 mcg, Nebulization, Q12H, Kae Black PA-C, 20 mcg at 09/03/23 1253  •  guaiFENesin (MUCINEX) 12 hr tablet 600 mg, 600 mg, Oral, BID, Kae Black PA-C, 600 mg at 09/03/23 7595  •  ipratropium (ATROVENT) 0.02 % inhalation solution 0.5 mg, 0.5 mg, Nebulization, TID, Agus Bliss MD, 0.5 mg at 09/03/23 1109  •  levalbuterol [facility]) inhalation solution 1.25 mg, 1.25 mg, Nebulization, TID, Agus Bliss MD, 1.25 mg at 09/03/23 8574  •  loratadine (CLARITIN) tablet 10 mg, 10 mg, Oral, Daily, Amanda Be PA-C, 10 mg at 09/03/23 8505  •  magnesium Oxide (MAG-OX) tablet 400 mg, 400 mg, Oral, Daily, Amanda Be PA-C, 400 mg at 09/03/23 9063  •  metoprolol tartrate (LOPRESSOR) tablet 50 mg, 50 mg, Oral, Q12H [address] [facility], Jassi Holguin, LOLI, 50 mg at 09/03/23 0619  •  ondansetron (ZOFRAN) injection 4 mg, 4 mg, Intravenous, Q6H PRN, Amanda Be PA-C, 4 mg at 08/28/23 1748  •  oxyCODONE (ROXICODONE) IR tablet 5 mg, 5 mg, Oral, Q6H PRN, Agus Bliss MD, 5 mg at 09/03/23 0916  •  pantoprazole (PROTONIX) EC tablet 40 mg, 40 mg, Oral, Daily Before Breakfast, Amanda Be PA-C, 40 mg at 09/03/23 0606  •  piperacillin-tazobactam (ZOSYN) 2.25 g in sodium chloride 0.9 % 50 mL IVPB, 2.25 g, Intravenous, Q6H, Agus Bliss MD, Last Rate: 100 mL/hr at 09/03/23 0553, 2.25 g at 09/03/23 0553  •  polyethylene glycol (MIRALAX) packet 17 g, 17 g, Oral, BID, Agus Bliss MD  •  saccharomyces boulardii (FLORASTOR) capsule 250 mg, 250 mg, Oral, BID, Wandy Tuttle PA-C, 250 mg at 09/03/23 1958  •  senna (SENOKOT) tablet 8.6 mg, 1 tablet, Oral, HS, Amanda Be PA-C, 8.6 mg at 09/02/23 7614    Blood Culture:   Lab Results   Component Value Date    BLOODCX No Growth at 24 hrs. 09/01/2023    BLOODCX No Growth at 24 hrs. 09/01/2023       Wound Culture:   No results found for: "WOUNDCULT"    Ins and Outs:  I/O last 24 hours:   In: 240 [P.O.:240]  Out: 240 [Urine:240]          Physical Exam:   /71   Pulse (!) 120   Temp (!) 101.2 °F (38.4 °C)   Resp (!) 23   Ht 5' 8" (1.727 m)   Wt 91.5 kg (201 lb 11.5 oz)   SpO2 93%   BMI 30.67 kg/m²     Musculoskeletal: right upper extremity  · Skin no active drainage  · TTP over olecrenon bursa with mild warmth and erythema  · Full active & passive ROM at elbow  · SILT m/r/u.   · Motor intact ain/pin/m/r/u,   · 2+ rad pulse      Procedure:     The right elbow olecranon bursa was cleansed with alcohol with aseptic technique. An 18-gauge needle was then used to make multiple needle sticks that were connected and to open up the olecranon bursa. Manual expression of bursal fluid was done and about 6 cc of purulent fluid with crystal like material was expressed. This was done until no further expression was able to be obtained. A soft dressing was then applied. This completed the I&D of the right olecranon bursa.     Assessment:  88 y.o.male R olecrenon bursitis    Plan:   · I and D performed at bedside and dressing placed  · Continue abx per medical team  · Doubt further surgical intervention needed, but will follow     Dee Cerrato DO

## 2023-09-03 NOTE — ASSESSMENT & PLAN NOTE
Lab Results   Component Value Date    EGFR 14 09/03/2023    EGFR 15 09/02/2023    EGFR 21 09/01/2023    CREATININE 3.63 (H) 09/03/2023    CREATININE 3.39 (H) 09/02/2023    CREATININE 2.58 (H) 09/01/2023   · Baseline creatinine 1.6-1.9  · Creatinine on admission 1.76  · Patient received a dose of Lasix on September 1  · Creatinine this morning is 3.63  · Urinary retention protocol  · Nephrology consult appreciated  · Nephrology ordered albumin  · Trend BMP daily

## 2023-09-03 NOTE — ASSESSMENT & PLAN NOTE
· WBC 10.68 K on admission  · Likely secondary to pneumonia and cystitis   · Blood cultures are negative to date  · U/c growing ESBL E. Coli  · On Zosyn

## 2023-09-03 NOTE — ASSESSMENT & PLAN NOTE
· Hemoglobin 9.4 on admission  · Hemoglobin ranging 10-12 during recent [facility] admission  · No signs of active bleeding  · Trend CBC

## 2023-09-03 NOTE — PROGRESS NOTES
NEPHROLOGY PROGRESS NOTE   Denise Doshi 82 y.o. male MRN: [id number]  Unit/Bed#: -01 Encounter: [id number]      ASSESSMENT/PLAN:  1. Acute kidney injury: Suspect prerenal versus ATN in the setting of sepsis, fevers, lasix, ARB and relative hypotension  · Creatinine 1.7 on admission  · Creatinine significantly worsening over the past few days and up to 3.6 today  · Oliguric but unsure if urine output accurate   · Had trial of IV fluids which was discontinued 9/1 due to edema   · Given IV lasix 9/1  · Started on albumin 25 g every 6 hours x6 doses yesterday  · UA: 2+ protein, moderate blood, 0-1 RBC, 0-1 hyaline cast  · CT: Multiple left renal cyst, no hydronephrosis, nonspecific bilateral perinephric edema, bladder unremarkable  · Bladder scan: 145 cc  · Continue to hold losartan (last dose 8/29)  2. CKD stage IIIB: Baseline creatinine around 1.5-1.7  3. Altered mental status:  4. Sepsis due to pneumonia and cystitis  5. Lower extremity edema: Possibly due to third spacing. Chest x-ray and CT without signs of volume overload    Plan Summary:   • Continue albumin x 6 doses   • Strict I/O   • Treat underlying infection   • Check am BMP     SUBJECTIVE:  Complains of pain everywhere. Having some coughing.     OBJECTIVE:  Current Weight: Weight - Scale: 91.5 kg (201 lb 11.5 oz)  Vitals:    09/03/23 0816   BP: 131/71   Pulse: (!) 120   Resp: (!) 23   Temp: (!) 101.2 °F (38.4 °C)   SpO2: 93%       Intake/Output Summary (Last 24 hours) at 9/3/2023 1005  Last data filed at 9/3/2023 0701  Gross per 24 hour   Intake --   Output 240 ml   Net -240 ml       General: Ill-appearing  Skin:  No rash  Eyes:  Sclerae anicteric, no periorbital edema   ENT:  Moist mucous membranes  Neck:  Trachea midline, symmetric   Chest: Coarse breath sounds which improved with coughing  CVS:  Regular rate and rhythm  Abdomen:  Soft, nontender, nondistended  Neuro: Awake but little confused  Psych:  Appropriate affect  Extremities: Bilateral edema      Medications:  Scheduled Meds:  Current Facility-Administered Medications   Medication Dose Route Frequency Provider Last Rate   • acetaminophen  650 mg Oral Q6H PRN Gil Ziegler PA-C     • Albumin 25%  25 g Intravenous Q6H Juan Shoemaker DO     • aluminum-magnesium hydroxide-simethicone  30 mL Oral Q6H PRN Gil Ziegler PA-C     • aspirin  81 mg Oral Daily Gil Ziegler PA-C     • bisacodyl  10 mg Rectal Daily PRN Fiorella Thomas MD     • budesonide  0.5 mg Nebulization Q12H Gil Ziegler PA-C     • diltiazem  240 mg Oral Daily Ivis Holguin PA-C     • fluticasone  1 spray Nasal Daily Gil Ziegler PA-C     • folic acid  1 mg Oral Daily Gil Ziegler PA-C     • formoterol  20 mcg Nebulization Q12H Gil Ziegler PA-C     • guaiFENesin  600 mg Oral BID Gil Ziegler PA-C     • ipratropium  0.5 mg Nebulization TID Fiorella Thomas MD     • levalbuterol  1.25 mg Nebulization TID Fiorella Thomas MD     • loratadine  10 mg Oral Daily Gil Ziegler PA-C     • magnesium Oxide  400 mg Oral Daily Gil Ziegler PA-C     • metoprolol tartrate  50 mg Oral Q12H [address] [facility] ALCIRA Holguin PA-C     • ondansetron  4 mg Intravenous Q6H PRN Gil Ziegler PA-C     • oxyCODONE  5 mg Oral Q6H PRN Fiorella Thomas MD     • pantoprazole  40 mg Oral Daily Before Breakfast Gil Ziegler PA-C     • piperacillin-tazobactam  2.25 g Intravenous Q6H Fiorella Thomas MD 2.25 g (09/03/23 1637)   • polyethylene glycol  17 g Oral Daily Gil Ziegler PA-C     • saccharomyces boulardii  250 mg Oral BID Jg Chaves PA-C     • senna  1 tablet Oral HS Gil Ziegler PA-C         PRN Meds:.•  acetaminophen  •  aluminum-magnesium hydroxide-simethicone  •  bisacodyl  •  ondansetron  •  oxyCODONE    Laboratory Results:  Results from last 7 days   Lab Units 09/03/23  0221 09/02/23  0336 09/01/23  0334 08/31/23  0335 08/30/23  0436 08/29/23  0421 08/28/23  0247   WBC Thousand/uL 12.50* 15.57* 15.57* 13.57* 12.12* 10.57* 10.82*   HEMOGLOBIN g/dL 7.6* 8.0* 9.2* 8.4* 8.4* 8.7* 9.5*   HEMATOCRIT % 23.8* 24.7* 29.0* 26.3* 26.0* 27.0* 29.6*   PLATELETS Thousands/uL 323 352 365 297 312 262 265   SODIUM mmol/L 136 134* 137 135 134* 135 138   POTASSIUM mmol/L 3.9 3.7 3.7 3.8 3.7 3.8 4.1   CHLORIDE mmol/L 105 105 105 105 103 104 105   CO2 mmol/L 19* 21 20* 22 23 23 24   BUN mg/dL 76* 68* 55* 54* 48* 40* 37*   CREATININE mg/dL 3.63* 3.39* 2.58* 2.49* 1.95* 1.85* 1.71*   CALCIUM mg/dL 8.9 8.8 8.8 8.6 8.5 8.4 8.6   MAGNESIUM mg/dL 2.1  --  2.1  --  2.1  --  1.8*

## 2023-09-03 NOTE — PROGRESS NOTES
Progress Note - General Surgery   Tracy Cordova 82 y.o. male MRN: [id number]  Unit/Bed#: -01 Encounter: [id number]      Assessment:   Distended Abdomen, CT findings     1. Cholelithiasis with possible impacted stone at the neck, gallbladder wall thickening and/or pericholecystic edema. Findings raise suspicious for acute cholecystitis given the clinical setting. Correlation with ultrasound recommended. 2. 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 3. Severely tortuous sigmoid colon with a long segment of sigmoid which appears collapsed.  Large portion of the sigmoid proximal to the collapsed segment appears air-filled and distended. However, the colon further upstream from the sigmoid (descending   and proximal) does not appear distended to indicate a colonic obstruction. Furthermore there is no evidence for volvulus. Although I am not strongly suspicious, cannot completely exclude stricturing of this long sigmoid segment or more distal rectal   thickening. Follow-up may be considered. 4. Limited evaluation of the lungs due to motion without convincing evidence for pneumonia. 5. 1.8 cm saccular abdominal aortic aneurysm on the left (601/82), previously about 1 cm in retrospect.   6. New, small amount of pelvic free fluid.   7. Small pericardial effusion.     -Pt unable to talk much 2/2 encephalopathy, hx obtained from nursing and staff-with distended abdomen since last night.  -   -Has been tolerating diet, ate breakfast, some bites of lunch without any nausea/vomting/pain  -+flauts, no real BMs  -RUQ US pending, LFTs normal  -Trend labs and vital signs  -Pt has been on abx, currently on zoysn  -Pt not currently a good surgical candidate at this time.   Ultrasound has evidence of acute cholecystitis along with clinical symptoms, patient would likely require percutaneous cholecystostomy tube placement at this time.     Sepsis  -evidenced by wbc, tachycardia, worsening TOBI on CKD  -presumed source was UTI  -pt has been on abx, but now with worsening wbc  -procal 3.11  -no LA noted     Acute encephalopathy  -Pt with metabolic encephalopathy in setting of UTI  -trend labs  -treat with abx  -IVF  -ID on board     Cystitis  -Cx showed E coli  -rentention protocol  -trend labs     Etoh abuse  -withdrawal seizures  -last hospitalzation was on 8/8     Covid 19  -Diagnosed with COVID-19 during recent admit  -received 3 days of remdesivir and IV fluids     Hx of prostate ca  -marlin mets noted on CT        Oliguric acute kidney injury  -likely in setting of sepsis and hypotension    Plan:  -Continue to monitor vitals and lab results  -Medical management as per primary team  -Serial abdominal exams  -Await results of ultrasound. If ultrasound findings, and clinical findings continue to be concern for acute cholecystitis, patient would likely require percutaneous cholecystostomy tube placement as he is not a good surgical candidate at this time. However, clinically this does not appear to be acute cholecystitis this patient's abdomen is nontender and LFTs are within normal range. Subjective/Objective   Chief Complaint:     Subjective: Patient mumbling but unable to verbalize or interact due to encephalopathy. Abdomen is distended. No wincing or showing signs of abdominal tenderness to palpation. Objective:     Blood pressure 131/71, pulse (!) 120, temperature (!) 101.2 °F (38.4 °C), resp. rate (!) 23, height 5' 8" (1.727 m), weight 91.5 kg (201 lb 11.5 oz), SpO2 93 %. Body mass index is 30.67 kg/m². I/O       09/01 0701 09/02 0700 09/02 0701 09/03 0700 09/03 0701 09/04 0700    P. O. 280 240     Total Intake(mL/kg) 280 (3.1) 240 (2.6)     Urine (mL/kg/hr)   240 (0.5)    Total Output   240    Net +280 +240 -240           Unmeasured Urine Occurrence 2 x            Invasive Devices     Peripheral Intravenous Line  Duration           Peripheral IV 09/01/23 Dorsal (posterior); Left Forearm 1 day                Physical Exam: /71   Pulse (!) 120   Temp (!) 101.2 °F (38.4 °C)   Resp (!) 23   Ht 5' 8" (1.727 m)   Wt 91.5 kg (201 lb 11.5 oz)   SpO2 93%   BMI 30.67 kg/m²   General appearance: delirious and slowed mentation  Lungs: diminished breath sounds  Heart: regular rate and rhythm  Abdomen: Soft, mildly distended, nontender to palpation  Extremities: edema 1+ lower extremities bilaterally    Labs   Recent Results (from the past 24 hour(s))   Urinalysis with microscopic    Collection Time: 09/02/23  4:59 PM   Result Value Ref Range    Color, UA Yellow     Clarity, UA Hazy     Specific [place], UA 1.025 1.005 - 1.030    pH, UA 5.5 4.5, 5.0, 5.5, 6.0, 6.5, 7.0, 7.5, 8.0    Leukocytes, UA Negative Negative    Nitrite, UA Negative Negative    Protein,  (2+) (A) Negative mg/dl    Glucose, UA Negative Negative mg/dl    Ketones, UA Negative Negative mg/dl    Urobilinogen, UA <2.0 <2.0 mg/dl mg/dl    Bilirubin, UA Negative Negative    Occult Blood, UA Moderate (A) Negative    RBC, UA 0-1 (A) None Seen, 2-4 /hpf    WBC, UA 2-4 None Seen, 2-4, 5-60 /hpf    Epithelial Cells Occasional None Seen, Occasional /hpf    Bacteria, UA Occasional None Seen, Occasional /hpf    Hyaline Casts, UA 0-1 (A) (none) /lpf    Amorphous Crystals, UA Occasional    CBC and differential    Collection Time: 09/03/23  2:21 AM   Result Value Ref Range    WBC 12.50 (H) 4.31 - 10.16 Thousand/uL    RBC 2.43 (L) 3.88 - 5.62 Million/uL    Hemoglobin 7.6 (L) 12.0 - 17.0 g/dL    Hematocrit 23.8 (L) 36.5 - 49.3 %    MCV 98 82 - 98 fL    MCH 31.3 26.8 - 34.3 pg    MCHC 31.9 31.4 - 37.4 g/dL    RDW 13.8 11.6 - 15.1 %    MPV 10.2 8.9 - 12.7 fL    Platelets 659 099 - 952 Thousands/uL    nRBC 0 /100 WBCs    Neutrophils Relative 75 43 - 75 %    Immat GRANS % 1 0 - 2 %    Lymphocytes Relative 9 (L) 14 - 44 %    Monocytes Relative 13 (H) 4 - 12 %    Eosinophils Relative 2 0 - 6 %    Basophils Relative 0 0 - 1 %    Neutrophils Absolute 9.36 (H) 1.85 - 7.62 Thousands/µL    Immature Grans Absolute 0.18 0.00 - 0.20 Thousand/uL    Lymphocytes Absolute 1.11 0.60 - 4.47 Thousands/µL    Monocytes Absolute 1.59 (H) 0.17 - 1.22 Thousand/µL    Eosinophils Absolute 0.22 0.00 - 0.61 Thousand/µL    Basophils Absolute 0.04 0.00 - 0.10 Thousands/µL   Comprehensive metabolic panel    Collection Time: 09/03/23  2:21 AM   Result Value Ref Range    Sodium 136 135 - 147 mmol/L    Potassium 3.9 3.5 - 5.3 mmol/L    Chloride 105 96 - 108 mmol/L    CO2 19 (L) 21 - 32 mmol/L    ANION GAP 12 mmol/L    BUN 76 (H) 5 - 25 mg/dL    Creatinine 3.63 (H) 0.60 - 1.30 mg/dL    Glucose 129 65 - 140 mg/dL    Calcium 8.9 8.4 - 10.2 mg/dL    Corrected Calcium 9.6 8.3 - 10.1 mg/dL    AST 19 13 - 39 U/L    ALT 15 7 - 52 U/L    Alkaline Phosphatase 71 34 - 104 U/L    Total Protein 6.5 6.4 - 8.4 g/dL    Albumin 3.1 (L) 3.5 - 5.0 g/dL    Total Bilirubin 0.69 0.20 - 1.00 mg/dL    eGFR 14 ml/min/1.73sq m   Lipase    Collection Time: 09/03/23  2:21 AM   Result Value Ref Range    Lipase 22 11 - 82 u/L   Magnesium    Collection Time: 09/03/23  2:21 AM   Result Value Ref Range    Magnesium 2.1 1.9 - 2.7 mg/dL   Procalcitonin    Collection Time: 09/03/23  2:21 AM   Result Value Ref Range    Procalcitonin 2.83 (H) <=0.25 ng/ml        Imaging and other studies:  XR chest portable    Result Date: 8/29/2023  Impression: No acute cardiopulmonary disease. Workstation performed: [id number]     XR chest portable    Result Date: 8/27/2023  Impression: No acute cardiopulmonary disease.  Workstation performed: [id number]       VTE Pharmacologic Prophylaxis: Eliquis  VTE Mechanical Prophylaxis: sequential compression device    Paul Flores PA-C  9/3/2023

## 2023-09-03 NOTE — PROCEDURES
Procedure: The right elbow olecranon bursa was cleansed with alcohol with aseptic technique. An 18-gauge needle was then used to make multiple needle sticks that were connected and to open up the olecranon bursa. Manual expression of bursal fluid was done and about 6 cc of purulent fluid with crystal like material was expressed. This was done until no further expression was able to be obtained. A soft dressing was then applied. This completed the I&D of the right olecranon bursa.

## 2023-09-03 NOTE — ASSESSMENT & PLAN NOTE
· Seen by ophthalmology during recent [facility] admission, diagnosed with CRVO and ocular hypertension  · Patient recommended for formal evaluation outpatient of OCT macular degeneration, discussion of possible anti-VEGF therapy  · Encouraged ophthalmology follow-up outpatient

## 2023-09-03 NOTE — ASSESSMENT & PLAN NOTE
Orthopedic consult appreciated  Patient underwent arthrocentesis of right elbow  Possible olecranon bursitis  ID reevaluation appreciated  Continue on Zosyn and started on vancomycin today  Wound culture ordered  Repeat blood cultures ordered

## 2023-09-03 NOTE — ASSESSMENT & PLAN NOTE
Patient is confused likely acute metabolic encephalopathy in setting of UTI versus septic  bursitis  Frequent reorientation  Trend WBC and fever curve  Neurochecks  On IV antibiotics

## 2023-09-03 NOTE — ASSESSMENT & PLAN NOTE
CT abdomen pelvis showed-  Cholelithiasis with possible impacted stone at the neck, gallbladder wall thickening and/or pericholecystic edema. Findings raise suspicious for acute cholecystitis given the clinical setting. Correlation with ultrasound recommended.     2. 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     3. Severely tortuous sigmoid colon with a long segment of sigmoid which appears collapsed. Large portion of the sigmoid proximal to the collapsed segment appears air-filled and distended. However, the colon further upstream from the sigmoid (descending   and proximal) does not appear distended to indicate a colonic obstruction. Furthermore there is no evidence for volvulus. Although I am not strongly suspicious, cannot completely exclude stricturing of this long sigmoid segment or more distal rectal   thickening. Follow-up may be considered. Surgery and GI consult appreciated  As per surgery patient does not have abdominal pain and has normal LFTs. No evidence of acute cholecystitis.   Continue on IV Zosyn and vancomycin  Repeat blood cultures ordered

## 2023-09-03 NOTE — PROGRESS NOTES
[facility]  Progress Note  Name: Shyam Paz  MRN: [id number]  Unit/Bed#: -01 I Date of Admission: 8/26/2023   Date of Service: 9/3/2023 I Hospital Day: 8    Assessment/Plan   Abnormal computed tomography angiography (CTA) of abdomen and pelvis  Assessment & Plan  CT abdomen pelvis showed-  Cholelithiasis with possible impacted stone at the neck, gallbladder wall thickening and/or pericholecystic edema. Findings raise suspicious for acute cholecystitis given the clinical setting. Correlation with ultrasound recommended.     2. 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     3. Severely tortuous sigmoid colon with a long segment of sigmoid which appears collapsed. Large portion of the sigmoid proximal to the collapsed segment appears air-filled and distended. However, the colon further upstream from the sigmoid (descending   and proximal) does not appear distended to indicate a colonic obstruction. Furthermore there is no evidence for volvulus. Although I am not strongly suspicious, cannot completely exclude stricturing of this long sigmoid segment or more distal rectal   thickening. Follow-up may be considered. Surgery and GI consult appreciated  As per surgery patient does not have abdominal pain and has normal LFTs. No evidence of acute cholecystitis.   Continue on IV Zosyn and vancomycin  Repeat blood cultures ordered    Elbow swelling, right  Assessment & Plan  Orthopedic consult appreciated  Patient underwent arthrocentesis of right elbow  Possible olecranon bursitis  ID reevaluation appreciated  Continue on Zosyn and started on vancomycin today  Wound culture ordered  Repeat blood cultures ordered    Acute encephalopathy  Assessment & Plan  Patient is confused likely acute metabolic encephalopathy in setting of UTI versus septic  bursitis  Frequent reorientation  Trend WBC and fever curve  Neurochecks  On IV antibiotics    Cystitis  Assessment & Plan  · Patient reports dysuria and difficulty urinating   · UA with innumerable WBCs and innumerable bacteria  · Urine culture grew ESBL E. coli  · Urinary retention protocol  · Trend WBC and fever curve  · On Zosyn    History of alcohol abuse  Assessment & Plan  · History of alcohol abuse with withdrawal seizures, has not drink since prior to [facility] hospitalization 8/8    History of COVID-19  Assessment & Plan  · Diagnosed with COVID-19 during recent admit  · Received 3 days of remdesivir and IV fluids  · No evidence of pneumonia during admission  · Completed quarantine while inpatient    Central retinal vein occlusion of right eye  Assessment & Plan  · Seen by ophthalmology during recent [facility] admission, diagnosed with CRVO and ocular hypertension  · Patient recommended for formal evaluation outpatient of OCT macular degeneration, discussion of possible anti-VEGF therapy  · Encouraged ophthalmology follow-up outpatient    Leukocytosis  Assessment & Plan  · WBC 10.68 K on admission  · Likely secondary to pneumonia and cystitis   · Blood cultures are negative to date  · U/c growing ESBL E. Coli  · On Zosyn    Stage 3b chronic kidney disease [facility])  Assessment & Plan  Lab Results   Component Value Date    EGFR 14 09/03/2023    EGFR 15 09/02/2023    EGFR 21 09/01/2023    CREATININE 3.63 (H) 09/03/2023    CREATININE 3.39 (H) 09/02/2023    CREATININE 2.58 (H) 09/01/2023   · Baseline creatinine 1.6-1.9  · Creatinine on admission 1.76  · Patient received a dose of Lasix on September 1  · Creatinine this morning is 3.63  · Urinary retention protocol  · Nephrology consult appreciated  · Nephrology ordered albumin  · Trend BMP daily    Chronic obstructive pulmonary disease, unspecified COPD type ([address])  Assessment & Plan  · Not on maintenance inhalers outpatient  · No signs of acute exacerbation on admission  · If patient were to develop wheezing in setting of pneumonia, would start Xopenex/Atrovent nebulizers    Atrial fibrillation [facility])  Assessment & Plan  · Went into A-fib with RVR on August 28 and was given IV Lopressor and Cardizem  · Home regimen: Coreg 25 Mg twice daily  · Started on Eliquis 2.5 mg twice daily for anticoagulation during hospitalization at [facility] 8/8-8/23  · Cardiology consult appreciated  · Patient converted to normal sinus rhythm  · Cardiology switched patient to Lopressor 50 mg every 12 hours and Cardizem  mg daily  · Continue home medications    Anemia of chronic disease  Assessment & Plan  · Hemoglobin 9.4 on admission  · Hemoglobin ranging 10-12 during recent [facility] admission  · No signs of active bleeding  · Trend CBC    * Community acquired pneumonia of right lower lobe of lung  Assessment & Plan  · Presents with fever, worsening cough, and dyspnea  · Procalcitonin elevated patient with leukocytosis  · Chest x-ray is unremarkable  · Urine strep and Legionella studies is negative  · Sputum culture results reviewed  · Blood cultures are negative to date  · Respiratory protocol  · Airway clearance protocol  · Patient is spiking fever. Follow-up repeat blood culture results  · Speech swallow evaluation. Patient switched to dysphagia diet  · Repeat chest x-ray is unremarkable         Labs & Imaging: I have personally reviewed pertinent reports. VTE Prophylaxis: in place. Code Status:   Level 3 - DNAR and DNI    Patient Centered Rounds: I have performed bedside rounds with nursing staff today. Discussions with Specialists or Other Care Team Provider: ID and orthopedics    Education and Discussions with Family / Patient: Daughter on phone    Current Length of Stay: 8 day(s)    Current Patient Status: Inpatient   Certification Statement: The patient will continue to require additional inpatient hospital stay due to see my assessment and plan.      Subjective: Patient is seen and examined at bedside. Patient is lethargic. Having fevers. Orthopedics did bedside arthrocentesis. No abdominal pain, nausea or vomiting  All other ROS are negative. Objective:    Vitals: Blood pressure 131/71, pulse (!) 120, temperature (!) 101.2 °F (38.4 °C), resp. rate (!) 23, height 5' 8" (1.727 m), weight 91.5 kg (201 lb 11.5 oz), SpO2 93 %. ,Body mass index is 30.67 kg/m². SPO2 RA Rest    Flowsheet Row ED to Hosp-Admission (Current) from 8/26/2023 in [facility] Med Surg Unit   SpO2 93 %   SpO2 Activity At Rest   O2 Device None (Room air)   O2 Flow Rate --        I&O:     Intake/Output Summary (Last 24 hours) at 9/3/2023 1150  Last data filed at 9/3/2023 0701  Gross per 24 hour   Intake --   Output 240 ml   Net -240 ml       Physical Exam:    General- lying comfortably in bed. Not in any acute distress. Neck- Supple, No JVD  CVS- regular, S1 and S2 normal  Chest- Bilateral Air entry, No rhochi, crackles or wheezing present. Abdomen- soft, nontender, not distended, no guarding or rigidity, BS+  Extremities-  No pedal edema, No calf tenderness  Right elbow-pain point. CNS-   Open eyes to verbal commands with muffled answers. Lethargic but easily arousable    Invasive Devices     Peripheral Intravenous Line  Duration           Peripheral IV 09/01/23 Dorsal (posterior); Left Forearm 1 day                      Social History  reviewed  Family History   Problem Relation Age of Onset   • Heart disease Mother         cardiac disorder   • Stroke Father    • Heart disease Father         cardiac disorder   • Heart disease Sister    • Diabetes Family    • Hypertension Family     reviewed    Meds:  Current Facility-Administered Medications   Medication Dose Route Frequency Provider Last Rate Last Admin   • acetaminophen (TYLENOL) tablet 650 mg  650 mg Oral Q6H PRN Phuc Mendoza PA-C   650 mg at 09/03/23 0917   • albumin human (FLEXBUMIN) 25 % injection 25 g  25 g Intravenous Q6H Juan Shoemaker, DO   25 g at 09/03/23 0915   • aluminum-magnesium hydroxide-simethicone (MAALOX) oral suspension 30 mL  30 mL Oral Q6H PRN Vena Ashly, PA-C       • aspirin (ECOTRIN LOW STRENGTH) EC tablet 81 mg  81 mg Oral Daily Vena Ashly, PA-C   81 mg at 09/03/23 7237   • bisacodyl (DULCOLAX) rectal suppository 10 mg  10 mg Rectal Daily PRN Ja Soriano MD   10 mg at 09/01/23 0915   • budesonide (PULMICORT) inhalation solution 0.5 mg  0.5 mg Nebulization Q12H Vena Ashly, PA-C   0.5 mg at 09/03/23 5683   • diltiazem (CARDIZEM CD) 24 hr capsule 240 mg  240 mg Oral Daily Altagracia Holguin, PA-C   240 mg at 09/03/23 0919   • fluticasone (FLONASE) 50 mcg/act nasal spray 1 spray  1 spray Nasal Daily Vena Ashly, PA-C   1 spray at 72/99/26 8892   • folic acid (FOLVITE) tablet 1 mg  1 mg Oral Daily Vena Ashly, PA-C   1 mg at 09/03/23 2732   • formoterol (PERFOROMIST) nebulizer solution 20 mcg  20 mcg Nebulization Q12H Vena Ashly, PA-C   20 mcg at 09/03/23 2185   • guaiFENesin (MUCINEX) 12 hr tablet 600 mg  600 mg Oral BID Vena Ashly, PA-C   600 mg at 09/03/23 0085   • ipratropium (ATROVENT) 0.02 % inhalation solution 0.5 mg  0.5 mg Nebulization TID Ja Soriano MD   0.5 mg at 09/03/23 1669   • levalbuterol (XOPENEX) inhalation solution 1.25 mg  1.25 mg Nebulization TID Ja Soriano MD   1.25 mg at 09/03/23 8446   • loratadine (CLARITIN) tablet 10 mg  10 mg Oral Daily Vena Ashly, PA-C   10 mg at 09/03/23 1513   • magnesium Oxide (MAG-OX) tablet 400 mg  400 mg Oral Daily Vena Ashly, PA-C   400 mg at 09/03/23 1932   • metoprolol tartrate (LOPRESSOR) tablet 50 mg  50 mg Oral Q12H [address] Jassi Holguin PA-C   50 mg at 09/03/23 2894   • ondansetron (ZOFRAN) injection 4 mg  4 mg Intravenous Q6H PRN Brenna Limon PA-C   4 mg at 08/28/23 1748   • oxyCODONE (ROXICODONE) IR tablet 5 mg  5 mg Oral Q6H PRN Ja Soriano MD   5 mg at 09/03/23 0916   • pantoprazole (PROTONIX) EC tablet 40 mg  40 mg Oral Daily Before Breakfast Vena Ashly, PA-C   40 mg at 09/03/23 0606   • piperacillin-tazobactam (ZOSYN) 2.25 g in sodium chloride 0.9 % 50 mL IVPB  2.25 g Intravenous Q6H Ja Soriano  mL/hr at 09/03/23 0553 2.25 g at 09/03/23 0553   • polyethylene glycol (MIRALAX) packet 17 g  17 g Oral BID Ja Soriano MD       • saccharomyces boulardii (FLORASTOR) capsule 250 mg  250 mg Oral BID Espiridion Mort, PA-C   250 mg at 09/03/23 6767   • senna (SENOKOT) tablet 8.6 mg  1 tablet Oral HS Vena Ashly, PA-C   8.6 mg at 09/02/23 2154   • vancomycin (VANCOCIN) 2,000 mg in sodium chloride 0.9 % 500 mL IVPB  25 mg/kg (Adjusted) Intravenous Once Ja Soriano MD          Medications Prior to Admission   Medication   • apixaban (ELIQUIS) 2.5 mg   • Aspirin 81 MG CAPS   • carvedilol (COREG) 12.5 mg tablet   • cetirizine (ZyrTEC) 10 mg tablet   • fluticasone (FLONASE) 50 mcg/act nasal spray   • folic acid (FOLVITE) 1 mg tablet   • losartan (COZAAR) 50 mg tablet   • MAGNESIUM OXIDE 400 PO   • pantoprazole (PROTONIX) 40 mg tablet   • polyvinyl alcohol (LIQUIFILM TEARS) 1.4 % ophthalmic solution   • magnesium hydroxide (MILK OF MAGNESIA) 400 mg/5 mL oral suspension       Labs:  Results from last 7 days   Lab Units 09/03/23 0221 09/02/23 0336 09/01/23  0334   WBC Thousand/uL 12.50* 15.57* 15.57*   HEMOGLOBIN g/dL 7.6* 8.0* 9.2*   HEMATOCRIT % 23.8* 24.7* 29.0*   PLATELETS Thousands/uL 323 352 365   NEUTROS PCT % 75 76* 79*   LYMPHS PCT % 9* 7* 6*   MONOS PCT % 13* 14* 13*   EOS PCT % 2 2 1     Results from last 7 days   Lab Units 09/03/23 0221 09/02/23  0336 09/01/23  0334   POTASSIUM mmol/L 3.9 3.7 3.7   CHLORIDE mmol/L 105 105 105   CO2 mmol/L 19* 21 20*   BUN mg/dL 76* 68* 55*   CREATININE mg/dL 3.63* 3.39* 2.58*   CALCIUM mg/dL 8.9 8.8 8.8   ALK PHOS U/L 71 94  --    ALT U/L 15 24  --    AST U/L 19 32  --      Lab Results   Component Value Date    TROPONINI <0.02 01/10/2020    TROPONINI <0.02 08/30/2017    TROPONINI <0.02 03/24/2017         Lab Results   Component Value Date    BLOODCX No Growth at 24 hrs. 09/01/2023    BLOODCX No Growth at 24 hrs. 09/01/2023    BLOODCX No Growth After 5 Days. 08/26/2023    BLOODCX No Growth After 5 Days. 08/26/2023    URINECX >100,000 cfu/ml Escherichia coli ESBL (A) 08/27/2023    SPUTUMCULTUR 2+ Growth of 08/27/2023         Imaging:  Results for orders placed during the hospital encounter of 08/26/23    XR chest portable    Narrative  CHEST    INDICATION:   wheezing. COVID-positive 8/12/2023. COMPARISON: CXR 8/29/2023, abdomen CT 3/24/2017. EXAM PERFORMED/VIEWS:  XR CHEST PORTABLE. FINDINGS:    Cardiomediastinal silhouette normal.    Lungs clear. No effusion or pneumothorax. Upper abdomen normal. Bones normal for age. Impression  No acute cardiopulmonary disease. Workstation performed: [id number]    Results for orders placed during the hospital encounter of 01/10/20    XR chest 2 views    Narrative  CHEST    INDICATION:   Chest Pain. COMPARISON:  3/24/2017    EXAM PERFORMED/VIEWS:  XR CHEST PA & LATERAL  Images: 2    FINDINGS:    Cardiomediastinal silhouette appears unremarkable. No congestive failure. No pneumothorax. No pneumonia. No effusions. Osseous structures appear within normal limits for patient age. Impression  No acute cardiopulmonary disease.         Workstation performed: [id number]      Last 24 Hours Medication List:   Current Facility-Administered Medications   Medication Dose Route Frequency Provider Last Rate   • acetaminophen  650 mg Oral Q6H PRN Keith Valencia PA-C     • Albumin 25%  25 g Intravenous Q6H Juan Shoemaker DO     • aluminum-magnesium hydroxide-simethicone  30 mL Oral Q6H PRN Keith Valencia PA-C     • aspirin  81 mg Oral Daily Keith Valencia PA-C     • bisacodyl  10 mg Rectal Daily PRN Marien Hodgkins, MD     • budesonide  0.5 mg Nebulization Q12H Joan Baptiste PA-C     • diltiazem  240 mg Oral Daily Cristian Holguin PA-C     • fluticasone  1 spray Nasal Daily Joan Baptiste PA-C     • folic acid  1 mg Oral Daily Joan Baptiste PA-C     • formoterol  20 mcg Nebulization Q12H Joan Baptiste PA-C     • guaiFENesin  600 mg Oral BID Joan Baptiste PA-C     • ipratropium  0.5 mg Nebulization TID Paz Carmona MD     • levalbuterol  1.25 mg Nebulization TID Paz Carmona MD     • loratadine  10 mg Oral Daily Joan Baptiste PA-C     • magnesium Oxide  400 mg Oral Daily Joan Baptiste PA-C     • metoprolol tartrate  50 mg Oral Q12H [facility] Jassi Holguin PA-C     • ondansetron  4 mg Intravenous Q6H PRN Joan Baptiste PA-C     • oxyCODONE  5 mg Oral Q6H PRN Paz Carmona MD     • pantoprazole  40 mg Oral Daily Before Breakfast Joan Baptiste PA-C     • piperacillin-tazobactam  2.25 g Intravenous Q6H Paz Carmona MD 2.25 g (09/03/23 0553)   • polyethylene glycol  17 g Oral BID Paz Carmona MD     • saccharomyces boulardii  250 mg Oral BID Rebeca Lopez PA-C     • senna  1 tablet Oral HS Joan Baptiste PA-C     • vancomycin  25 mg/kg (Adjusted) Intravenous Once Paz Carmona MD          Today, Patient Was Seen By: Paz Carmona MD    ** Please Note: Dictation voice to text software may have been used in the creation of this document.  **

## 2023-09-03 NOTE — PLAN OF CARE
Problem: Potential for Falls  Goal: Patient will remain free of falls  Description: INTERVENTIONS:  - Educate patient/family on patient safety including physical limitations  - Instruct patient to call for assistance with activity   - Consult OT/PT to assist with strengthening/mobility   - Keep Call bell within reach  - Keep bed low and locked with side rails adjusted as appropriate  - Keep care items and personal belongings within reach  - Initiate and maintain comfort rounds  - Make Fall Risk Sign visible to staff  - Offer Toileting every 2 Hours, in advance of need  - Initiate/Maintain bed alarm  - Obtain necessary fall risk management equipment:   - Apply yellow socks and bracelet for high fall risk patients  - Consider moving patient to room near nurses station  Outcome: Progressing     Problem: PAIN - ADULT  Goal: Verbalizes/displays adequate comfort level or baseline comfort level  Description: Interventions:  - Encourage patient to monitor pain and request assistance  - Assess pain using appropriate pain scale  - Administer analgesics based on type and severity of pain and evaluate response  - Implement non-pharmacological measures as appropriate and evaluate response  - Consider cultural and social influences on pain and pain management  - Notify physician/advanced practitioner if interventions unsuccessful or patient reports new pain  Outcome: Progressing     Problem: INFECTION - ADULT  Goal: Absence or prevention of progression during hospitalization  Description: INTERVENTIONS:  - Assess and monitor for signs and symptoms of infection  - Monitor lab/diagnostic results  - Monitor all insertion sites, i.e. indwelling lines, tubes, and drains  - Monitor endotracheal if appropriate and nasal secretions for changes in amount and color  - Orange appropriate cooling/warming therapies per order  - Administer medications as ordered  - Instruct and encourage patient and family to use good hand hygiene technique  - Identify and instruct in appropriate isolation precautions for identified infection/condition  Outcome: Progressing  Goal: Absence of fever/infection during neutropenic period  Description: INTERVENTIONS:  - Monitor WBC    Outcome: Progressing     Problem: SAFETY ADULT  Goal: Maintain or return to baseline ADL function  Description: INTERVENTIONS:  -  Assess patient's ability to carry out ADLs; assess patient's baseline for ADL function and identify physical deficits which impact ability to perform ADLs (bathing, care of mouth/teeth, toileting, grooming, dressing, etc.)  - Assess/evaluate cause of self-care deficits   - Assess range of motion  - Assess patient's mobility; develop plan if impaired  - Assess patient's need for assistive devices and provide as appropriate  - Encourage maximum independence but intervene and supervise when necessary  - Involve family in performance of ADLs  - Assess for home care needs following discharge   - Consider OT consult to assist with ADL evaluation and planning for discharge  - Provide patient education as appropriate  Outcome: Progressing  Goal: Maintains/Returns to pre admission functional level  Description: INTERVENTIONS:  - Perform BMAT or MOVE assessment daily.   - Set and communicate daily mobility goal to care team and patient/family/caregiver. - Collaborate with rehabilitation services on mobility goals if consulted  - Perform Range of Motion 3 times a day. - Reposition patient every 2 hours.   - Dangle patient 2 times a day  - Stand patient 2 times a day  - Ambulate patient 2 times a day  - Out of bed to chair 2 times a day   - Out of bed for meals 2 times a day  - Out of bed for toileting  - Record patient progress and toleration of activity level   Outcome: Progressing     Problem: DISCHARGE PLANNING  Goal: Discharge to home or other facility with appropriate resources  Description: INTERVENTIONS:  - Identify barriers to discharge w/patient and caregiver  - Arrange for needed discharge resources and transportation as appropriate  - Identify discharge learning needs (meds, wound care, etc.)  - Arrange for interpretive services to assist at discharge as needed  - Refer to Case Management Department for coordinating discharge planning if the patient needs post-hospital services based on physician/advanced practitioner order or complex needs related to functional status, cognitive ability, or social support system  Outcome: Progressing     Problem: Knowledge Deficit  Goal: Patient/family/caregiver demonstrates understanding of disease process, treatment plan, medications, and discharge instructions  Description: Complete learning assessment and assess knowledge base.   Interventions:  - Provide teaching at level of understanding  - Provide teaching via preferred learning methods  Outcome: Progressing     Problem: Prexisting or High Potential for Compromised Skin Integrity  Goal: Skin integrity is maintained or improved  Description: INTERVENTIONS:  - Identify patients at risk for skin breakdown  - Assess and monitor skin integrity  - Assess and monitor nutrition and hydration status  - Monitor labs   - Assess for incontinence   - Turn and reposition patient  - Assist with mobility/ambulation  - Relieve pressure over bony prominences  - Avoid friction and shearing  - Provide appropriate hygiene as needed including keeping skin clean and dry  - Evaluate need for skin moisturizer/barrier cream  - Collaborate with interdisciplinary team   - Patient/family teaching  - Consider wound care consult   Outcome: Progressing     Problem: MOBILITY - ADULT  Goal: Maintain or return to baseline ADL function  Description: INTERVENTIONS:  -  Assess patient's ability to carry out ADLs; assess patient's baseline for ADL function and identify physical deficits which impact ability to perform ADLs (bathing, care of mouth/teeth, toileting, grooming, dressing, etc.)  - Assess/evaluate cause of self-care deficits   - Assess range of motion  - Assess patient's mobility; develop plan if impaired  - Assess patient's need for assistive devices and provide as appropriate  - Encourage maximum independence but intervene and supervise when necessary  - Involve family in performance of ADLs  - Assess for home care needs following discharge   - Consider OT consult to assist with ADL evaluation and planning for discharge  - Provide patient education as appropriate  Outcome: Progressing  Goal: Maintains/Returns to pre admission functional level  Description: INTERVENTIONS:  - Perform BMAT or MOVE assessment daily.   - Set and communicate daily mobility goal to care team and patient/family/caregiver. - Collaborate with rehabilitation services on mobility goals if consulted  - Perform Range of Motion 3 times a day. - Reposition patient every 2 hours.   - Dangle patient 2 times a day  - Stand patient 2 times a day  - Ambulate patient 2 times a day  - Out of bed to chair 2 times a day   - Out of bed for meals 2 times a day  - Out of bed for toileting  - Record patient progress and toleration of activity level   Outcome: Progressing

## 2023-09-03 NOTE — ASSESSMENT & PLAN NOTE
· Went into A-fib with RVR on August 28 and was given IV Lopressor and Cardizem  · Home regimen: Coreg 25 Mg twice daily  · Started on Eliquis 2.5 mg twice daily for anticoagulation during hospitalization at [facility] 8/8-8/23  · Cardiology consult appreciated  · Patient converted to normal sinus rhythm  · Cardiology switched patient to Lopressor 50 mg every 12 hours and Cardizem  mg daily  · Continue home medications

## 2023-09-04 PROBLEM — A41.9 SEPSIS (HCC): Status: ACTIVE | Noted: 2023-09-04

## 2023-09-04 PROBLEM — Z16.12 ESBL (EXTENDED SPECTRUM BETA-LACTAMASE) PRODUCING BACTERIA INFECTION: Status: ACTIVE | Noted: 2023-09-04

## 2023-09-04 PROBLEM — M70.20 OLECRANON BURSITIS: Status: ACTIVE | Noted: 2023-09-03

## 2023-09-04 PROBLEM — A49.9 ESBL (EXTENDED SPECTRUM BETA-LACTAMASE) PRODUCING BACTERIA INFECTION: Status: ACTIVE | Noted: 2023-09-04

## 2023-09-04 PROBLEM — N17.9 AKI (ACUTE KIDNEY INJURY) (HCC): Status: ACTIVE | Noted: 2021-11-11

## 2023-09-04 PROBLEM — J18.9 COMMUNITY ACQUIRED PNEUMONIA OF RIGHT LOWER LOBE OF LUNG: Status: RESOLVED | Noted: 2023-08-27 | Resolved: 2023-09-04

## 2023-09-04 LAB
ALBUMIN SERPL BCP-MCNC: 3.5 G/DL (ref 3.5–5)
ALP SERPL-CCNC: 69 U/L (ref 34–104)
ALT SERPL W P-5'-P-CCNC: 12 U/L (ref 7–52)
AMORPH URATE CRY URNS QL MICRO: ABNORMAL
ANION GAP SERPL CALCULATED.3IONS-SCNC: 13 MMOL/L
AST SERPL W P-5'-P-CCNC: 17 U/L (ref 13–39)
BACTERIA UR QL AUTO: ABNORMAL /HPF
BASOPHILS # BLD AUTO: 0.04 THOUSANDS/ÂΜL (ref 0–0.1)
BASOPHILS NFR BLD AUTO: 0 % (ref 0–1)
BILIRUB SERPL-MCNC: 0.9 MG/DL (ref 0.2–1)
BILIRUB UR QL STRIP: NEGATIVE
BUN SERPL-MCNC: 89 MG/DL (ref 5–25)
CALCIUM SERPL-MCNC: 9.2 MG/DL (ref 8.4–10.2)
CHLORIDE SERPL-SCNC: 107 MMOL/L (ref 96–108)
CHLORIDE UR-SCNC: <15 MMOL/L
CLARITY UR: ABNORMAL
CO2 SERPL-SCNC: 20 MMOL/L (ref 21–32)
COLOR UR: YELLOW
CREAT SERPL-MCNC: 4.33 MG/DL (ref 0.6–1.3)
CREAT UR-MCNC: 85.8 MG/DL
EOSINOPHIL # BLD AUTO: 0.17 THOUSAND/ÂΜL (ref 0–0.61)
EOSINOPHIL NFR BLD AUTO: 1 % (ref 0–6)
ERYTHROCYTE [DISTWIDTH] IN BLOOD BY AUTOMATED COUNT: 14.1 % (ref 11.6–15.1)
GFR SERPL CREATININE-BSD FRML MDRD: 11 ML/MIN/1.73SQ M
GLUCOSE SERPL-MCNC: 157 MG/DL (ref 65–140)
GLUCOSE UR STRIP-MCNC: NEGATIVE MG/DL
HCT VFR BLD AUTO: 21.8 % (ref 36.5–49.3)
HCT VFR BLD AUTO: 23.3 % (ref 36.5–49.3)
HGB BLD-MCNC: 7 G/DL (ref 12–17)
HGB BLD-MCNC: 7.6 G/DL (ref 12–17)
HGB UR QL STRIP.AUTO: ABNORMAL
IMM GRANULOCYTES # BLD AUTO: 0.21 THOUSAND/UL (ref 0–0.2)
IMM GRANULOCYTES NFR BLD AUTO: 2 % (ref 0–2)
KETONES UR STRIP-MCNC: NEGATIVE MG/DL
LEUKOCYTE ESTERASE UR QL STRIP: NEGATIVE
LYMPHOCYTES # BLD AUTO: 1.02 THOUSANDS/ÂΜL (ref 0.6–4.47)
LYMPHOCYTES NFR BLD AUTO: 7 % (ref 14–44)
MAGNESIUM SERPL-MCNC: 2.2 MG/DL (ref 1.9–2.7)
MCH RBC QN AUTO: 31.3 PG (ref 26.8–34.3)
MCHC RBC AUTO-ENTMCNC: 32.1 G/DL (ref 31.4–37.4)
MCV RBC AUTO: 97 FL (ref 82–98)
MONOCYTES # BLD AUTO: 1.58 THOUSAND/ÂΜL (ref 0.17–1.22)
MONOCYTES NFR BLD AUTO: 11 % (ref 4–12)
NEUTROPHILS # BLD AUTO: 11.02 THOUSANDS/ÂΜL (ref 1.85–7.62)
NEUTS SEG NFR BLD AUTO: 79 % (ref 43–75)
NITRITE UR QL STRIP: NEGATIVE
NON-SQ EPI CELLS URNS QL MICRO: ABNORMAL /HPF
NRBC BLD AUTO-RTO: 0 /100 WBCS
PH UR STRIP.AUTO: 5.5 [PH]
PLATELET # BLD AUTO: 296 THOUSANDS/UL (ref 149–390)
PMV BLD AUTO: 9.8 FL (ref 8.9–12.7)
POTASSIUM SERPL-SCNC: 3.3 MMOL/L (ref 3.5–5.3)
PROCALCITONIN SERPL-MCNC: 2.93 NG/ML
PROT SERPL-MCNC: 6.7 G/DL (ref 6.4–8.4)
PROT UR STRIP-MCNC: ABNORMAL MG/DL
RBC # BLD AUTO: 2.24 MILLION/UL (ref 3.88–5.62)
RBC #/AREA URNS AUTO: ABNORMAL /HPF
SODIUM 24H UR-SCNC: 16 MOL/L
SODIUM SERPL-SCNC: 140 MMOL/L (ref 135–147)
SP GR UR STRIP.AUTO: 1.02 (ref 1–1.03)
UROBILINOGEN UR STRIP-ACNC: <2 MG/DL
VANCOMYCIN SERPL-MCNC: 18.2 UG/ML (ref 10–20)
WBC # BLD AUTO: 14.04 THOUSAND/UL (ref 4.31–10.16)
WBC #/AREA URNS AUTO: ABNORMAL /HPF

## 2023-09-04 PROCEDURE — 82436 ASSAY OF URINE CHLORIDE: CPT | Performed by: NURSE PRACTITIONER

## 2023-09-04 PROCEDURE — 94760 N-INVAS EAR/PLS OXIMETRY 1: CPT

## 2023-09-04 PROCEDURE — 80053 COMPREHEN METABOLIC PANEL: CPT | Performed by: INTERNAL MEDICINE

## 2023-09-04 PROCEDURE — 99233 SBSQ HOSP IP/OBS HIGH 50: CPT | Performed by: INTERNAL MEDICINE

## 2023-09-04 PROCEDURE — 99232 SBSQ HOSP IP/OBS MODERATE 35: CPT | Performed by: INTERNAL MEDICINE

## 2023-09-04 PROCEDURE — 85014 HEMATOCRIT: CPT | Performed by: INTERNAL MEDICINE

## 2023-09-04 PROCEDURE — 82570 ASSAY OF URINE CREATININE: CPT | Performed by: NURSE PRACTITIONER

## 2023-09-04 PROCEDURE — 84300 ASSAY OF URINE SODIUM: CPT | Performed by: NURSE PRACTITIONER

## 2023-09-04 PROCEDURE — 97530 THERAPEUTIC ACTIVITIES: CPT

## 2023-09-04 PROCEDURE — 83735 ASSAY OF MAGNESIUM: CPT | Performed by: INTERNAL MEDICINE

## 2023-09-04 PROCEDURE — 84145 PROCALCITONIN (PCT): CPT | Performed by: INTERNAL MEDICINE

## 2023-09-04 PROCEDURE — 80202 ASSAY OF VANCOMYCIN: CPT | Performed by: INTERNAL MEDICINE

## 2023-09-04 PROCEDURE — 99024 POSTOP FOLLOW-UP VISIT: CPT | Performed by: ORTHOPAEDIC SURGERY

## 2023-09-04 PROCEDURE — 81001 URINALYSIS AUTO W/SCOPE: CPT | Performed by: NURSE PRACTITIONER

## 2023-09-04 PROCEDURE — 85025 COMPLETE CBC W/AUTO DIFF WBC: CPT | Performed by: INTERNAL MEDICINE

## 2023-09-04 PROCEDURE — 87205 SMEAR GRAM STAIN: CPT | Performed by: INTERNAL MEDICINE

## 2023-09-04 PROCEDURE — 85018 HEMOGLOBIN: CPT | Performed by: INTERNAL MEDICINE

## 2023-09-04 PROCEDURE — 94640 AIRWAY INHALATION TREATMENT: CPT

## 2023-09-04 PROCEDURE — 99233 SBSQ HOSP IP/OBS HIGH 50: CPT | Performed by: PHYSICIAN ASSISTANT

## 2023-09-04 RX ORDER — LACTULOSE 20 G/30ML
20 SOLUTION ORAL 2 TIMES DAILY
Status: DISCONTINUED | OUTPATIENT
Start: 2023-09-05 | End: 2023-09-05

## 2023-09-04 RX ORDER — POTASSIUM CHLORIDE 14.9 MG/ML
20 INJECTION INTRAVENOUS ONCE
Status: DISCONTINUED | OUTPATIENT
Start: 2023-09-04 | End: 2023-09-04

## 2023-09-04 RX ORDER — LACTULOSE 20 G/30ML
20 SOLUTION ORAL 2 TIMES DAILY PRN
Status: DISCONTINUED | OUTPATIENT
Start: 2023-09-04 | End: 2023-09-04

## 2023-09-04 RX ORDER — POTASSIUM CHLORIDE 20 MEQ/1
20 TABLET, EXTENDED RELEASE ORAL ONCE
Status: DISCONTINUED | OUTPATIENT
Start: 2023-09-04 | End: 2023-09-04

## 2023-09-04 RX ORDER — POTASSIUM CHLORIDE 20 MEQ/1
20 TABLET, EXTENDED RELEASE ORAL ONCE
Status: COMPLETED | OUTPATIENT
Start: 2023-09-04 | End: 2023-09-04

## 2023-09-04 RX ADMIN — POTASSIUM CHLORIDE 20 MEQ: 1500 TABLET, EXTENDED RELEASE ORAL at 12:57

## 2023-09-04 RX ADMIN — IPRATROPIUM BROMIDE 0.5 MG: 0.5 SOLUTION RESPIRATORY (INHALATION) at 13:47

## 2023-09-04 RX ADMIN — Medication 250 MG: at 17:12

## 2023-09-04 RX ADMIN — SENNOSIDES 8.6 MG: 8.6 TABLET, FILM COATED ORAL at 21:58

## 2023-09-04 RX ADMIN — FORMOTEROL FUMARATE DIHYDRATE 20 MCG: 20 SOLUTION RESPIRATORY (INHALATION) at 07:14

## 2023-09-04 RX ADMIN — BUDESONIDE 0.5 MG: 0.5 INHALANT ORAL at 07:14

## 2023-09-04 RX ADMIN — FORMOTEROL FUMARATE DIHYDRATE 20 MCG: 20 SOLUTION RESPIRATORY (INHALATION) at 19:55

## 2023-09-04 RX ADMIN — PANTOPRAZOLE SODIUM 40 MG: 40 TABLET, DELAYED RELEASE ORAL at 06:15

## 2023-09-04 RX ADMIN — MEROPENEM 500 MG: 500 INJECTION, POWDER, FOR SOLUTION INTRAVENOUS at 12:31

## 2023-09-04 RX ADMIN — LEVALBUTEROL HYDROCHLORIDE 1.25 MG: 1.25 SOLUTION RESPIRATORY (INHALATION) at 13:47

## 2023-09-04 RX ADMIN — BUDESONIDE 0.5 MG: 0.5 INHALANT ORAL at 19:55

## 2023-09-04 RX ADMIN — PIPERACILLIN AND TAZOBACTAM 2.25 G: 2; .25 INJECTION, POWDER, FOR SOLUTION INTRAVENOUS at 05:42

## 2023-09-04 RX ADMIN — LEVALBUTEROL HYDROCHLORIDE 1.25 MG: 1.25 SOLUTION RESPIRATORY (INHALATION) at 19:54

## 2023-09-04 RX ADMIN — APIXABAN 2.5 MG: 2.5 TABLET, FILM COATED ORAL at 17:12

## 2023-09-04 RX ADMIN — GUAIFENESIN 600 MG: 600 TABLET ORAL at 17:13

## 2023-09-04 RX ADMIN — METOPROLOL TARTRATE 50 MG: 50 TABLET ORAL at 21:58

## 2023-09-04 RX ADMIN — LEVALBUTEROL HYDROCHLORIDE 1.25 MG: 1.25 SOLUTION RESPIRATORY (INHALATION) at 07:14

## 2023-09-04 RX ADMIN — APIXABAN 2.5 MG: 2.5 TABLET, FILM COATED ORAL at 12:31

## 2023-09-04 RX ADMIN — MEROPENEM 500 MG: 500 INJECTION, POWDER, FOR SOLUTION INTRAVENOUS at 22:02

## 2023-09-04 RX ADMIN — IPRATROPIUM BROMIDE 0.5 MG: 0.5 SOLUTION RESPIRATORY (INHALATION) at 07:14

## 2023-09-04 RX ADMIN — IPRATROPIUM BROMIDE 0.5 MG: 0.5 SOLUTION RESPIRATORY (INHALATION) at 19:55

## 2023-09-04 RX ADMIN — LACTULOSE SOLUTION USP, 10 G/15 ML 200 G: 10 SOLUTION ORAL; RECTAL at 09:56

## 2023-09-04 NOTE — PROGRESS NOTES
Progress note - Gastroenterology   Moni Rebeca 82 y.o. male MRN: [id number]  Unit/Bed#: -01 Encounter: [id number]    ASSESSMENT and PLAN  61-year-old male with recent hospitalization at [facility] August 2023 with COVID and new onset of A-fib. Admitted 8/27 from skilled nursing facility due to fever, worsening cough and anorexia  Treated initially for pneumonia and UTI with no improvement. Remains febrile with increasing WBCs, acute on chronic renal failure and change in mental status  GI consulted for abdominal distention and abnormal CT scan findings     1. Cholelithiasis  CT scan of the abdomen 9/2 showed cholelithiasis, possible stone impaction at neck of gallbladder  Right upper quadrant ultrasound completed but not reported yet  LFTs remain normal  Surgery is following and feels unlikely acute cholecystitis  Patient denies right upper quadrant abdominal pain, no nausea or vomiting  -Check right upper quadrant ultrasound, consider HIDA scan    2. Abnormal CT scan with sigmoid colon stricturing  CT scan of the chest abdomen and pelvis without contrast performed to evaluate for other source of infection. Noted to have significant tortuosity of sigmoid colon with possible colon stricture but no obstruction noted. Significant stool burden   No clinical signs of acute obstruction such as abdominal pain, nausea or vomiting  Unclear if patient has had previous colonoscopy- no colonoscopy report found on review of records  Will treat constipation, follow serial abdominal exams  Would benefit from colonoscopy but given acute illness, advanced age and chronic health issues risks  outweigh benefits. Would defer at this time  - continue  p.o. lactulose  -Continue lactulose enemas today   -Serial abdominal exams  -N.p.o. for now awaiting right upper quadrant ultrasound report      Chief Complaint   Patient presents with   • Fever     Pt having fevers at SNF- EMS reports 101. 3. SUBJECTIVE/HPI   Tmax 101.2 yesterday morning, 99.5 this a.m.   WBCs remain elevated at 14, LFTs normal  Much more awake and alert today, answers questions appropriately  Denies abdominal pain but reports knee and joint pain  No nausea or vomiting, p.o. at present  Liquid brown stool yesterday after enema  Right upper quadrant ultrasound completed-awaiting report    /70   Pulse (!) 116   Temp 99.5 °F (37.5 °C)   Resp 18   Ht 5' 8" (1.727 m)   Wt 91.5 kg (201 lb 11.5 oz)   SpO2 98%   BMI 30.67 kg/m²     PHYSICALEXAM  General appearance: Alert today and conversing appropriately  Eyes:  no icterus   Head: Normocephalic, without obvious abnormality, atraumatic  Lungs: clear to auscultation bilaterally  Heart: regular rate and rhythm, S1, S2 normal, no murmur, click, rub or gallop  Abdomen: Abdomen distended but soft, nontender with palpation all quadrants; bowel sounds normal.  Nursing reporting brown liquid stool after enema last night  Extremities: extremities normal, atraumatic, no cyanosis or edema      Lab Results   Component Value Date    GLUCOSE 115 12/22/2015    CALCIUM 9.2 09/04/2023     12/22/2015    K 3.3 (L) 09/04/2023    CO2 20 (L) 09/04/2023     09/04/2023    BUN 89 (H) 09/04/2023    CREATININE 4.33 (H) 09/04/2023     Lab Results   Component Value Date    WBC 14.04 (H) 09/04/2023    HGB 7.0 (L) 09/04/2023    HCT 21.8 (L) 09/04/2023    MCV 97 09/04/2023     09/04/2023     Lab Results   Component Value Date    ALT 12 09/04/2023    AST 17 09/04/2023    ALKPHOS 69 09/04/2023    BILITOT 0.48 12/22/2015     Lab Results   Component Value Date    AMYLASE 101 04/27/2016     Lab Results   Component Value Date    LIPASE 22 09/03/2023       Lab Results   Component Value Date    INR 1.72 (H) 08/26/2023

## 2023-09-04 NOTE — PROGRESS NOTES
Antonio Rodriguez is a 82 y.o. male who is currently ordered Vancomycin IV with management by the Pharmacy Consult service. Relevant clinical data and objective / subjective history reviewed. Vancomycin Assessment:  Indication and Goal AUC/Trough: Bone/joint infection (goal -600, trough >10), -600, trough >10  Clinical Status: worsening  Micro:     Renal Function:  SCr: 4.33 mg/dL  CrCl: 13.4 mL/min  Renal replacement: Not on dialysis  Days of Therapy: 2  Current Dose: 750mg (10mg/kg) daily prn when random level < 15  Vancomycin Plan:  New Dosinmg (25mg/kg) IV once then 750mg (10mg/kg) daily prn when random level < 15  Next Level: 0600 on   Renal Function Monitoring: Daily BMP and [place] will continue to follow closely for s/sx of nephrotoxicity, infusion reactions and appropriateness of therapy. BMP and CBC will be ordered per protocol. We will continue to follow the patient’s culture results and clinical progress daily.     Jose Angel Best, Pharmacist

## 2023-09-04 NOTE — PLAN OF CARE
Problem: Potential for Falls  Goal: Patient will remain free of falls  Description: INTERVENTIONS:  - Educate patient/family on patient safety including physical limitations  - Instruct patient to call for assistance with activity   - Consult OT/PT to assist with strengthening/mobility   - Keep Call bell within reach  - Keep bed low and locked with side rails adjusted as appropriate  - Keep care items and personal belongings within reach  - Initiate and maintain comfort rounds  - Make Fall Risk Sign visible to staff  - Offer Toileting every 2 Hours, in advance of need  - Initiate/Maintain bed alarm  - Obtain necessary fall risk management equipment:   - Apply yellow socks and bracelet for high fall risk patients  - Consider moving patient to room near nurses station  Outcome: Progressing     Problem: PAIN - ADULT  Goal: Verbalizes/displays adequate comfort level or baseline comfort level  Description: Interventions:  - Encourage patient to monitor pain and request assistance  - Assess pain using appropriate pain scale  - Administer analgesics based on type and severity of pain and evaluate response  - Implement non-pharmacological measures as appropriate and evaluate response  - Consider cultural and social influences on pain and pain management  - Notify physician/advanced practitioner if interventions unsuccessful or patient reports new pain  Outcome: Progressing     Problem: INFECTION - ADULT  Goal: Absence or prevention of progression during hospitalization  Description: INTERVENTIONS:  - Assess and monitor for signs and symptoms of infection  - Monitor lab/diagnostic results  - Monitor all insertion sites, i.e. indwelling lines, tubes, and drains  - Monitor endotracheal if appropriate and nasal secretions for changes in amount and color  - [place] appropriate cooling/warming therapies per order  - Administer medications as ordered  - Instruct and encourage patient and family to use good hand hygiene technique  - Identify and instruct in appropriate isolation precautions for identified infection/condition  Outcome: Progressing  Goal: Absence of fever/infection during neutropenic period  Description: INTERVENTIONS:  - Monitor WBC    Outcome: Progressing     Problem: SAFETY ADULT  Goal: Maintain or return to baseline ADL function  Description: INTERVENTIONS:  -  Assess patient's ability to carry out ADLs; assess patient's baseline for ADL function and identify physical deficits which impact ability to perform ADLs (bathing, care of mouth/teeth, toileting, grooming, dressing, etc.)  - Assess/evaluate cause of self-care deficits   - Assess range of motion  - Assess patient's mobility; develop plan if impaired  - Assess patient's need for assistive devices and provide as appropriate  - Encourage maximum independence but intervene and supervise when necessary  - Involve family in performance of ADLs  - Assess for home care needs following discharge   - Consider OT consult to assist with ADL evaluation and planning for discharge  - Provide patient education as appropriate  Outcome: Progressing  Goal: Maintains/Returns to pre admission functional level  Description: INTERVENTIONS:  - Perform BMAT or MOVE assessment daily.   - Set and communicate daily mobility goal to care team and patient/family/caregiver. - Collaborate with rehabilitation services on mobility goals if consulted  - Perform Range of Motion 3 times a day. - Reposition patient every 2 hours.   - Dangle patient 2 times a day  - Stand patient 2 times a day  - Ambulate patient 2 times a day  - Out of bed to chair 2 times a day   - Out of bed for meals 2 times a day  - Out of bed for toileting  - Record patient progress and toleration of activity level   Outcome: Progressing     Problem: DISCHARGE PLANNING  Goal: Discharge to home or other facility with appropriate resources  Description: INTERVENTIONS:  - Identify barriers to discharge w/patient and caregiver  - Arrange for needed discharge resources and transportation as appropriate  - Identify discharge learning needs (meds, wound care, etc.)  - Arrange for interpretive services to assist at discharge as needed  - Refer to Case Management Department for coordinating discharge planning if the patient needs post-hospital services based on physician/advanced practitioner order or complex needs related to functional status, cognitive ability, or social support system  Outcome: Progressing     Problem: Knowledge Deficit  Goal: Patient/family/caregiver demonstrates understanding of disease process, treatment plan, medications, and discharge instructions  Description: Complete learning assessment and assess knowledge base. Interventions:  - Provide teaching at level of understanding  - Provide teaching via preferred learning methods  Outcome: Progressing     Problem: MOBILITY - ADULT  Goal: Maintain or return to baseline ADL function  Description: INTERVENTIONS:  -  Assess patient's ability to carry out ADLs; assess patient's baseline for ADL function and identify physical deficits which impact ability to perform ADLs (bathing, care of mouth/teeth, toileting, grooming, dressing, etc.)  - Assess/evaluate cause of self-care deficits   - Assess range of motion  - Assess patient's mobility; develop plan if impaired  - Assess patient's need for assistive devices and provide as appropriate  - Encourage maximum independence but intervene and supervise when necessary  - Involve family in performance of ADLs  - Assess for home care needs following discharge   - Consider OT consult to assist with ADL evaluation and planning for discharge  - Provide patient education as appropriate  Outcome: Progressing  Goal: Maintains/Returns to pre admission functional level  Description: INTERVENTIONS:  - Perform BMAT or MOVE assessment daily.   - Set and communicate daily mobility goal to care team and patient/family/caregiver.    - Collaborate with rehabilitation services on mobility goals if consulted  - Perform Range of Motion 3 times a day. - Reposition patient every 2 hours. - Dangle patient 2 times a day  - Stand patient 2 times a day  - Ambulate patient 2 times a day  - Out of bed to chair 2 times a day   - Out of bed for meals 2 times a day  - Out of bed for toileting  - Record patient progress and toleration of activity level   Outcome: Progressing     Problem: Prexisting or High Potential for Compromised Skin Integrity  Goal: Skin integrity is maintained or improved  Description: INTERVENTIONS:  - Identify patients at risk for skin breakdown  - Assess and monitor skin integrity  - Assess and monitor nutrition and hydration status  - Monitor labs   - Assess for incontinence   - Turn and reposition patient  - Assist with mobility/ambulation  - Relieve pressure over bony prominences  - Avoid friction and shearing  - Provide appropriate hygiene as needed including keeping skin clean and dry  - Evaluate need for skin moisturizer/barrier cream  - Collaborate with interdisciplinary team   - Patient/family teaching  - Consider wound care consult   Outcome: Progressing     Problem: Nutrition/Hydration-ADULT  Goal: Nutrient/Hydration intake appropriate for improving, restoring or maintaining nutritional needs  Description: Monitor and assess patient's nutrition/hydration status for malnutrition. Collaborate with interdisciplinary team and initiate plan and interventions as ordered. Monitor patient's weight and dietary intake as ordered or per policy. Utilize nutrition screening tool and intervene as necessary. Determine patient's food preferences and provide high-protein, high-caloric foods as appropriate.      INTERVENTIONS:  - Monitor oral intake, urinary output, labs, and treatment plans  - Assess nutrition and hydration status and recommend course of action  - Evaluate amount of meals eaten  - Assist patient with eating if necessary   - Allow adequate time for meals  - Recommend/ encourage appropriate diets, oral nutritional supplements, and vitamin/mineral supplements  - Order, calculate, and assess calorie counts as needed  - Recommend, monitor, and adjust tube feedings and TPN/PPN based on assessed needs  - Assess need for intravenous fluids  - Provide specific nutrition/hydration education as appropriate  - Include patient/family/caregiver in decisions related to nutrition  Outcome: Progressing

## 2023-09-04 NOTE — ASSESSMENT & PLAN NOTE
Patient is confused likely acute metabolic encephalopathy in setting of UTI versus septic  bursitis  Frequent reorientation  Trend WBC and fever curve  Follow up on Cx  Neurochecks  On IV antibiotics

## 2023-09-04 NOTE — PLAN OF CARE
Problem: PAIN - ADULT  Goal: Verbalizes/displays adequate comfort level or baseline comfort level  Description: Interventions:  - Encourage patient to monitor pain and request assistance  - Assess pain using appropriate pain scale  - Administer analgesics based on type and severity of pain and evaluate response  - Implement non-pharmacological measures as appropriate and evaluate response  - Consider cultural and social influences on pain and pain management  - Notify physician/advanced practitioner if interventions unsuccessful or patient reports new pain  Outcome: Progressing     Problem: INFECTION - ADULT  Goal: Absence or prevention of progression during hospitalization  Description: INTERVENTIONS:  - Assess and monitor for signs and symptoms of infection  - Monitor lab/diagnostic results  - Monitor all insertion sites, i.e. indwelling lines, tubes, and drains  - Monitor endotracheal if appropriate and nasal secretions for changes in amount and color  - [place] appropriate cooling/warming therapies per order  - Administer medications as ordered  - Instruct and encourage patient and family to use good hand hygiene technique  - Identify and instruct in appropriate isolation precautions for identified infection/condition  Outcome: Progressing  Goal: Absence of fever/infection during neutropenic period  Description: INTERVENTIONS:  - Monitor WBC    Outcome: Progressing

## 2023-09-04 NOTE — PROGRESS NOTES
NEPHROLOGY PROGRESS NOTE   Luis E Herrera 82 y.o. male MRN: [id number]  Unit/Bed#: -01 Encounter: [id number]  Reason for Consult: TOBI on CKD IIIB    ASSESSMENT/PLAN:  Acute kidney injury on CKD IIIB: Suspect prerenal due to relative hypotension as well as sepsis with possible progression to ATN. -Presented with creatinine of 1.7.  -Baseline creatinine 1.5-1.7.  -Creatinine increased to 4.3.  -Initially received IV hydration without improvement, discontinued 9/1 due to edema.  -Received IV Lasix on 9/1.  -Repeat UA and urine lytes. -CT scan: Multiple left renal cyst, negative for hydro, nonspecific bilateral perinephric edema, unremarkable bladder.  -Receiving IV albumin 25 g every 6 hours x 6 doses. -Continue to hold ARB, LD 08/29.  -Checking bladder scans PVR with retention protocol.  -Recommend avoiding nephrotoxins, hypotension, IV contrast.    Hypokalemia:   -Mag level within normal range.   -Continue to montitor and replace as needed. Non-gap acidosis: in the setting of worsening renal failure.   -continue to monitor for now. Consider bicarb replacement. ESBL UTI: Presenting with confusion/encephalopathy. Lower extremity edema: Suspected component of third spacing.  -Chest x-ray and CT without evidence of volume overload. -Receiving IV albumin. Blood pressure: Remains stable and acceptable. -Current medication: Cardizem 240 mg daily, metoprolol 50 mg every 12 hours. -Recommend avoiding hypotension or high fluctuations with blood pressure. Pneumonia:  -Treating with antibiotics. Cholelithiasis:  -CT scan with cholelithiasis with possible stone impaction.  -Right upper quadrant ultrasound is pending. Considering HIDA scan. -GI and surgery team is following.  -Continues on IV antibiotics. Sigmoid colon stricture: No evidence of acute discomfort. -GI team is following, continues on bowel regimen.  -Potentially will need colonoscopy, deferring at this time.     Right elbow swelling: Status post arthrocentesis. -Orthopedic and ID team following.  -Continues on IV antibiotics. -Awaiting culture results. Change in mental status: Suspected multifactorial etiology with pneumonia, cystitis, and history of alcohol abuse.  -Further management and work-up per primary care team.    Anemia:   -Hgb trending downward.  -Continue to monitor and transfuse as needed for Hgb <7.0. Other: Recent COVID infection, atrial fibrillation on beta-blocker, alcohol abuse, COPD    Disposition: Requiring additional stay due to medical needs. SUBJECTIVE:  The patient is resting in his bed. He appears comfortable. He denies chest pain or shortness of breathe.     OBJECTIVE:  Current Weight: Weight - Scale: 91.5 kg (201 lb 11.5 oz)  Vitals:    09/03/23 1345 09/03/23 1614 09/03/23 2026 09/04/23 0722   BP:  133/68 134/72 138/70   Pulse:  105 105 (!) 116   Resp:  20  18   Temp:  98.9 °F (37.2 °C) 99 °F (37.2 °C) 99.5 °F (37.5 °C)   TempSrc:       SpO2: 91% 92% 90% 98%   Weight:       Height:           Intake/Output Summary (Last 24 hours) at 9/4/2023 7563  Last data filed at 9/3/2023 1801  Gross per 24 hour   Intake 60 ml   Output --   Net 60 ml     General: NAD  Skin: warm, dry, intact, no rash  HEENT: Moist mucous membranes, sclera anicteric, normocephalic, atraumatic  Neck: No apparent JVD appreciated  Chest: lung sounds clear B/L, on RA   CVS:Regular rate and rhythm, no murmer   Abdomen: Soft, round, non-tender, +BS  Extremities: B/L LE edema present  Neuro: alert and oriented  Psych: appropriate mood and affect     Medications:    Current Facility-Administered Medications:   •  acetaminophen (TYLENOL) tablet 650 mg, 650 mg, Oral, Q6H PRN, Valerie Boswell PA-C, 650 mg at 09/03/23 2220  •  aluminum-magnesium hydroxide-simethicone (MAALOX) oral suspension 30 mL, 30 mL, Oral, Q6H PRN, Valerie Boswell PA-C  •  aspirin (ECOTRIN LOW STRENGTH) EC tablet 81 mg, 81 mg, Oral, Daily, Valerie Boswell PA-C, 81 mg at 09/03/23 8996  •  bisacodyl (DULCOLAX) rectal suppository 10 mg, 10 mg, Rectal, Daily PRN, Noelle Klein MD, 10 mg at 09/01/23 0915  •  budesonide (PULMICORT) inhalation solution 0.5 mg, 0.5 mg, Nebulization, Q12H, Carole Barriga PA-C, 0.5 mg at 09/04/23 8539  •  diltiazem (CARDIZEM CD) 24 hr capsule 240 mg, 240 mg, Oral, Daily, Kiko Holguin PA-C, 240 mg at 09/03/23 5165  •  fluticasone (FLONASE) 50 mcg/act nasal spray 1 spray, 1 spray, Nasal, Daily, Carole Barriga PA-C, 1 spray at 00/06/97 3099  •  folic acid (FOLVITE) tablet 1 mg, 1 mg, Oral, Daily, Carole Barriga PA-C, 1 mg at 09/03/23 6991  •  formoterol (PERFOROMIST) nebulizer solution 20 mcg, 20 mcg, Nebulization, Q12H, Carole Barriga PA-C, 20 mcg at 09/04/23 8241  •  guaiFENesin (MUCINEX) 12 hr tablet 600 mg, 600 mg, Oral, BID, Carole Barriga PA-C, 600 mg at 09/03/23 1727  •  ipratropium (ATROVENT) 0.02 % inhalation solution 0.5 mg, 0.5 mg, Nebulization, TID, Noelle Klein MD, 0.5 mg at 09/04/23 5761  •  lactulose oral solution 20 g, 20 g, Oral, BID, Darcie Gowers, CRNP, 20 g at 09/03/23 2129  •  lactulose retention enema 200 g, 200 g, Rectal, BID, Darcie Gowers, CRNP, 200 g at 09/03/23 2101  •  levalbuterol (Neha Ruth) inhalation solution 1.25 mg, 1.25 mg, Nebulization, TID, Noelle Klein MD, 1.25 mg at 09/04/23 8392  •  loratadine (CLARITIN) tablet 10 mg, 10 mg, Oral, Daily, Carole Barriga PA-C, 10 mg at 09/03/23 7309  •  magnesium Oxide (MAG-OX) tablet 400 mg, 400 mg, Oral, Daily, Carole Barriga PA-C, 400 mg at 09/03/23 8918  •  metoprolol tartrate (LOPRESSOR) tablet 50 mg, 50 mg, Oral, Q12H [address] [facility], Kiko Holguin PA-C, 50 mg at 09/03/23 2101  •  ondansetron (ZOFRAN) injection 4 mg, 4 mg, Intravenous, Q6H PRN, Carole Barriga PA-C, 4 mg at 08/28/23 1748  •  oxyCODONE (ROXICODONE) IR tablet 5 mg, 5 mg, Oral, Q6H PRN, Noelle Klein MD, 5 mg at 09/03/23 0916  •  pantoprazole (PROTONIX) EC tablet 40 mg, 40 mg, Oral, Daily Before Breakfast, Sofiya Forman PA-C, 40 mg at 09/04/23 0615  •  piperacillin-tazobactam (ZOSYN) 2.25 g in sodium chloride 0.9 % 50 mL IVPB, 2.25 g, Intravenous, Q6H, Magaly Dunne MD, Last Rate: 100 mL/hr at 09/04/23 0542, 2.25 g at 09/04/23 0542  •  potassium chloride (K-DUR,KLOR-CON) CR tablet 20 mEq, 20 mEq, Oral, Once, Mendoza Santillan MD  •  saccharomyces boulardii (FLORASTOR) capsule 250 mg, 250 mg, Oral, BID, Wandy Tuttle PA-C, 250 mg at 09/03/23 1727  •  senna (SENOKOT) tablet 8.6 mg, 1 tablet, Oral, HS, Sofiya Forman PA-C, 8.6 mg at 09/03/23 2101  •  vancomycin (VANCOCIN) IVPB (premix in dextrose) 750 mg 150 mL, 10 mg/kg (Adjusted), Intravenous, Daily PRN, Magaly Dunne MD    Laboratory Results:  Results from last 7 days   Lab Units 09/04/23  0527 09/03/23  0221 09/02/23  0336 09/01/23  0334   WBC Thousand/uL 14.04* 12.50* 15.57* 15.57*   HEMOGLOBIN g/dL 7.0* 7.6* 8.0* 9.2*   HEMATOCRIT % 21.8* 23.8* 24.7* 29.0*   PLATELETS Thousands/uL 296 323 352 365   SODIUM mmol/L 140 136 134* 137   POTASSIUM mmol/L 3.3* 3.9 3.7 3.7   CHLORIDE mmol/L 107 105 105 105   CO2 mmol/L 20* 19* 21 20*   BUN mg/dL 89* 76* 68* 55*   CREATININE mg/dL 4.33* 3.63* 3.39* 2.58*   CALCIUM mg/dL 9.2 8.9 8.8 8.8   MAGNESIUM mg/dL 2.2 2.1  --  2.1   ALK PHOS U/L 69 71 94  --    ALT U/L 12 15 24  --    AST U/L 17 19 32  --

## 2023-09-04 NOTE — PROGRESS NOTES
Orthopedics   Tracy Cordova 82 y.o. male MRN: [id number]  Unit/Bed#: -01      HPI:  82 y.o.male complaining of right elbow pain. He notes less pain from yesterday. He is not able to communicate any other complaints. Past Medical History:   Past Medical History:   Diagnosis Date   • Alcohol abuse    • Alcohol withdrawal seizure without complication ([address])    • Arthritis    • Asthma    • CVA (cerebral vascular accident) ([address])    • Decubitus ulcer of buttock     last assessed 07/20/16   • Diabetes ([address])    • Disease of thyroid gland    • Duodenal ulcer    • Emphysema lung ([address])    • Esophageal varices (HCC)    • GERD (gastroesophageal reflux disease)    • History of transfusion    • Hypertension    • Irritable bowel syndrome with diarrhea    • Kidney stones    • Prostate cancer ([address])    • Urinary frequency     resolved 02/15/17       Past Surgical History:   Past Surgical History:   Procedure Laterality Date   • BACK SURGERY     • CATARACT EXTRACTION     • ESOPHAGOGASTRODUODENOSCOPY N/A 2/6/2017    Procedure: ESOPHAGOGASTRODUODENOSCOPY (EGD); Surgeon: Yoanna Broussard MD;  Location:  MAIN OR;  Service:    • IA ESOPHAGOGASTRODUODENOSCOPY TRANSORAL DIAGNOSTIC N/A 4/26/2016    Procedure: ESOPHAGOGASTRODUODENOSCOPY (EGD);   Surgeon: Ankita Phillips MD;  Location:  MAIN OR;  Service: Gastroenterology   • TONSILLECTOMY         Family History:  Family history reviewed and non-contributory  Family History   Problem Relation Age of Onset   • Heart disease Mother         cardiac disorder   • Stroke Father    • Heart disease Father         cardiac disorder   • Heart disease Sister    • Diabetes Family    • Hypertension Family        Social History:  Social History     Socioeconomic History   • Marital status: /Civil Union     Spouse name: None   • Number of children: None   • Years of education: None   • Highest education level: None   Occupational History   • None   Tobacco Use   • Smoking status: Former     Types: Cigarettes     Quit date: 1980     Years since quittin.7   • Smokeless tobacco: Never   • Tobacco comments:     quit 20 years ago   Vaping Use   • Vaping Use: Never used   Substance and Sexual Activity   • Alcohol use: Yes     Alcohol/week: 14.0 standard drinks of alcohol     Types: 14 Standard drinks or equivalent per week     Comment: 3-4 mixed drinks vodka per night/ 2L per week   • Drug use: No   • Sexual activity: Not Currently   Other Topics Concern   • None   Social History Narrative    Lives with Wife     Social Determinants of Health     Financial Resource Strain: Low Risk  (2023)    Overall Financial Resource Strain (CARDIA)    • Difficulty of Paying Living Expenses: Not very hard   Food Insecurity: No Food Insecurity (2023)    Hunger Vital Sign    • Worried About Running Out of Food in the Last Year: Never true    • Ran Out of Food in the Last Year: Never true   Transportation Needs: No Transportation Needs (2023)    PRAPARE - Transportation    • Lack of Transportation (Medical): No    • Lack of Transportation (Non-Medical): No   Physical Activity: Not on file   Stress: Not on file   Social Connections: Not on file   Intimate Partner Violence: Not At Risk (2023)    Humiliation, Afraid, Rape, and Kick questionnaire    • Fear of Current or Ex-Partner: No    • Emotionally Abused: No    • Physically Abused: No    • Sexually Abused: No   Housing Stability: Low Risk  (2023)    Housing Stability Vital Sign    • Unable to Pay for Housing in the Last Year: No    • Number of [address] in the Last Year: 1    • Unstable Housing in the Last Year: No       Allergies:    Allergies   Allergen Reactions   • Morphine And Related            Labs:  0   Lab Value Date/Time    HCT 21.8 (L) 2023 0527    HCT 23.8 (L) 2023 0221    HCT 24.7 (L) 2023 0336    HCT 40.9 2015 1248    HCT 31.5 (L) 2014 0600    HCT 37.8 2014 0618    HGB 7.0 (L) 09/04/2023 0527    HGB 7.6 (L) 09/03/2023 0221    HGB 8.0 (L) 09/02/2023 0336    HGB 14.0 12/22/2015 1248    HGB 10.0 (L) 08/28/2014 0600    HGB 12.1 08/27/2014 0618    INR 1.72 (H) 08/26/2023 2052    INR 1.06 08/26/2014 1320    WBC 14.04 (H) 09/04/2023 0527    WBC 12.50 (H) 09/03/2023 0221    WBC 15.57 (H) 09/02/2023 0336    WBC 8.80 12/22/2015 1248    WBC 5.42 08/28/2014 0600    WBC 7.12 08/27/2014 0618    ESR 76 (H) 04/14/2023 0938    CRP 6.5 (H) 01/19/2016 1257       Meds:    Current Facility-Administered Medications:   •  acetaminophen (TYLENOL) tablet 650 mg, 650 mg, Oral, Q6H PRN, Jay Reyes PA-C, 650 mg at 09/03/23 2220  •  aluminum-magnesium hydroxide-simethicone (MAALOX) oral suspension 30 mL, 30 mL, Oral, Q6H PRN, Jay Reyes PA-C  •  aspirin (ECOTRIN LOW STRENGTH) EC tablet 81 mg, 81 mg, Oral, Daily, Jay Reyes PA-C, 81 mg at 09/03/23 1974  •  bisacodyl (DULCOLAX) rectal suppository 10 mg, 10 mg, Rectal, Daily PRN, Vic Diaz MD, 10 mg at 09/01/23 0915  •  budesonide (PULMICORT) inhalation solution 0.5 mg, 0.5 mg, Nebulization, Q12H, Jay Reyes PA-C, 0.5 mg at 09/04/23 9827  •  diltiazem (CARDIZEM CD) 24 hr capsule 240 mg, 240 mg, Oral, Daily, Edgar Holguin PA-C, 240 mg at 09/03/23 2978  •  fluticasone (FLONASE) 50 mcg/act nasal spray 1 spray, 1 spray, Nasal, Daily, Jay Reyes PA-C, 1 spray at 21/59/38 3643  •  folic acid (FOLVITE) tablet 1 mg, 1 mg, Oral, Daily, Jay Reyes PA-C, 1 mg at 09/03/23 0422  •  formoterol (PERFOROMIST) nebulizer solution 20 mcg, 20 mcg, Nebulization, Q12H, Jay Reyes PA-C, 20 mcg at 09/04/23 1375  •  guaiFENesin (MUCINEX) 12 hr tablet 600 mg, 600 mg, Oral, BID, Jay Reyes PA-C, 600 mg at 09/03/23 1726  •  ipratropium (ATROVENT) 0.02 % inhalation solution 0.5 mg, 0.5 mg, Nebulization, TID, Vic Diaz MD, 0.5 mg at 09/04/23 7915  •  lactulose oral solution 20 g, 20 g, Oral, BID, Jaxon Alford, CRNP, 20 g at 09/03/23 2129  •  lactulose retention enema 200 g, 200 g, Rectal, BID, Margie Monroy, CRNP, 200 g at 09/03/23 2101  •  levalbuterol (Verlean Pitch) inhalation solution 1.25 mg, 1.25 mg, Nebulization, TID, Paz Carmona MD, 1.25 mg at 09/04/23 5051  •  loratadine (CLARITIN) tablet 10 mg, 10 mg, Oral, Daily, Joan Baptiste PA-C, 10 mg at 09/03/23 9376  •  magnesium Oxide (MAG-OX) tablet 400 mg, 400 mg, Oral, Daily, Joan Baptiste PA-C, 400 mg at 09/03/23 0660  •  metoprolol tartrate (LOPRESSOR) tablet 50 mg, 50 mg, Oral, Q12H [facility] LOLI Holguin, 50 mg at 09/03/23 2101  •  ondansetron (ZOFRAN) injection 4 mg, 4 mg, Intravenous, Q6H PRN, Joan Baptiste PA-C, 4 mg at 08/28/23 1748  •  oxyCODONE (ROXICODONE) IR tablet 5 mg, 5 mg, Oral, Q6H PRN, Paz Carmona MD, 5 mg at 09/03/23 3558  •  pantoprazole (PROTONIX) EC tablet 40 mg, 40 mg, Oral, Daily Before Breakfast, Joan Baptiste PA-C, 40 mg at 09/04/23 0615  •  piperacillin-tazobactam (ZOSYN) 2.25 g in sodium chloride 0.9 % 50 mL IVPB, 2.25 g, Intravenous, Q6H, Paz Carmona MD, Last Rate: 100 mL/hr at 09/04/23 0542, 2.25 g at 09/04/23 0542  •  potassium chloride (K-DUR,KLOR-CON) CR tablet 20 mEq, 20 mEq, Oral, Once, Farheen Borrego MD  •  saccharomyces boulardii (FLORASTOR) capsule 250 mg, 250 mg, Oral, BID, Wandy Tuttle PA-C, 250 mg at 09/03/23 1727  •  senna (SENOKOT) tablet 8.6 mg, 1 tablet, Oral, HS, Joan Baptiste PA-C, 8.6 mg at 09/03/23 2101  •  vancomycin (VANCOCIN) IVPB (premix in dextrose) 750 mg 150 mL, 10 mg/kg (Adjusted), Intravenous, Daily PRN, Paz Carmona MD    Blood Culture:   Lab Results   Component Value Date    BLOODCX Received in Microbiology Lab. Culture in Progress. 09/03/2023    BLOODCX Received in Microbiology Lab. Culture in Progress. 09/03/2023       Wound Culture:   No results found for: "WOUNDCULT"    Ins and Outs:  I/O last 24 hours:   In: 61 [P.O.:60]  Out: 240 [Urine:240]          Physical Exam:   /70   Pulse (!) 116   Temp 99.5 °F (37.5 °C)   Resp 18   Ht 5' 8" (1.727 m)   Wt 91.5 kg (201 lb 11.5 oz)   SpO2 98%   BMI 30.67 kg/m²     Musculoskeletal: right upper extremity  · Skin no active drainage  · No erythema over olecrenon bursa  · Full active & passive ROM at elbow  · SILT m/r/u.   · Motor intact ain/pin/m/r/u,   · 2+ rad pulse      Assessment:  88 y.o.male R olecrenon bursitis s/p bedside I and D    Plan:   · abx per primary team  · olecrenon bursa improved with less redness and swelling  · F/u cultures  · Doubt further intervention needed, but will reassess tomorrow    Tony Andrade DO

## 2023-09-04 NOTE — ASSESSMENT & PLAN NOTE
Lab Results   Component Value Date    EGFR 11 09/04/2023    EGFR 14 09/03/2023    EGFR 15 09/02/2023    CREATININE 4.33 (H) 09/04/2023    CREATININE 3.63 (H) 09/03/2023    CREATININE 3.39 (H) 09/02/2023   · Baseline creatinine 1.6-1.9, POA- 1.7  · Likely secondary to combination of meds, decreased iv volume, possible ATN with sepsis  · Nephrology following  · Urinary retention protocol  · I/O monitoring

## 2023-09-04 NOTE — OCCUPATIONAL THERAPY NOTE
Occupational Therapy Treatment Note    Patient Name: Gordy Price  CYGSB'M Date: 9/4/2023 09/04/23 1315   OT Last Visit   OT Visit Date 09/04/23   Note Type   Note Type Treatment   Pain Assessment   Pain Assessment Tool FLACC   Effect of Pain on Daily Activities Pain with movement   Pain Rating: FLACC (Rest) - Face 0   Pain Rating: FLACC (Rest) - Legs 0   Pain Rating: FLACC (Rest) - Activity 0   Pain Rating: FLACC (Rest) - Cry 0   Pain Rating: FLACC (Rest) - Consolability 0   Score: FLACC (Rest) 0   Pain Rating: FLACC (Activity) - Face 2   Pain Rating: FLACC (Activity) - Legs 2   Pain Rating: FLACC (Activity) - Activity 2   Pain Rating: FLACC (Activity) - Cry 2   Pain Rating: FLACC (Activity) - Consolability 1   Score: FLACC (Activity) 9   Restrictions/Precautions   Weight Bearing Precautions Per Order No   Other Precautions Contact/isolation;Cognitive; Chair Alarm; Bed Alarm;Multiple lines; Fall Risk;Pain;Visual impairment   ADL   Eating Assistance 2  Maximal Assistance   Eating Deficit Beverage management   Bed Mobility   Additional Comments Dx1 to reposition shoulders in bed. Therapeutic Exercise - ROM   UE-ROM Yes   ROM- Right Upper Extremities   R Shoulder PROM   R Elbow PROM   R Wrist PROM   R Hand PROM   R Position Supine   RUE ROM Comment Limited 2* pain. Positioned comfortably supported on pillow at end of session. ROM - Left Upper Extremities    L Shoulder PROM   L Elbow PROM   L Wrist PROM   L Hand PROM   L Position Supine   LUE ROM Comment Limited 2* pain. Positioned comfortably supported on pillow at end of session. Activity Tolerance   Activity Tolerance Patient limited by pain   Assessment   Assessment Patient participated in Skilled OT session this date with interventions consisting of  bed mobility* and therapeutic exercise to: increase functional use of BUEs, increase BUE muscle strength  . Patient agreeable to OT treatment session, upon arrival patient was found supine in bed. Treatment session as follows: Pt repositioned in bed 2* L lateral lean. D to reposition shoulders into functional seated position. PROM performed to B/L UE's; limited 2* pt with high pain. Pt would benefit from coordination with PT for next session to increase performance with bed mobility and transfers. Patient continues to be functioning below baseline level, occupational performance remains limited secondary to factors listed above and increased risk for falls and injury. The patient's raw score on the AM-PAC Daily Activity inpatient short form is 11, standardized score is 29.04, less than 39.4. Patients at this level are likely to benefit from DC to post-acute rehabilitation services. From OT standpoint, recommendation at time of d/c would be level 2. Patient to benefit from continued Occupational Therapy treatment while in the hospital to address deficits as defined above and maximize level of functional independence with ADLs and functional mobility. . Pt left with call bell in reach, tray table in reach, needs met, bed alarm activated.    Plan   OT Treatment Day 1   Recommendation   UB Rehab Discharge Recommendation (PT/OT) Level 2   AM-PAC Daily Activity Inpatient   Lower Body Dressing 1   Bathing 2   Toileting 1   Upper Body Dressing 2   Grooming 2   Eating 3   Daily Activity Raw Score 11   Daily Activity Standardized Score (Calc for Raw Score >=11) 29.04   AM-PAC Applied Cognition Inpatient   Following a Speech/Presentation 3   Understanding Ordinary Conversation 3   Taking Medications 2   Remembering Where Things Are Placed or Put Away 2   Remembering List of 4-5 Errands 2   Taking Care of Complicated Tasks 2   Applied Cognition Raw Score 14   Applied Cognition Standardized Score 32.02     [employer], MS, OTR/L

## 2023-09-04 NOTE — ASSESSMENT & PLAN NOTE
· Seen by ophthalmology during recent [facility] admission, diagnosed with CRVO and ocular hypertension  · Patient recommended for formal evaluation outpatient of OCT macular degeneration, discussion of possible anti-VEGF therapy  · Encouraged ophthalmology follow-up outpatient

## 2023-09-04 NOTE — PROGRESS NOTES
[facility]  Progress Note  Name: Nam Banks  MRN: [id number]  Unit/Bed#: -01 I Date of Admission: 8/26/2023   Date of Service: 9/4/2023 I Hospital Day: 9    Assessment/Plan   Sepsis [facility])  Assessment & Plan  Meets criteria with leukocytosis, fever  Likely secondary to ESBL UTI/cholecystitis/Septic olecranon bursitis  F/u Blood Cx, wound Cx  F/u vitals  F/u CBC, CMP and procal  Abx as per ID recommendations- currently on Vanc and Zosyn- D2, total day of Abx-10      Acute encephalopathy  Assessment & Plan  Patient is confused likely acute metabolic encephalopathy in setting of UTI versus septic  bursitis  Frequent reorientation  Trend WBC and fever curve  Follow up on Cx  Neurochecks  On IV antibiotics    TOBI (acute kidney injury) [facility])  Assessment & Plan  Lab Results   Component Value Date    EGFR 11 09/04/2023    EGFR 14 09/03/2023    EGFR 15 09/02/2023    CREATININE 4.33 (H) 09/04/2023    CREATININE 3.63 (H) 09/03/2023    CREATININE 3.39 (H) 09/02/2023   · Baseline creatinine 1.6-1.9, POA- 1.7  · Likely secondary to combination of meds, decreased iv volume, possible ATN with sepsis  · Nephrology following  · Urinary retention protocol  · I/O monitoring        ESBL (extended spectrum beta-lactamase) producing bacteria infection  Assessment & Plan  Urine CX from 8/27- grew ESBL  Abx as per ID  Currently on Zosyn, previously on ertapenem D6 of Abx      Olecranon bursitis  Assessment & Plan   Zosyn and vancomycin- D2   Wound culture ordered  Repeat blood cultures ordered  ID following    Abnormal computed tomography angiography (CTA) of abdomen and pelvis  Assessment & Plan  CT abdomen pelvis showed-  Cholelithiasis with possible impacted stone at the neck, gallbladder wall thickening and/or pericholecystic edema and sigmoid colon stricturing. RUQ US pending. Called radiology for report.  NPO for now  F/u LFT  Continue PO lactulose as per GI  Serial abdominal exams      Central retinal vein occlusion of right eye  Assessment & Plan  · Seen by ophthalmology during recent [facility] admission, diagnosed with CRVO and ocular hypertension  · Patient recommended for formal evaluation outpatient of OCT macular degeneration, discussion of possible anti-VEGF therapy  · Encouraged ophthalmology follow-up outpatient    Chronic obstructive pulmonary disease, unspecified COPD type ([address])  Assessment & Plan  · Not on maintenance inhalers outpatient  · No signs of acute exacerbation on admission  · If patient were to develop wheezing in setting of pneumonia, would start Xopenex/Atrovent nebulizers    Atrial fibrillation ([address])  Assessment & Plan  · Went into A-fib with RVR on August 28 and was given IV Lopressor and Cardizem  · Home regimen: Coreg 25 Mg twice daily  · Started on Eliquis 2.5 mg twice daily for anticoagulation during hospitalization at [facility] 8/8-8/23  · Cardiology consult appreciated  · Cardiology switched patient to Lopressor 50 mg every 12 hours and Cardizem  mg daily  · Continue home medications    Anemia of chronic disease  Assessment & Plan  · Hemoglobin 9.4 on admission  · Hemoglobin ranging 10-12 during recent [facility] admission  · No signs of active bleeding  · Trend CBC  · Hgb 7 today, Will obtain consent for transfusion  · Eliquis on hold for possible procedure             VTE Pharmacologic Prophylaxis: VTE Score: 5 High Risk (Score >/= 5) - Pharmacological DVT Prophylaxis Ordered: apixaban (Eliquis). Sequential Compression Devices Ordered. Patient Centered Rounds: I performed bedside rounds with nursing staff today. Discussions with Specialists or Other Care Team Provider: ID, Nephro    Education and Discussions with Family / Patient: Updated  (wife) via phone.     Total Time Spent on Date of Encounter in care of patient: 45 minutes This time was spent on one or more of the following: performing physical exam; counseling and coordination of care; obtaining or reviewing history; documenting in the medical record; reviewing/ordering tests, medications or procedures; communicating with other healthcare professionals and discussing with patient's family/caregivers. Current Length of Stay: 9 day(s)  Current Patient Status: Inpatient   Certification Statement: The patient will continue to require additional inpatient hospital stay due to sepsis  Discharge Plan: Anticipate discharge in 48-72 hrs to pending PT eval    Code Status: Level 3 - DNAR and DNI    Subjective:   Waxing and waning mental status, states his whole body hurts. Objective:     Vitals:   Temp (24hrs), Av.1 °F (37.3 °C), Min:98.9 °F (37.2 °C), Max:99.5 °F (37.5 °C)    Temp:  [98.9 °F (37.2 °C)-99.5 °F (37.5 °C)] 99.5 °F (37.5 °C)  HR:  [105-116] 116  Resp:  [18-20] 18  BP: (133-138)/(68-72) 138/70  SpO2:  [90 %-98 %] 98 %  Body mass index is 30.67 kg/m². Input and Output Summary (last 24 hours): Intake/Output Summary (Last 24 hours) at 2023 1007  Last data filed at 9/3/2023 1801  Gross per 24 hour   Intake 60 ml   Output --   Net 60 ml       Physical Exam:   Physical Exam  Vitals reviewed. HENT:      Head: Normocephalic and atraumatic. Mouth/Throat:      Mouth: Mucous membranes are dry. Pharynx: Oropharynx is clear. Eyes:      Conjunctiva/sclera: Conjunctivae normal.   Cardiovascular:      Rate and Rhythm: Tachycardia present. Pulses: Normal pulses. Pulmonary:      Effort: Pulmonary effort is normal.   Abdominal:      General: There is distension. Palpations: Abdomen is soft. Tenderness: There is abdominal tenderness (mild diffuse). There is no guarding or rebound. Musculoskeletal:      Right lower leg: Edema present. Left lower leg: Edema present. Comments: Limited ROM- not able to lift arms  Bilateral upper limbs edematous   Skin:     General: Skin is warm and dry.       Capillary Refill: Capillary refill takes 2 to 3 seconds. Neurological:      Mental Status: He is alert. He is disoriented. Additional Data:     Labs:  Results from last 7 days   Lab Units 09/04/23  0527   WBC Thousand/uL 14.04*   HEMOGLOBIN g/dL 7.0*   HEMATOCRIT % 21.8*   PLATELETS Thousands/uL 296   NEUTROS PCT % 79*   LYMPHS PCT % 7*   MONOS PCT % 11   EOS PCT % 1     Results from last 7 days   Lab Units 09/04/23  0527   SODIUM mmol/L 140   POTASSIUM mmol/L 3.3*   CHLORIDE mmol/L 107   CO2 mmol/L 20*   BUN mg/dL 89*   CREATININE mg/dL 4.33*   ANION GAP mmol/L 13   CALCIUM mg/dL 9.2   ALBUMIN g/dL 3.5   TOTAL BILIRUBIN mg/dL 0.90   ALK PHOS U/L 69   ALT U/L 12   AST U/L 17   GLUCOSE RANDOM mg/dL 157*                 Results from last 7 days   Lab Units 09/04/23  0527 09/03/23  0221 09/02/23  0336 08/30/23  0436 08/29/23  0421   PROCALCITONIN ng/ml 2.93* 2.83* 3.11* 0.75* 0.69*       Lines/Drains:  Invasive Devices     Peripheral Intravenous Line  Duration           Peripheral IV 09/01/23 Dorsal (posterior); Left Forearm 2 days                      Imaging: Reviewed radiology reports from this admission including: chest CT scan and abdominal/pelvic CT    Recent Cultures (last 7 days):   Results from last 7 days   Lab Units 09/03/23  1141 09/03/23  1133 09/01/23  1429   BLOOD CULTURE  Received in Microbiology Lab. Culture in Progress. Received in Microbiology Lab. Culture in Progress. --  No Growth at 48 hrs. No Growth at 48 hrs.    GRAM STAIN RESULT   --  Rare Polys  No bacteria seen  --        Last 24 Hours Medication List:   Current Facility-Administered Medications   Medication Dose Route Frequency Provider Last Rate   • acetaminophen  650 mg Oral Q6H PRN Radha Saldaña PA-C     • aluminum-magnesium hydroxide-simethicone  30 mL Oral Q6H PRN Radha Saldaña PA-C     • aspirin  81 mg Oral Daily Radha Saldaña PA-C     • bisacodyl  10 mg Rectal Daily PRN Gonzalez Daniel MD     • budesonide  0.5 mg Nebulization Q12H Phuc Mendoza PA-C     • diltiazem  240 mg Oral Daily Luzmaria Holguin PA-C     • fluticasone  1 spray Nasal Daily Phuc Mendoza PA-C     • folic acid  1 mg Oral Daily Phuc Mendoza PA-C     • formoterol  20 mcg Nebulization Q12H Phuc Mendoza PA-C     • guaiFENesin  600 mg Oral BID Phuc Mendoza PA-C     • ipratropium  0.5 mg Nebulization TID Rosalinda Aiken MD     • lactulose  20 g Oral BID Paul Flight, CRNP     • lactulose  200 g Rectal BID Paul Flight, CRNP     • levalbuterol  1.25 mg Nebulization TID Rosalinda Aiken MD     • loratadine  10 mg Oral Daily Phuc Mendoza PA-C     • magnesium Oxide  400 mg Oral Daily Phuc Mendoza PA-C     • metoprolol tartrate  50 mg Oral Q12H [address] [facility] LOLI Holguin     • ondansetron  4 mg Intravenous Q6H PRN Phuc Mendoza PA-C     • oxyCODONE  5 mg Oral Q6H PRN Rosalinda Aiken MD     • pantoprazole  40 mg Oral Daily Before Breakfast Phuc Mendoza PA-C     • piperacillin-tazobactam  2.25 g Intravenous Q6H Rosalinda Aiken MD 2.25 g (09/04/23 0542)   • potassium chloride  20 mEq Oral Once Jose Masters MD     • saccharomyces boulardii  250 mg Oral BID Saul Atwood PA-C     • senna  1 tablet Oral HS Phuc Mendoza PA-C     • vancomycin  10 mg/kg (Adjusted) Intravenous Daily PRN Rosalinda Aiken MD          Today, Patient Was Seen By: Jose Masters MD    **Please Note: This note may have been constructed using a voice recognition system. **

## 2023-09-04 NOTE — PROGRESS NOTES
Progress Note - General Surgery   Thomas Chamberlain 82 y.o. male MRN: [id number]  Unit/Bed#: -01 Encounter: [id number]    Assessment/Plan:    Distended abdomen, abnormal CT findings  -Continues to have distended abdomen. Tender lower abdomen. No RUQ tenderness. Patient not complaining of pain but encephalopathic.  -Had diet yesterday without nausea/vomiting, or pain  -+BM and flatus  -Pending RUQ US results  -LFTs normal  -Currently on Zosyn  -patient not a good surgical candidate at this time and not clinically acute cholecystitis. Await RUQ US results. -Trend labs, vitals. Serial abdominal exams. Sepsis  -Leukocytosis, tachycardia, worsening TOBI on CKD   -Presumed source was UTI. Also had olecranon bursitis s/p I&D by ortho. Cultures pending.  -Cr, procal, WBC all slightly worse today  -Temp 99  -On antibiotics  -Blood cultures 9/1- no growth after 48 hrs. Repeat pending.  -Continue to trend vitals, labs    Acute encephalopathy  -Patient with metabolic encephalopathy in setting of UTI vs septic bursitis  -Improvement today per GI  -trend labs, vitals  -Continue antibiotics, IV fluids  -ID on board    Cystitis  -UA innumerable WBC and bacteria  -Culture grew ESBL E coli  -Retention protocol  -trend labs, vitals  -Continue antibiotics    ETOH abuse  -Hx of withdrawal seizures  -last hospitalization 8/8, no ETOH since per chart    TOBI on CKD  -Cr 4.33 today, increased from 3.63  -Baseline 1.6-1.9  -Retention protocol  -Patient was on lasix  -Nephrology consulted- ordered albumin  -Trend labs        Subjective/Objective   Chief Complaint: none    Subjective: Patient offers little complaints. Per GI provider encephalopathy seems slightly improved today. Patient denies abdominal pain or nausea. Objective:     Blood pressure 138/70, pulse (!) 116, temperature 99.5 °F (37.5 °C), resp. rate 18, height 5' 8" (1.727 m), weight 91.5 kg (201 lb 11.5 oz), SpO2 98 %. ,Body mass index is 30.67 kg/m².       Intake/Output Summary (Last 24 hours) at 9/4/2023 0955  Last data filed at 9/3/2023 1801  Gross per 24 hour   Intake 60 ml   Output --   Net 60 ml       Invasive Devices     Peripheral Intravenous Line  Duration           Peripheral IV 09/01/23 Dorsal (posterior); Left Forearm 2 days                Physical Exam: General appearance: alert and disoriented. no distress. Head: Normocephalic, without obvious abnormality, atraumatic  Lungs: clear to auscultation bilaterally  Heart: regular rate and rhythm, S1, S2 normal, no murmur, click, rub or gallop  Abdomen: soft, distended, tender lower abdomen, +bowel sounds    Lab, Imaging and other studies:  I have personally reviewed pertinent lab results.   , CBC:   Lab Results   Component Value Date    WBC 14.04 (H) 09/04/2023    HGB 7.0 (L) 09/04/2023    HCT 21.8 (L) 09/04/2023    MCV 97 09/04/2023     09/04/2023    RBC 2.24 (L) 09/04/2023    MCH 31.3 09/04/2023    MCHC 32.1 09/04/2023    RDW 14.1 09/04/2023    MPV 9.8 09/04/2023    NRBC 0 09/04/2023   , CMP:   Lab Results   Component Value Date    SODIUM 140 09/04/2023    K 3.3 (L) 09/04/2023     09/04/2023    CO2 20 (L) 09/04/2023    BUN 89 (H) 09/04/2023    CREATININE 4.33 (H) 09/04/2023    CALCIUM 9.2 09/04/2023    AST 17 09/04/2023    ALT 12 09/04/2023    ALKPHOS 69 09/04/2023    EGFR 11 09/04/2023   , Urinalysis: No results found for: "COLORU", "CLARITYU", "SPECGRAV", "PHUR", "LEUKOCYTESUR", "NITRITE", "PROTEINUA", "GLUCOSEU", "Steffi Sharps", "Berneda Eliud", "BLOODU"  VTE Pharmacologic Prophylaxis: Reason for no pharmacologic prophylaxis patient on Eliquis  VTE Mechanical Prophylaxis: sequential compression device

## 2023-09-04 NOTE — ASSESSMENT & PLAN NOTE
Meets criteria with leukocytosis, fever  Likely secondary to ESBL UTI/cholecystitis/Septic olecranon bursitis  F/u Blood Cx, wound Cx  F/u vitals  F/u CBC, CMP and procal  Abx as per ID recommendations- currently on Vanc and Zosyn- D2, total day of Abx-10

## 2023-09-04 NOTE — PLAN OF CARE
Problem: OCCUPATIONAL THERAPY ADULT  Goal: Performs self-care activities at highest level of function for planned discharge setting. See evaluation for individualized goals. Description:   Outcome: Progressing  Note: Limitation: Decreased ADL status, Decreased UE strength, Decreased UE ROM, Decreased endurance, Decreased cognition, Decreased self-care trans, Decreased high-level ADLs  Prognosis: Poor  Assessment: Patient participated in Skilled OT session this date with interventions consisting of  bed mobility* and therapeutic exercise to: increase functional use of BUEs, increase BUE muscle strength  . Patient agreeable to OT treatment session, upon arrival patient was found supine in bed. Treatment session as follows: Pt repositioned in bed 2* L lateral lean. D to reposition shoulders into functional seated position. PROM performed to B/L UE's; limited 2* pt with high pain. Pt would benefit from coordination with PT for next session to increase performance with bed mobility and transfers. Patient continues to be functioning below baseline level, occupational performance remains limited secondary to factors listed above and increased risk for falls and injury. The patient's raw score on the -PAC Daily Activity inpatient short form is 11, standardized score is 29.04, less than 39.4. Patients at this level are likely to benefit from DC to post-acute rehabilitation services. From OT standpoint, recommendation at time of d/c would be level 2. Patient to benefit from continued Occupational Therapy treatment while in the hospital to address deficits as defined above and maximize level of functional independence with ADLs and functional mobility. . Pt left with call bell in reach, tray table in reach, needs met, bed alarm activated.

## 2023-09-04 NOTE — ASSESSMENT & PLAN NOTE
· Hemoglobin 9.4 on admission  · Hemoglobin ranging 10-12 during recent [facility] admission  · No signs of active bleeding  · Trend CBC  · Hgb 7 today, Will obtain consent for transfusion  · Eliquis on hold for possible procedure

## 2023-09-04 NOTE — ASSESSMENT & PLAN NOTE
· Went into A-fib with RVR on August 28 and was given IV Lopressor and Cardizem  · Home regimen: Coreg 25 Mg twice daily  · Started on Eliquis 2.5 mg twice daily for anticoagulation during hospitalization at [facility] 8/8-8/23  · Cardiology consult appreciated  · Cardiology switched patient to Lopressor 50 mg every 12 hours and Cardizem  mg daily  · Continue home medications

## 2023-09-04 NOTE — PLAN OF CARE
Problem: Potential for Falls  Goal: Patient will remain free of falls  Description: INTERVENTIONS:  - Educate patient/family on patient safety including physical limitations  - Instruct patient to call for assistance with activity   - Consult OT/PT to assist with strengthening/mobility   - Keep Call bell within reach  - Keep bed low and locked with side rails adjusted as appropriate  - Keep care items and personal belongings within reach  - Initiate and maintain comfort rounds  - Make Fall Risk Sign visible to staff  - Offer Toileting every 2 Hours, in advance of need  - Initiate/Maintain bed alarm  - Obtain necessary fall risk management equipment:   - Apply yellow socks and bracelet for high fall risk patients  - Consider moving patient to room near nurses station  9/4/2023 1249 by Alex Madison RN  Outcome: Progressing  9/4/2023 1249 by Alex Madison RN  Outcome: Progressing  9/4/2023 1244 by Alex Madison RN  Outcome: Progressing     Problem: PAIN - ADULT  Goal: Verbalizes/displays adequate comfort level or baseline comfort level  Description: Interventions:  - Encourage patient to monitor pain and request assistance  - Assess pain using appropriate pain scale  - Administer analgesics based on type and severity of pain and evaluate response  - Implement non-pharmacological measures as appropriate and evaluate response  - Consider cultural and social influences on pain and pain management  - Notify physician/advanced practitioner if interventions unsuccessful or patient reports new pain  9/4/2023 1249 by Alex Madison RN  Outcome: Not Progressing  9/4/2023 1249 by Alex Madison RN  Outcome: Progressing  9/4/2023 1244 by Alex Madison RN  Outcome: Progressing     Problem: INFECTION - ADULT  Goal: Absence or prevention of progression during hospitalization  Description: INTERVENTIONS:  - Assess and monitor for signs and symptoms of infection  - Monitor lab/diagnostic results  - Monitor all insertion sites, i.e. indwelling lines, tubes, and drains  - Monitor endotracheal if appropriate and nasal secretions for changes in amount and color  - [place] appropriate cooling/warming therapies per order  - Administer medications as ordered  - Instruct and encourage patient and family to use good hand hygiene technique  - Identify and instruct in appropriate isolation precautions for identified infection/condition  9/4/2023 1249 by Shaye Sunshine RN  Outcome: Not Progressing  9/4/2023 1249 by Shaye Sunshine RN  Outcome: Progressing  9/4/2023 1244 by Shaye Sunshine RN  Outcome: Progressing  Goal: Absence of fever/infection during neutropenic period  Description: INTERVENTIONS:  - Monitor WBC    9/4/2023 1249 by Shaye Sunshine RN  Outcome: Not Progressing  9/4/2023 1249 by Shaye Sunshine RN  Outcome: Progressing  9/4/2023 1244 by Shaye Sunshine RN  Outcome: Progressing     Problem: SAFETY ADULT  Goal: Maintain or return to baseline ADL function  Description: INTERVENTIONS:  -  Assess patient's ability to carry out ADLs; assess patient's baseline for ADL function and identify physical deficits which impact ability to perform ADLs (bathing, care of mouth/teeth, toileting, grooming, dressing, etc.)  - Assess/evaluate cause of self-care deficits   - Assess range of motion  - Assess patient's mobility; develop plan if impaired  - Assess patient's need for assistive devices and provide as appropriate  - Encourage maximum independence but intervene and supervise when necessary  - Involve family in performance of ADLs  - Assess for home care needs following discharge   - Consider OT consult to assist with ADL evaluation and planning for discharge  - Provide patient education as appropriate  9/4/2023 1249 by Shaye Sunshine RN  Outcome: Not Progressing  9/4/2023 1249 by Shaye Sunshine RN  Outcome: Progressing  9/4/2023 1244 by Shaye Sunshine RN  Outcome: Progressing  Goal: Maintains/Returns to pre admission functional level  Description: INTERVENTIONS:  - Perform BMAT or MOVE assessment daily.   - Set and communicate daily mobility goal to care team and patient/family/caregiver. - Collaborate with rehabilitation services on mobility goals if consulted  - Perform Range of Motion 3 times a day. - Reposition patient every 2 hours. - Dangle patient 2 times a day  - Stand patient 2 times a day  - Ambulate patient 2 times a day  - Out of bed to chair 2 times a day   - Out of bed for meals 2 times a day  - Out of bed for toileting  - Record patient progress and toleration of activity level   9/4/2023 1249 by Idella Lanes, RN  Outcome: Not Progressing  9/4/2023 1249 by Idella Lanes, RN  Outcome: Progressing  9/4/2023 1244 by Idella Lanes, RN  Outcome: Progressing     Problem: DISCHARGE PLANNING  Goal: Discharge to home or other facility with appropriate resources  Description: INTERVENTIONS:  - Identify barriers to discharge w/patient and caregiver  - Arrange for needed discharge resources and transportation as appropriate  - Identify discharge learning needs (meds, wound care, etc.)  - Arrange for interpretive services to assist at discharge as needed  - Refer to Case Management Department for coordinating discharge planning if the patient needs post-hospital services based on physician/advanced practitioner order or complex needs related to functional status, cognitive ability, or social support system  9/4/2023 1249 by Idella Lanes, RN  Outcome: Progressing  9/4/2023 1249 by Idella Lanes, RN  Outcome: Progressing  9/4/2023 1244 by Idella Lanes, RN  Outcome: Progressing     Problem: Knowledge Deficit  Goal: Patient/family/caregiver demonstrates understanding of disease process, treatment plan, medications, and discharge instructions  Description: Complete learning assessment and assess knowledge base.   Interventions:  - Provide teaching at level of understanding  - Provide teaching via preferred learning methods  9/4/2023 1249 by Caitlin Mora RN  Outcome: Not Progressing  9/4/2023 1249 by Caitlin Mora RN  Outcome: Progressing  9/4/2023 1244 by Caitlin Mora RN  Outcome: Progressing     Problem: Prexisting or High Potential for Compromised Skin Integrity  Goal: Skin integrity is maintained or improved  Description: INTERVENTIONS:  - Identify patients at risk for skin breakdown  - Assess and monitor skin integrity  - Assess and monitor nutrition and hydration status  - Monitor labs   - Assess for incontinence   - Turn and reposition patient  - Assist with mobility/ambulation  - Relieve pressure over bony prominences  - Avoid friction and shearing  - Provide appropriate hygiene as needed including keeping skin clean and dry  - Evaluate need for skin moisturizer/barrier cream  - Collaborate with interdisciplinary team   - Patient/family teaching  - Consider wound care consult   9/4/2023 1249 by Caitlin Mora RN  Outcome: Not Progressing  9/4/2023 1249 by Caitlin Mora RN  Outcome: Progressing  9/4/2023 1244 by Caitlin Mora RN  Outcome: Progressing     Problem: MOBILITY - ADULT  Goal: Maintain or return to baseline ADL function  Description: INTERVENTIONS:  -  Assess patient's ability to carry out ADLs; assess patient's baseline for ADL function and identify physical deficits which impact ability to perform ADLs (bathing, care of mouth/teeth, toileting, grooming, dressing, etc.)  - Assess/evaluate cause of self-care deficits   - Assess range of motion  - Assess patient's mobility; develop plan if impaired  - Assess patient's need for assistive devices and provide as appropriate  - Encourage maximum independence but intervene and supervise when necessary  - Involve family in performance of ADLs  - Assess for home care needs following discharge   - Consider OT consult to assist with ADL evaluation and planning for discharge  - Provide patient education as appropriate  9/4/2023 1249 by Caitlin Mora RN  Outcome: Not Progressing  9/4/2023 1249 by Lucy Davila RN  Outcome: Progressing  9/4/2023 1244 by Lucy Davila RN  Outcome: Progressing  Goal: Maintains/Returns to pre admission functional level  Description: INTERVENTIONS:  - Perform BMAT or MOVE assessment daily.   - Set and communicate daily mobility goal to care team and patient/family/caregiver. - Collaborate with rehabilitation services on mobility goals if consulted  - Perform Range of Motion 3 times a day. - Reposition patient every 2 hours. - Dangle patient 2 times a day  - Stand patient 2 times a day  - Ambulate patient 2 times a day  - Out of bed to chair 2 times a day   - Out of bed for meals 2 times a day  - Out of bed for toileting  - Record patient progress and toleration of activity level   9/4/2023 1249 by Lucy Davila RN  Outcome: Not Progressing  9/4/2023 1249 by Lucy Davila RN  Outcome: Progressing  9/4/2023 1244 by Lucy Davila RN  Outcome: Progressing     Problem: Nutrition/Hydration-ADULT  Goal: Nutrient/Hydration intake appropriate for improving, restoring or maintaining nutritional needs  Description: Monitor and assess patient's nutrition/hydration status for malnutrition. Collaborate with interdisciplinary team and initiate plan and interventions as ordered. Monitor patient's weight and dietary intake as ordered or per policy. Utilize nutrition screening tool and intervene as necessary. Determine patient's food preferences and provide high-protein, high-caloric foods as appropriate.      INTERVENTIONS:  - Monitor oral intake, urinary output, labs, and treatment plans  - Assess nutrition and hydration status and recommend course of action  - Evaluate amount of meals eaten  - Assist patient with eating if necessary   - Allow adequate time for meals  - Recommend/ encourage appropriate diets, oral nutritional supplements, and vitamin/mineral supplements  - Order, calculate, and assess calorie counts as needed  - Recommend, monitor, and adjust tube feedings and TPN/PPN based on assessed needs  - Assess need for intravenous fluids  - Provide specific nutrition/hydration education as appropriate  - Include patient/family/caregiver in decisions related to nutrition  9/4/2023 1249 by Shaina Hoover RN  Outcome: Not Progressing  9/4/2023 1249 by Shaina Hoover RN  Outcome: Progressing  9/4/2023 1244 by Shaina Hoover RN  Outcome: Progressing

## 2023-09-04 NOTE — ASSESSMENT & PLAN NOTE
Zosyn and vancomycin- D2   Wound culture ordered  Repeat blood cultures ordered  ID following  Will change Abx zosyn to meropenem

## 2023-09-04 NOTE — ASSESSMENT & PLAN NOTE
CT abdomen pelvis showed-  Cholelithiasis with possible impacted stone at the neck, gallbladder wall thickening and/or pericholecystic edema and sigmoid colon stricturing. RUQ US pending. Called radiology for report.  NPO for now  F/u LFT  Continue PO lactulose as per GI  Serial abdominal exams

## 2023-09-05 PROBLEM — S31.000A SACRAL WOUND: Status: ACTIVE | Noted: 2023-09-05

## 2023-09-05 LAB
ALBUMIN SERPL BCP-MCNC: 3.1 G/DL (ref 3.5–5)
ALP SERPL-CCNC: 63 U/L (ref 34–104)
ALT SERPL W P-5'-P-CCNC: 9 U/L (ref 7–52)
ANION GAP SERPL CALCULATED.3IONS-SCNC: 11 MMOL/L
AST SERPL W P-5'-P-CCNC: 17 U/L (ref 13–39)
BASOPHILS # BLD AUTO: 0.05 THOUSANDS/ÂΜL (ref 0–0.1)
BASOPHILS NFR BLD AUTO: 0 % (ref 0–1)
BILIRUB SERPL-MCNC: 0.81 MG/DL (ref 0.2–1)
BUN SERPL-MCNC: 94 MG/DL (ref 5–25)
CALCIUM ALBUM COR SERPL-MCNC: 9.7 MG/DL (ref 8.3–10.1)
CALCIUM SERPL-MCNC: 9 MG/DL (ref 8.4–10.2)
CHLORIDE SERPL-SCNC: 108 MMOL/L (ref 96–108)
CO2 SERPL-SCNC: 20 MMOL/L (ref 21–32)
CREAT SERPL-MCNC: 4.67 MG/DL (ref 0.6–1.3)
EOSINOPHIL # BLD AUTO: 0.32 THOUSAND/ÂΜL (ref 0–0.61)
EOSINOPHIL NFR BLD AUTO: 2 % (ref 0–6)
ERYTHROCYTE [DISTWIDTH] IN BLOOD BY AUTOMATED COUNT: 14.2 % (ref 11.6–15.1)
GFR SERPL CREATININE-BSD FRML MDRD: 10 ML/MIN/1.73SQ M
GLUCOSE SERPL-MCNC: 141 MG/DL (ref 65–140)
HCT VFR BLD AUTO: 23.1 % (ref 36.5–49.3)
HGB BLD-MCNC: 7.4 G/DL (ref 12–17)
IMM GRANULOCYTES # BLD AUTO: 0.26 THOUSAND/UL (ref 0–0.2)
IMM GRANULOCYTES NFR BLD AUTO: 2 % (ref 0–2)
LYMPHOCYTES # BLD AUTO: 1.24 THOUSANDS/ÂΜL (ref 0.6–4.47)
LYMPHOCYTES NFR BLD AUTO: 8 % (ref 14–44)
MAGNESIUM SERPL-MCNC: 2.3 MG/DL (ref 1.9–2.7)
MCH RBC QN AUTO: 31.6 PG (ref 26.8–34.3)
MCHC RBC AUTO-ENTMCNC: 32 G/DL (ref 31.4–37.4)
MCV RBC AUTO: 99 FL (ref 82–98)
MONOCYTES # BLD AUTO: 1.73 THOUSAND/ÂΜL (ref 0.17–1.22)
MONOCYTES NFR BLD AUTO: 10 % (ref 4–12)
NEUTROPHILS # BLD AUTO: 13 THOUSANDS/ÂΜL (ref 1.85–7.62)
NEUTS SEG NFR BLD AUTO: 78 % (ref 43–75)
NRBC BLD AUTO-RTO: 0 /100 WBCS
PLATELET # BLD AUTO: 318 THOUSANDS/UL (ref 149–390)
PMV BLD AUTO: 9.9 FL (ref 8.9–12.7)
POTASSIUM SERPL-SCNC: 4 MMOL/L (ref 3.5–5.3)
PROCALCITONIN SERPL-MCNC: 2.42 NG/ML
PROT SERPL-MCNC: 6.4 G/DL (ref 6.4–8.4)
RBC # BLD AUTO: 2.34 MILLION/UL (ref 3.88–5.62)
SODIUM SERPL-SCNC: 139 MMOL/L (ref 135–147)
VANCOMYCIN SERPL-MCNC: 15.1 UG/ML (ref 10–20)
WBC # BLD AUTO: 16.6 THOUSAND/UL (ref 4.31–10.16)

## 2023-09-05 PROCEDURE — 94668 MNPJ CHEST WALL SBSQ: CPT

## 2023-09-05 PROCEDURE — 84145 PROCALCITONIN (PCT): CPT | Performed by: INTERNAL MEDICINE

## 2023-09-05 PROCEDURE — 85025 COMPLETE CBC W/AUTO DIFF WBC: CPT | Performed by: INTERNAL MEDICINE

## 2023-09-05 PROCEDURE — 84165 PROTEIN E-PHORESIS SERUM: CPT | Performed by: PHYSICIAN ASSISTANT

## 2023-09-05 PROCEDURE — 86335 IMMUNFIX E-PHORSIS/URINE/CSF: CPT | Performed by: PHYSICIAN ASSISTANT

## 2023-09-05 PROCEDURE — 94760 N-INVAS EAR/PLS OXIMETRY 1: CPT

## 2023-09-05 PROCEDURE — 83735 ASSAY OF MAGNESIUM: CPT | Performed by: INTERNAL MEDICINE

## 2023-09-05 PROCEDURE — 93005 ELECTROCARDIOGRAM TRACING: CPT

## 2023-09-05 PROCEDURE — 80053 COMPREHEN METABOLIC PANEL: CPT | Performed by: INTERNAL MEDICINE

## 2023-09-05 PROCEDURE — 80202 ASSAY OF VANCOMYCIN: CPT | Performed by: INTERNAL MEDICINE

## 2023-09-05 PROCEDURE — 99233 SBSQ HOSP IP/OBS HIGH 50: CPT | Performed by: INTERNAL MEDICINE

## 2023-09-05 PROCEDURE — 84166 PROTEIN E-PHORESIS/URINE/CSF: CPT | Performed by: PHYSICIAN ASSISTANT

## 2023-09-05 PROCEDURE — 99024 POSTOP FOLLOW-UP VISIT: CPT | Performed by: PHYSICIAN ASSISTANT

## 2023-09-05 PROCEDURE — 83521 IG LIGHT CHAINS FREE EACH: CPT | Performed by: PHYSICIAN ASSISTANT

## 2023-09-05 PROCEDURE — 99232 SBSQ HOSP IP/OBS MODERATE 35: CPT | Performed by: PHYSICIAN ASSISTANT

## 2023-09-05 PROCEDURE — 94640 AIRWAY INHALATION TREATMENT: CPT

## 2023-09-05 RX ORDER — FUROSEMIDE 10 MG/ML
40 SYRINGE (ML) INJECTION CONTINUOUS
Status: DISCONTINUED | OUTPATIENT
Start: 2023-09-05 | End: 2023-09-07

## 2023-09-05 RX ORDER — METOLAZONE 2.5 MG/1
10 TABLET ORAL EVERY 6 HOURS
Status: DISCONTINUED | OUTPATIENT
Start: 2023-09-05 | End: 2023-09-07

## 2023-09-05 RX ORDER — SODIUM BICARBONATE 650 MG/1
1300 TABLET ORAL
Status: DISCONTINUED | OUTPATIENT
Start: 2023-09-05 | End: 2023-09-07

## 2023-09-05 RX ADMIN — DAPTOMYCIN 550 MG: 500 INJECTION, POWDER, LYOPHILIZED, FOR SOLUTION INTRAVENOUS at 10:04

## 2023-09-05 RX ADMIN — FLUTICASONE PROPIONATE 1 SPRAY: 50 SPRAY, METERED NASAL at 09:23

## 2023-09-05 RX ADMIN — METOPROLOL TARTRATE 50 MG: 50 TABLET ORAL at 20:41

## 2023-09-05 RX ADMIN — APIXABAN 2.5 MG: 2.5 TABLET, FILM COATED ORAL at 18:35

## 2023-09-05 RX ADMIN — LEVALBUTEROL HYDROCHLORIDE 1.25 MG: 1.25 SOLUTION RESPIRATORY (INHALATION) at 13:51

## 2023-09-05 RX ADMIN — ASPIRIN 81 MG: 81 TABLET, COATED ORAL at 09:16

## 2023-09-05 RX ADMIN — DILTIAZEM HYDROCHLORIDE 240 MG: 240 CAPSULE, COATED, EXTENDED RELEASE ORAL at 09:16

## 2023-09-05 RX ADMIN — FORMOTEROL FUMARATE DIHYDRATE 20 MCG: 20 SOLUTION RESPIRATORY (INHALATION) at 19:40

## 2023-09-05 RX ADMIN — BUDESONIDE 0.5 MG: 0.5 INHALANT ORAL at 07:51

## 2023-09-05 RX ADMIN — SENNOSIDES 8.6 MG: 8.6 TABLET, FILM COATED ORAL at 21:07

## 2023-09-05 RX ADMIN — METOLAZONE 10 MG: 2.5 TABLET ORAL at 14:40

## 2023-09-05 RX ADMIN — Medication 250 MG: at 09:16

## 2023-09-05 RX ADMIN — MEROPENEM 500 MG: 500 INJECTION, POWDER, FOR SOLUTION INTRAVENOUS at 10:58

## 2023-09-05 RX ADMIN — Medication 40 MG/HR: at 15:50

## 2023-09-05 RX ADMIN — MEROPENEM 500 MG: 500 INJECTION, POWDER, FOR SOLUTION INTRAVENOUS at 22:27

## 2023-09-05 RX ADMIN — APIXABAN 2.5 MG: 2.5 TABLET, FILM COATED ORAL at 09:16

## 2023-09-05 RX ADMIN — IPRATROPIUM BROMIDE 0.5 MG: 0.5 SOLUTION RESPIRATORY (INHALATION) at 13:51

## 2023-09-05 RX ADMIN — Medication 250 MG: at 18:35

## 2023-09-05 RX ADMIN — IPRATROPIUM BROMIDE 0.5 MG: 0.5 SOLUTION RESPIRATORY (INHALATION) at 07:51

## 2023-09-05 RX ADMIN — LEVALBUTEROL HYDROCHLORIDE 1.25 MG: 1.25 SOLUTION RESPIRATORY (INHALATION) at 19:38

## 2023-09-05 RX ADMIN — PANTOPRAZOLE SODIUM 40 MG: 40 TABLET, DELAYED RELEASE ORAL at 06:10

## 2023-09-05 RX ADMIN — FORMOTEROL FUMARATE DIHYDRATE 20 MCG: 20 SOLUTION RESPIRATORY (INHALATION) at 07:51

## 2023-09-05 RX ADMIN — LEVALBUTEROL HYDROCHLORIDE 1.25 MG: 1.25 SOLUTION RESPIRATORY (INHALATION) at 07:51

## 2023-09-05 RX ADMIN — GUAIFENESIN 600 MG: 600 TABLET ORAL at 18:35

## 2023-09-05 RX ADMIN — FUROSEMIDE 160 MG: 10 INJECTION, SOLUTION INTRAMUSCULAR; INTRAVENOUS at 14:32

## 2023-09-05 RX ADMIN — METOPROLOL TARTRATE 50 MG: 50 TABLET ORAL at 09:16

## 2023-09-05 RX ADMIN — MAGNESIUM OXIDE TAB 400 MG (241.3 MG ELEMENTAL MG) 400 MG: 400 (241.3 MG) TAB at 09:16

## 2023-09-05 RX ADMIN — OXYCODONE HYDROCHLORIDE 5 MG: 5 TABLET ORAL at 03:44

## 2023-09-05 RX ADMIN — SODIUM BICARBONATE 650 MG TABLET 1300 MG: at 18:35

## 2023-09-05 RX ADMIN — FOLIC ACID 1 MG: 1 TABLET ORAL at 09:16

## 2023-09-05 RX ADMIN — GUAIFENESIN 600 MG: 600 TABLET ORAL at 09:16

## 2023-09-05 RX ADMIN — BUDESONIDE 0.5 MG: 0.5 INHALANT ORAL at 19:38

## 2023-09-05 RX ADMIN — IPRATROPIUM BROMIDE 0.5 MG: 0.5 SOLUTION RESPIRATORY (INHALATION) at 19:38

## 2023-09-05 RX ADMIN — METOLAZONE 10 MG: 2.5 TABLET ORAL at 20:41

## 2023-09-05 RX ADMIN — LORATADINE 10 MG: 10 TABLET ORAL at 09:16

## 2023-09-05 RX ADMIN — ACETAMINOPHEN 325MG 650 MG: 325 TABLET ORAL at 03:44

## 2023-09-05 RX ADMIN — ACETAMINOPHEN 325MG 650 MG: 325 TABLET ORAL at 20:41

## 2023-09-05 NOTE — PROGRESS NOTES
Progress Note - General Surgery   Stephanie Bhandari 82 y.o. male MRN: [id number]  Unit/Bed#: -01 Encounter: [id number]    Assessment/Plan:  Cholelithiasis  -gallstone in gallbladder neck with wall thickening, similar to previous, likely chronic findings  -Clinically no findings of acute cholecystitis  -No right upper quadrant pain or Mari sign on exam  -Patient able to tolerate diet not consistent with cholecystitis  -No LFT abnormalities  -follow exam  -No indication for surgical intervention at this time    Sepsis  -Indicated by leukocytosis, tachycardia  -Presumed source UTI (ESBL, E.Coli), also olecranon bursitis status post I&D  -No clinical findings of cholecystitis  -Continue IV antibiotics  -Trend WBC, fever curve, vital signs  -Continue supportive care    Anemia  -HGB 7.4  -HGB 12 on chart review 4/2023, 8/2023  -continue to trend    TOBI  -trend indices  -avoid nephrotoxic agents    Encephalopathy, cystitis, EtOH abuse  -medical management      Subjective/Objective   Chief Complaint: Pain    Subjective: Complains of pain everywhere,  tolerating diet, no fevers or chills    Objective    Blood pressure 124/64, pulse (!) 120, temperature (!) 97.4 °F (36.3 °C), resp. rate 16, height 5' 8" (1.727 m), weight 91.5 kg (201 lb 11.5 oz), SpO2 96 %. ,Body mass index is 30.67 kg/m². Intake/Output Summary (Last 24 hours) at 9/5/2023 1039  Last data filed at 9/4/2023 1720  Gross per 24 hour   Intake 240 ml   Output 150 ml   Net 90 ml       Invasive Devices     Peripheral Intravenous Line  Duration           Peripheral IV 09/01/23 Dorsal (posterior); Left Forearm 3 days    Peripheral IV 09/05/23 Dorsal (posterior); Right Wrist <1 day                Physical Exam:   General appearance: alert, confused  Head: Normocephalic, without obvious abnormality, atraumatic, sclerae anicteric, mucous membranes moist  Neck: no JVD and supple, symmetrical, trachea midline  Lungs: clear to auscultation, no wheezes or rales  Heart:   Regular rate and rhythm, S1-S2 normal, no murmur  Abdomen:   Obese, soft, no right upper quadrant tenderness, no Mari sign, few bowel sounds  Extremities:   No edema, redness or tenderness in the calves or thighs  Skin: Warm, dry  Nursing notes and vital signs reviewed      Lab, Imaging and other studies:  I have personally reviewed pertinent lab results.   , CBC:   Lab Results   Component Value Date    WBC 16.60 (H) 09/05/2023    HGB 7.4 (L) 09/05/2023    HCT 23.1 (L) 09/05/2023    MCV 99 (H) 09/05/2023     09/05/2023    RBC 2.34 (L) 09/05/2023    MCH 31.6 09/05/2023    MCHC 32.0 09/05/2023    RDW 14.2 09/05/2023    MPV 9.9 09/05/2023    NRBC 0 09/05/2023   , CMP:   Lab Results   Component Value Date    SODIUM 139 09/05/2023    K 4.0 09/05/2023     09/05/2023    CO2 20 (L) 09/05/2023    BUN 94 (H) 09/05/2023    CREATININE 4.67 (H) 09/05/2023    CALCIUM 9.0 09/05/2023    AST 17 09/05/2023    ALT 9 09/05/2023    ALKPHOS 63 09/05/2023    EGFR 10 09/05/2023     VTE Pharmacologic Prophylaxis: Heparin  VTE Mechanical Prophylaxis: sequential compression device     Jasmeet Luther

## 2023-09-05 NOTE — PROGRESS NOTES
Lorenzo Carroll is a 82 y.o. male who is currently ordered Vancomycin IV with management by the Pharmacy Consult service. Relevant clinical data and objective / subjective history reviewed. Vancomycin Assessment:  Indication and Goal AUC/Trough: Bone/joint infection (goal -600, trough >10), -600, trough >10  Clinical Status: worsening  Micro:     Renal Function:  SCr: 4.67 mg/dL  CrCl: 12.5 mL/min  Renal replacement: Not on dialysis  Days of Therapy: 3  Current Dose: 750mg daily PRN if level <15  Vancomycin Plan: random level resulted 15.1. Since the level is >15, no dose will be given today. New Dosing: No change  Next Level: 9/6/23 at 0600  Renal Function Monitoring: Daily BMP and [place] will continue to follow closely for s/sx of nephrotoxicity, infusion reactions and appropriateness of therapy. BMP and CBC will be ordered per protocol. We will continue to follow the patient’s culture results and clinical progress daily. Also, meropenem will be adjusted renally if necessary.     Hola Barriga, Pharmacist, PharmD, BCPS

## 2023-09-05 NOTE — ASSESSMENT & PLAN NOTE
CT abdomen pelvis showed-  Cholelithiasis with possible impacted stone at the neck, gallbladder wall thickening and/or pericholecystic edema and sigmoid colon stricturing. No abdominal tenderness, Surgery and GI recommends follow up with serial abdominal exams.  No plan for surgery as it could be likely chronic problem  F/u LFT

## 2023-09-05 NOTE — PROGRESS NOTES
[facility]  Progress Note  Name: Yessica Moffett  MRN: [id number]  Unit/Bed#: -01 I Date of Admission: 8/26/2023   Date of Service: 9/5/2023 I Hospital Day: 10    Assessment/Plan   Sepsis [facility])  Assessment & Plan  Meets criteria with leukocytosis, tachycardia  Likely secondary to ESBL UTI/Septic olecranon bursitis  F/u Blood Cx, wound Cx  F/u vitals  F/u CBC, CMP and procal  Abx as per ID recommendations- currently on Dapto and ertapenem, total day of Abx-11      Acute encephalopathy  Assessment & Plan  Patient is confused likely acute metabolic encephalopathy in setting of UTI versus septic  Bursitis Vs TOBI  Frequent reorientation  Monitor CBC, CMP  Follow up on Cx  Neurochecks  On IV antibiotics  ID, nephrology consulted, appreciate recommendations    TOBI (acute kidney injury) [facility])  Assessment & Plan  Lab Results   Component Value Date    EGFR 10 09/05/2023    EGFR 11 09/04/2023    EGFR 14 09/03/2023    CREATININE 4.67 (H) 09/05/2023    CREATININE 4.33 (H) 09/04/2023    CREATININE 3.63 (H) 09/03/2023   · Baseline creatinine 1.6-1.9, POA- 1.7  · Likely secondary to autoregulatory failure, ATN secondary to sepsis, medications  · Nephrology following  · Urinary retention protocol  · I/O monitoring  · Renally dose medications  · Changed Abx for kidney failure        Olecranon bursitis  Assessment & Plan  Continue Dapto  Wound culture ordered  Repeat blood cultures ordered  ID and ortho following      Abnormal computed tomography angiography (CTA) of abdomen and pelvis  Assessment & Plan  CT abdomen pelvis showed-  Cholelithiasis with possible impacted stone at the neck, gallbladder wall thickening and/or pericholecystic edema and sigmoid colon stricturing. No abdominal tenderness, Surgery and GI recommends follow up with serial abdominal exams.  No plan for surgery as it could be likely chronic problem  F/u LFT      Atrial fibrillation [facility])  Assessment & Plan  · Went into A-fib with RVR on  and was given IV Lopressor and Cardizem  · Home regimen: Coreg 25 Mg twice daily  · Started on Eliquis 2.5 mg twice daily for anticoagulation during hospitalization at [address] [facility] -  · Cardiology consult appreciated  · Tachycardia today, likely in the setting of TOBI,              VTE Pharmacologic Prophylaxis:   VTE Score: 5 High Risk (Score >/= 5) - Pharmacological DVT Prophylaxis Ordered: Apixaban (Eliquis). Sequential Compression Devices Ordered. Mechanical VTE Prophylaxis in Place: Yes    Patient Centered Rounds: I have performed bedside rounds with nursing staff today. Discussions with Specialists or Other Care Team Provider: ID, nephrology, ortho and general surgery    Education and Discussions with Family / Patient: will discuss with family at 2 PM    Current Length of Stay: 10 day(s)    Current Patient Status: Inpatient     Discharge Plan / Estimated Discharge Date: TBD- pending clinical improvement    Code Status: Level 3 - DNAR and DNI      Subjective:   Patient c/o pain all over. Oriented to self. No overnight acute events. Is tachycardic this morning    Objective:     Vitals:   Temp (24hrs), Av.6 °F (36.4 °C), Min:97.4 °F (36.3 °C), Max:98 °F (36.7 °C)    Temp:  [97.4 °F (36.3 °C)-98 °F (36.7 °C)] 97.4 °F (36.3 °C)  HR:  [103-141] 120  Resp:  [16-18] 16  BP: (121-132)/(54-87) 124/64  SpO2:  [94 %-98 %] 96 %  Body mass index is 30.67 kg/m². Input and Output Summary (last 24 hours): Intake/Output Summary (Last 24 hours) at 2023 1335  Last data filed at 2023 1310  Gross per 24 hour   Intake 600 ml   Output 150 ml   Net 450 ml       Physical Exam:     Physical Exam  Vitals reviewed. HENT:      Head: Normocephalic and atraumatic. Mouth/Throat:      Mouth: Mucous membranes are moist.      Pharynx: Oropharynx is clear. Eyes:      Conjunctiva/sclera: Conjunctivae normal.   Cardiovascular:      Pulses: Normal pulses.       Heart sounds: Normal heart sounds. Pulmonary:      Effort: Pulmonary effort is normal.      Breath sounds: Normal breath sounds. Abdominal:      General: Bowel sounds are normal. There is no distension. Palpations: Abdomen is soft. Tenderness: There is no abdominal tenderness. There is no guarding. Musculoskeletal:      Right lower leg: Edema present. Left lower leg: Edema present. Comments: Bilateral upper extremities edematous   Skin:     General: Skin is warm and dry. Neurological:      Mental Status: He is alert. Comments: oriented X1          Additional Data:     Labs:  Results from last 7 days   Lab Units 09/05/23  0336   WBC Thousand/uL 16.60*   HEMOGLOBIN g/dL 7.4*   HEMATOCRIT % 23.1*   PLATELETS Thousands/uL 318   NEUTROS PCT % 78*   LYMPHS PCT % 8*   MONOS PCT % 10   EOS PCT % 2     Results from last 7 days   Lab Units 09/05/23  0336   SODIUM mmol/L 139   POTASSIUM mmol/L 4.0   CHLORIDE mmol/L 108   CO2 mmol/L 20*   BUN mg/dL 94*   CREATININE mg/dL 4.67*   ANION GAP mmol/L 11   CALCIUM mg/dL 9.0   ALBUMIN g/dL 3.1*   TOTAL BILIRUBIN mg/dL 0.81   ALK PHOS U/L 63   ALT U/L 9   AST U/L 17   GLUCOSE RANDOM mg/dL 141*                 Results from last 7 days   Lab Units 09/05/23  0336 09/04/23  0527 09/03/23  0221 09/02/23  0336 08/30/23  0436   PROCALCITONIN ng/ml 2.42* 2.93* 2.83* 3.11* 0.75*       Imaging: Reviewed radiology reports from this admission including: ultrasound(s)    Recent Cultures (last 7 days):     Results from last 7 days   Lab Units 09/03/23  1141 09/03/23  1133 09/01/23  1429   BLOOD CULTURE  No Growth at 24 hrs. No Growth at 24 hrs.  --  No Growth at 72 hrs. No Growth at 72 hrs. GRAM STAIN RESULT   --  Rare Polys  No bacteria seen  --    WOUND CULTURE   --  Culture results to follow. --        Lines/Drains:  Invasive Devices     Peripheral Intravenous Line  Duration           Peripheral IV 09/01/23 Dorsal (posterior); Left Forearm 3 days    Peripheral IV 09/05/23 Dorsal (posterior); Right Wrist <1 day                Telemetry:   Telemetry Orders (From admission, onward)             24 Hour Telemetry Monitoring  Continuous x 24 Hours (Telem)        Comments: A fib with uncontrolled HR   Question:  Reason for 24 Hour Telemetry  Answer:  Arrhythmias requiring acute medical intervention / PPM or ICD malfunction                    Last 24 Hours Medication List:   Current Facility-Administered Medications   Medication Dose Route Frequency Provider Last Rate   • acetaminophen  650 mg Oral Q6H PRN Joan Baptiste PA-C     • aluminum-magnesium hydroxide-simethicone  30 mL Oral Q6H PRN Joan Baptiste PA-C     • apixaban  2.5 mg Oral BID Farheen Borrego MD     • aspirin  81 mg Oral Daily Joan Baptiste PA-C     • budesonide  0.5 mg Nebulization Q12H Joan Baptiste PA-C     • DAPTOmycin  6 mg/kg Intravenous Q48H Justina Loja  mg (09/05/23 1004)   • diltiazem  240 mg Oral Daily Cristian Holguin PA-C     • fluticasone  1 spray Nasal Daily Joan Baptiste PA-C     • folic acid  1 mg Oral Daily Joan Baptiste PA-C     • formoterol  20 mcg Nebulization Q12H Joan Baptiste PA-C     • guaiFENesin  600 mg Oral BID Joan Baptiste PA-C     • ipratropium  0.5 mg Nebulization TID Paz Carmona MD     • lactulose  200 g Rectal BID PRN Alina Willett PA-C     • levalbuterol  1.25 mg Nebulization TID Paz Carmona MD     • loratadine  10 mg Oral Daily Joan Baptiste PA-C     • magnesium Oxide  400 mg Oral Daily Joan Baptiste PA-C     • meropenem  500 mg Intravenous Q12H Farheen Borrego  mg (09/05/23 1058)   • metoprolol tartrate  50 mg Oral Q12H [address] Jassi Holguin PA-C     • ondansetron  4 mg Intravenous Q6H PRN Joan Baptiste PA-C     • oxyCODONE  5 mg Oral Q6H PRN Paz Carmona MD     • pantoprazole  40 mg Oral Daily Before Breakfast Joan Baptiste PA-C     • saccharomyces boulardii  250 mg Oral BID Rebeca Lopez, LOLI     • senna  1 tablet Oral HS Kae Black PA-C          Today, Patient Was Seen By: Aaron Mcrae MD    ** Please Note: This note has been constructed using a voice recognition system.  **

## 2023-09-05 NOTE — PLAN OF CARE
Problem: Potential for Falls  Goal: Patient will remain free of falls  Description: INTERVENTIONS:  - Educate patient/family on patient safety including physical limitations  - Instruct patient to call for assistance with activity   - Consult OT/PT to assist with strengthening/mobility   - Keep Call bell within reach  - Keep bed low and locked with side rails adjusted as appropriate  - Keep care items and personal belongings within reach  - Initiate and maintain comfort rounds  - Make Fall Risk Sign visible to staff  - Offer Toileting every 2 Hours, in advance of need  - Initiate/Maintain bed alarm  - Obtain necessary fall risk management equipment:   - Apply yellow socks and bracelet for high fall risk patients  - Consider moving patient to room near nurses station  Outcome: Progressing     Problem: PAIN - ADULT  Goal: Verbalizes/displays adequate comfort level or baseline comfort level  Description: Interventions:  - Encourage patient to monitor pain and request assistance  - Assess pain using appropriate pain scale  - Administer analgesics based on type and severity of pain and evaluate response  - Implement non-pharmacological measures as appropriate and evaluate response  - Consider cultural and social influences on pain and pain management  - Notify physician/advanced practitioner if interventions unsuccessful or patient reports new pain  Outcome: Progressing     Problem: INFECTION - ADULT  Goal: Absence or prevention of progression during hospitalization  Description: INTERVENTIONS:  - Assess and monitor for signs and symptoms of infection  - Monitor lab/diagnostic results  - Monitor all insertion sites, i.e. indwelling lines, tubes, and drains  - Monitor endotracheal if appropriate and nasal secretions for changes in amount and color  - [place] appropriate cooling/warming therapies per order  - Administer medications as ordered  - Instruct and encourage patient and family to use good hand hygiene technique  - Identify and instruct in appropriate isolation precautions for identified infection/condition  Outcome: Progressing  Goal: Absence of fever/infection during neutropenic period  Description: INTERVENTIONS:  - Monitor WBC    Outcome: Progressing     Problem: SAFETY ADULT  Goal: Maintain or return to baseline ADL function  Description: INTERVENTIONS:  -  Assess patient's ability to carry out ADLs; assess patient's baseline for ADL function and identify physical deficits which impact ability to perform ADLs (bathing, care of mouth/teeth, toileting, grooming, dressing, etc.)  - Assess/evaluate cause of self-care deficits   - Assess range of motion  - Assess patient's mobility; develop plan if impaired  - Assess patient's need for assistive devices and provide as appropriate  - Encourage maximum independence but intervene and supervise when necessary  - Involve family in performance of ADLs  - Assess for home care needs following discharge   - Consider OT consult to assist with ADL evaluation and planning for discharge  - Provide patient education as appropriate  Outcome: Progressing  Goal: Maintains/Returns to pre admission functional level  Description: INTERVENTIONS:  - Perform BMAT or MOVE assessment daily.   - Set and communicate daily mobility goal to care team and patient/family/caregiver. - Collaborate with rehabilitation services on mobility goals if consulted  - Perform Range of Motion 3 times a day. - Reposition patient every 2 hours.   - Dangle patient 2 times a day  - Stand patient 2 times a day  - Ambulate patient 2 times a day  - Out of bed to chair 2 times a day   - Out of bed for meals 2 times a day  - Out of bed for toileting  - Record patient progress and toleration of activity level   Outcome: Progressing     Problem: DISCHARGE PLANNING  Goal: Discharge to home or other facility with appropriate resources  Description: INTERVENTIONS:  - Identify barriers to discharge w/patient and caregiver  - Arrange for needed discharge resources and transportation as appropriate  - Identify discharge learning needs (meds, wound care, etc.)  - Arrange for interpretive services to assist at discharge as needed  - Refer to Case Management Department for coordinating discharge planning if the patient needs post-hospital services based on physician/advanced practitioner order or complex needs related to functional status, cognitive ability, or social support system  Outcome: Progressing     Problem: Knowledge Deficit  Goal: Patient/family/caregiver demonstrates understanding of disease process, treatment plan, medications, and discharge instructions  Description: Complete learning assessment and assess knowledge base.   Interventions:  - Provide teaching at level of understanding  - Provide teaching via preferred learning methods  Outcome: Progressing     Problem: Prexisting or High Potential for Compromised Skin Integrity  Goal: Skin integrity is maintained or improved  Description: INTERVENTIONS:  - Identify patients at risk for skin breakdown  - Assess and monitor skin integrity  - Assess and monitor nutrition and hydration status  - Monitor labs   - Assess for incontinence   - Turn and reposition patient  - Assist with mobility/ambulation  - Relieve pressure over bony prominences  - Avoid friction and shearing  - Provide appropriate hygiene as needed including keeping skin clean and dry  - Evaluate need for skin moisturizer/barrier cream  - Collaborate with interdisciplinary team   - Patient/family teaching  - Consider wound care consult   Outcome: Progressing     Problem: MOBILITY - ADULT  Goal: Maintain or return to baseline ADL function  Description: INTERVENTIONS:  -  Assess patient's ability to carry out ADLs; assess patient's baseline for ADL function and identify physical deficits which impact ability to perform ADLs (bathing, care of mouth/teeth, toileting, grooming, dressing, etc.)  - Assess/evaluate cause of self-care deficits   - Assess range of motion  - Assess patient's mobility; develop plan if impaired  - Assess patient's need for assistive devices and provide as appropriate  - Encourage maximum independence but intervene and supervise when necessary  - Involve family in performance of ADLs  - Assess for home care needs following discharge   - Consider OT consult to assist with ADL evaluation and planning for discharge  - Provide patient education as appropriate  Outcome: Progressing  Goal: Maintains/Returns to pre admission functional level  Description: INTERVENTIONS:  - Perform BMAT or MOVE assessment daily.   - Set and communicate daily mobility goal to care team and patient/family/caregiver. - Collaborate with rehabilitation services on mobility goals if consulted  - Perform Range of Motion 3 times a day. - Reposition patient every 2 hours. - Dangle patient 2 times a day  - Stand patient 2 times a day  - Ambulate patient 2 times a day  - Out of bed to chair 2 times a day   - Out of bed for meals 2 times a day  - Out of bed for toileting  - Record patient progress and toleration of activity level   Outcome: Progressing     Problem: Nutrition/Hydration-ADULT  Goal: Nutrient/Hydration intake appropriate for improving, restoring or maintaining nutritional needs  Description: Monitor and assess patient's nutrition/hydration status for malnutrition. Collaborate with interdisciplinary team and initiate plan and interventions as ordered. Monitor patient's weight and dietary intake as ordered or per policy. Utilize nutrition screening tool and intervene as necessary. Determine patient's food preferences and provide high-protein, high-caloric foods as appropriate.      INTERVENTIONS:  - Monitor oral intake, urinary output, labs, and treatment plans  - Assess nutrition and hydration status and recommend course of action  - Evaluate amount of meals eaten  - Assist patient with eating if necessary   - Allow adequate time for meals  - Recommend/ encourage appropriate diets, oral nutritional supplements, and vitamin/mineral supplements  - Order, calculate, and assess calorie counts as needed  - Recommend, monitor, and adjust tube feedings and TPN/PPN based on assessed needs  - Assess need for intravenous fluids  - Provide specific nutrition/hydration education as appropriate  - Include patient/family/caregiver in decisions related to nutrition  Outcome: Progressing

## 2023-09-05 NOTE — WOUND OSTOMY CARE
Consult Note - Wound   Gordy Pro 82 y.o. male MRN: [id number]  Unit/Bed#: -01 Encounter: [id number]    History and Present Illness:  82year old male patient admitted from SNF with fevers. Wound care consulted for right elbow. Patient history significant for anemia, a-fib, COPD, Covid-19, alcohol abuse, acute encephalopathy, ambulatory dysfunction, HTN, acute ischemic stroke, rheumatoid arthritis, and CVA. Assessment Findings:   Patient in bed for assessment, family was present, left during assessment. Patient is awake, not interactive, reacts to pain with movement. He is incontinent of urine and stool. He has nutritional supplements. He is a max assist with turning, has a poor appetite, is a total assist with feeding. Primary RN aware of HA wounds. 1. HA-deep tissue injury to the right and left posterior heels. Purple non-blanchable tissue to the right heel, periwound blanchable erythema. Left heel evolving deep tissue injury, gray and light purple area on the posterior heel. Epidermal separation with gray blistered skin, small beefy red area proximally, non-blanchable. Wounds may evolve to stage 3, stage 4 or unstageable wounds  2. HA-evolving deep tissue injury to the buttocks-sacrum. Purple non-blanchable tissue on the buttocks with epidermal separation. Gluteal cleft area with open area at this time appears superficial. Remaining wound to the sacrum and buttocks purple non-blanchable tissue, suspect wound to evolve to a stage 3, stage 4 or unstageable wound. Right buttock appears to have friction component to the pressure injury with shackled appearance  3. Slow to guy area to the anterior and inner left upper thigh, possibly due to disposable brief  4. Right and left elbow soft tissue swelling and pain.  After patient repositioned noted milky serosanguineous drainage from right elbow on incontinence pad, drainage stopped, no dressing applied, suspect gout to the elbows, per patient's wife patient had gout in the past    Manager aware of hospital acquired wounds    Skin and Wound Care Plan:   1. Allevyn life heel foams to bilateral heels, karla with T, date, and peel back daily for skin assessment, change every 3 days or with soilage or dislodgement. 2. Cleanse upper buttocks-sacrum with Remedy foaming cleanser, pat dry. Place Xeroform on wound beds to right and left buttock, cover with Allevyn life silicone bordered foam dressing, karla with T, date, change every other day and PRN  3. P-500 low air loss therapy surface  4. Turn and reposition patient Q2 hours  5. Ehob offloading cushion to chair when OOB  6. Elevate heels off of bed with pillows at all times to offload  7. Apply skin nourishing cream to the skin daily    Referral for wound management center placed     Wounds:  Wound 08/30/23 Elbow Posterior;Right (Active)   Wound Image   09/05/23 1431   Wound Description Beefy red;Edema 09/05/23 1431   Berenice-wound Assessment Clean;Dry; Intact 09/05/23 1100   Drainage Amount Small 09/05/23 1431   Drainage Description Serosanguineous;Milky 09/05/23 1431   Treatments Cleansed 09/05/23 1431   Dressing Open to air 09/05/23 1431   Patient Tolerance Tolerated poorly 09/05/23 1431   Dressing Status Clean;Dry; Intact 09/03/23 0915       Wound 08/30/23 Other (comment) Foot Anterior;Right (Active)   Wound Image    08/30/23 1028       Wound 09/05/23 Heel Left (Active)   Wound Image   09/05/23 1429   Wound Description Light purple; Intact 09/05/23 1429   Pressure Injury Stage DTPI 09/05/23 1429   Berenice-wound Assessment Erythema 09/05/23 1429   Wound Length (cm) 1.5 cm 09/05/23 1429   Wound Width (cm) 2 cm 09/05/23 1429   Wound Depth (cm) 0 cm 09/05/23 1429   Wound Surface Area (cm^2) 3 cm^2 09/05/23 1429   Wound Volume (cm^3) 0 cm^3 09/05/23 1429   Calculated Wound Volume (cm^3) 0 cm^3 09/05/23 1429   Drainage Amount None 09/05/23 1429   Treatments Elevated 09/05/23 1429   Dressing Foam, Silicon (eg.  Allevyn, etc) 09/05/23 1429   Dressing Changed New 09/05/23 1429   Patient Tolerance Tolerated well 09/05/23 1429       Wound 09/05/23 Pressure Injury Sacrum (Active)   Wound Image   09/05/23 1432   Wound Description Beefy red;Fragile;Light purple;Slough 09/05/23 1432   Pressure Injury Stage DTPI 09/05/23 1432   Berenice-wound Assessment Erythema;Fragile 09/05/23 1432   Wound Length (cm) 10 cm 09/05/23 1432   Wound Width (cm) 6.5 cm 09/05/23 1432   Wound Depth (cm) 0.1 cm 09/05/23 1432   Wound Surface Area (cm^2) 65 cm^2 09/05/23 1432   Wound Volume (cm^3) 6.5 cm^3 09/05/23 1432   Calculated Wound Volume (cm^3) 6.5 cm^3 09/05/23 1432   Drainage Amount Scant 09/05/23 1432   Drainage Description Serosanguineous 09/05/23 1432   Treatments Cleansed 09/05/23 1432   Dressing Foam, Silicon (eg. Allevyn, etc) 09/05/23 1432   Dressing Changed Changed 09/05/23 1432   Patient Tolerance Tolerated poorly 09/05/23 1432       Wound 09/05/23 Heel Right (Active)   Wound Image   09/05/23 1433   Wound Description Fragile;Light purple 09/05/23 1433   Pressure Injury Stage DTPI 09/05/23 1433   Berenice-wound Assessment Intact; Erythema 09/05/23 1433   Wound Length (cm) 1 cm 09/05/23 1433   Wound Width (cm) 1 cm 09/05/23 1433   Wound Depth (cm) 0 cm 09/05/23 1433   Wound Surface Area (cm^2) 1 cm^2 09/05/23 1433   Wound Volume (cm^3) 0 cm^3 09/05/23 1433   Calculated Wound Volume (cm^3) 0 cm^3 09/05/23 1433   Drainage Amount None 09/05/23 1433   Treatments Elevated 09/05/23 1433   Dressing Foam, Silicon (eg.  Allevyn, etc) 09/05/23 1433   Dressing Changed New 09/05/23 1433   Patient Tolerance Tolerated poorly 09/05/23 1433     Reviewed plan of care with primary RN Favian Gtz  Recommendations written as orders  Wound care team to follow weekly while admitted  Questions or concerns [address] [place] [address] Nurse    Geoff Gruber BSN, RN, [facility]

## 2023-09-05 NOTE — PLAN OF CARE
Problem: INFECTION - ADULT  Goal: Absence or prevention of progression during hospitalization  Description: INTERVENTIONS:  - Assess and monitor for signs and symptoms of infection  - Monitor lab/diagnostic results  - Monitor all insertion sites, i.e. indwelling lines, tubes, and drains  - Monitor endotracheal if appropriate and nasal secretions for changes in amount and color  - [place] appropriate cooling/warming therapies per order  - Administer medications as ordered  - Instruct and encourage patient and family to use good hand hygiene technique  - Identify and instruct in appropriate isolation precautions for identified infection/condition  Outcome: Progressing  Goal: Absence of fever/infection during neutropenic period  Description: INTERVENTIONS:  - Monitor WBC    Outcome: Progressing     Problem: PAIN - ADULT  Goal: Verbalizes/displays adequate comfort level or baseline comfort level  Description: Interventions:  - Encourage patient to monitor pain and request assistance  - Assess pain using appropriate pain scale  - Administer analgesics based on type and severity of pain and evaluate response  - Implement non-pharmacological measures as appropriate and evaluate response  - Consider cultural and social influences on pain and pain management  - Notify physician/advanced practitioner if interventions unsuccessful or patient reports new pain  Outcome: Progressing

## 2023-09-05 NOTE — ASSESSMENT & PLAN NOTE
Patient is confused likely acute metabolic encephalopathy in setting of UTI versus septic  Bursitis Vs TOBI  Frequent reorientation  Monitor CBC, CMP  Follow up on Cx  Neurochecks  On IV antibiotics  ID, nephrology consulted, appreciate recommendations

## 2023-09-05 NOTE — PROGRESS NOTES
NEPHROLOGY PROGRESS NOTE   Stacy Fields 82 y.o. male MRN: [id number]  Unit/Bed#: -01 Encounter: [id number]      ASSESSMENT/PLAN:  1. Acute kidney injury: Suspect prerenal with progression to ATN in the setting of sepsis, fevers, lasix, ARB and relative hypotension  · Creatinine 1.7 on admission  · Creatinine significantly worsening over the past few days and now up to 4.67 and is anuric  · Repeat UA: Moderate blood, +2 protein, 1-2 RBCs  · Urine sodium 16, urine chloride less than 15  · Bladder scan 100cc   · CT: Multiple left renal cyst, no hydronephrosis, nonspecific bilateral perinephric edema, bladder unremarkable  · Urine eos: pending   · Had trial of IV fluids which was discontinued 9/1 due to edema and no improvement in renal function  · Given IV lasix 9/1  · Received albumin 25 g every 6 hours x6 doses over the weekend  · Consider Lasix drip in an attempt to convert to nonoliguric state  · Continue to hold losartan (last dose 8/29)  2. CKD stage IIIB: Baseline creatinine around 1.5-1.7  3. Altered mental status  4. Sepsis due to pneumonia and cystitis. Also with olecranon bursitis status post I&D  · Antibiotics adjusted per ID due to worsening renal function  5. Lower extremity edema: Possibly due to third spacing. Chest x-ray and CT without signs of volume overload. Improving from weekend    Plan Summary:   • Consider lasix trial in attempt to convert to nonoliguric state -- will d/w attending   • Check am BMP   • 2pm family meeting to discuss goals of care     Addendum: discussed with attending, Dr Inge Porras give diuretic trial in attempt to convert to non-oliguric state:  lasix 160mg IV bolus x 1 and then start lasix drip at 40mg/h. Also add metolazone 10mg po q6h. SUBJECTIVE:  Complains of not feeling well but no specific complaints. According to nurse he has not urinated since yesterday evening and was bladder scanned overnight for 100 cc.     OBJECTIVE:  Current Weight: Weight - Scale: 91.5 kg (201 lb 11.5 oz)  Vitals:    09/05/23 0916   BP: 124/64   Pulse: (!) 120   Resp:    Temp:    SpO2:        Intake/Output Summary (Last 24 hours) at 9/5/2023 1133  Last data filed at 9/4/2023 1720  Gross per 24 hour   Intake 240 ml   Output 150 ml   Net 90 ml       General:  Ill appearing   Skin:  No rash  Eyes:  Sclerae anicteric, no periorbital edema   ENT:  Moist mucous membranes  Neck:  Trachea midline, symmetric   Chest:  Clear to auscultation bilaterally with no wheezes, rales or rhonchi  CVS:  Regular rate and rhythm  Abdomen:  Soft, nontender, nondistended  Neuro:  Lethargic   Extremities: no lower extremity edema       Medications:  Scheduled Meds:  Current Facility-Administered Medications   Medication Dose Route Frequency Provider Last Rate   • acetaminophen  650 mg Oral Q6H PRN Kae Steiner PA-C     • aluminum-magnesium hydroxide-simethicone  30 mL Oral Q6H PRN Kae Steiner PA-C     • apixaban  2.5 mg Oral BID Nena Payne MD     • aspirin  81 mg Oral Daily Kae Steiner PA-C     • budesonide  0.5 mg Nebulization Q12H Kae Steiner PA-C     • DAPTOmycin  6 mg/kg Intravenous Q48H Kelly Nunez  mg (09/05/23 1004)   • diltiazem  240 mg Oral Daily Linda Holguin PA-C     • fluticasone  1 spray Nasal Daily Kae Steiner PA-C     • folic acid  1 mg Oral Daily Kae Steiner PA-C     • formoterol  20 mcg Nebulization Q12H Kae Steiner PA-C     • guaiFENesin  600 mg Oral BID Kae Steiner PA-C     • ipratropium  0.5 mg Nebulization TID Teresa Henson MD     • lactulose  200 g Rectal BID PRN Silvia Willett PA-C     • levalbuterol  1.25 mg Nebulization TID Teresa Henson MD     • loratadine  10 mg Oral Daily Kae Steiner PA-C     • magnesium Oxide  400 mg Oral Daily Kae Steiner PA-C     • meropenem  500 mg Intravenous Q12H Nena Payne  mg (09/05/23 1058)   • metoprolol tartrate  50 mg Oral Q12H [address] ALCIRA Holguin PA-C     • ondansetron  4 mg Intravenous Q6H PRN Aggie Rios PA-C     • oxyCODONE  5 mg Oral Q6H PRN Mauricio Boone MD     • pantoprazole  40 mg Oral Daily Before Breakfast Aggie Rios PA-C     • saccharomyces boulardii  250 mg Oral BID Mar Chambers PA-C     • senna  1 tablet Oral HS Aggie Rios PA-C         PRN Meds:.•  acetaminophen  •  aluminum-magnesium hydroxide-simethicone  •  lactulose  •  ondansetron  •  oxyCODONE    Laboratory Results:  Results from last 7 days   Lab Units 09/05/23  0336 09/04/23  1234 09/04/23  0527 09/03/23  0221 09/02/23  0336 09/01/23  0334 08/31/23  0335 08/30/23  0436   WBC Thousand/uL 16.60*  --  14.04* 12.50* 15.57* 15.57* 13.57* 12.12*   HEMOGLOBIN g/dL 7.4* 7.6* 7.0* 7.6* 8.0* 9.2* 8.4* 8.4*   HEMATOCRIT % 23.1* 23.3* 21.8* 23.8* 24.7* 29.0* 26.3* 26.0*   PLATELETS Thousands/uL 318  --  296 323 352 365 297 312   SODIUM mmol/L 139  --  140 136 134* 137 135 134*   POTASSIUM mmol/L 4.0  --  3.3* 3.9 3.7 3.7 3.8 3.7   CHLORIDE mmol/L 108  --  107 105 105 105 105 103   CO2 mmol/L 20*  --  20* 19* 21 20* 22 23   BUN mg/dL 94*  --  89* 76* 68* 55* 54* 48*   CREATININE mg/dL 4.67*  --  4.33* 3.63* 3.39* 2.58* 2.49* 1.95*   CALCIUM mg/dL 9.0  --  9.2 8.9 8.8 8.8 8.6 8.5   MAGNESIUM mg/dL 2.3  --  2.2 2.1  --  2.1  --  2.1

## 2023-09-05 NOTE — ASSESSMENT & PLAN NOTE
Meets criteria with leukocytosis, tachycardia  Likely secondary to ESBL UTI/Septic olecranon bursitis  F/u Blood Cx, wound Cx  F/u vitals  F/u CBC, CMP and procal  Abx as per ID recommendations- currently on Dapto and ertapenem, total day of Abx-11

## 2023-09-05 NOTE — PROGRESS NOTES
Progress Note - Infectious Disease   Supai Findmargarette 82 y.o. male MRN: [id number]  Unit/Bed#: -01 Encounter: [id number]    Assessment:  79-year-old man with fever, urinary tract infection, CKD, leukocytosis, bursitis, acute kidney injury     Plan:  1) UTI -  patient with improvement in UTI symptoms, changed to meropenem by primary team with onset of new fever. For now, would continue this agent which is due to complete treatment of UTI on 6 September.    2) Fever, leukocytosis, bursitis, cellulitis - Patient with improvement in cellulitis clinically, however also with worsening white blood cell count today as compared with yesterday. On exam, left elbow is quite tender to palpation, however not as edematous as contralateral side. Regardless, agree with current antibiotic therapy given resolution of fever with initiation of same, will continue broad spectrum antibiotics until culture results are available. Per discussion with primary team, patient with some loose stools though reasonable to check for C. difficile, relatively low suspicion for same.    ===> Would continue broad empirical coverage while olecranon bursa cultures are pending, however will change vancomycin to daptomycin given worsening kidney function. Will observe patient for toxicity while on these agents.    ===> C. difficile testing not unreasonable, await results of same.      ===>Would ask Orthopedic Service to evaluate left elbow given tenderness to palpation. 3) Acute on CKD - Pt with worsening renal function, though this could be due to vancomycin increase his been quite rapid over the past several days, and patient's vancomycin level is well within therapeutic range at 15.  That said, will change to daptomycin to avoid potential nephrotoxicity.     ===> Discussed w/ 1' team, nephrology.  ===> Will follow     Subjective/Objective         Temp:  [97.4 °F (36.3 °C)-98 °F (36.7 °C)] 97.4 °F (36.3 °C)  HR:  [103-141] 113  Resp:  [16-18] 16  BP: (121-132)/(54-87) 129/62  SpO2:  [94 %-98 %] 96 %  Temp (24hrs), Av.6 °F (36.4 °C), Min:97.4 °F (36.3 °C), Max:98 °F (36.7 °C)  Current: Temperature: (!) 97.4 °F (36.3 °C)    Physical Exam:  Gen: AA, NAD, conversant, VS reviewed  ENT: MMM  CV: No m/r/g, S1, S2  Pulm: Lungs CTAB, no respiratory distress  ABD: S/NT/DT  Skin: No rash on exposed skin  Psych: Oriented, nl. affect   Msk:    Invasive Devices     Peripheral Intravenous Line  Duration           Peripheral IV 23 Dorsal (posterior); Left Forearm 3 days    Peripheral IV 23 Dorsal (posterior); Right Wrist <1 day                Lab, Imaging and other studies: I have personally reviewed pertinent reports.    and I have personally reviewed pertinent films in PACS

## 2023-09-05 NOTE — PROGRESS NOTES
Orthopedics   [facility] 82 y.o. male MRN: [id number]  Unit/Bed#: -01      HPI:  82 y.o.male with right olecranon bursitis/cellulitis. No complaints this morning. Not a reliable historian. No issues overnight. No documented fevers. Past Medical History:   Past Medical History:   Diagnosis Date   • Alcohol abuse    • Alcohol withdrawal seizure without complication ([address])    • Arthritis    • Asthma    • CVA (cerebral vascular accident) ([address])    • Decubitus ulcer of buttock     last assessed 07/20/16   • Diabetes ([address])    • Disease of thyroid gland    • Duodenal ulcer    • Emphysema lung ([address])    • Esophageal varices (HCC)    • GERD (gastroesophageal reflux disease)    • History of transfusion    • Hypertension    • Irritable bowel syndrome with diarrhea    • Kidney stones    • Prostate cancer ([address])    • Urinary frequency     resolved 02/15/17       Past Surgical History:   Past Surgical History:   Procedure Laterality Date   • BACK SURGERY     • CATARACT EXTRACTION     • ESOPHAGOGASTRODUODENOSCOPY N/A 2/6/2017    Procedure: ESOPHAGOGASTRODUODENOSCOPY (EGD); Surgeon: Elise Overton MD;  Location:  MAIN OR;  Service:    • MT ESOPHAGOGASTRODUODENOSCOPY TRANSORAL DIAGNOSTIC N/A 4/26/2016    Procedure: ESOPHAGOGASTRODUODENOSCOPY (EGD);   Surgeon: Leon Dominguez MD;  Location:  MAIN OR;  Service: Gastroenterology   • TONSILLECTOMY         Family History:  Family history reviewed and non-contributory  Family History   Problem Relation Age of Onset   • Heart disease Mother         cardiac disorder   • Stroke Father    • Heart disease Father         cardiac disorder   • Heart disease Sister    • Diabetes Family    • Hypertension Family        Social History:  Social History     Socioeconomic History   • Marital status: /Civil Union     Spouse name: None   • Number of children: None   • Years of education: None   • Highest education level: None   Occupational History   • None   Tobacco Use   • Smoking status: Former     Types: Cigarettes     Quit date: 1980     Years since quittin.7   • Smokeless tobacco: Never   • Tobacco comments:     quit 20 years ago   Vaping Use   • Vaping Use: Never used   Substance and Sexual Activity   • Alcohol use: Yes     Alcohol/week: 14.0 standard drinks of alcohol     Types: 14 Standard drinks or equivalent per week     Comment: 3-4 mixed drinks vodka per night/ 2L per week   • Drug use: No   • Sexual activity: Not Currently   Other Topics Concern   • None   Social History Narrative    Lives with Wife     Social Determinants of Health     Financial Resource Strain: Low Risk  (2023)    Overall Financial Resource Strain (CARDIA)    • Difficulty of Paying Living Expenses: Not very hard   Food Insecurity: No Food Insecurity (2023)    Hunger Vital Sign    • Worried About Running Out of Food in the Last Year: Never true    • Ran Out of Food in the Last Year: Never true   Transportation Needs: No Transportation Needs (2023)    PRAPARE - Transportation    • Lack of Transportation (Medical): No    • Lack of Transportation (Non-Medical): No   Physical Activity: Not on file   Stress: Not on file   Social Connections: Not on file   Intimate Partner Violence: Not At Risk (2023)    Humiliation, Afraid, Rape, and Kick questionnaire    • Fear of Current or Ex-Partner: No    • Emotionally Abused: No    • Physically Abused: No    • Sexually Abused: No   Housing Stability: Low Risk  (2023)    Housing Stability Vital Sign    • Unable to Pay for Housing in the Last Year: No    • Number of [address] in the Last Year: 1    • Unstable Housing in the Last Year: No       Allergies:    Allergies   Allergen Reactions   • Morphine And Related            Labs:  0   Lab Value Date/Time    HCT 23.1 (L) 2023 0336    HCT 23.3 (L) 2023 1234    HCT 21.8 (L) 2023 0527    HCT 40.9 2015 1248    HCT 31.5 (L) 2014 0600    HCT 37.8 2014 0618    HGB 7.4 (L) 09/05/2023 0336    HGB 7.6 (L) 09/04/2023 1234    HGB 7.0 (L) 09/04/2023 0527    HGB 14.0 12/22/2015 1248    HGB 10.0 (L) 08/28/2014 0600    HGB 12.1 08/27/2014 0618    INR 1.72 (H) 08/26/2023 2052    INR 1.06 08/26/2014 1320    WBC 16.60 (H) 09/05/2023 0336    WBC 14.04 (H) 09/04/2023 0527    WBC 12.50 (H) 09/03/2023 0221    WBC 8.80 12/22/2015 1248    WBC 5.42 08/28/2014 0600    WBC 7.12 08/27/2014 0618    ESR 76 (H) 04/14/2023 0938    CRP 6.5 (H) 01/19/2016 1257       Meds:    Current Facility-Administered Medications:   •  acetaminophen (TYLENOL) tablet 650 mg, 650 mg, Oral, Q6H PRN, Kae Steiner PA-C, 650 mg at 09/05/23 0344  •  aluminum-magnesium hydroxide-simethicone (MAALOX) oral suspension 30 mL, 30 mL, Oral, Q6H PRN, Kae Steiner PA-C  •  apixaban (ELIQUIS) tablet 2.5 mg, 2.5 mg, Oral, BID, Nena Payne MD, 2.5 mg at 09/04/23 1712  •  aspirin (ECOTRIN LOW STRENGTH) EC tablet 81 mg, 81 mg, Oral, Daily, Kae Steiner PA-C, 81 mg at 09/03/23 9787  •  budesonide (PULMICORT) inhalation solution 0.5 mg, 0.5 mg, Nebulization, Q12H, Kae Steiner PA-C, 0.5 mg at 09/04/23 1955  •  diltiazem (CARDIZEM CD) 24 hr capsule 240 mg, 240 mg, Oral, Daily, Linda Holguin PA-C, 240 mg at 09/03/23 5303  •  fluticasone (FLONASE) 50 mcg/act nasal spray 1 spray, 1 spray, Nasal, Daily, Kae Steiner, PA-C, 1 spray at 28/42/45 0997  •  folic acid (FOLVITE) tablet 1 mg, 1 mg, Oral, Daily, Kae Steiner, PA-C, 1 mg at 09/03/23 6093  •  formoterol (PERFOROMIST) nebulizer solution 20 mcg, 20 mcg, Nebulization, Q12H, Kae Steiner, PA-C, 20 mcg at 09/04/23 1955  •  guaiFENesin (MUCINEX) 12 hr tablet 600 mg, 600 mg, Oral, BID, Kae Steiner, PA-C, 600 mg at 09/04/23 1713  •  ipratropium (ATROVENT) 0.02 % inhalation solution 0.5 mg, 0.5 mg, Nebulization, TID, Teresa Henson MD, 0.5 mg at 09/04/23 1955  •  lactulose oral solution 20 g, 20 g, Oral, BID, Silvia Luna, LOLI  •  lactulose retention enema 200 g, 200 g, Rectal, BID PRN, Ameena Willett PA-C  •  levalbuterol [facility]) inhalation solution 1.25 mg, 1.25 mg, Nebulization, TID, Lew Ahuja MD, 1.25 mg at 09/04/23 1954  •  loratadine (CLARITIN) tablet 10 mg, 10 mg, Oral, Daily, Tamiko Moran PA-C, 10 mg at 09/03/23 5165  •  magnesium Oxide (MAG-OX) tablet 400 mg, 400 mg, Oral, Daily, Tamiko Moran PA-C, 400 mg at 09/03/23 2862  •  meropenem (MERREM) 500 mg in sodium chloride 0.9 % 50 mL IVPB, 500 mg, Intravenous, Q12H, Karon Ritchie MD, Last Rate: 100 mL/hr at 09/04/23 2202, 500 mg at 09/04/23 2202  •  metoprolol tartrate (LOPRESSOR) tablet 50 mg, 50 mg, Oral, Q12H [facility], Kalyani Angel Holguin PA-C, 50 mg at 09/04/23 2158  •  ondansetron [facility]) injection 4 mg, 4 mg, Intravenous, Q6H PRN, Tamiko Moran PA-C, 4 mg at 08/28/23 1748  •  oxyCODONE (ROXICODONE) IR tablet 5 mg, 5 mg, Oral, Q6H PRN, Lew Ahuja MD, 5 mg at 09/05/23 0344  •  pantoprazole (PROTONIX) EC tablet 40 mg, 40 mg, Oral, Daily Before Breakfast, Tamiko Moran PA-C, 40 mg at 09/05/23 5941  •  saccharomyces boulardii (FLORASTOR) capsule 250 mg, 250 mg, Oral, BID, Wandy Tuttle PA-C, 250 mg at 09/04/23 1712  •  senna (SENOKOT) tablet 8.6 mg, 1 tablet, Oral, HS, Tamiko oMran PA-C, 8.6 mg at 09/04/23 2158  •  vancomycin (VANCOCIN) IVPB (premix in dextrose) 750 mg 150 mL, 10 mg/kg (Adjusted), Intravenous, Daily PRN, Lew Ahuja MD    Blood Culture:   Lab Results   Component Value Date    BLOODCX No Growth at 24 hrs. 09/03/2023    BLOODCX No Growth at 24 hrs. 09/03/2023       Wound Culture:   Lab Results   Component Value Date    WOUNDCULT Culture too young- will reincubate 09/03/2023       Ins and Outs:  I/O last 24 hours:   In: 240 [P.O.:240]  Out: 150 [Urine:150]          Physical Exam:   /62   Pulse (!) 113   Temp (!) 97.4 °F (36.3 °C)   Resp 16   Ht 5' 8" (1.727 m)   Wt 91.5 kg (201 lb 11.5 oz) SpO2 96%   BMI 30.67 kg/m²     Musculoskeletal: right upper extremity  · Skin no active drainage  · No erythema over olecrenon bursa  · No reaction or complaints with palpation to olecranon bursa  · Full active & passive ROM at elbow  · SILT m/r/u.   · Motor intact ain/pin/m/r/u,   · 2+ rad pulse      Assessment:  88 y.o.male R olecrenon bursitis s/p bedside I and D    Plan:   · abx per primary team  · olecrenon bursa improved with less redness and swelling  · F/u cultures. Not finalized yet. · No surgical intervention recommended at this time. We will continue to follow until cultures are finalized. Can follow-up as outpatient.     Lj Forde PA-C

## 2023-09-05 NOTE — DISCHARGE INSTR - OTHER ORDERS
Skin and Wound Care Plan:   1. Allevyn life heel foams to bilateral heels, karla with T, date, and peel back daily for skin assessment, change every 3 days or with soilage or dislodgement. 2. Cleanse upper buttocks-sacrum and right elbow with Remedy foaming cleanser, pat dry. Place Xeroform on wound beds to right and left buttock, cover with Allevyn life silicone bordered foam dressing, karla with T, date, change every other day and PRN  3. P-500 low air loss therapy surface  4. Turn and reposition patient Q2 hours  5. Ehob offloading cushion to chair when OOB  6. Elevate heels off of bed with pillows at all times to offload  7.  Apply skin nourishing cream to the skin daily

## 2023-09-05 NOTE — ASSESSMENT & PLAN NOTE
· Went into A-fib with RVR on August 28 and was given IV Lopressor and Cardizem  · Home regimen: Coreg 25 Mg twice daily  · Started on Eliquis 2.5 mg twice daily for anticoagulation during hospitalization at [address] 8/8-8/23  · Cardiology consult appreciated  · Tachycardia today, likely in the setting of TOBI,

## 2023-09-05 NOTE — ASSESSMENT & PLAN NOTE
Lab Results   Component Value Date    EGFR 10 09/05/2023    EGFR 11 09/04/2023    EGFR 14 09/03/2023    CREATININE 4.67 (H) 09/05/2023    CREATININE 4.33 (H) 09/04/2023    CREATININE 3.63 (H) 09/03/2023   · Baseline creatinine 1.6-1.9, POA- 1.7  · Likely secondary to autoregulatory failure, ATN secondary to sepsis, medications  · Nephrology following  · Urinary retention protocol  · I/O monitoring  · Renally dose medications  · Changed Abx for kidney failure

## 2023-09-06 ENCOUNTER — APPOINTMENT (INPATIENT)
Dept: RADIOLOGY | Facility: HOSPITAL | Age: 88
DRG: 193 | End: 2023-09-06
Payer: MEDICARE

## 2023-09-06 PROBLEM — M79.89 SWELLING OF FINGER, LEFT: Status: ACTIVE | Noted: 2023-09-06

## 2023-09-06 LAB
ALBUMIN SERPL BCP-MCNC: 2.9 G/DL (ref 3.5–5)
ALP SERPL-CCNC: 65 U/L (ref 34–104)
ALT SERPL W P-5'-P-CCNC: 8 U/L (ref 7–52)
ANION GAP SERPL CALCULATED.3IONS-SCNC: 14 MMOL/L
APTT PPP: 47 SECONDS (ref 23–37)
AST SERPL W P-5'-P-CCNC: 12 U/L (ref 13–39)
ATRIAL RATE: 241 BPM
BACTERIA BLD CULT: NORMAL
BACTERIA BLD CULT: NORMAL
BACTERIA WND AEROBE CULT: ABNORMAL
BASOPHILS # BLD AUTO: 0.03 THOUSANDS/ÂΜL (ref 0–0.1)
BASOPHILS NFR BLD AUTO: 0 % (ref 0–1)
BILIRUB SERPL-MCNC: 0.8 MG/DL (ref 0.2–1)
BUN SERPL-MCNC: 101 MG/DL (ref 5–25)
CALCIUM ALBUM COR SERPL-MCNC: 10 MG/DL (ref 8.3–10.1)
CALCIUM SERPL-MCNC: 9.1 MG/DL (ref 8.4–10.2)
CHLORIDE SERPL-SCNC: 105 MMOL/L (ref 96–108)
CK SERPL-CCNC: 72 U/L (ref 39–308)
CO2 SERPL-SCNC: 21 MMOL/L (ref 21–32)
CREAT SERPL-MCNC: 4.93 MG/DL (ref 0.6–1.3)
EOSINOPHIL # BLD AUTO: 0.34 THOUSAND/ÂΜL (ref 0–0.61)
EOSINOPHIL NFR BLD AUTO: 2 % (ref 0–6)
EOSINOPHIL NFR URNS MANUAL: 0 %
ERYTHROCYTE [DISTWIDTH] IN BLOOD BY AUTOMATED COUNT: 13.9 % (ref 11.6–15.1)
ERYTHROCYTE [DISTWIDTH] IN BLOOD BY AUTOMATED COUNT: 14.1 % (ref 11.6–15.1)
FERRITIN SERPL-MCNC: 535 NG/ML (ref 24–336)
GFR SERPL CREATININE-BSD FRML MDRD: 9 ML/MIN/1.73SQ M
GLUCOSE SERPL-MCNC: 145 MG/DL (ref 65–140)
GRAM STN SPEC: ABNORMAL
GRAM STN SPEC: ABNORMAL
HCT VFR BLD AUTO: 22.4 % (ref 36.5–49.3)
HCT VFR BLD AUTO: 24.6 % (ref 36.5–49.3)
HGB BLD-MCNC: 7.3 G/DL (ref 12–17)
HGB BLD-MCNC: 8.1 G/DL (ref 12–17)
IMM GRANULOCYTES # BLD AUTO: 0.26 THOUSAND/UL (ref 0–0.2)
IMM GRANULOCYTES NFR BLD AUTO: 2 % (ref 0–2)
INR PPP: 2.67 (ref 0.84–1.19)
IRON SERPL-MCNC: 18 UG/DL (ref 50–212)
KAPPA LC FREE SER-MCNC: 167.2 MG/L (ref 3.3–19.4)
KAPPA LC FREE/LAMBDA FREE SER: 0.84 {RATIO} (ref 0.26–1.65)
LAMBDA LC FREE SERPL-MCNC: 198 MG/L (ref 5.7–26.3)
LYMPHOCYTES # BLD AUTO: 1.14 THOUSANDS/ÂΜL (ref 0.6–4.47)
LYMPHOCYTES NFR BLD AUTO: 7 % (ref 14–44)
MAGNESIUM SERPL-MCNC: 2.1 MG/DL (ref 1.9–2.7)
MCH RBC QN AUTO: 31.3 PG (ref 26.8–34.3)
MCH RBC QN AUTO: 31.4 PG (ref 26.8–34.3)
MCHC RBC AUTO-ENTMCNC: 32.6 G/DL (ref 31.4–37.4)
MCHC RBC AUTO-ENTMCNC: 32.9 G/DL (ref 31.4–37.4)
MCV RBC AUTO: 95 FL (ref 82–98)
MCV RBC AUTO: 96 FL (ref 82–98)
MONOCYTES # BLD AUTO: 1.4 THOUSAND/ÂΜL (ref 0.17–1.22)
MONOCYTES NFR BLD AUTO: 9 % (ref 4–12)
NEUTROPHILS # BLD AUTO: 13.15 THOUSANDS/ÂΜL (ref 1.85–7.62)
NEUTS SEG NFR BLD AUTO: 80 % (ref 43–75)
NRBC BLD AUTO-RTO: 0 /100 WBCS
PHOSPHATE SERPL-MCNC: 5.8 MG/DL (ref 2.3–4.1)
PLATELET # BLD AUTO: 330 THOUSANDS/UL (ref 149–390)
PLATELET # BLD AUTO: 355 THOUSANDS/UL (ref 149–390)
PMV BLD AUTO: 9.6 FL (ref 8.9–12.7)
PMV BLD AUTO: 9.7 FL (ref 8.9–12.7)
POTASSIUM SERPL-SCNC: 3.1 MMOL/L (ref 3.5–5.3)
PROT SERPL-MCNC: 6.2 G/DL (ref 6.4–8.4)
PROTHROMBIN TIME: 29.8 SECONDS (ref 11.6–14.5)
QRS AXIS: -18 DEGREES
QRSD INTERVAL: 86 MS
QT INTERVAL: 326 MS
QTC INTERVAL: 436 MS
RBC # BLD AUTO: 2.33 MILLION/UL (ref 3.88–5.62)
RBC # BLD AUTO: 2.58 MILLION/UL (ref 3.88–5.62)
SODIUM SERPL-SCNC: 140 MMOL/L (ref 135–147)
T WAVE AXIS: 75 DEGREES
TIBC SERPL-MCNC: <73 UG/DL (ref 250–450)
UIBC SERPL-MCNC: <55 UG/DL (ref 155–355)
VENTRICULAR RATE: 108 BPM
WBC # BLD AUTO: 16.32 THOUSAND/UL (ref 4.31–10.16)
WBC # BLD AUTO: 18.94 THOUSAND/UL (ref 4.31–10.16)

## 2023-09-06 PROCEDURE — 84100 ASSAY OF PHOSPHORUS: CPT | Performed by: PHYSICIAN ASSISTANT

## 2023-09-06 PROCEDURE — 85027 COMPLETE CBC AUTOMATED: CPT | Performed by: INTERNAL MEDICINE

## 2023-09-06 PROCEDURE — 85610 PROTHROMBIN TIME: CPT | Performed by: INTERNAL MEDICINE

## 2023-09-06 PROCEDURE — 83735 ASSAY OF MAGNESIUM: CPT | Performed by: INTERNAL MEDICINE

## 2023-09-06 PROCEDURE — 82550 ASSAY OF CK (CPK): CPT | Performed by: PHYSICIAN ASSISTANT

## 2023-09-06 PROCEDURE — 87205 SMEAR GRAM STAIN: CPT | Performed by: PHYSICIAN ASSISTANT

## 2023-09-06 PROCEDURE — 71045 X-RAY EXAM CHEST 1 VIEW: CPT

## 2023-09-06 PROCEDURE — 93010 ELECTROCARDIOGRAM REPORT: CPT | Performed by: INTERNAL MEDICINE

## 2023-09-06 PROCEDURE — 85025 COMPLETE CBC W/AUTO DIFF WBC: CPT | Performed by: INTERNAL MEDICINE

## 2023-09-06 PROCEDURE — 74018 RADEX ABDOMEN 1 VIEW: CPT

## 2023-09-06 PROCEDURE — 94760 N-INVAS EAR/PLS OXIMETRY 1: CPT

## 2023-09-06 PROCEDURE — 80053 COMPREHEN METABOLIC PANEL: CPT | Performed by: INTERNAL MEDICINE

## 2023-09-06 PROCEDURE — 99232 SBSQ HOSP IP/OBS MODERATE 35: CPT | Performed by: STUDENT IN AN ORGANIZED HEALTH CARE EDUCATION/TRAINING PROGRAM

## 2023-09-06 PROCEDURE — 99233 SBSQ HOSP IP/OBS HIGH 50: CPT | Performed by: INTERNAL MEDICINE

## 2023-09-06 PROCEDURE — 83540 ASSAY OF IRON: CPT | Performed by: PHYSICIAN ASSISTANT

## 2023-09-06 PROCEDURE — 82728 ASSAY OF FERRITIN: CPT | Performed by: PHYSICIAN ASSISTANT

## 2023-09-06 PROCEDURE — 83550 IRON BINDING TEST: CPT | Performed by: PHYSICIAN ASSISTANT

## 2023-09-06 PROCEDURE — 94640 AIRWAY INHALATION TREATMENT: CPT

## 2023-09-06 PROCEDURE — 87070 CULTURE OTHR SPECIMN AEROBIC: CPT | Performed by: PHYSICIAN ASSISTANT

## 2023-09-06 PROCEDURE — 20605 DRAIN/INJ JOINT/BURSA W/O US: CPT | Performed by: PHYSICIAN ASSISTANT

## 2023-09-06 PROCEDURE — 85730 THROMBOPLASTIN TIME PARTIAL: CPT | Performed by: INTERNAL MEDICINE

## 2023-09-06 RX ORDER — POTASSIUM CHLORIDE 20MEQ/15ML
40 LIQUID (ML) ORAL ONCE
Status: COMPLETED | OUTPATIENT
Start: 2023-09-06 | End: 2023-09-06

## 2023-09-06 RX ORDER — HEPARIN SODIUM 1000 [USP'U]/ML
4000 INJECTION, SOLUTION INTRAVENOUS; SUBCUTANEOUS EVERY 6 HOURS PRN
Status: DISCONTINUED | OUTPATIENT
Start: 2023-09-06 | End: 2023-09-10

## 2023-09-06 RX ORDER — COLCHICINE 0.6 MG/1
0.3 TABLET ORAL ONCE
Status: COMPLETED | OUTPATIENT
Start: 2023-09-06 | End: 2023-09-06

## 2023-09-06 RX ORDER — CALCIUM ACETATE 667 MG/1
667 CAPSULE ORAL
Status: DISCONTINUED | OUTPATIENT
Start: 2023-09-06 | End: 2023-09-07

## 2023-09-06 RX ORDER — PANTOPRAZOLE SODIUM 40 MG/10ML
40 INJECTION, POWDER, LYOPHILIZED, FOR SOLUTION INTRAVENOUS
Status: DISCONTINUED | OUTPATIENT
Start: 2023-09-07 | End: 2023-09-11

## 2023-09-06 RX ORDER — METOPROLOL TARTRATE 5 MG/5ML
5 INJECTION INTRAVENOUS EVERY 6 HOURS
Status: DISCONTINUED | OUTPATIENT
Start: 2023-09-06 | End: 2023-09-11

## 2023-09-06 RX ORDER — HEPARIN SODIUM 1000 [USP'U]/ML
4000 INJECTION, SOLUTION INTRAVENOUS; SUBCUTANEOUS ONCE
Status: COMPLETED | OUTPATIENT
Start: 2023-09-06 | End: 2023-09-06

## 2023-09-06 RX ORDER — HEPARIN SODIUM 1000 [USP'U]/ML
2000 INJECTION, SOLUTION INTRAVENOUS; SUBCUTANEOUS EVERY 6 HOURS PRN
Status: DISCONTINUED | OUTPATIENT
Start: 2023-09-06 | End: 2023-09-10

## 2023-09-06 RX ORDER — COLCHICINE 0.6 MG/1
0.6 TABLET ORAL ONCE
Status: COMPLETED | OUTPATIENT
Start: 2023-09-06 | End: 2023-09-06

## 2023-09-06 RX ORDER — HEPARIN SODIUM 10000 [USP'U]/100ML
3-20 INJECTION, SOLUTION INTRAVENOUS
Status: DISCONTINUED | OUTPATIENT
Start: 2023-09-06 | End: 2023-09-10

## 2023-09-06 RX ADMIN — COLCHICINE 0.3 MG: 0.6 TABLET ORAL at 13:24

## 2023-09-06 RX ADMIN — METOLAZONE 10 MG: 2.5 TABLET ORAL at 02:25

## 2023-09-06 RX ADMIN — IPRATROPIUM BROMIDE 0.5 MG: 0.5 SOLUTION RESPIRATORY (INHALATION) at 07:29

## 2023-09-06 RX ADMIN — METOPROLOL TARTRATE 5 MG: 1 INJECTION, SOLUTION INTRAVENOUS at 21:15

## 2023-09-06 RX ADMIN — DILTIAZEM HYDROCHLORIDE 240 MG: 240 CAPSULE, COATED, EXTENDED RELEASE ORAL at 09:52

## 2023-09-06 RX ADMIN — BUDESONIDE 0.5 MG: 0.5 INHALANT ORAL at 07:29

## 2023-09-06 RX ADMIN — MEROPENEM 500 MG: 500 INJECTION, POWDER, FOR SOLUTION INTRAVENOUS at 23:02

## 2023-09-06 RX ADMIN — LEVALBUTEROL HYDROCHLORIDE 1.25 MG: 1.25 SOLUTION RESPIRATORY (INHALATION) at 19:27

## 2023-09-06 RX ADMIN — METOLAZONE 2.5 MG: 2.5 TABLET ORAL at 13:23

## 2023-09-06 RX ADMIN — ASPIRIN 81 MG: 81 TABLET, COATED ORAL at 09:15

## 2023-09-06 RX ADMIN — LEVALBUTEROL HYDROCHLORIDE 1.25 MG: 1.25 SOLUTION RESPIRATORY (INHALATION) at 07:29

## 2023-09-06 RX ADMIN — METOLAZONE 10 MG: 2.5 TABLET ORAL at 09:15

## 2023-09-06 RX ADMIN — HEPARIN SODIUM 4000 UNITS: 1000 INJECTION INTRAVENOUS; SUBCUTANEOUS at 21:34

## 2023-09-06 RX ADMIN — APIXABAN 2.5 MG: 2.5 TABLET, FILM COATED ORAL at 09:15

## 2023-09-06 RX ADMIN — HEPARIN SODIUM 11.1 UNITS/KG/HR: 10000 INJECTION, SOLUTION INTRAVENOUS at 21:19

## 2023-09-06 RX ADMIN — Medication 40 MG/HR: at 05:28

## 2023-09-06 RX ADMIN — LORATADINE 10 MG: 10 TABLET ORAL at 09:16

## 2023-09-06 RX ADMIN — POTASSIUM CHLORIDE 40 MEQ: 1.5 SOLUTION ORAL at 09:16

## 2023-09-06 RX ADMIN — COLCHICINE 0.6 MG: 0.6 TABLET ORAL at 13:23

## 2023-09-06 RX ADMIN — LEVALBUTEROL HYDROCHLORIDE 1.25 MG: 1.25 SOLUTION RESPIRATORY (INHALATION) at 13:29

## 2023-09-06 RX ADMIN — MAGNESIUM OXIDE TAB 400 MG (241.3 MG ELEMENTAL MG) 400 MG: 400 (241.3 MG) TAB at 09:15

## 2023-09-06 RX ADMIN — FORMOTEROL FUMARATE DIHYDRATE 20 MCG: 20 SOLUTION RESPIRATORY (INHALATION) at 19:29

## 2023-09-06 RX ADMIN — POTASSIUM CHLORIDE 40 MEQ: 1.5 SOLUTION ORAL at 14:00

## 2023-09-06 RX ADMIN — MEROPENEM 500 MG: 500 INJECTION, POWDER, FOR SOLUTION INTRAVENOUS at 11:00

## 2023-09-06 RX ADMIN — Medication 40 MG/HR: at 16:04

## 2023-09-06 RX ADMIN — IPRATROPIUM BROMIDE 0.5 MG: 0.5 SOLUTION RESPIRATORY (INHALATION) at 13:29

## 2023-09-06 RX ADMIN — PANTOPRAZOLE SODIUM 40 MG: 40 TABLET, DELAYED RELEASE ORAL at 06:39

## 2023-09-06 RX ADMIN — FORMOTEROL FUMARATE DIHYDRATE 20 MCG: 20 SOLUTION RESPIRATORY (INHALATION) at 07:51

## 2023-09-06 RX ADMIN — Medication 250 MG: at 09:15

## 2023-09-06 RX ADMIN — BUDESONIDE 0.5 MG: 0.5 INHALANT ORAL at 19:28

## 2023-09-06 RX ADMIN — GUAIFENESIN 600 MG: 600 TABLET ORAL at 09:15

## 2023-09-06 RX ADMIN — METOPROLOL TARTRATE 50 MG: 50 TABLET ORAL at 09:15

## 2023-09-06 RX ADMIN — SODIUM BICARBONATE 650 MG TABLET 1300 MG: at 09:15

## 2023-09-06 RX ADMIN — IPRATROPIUM BROMIDE 0.5 MG: 0.5 SOLUTION RESPIRATORY (INHALATION) at 19:27

## 2023-09-06 RX ADMIN — FLUTICASONE PROPIONATE 1 SPRAY: 50 SPRAY, METERED NASAL at 09:44

## 2023-09-06 RX ADMIN — ACETAMINOPHEN 325MG 650 MG: 325 TABLET ORAL at 03:17

## 2023-09-06 RX ADMIN — FOLIC ACID 1 MG: 1 TABLET ORAL at 09:15

## 2023-09-06 NOTE — ASSESSMENT & PLAN NOTE
Patient is confused likely acute metabolic encephalopathy in setting of UTI versus septic  Bursitis Vs TOBI  IMPROVING- oriented X3 today  Frequent reorientation  Monitor CBC, CMP  Follow up on Cx  Neurochecks  On IV antibiotics  ID, nephrology consulted, appreciate recommendations

## 2023-09-06 NOTE — ASSESSMENT & PLAN NOTE
CT abdomen pelvis showed-  Cholelithiasis with possible impacted stone at the neck, gallbladder wall thickening and/or pericholecystic edema and sigmoid colon stricturing. No abdominal tenderness, Surgery and GI recommends follow up with serial abdominal exams.  No plan for surgery as it could be likely chronic problem

## 2023-09-06 NOTE — ASSESSMENT & PLAN NOTE
· Patient reports dysuria and difficulty urinating   · UA with innumerable WBCs and innumerable bacteria  · Urine culture grew ESBL E. coli  ID following.  Continue Abx as per ID

## 2023-09-06 NOTE — ASSESSMENT & PLAN NOTE
· Hemoglobin 9.4 on admission  · Hemoglobin ranging 10-12 during recent [facility] admission  · No signs of active bleeding  · Trend CBC  · Pending Iron def anemia work up, will follow up.

## 2023-09-06 NOTE — PLAN OF CARE
Problem: Potential for Falls  Goal: Patient will remain free of falls  Description: INTERVENTIONS:  - Educate patient/family on patient safety including physical limitations  - Instruct patient to call for assistance with activity   - Consult OT/PT to assist with strengthening/mobility   - Keep Call bell within reach  - Keep bed low and locked with side rails adjusted as appropriate  - Keep care items and personal belongings within reach  - Initiate and maintain comfort rounds  - Make Fall Risk Sign visible to staff  - Offer Toileting every 2 Hours, in advance of need  - Initiate/Maintain bed alarm  - Obtain necessary fall risk management equipment:   - Apply yellow socks and bracelet for high fall risk patients  - Consider moving patient to room near nurses station  Outcome: Progressing     Problem: PAIN - ADULT  Goal: Verbalizes/displays adequate comfort level or baseline comfort level  Description: Interventions:  - Encourage patient to monitor pain and request assistance  - Assess pain using appropriate pain scale  - Administer analgesics based on type and severity of pain and evaluate response  - Implement non-pharmacological measures as appropriate and evaluate response  - Consider cultural and social influences on pain and pain management  - Notify physician/advanced practitioner if interventions unsuccessful or patient reports new pain  Outcome: Progressing     Problem: INFECTION - ADULT  Goal: Absence or prevention of progression during hospitalization  Description: INTERVENTIONS:  - Assess and monitor for signs and symptoms of infection  - Monitor lab/diagnostic results  - Monitor all insertion sites, i.e. indwelling lines, tubes, and drains  - Monitor endotracheal if appropriate and nasal secretions for changes in amount and color  - Moran appropriate cooling/warming therapies per order  - Administer medications as ordered  - Instruct and encourage patient and family to use good hand hygiene technique  - Identify and instruct in appropriate isolation precautions for identified infection/condition  Outcome: Progressing  Goal: Absence of fever/infection during neutropenic period  Description: INTERVENTIONS:  - Monitor WBC    Outcome: Progressing     Problem: SAFETY ADULT  Goal: Maintain or return to baseline ADL function  Description: INTERVENTIONS:  -  Assess patient's ability to carry out ADLs; assess patient's baseline for ADL function and identify physical deficits which impact ability to perform ADLs (bathing, care of mouth/teeth, toileting, grooming, dressing, etc.)  - Assess/evaluate cause of self-care deficits   - Assess range of motion  - Assess patient's mobility; develop plan if impaired  - Assess patient's need for assistive devices and provide as appropriate  - Encourage maximum independence but intervene and supervise when necessary  - Involve family in performance of ADLs  - Assess for home care needs following discharge   - Consider OT consult to assist with ADL evaluation and planning for discharge  - Provide patient education as appropriate  Outcome: Progressing  Goal: Maintains/Returns to pre admission functional level  Description: INTERVENTIONS:  - Perform BMAT or MOVE assessment daily.   - Set and communicate daily mobility goal to care team and patient/family/caregiver. - Collaborate with rehabilitation services on mobility goals if consulted  - Perform Range of Motion 3 times a day. - Reposition patient every 2 hours.   - Dangle patient 2 times a day  - Stand patient 2 times a day  - Ambulate patient 2 times a day  - Out of bed to chair 2 times a day   - Out of bed for meals 2 times a day  - Out of bed for toileting  - Record patient progress and toleration of activity level   Outcome: Progressing     Problem: DISCHARGE PLANNING  Goal: Discharge to home or other facility with appropriate resources  Description: INTERVENTIONS:  - Identify barriers to discharge w/patient and caregiver  - Arrange for needed discharge resources and transportation as appropriate  - Identify discharge learning needs (meds, wound care, etc.)  - Arrange for interpretive services to assist at discharge as needed  - Refer to Case Management Department for coordinating discharge planning if the patient needs post-hospital services based on physician/advanced practitioner order or complex needs related to functional status, cognitive ability, or social support system  Outcome: Progressing     Problem: Knowledge Deficit  Goal: Patient/family/caregiver demonstrates understanding of disease process, treatment plan, medications, and discharge instructions  Description: Complete learning assessment and assess knowledge base.   Interventions:  - Provide teaching at level of understanding  - Provide teaching via preferred learning methods  Outcome: Progressing     Problem: Prexisting or High Potential for Compromised Skin Integrity  Goal: Skin integrity is maintained or improved  Description: INTERVENTIONS:  - Identify patients at risk for skin breakdown  - Assess and monitor skin integrity  - Assess and monitor nutrition and hydration status  - Monitor labs   - Assess for incontinence   - Turn and reposition patient  - Assist with mobility/ambulation  - Relieve pressure over bony prominences  - Avoid friction and shearing  - Provide appropriate hygiene as needed including keeping skin clean and dry  - Evaluate need for skin moisturizer/barrier cream  - Collaborate with interdisciplinary team   - Patient/family teaching  - Consider wound care consult   Outcome: Progressing     Problem: MOBILITY - ADULT  Goal: Maintain or return to baseline ADL function  Description: INTERVENTIONS:  -  Assess patient's ability to carry out ADLs; assess patient's baseline for ADL function and identify physical deficits which impact ability to perform ADLs (bathing, care of mouth/teeth, toileting, grooming, dressing, etc.)  - Assess/evaluate cause of self-care deficits   - Assess range of motion  - Assess patient's mobility; develop plan if impaired  - Assess patient's need for assistive devices and provide as appropriate  - Encourage maximum independence but intervene and supervise when necessary  - Involve family in performance of ADLs  - Assess for home care needs following discharge   - Consider OT consult to assist with ADL evaluation and planning for discharge  - Provide patient education as appropriate  Outcome: Progressing  Goal: Maintains/Returns to pre admission functional level  Description: INTERVENTIONS:  - Perform BMAT or MOVE assessment daily.   - Set and communicate daily mobility goal to care team and patient/family/caregiver. - Collaborate with rehabilitation services on mobility goals if consulted  - Perform Range of Motion 3 times a day. - Reposition patient every 2 hours. - Dangle patient 2 times a day  - Stand patient 2 times a day  - Ambulate patient 2 times a day  - Out of bed to chair 2 times a day   - Out of bed for meals 2 times a day  - Out of bed for toileting  - Record patient progress and toleration of activity level   Outcome: Progressing     Problem: Nutrition/Hydration-ADULT  Goal: Nutrient/Hydration intake appropriate for improving, restoring or maintaining nutritional needs  Description: Monitor and assess patient's nutrition/hydration status for malnutrition. Collaborate with interdisciplinary team and initiate plan and interventions as ordered. Monitor patient's weight and dietary intake as ordered or per policy. Utilize nutrition screening tool and intervene as necessary. Determine patient's food preferences and provide high-protein, high-caloric foods as appropriate.      INTERVENTIONS:  - Monitor oral intake, urinary output, labs, and treatment plans  - Assess nutrition and hydration status and recommend course of action  - Evaluate amount of meals eaten  - Assist patient with eating if necessary   - Allow adequate time for meals  - Recommend/ encourage appropriate diets, oral nutritional supplements, and vitamin/mineral supplements  - Order, calculate, and assess calorie counts as needed  - Recommend, monitor, and adjust tube feedings and TPN/PPN based on assessed needs  - Assess need for intravenous fluids  - Provide specific nutrition/hydration education as appropriate  - Include patient/family/caregiver in decisions related to nutrition  Outcome: Progressing

## 2023-09-06 NOTE — PROGRESS NOTES
Orthopedics   Will Berumen 82 y.o. male MRN: [id number]  Unit/Bed#: UB XRAY      HPI:  82 y.o.male with right olecranon bursitis/cellulitis with bedside I&D on 9/3/23. He continues with swelling of the right elbow with some discomfort. He is now complaining of pain in the left elbow with swelling of the bursa, along with multiple joint pains. Not a reliable historian. No issues overnight. No documented fevers. Past Medical History:   Past Medical History:   Diagnosis Date   • Alcohol abuse    • Alcohol withdrawal seizure without complication ([address])    • Arthritis    • Asthma    • CVA (cerebral vascular accident) ([address])    • Decubitus ulcer of buttock     last assessed 07/20/16   • Diabetes ([address])    • Disease of thyroid gland    • Duodenal ulcer    • Emphysema lung ([address])    • Esophageal varices ([facility]CC)    • GERD (gastroesophageal reflux disease)    • History of transfusion    • Hypertension    • Irritable bowel syndrome with diarrhea    • Kidney stones    • Prostate cancer ([address])    • Urinary frequency     resolved 02/15/17       Past Surgical History:   Past Surgical History:   Procedure Laterality Date   • BACK SURGERY     • CATARACT EXTRACTION     • ESOPHAGOGASTRODUODENOSCOPY N/A 2/6/2017    Procedure: ESOPHAGOGASTRODUODENOSCOPY (EGD); Surgeon: Clif Burgess MD;  Location:  MAIN OR;  Service:    • MN ESOPHAGOGASTRODUODENOSCOPY TRANSORAL DIAGNOSTIC N/A 4/26/2016    Procedure: ESOPHAGOGASTRODUODENOSCOPY (EGD);   Surgeon: Anna Arambula MD;  Location:  MAIN OR;  Service: Gastroenterology   • TONSILLECTOMY         Family History:  Family history reviewed and non-contributory  Family History   Problem Relation Age of Onset   • Heart disease Mother         cardiac disorder   • Stroke Father    • Heart disease Father         cardiac disorder   • Heart disease Sister    • Diabetes Family    • Hypertension Family        Social History:  Social History     Socioeconomic History   • Marital status: /[employer]     Spouse name: None   • Number of children: None   • Years of education: None   • Highest education level: None   Occupational History   • None   Tobacco Use   • Smoking status: Former     Types: Cigarettes     Quit date: 1980     Years since quittin.7   • Smokeless tobacco: Never   • Tobacco comments:     quit 20 years ago   Vaping Use   • Vaping Use: Never used   Substance and Sexual Activity   • Alcohol use: Yes     Alcohol/week: 14.0 standard drinks of alcohol     Types: 14 Standard drinks or equivalent per week     Comment: 3-4 mixed drinks vodka per night/ 2L per week   • Drug use: No   • Sexual activity: Not Currently   Other Topics Concern   • None   Social History Narrative    Lives with Wife     Social Determinants of Health     Financial Resource Strain: Low Risk  (2023)    Overall Financial Resource Strain (CARDIA)    • Difficulty of Paying Living Expenses: Not very hard   Food Insecurity: No Food Insecurity (2023)    Hunger Vital Sign    • Worried About Running Out of Food in the Last Year: Never true    • Ran Out of Food in the Last Year: Never true   Transportation Needs: No Transportation Needs (2023)    PRAPARE - Transportation    • Lack of Transportation (Medical): No    • Lack of Transportation (Non-Medical): No   Physical Activity: Not on file   Stress: Not on file   Social Connections: Not on file   Intimate Partner Violence: Not At Risk (2023)    Humiliation, Afraid, Rape, and Kick questionnaire    • Fear of Current or Ex-Partner: No    • Emotionally Abused: No    • Physically Abused: No    • Sexually Abused: No   Housing Stability: Low Risk  (2023)    Housing Stability Vital Sign    • Unable to Pay for Housing in the Last Year: No    • Number of [address] in the Last Year: 1    • Unstable Housing in the Last Year: No       Allergies:    Allergies   Allergen Reactions   • Morphine And Related            Labs:  0   Lab Value Date/Time    HCT 22.4 (L) 09/06/2023 0433    HCT 23.1 (L) 09/05/2023 0336    HCT 23.3 (L) 09/04/2023 1234    HCT 40.9 12/22/2015 1248    HCT 31.5 (L) 08/28/2014 0600    HCT 37.8 08/27/2014 0618    HGB 7.3 (L) 09/06/2023 0433    HGB 7.4 (L) 09/05/2023 0336    HGB 7.6 (L) 09/04/2023 1234    HGB 14.0 12/22/2015 1248    HGB 10.0 (L) 08/28/2014 0600    HGB 12.1 08/27/2014 0618    INR 1.72 (H) 08/26/2023 2052    INR 1.06 08/26/2014 1320    WBC 16.32 (H) 09/06/2023 0433    WBC 16.60 (H) 09/05/2023 0336    WBC 14.04 (H) 09/04/2023 0527    WBC 8.80 12/22/2015 1248    WBC 5.42 08/28/2014 0600    WBC 7.12 08/27/2014 0618    ESR 76 (H) 04/14/2023 0938    CRP 6.5 (H) 01/19/2016 1257       Meds:    Current Facility-Administered Medications:   •  acetaminophen (TYLENOL) tablet 650 mg, 650 mg, Oral, Q6H PRN, Vena Ashly, PA-C, 650 mg at 09/06/23 5916  •  aluminum-magnesium hydroxide-simethicone (MAALOX) oral suspension 30 mL, 30 mL, Oral, Q6H PRN, Vena Ashly, PA-C  •  apixaban (ELIQUIS) tablet 2.5 mg, 2.5 mg, Oral, BID, Melvia Snellen, MD, 2.5 mg at 09/06/23 0915  •  aspirin (ECOTRIN LOW STRENGTH) EC tablet 81 mg, 81 mg, Oral, Daily, Vena Ashly, PA-C, 81 mg at 09/06/23 0915  •  budesonide (PULMICORT) inhalation solution 0.5 mg, 0.5 mg, Nebulization, Q12H, Vena Ashly, PA-C, 0.5 mg at 09/06/23 0615  •  calcium acetate (PHOSLO) capsule 667 mg, 667 mg, Oral, TID With Meals, Tobi Hernandez PA-C  •  DAPTOmycin (CUBICIN) 550 mg in sodium chloride 0.9 % 50 mL IVPB, 6 mg/kg, Intravenous, Q48H, Melodye Fothergill, MD, Last Rate: 100 mL/hr at 09/05/23 1004, 550 mg at 09/05/23 1004  •  diltiazem (CARDIZEM CD) 24 hr capsule 240 mg, 240 mg, Oral, Daily, Altagracia Holguin PA-C, 240 mg at 09/06/23 7214  •  fluticasone (FLONASE) 50 mcg/act nasal spray 1 spray, 1 spray, Nasal, Daily, Brenna Limon PA-C, 1 spray at 89/45/32 0050  •  folic acid (FOLVITE) tablet 1 mg, 1 mg, Oral, Daily, Brenna Limon PA-C, 1 mg at 09/06/23 0915  • formoterol (PERFOROMIST) nebulizer solution 20 mcg, 20 mcg, Nebulization, Q12H, Tamiko Moran PA-C, 20 mcg at 09/06/23 0751  •  furosemide (LASIX) 500 mg infusion 50 mL, 40 mg/hr, Intravenous, Continuous, Dulce Maria Vuong PA-C, Last Rate: 4 mL/hr at 09/06/23 1604, 40 mg/hr at 09/06/23 1604  •  guaiFENesin (MUCINEX) 12 hr tablet 600 mg, 600 mg, Oral, BID, Tamiko Moran PA-C, 600 mg at 09/06/23 1199  •  ipratropium (ATROVENT) 0.02 % inhalation solution 0.5 mg, 0.5 mg, Nebulization, TID, Lew Ahuja MD, 0.5 mg at 09/06/23 1329  •  lactulose retention enema 200 g, 200 g, Rectal, BID PRN, Ameena Willett PA-C  •  levalbuterol Nuzhat [place]) inhalation solution 1.25 mg, 1.25 mg, Nebulization, TID, Lew Ahuja MD, 1.25 mg at 09/06/23 1329  •  loratadine (CLARITIN) tablet 10 mg, 10 mg, Oral, Daily, Tamiko Moran PA-C, 10 mg at 09/06/23 6359  •  magnesium Oxide (MAG-OX) tablet 400 mg, 400 mg, Oral, Daily, Tamiko Moran PA-C, 400 mg at 09/06/23 0915  •  meropenem (MERREM) 500 mg in sodium chloride 0.9 % 50 mL IVPB, 500 mg, Intravenous, Q12H, Karon Ritchie MD, Last Rate: 100 mL/hr at 09/06/23 1100, 500 mg at 09/06/23 1100  •  metolazone (ZAROXOLYN) tablet 10 mg, 10 mg, Oral, Q6H, Dulce Maria Vuong PA-C, 2.5 mg at 09/06/23 1323  •  metoprolol tartrate (LOPRESSOR) tablet 50 mg, 50 mg, Oral, Q12H 2200 Jassi Cheema PA-C, 50 mg at 09/06/23 0915  •  ondansetron (ZOFRAN) injection 4 mg, 4 mg, Intravenous, Q6H PRN, Tamiko Moran PA-C, 4 mg at 08/28/23 1748  •  oxyCODONE (ROXICODONE) IR tablet 5 mg, 5 mg, Oral, Q6H PRN, Lew Ahuja MD, 5 mg at 09/05/23 0344  •  pantoprazole (PROTONIX) EC tablet 40 mg, 40 mg, Oral, Daily Before Breakfast, Tamiko Moran PA-C, 40 mg at 09/06/23 1072  •  saccharomyces boulardii (FLORASTOR) capsule 250 mg, 250 mg, Oral, BID, Wandy Tuttle PA-C, 250 mg at 09/06/23 0915  •  senna (SENOKOT) tablet 8.6 mg, 1 tablet, Oral, HS, Tamiko Moran PA-C, 8.6 mg at 09/05/23 2107  •  sodium bicarbonate tablet 1,300 mg, 1,300 mg, Oral, BID after meals, Jose Azar PA-C, 1,300 mg at 09/06/23 0915    Blood Culture:   Lab Results   Component Value Date    BLOODCX No Growth at 72 hrs. 09/03/2023    BLOODCX No Growth at 72 hrs. 09/03/2023       Wound Culture:   Lab Results   Component Value Date    WOUNDCULT 3 colonies Staphylococcus coagulase negative (A) 09/03/2023       Ins and Outs:  I/O last 24 hours: In: 360 [P.O.:360]  Out: 3356 [Urine:3356]          Physical Exam:   /59   Pulse 92   Temp (!) 97.3 °F (36.3 °C)   Resp 18   Ht 5' 8" (1.727 m)   Wt 91.5 kg (201 lb 11.5 oz)   SpO2 98%   BMI 30.67 kg/m²     Musculoskeletal: right upper extremity  · Skin no active drainage  · Mild erythema over olecrenon bursa  · No reaction or complaints with palpation to olecranon bursa  · +swelling and fluid collection of olecranon bursa  · Full active & passive ROM at elbow  · SILT m/r/u.   · Motor intact ain/pin/m/r/u,   · 2+ rad pulse     Left upper extremity  · Skin no active drainage  · No erythema over olecrenon bursa  · No reaction or complaints with palpation to olecranon bursa  · +swelling and fluid collection of olecranon bursa  · Full active & passive ROM at elbow  · SILT m/r/u.   · Motor intact ain/pin/m/r/u,   · 2+ rad pulse      Assessment:  88 y.o.male R olecrenon bursitis s/p bedside I and D    Plan:   · abx per primary team  · Right olecrenon bursa with continued swelling and mild erythema. · Left olecranon bursa with no swelling and palpable fluid collection, no significant erythema. · F/u cultures, they are currently growing 3 colonies of coag neg staph as of this afternoon. · Left elbow olecranon bursa aspirated today yielding 2ccs of purulent material, sent for culture. · Continue to follow clinically and cultures of the left elbow. · We will make him NPO at midnight and check clinically tomorrow for worsening.       Reece Dumont PA-C

## 2023-09-06 NOTE — ASSESSMENT & PLAN NOTE
Meets criteria with leukocytosis, tachycardia  Likely secondary to ESBL UTI/Septic olecranon bursitis  F/u Blood Cx- Negative growth so far, wound Cx- growing coag negative staph  F/u vitals  F/u CBC, CMP and procal  Abx as per ID recommendations- currently on Dapto and ertapenem

## 2023-09-06 NOTE — ASSESSMENT & PLAN NOTE
· Went into A-fib with RVR on August 28 and was given IV Lopressor and Cardizem  · Home regimen: Coreg 25 Mg twice daily  · Started on Eliquis 2.5 mg twice daily for anticoagulation during hospitalization at [address] [facility] [address] [facility] [address] 8/8-8/23  · Cardiology consult appreciated  · Rate controlled

## 2023-09-06 NOTE — PROGRESS NOTES
[facility]  Progress Note  Name: Jayne Canela  MRN: [id number]  Unit/Bed#: -01 I Date of Admission: 8/26/2023   Date of Service: 9/6/2023 I Hospital Day: 11    Assessment/Plan   Sepsis [facility])  Assessment & Plan  Meets criteria with leukocytosis, tachycardia  Likely secondary to ESBL UTI/Septic olecranon bursitis  F/u Blood Cx- Negative growth so far, wound Cx- growing coag negative staph  F/u vitals  F/u CBC, CMP and procal  Abx as per ID recommendations- currently on Dapto and ertapenem      Acute encephalopathy  Assessment & Plan  Patient is confused likely acute metabolic encephalopathy in setting of UTI versus septic  Bursitis Vs TOBI  IMPROVING- oriented X3 today  Frequent reorientation  Monitor CBC, CMP  Follow up on Cx  Neurochecks  On IV antibiotics  ID, nephrology consulted, appreciate recommendations    TOBI (acute kidney injury) [facility])  Assessment & Plan  Lab Results   Component Value Date    EGFR 9 09/06/2023    EGFR 10 09/05/2023    EGFR 11 09/04/2023    CREATININE 4.93 (H) 09/06/2023    CREATININE 4.67 (H) 09/05/2023    CREATININE 4.33 (H) 09/04/2023   · Baseline creatinine 1.6-1.9, POA- 1.7  · Likely secondary to autoregulatory failure, ATN secondary to sepsis, medications  · Good urine output with IV lasix drip, creatinine is trending up, likely a lag.  Will monitor   · Nephrology following  · Urinary retention protocol  · I/O monitoring  · Renally dose medications  · Changed Abx for kidney failure        ESBL (extended spectrum beta-lactamase) producing bacteria infection  Assessment & Plan  Urine CX from 8/27- grew ESBL  Abx as per ID        Olecranon bursitis  Assessment & Plan  Continue Dapto  Wound culture ordered  Repeat blood cultures ordered  ID and ortho following      Swelling of finger, left  Assessment & Plan  Left 3rd MCP joint  Tender to touch, erythematous  add colchicine reduced dose    Sacral wound  Assessment & Plan  Wound care consulted  Appreciate recs    Abnormal computed tomography angiography (CTA) of abdomen and pelvis  Assessment & Plan  CT abdomen pelvis showed-  Cholelithiasis with possible impacted stone at the neck, gallbladder wall thickening and/or pericholecystic edema and sigmoid colon stricturing. No abdominal tenderness, Surgery and GI recommends follow up with serial abdominal exams. No plan for surgery as it could be likely chronic problem        Cystitis  Assessment & Plan  · Patient reports dysuria and difficulty urinating   · UA with innumerable WBCs and innumerable bacteria  · Urine culture grew ESBL E. coli  ID following. Continue Abx as per ID    Central retinal vein occlusion of right eye  Assessment & Plan  · Seen by ophthalmology during recent [facility] admission, diagnosed with CRVO and ocular hypertension  · Patient recommended for formal evaluation outpatient of OCT macular degeneration, discussion of possible anti-VEGF therapy  · Encouraged ophthalmology follow-up outpatient    Atrial fibrillation [facility])  Assessment & Plan  · Went into A-fib with RVR on August 28 and was given IV Lopressor and Cardizem  · Home regimen: Coreg 25 Mg twice daily  · Started on Eliquis 2.5 mg twice daily for anticoagulation during hospitalization at [facility] 8/8-8/23  · Cardiology consult appreciated  · Rate controlled      Anemia of chronic disease  Assessment & Plan  · Hemoglobin 9.4 on admission  · Hemoglobin ranging 10-12 during recent [facility] admission  · No signs of active bleeding  · Trend CBC  · Pending Iron def anemia work up, will follow up. VTE Pharmacologic Prophylaxis: VTE Score: 5 High Risk (Score >/= 5) - Pharmacological DVT Prophylaxis Ordered: apixaban (Eliquis). Sequential Compression Devices Ordered. Patient Centered Rounds: I performed bedside rounds with nursing staff today.   Discussions with Specialists or Other Care Team Provider: ID, nephro, ortho, CM, nursing    Education and Discussions with Family / Patient: Updated  (wife and daughter) at bedside. Total Time Spent on Date of Encounter in care of patient: 45 minutes This time was spent on one or more of the following: performing physical exam; counseling and coordination of care; obtaining or reviewing history; documenting in the medical record; reviewing/ordering tests, medications or procedures; communicating with other healthcare professionals and discussing with patient's family/caregivers. Current Length of Stay: 11 day(s)  Current Patient Status: Inpatient   Certification Statement: The patient will continue to require additional inpatient hospital stay due to pending clinical improvement  Discharge Plan: pending clinical improvement    Code Status: Level 3 - DNAR and DNI    Subjective:   Patient is more alert, oriented. Able to voice complaint of pain. He states he is thirsty, and would like to eat. No acute overnight events. Objective:     Vitals:   Temp (24hrs), Av.5 °F (36.9 °C), Min:97.3 °F (36.3 °C), Max:101.2 °F (38.4 °C)    Temp:  [97.3 °F (36.3 °C)-101.2 °F (38.4 °C)] 97.3 °F (36.3 °C)  HR:  [] 95  Resp:  [14-23] 18  BP: (123-143)/(64-89) 140/86  SpO2:  [94 %-99 %] 98 %  Body mass index is 30.67 kg/m². Input and Output Summary (last 24 hours): Intake/Output Summary (Last 24 hours) at 2023 1146  Last data filed at 2023 0128  Gross per 24 hour   Intake 360 ml   Output 2556 ml   Net -2196 ml       Physical Exam:   Physical Exam  Constitutional:       Appearance: He is obese. HENT:      Head: Normocephalic and atraumatic. Mouth/Throat:      Mouth: Mucous membranes are moist.      Pharynx: Oropharynx is clear. Eyes:      General: No scleral icterus. Right eye: No discharge. Left eye: No discharge. Conjunctiva/sclera: Conjunctivae normal.   Cardiovascular:      Rate and Rhythm: Tachycardia present. Rhythm irregular. Pulses: Normal pulses. Pulmonary:      Effort: Pulmonary effort is normal. No respiratory distress. Abdominal:      General: Bowel sounds are normal.      Palpations: Abdomen is soft. Tenderness: There is no abdominal tenderness. Musculoskeletal:         General: Swelling and tenderness present. Comments: Left third PIP, third and fourth MCP, left elbow, right elbow-swollen, erythematous, tender to touch, limited ROM   Skin:     General: Skin is warm and dry. Capillary Refill: Capillary refill takes less than 2 seconds. Neurological:      Mental Status: He is alert and oriented to person, place, and time. Additional Data:     Labs:  Results from last 7 days   Lab Units 23  0433   WBC Thousand/uL 16.32*   HEMOGLOBIN g/dL 7.3*   HEMATOCRIT % 22.4*   PLATELETS Thousands/uL 330   NEUTROS PCT % 80*   LYMPHS PCT % 7*   MONOS PCT % 9   EOS PCT % 2     Results from last 7 days   Lab Units 23  0433   SODIUM mmol/L 140   POTASSIUM mmol/L 3.1*   CHLORIDE mmol/L 105   CO2 mmol/L 21   BUN mg/dL 101*   CREATININE mg/dL 4.93*   ANION GAP mmol/L 14   CALCIUM mg/dL 9.1   ALBUMIN g/dL 2.9*   TOTAL BILIRUBIN mg/dL 0.80   ALK PHOS U/L 65   ALT U/L 8   AST U/L 12*   GLUCOSE RANDOM mg/dL 145*                 Results from last 7 days   Lab Units 23  0336 23  0527 23  0221 23  0336   PROCALCITONIN ng/ml 2.42* 2.93* 2.83* 3.11*       Lines/Drains:  Invasive Devices     Peripheral Intravenous Line  Duration           Peripheral IV 23 Dorsal (posterior); Right Wrist 1 day                  Telemetry:  Telemetry Orders (From admission, onward)             24 Hour Telemetry Monitoring  Continuous x 24 Hours (Telem)        Comments: A fib with uncontrolled HR      Question:  Reason for 24 Hour Telemetry  Answer:  Arrhythmias requiring acute medical intervention / PPM or ICD malfunction                 Telemetry Reviewed: Sinus Tachycardia  Indication for Continued Telemetry Use: Arrthymias requiring medical therapy, Lasix drip             Imaging: No pertinent imaging reviewed. Recent Cultures (last 7 days):   Results from last 7 days   Lab Units 09/03/23  1141 09/03/23  1133 09/01/23  1429   BLOOD CULTURE  No Growth at 48 hrs. No Growth at 48 hrs. --  No Growth After 4 Days. No Growth After 4 Days.    GRAM STAIN RESULT   --  Rare Polys  No bacteria seen  --    WOUND CULTURE   --  3 colonies Staphylococcus coagulase negative*  --        Last 24 Hours Medication List:   Current Facility-Administered Medications   Medication Dose Route Frequency Provider Last Rate   • acetaminophen  650 mg Oral Q6H PRN Keith Valencia PA-C     • aluminum-magnesium hydroxide-simethicone  30 mL Oral Q6H PRN Keith Valencia PA-C     • apixaban  2.5 mg Oral BID Jaison Harding MD     • aspirin  81 mg Oral Daily Keith Valencia PA-C     • budesonide  0.5 mg Nebulization Q12H Keith Valencia PA-C     • DAPTOmycin  6 mg/kg Intravenous Q48H Coretta Ulloa  mg (09/05/23 1004)   • diltiazem  240 mg Oral Daily Frank Holguin PA-C     • fluticasone  1 spray Nasal Daily Keith Valencia PA-C     • folic acid  1 mg Oral Daily Keith Valencia PA-C     • formoterol  20 mcg Nebulization Q12H Keith Valencia PA-C     • furosemide  40 mg/hr Intravenous Continuous Stephania Huerta PA-C 40 mg/hr (09/06/23 0528)   • guaiFENesin  600 mg Oral BID Keith Valencia PA-C     • ipratropium  0.5 mg Nebulization TID Marien Hodgkins, MD     • lactulose  200 g Rectal BID PRN Kenzie Willett PA-C     • levalbuterol  1.25 mg Nebulization TID Marien Hodgkins, MD     • loratadine  10 mg Oral Daily Keith Valencia PA-C     • magnesium Oxide  400 mg Oral Daily Keith Valencia PA-C     • meropenem  500 mg Intravenous Q12H Jaison Harding  mg (09/06/23 1100)   • metolazone  10 mg Oral Q6H Stephania Huerta PA-C     • metoprolol tartrate  50 mg Oral Q12H [address] [facility] LOIL Holguin     • ondansetron  4 mg Intravenous Q6H PRN Moses Irizarry PA-C     • oxyCODONE  5 mg Oral Q6H PRN Jayne Crocker MD     • pantoprazole  40 mg Oral Daily Before Breakfast Moses Irizarry PA-C     • saccharomyces boulardii  250 mg Oral BID Farheen Musa PA-C     • senna  1 tablet Oral HS Moses Irizarry PA-C     • sodium bicarbonate  1,300 mg Oral BID after meals Sallie Glez PA-C          Today, Patient Was Seen By: Antonia Power MD    **Please Note: This note may have been constructed using a voice recognition system. **

## 2023-09-06 NOTE — ASSESSMENT & PLAN NOTE
· Seen by ophthalmology during recent [facility] admission, diagnosed with CRVO and ocular hypertension  · Patient recommended for formal evaluation outpatient of OCT macular degeneration, discussion of possible anti-VEGF therapy  · Encouraged ophthalmology follow-up outpatient

## 2023-09-06 NOTE — PLAN OF CARE
Problem: Potential for Falls  Goal: Patient will remain free of falls  Description: INTERVENTIONS:  - Educate patient/family on patient safety including physical limitations  - Instruct patient to call for assistance with activity   - Consult OT/PT to assist with strengthening/mobility   - Keep Call bell within reach  - Keep bed low and locked with side rails adjusted as appropriate  - Keep care items and personal belongings within reach  - Initiate and maintain comfort rounds  - Make Fall Risk Sign visible to staff  - Offer Toileting every 2 Hours, in advance of need  - Initiate/Maintain bed alarm  - Obtain necessary fall risk management equipment:   - Apply yellow socks and bracelet for high fall risk patients  - Consider moving patient to room near nurses station  Outcome: Progressing     Problem: PAIN - ADULT  Goal: Verbalizes/displays adequate comfort level or baseline comfort level  Description: Interventions:  - Encourage patient to monitor pain and request assistance  - Assess pain using appropriate pain scale  - Administer analgesics based on type and severity of pain and evaluate response  - Implement non-pharmacological measures as appropriate and evaluate response  - Consider cultural and social influences on pain and pain management  - Notify physician/advanced practitioner if interventions unsuccessful or patient reports new pain  Outcome: Progressing     Problem: INFECTION - ADULT  Goal: Absence or prevention of progression during hospitalization  Description: INTERVENTIONS:  - Assess and monitor for signs and symptoms of infection  - Monitor lab/diagnostic results  - Monitor all insertion sites, i.e. indwelling lines, tubes, and drains  - Monitor endotracheal if appropriate and nasal secretions for changes in amount and color  - Verbena appropriate cooling/warming therapies per order  - Administer medications as ordered  - Instruct and encourage patient and family to use good hand hygiene technique  - Identify and instruct in appropriate isolation precautions for identified infection/condition  Outcome: Progressing  Goal: Absence of fever/infection during neutropenic period  Description: INTERVENTIONS:  - Monitor WBC    Outcome: Progressing     Problem: SAFETY ADULT  Goal: Maintain or return to baseline ADL function  Description: INTERVENTIONS:  -  Assess patient's ability to carry out ADLs; assess patient's baseline for ADL function and identify physical deficits which impact ability to perform ADLs (bathing, care of mouth/teeth, toileting, grooming, dressing, etc.)  - Assess/evaluate cause of self-care deficits   - Assess range of motion  - Assess patient's mobility; develop plan if impaired  - Assess patient's need for assistive devices and provide as appropriate  - Encourage maximum independence but intervene and supervise when necessary  - Involve family in performance of ADLs  - Assess for home care needs following discharge   - Consider OT consult to assist with ADL evaluation and planning for discharge  - Provide patient education as appropriate  Outcome: Progressing  Goal: Maintains/Returns to pre admission functional level  Description: INTERVENTIONS:  - Perform BMAT or MOVE assessment daily.   - Set and communicate daily mobility goal to care team and patient/family/caregiver. - Collaborate with rehabilitation services on mobility goals if consulted  - Perform Range of Motion 3 times a day. - Reposition patient every 2 hours.   - Dangle patient 2 times a day  - Stand patient 2 times a day  - Ambulate patient 2 times a day  - Out of bed to chair 2 times a day   - Out of bed for meals 2 times a day  - Out of bed for toileting  - Record patient progress and toleration of activity level   Outcome: Progressing     Problem: DISCHARGE PLANNING  Goal: Discharge to home or other facility with appropriate resources  Description: INTERVENTIONS:  - Identify barriers to discharge w/patient and caregiver  - Arrange for needed discharge resources and transportation as appropriate  - Identify discharge learning needs (meds, wound care, etc.)  - Arrange for interpretive services to assist at discharge as needed  - Refer to Case Management Department for coordinating discharge planning if the patient needs post-hospital services based on physician/advanced practitioner order or complex needs related to functional status, cognitive ability, or social support system  Outcome: Progressing     Problem: Knowledge Deficit  Goal: Patient/family/caregiver demonstrates understanding of disease process, treatment plan, medications, and discharge instructions  Description: Complete learning assessment and assess knowledge base.   Interventions:  - Provide teaching at level of understanding  - Provide teaching via preferred learning methods  Outcome: Progressing     Problem: Prexisting or High Potential for Compromised Skin Integrity  Goal: Skin integrity is maintained or improved  Description: INTERVENTIONS:  - Identify patients at risk for skin breakdown  - Assess and monitor skin integrity  - Assess and monitor nutrition and hydration status  - Monitor labs   - Assess for incontinence   - Turn and reposition patient  - Assist with mobility/ambulation  - Relieve pressure over bony prominences  - Avoid friction and shearing  - Provide appropriate hygiene as needed including keeping skin clean and dry  - Evaluate need for skin moisturizer/barrier cream  - Collaborate with interdisciplinary team   - Patient/family teaching  - Consider wound care consult   Outcome: Progressing     Problem: MOBILITY - ADULT  Goal: Maintain or return to baseline ADL function  Description: INTERVENTIONS:  -  Assess patient's ability to carry out ADLs; assess patient's baseline for ADL function and identify physical deficits which impact ability to perform ADLs (bathing, care of mouth/teeth, toileting, grooming, dressing, etc.)  - Assess/evaluate cause of self-care deficits   - Assess range of motion  - Assess patient's mobility; develop plan if impaired  - Assess patient's need for assistive devices and provide as appropriate  - Encourage maximum independence but intervene and supervise when necessary  - Involve family in performance of ADLs  - Assess for home care needs following discharge   - Consider OT consult to assist with ADL evaluation and planning for discharge  - Provide patient education as appropriate  Outcome: Progressing  Goal: Maintains/Returns to pre admission functional level  Description: INTERVENTIONS:  - Perform BMAT or MOVE assessment daily.   - Set and communicate daily mobility goal to care team and patient/family/caregiver. - Collaborate with rehabilitation services on mobility goals if consulted  - Perform Range of Motion 3 times a day. - Reposition patient every 2 hours. - Dangle patient 2 times a day  - Stand patient 2 times a day  - Ambulate patient 2 times a day  - Out of bed to chair 2 times a day   - Out of bed for meals 2 times a day  - Out of bed for toileting  - Record patient progress and toleration of activity level   Outcome: Progressing     Problem: Nutrition/Hydration-ADULT  Goal: Nutrient/Hydration intake appropriate for improving, restoring or maintaining nutritional needs  Description: Monitor and assess patient's nutrition/hydration status for malnutrition. Collaborate with interdisciplinary team and initiate plan and interventions as ordered. Monitor patient's weight and dietary intake as ordered or per policy. Utilize nutrition screening tool and intervene as necessary. Determine patient's food preferences and provide high-protein, high-caloric foods as appropriate.      INTERVENTIONS:  - Monitor oral intake, urinary output, labs, and treatment plans  - Assess nutrition and hydration status and recommend course of action  - Evaluate amount of meals eaten  - Assist patient with eating if necessary   - Allow adequate time for meals  - Recommend/ encourage appropriate diets, oral nutritional supplements, and vitamin/mineral supplements  - Order, calculate, and assess calorie counts as needed  - Recommend, monitor, and adjust tube feedings and TPN/PPN based on assessed needs  - Assess need for intravenous fluids  - Provide specific nutrition/hydration education as appropriate  - Include patient/family/caregiver in decisions related to nutrition  Outcome: Progressing

## 2023-09-06 NOTE — PROGRESS NOTES
NEPHROLOGY PROGRESS NOTE   Denise Doshi 82 y.o. male MRN: [id number]  Unit/Bed#: -01 Encounter: [id number]      ASSESSMENT/PLAN:  1. Acute kidney injury: Suspect prerenal with progression to ATN in the setting of sepsis, fevers, lasix, ARB and relative hypotension  · Creatinine 1.7 on admission  · Patient was anuric yesterday and started on a Lasix drip  · Creatinine today continues to worsen to 4.9 with  but now nonoliguric and mental status better  · No urgent indication for dialysis  · Continue Lasix drip and metolazone as he remains volume overloaded  · Repeat UA: Moderate blood, +2 protein, 1-2 RBCs  · Urine sodium 16, urine chloride less than 15  · CK 72  · Bladder scan 100cc   · CT: Multiple left renal cyst, no hydronephrosis, nonspecific bilateral perinephric edema, bladder unremarkable  · Urine eos: pending   · Continue to hold losartan (last dose 8/29)  2. CKD stage IIIB: Baseline creatinine around 1.5-1.7  3. Altered mental status: Significantly improved today  4. Sepsis due to pneumonia and cystitis. Also with olecranon bursitis status post I&D  · currently on meropenem and daptomycin  5. Lower extremity edema: Some component of third spacing. Albumin 2.9. Monitor on Lasix drip and metolazone  6. Hyperphosphatemia: Phosphorus 5.8  · Trend with hopeful improvement in renal function and if worsening would start binder   · Currently on low phos diet   7. Hypokalemia: Potassium 3.1  · Status post KCl 40 mEq p.o. x1 this morning  · We will give another dose of potassium this afternoon  8. Anemia of chronic disease: Hemoglobin stable at 7.3  · Pending: Iron studies, fecal occult blood, SPEP, UPEP, free light chain  9.  Metabolic acidosis: bicarb 21   · Continue sodium bicarb 1300 mg p.o. twice daily    Plan Summary:   · Continue Lasix drip at 40 mg/h and metolazone 10 mg p.o. every 6 hours for now  · Give additional kcl 40meq po x 1 this afternoon   · Check am BMP, mag, phos SUBJECTIVE:  Much more awake and alert this morning than yesterday. He is asking me appropriate questions such as what hospital is he in, [facility] or [facility]. He complains of urinating too often and being incontinent. He denies any shortness of breath. He is asking for water.     OBJECTIVE:  Current Weight: Weight - Scale: 91.5 kg (201 lb 11.5 oz)  Vitals:    09/06/23 0806   BP: 140/86   Pulse: 95   Resp: 18   Temp: (!) 97.3 °F (36.3 °C)   SpO2: 98%       Intake/Output Summary (Last 24 hours) at 9/6/2023 4798  Last data filed at 9/6/2023 0953  Gross per 24 hour   Intake 360 ml   Output 2556 ml   Net -2196 ml       General: Ill-appearing but in no acute distress  Skin:  No rash  Eyes:  Sclerae anicteric, no periorbital edema   ENT:  Moist mucous membranes  Neck:  Trachea midline, symmetric   Chest: Decreased breath sounds  CVS:  Regular rate and rhythm  Abdomen:  Soft, nontender, nondistended  Neuro:  Awake and alert  Psych:  Appropriate affect  Extremities: Bilateral upper and lower extremity edema      Medications:  Scheduled Meds:  Current Facility-Administered Medications   Medication Dose Route Frequency Provider Last Rate   • acetaminophen  650 mg Oral Q6H PRN Jolie Sarah PA-C     • aluminum-magnesium hydroxide-simethicone  30 mL Oral Q6H PRN Jolie Sarah PA-C     • apixaban  2.5 mg Oral BID Barbara Figueroa MD     • aspirin  81 mg Oral Daily Jolie Sarah PA-C     • budesonide  0.5 mg Nebulization Q12H Jolie Sarah PA-C     • DAPTOmycin  6 mg/kg Intravenous Q48H Isabell Mckenna  mg (09/05/23 1004)   • diltiazem  240 mg Oral Daily Eleazar Holguin PA-C     • fluticasone  1 spray Nasal Daily Jolie Sarah PA-C     • folic acid  1 mg Oral Daily Jolie Sarah PA-C     • formoterol  20 mcg Nebulization Q12H Jolie Sarah PA-C     • furosemide  40 mg/hr Intravenous Continuous Albert Roper PA-C 40 mg/hr (09/06/23 0528)   • guaiFENesin  600 mg Oral BID Jeanne Cardoso PA-C     • ipratropium  0.5 mg Nebulization TID David Liu MD     • lactulose  200 g Rectal BID PRN Enmanuel Willett PA-C     • levalbuterol  1.25 mg Nebulization TID David Liu MD     • loratadine  10 mg Oral Daily Jeanne Cardoso PA-C     • magnesium Oxide  400 mg Oral Daily Jeanne Cardoso PA-C     • meropenem  500 mg Intravenous Q12H Keren Meza  mg (09/05/23 2227)   • metolazone  10 mg Oral Q6H Joel Newman PA-C     • metoprolol tartrate  50 mg Oral Q12H [facility] Jassi Holguin PA-C     • ondansetron  4 mg Intravenous Q6H PRN Jeanne Cardoso PA-C     • oxyCODONE  5 mg Oral Q6H PRN David Liu MD     • pantoprazole  40 mg Oral Daily Before Breakfast Jeanne Cardoso PA-C     • saccharomyces boulardii  250 mg Oral BID Azalia Vieyra PA-C     • senna  1 tablet Oral HS Jeanne Cardoso PA-C     • sodium bicarbonate  1,300 mg Oral BID after meals Joel Newman PA-C         PRN Meds:.•  acetaminophen  •  aluminum-magnesium hydroxide-simethicone  •  lactulose  •  ondansetron  •  oxyCODONE    Laboratory Results:  Results from last 7 days   Lab Units 09/06/23  0433 09/05/23  0336 09/04/23  1234 09/04/23  0527 09/03/23  0221 09/02/23  0336 09/01/23  0334 08/31/23  0335   WBC Thousand/uL 16.32* 16.60*  --  14.04* 12.50* 15.57* 15.57* 13.57*   HEMOGLOBIN g/dL 7.3* 7.4* 7.6* 7.0* 7.6* 8.0* 9.2* 8.4*   HEMATOCRIT % 22.4* 23.1* 23.3* 21.8* 23.8* 24.7* 29.0* 26.3*   PLATELETS Thousands/uL 330 318  --  296 323 352 365 297   SODIUM mmol/L 140 139  --  140 136 134* 137 135   POTASSIUM mmol/L 3.1* 4.0  --  3.3* 3.9 3.7 3.7 3.8   CHLORIDE mmol/L 105 108  --  107 105 105 105 105   CO2 mmol/L 21 20*  --  20* 19* 21 20* 22   BUN mg/dL 101* 94*  --  89* 76* 68* 55* 54*   CREATININE mg/dL 4.93* 4.67*  --  4.33* 3.63* 3.39* 2.58* 2.49*   CALCIUM mg/dL 9.1 9.0  --  9.2 8.9 8.8 8.8 8.6   MAGNESIUM mg/dL 2.1 2.3  --  2.2 2.1  --  2.1  --    PHOSPHORUS mg/dL 5.8*  -- --   --   --   --   --   --

## 2023-09-06 NOTE — ASSESSMENT & PLAN NOTE
Lab Results   Component Value Date    EGFR 9 09/06/2023    EGFR 10 09/05/2023    EGFR 11 09/04/2023    CREATININE 4.93 (H) 09/06/2023    CREATININE 4.67 (H) 09/05/2023    CREATININE 4.33 (H) 09/04/2023   · Baseline creatinine 1.6-1.9, POA- 1.7  · Likely secondary to autoregulatory failure, ATN secondary to sepsis, medications  · Good urine output with IV lasix drip, creatinine is trending up, likely a lag.  Will monitor   · Nephrology following  · Urinary retention protocol  · I/O monitoring  · Renally dose medications  · Changed Abx for kidney failure

## 2023-09-07 ENCOUNTER — APPOINTMENT (INPATIENT)
Dept: RADIOLOGY | Facility: HOSPITAL | Age: 88
DRG: 193 | End: 2023-09-07
Payer: MEDICARE

## 2023-09-07 PROBLEM — K56.7 ILEUS (HCC): Status: ACTIVE | Noted: 2023-09-07

## 2023-09-07 LAB
ALBUMIN SERPL BCP-MCNC: 3.2 G/DL (ref 3.5–5)
ALBUMIN SERPL ELPH-MCNC: 2.53 G/DL (ref 3.2–5.1)
ALBUMIN SERPL ELPH-MCNC: 46 % (ref 48–70)
ALP SERPL-CCNC: 75 U/L (ref 34–104)
ALPHA1 GLOB SERPL ELPH-MCNC: 0.46 G/DL (ref 0.15–0.47)
ALPHA1 GLOB SERPL ELPH-MCNC: 8.3 % (ref 1.8–7)
ALPHA2 GLOB SERPL ELPH-MCNC: 0.92 G/DL (ref 0.42–1.04)
ALPHA2 GLOB SERPL ELPH-MCNC: 16.7 % (ref 5.9–14.9)
ALT SERPL W P-5'-P-CCNC: 5 U/L (ref 7–52)
ANION GAP SERPL CALCULATED.3IONS-SCNC: 15 MMOL/L
APTT PPP: 72 SECONDS (ref 23–37)
APTT PPP: 83 SECONDS (ref 23–37)
AST SERPL W P-5'-P-CCNC: 17 U/L (ref 13–39)
BASOPHILS # BLD AUTO: 0.07 THOUSANDS/ÂΜL (ref 0–0.1)
BASOPHILS NFR BLD AUTO: 0 % (ref 0–1)
BETA GLOB ABNORMAL SERPL ELPH-MCNC: 0.23 G/DL (ref 0.31–0.57)
BETA1 GLOB SERPL ELPH-MCNC: 4.1 % (ref 4.7–7.7)
BETA2 GLOB SERPL ELPH-MCNC: 7.5 % (ref 3.1–7.9)
BETA2+GAMMA GLOB SERPL ELPH-MCNC: 0.41 G/DL (ref 0.2–0.58)
BILIRUB SERPL-MCNC: 0.67 MG/DL (ref 0.2–1)
BUN SERPL-MCNC: 109 MG/DL (ref 5–25)
CALCIUM ALBUM COR SERPL-MCNC: 9.9 MG/DL (ref 8.3–10.1)
CALCIUM SERPL-MCNC: 9.3 MG/DL (ref 8.4–10.2)
CHLORIDE SERPL-SCNC: 104 MMOL/L (ref 96–108)
CO2 SERPL-SCNC: 18 MMOL/L (ref 21–32)
CREAT SERPL-MCNC: 4.8 MG/DL (ref 0.6–1.3)
EOSINOPHIL # BLD AUTO: 0.45 THOUSAND/ÂΜL (ref 0–0.61)
EOSINOPHIL NFR BLD AUTO: 2 % (ref 0–6)
ERYTHROCYTE [DISTWIDTH] IN BLOOD BY AUTOMATED COUNT: 14.2 % (ref 11.6–15.1)
GAMMA GLOB ABNORMAL SERPL ELPH-MCNC: 0.96 G/DL (ref 0.4–1.66)
GAMMA GLOB SERPL ELPH-MCNC: 17.4 % (ref 6.9–22.3)
GFR SERPL CREATININE-BSD FRML MDRD: 10 ML/MIN/1.73SQ M
GLUCOSE SERPL-MCNC: 147 MG/DL (ref 65–140)
HCT VFR BLD AUTO: 27.5 % (ref 36.5–49.3)
HGB BLD-MCNC: 8.8 G/DL (ref 12–17)
IGG/ALB SER: 0.85 {RATIO} (ref 1.1–1.8)
IMM GRANULOCYTES # BLD AUTO: 0.33 THOUSAND/UL (ref 0–0.2)
IMM GRANULOCYTES NFR BLD AUTO: 2 % (ref 0–2)
LYMPHOCYTES # BLD AUTO: 1.66 THOUSANDS/ÂΜL (ref 0.6–4.47)
LYMPHOCYTES NFR BLD AUTO: 9 % (ref 14–44)
MAGNESIUM SERPL-MCNC: 2.4 MG/DL (ref 1.9–2.7)
MCH RBC QN AUTO: 31.3 PG (ref 26.8–34.3)
MCHC RBC AUTO-ENTMCNC: 32 G/DL (ref 31.4–37.4)
MCV RBC AUTO: 98 FL (ref 82–98)
MONOCYTES # BLD AUTO: 1.39 THOUSAND/ÂΜL (ref 0.17–1.22)
MONOCYTES NFR BLD AUTO: 7 % (ref 4–12)
NEUTROPHILS # BLD AUTO: 15.62 THOUSANDS/ÂΜL (ref 1.85–7.62)
NEUTS SEG NFR BLD AUTO: 80 % (ref 43–75)
NRBC BLD AUTO-RTO: 0 /100 WBCS
PHOSPHATE SERPL-MCNC: 5.6 MG/DL (ref 2.3–4.1)
PLATELET # BLD AUTO: 396 THOUSANDS/UL (ref 149–390)
PMV BLD AUTO: 9.5 FL (ref 8.9–12.7)
POTASSIUM SERPL-SCNC: 3.7 MMOL/L (ref 3.5–5.3)
PROT PATTERN SERPL ELPH-IMP: ABNORMAL
PROT SERPL-MCNC: 5.5 G/DL (ref 6.4–8.4)
PROT SERPL-MCNC: 7 G/DL (ref 6.4–8.4)
RBC # BLD AUTO: 2.81 MILLION/UL (ref 3.88–5.62)
SODIUM SERPL-SCNC: 137 MMOL/L (ref 135–147)
WBC # BLD AUTO: 19.52 THOUSAND/UL (ref 4.31–10.16)

## 2023-09-07 PROCEDURE — 99232 SBSQ HOSP IP/OBS MODERATE 35: CPT | Performed by: SURGERY

## 2023-09-07 PROCEDURE — 94640 AIRWAY INHALATION TREATMENT: CPT

## 2023-09-07 PROCEDURE — 99232 SBSQ HOSP IP/OBS MODERATE 35: CPT | Performed by: STUDENT IN AN ORGANIZED HEALTH CARE EDUCATION/TRAINING PROGRAM

## 2023-09-07 PROCEDURE — 99233 SBSQ HOSP IP/OBS HIGH 50: CPT | Performed by: INTERNAL MEDICINE

## 2023-09-07 PROCEDURE — 85025 COMPLETE CBC W/AUTO DIFF WBC: CPT | Performed by: INTERNAL MEDICINE

## 2023-09-07 PROCEDURE — 85730 THROMBOPLASTIN TIME PARTIAL: CPT | Performed by: INTERNAL MEDICINE

## 2023-09-07 PROCEDURE — 74022 RADEX COMPL AQT ABD SERIES: CPT

## 2023-09-07 PROCEDURE — 80053 COMPREHEN METABOLIC PANEL: CPT | Performed by: INTERNAL MEDICINE

## 2023-09-07 PROCEDURE — 99024 POSTOP FOLLOW-UP VISIT: CPT | Performed by: STUDENT IN AN ORGANIZED HEALTH CARE EDUCATION/TRAINING PROGRAM

## 2023-09-07 PROCEDURE — 99232 SBSQ HOSP IP/OBS MODERATE 35: CPT | Performed by: INTERNAL MEDICINE

## 2023-09-07 PROCEDURE — 94760 N-INVAS EAR/PLS OXIMETRY 1: CPT

## 2023-09-07 PROCEDURE — C9113 INJ PANTOPRAZOLE SODIUM, VIA: HCPCS | Performed by: INTERNAL MEDICINE

## 2023-09-07 PROCEDURE — 84165 PROTEIN E-PHORESIS SERUM: CPT | Performed by: STUDENT IN AN ORGANIZED HEALTH CARE EDUCATION/TRAINING PROGRAM

## 2023-09-07 PROCEDURE — 83735 ASSAY OF MAGNESIUM: CPT | Performed by: INTERNAL MEDICINE

## 2023-09-07 PROCEDURE — 94668 MNPJ CHEST WALL SBSQ: CPT

## 2023-09-07 PROCEDURE — 84100 ASSAY OF PHOSPHORUS: CPT | Performed by: INTERNAL MEDICINE

## 2023-09-07 RX ORDER — COLCHICINE 0.6 MG/1
0.3 TABLET ORAL DAILY
Status: DISCONTINUED | OUTPATIENT
Start: 2023-09-08 | End: 2023-09-08

## 2023-09-07 RX ADMIN — METOPROLOL TARTRATE 5 MG: 1 INJECTION, SOLUTION INTRAVENOUS at 02:48

## 2023-09-07 RX ADMIN — PANTOPRAZOLE SODIUM 40 MG: 40 INJECTION, POWDER, FOR SOLUTION INTRAVENOUS at 09:07

## 2023-09-07 RX ADMIN — METOPROLOL TARTRATE 5 MG: 1 INJECTION, SOLUTION INTRAVENOUS at 21:10

## 2023-09-07 RX ADMIN — LEVALBUTEROL HYDROCHLORIDE 1.25 MG: 1.25 SOLUTION RESPIRATORY (INHALATION) at 13:53

## 2023-09-07 RX ADMIN — SODIUM BICARBONATE 75 ML/HR: 84 INJECTION, SOLUTION INTRAVENOUS at 12:15

## 2023-09-07 RX ADMIN — IPRATROPIUM BROMIDE 0.5 MG: 0.5 SOLUTION RESPIRATORY (INHALATION) at 13:53

## 2023-09-07 RX ADMIN — SENNOSIDES 8.6 MG: 8.6 TABLET, FILM COATED ORAL at 21:10

## 2023-09-07 RX ADMIN — BUDESONIDE 0.5 MG: 0.5 INHALANT ORAL at 07:54

## 2023-09-07 RX ADMIN — IPRATROPIUM BROMIDE 0.5 MG: 0.5 SOLUTION RESPIRATORY (INHALATION) at 07:54

## 2023-09-07 RX ADMIN — Medication 250 MG: at 16:51

## 2023-09-07 RX ADMIN — IPRATROPIUM BROMIDE 0.5 MG: 0.5 SOLUTION RESPIRATORY (INHALATION) at 19:21

## 2023-09-07 RX ADMIN — METOPROLOL TARTRATE 5 MG: 1 INJECTION, SOLUTION INTRAVENOUS at 09:06

## 2023-09-07 RX ADMIN — DAPTOMYCIN 550 MG: 500 INJECTION, POWDER, LYOPHILIZED, FOR SOLUTION INTRAVENOUS at 09:20

## 2023-09-07 RX ADMIN — FORMOTEROL FUMARATE DIHYDRATE 20 MCG: 20 SOLUTION RESPIRATORY (INHALATION) at 19:21

## 2023-09-07 RX ADMIN — METOPROLOL TARTRATE 5 MG: 1 INJECTION, SOLUTION INTRAVENOUS at 14:31

## 2023-09-07 RX ADMIN — BUDESONIDE 0.5 MG: 0.5 INHALANT ORAL at 19:21

## 2023-09-07 RX ADMIN — Medication 40 MG/HR: at 01:02

## 2023-09-07 RX ADMIN — LEVALBUTEROL HYDROCHLORIDE 1.25 MG: 1.25 SOLUTION RESPIRATORY (INHALATION) at 07:54

## 2023-09-07 RX ADMIN — IRON SUCROSE 200 MG: 20 INJECTION, SOLUTION INTRAVENOUS at 10:47

## 2023-09-07 RX ADMIN — LEVALBUTEROL HYDROCHLORIDE 1.25 MG: 1.25 SOLUTION RESPIRATORY (INHALATION) at 19:22

## 2023-09-07 RX ADMIN — GUAIFENESIN 600 MG: 600 TABLET ORAL at 16:51

## 2023-09-07 RX ADMIN — IOHEXOL 100 ML: 350 INJECTION, SOLUTION INTRAVENOUS at 13:18

## 2023-09-07 RX ADMIN — HEPARIN SODIUM 11.1 UNITS/KG/HR: 10000 INJECTION, SOLUTION INTRAVENOUS at 21:10

## 2023-09-07 NOTE — PROGRESS NOTES
NEPHROLOGY PROGRESS NOTE   Gerri Preston 82 y.o. male MRN: [id number]  Unit/Bed#: -01 Encounter: [id number]      HPI/24hr EVENTS:    -31-year-old male past medical history of CKD 3, atrial fibrillation, COPD. Presented with fever, nephrology consult for management of TOBI.     -Negative fluid balance over the past 24 hours, NG tube placed overnight for abdominal distention, creatinine mildly improved from day prior    ASSESSMENT/PLAN:    TOBI on CKD 3  -Baseline creatinine: 1.5-1.7  -Creatinine on admission 0.7, most recent creatinine 4.0 from 4.93 yesterday  -Etiology: Suspect second to prerenal azotemia with component of ATN due to sepsis, ARB use, hypotension  -UA: 2+ protein, 1-2 RBCs  -Renal imaging: CTAP with multiple left renal cyst, no hydronephrosis, nonspecific bilateral perinephric edema  -Avoid hypotension, avoid nephrotoxins, avoid NSAIDS  -Trend BMP  -Urine eosinophils negative, CK normal  -Is azotemic with a BUN of 109  -No acute indications for dialysis today, is nonoliguric with 2.8 L urine output, is confused, this could possibly due to uremia versus delirium given prolonged hospitalization and additional comorbidities, monitor closely for HD needs in the next 24 to 48 hours  -Stop Lasix infusion given n.p.o. status, start gentle IV fluid with sodium bicarbonate drip at 75 cc/hr, his volume status is difficult given his body habitus but he appears roughly euvolemic, his chest x-ray was without any pulmonary edema and he is on room air  -Continue holding losartan  -Has Villasenor catheter    Anion gap acidosis  -Recent bicarb is 18, anion gap of 15  -Has had mild acidosis over the past few days intermittently resolving  -Suspect multifactorial, likely predominantly due to his TOBI  -Start gentle bicarb drip as above, he is n.p.o. we will stop bicarb tabs    Anemia  -Recent hemoglobin 8.8  -FLC/kappa lambda ratio was 0.84, SPEP with beta gamma bridging but no M spike, UPEP pending  -Iron panel with iron sat unable to be calculated, severely decreased TIBC, moderately elevated ferritin    Encephalopathy  -Mental status has been waxing and waning over the past several days  -Suspect multifactorial, prolonged hospitalization, delirium, uremia  -Continue to monitor    CKD MBD  -Prior Phos was elevated, now that he is n.p.o. we will discontinue PhosLo    Blood pressure  -Currently on metoprolol 5 mg every 6 hours IV  -Recent blood pressure trends acceptable    Sepsis/pneumonia/cystitis/olecranon bursitis  -Currently on daptomycin and meropenem per ID  -Management per primary team    Abdominal distention  -Status post NG tube placement overnight, currently n.p.o. Discussed with jessica Chery, discussed via phone with patient's spouse Pat, discussed with nephrology attending Dr. Mar Credit:  Confused, offers no specific complaints    ROS:  Review of Systems   Unable to perform ROS: Mental status change      A complete 10 point review of systems was performed and found to be negative unless otherwise noted above or in the HPI. OBJECTIVE:  Current Weight: Weight - Scale: 91.5 kg (201 lb 11.5 oz)  Vitals:    09/06/23 2007 09/07/23 0754 09/07/23 0807 09/07/23 0826   BP: 115/84  128/60 111/61   BP Location: Left arm  Left arm Left arm   Pulse: 98  93 (!) 106   Resp: 20  22 (!) 23   Temp: 99.4 °F (37.4 °C)  97.8 °F (36.6 °C) (!) 97.4 °F (36.3 °C)   TempSrc: Axillary  Axillary Axillary   SpO2: 96% 97%  98%   Weight:       Height:           Intake/Output Summary (Last 24 hours) at 9/7/2023 1052  Last data filed at 9/7/2023 0826  Gross per 24 hour   Intake 50 ml   Output 3710 ml   Net -3660 ml     Physical Exam  Vitals and nursing note reviewed. Constitutional:       Comments: Awake sitting in bed, intermittently confused, frail-appearing   HENT:      Head: Normocephalic and atraumatic.       Nose: Nose normal.      Comments: NG tube in place     Mouth/Throat:      Mouth: Mucous membranes are moist.   Eyes: General: No scleral icterus. Cardiovascular:      Rate and Rhythm: Normal rate and regular rhythm. Pulses: Normal pulses. Pulmonary:      Effort: Pulmonary effort is normal. No respiratory distress. Breath sounds: Normal breath sounds. No wheezing or rales. Comments: Poor inspiratory effort  Abdominal:      General: Abdomen is flat. Musculoskeletal:      Cervical back: Neck supple. Comments: Trace lower extremity edema bilaterally   Skin:     General: Skin is warm and dry. Neurological:      Mental Status: He is alert.       Comments: Awake, confused          Medications:    Current Facility-Administered Medications:   •  acetaminophen (TYLENOL) tablet 650 mg, 650 mg, Oral, Q6H PRN, Lily Torres PA-C, 650 mg at 09/06/23 4442  •  aluminum-magnesium hydroxide-simethicone (MAALOX) oral suspension 30 mL, 30 mL, Oral, Q6H PRN, Lily Torres PA-C  •  aspirin (ECOTRIN LOW STRENGTH) EC tablet 81 mg, 81 mg, Oral, Daily, Lily Torres PA-C, 81 mg at 09/06/23 0915  •  budesonide (PULMICORT) inhalation solution 0.5 mg, 0.5 mg, Nebulization, Q12H, Lily Torres PA-C, 0.5 mg at 09/07/23 0754  •  DAPTOmycin (CUBICIN) 550 mg in sodium chloride 0.9 % 50 mL IVPB, 6 mg/kg, Intravenous, Q48H, Dela Schlatter, MD, Last Rate: 100 mL/hr at 09/07/23 0920, 550 mg at 09/07/23 0920  •  diltiazem (CARDIZEM CD) 24 hr capsule 240 mg, 240 mg, Oral, Daily, Jan Holguin PA-C, 240 mg at 09/06/23 2342  •  fluticasone (FLONASE) 50 mcg/act nasal spray 1 spray, 1 spray, Nasal, Daily, Lily Torres PA-C 1 spray at 70/54/66 1594  •  folic acid (FOLVITE) tablet 1 mg, 1 mg, Oral, Daily, Lily Torres PA-C, 1 mg at 09/06/23 0915  •  formoterol (PERFOROMIST) nebulizer solution 20 mcg, 20 mcg, Nebulization, Q12H, Lily Torres PA-C, 20 mcg at 09/06/23 1929  •  guaiFENesin (MUCINEX) 12 hr tablet 600 mg, 600 mg, Oral, BID, Lily Torres PA-C, 600 mg at 09/06/23 0915  •  heparin (porcine) 25,000 units in 0.45% NaCl 250 mL infusion (premix), 3-20 Units/kg/hr (Order-Specific), Intravenous, Titrated, Mendoza Santillan MD, Last Rate: 10 mL/hr at 09/06/23 2119, 11.1 Units/kg/hr at 09/06/23 2119  •  heparin (porcine) injection 2,000 Units, 2,000 Units, Intravenous, Q6H PRN, Mendoza Santillan MD  •  heparin (porcine) injection 4,000 Units, 4,000 Units, Intravenous, Q6H PRN, Mendoza Santillan MD  •  ipratropium (ATROVENT) 0.02 % inhalation solution 0.5 mg, 0.5 mg, Nebulization, TID, Magaly Dunne MD, 0.5 mg at 09/07/23 0754  •  iron sucrose (VENOFER) 200 mg in sodium chloride 0.9% 100 ml IVPB 200 mg, 200 mg, Intravenous, Once, Mendoza Santillan MD, 200 mg at 09/07/23 1047  •  lactulose retention enema 200 g, 200 g, Rectal, BID PRN, Jenise Willett PA-C  •  levalbuterol Sai Pilling) inhalation solution 1.25 mg, 1.25 mg, Nebulization, TID, Magaly Dunne MD, 1.25 mg at 09/07/23 0754  •  loratadine (CLARITIN) tablet 10 mg, 10 mg, Oral, Daily, Sofiya Forman PA-C, 10 mg at 09/06/23 3400  •  magnesium Oxide (MAG-OX) tablet 400 mg, 400 mg, Oral, Daily, Sofiya Forman PA-C, 400 mg at 09/06/23 0915  •  metoprolol (LOPRESSOR) injection 5 mg, 5 mg, Intravenous, Q6H, Wandy Tuttle PA-C, 5 mg at 09/07/23 0906  •  ondansetron (ZOFRAN) injection 4 mg, 4 mg, Intravenous, Q6H PRN, Sofiya Forman PA-C, 4 mg at 08/28/23 1748  •  oxyCODONE (ROXICODONE) IR tablet 5 mg, 5 mg, Oral, Q6H PRN, Magaly Dunne MD, 5 mg at 09/05/23 0344  •  pantoprazole (PROTONIX) injection 40 mg, 40 mg, Intravenous, Q24H [facility], Wandy Tuttle PA-C, 40 mg at 09/07/23 0907  •  saccharomyces boulardii (FLORASTOR) capsule 250 mg, 250 mg, Oral, BID, Wandy Tuttle PA-C, 250 mg at 09/06/23 0915  •  senna (SENOKOT) tablet 8.6 mg, 1 tablet, Oral, HS, Sofiya Forman PA-C, 8.6 mg at 09/05/23 2107  •  sodium bicarbonate 150 mEq in dextrose 5 % 1,000 mL infusion, 75 mL/hr, Intravenous, Continuous, Agusto Daniels, [address] [facility] Results:  Results from last 7 days   Lab Units 09/07/23  0300 09/06/23  2104 09/06/23  0433 09/05/23  0336 09/04/23  1234 09/04/23  0527 09/03/23  0221 09/02/23 0336 09/01/23  0334   WBC Thousand/uL 19.52* 18.94* 16.32* 16.60*  --  14.04* 12.50* 15.57* 15.57*   HEMOGLOBIN g/dL 8.8* 8.1* 7.3* 7.4* 7.6* 7.0* 7.6* 8.0* 9.2*   HEMATOCRIT % 27.5* 24.6* 22.4* 23.1* 23.3* 21.8* 23.8* 24.7* 29.0*   PLATELETS Thousands/uL 396* 355 330 318  --  296 323 352 365   POTASSIUM mmol/L 3.7  --  3.1* 4.0  --  3.3* 3.9 3.7 3.7   CHLORIDE mmol/L 104  --  105 108  --  107 105 105 105   CO2 mmol/L 18*  --  21 20*  --  20* 19* 21 20*   BUN mg/dL 109*  --  101* 94*  --  89* 76* 68* 55*   CREATININE mg/dL 4.80*  --  4.93* 4.67*  --  4.33* 3.63* 3.39* 2.58*   CALCIUM mg/dL 9.3  --  9.1 9.0  --  9.2 8.9 8.8 8.8   MAGNESIUM mg/dL 2.4  --  2.1 2.3  --  2.2 2.1  --  2.1   PHOSPHORUS mg/dL 5.6*  --  5.8*  --   --   --   --   --   --        I have personally reviewed the blood work as stated above and in my note. I have personally reviewed chest x-ray imaging studies. I have personally reviewed internal medicine, general surgery, orthopedics note.

## 2023-09-07 NOTE — OCCUPATIONAL THERAPY NOTE
Occupational Therapy Cancellation    Patient Name: [facility]  IKZPG'U Date: 9/7/2023 09/07/23 1252   OT Last Visit   OT Visit Date 09/07/23   Note Type   Note type Cancelled Session   Additional Comments Pt on OT caseload. Pt currently not appropriate for OT session 2* pain/medical status. OT will continue to follow.      [employer], MS, OTR/L

## 2023-09-07 NOTE — PROGRESS NOTES
Progress Note - General Surgery   Nataliia Rea 82 y.o. male MRN: [id number]  Unit/Bed#: -01 Encounter: [id number]    Assessment/Plan:  Patient admitted with sepsis, encephalopathy, infected olecranon bursitis and UTI. Patient with gallstone in gallbladder neck with wall thickening on initial imaging. Has continued to deny abdominal pain and was tolerating diet on admission. No LFT abnormalities. No right upper quadrant tenderness. GB findings felt to be chronic. Asked  for reevaluation due to abdominal distention. Ileus  -KUB ordered yesterday with large dilated loop LLQ  -NG tube placed last evening with 150 mL output overnight  -Patient feeling better today. Passing flatus. Still with abdominal distention and tympany, no tenderness  -will place contrast via NG tube for contrast study today for further evaluation  -will follow  -clamp NG tube for study, replace to suction if becomes symptomatic  -needs OOB, PT/OT    Sepsis  -Thought to be related to UTI and olecranon bursitis  -Continue antibiotics  -Continue to trend leukocytosis, fever curve vital signs  -Continue supportive care    Anemia, TOBI,, AFIB, Encephalopathy, cystitis, ETOH abuse, infected olecranon bursitis, ambulatory dysfunction, CVA  -Medical, nephrology, cardiology, orthopedic management    Subjective/Objective   Chief Complaint:     Subjective: Patient confused. Able to answer few questions. Does deny abdominal pain. States he is passing gas. Objective:     Blood pressure 111/61, pulse (!) 106, temperature (!) 97.4 °F (36.3 °C), temperature source Axillary, resp. rate (!) 23, height 5' 8" (1.727 m), weight 91.5 kg (201 lb 11.5 oz), SpO2 98 %. ,Body mass index is 30.67 kg/m².       Intake/Output Summary (Last 24 hours) at 9/7/2023 0996  Last data filed at 9/7/2023 0826  Gross per 24 hour   Intake 50 ml   Output 3710 ml   Net -3660 ml       Invasive Devices     Peripheral Intravenous Line  Duration           Peripheral IV 09/05/23 Dorsal (posterior); Right Wrist 2 days    Peripheral IV 09/06/23 Dorsal (posterior); Right Forearm <1 day          Drain  Duration           Urethral Catheter 16 Fr. 1 day    NG/OG/Enteral Tube Nasogastric 16 Fr Right nare <1 day                Physical Exam:   General appearance: resting comfortably,in no acute distress, confused  Head: Normocephalic, without obvious abnormality, atraumatic, sclerae anicteric, mucous membranes moist  Neck: no JVD and supple, symmetrical, trachea midline  Lungs: clear to auscultation, no wheezes or rales  Heart:   Regular rate, S1-S2 normal, no murmur  Abdomen:   Obese, soft, distended, tympanic, you bowel sounds  Extremities: Edematous upper extremities  Skin: Warm, dry; pale  Nursing notes and vital signs reviewed      Lab, Imaging and other studies:  I have personally reviewed pertinent lab results.   , CBC:   Lab Results   Component Value Date    WBC 19.52 (H) 09/07/2023    HGB 8.8 (L) 09/07/2023    HCT 27.5 (L) 09/07/2023    MCV 98 09/07/2023     (H) 09/07/2023    RBC 2.81 (L) 09/07/2023    MCH 31.3 09/07/2023    MCHC 32.0 09/07/2023    RDW 14.2 09/07/2023    MPV 9.5 09/07/2023    NRBC 0 09/07/2023   , CMP:   Lab Results   Component Value Date    SODIUM 137 09/07/2023    K 3.7 09/07/2023     09/07/2023    CO2 18 (L) 09/07/2023     (H) 09/07/2023    CREATININE 4.80 (H) 09/07/2023    CALCIUM 9.3 09/07/2023    AST 17 09/07/2023    ALT 5 (L) 09/07/2023    ALKPHOS 75 09/07/2023    EGFR 10 09/07/2023     VTE Pharmacologic Prophylaxis: Heparin  VTE Mechanical Prophylaxis: sequential compression device     [place]

## 2023-09-07 NOTE — PROGRESS NOTES
Progress Note - Infectious Disease   Briseyda Zarate 82 y.o. male MRN: [id number]  Unit/Bed#: -01 Encounter: [id number]    Assessment:  53-year-old man with fever, urinary tract infection, CKD, leukocytosis, bursitis, acute kidney injury     Plan:  1) UTI -  patient with improvement in UTI symptoms, has completed Tx for same.    2) Fever, leukocytosis, bursitis, cellulitis, ileus - Patient with improvement GI Sx. Suspect ileus as cause of leukocytosis, and would expect this to resolve along w/ same. HOWEVER, if wbc continues to rise would favor I&D of bursa for definitive source control, as bursitis would be next most probable cause of leukocytosis.      ===> Would continue broad empirical Gram positive coverage while olecranon bursa cultures are pending, however will change vancomycin to daptomycin given worsening kidney function. Will observe patient for toxicity while on this agent. 3) Acute on CKD - Will avoid nephrotoxins. Continue daptomycin.       ===> Will follow     Subjective/Objective   Over past 48h, pt. w/ drainage of bursitis on left elbow (Cx pending, w/o growth), as well as ileus / abdominal obstruction. Clinically improved after drainage of bursa, placement of NGT. Tolerating sips of clears. Afebrile, but WBC up (19k today). Creat remains elevated, but down a bit compared w/ yesterday. Temp:  [97.3 °F (36.3 °C)-99.4 °F (37.4 °C)] 97.3 °F (36.3 °C)  HR:  [] 97  Resp:  [18-23] 19  BP: (111-149)/(59-84) 149/80  SpO2:  [96 %-99 %] 99 %  Temp (24hrs), Av °F (36.7 °C), Min:97.3 °F (36.3 °C), Max:99.4 °F (37.4 °C)  Current: Temperature: (!) 97.3 °F (36.3 °C)    Physical Exam:   Gen: AA, NAD, conversant, VS reviewed  ENT: MMM  CV: No m/r/g, S1, S2  Pulm: Lungs CTAB, no respiratory distress  ABD: S/NT/DT  Skin: No rash on exposed skin  Psych: Oriented, nl. affect     Invasive Devices     Peripheral Intravenous Line  Duration           Peripheral IV 23 Dorsal (posterior); Right Wrist 2 days    Peripheral IV 09/06/23 Dorsal (posterior); Right Forearm <1 day          Drain  Duration           Urethral Catheter 16 Fr. 1 day    NG/OG/Enteral Tube Nasogastric 16 Fr Right nare <1 day                Lab, Imaging and other studies: I have personally reviewed pertinent reports.    and I have personally reviewed pertinent films in PACS

## 2023-09-07 NOTE — CASE MANAGEMENT
Case Management Progress Note    Patient name Kiersten Alcantara  Location /-62 MRN [id number]  : 1935 Date 2023       LOS (days): 12  Geometric Mean LOS (GMLOS) (days): 4.00  Days to GMLOS:-7.7        OBJECTIVE:        Current admission status: Inpatient  Preferred Pharmacy:   [address], [address] [facility] [address] [facility]  [address] [facility] [address]  Phone: [phone] Fax: [phone]    [facility]  [address] [place] [address].  [address] [place] [address]  Phone: [phone] Fax: [phone]    Primary Care Provider: Jesus Felder DO    Primary Insurance: MEDICARE  Secondary Insurance: Zeke Contreras    PROGRESS NOTE:    Updated Danyelle Díaz via [address] on Pt's status. CM to follow as Pr progresses.

## 2023-09-07 NOTE — ASSESSMENT & PLAN NOTE
Lab Results   Component Value Date    EGFR 10 09/07/2023    EGFR 9 09/06/2023    EGFR 10 09/05/2023    CREATININE 4.80 (H) 09/07/2023    CREATININE 4.93 (H) 09/06/2023    CREATININE 4.67 (H) 09/05/2023   · Baseline creatinine 1.6-1.9, POA- 1.7  · Likely secondary to autoregulatory failure, ATN secondary to sepsis, medications  · Good urine output with IV lasix drip, creatinine is trending up, likely a lag.  Will monitor   · Nephrology following  · Urinary retention protocol  · I/O monitoring  · Renally dose medications  · Changed Abx for kidney failure

## 2023-09-07 NOTE — PROCEDURES
Procedure: After discussion of risks and benefits, patient agreed to proceed with aspiration of the Left olecranon bursa. After prepping the elbow with chloraprep, an 18 gauge needle was introduced and 2ccs of purulent fluid was aspirated and sent for culture. The needle was removed and dry, sterile dressing was placed over the elbow. Patient tolerated the procedure well.       Reece Dumont PA-C

## 2023-09-07 NOTE — ASSESSMENT & PLAN NOTE
Abdominal distension with X ray KUB showing left lower quadrant loop distension  Surgery following  PO contrast given for X ray obs series-which were negative  Ileus demonstrated  Clear liquid

## 2023-09-07 NOTE — PROGRESS NOTES
[id number]  Progress Note  Name: Liliane Lynn  MRN: [id number]  Unit/Bed#: -01 I Date of Admission: 8/26/2023   Date of Service: 9/7/2023 I Hospital Day: 12    Assessment/Plan   Sepsis [facility])  Assessment & Plan  Meets criteria with leukocytosis, tachycardia  Likely secondary to ESBL UTI/Septic olecranon bursitis or SIRS with TOBI  F/u Blood Cx- Negative growth so far, wound Cx- growing coag negative staph  F/u vitals  F/u CBC, CMP and procal  Abx as per ID recommendations- currently on Dapto      Acute encephalopathy  Assessment & Plan  Patient is confused likely acute metabolic encephalopathy in setting of UTI versus septic  Bursitis Vs TOBI  Frequent reorientation  Monitor CBC, CMP  Follow up on Cx  Neurochecks  On IV antibiotics  ID, nephrology consulted, appreciate recommendations    TOBI (acute kidney injury) [facility])  Assessment & Plan  Lab Results   Component Value Date    EGFR 10 09/07/2023    EGFR 9 09/06/2023    EGFR 10 09/05/2023    CREATININE 4.80 (H) 09/07/2023    CREATININE 4.93 (H) 09/06/2023    CREATININE 4.67 (H) 09/05/2023   · Baseline creatinine 1.6-1.9, POA- 1.7  · Likely secondary to autoregulatory failure, ATN secondary to sepsis, medications  · Good urine output with IV lasix drip, creatinine is trending up, likely a lag.  Will monitor   · Nephrology following  · Urinary retention protocol  · I/O monitoring  · Renally dose medications  · Changed Abx for kidney failure        ESBL (extended spectrum beta-lactamase) producing bacteria infection  Assessment & Plan  Urine CX from 8/27- grew ESBL  Abx as per ID        Olecranon bursitis  Assessment & Plan  Continue Dapto  Wound culture ordered  Repeat wound CX ordered  ID and ortho following      Ileus ([address])  Assessment & Plan  Abdominal distension with X ray KUB showing left lower quadrant loop distension  Surgery following  PO contrast given for X ray obs series-which were negative  Ileus demonstrated  Clear liquid        Swelling of finger, left  Assessment & Plan  Left 3rd MCP joint  Tender to touch, erythematous  add colchicine reduced dose    Sacral wound  Assessment & Plan  Wound care consulted  Appreciate recs    Atrial fibrillation [facility])  Assessment & Plan  · Went into A-fib with RVR on August 28 and was given IV Lopressor and Cardizem  · Home regimen: Coreg 25 Mg twice daily  · Started on Eliquis 2.5 mg twice daily for anticoagulation during hospitalization at [address] [facility] 8/8-8/23  · Cardiology consult appreciated  · Rate controlled               VTE Pharmacologic Prophylaxis: VTE Score: 5 High Risk (Score >/= 5) - Pharmacological DVT Prophylaxis Ordered: heparin drip. Sequential Compression Devices Ordered. - Consideration for HD cathter, will hold eliquis for now    Patient Centered Rounds: I performed bedside rounds with nursing staff today. Discussions with Specialists or Other Care Team Provider: Surgery, Nephro, Nursing    Education and Discussions with Family / Patient: will call wife. Total Time Spent on Date of Encounter in care of patient: 55 minutes This time was spent on one or more of the following: performing physical exam; counseling and coordination of care; obtaining or reviewing history; documenting in the medical record; reviewing/ordering tests, medications or procedures; communicating with other healthcare professionals and discussing with patient's family/caregivers. Current Length of Stay: 12 day(s)  Current Patient Status: Inpatient   Certification Statement: The patient will continue to require additional inpatient hospital stay due to TOBI, sepsis, Encephalopathy  Discharge Plan: pending clinical improvement    Code Status: Level 3 - DNAR and DNI    Subjective:   Patient is alert, confused intermittently. Oriented X1, C/o pain in the left lower quadrant with palpation, left hand, left elbow.  Denies chest pain, SOB  Acute over night events significant findings on X ray KUB- placed NGT with 150 ml to suction, PO contrast given this morning. No overnight tele events    Objective:     Vitals:   Temp (24hrs), Av °F (36.7 °C), Min:97.3 °F (36.3 °C), Max:99.4 °F (37.4 °C)    Temp:  [97.3 °F (36.3 °C)-99.4 °F (37.4 °C)] 97.3 °F (36.3 °C)  HR:  [] 97  Resp:  [19-23] 19  BP: (111-149)/(60-84) 149/80  SpO2:  [96 %-99 %] 99 %  Body mass index is 30.67 kg/m². Input and Output Summary (last 24 hours): Intake/Output Summary (Last 24 hours) at 2023 1900  Last data filed at 2023 1107  Gross per 24 hour   Intake 50 ml   Output 3385 ml   Net -3335 ml       Physical Exam:   Physical Exam  HENT:      Head: Normocephalic and atraumatic. Nose: Nose normal.      Mouth/Throat:      Mouth: Mucous membranes are moist.      Pharynx: Oropharynx is clear. Eyes:      General: No scleral icterus. Right eye: No discharge. Left eye: No discharge. Conjunctiva/sclera: Conjunctivae normal.   Cardiovascular:      Rate and Rhythm: Tachycardia present. Rhythm irregular. Pulses: Normal pulses. Heart sounds: Normal heart sounds. Pulmonary:      Effort: Pulmonary effort is normal. No respiratory distress. Abdominal:      General: There is distension. Palpations: Abdomen is soft. Tenderness: There is abdominal tenderness. There is no guarding or rebound. Comments: Slow BS, Tymapnic note   Skin:     General: Skin is warm and dry. Capillary Refill: Capillary refill takes 2 to 3 seconds. Neurological:      Mental Status: He is alert.       Comments: Oriented X 1         Additional Data:     Labs:  Results from last 7 days   Lab Units 23  0300   WBC Thousand/uL 19.52*   HEMOGLOBIN g/dL 8.8*   HEMATOCRIT % 27.5*   PLATELETS Thousands/uL 396*   NEUTROS PCT % 80*   LYMPHS PCT % 9*   MONOS PCT % 7   EOS PCT % 2     Results from last 7 days   Lab Units 23  0300   SODIUM mmol/L 137   POTASSIUM mmol/L 3.7   CHLORIDE mmol/L 104   CO2 mmol/L 18*   BUN mg/dL 109*   CREATININE mg/dL 4.80*   ANION GAP mmol/L 15   CALCIUM mg/dL 9.3   ALBUMIN g/dL 3.2*   TOTAL BILIRUBIN mg/dL 0.67   ALK PHOS U/L 75   ALT U/L 5*   AST U/L 17   GLUCOSE RANDOM mg/dL 147*     Results from last 7 days   Lab Units 09/06/23  2104   INR  2.67*             Results from last 7 days   Lab Units 09/05/23  0336 09/04/23  0527 09/03/23  0221 09/02/23  0336   PROCALCITONIN ng/ml 2.42* 2.93* 2.83* 3.11*       Lines/Drains:  Invasive Devices     Peripheral Intravenous Line  Duration           Peripheral IV 09/05/23 Dorsal (posterior); Right Wrist 2 days    Peripheral IV 09/06/23 Dorsal (posterior); Right Forearm <1 day          Drain  Duration           Urethral Catheter 16 Fr. 1 day              Urinary Catheter:  Goal for removal: Goal to remove tomorrow           Telemetry:  Telemetry Orders (From admission, onward)             24 Hour Telemetry Monitoring  Continuous x 24 Hours (Telem)        Comments: A fib with uncontrolled HR   Expiring   Question:  Reason for 24 Hour Telemetry  Answer:  Arrhythmias requiring acute medical intervention / PPM or ICD malfunction                 Telemetry Reviewed: Atrial fibrillation. HR averaging 110  Indication for Continued Telemetry Use: Arrthymias requiring medical therapy             Imaging: Reviewed radiology reports from this admission including: xray(s)    Recent Cultures (last 7 days):   Results from last 7 days   Lab Units 09/06/23  1802 09/03/23  1141 09/03/23  1133 09/01/23  1429   BLOOD CULTURE   --  No Growth After 4 Days. No Growth After 4 Days. --  No Growth After 5 Days. No Growth After 5 Days.    GRAM STAIN RESULT  2+ Polys  No organisms seen  --  Rare Polys  No bacteria seen  --    WOUND CULTURE   --   --  3 colonies Staphylococcus coagulase negative*  --        Last 24 Hours Medication List:   Current Facility-Administered Medications   Medication Dose Route Frequency Provider Last Rate   • acetaminophen  650 mg Oral Q6H PRN Phuc Mendoza PA-C     • aluminum-magnesium hydroxide-simethicone  30 mL Oral Q6H PRN Phuc Mendoza PA-C     • aspirin  81 mg Oral Daily Phuc Mendoza PA-C     • budesonide  0.5 mg Nebulization Q12H Phuc Mendoza PA-C     • [START ON 9/8/2023] colchicine  0.3 mg Oral Daily Jose Masters MD     • DAPTOmycin  6 mg/kg Intravenous Q48H Mary Sanches  mg (09/07/23 0920)   • diltiazem  240 mg Oral Daily Luzmaria Holguin PA-C     • fluticasone  1 spray Nasal Daily Phuc Mendoza PA-C     • folic acid  1 mg Oral Daily Phuc Mendoza PA-C     • formoterol  20 mcg Nebulization Q12H Phuc Mendoza PA-C     • guaiFENesin  600 mg Oral BID Phuc Mendoza PA-C     • heparin (porcine)  3-20 Units/kg/hr (Order-Specific) Intravenous Titrated Jose Masters MD 11.1 Units/kg/hr (09/06/23 2119)   • heparin (porcine)  2,000 Units Intravenous Q6H PRN Jose Masters MD     • heparin (porcine)  4,000 Units Intravenous Q6H PRN Jose Masters MD     • ipratropium  0.5 mg Nebulization TID Rosalinda Aiken MD     • lactulose  200 g Rectal BID PRN Thomas Willett PA-C     • levalbuterol  1.25 mg Nebulization TID Rosalinda Aiken MD     • loratadine  10 mg Oral Daily hPuc Mendoza PA-C     • magnesium Oxide  400 mg Oral Daily Phuc Mendoza PA-C     • metoprolol  5 mg Intravenous Q6H Wandy Tuttle PA-C     • ondansetron  4 mg Intravenous Q6H PRN Phuc Mendoza PA-C     • oxyCODONE  5 mg Oral Q6H PRN Rosalinda Aiken MD     • pantoprazole  40 mg Intravenous Q24H [address] Wandy Tuttle PA-C     • saccharomyces boulardii  250 mg Oral BID Saul Atwood PA-C     • senna  1 tablet Oral HS Phuc Mendoza PA-C     • sodium bicarbonate 150 mEq in dextrose 5 % 1,000 mL infusion  75 mL/hr Intravenous Continuous TOD Swenson 75 mL/hr (09/07/23 1215)        Today, Patient Was Seen By: Jose Masters MD    **Please Note: This note may have been constructed using a voice recognition system. **

## 2023-09-07 NOTE — PROGRESS NOTES
Orthopedics   Kellen Em 82 y.o. male MRN: [id number]  Unit/Bed#: UB XRAY      HPI:  82 y.o.male with bilateral olecranon bursitis with bedside I&D of right side on 9/3/23. Patient currently has NG tube in. He is alert and able to answer questions. He is not a reliable historian due to current encephalopathy. He states he does not feel any pain at this time. No recent fever or chills. Past Medical History:   Past Medical History:   Diagnosis Date   • Alcohol abuse    • Alcohol withdrawal seizure without complication ([address])    • Arthritis    • Asthma    • CVA (cerebral vascular accident) ([address])    • Decubitus ulcer of buttock     last assessed 07/20/16   • Diabetes ([address])    • Disease of thyroid gland    • Duodenal ulcer    • Emphysema lung ([address])    • Esophageal varices (HCC)    • GERD (gastroesophageal reflux disease)    • History of transfusion    • Hypertension    • Irritable bowel syndrome with diarrhea    • Kidney stones    • Prostate cancer ([address])    • Urinary frequency     resolved 02/15/17       Past Surgical History:   Past Surgical History:   Procedure Laterality Date   • BACK SURGERY     • CATARACT EXTRACTION     • ESOPHAGOGASTRODUODENOSCOPY N/A 2/6/2017    Procedure: ESOPHAGOGASTRODUODENOSCOPY (EGD); Surgeon: Chris Mckeon MD;  Location:  MAIN OR;  Service:    • UT ESOPHAGOGASTRODUODENOSCOPY TRANSORAL DIAGNOSTIC N/A 4/26/2016    Procedure: ESOPHAGOGASTRODUODENOSCOPY (EGD);   Surgeon: Monalisa Ruano MD;  Location:  MAIN OR;  Service: Gastroenterology   • TONSILLECTOMY         Family History:  Family history reviewed and non-contributory  Family History   Problem Relation Age of Onset   • Heart disease Mother         cardiac disorder   • Stroke Father    • Heart disease Father         cardiac disorder   • Heart disease Sister    • Diabetes Family    • Hypertension Family        Social History:  Social History     Socioeconomic History   • Marital status: /Civil Union     Spouse name: None   • Number of children: None   • Years of education: None   • Highest education level: None   Occupational History   • None   Tobacco Use   • Smoking status: Former     Types: Cigarettes     Quit date: 1980     Years since quittin.7   • Smokeless tobacco: Never   • Tobacco comments:     quit 20 years ago   Vaping Use   • Vaping Use: Never used   Substance and Sexual Activity   • Alcohol use: Yes     Alcohol/week: 14.0 standard drinks of alcohol     Types: 14 Standard drinks or equivalent per week     Comment: 3-4 mixed drinks vodka per night/ 2L per week   • Drug use: No   • Sexual activity: Not Currently   Other Topics Concern   • None   Social History Narrative    Lives with Wife     Social Determinants of Health     Financial Resource Strain: Low Risk  (2023)    Overall Financial Resource Strain (CARDIA)    • Difficulty of Paying Living Expenses: Not very hard   Food Insecurity: No Food Insecurity (2023)    Hunger Vital Sign    • Worried About Running Out of Food in the Last Year: Never true    • Ran Out of Food in the Last Year: Never true   Transportation Needs: No Transportation Needs (2023)    PRAPARE - Transportation    • Lack of Transportation (Medical): No    • Lack of Transportation (Non-Medical): No   Physical Activity: Not on file   Stress: Not on file   Social Connections: Not on file   Intimate Partner Violence: Not At Risk (2023)    Humiliation, Afraid, Rape, and Kick questionnaire    • Fear of Current or Ex-Partner: No    • Emotionally Abused: No    • Physically Abused: No    • Sexually Abused: No   Housing Stability: Low Risk  (2023)    Housing Stability Vital Sign    • Unable to Pay for Housing in the Last Year: No    • Number of [address] in the Last Year: 1    • Unstable Housing in the Last Year: No       Allergies:    Allergies   Allergen Reactions   • Morphine And Related            Labs:  0   Lab Value Date/Time    HCT 27.5 (L) 2023 0300    HCT 24.6 (L) 09/06/2023 2104    HCT 22.4 (L) 09/06/2023 0433    HCT 40.9 12/22/2015 1248    HCT 31.5 (L) 08/28/2014 0600    HCT 37.8 08/27/2014 0618    HGB 8.8 (L) 09/07/2023 0300    HGB 8.1 (L) 09/06/2023 2104    HGB 7.3 (L) 09/06/2023 0433    HGB 14.0 12/22/2015 1248    HGB 10.0 (L) 08/28/2014 0600    HGB 12.1 08/27/2014 0618    INR 2.67 (H) 09/06/2023 2104    INR 1.06 08/26/2014 1320    WBC 19.52 (H) 09/07/2023 0300    WBC 18.94 (H) 09/06/2023 2104    WBC 16.32 (H) 09/06/2023 0433    WBC 8.80 12/22/2015 1248    WBC 5.42 08/28/2014 0600    WBC 7.12 08/27/2014 0618    ESR 76 (H) 04/14/2023 0938    CRP 6.5 (H) 01/19/2016 1257       Meds:    Current Facility-Administered Medications:   •  acetaminophen (TYLENOL) tablet 650 mg, 650 mg, Oral, Q6H PRN, ARSEN Orozco-C, 650 mg at 09/06/23 4999  •  aluminum-magnesium hydroxide-simethicone (MAALOX) oral suspension 30 mL, 30 mL, Oral, Q6H PRN, Andrade Dai PA-C  •  aspirin (ECOTRIN LOW STRENGTH) EC tablet 81 mg, 81 mg, Oral, Daily, ARSEN Orozco-C, 81 mg at 09/06/23 0915  •  budesonide (PULMICORT) inhalation solution 0.5 mg, 0.5 mg, Nebulization, Q12H, ARSEN Orozco-C, 0.5 mg at 09/07/23 0754  •  calcium acetate (PHOSLO) capsule 667 mg, 667 mg, Oral, TID With Meals, Wallace Gonzalez PA-C  •  DAPTOmycin (CUBICIN) 550 mg in sodium chloride 0.9 % 50 mL IVPB, 6 mg/kg, Intravenous, Q48H, Tahira Jeff MD, Last Rate: 100 mL/hr at 09/07/23 0920, 550 mg at 09/07/23 0920  •  diltiazem (CARDIZEM CD) 24 hr capsule 240 mg, 240 mg, Oral, Daily, Shea Holguin PA-C, 240 mg at 09/06/23 4123  •  fluticasone (FLONASE) 50 mcg/act nasal spray 1 spray, 1 spray, Nasal, Daily, Andrade Dai PA-C, 1 spray at 81/83/39 2047  •  folic acid (FOLVITE) tablet 1 mg, 1 mg, Oral, Daily, Andrade Dai PA-C, 1 mg at 09/06/23 0915  •  formoterol (PERFOROMIST) nebulizer solution 20 mcg, 20 mcg, Nebulization, Q12H, ARSEN Orozco-C, 20 mcg at 09/06/23 1929  • furosemide (LASIX) 500 mg infusion 50 mL, 40 mg/hr, Intravenous, Continuous, Purnima Arita PA-C, Last Rate: 4 mL/hr at 09/07/23 0102, 40 mg/hr at 09/07/23 0102  •  guaiFENesin (MUCINEX) 12 hr tablet 600 mg, 600 mg, Oral, BID, ARSEN Sahni-C, 600 mg at 09/06/23 0915  •  heparin (porcine) 25,000 units in 0.45% NaCl 250 mL infusion (premix), 3-20 Units/kg/hr (Order-Specific), Intravenous, Titrated, Blue Greene MD, Last Rate: 10 mL/hr at 09/06/23 2119, 11.1 Units/kg/hr at 09/06/23 2119  •  heparin (porcine) injection 2,000 Units, 2,000 Units, Intravenous, Q6H PRN, Blue Greene MD  •  heparin (porcine) injection 4,000 Units, 4,000 Units, Intravenous, Q6H PRN, Blue Greene MD  •  ipratropium (ATROVENT) 0.02 % inhalation solution 0.5 mg, 0.5 mg, Nebulization, TID, Agus Bliss MD, 0.5 mg at 09/07/23 0754  •  iron sucrose (VENOFER) 200 mg in sodium chloride 0.9% 100 ml IVPB 200 mg, 200 mg, Intravenous, Once, Blue Greene MD  •  lactulose retention enema 200 g, 200 g, Rectal, BID PRN, Alisha Willett PA-C  •  levalbuterol (XOPENEX) inhalation solution 1.25 mg, 1.25 mg, Nebulization, TID, Agus Bliss MD, 1.25 mg at 09/07/23 0754  •  loratadine (CLARITIN) tablet 10 mg, 10 mg, Oral, Daily, Amanda Be PA-C, 10 mg at 09/06/23 9695  •  magnesium Oxide (MAG-OX) tablet 400 mg, 400 mg, Oral, Daily, Amanda Be PA-C, 400 mg at 09/06/23 0915  •  metolazone (ZAROXOLYN) tablet 10 mg, 10 mg, Oral, Q6H, Purnima Arita PA-C, 2.5 mg at 09/06/23 1323  •  metoprolol (LOPRESSOR) injection 5 mg, 5 mg, Intravenous, Q6H, Wandy Tuttle PA-C, 5 mg at 09/07/23 0906  •  ondansetron (ZOFRAN) injection 4 mg, 4 mg, Intravenous, Q6H PRN, Amanda Be PA-C, 4 mg at 08/28/23 1748  •  oxyCODONE (ROXICODONE) IR tablet 5 mg, 5 mg, Oral, Q6H PRN, Agus Bliss MD, 5 mg at 09/05/23 0344  •  pantoprazole (PROTONIX) injection 40 mg, 40 mg, Intravenous, Q24H [address] [facility], Wandy Tuttle PA-C, 40 mg at 09/07/23 0907  •  saccharomyces boulardii (FLORASTOR) capsule 250 mg, 250 mg, Oral, BID, Wandy Tuttle PA-C, 250 mg at 09/06/23 0915  •  senna (SENOKOT) tablet 8.6 mg, 1 tablet, Oral, HS, Kaeamber Black PA-C, 8.6 mg at 09/05/23 2107  •  sodium bicarbonate tablet 1,300 mg, 1,300 mg, Oral, BID after meals, Igor Paez PA-C, 1,300 mg at 09/06/23 0915    Blood Culture:   Lab Results   Component Value Date    BLOODCX No Growth at 72 hrs. 09/03/2023    BLOODCX No Growth at 72 hrs. 09/03/2023       Wound Culture:   Lab Results   Component Value Date    WOUNDCULT 3 colonies Staphylococcus coagulase negative (A) 09/03/2023       Ins and Outs:  I/O last 24 hours: In: 48 [NG/GT:50]  Out: 3968 [Urine:3858; Emesis/NG output:110]          Physical Exam:   /61 (BP Location: Left arm)   Pulse (!) 106   Temp (!) 97.4 °F (36.3 °C) (Axillary)   Resp (!) 23   Ht 5' 8" (1.727 m)   Wt 91.5 kg (201 lb 11.5 oz)   SpO2 98%   BMI 30.67 kg/m²     Musculoskeletal: right upper extremity  · Skin no active drainage  · No erythema over olecrenon bursa  · No reaction or complaints with palpation to olecranon bursa  · +swelling and small fluid collection of olecranon bursa  · Full active & passive ROM at elbow  · Edema throughout upper extremity. · SILT m/r/u.   · Motor intact ain/pin/m/r/u,   · 2+ rad pulse     Left upper extremity  · Skin no active drainage  · No erythema over olecrenon bursa  · No reaction or complaints with palpation to olecranon bursa  · +swelling and fluid collection of olecranon bursa  · Full active & passive ROM at elbow  · Edema throughout upper extremity  · SILT m/r/u.   · Motor intact ain/pin/m/r/u,   · 2+ rad pulse      Assessment:  88 y.o.male with bilateral olecranon bursitis    Plan:   · abx per primary team  · Right olecrenon bursa with continued swelling but nontender to palpation. · Left olecranon bursa with swelling and palpable fluid collection, no significant erythema.   · F/u cultures of right, they are currently growing 3 colonies of coag neg staph. Of note, this culture was of the wound later in day after bedside I&D. Dr. Ange Tate was unable to get enough fluid from bursa to send for culture at that time. · Left elbow olecranon bursa aspirated yesterday. Will follow up on cultures of that fluid. · At this time we have no plan to take patient to OR today for I&D. He currently has a NG tube and other medical issues that are being worked up. If cultures/Gram stain from the left elbow olecranon bursa aspirate are positive, will most likely do bedside I&D of the left side. · Ortho will follow.        Gera Marley PA-C

## 2023-09-07 NOTE — SPEECH THERAPY NOTE
Speech Language/Pathology    Per chart review, pt NPO w/ NGT for suction per surgery. ST to f/u as able/appropriate.

## 2023-09-07 NOTE — ASSESSMENT & PLAN NOTE
· Went into A-fib with RVR on August 28 and was given IV Lopressor and Cardizem  · Home regimen: Coreg 25 Mg twice daily  · Started on Eliquis 2.5 mg twice daily for anticoagulation during hospitalization at [address] [facility] [address] 8/8-8/23  · Cardiology consult appreciated  · Rate controlled

## 2023-09-07 NOTE — ASSESSMENT & PLAN NOTE
Meets criteria with leukocytosis, tachycardia  Likely secondary to ESBL UTI/Septic olecranon bursitis or SIRS with TOBI  F/u Blood Cx- Negative growth so far, wound Cx- growing coag negative staph  F/u vitals  F/u CBC, CMP and procal  Abx as per ID recommendations- currently on Dapto

## 2023-09-07 NOTE — PLAN OF CARE
Problem: Potential for Falls  Goal: Patient will remain free of falls  Description: INTERVENTIONS:  - Educate patient/family on patient safety including physical limitations  - Instruct patient to call for assistance with activity   - Consult OT/PT to assist with strengthening/mobility   - Keep Call bell within reach  - Keep bed low and locked with side rails adjusted as appropriate  - Keep care items and personal belongings within reach  - Initiate and maintain comfort rounds  - Make Fall Risk Sign visible to staff  - Offer Toileting every 2 Hours, in advance of need  - Initiate/Maintain bed alarm  - Obtain necessary fall risk management equipment:   - Apply yellow socks and bracelet for high fall risk patients  - Consider moving patient to room near nurses station  Outcome: Progressing     Problem: PAIN - ADULT  Goal: Verbalizes/displays adequate comfort level or baseline comfort level  Description: Interventions:  - Encourage patient to monitor pain and request assistance  - Assess pain using appropriate pain scale  - Administer analgesics based on type and severity of pain and evaluate response  - Implement non-pharmacological measures as appropriate and evaluate response  - Consider cultural and social influences on pain and pain management  - Notify physician/advanced practitioner if interventions unsuccessful or patient reports new pain  Outcome: Progressing     Problem: INFECTION - ADULT  Goal: Absence or prevention of progression during hospitalization  Description: INTERVENTIONS:  - Assess and monitor for signs and symptoms of infection  - Monitor lab/diagnostic results  - Monitor all insertion sites, i.e. indwelling lines, tubes, and drains  - Monitor endotracheal if appropriate and nasal secretions for changes in amount and color  - [place] appropriate cooling/warming therapies per order  - Administer medications as ordered  - Instruct and encourage patient and family to use good hand hygiene technique  - Identify and instruct in appropriate isolation precautions for identified infection/condition  Outcome: Progressing  Goal: Absence of fever/infection during neutropenic period  Description: INTERVENTIONS:  - Monitor WBC    Outcome: Progressing     Problem: SAFETY ADULT  Goal: Maintain or return to baseline ADL function  Description: INTERVENTIONS:  -  Assess patient's ability to carry out ADLs; assess patient's baseline for ADL function and identify physical deficits which impact ability to perform ADLs (bathing, care of mouth/teeth, toileting, grooming, dressing, etc.)  - Assess/evaluate cause of self-care deficits   - Assess range of motion  - Assess patient's mobility; develop plan if impaired  - Assess patient's need for assistive devices and provide as appropriate  - Encourage maximum independence but intervene and supervise when necessary  - Involve family in performance of ADLs  - Assess for home care needs following discharge   - Consider OT consult to assist with ADL evaluation and planning for discharge  - Provide patient education as appropriate  Outcome: Progressing  Goal: Maintains/Returns to pre admission functional level  Description: INTERVENTIONS:  - Perform BMAT or MOVE assessment daily.   - Set and communicate daily mobility goal to care team and patient/family/caregiver. - Collaborate with rehabilitation services on mobility goals if consulted  - Perform Range of Motion 3 times a day. - Reposition patient every 2 hours.   - Dangle patient 2 times a day  - Stand patient 2 times a day  - Ambulate patient 2 times a day  - Out of bed to chair 2 times a day   - Out of bed for meals 2 times a day  - Out of bed for toileting  - Record patient progress and toleration of activity level   Outcome: Progressing     Problem: DISCHARGE PLANNING  Goal: Discharge to home or other facility with appropriate resources  Description: INTERVENTIONS:  - Identify barriers to discharge w/patient and caregiver  - Arrange for needed discharge resources and transportation as appropriate  - Identify discharge learning needs (meds, wound care, etc.)  - Arrange for interpretive services to assist at discharge as needed  - Refer to Case Management Department for coordinating discharge planning if the patient needs post-hospital services based on physician/advanced practitioner order or complex needs related to functional status, cognitive ability, or social support system  Outcome: Progressing     Problem: Knowledge Deficit  Goal: Patient/family/caregiver demonstrates understanding of disease process, treatment plan, medications, and discharge instructions  Description: Complete learning assessment and assess knowledge base.   Interventions:  - Provide teaching at level of understanding  - Provide teaching via preferred learning methods  Outcome: Progressing     Problem: Prexisting or High Potential for Compromised Skin Integrity  Goal: Skin integrity is maintained or improved  Description: INTERVENTIONS:  - Identify patients at risk for skin breakdown  - Assess and monitor skin integrity  - Assess and monitor nutrition and hydration status  - Monitor labs   - Assess for incontinence   - Turn and reposition patient  - Assist with mobility/ambulation  - Relieve pressure over bony prominences  - Avoid friction and shearing  - Provide appropriate hygiene as needed including keeping skin clean and dry  - Evaluate need for skin moisturizer/barrier cream  - Collaborate with interdisciplinary team   - Patient/family teaching  - Consider wound care consult   Outcome: Progressing     Problem: MOBILITY - ADULT  Goal: Maintain or return to baseline ADL function  Description: INTERVENTIONS:  -  Assess patient's ability to carry out ADLs; assess patient's baseline for ADL function and identify physical deficits which impact ability to perform ADLs (bathing, care of mouth/teeth, toileting, grooming, dressing, etc.)  - Assess/evaluate cause of self-care deficits   - Assess range of motion  - Assess patient's mobility; develop plan if impaired  - Assess patient's need for assistive devices and provide as appropriate  - Encourage maximum independence but intervene and supervise when necessary  - Involve family in performance of ADLs  - Assess for home care needs following discharge   - Consider OT consult to assist with ADL evaluation and planning for discharge  - Provide patient education as appropriate  Outcome: Progressing  Goal: Maintains/Returns to pre admission functional level  Description: INTERVENTIONS:  - Perform BMAT or MOVE assessment daily.   - Set and communicate daily mobility goal to care team and patient/family/caregiver. - Collaborate with rehabilitation services on mobility goals if consulted  - Perform Range of Motion 3 times a day. - Reposition patient every 2 hours. - Dangle patient 2 times a day  - Stand patient 2 times a day  - Ambulate patient 2 times a day  - Out of bed to chair 2 times a day   - Out of bed for meals 2 times a day  - Out of bed for toileting  - Record patient progress and toleration of activity level   Outcome: Progressing     Problem: Nutrition/Hydration-ADULT  Goal: Nutrient/Hydration intake appropriate for improving, restoring or maintaining nutritional needs  Description: Monitor and assess patient's nutrition/hydration status for malnutrition. Collaborate with interdisciplinary team and initiate plan and interventions as ordered. Monitor patient's weight and dietary intake as ordered or per policy. Utilize nutrition screening tool and intervene as necessary. Determine patient's food preferences and provide high-protein, high-caloric foods as appropriate.      INTERVENTIONS:  - Monitor oral intake, urinary output, labs, and treatment plans  - Assess nutrition and hydration status and recommend course of action  - Evaluate amount of meals eaten  - Assist patient with eating if necessary   - Allow adequate time for meals  - Recommend/ encourage appropriate diets, oral nutritional supplements, and vitamin/mineral supplements  - Order, calculate, and assess calorie counts as needed  - Recommend, monitor, and adjust tube feedings and TPN/PPN based on assessed needs  - Assess need for intravenous fluids  - Provide specific nutrition/hydration education as appropriate  - Include patient/family/caregiver in decisions related to nutrition  Outcome: Progressing

## 2023-09-08 ENCOUNTER — APPOINTMENT (INPATIENT)
Dept: RADIOLOGY | Facility: HOSPITAL | Age: 88
DRG: 193 | End: 2023-09-08
Payer: MEDICARE

## 2023-09-08 PROBLEM — D75.839 THROMBOCYTOSIS: Status: ACTIVE | Noted: 2023-09-08

## 2023-09-08 LAB
ANION GAP SERPL CALCULATED.3IONS-SCNC: 11 MMOL/L
APTT PPP: 125 SECONDS (ref 23–37)
APTT PPP: 74 SECONDS (ref 23–37)
APTT PPP: 75 SECONDS (ref 23–37)
BACTERIA BLD CULT: NORMAL
BACTERIA BLD CULT: NORMAL
BASOPHILS # BLD AUTO: 0.05 THOUSANDS/ÂΜL (ref 0–0.1)
BASOPHILS NFR BLD AUTO: 0 % (ref 0–1)
BUN SERPL-MCNC: 113 MG/DL (ref 5–25)
CALCIUM SERPL-MCNC: 9.5 MG/DL (ref 8.4–10.2)
CHLORIDE SERPL-SCNC: 99 MMOL/L (ref 96–108)
CO2 SERPL-SCNC: 30 MMOL/L (ref 21–32)
CREAT SERPL-MCNC: 4.41 MG/DL (ref 0.6–1.3)
CRYSTALS SNV QL MICRO: NORMAL
EOSINOPHIL # BLD AUTO: 0.41 THOUSAND/ÂΜL (ref 0–0.61)
EOSINOPHIL NFR BLD AUTO: 3 % (ref 0–6)
ERYTHROCYTE [DISTWIDTH] IN BLOOD BY AUTOMATED COUNT: 13.9 % (ref 11.6–15.1)
GFR SERPL CREATININE-BSD FRML MDRD: 11 ML/MIN/1.73SQ M
GLUCOSE SERPL-MCNC: 154 MG/DL (ref 65–140)
HCT VFR BLD AUTO: 27 % (ref 36.5–49.3)
HGB BLD-MCNC: 9 G/DL (ref 12–17)
IMM GRANULOCYTES # BLD AUTO: 0.26 THOUSAND/UL (ref 0–0.2)
IMM GRANULOCYTES NFR BLD AUTO: 2 % (ref 0–2)
LYMPHOCYTES # BLD AUTO: 1.5 THOUSANDS/ÂΜL (ref 0.6–4.47)
LYMPHOCYTES NFR BLD AUTO: 10 % (ref 14–44)
MCH RBC QN AUTO: 32.1 PG (ref 26.8–34.3)
MCHC RBC AUTO-ENTMCNC: 33.3 G/DL (ref 31.4–37.4)
MCV RBC AUTO: 96 FL (ref 82–98)
MONOCYTES # BLD AUTO: 1.12 THOUSAND/ÂΜL (ref 0.17–1.22)
MONOCYTES NFR BLD AUTO: 7 % (ref 4–12)
NEUTROPHILS # BLD AUTO: 12.48 THOUSANDS/ÂΜL (ref 1.85–7.62)
NEUTS SEG NFR BLD AUTO: 78 % (ref 43–75)
NRBC BLD AUTO-RTO: 0 /100 WBCS
PLATELET # BLD AUTO: 398 THOUSANDS/UL (ref 149–390)
PMV BLD AUTO: 9.7 FL (ref 8.9–12.7)
POTASSIUM SERPL-SCNC: 2.8 MMOL/L (ref 3.5–5.3)
RBC # BLD AUTO: 2.8 MILLION/UL (ref 3.88–5.62)
SODIUM SERPL-SCNC: 140 MMOL/L (ref 135–147)
URATE SERPL-MCNC: 16.1 MG/DL (ref 3.5–8.5)
WBC # BLD AUTO: 15.82 THOUSAND/UL (ref 4.31–10.16)

## 2023-09-08 PROCEDURE — 94760 N-INVAS EAR/PLS OXIMETRY 1: CPT

## 2023-09-08 PROCEDURE — 85025 COMPLETE CBC W/AUTO DIFF WBC: CPT | Performed by: INTERNAL MEDICINE

## 2023-09-08 PROCEDURE — 99232 SBSQ HOSP IP/OBS MODERATE 35: CPT | Performed by: INTERNAL MEDICINE

## 2023-09-08 PROCEDURE — 23931 I&D UPR A/E BURSA: CPT | Performed by: PHYSICIAN ASSISTANT

## 2023-09-08 PROCEDURE — 73080 X-RAY EXAM OF ELBOW: CPT

## 2023-09-08 PROCEDURE — 99233 SBSQ HOSP IP/OBS HIGH 50: CPT | Performed by: INTERNAL MEDICINE

## 2023-09-08 PROCEDURE — 89060 EXAM SYNOVIAL FLUID CRYSTALS: CPT | Performed by: PHYSICIAN ASSISTANT

## 2023-09-08 PROCEDURE — 80048 BASIC METABOLIC PNL TOTAL CA: CPT | Performed by: INTERNAL MEDICINE

## 2023-09-08 PROCEDURE — 87070 CULTURE OTHR SPECIMN AEROBIC: CPT | Performed by: PHYSICIAN ASSISTANT

## 2023-09-08 PROCEDURE — 97530 THERAPEUTIC ACTIVITIES: CPT

## 2023-09-08 PROCEDURE — C9113 INJ PANTOPRAZOLE SODIUM, VIA: HCPCS | Performed by: INTERNAL MEDICINE

## 2023-09-08 PROCEDURE — 92526 ORAL FUNCTION THERAPY: CPT

## 2023-09-08 PROCEDURE — 94640 AIRWAY INHALATION TREATMENT: CPT

## 2023-09-08 PROCEDURE — 0M933ZZ DRAINAGE OF RIGHT ELBOW BURSA AND LIGAMENT, PERCUTANEOUS APPROACH: ICD-10-PCS | Performed by: ORTHOPAEDIC SURGERY

## 2023-09-08 PROCEDURE — 84550 ASSAY OF BLOOD/URIC ACID: CPT | Performed by: INTERNAL MEDICINE

## 2023-09-08 PROCEDURE — 99024 POSTOP FOLLOW-UP VISIT: CPT | Performed by: PHYSICIAN ASSISTANT

## 2023-09-08 PROCEDURE — 74018 RADEX ABDOMEN 1 VIEW: CPT

## 2023-09-08 PROCEDURE — 85730 THROMBOPLASTIN TIME PARTIAL: CPT | Performed by: INTERNAL MEDICINE

## 2023-09-08 PROCEDURE — 87205 SMEAR GRAM STAIN: CPT | Performed by: PHYSICIAN ASSISTANT

## 2023-09-08 RX ORDER — POTASSIUM CHLORIDE 20MEQ/15ML
40 LIQUID (ML) ORAL 2 TIMES DAILY
Status: DISCONTINUED | OUTPATIENT
Start: 2023-09-08 | End: 2023-09-08

## 2023-09-08 RX ORDER — POTASSIUM CHLORIDE 20MEQ/15ML
40 LIQUID (ML) ORAL ONCE
Status: DISCONTINUED | OUTPATIENT
Start: 2023-09-08 | End: 2023-09-08

## 2023-09-08 RX ORDER — POTASSIUM CHLORIDE 20 MEQ/1
40 TABLET, EXTENDED RELEASE ORAL ONCE
Status: DISCONTINUED | OUTPATIENT
Start: 2023-09-08 | End: 2023-09-09

## 2023-09-08 RX ORDER — LIDOCAINE HYDROCHLORIDE 10 MG/ML
5 INJECTION, SOLUTION EPIDURAL; INFILTRATION; INTRACAUDAL; PERINEURAL ONCE
Status: COMPLETED | OUTPATIENT
Start: 2023-09-08 | End: 2023-09-08

## 2023-09-08 RX ORDER — POTASSIUM CHLORIDE 14.9 MG/ML
20 INJECTION INTRAVENOUS
Status: COMPLETED | OUTPATIENT
Start: 2023-09-08 | End: 2023-09-08

## 2023-09-08 RX ORDER — POTASSIUM CHLORIDE 29.8 MG/ML
40 INJECTION INTRAVENOUS ONCE
Status: DISCONTINUED | OUTPATIENT
Start: 2023-09-08 | End: 2023-09-08

## 2023-09-08 RX ORDER — METHYLPREDNISOLONE SODIUM SUCCINATE 40 MG/ML
20 INJECTION, POWDER, LYOPHILIZED, FOR SOLUTION INTRAMUSCULAR; INTRAVENOUS DAILY
Status: COMPLETED | OUTPATIENT
Start: 2023-09-08 | End: 2023-09-12

## 2023-09-08 RX ORDER — DILTIAZEM HYDROCHLORIDE 5 MG/ML
10 INJECTION INTRAVENOUS 2 TIMES DAILY PRN
Status: DISCONTINUED | OUTPATIENT
Start: 2023-09-08 | End: 2023-09-15 | Stop reason: HOSPADM

## 2023-09-08 RX ORDER — POTASSIUM CHLORIDE 14.9 MG/ML
20 INJECTION INTRAVENOUS ONCE
Status: COMPLETED | OUTPATIENT
Start: 2023-09-08 | End: 2023-09-08

## 2023-09-08 RX ORDER — SODIUM CHLORIDE, SODIUM GLUCONATE, SODIUM ACETATE, POTASSIUM CHLORIDE, MAGNESIUM CHLORIDE, SODIUM PHOSPHATE, DIBASIC, AND POTASSIUM PHOSPHATE .53; .5; .37; .037; .03; .012; .00082 G/100ML; G/100ML; G/100ML; G/100ML; G/100ML; G/100ML; G/100ML
75 INJECTION, SOLUTION INTRAVENOUS CONTINUOUS
Status: DISCONTINUED | OUTPATIENT
Start: 2023-09-08 | End: 2023-09-10

## 2023-09-08 RX ADMIN — SODIUM CHLORIDE, SODIUM GLUCONATE, SODIUM ACETATE, POTASSIUM CHLORIDE, MAGNESIUM CHLORIDE, SODIUM PHOSPHATE, DIBASIC, AND POTASSIUM PHOSPHATE 75 ML/HR: .53; .5; .37; .037; .03; .012; .00082 INJECTION, SOLUTION INTRAVENOUS at 10:23

## 2023-09-08 RX ADMIN — METHYLPREDNISOLONE SODIUM SUCCINATE 20 MG: 40 INJECTION, POWDER, FOR SOLUTION INTRAMUSCULAR; INTRAVENOUS at 15:10

## 2023-09-08 RX ADMIN — LEVALBUTEROL HYDROCHLORIDE 1.25 MG: 1.25 SOLUTION RESPIRATORY (INHALATION) at 19:58

## 2023-09-08 RX ADMIN — POTASSIUM CHLORIDE 20 MEQ: 14.9 INJECTION, SOLUTION INTRAVENOUS at 15:12

## 2023-09-08 RX ADMIN — METOPROLOL TARTRATE 5 MG: 1 INJECTION, SOLUTION INTRAVENOUS at 10:24

## 2023-09-08 RX ADMIN — BUDESONIDE 0.5 MG: 0.5 INHALANT ORAL at 19:59

## 2023-09-08 RX ADMIN — FORMOTEROL FUMARATE DIHYDRATE 20 MCG: 20 SOLUTION RESPIRATORY (INHALATION) at 07:32

## 2023-09-08 RX ADMIN — LIDOCAINE HYDROCHLORIDE 5 ML: 10 INJECTION, SOLUTION EPIDURAL; INFILTRATION; INTRACAUDAL; PERINEURAL at 10:17

## 2023-09-08 RX ADMIN — METOPROLOL TARTRATE 5 MG: 1 INJECTION, SOLUTION INTRAVENOUS at 03:37

## 2023-09-08 RX ADMIN — IPRATROPIUM BROMIDE 0.5 MG: 0.5 SOLUTION RESPIRATORY (INHALATION) at 07:06

## 2023-09-08 RX ADMIN — METOPROLOL TARTRATE 5 MG: 1 INJECTION, SOLUTION INTRAVENOUS at 20:43

## 2023-09-08 RX ADMIN — METOPROLOL TARTRATE 5 MG: 1 INJECTION, SOLUTION INTRAVENOUS at 15:11

## 2023-09-08 RX ADMIN — LEVALBUTEROL HYDROCHLORIDE 1.25 MG: 1.25 SOLUTION RESPIRATORY (INHALATION) at 07:06

## 2023-09-08 RX ADMIN — SODIUM BICARBONATE 75 ML/HR: 84 INJECTION, SOLUTION INTRAVENOUS at 03:41

## 2023-09-08 RX ADMIN — IPRATROPIUM BROMIDE 0.5 MG: 0.5 SOLUTION RESPIRATORY (INHALATION) at 13:48

## 2023-09-08 RX ADMIN — IPRATROPIUM BROMIDE 0.5 MG: 0.5 SOLUTION RESPIRATORY (INHALATION) at 19:58

## 2023-09-08 RX ADMIN — POTASSIUM CHLORIDE 20 MEQ: 14.9 INJECTION, SOLUTION INTRAVENOUS at 10:49

## 2023-09-08 RX ADMIN — BUDESONIDE 0.5 MG: 0.5 INHALANT ORAL at 07:06

## 2023-09-08 RX ADMIN — POTASSIUM CHLORIDE 20 MEQ: 14.9 INJECTION, SOLUTION INTRAVENOUS at 13:09

## 2023-09-08 RX ADMIN — LEVALBUTEROL HYDROCHLORIDE 1.25 MG: 1.25 SOLUTION RESPIRATORY (INHALATION) at 13:48

## 2023-09-08 RX ADMIN — PANTOPRAZOLE SODIUM 40 MG: 40 INJECTION, POWDER, FOR SOLUTION INTRAVENOUS at 10:24

## 2023-09-08 RX ADMIN — FORMOTEROL FUMARATE DIHYDRATE 20 MCG: 20 SOLUTION RESPIRATORY (INHALATION) at 19:59

## 2023-09-08 NOTE — ASSESSMENT & PLAN NOTE
Lab Results   Component Value Date    EGFR 11 09/08/2023    EGFR 10 09/07/2023    EGFR 9 09/06/2023    CREATININE 4.41 (H) 09/08/2023    CREATININE 4.80 (H) 09/07/2023    CREATININE 4.93 (H) 09/06/2023   · Baseline creatinine 1.6-1.9, POA- 1.7  · Likely secondary to autoregulatory failure, ATN secondary to sepsis, medications  · S/p Lasix drip  · Currently on Plasma-Lyte  · Nephrology following  · Urinary retention protocol  · I/O monitoring  · Renally dose medications  · Changed Abx to Daptomycin

## 2023-09-08 NOTE — OCCUPATIONAL THERAPY NOTE
Occupational Therapy Cancellation    Patient Name: Stephanie ACOSTA Date: 9/8/2023 09/08/23 1430   OT Last Visit   OT Visit Date 09/08/23   Note Type   Note type Cancelled Session   Cancel Reasons Medical status   Additional Comments Pt on OT caseload. Attempted to see pt. Very lethargic, but arouses and c/o pain when OT attempts UE PROM. Not appropriate at this time. Will continue to follow.      [place], MS, OTR/L

## 2023-09-08 NOTE — PROGRESS NOTES
[facility]  Progress Note  Name: Jeane Singh  MRN: [id number]  Unit/Bed#: -01 I Date of Admission: 8/26/2023   Date of Service: 9/8/2023 I Hospital Day: 13    Assessment/Plan   Sepsis [facility])  Assessment & Plan  Meets criteria with leukocytosis, tachycardia  Likely secondary to ESBL UTI/Septic olecranon bursitis or SIRS with TOBI  F/u Blood Cx- Negative growth so far, wound Cx- growing coag negative staph  F/u vitals  F/u CBC, CMP and procal  Abx as per ID recommendations- currently on Dapto      Acute encephalopathy  Assessment & Plan  Patient is confused likely acute metabolic encephalopathy in setting of infection Vs TOBI vs prolonged hospitalization  Frequent reorientation  Monitor CBC, CMP  Follow up on Cx  Neurochecks, delirium preacutions  On IV antibiotics  ID, nephrology consulted, appreciate recommendations    TOBI (acute kidney injury) [facility])  Assessment & Plan  Lab Results   Component Value Date    EGFR 11 09/08/2023    EGFR 10 09/07/2023    EGFR 9 09/06/2023    CREATININE 4.41 (H) 09/08/2023    CREATININE 4.80 (H) 09/07/2023    CREATININE 4.93 (H) 09/06/2023   · Baseline creatinine 1.6-1.9, POA- 1.7  · Likely secondary to autoregulatory failure, ATN secondary to sepsis, medications  · S/p Lasix drip  · Currently on Plasma-Lyte  · Nephrology following  · Urinary retention protocol  · I/O monitoring  · Renally dose medications  · Changed Abx to Daptomycin        Olecranon bursitis  Assessment & Plan  Continue Dapto  Wound culture ordered  Repeat wound CX ordered  ID and ortho following  Bedside I&D on right olecranon bursa  Follow-up on synovial fluid, sent for culture, Gram stain, cell count, crystals      Thrombocytosis  Assessment & Plan  Likely reactive  Monitor platelet count    Ileus (HCC)  Assessment & Plan  Abdominal distension with X ray KUB showing left lower quadrant loop distension- 9/7  Surgery following  PO contrast given for X ray obs series-which were negative  Ileus demonstrated  X ray KUB done today- showed trace contrast seen in rectosigmoid junction, contrast seen progressing well to the level of distal sigmoid colon. Clear liquid  Will advance diet as tolerated        Swelling of finger, left  Assessment & Plan  Left 3rd MCP joint, PIP  Tender to touch, erythematous  Given elevated uric acid we will change colchicine to IV prednisone, will taper depending on symptomatic improvement    Atrial fibrillation (HCC)  Assessment & Plan  · Went into A-fib with RVR on August 28 and was given IV Lopressor and Cardizem  · Home regimen: Coreg 25 Mg twice daily  · Started on Eliquis 2.5 mg twice daily for anticoagulation during hospitalization at [address] 8/8-8/23  · Cardiology consult appreciated  · Rate controlled  · Will order Cardizem IV as PRN for poor oral intake,                VTE Pharmacologic Prophylaxis: VTE Score: 5 High Risk (Score >/= 5) - Pharmacological DVT Prophylaxis Ordered: heparin drip. Sequential Compression Devices Ordered. Patient Centered Rounds: I performed bedside rounds with nursing staff today. Discussions with Specialists or Other Care Team Provider: ID, Nephro, general surgery    Education and Discussions with Family / Patient: Updated  (wife) at bedside. Total Time Spent on Date of Encounter in care of patient: 45 minutes This time was spent on one or more of the following: performing physical exam; counseling and coordination of care; obtaining or reviewing history; documenting in the medical record; reviewing/ordering tests, medications or procedures; communicating with other healthcare professionals and discussing with patient's family/caregivers.     Current Length of Stay: 13 day(s)  Current Patient Status: Inpatient   Certification Statement: The patient will continue to require additional inpatient hospital stay due to mulitple comorbid issues requring treatment  Discharge Plan: Pending clinical improvement    Code Status: Level 3 - DNAR and DNI    Subjective:   Patient is alert, very confused this morning, unbale to follow the conversation through. Evaluated this PM again, able to participate in the talk compared to this morning. Objective:     Vitals:   Temp (24hrs), Av.4 °F (36.9 °C), Min:98.2 °F (36.8 °C), Max:98.6 °F (37 °C)    Temp:  [98.2 °F (36.8 °C)-98.6 °F (37 °C)] 98.2 °F (36.8 °C)  HR:  [] 94  Resp:  [20] 20  BP: (131-158)/(66-71) 131/66  SpO2:  [93 %-98 %] 94 %  Body mass index is 30.67 kg/m². Input and Output Summary (last 24 hours): Intake/Output Summary (Last 24 hours) at 2023 1438  Last data filed at 2023 0400  Gross per 24 hour   Intake --   Output 2000 ml   Net -2000 ml       Physical Exam:   Physical Exam  HENT:      Head: Normocephalic and atraumatic. Nose: Nose normal.      Mouth/Throat:      Mouth: Mucous membranes are dry. Pharynx: Oropharynx is clear. Eyes:      General: No scleral icterus. Right eye: No discharge. Left eye: No discharge. Conjunctiva/sclera: Conjunctivae normal.   Cardiovascular:      Rate and Rhythm: Tachycardia present. Rhythm irregular. Pulses: Normal pulses. Heart sounds: Normal heart sounds. Pulmonary:      Effort: Pulmonary effort is normal. No respiratory distress. Abdominal:      General: There is distension. Palpations: Abdomen is soft. Tenderness: There is no guarding or rebound. Comments: Slow BS, Tymapnic note   Musculoskeletal:         General: Swelling and tenderness present. Skin:     General: Skin is warm and dry. Capillary Refill: Capillary refill takes 2 to 3 seconds. Neurological:      Mental Status: He is alert.       Comments: Oriented X 1          Additional Data:     Labs:  Results from last 7 days   Lab Units 23  0344   WBC Thousand/uL 15.82*   HEMOGLOBIN g/dL 9.0*   HEMATOCRIT % 27.0*   PLATELETS Thousands/uL 398*   NEUTROS PCT % 78* LYMPHS PCT % 10*   MONOS PCT % 7   EOS PCT % 3     Results from last 7 days   Lab Units 23  0344 23  0300   SODIUM mmol/L 140 137   POTASSIUM mmol/L 2.8* 3.7   CHLORIDE mmol/L 99 104   CO2 mmol/L 30 18*   BUN mg/dL 113* 109*   CREATININE mg/dL 4.41* 4.80*   ANION GAP mmol/L 11 15   CALCIUM mg/dL 9.5 9.3   ALBUMIN g/dL  --  3.2*   TOTAL BILIRUBIN mg/dL  --  0.67   ALK PHOS U/L  --  75   ALT U/L  --  5*   AST U/L  --  17   GLUCOSE RANDOM mg/dL 154* 147*     Results from last 7 days   Lab Units 23  2104   INR  2.67*             Results from last 7 days   Lab Units 23  0336 23  0527 23  0221 23  0336   PROCALCITONIN ng/ml 2.42* 2.93* 2.83* 3.11*       Lines/Drains:  Invasive Devices     Peripheral Intravenous Line  Duration           Peripheral IV 23 Dorsal (posterior); Right Wrist 3 days    Peripheral IV 23 Dorsal (posterior); Right Forearm 1 day          Drain  Duration           Urethral Catheter 16 Fr. 2 days              Urinary Catheter:  Goal for removal: No longer needed. Will place order to discontinue           Telemetry:  Telemetry Orders (From admission, onward)             24 Hour Telemetry Monitoring  Continuous x 24 Hours (Telem)        Comments: A fib with uncontrolled HR      Question:  Reason for 24 Hour Telemetry  Answer:  Arrhythmias requiring acute medical intervention / PPM or ICD malfunction                 Telemetry Reviewed: Atrial fibrillation. HR averaging 110  Indication for Continued Telemetry Use: Arrthymias requiring medical therapy             Imaging: Reviewed radiology reports from this admission including: xray(s)    Recent Cultures (last 7 days):   Results from last 7 days   Lab Units 23  1802 23  1141 23  1133   BLOOD CULTURE   --  No Growth After 4 Days. No Growth After 4 Days.   --    GRAM STAIN RESULT  2+ Polys  No organisms seen  --  Rare Polys  No bacteria seen   WOUND CULTURE   --   --  3 colonies Staphylococcus coagulase negative*   BODY FLUID CULTURE, STERILE  No growth  --   --        Last 24 Hours Medication List:   Current Facility-Administered Medications   Medication Dose Route Frequency Provider Last Rate   • acetaminophen  650 mg Oral Q6H PRN Tony Pickett PA-C     • aluminum-magnesium hydroxide-simethicone  30 mL Oral Q6H PRN Tony Pickett PA-C     • aspirin  81 mg Oral Daily Tony Pickett PA-C     • budesonide  0.5 mg Nebulization Q12H Tony Pickett PA-C     • DAPTOmycin  6 mg/kg Intravenous Q48H Senia Aguilera  mg (09/07/23 0920)   • diltiazem  240 mg Oral Daily Padmini Holguin PA-C     • diltiazem  10 mg Intravenous BID PRN Sana Jewell MD     • fluticasone  1 spray Nasal Daily Tony Pickett PA-C     • folic acid  1 mg Oral Daily Tony Pickett PA-C     • formoterol  20 mcg Nebulization Q12H Tony Pickett PA-C     • guaiFENesin  600 mg Oral BID Tony Pickett PA-C     • heparin (porcine)  3-20 Units/kg/hr (Order-Specific) Intravenous Titrated Sana Jewell MD 8.1 Units/kg/hr (09/08/23 9832)   • heparin (porcine)  2,000 Units Intravenous Q6H PRN Sana Jewell MD     • heparin (porcine)  4,000 Units Intravenous Q6H PRN Sana Jewell MD     • ipratropium  0.5 mg Nebulization TID Lizbeth Joya MD     • lactulose  200 g Rectal BID PRN Av Willett PA-C     • levalbuterol  1.25 mg Nebulization TID Lizbeth Joya MD     • loratadine  10 mg Oral Daily Tony Pickett PA-C     • magnesium Oxide  400 mg Oral Daily Tony Pickett PA-C     • methylPREDNISolone sodium succinate  20 mg Intravenous Daily Sana Jewell MD     • metoprolol  5 mg Intravenous Q6H Marvin Nina PA-C     • multi-electrolyte  75 mL/hr Intravenous Continuous Renaee Drape, CRNP 75 mL/hr (09/08/23 1023)   • ondansetron  4 mg Intravenous Q6H PRN Tony Pickett PA-C     • oxyCODONE  5 mg Oral Q6H PRN Lizbeth Joya MD     • pantoprazole  40 mg Intravenous Q24H [address] Wandy Tuttle PA-C     • potassium chloride  20 mEq Intravenous Q2H Alexis Quinones MD 20 mEq (09/08/23 1309)   • saccharomyces boulardii  250 mg Oral BID Joanne Mendoza PA-C     • senna  1 tablet Oral HS Valerie Boswell PA-C          Today, Patient Was Seen By: Alexis Quinones MD    **Please Note: This note may have been constructed using a voice recognition system. **

## 2023-09-08 NOTE — PROGRESS NOTES
Orthopedics   Kiersten Alcantara 82 y.o. male MRN: [id number]  Unit/Bed#: -01      HPI:  82 y.o.male with bilateral olecranon bursitis. Left elbow olecranon bursa was aspirated on September 6, 2023. Patient denies any pain currently. No issues overnight. No documented fevers or chills. Past Medical History:   Past Medical History:   Diagnosis Date   • Alcohol abuse    • Alcohol withdrawal seizure without complication ([address])    • Arthritis    • Asthma    • CVA (cerebral vascular accident) ([address])    • Decubitus ulcer of buttock     last assessed 07/20/16   • Diabetes ([address])    • Disease of thyroid gland    • Duodenal ulcer    • Emphysema lung ([address])    • Esophageal varices (HCC)    • GERD (gastroesophageal reflux disease)    • History of transfusion    • Hypertension    • Irritable bowel syndrome with diarrhea    • Kidney stones    • Prostate cancer ([address])    • Urinary frequency     resolved 02/15/17       Past Surgical History:   Past Surgical History:   Procedure Laterality Date   • BACK SURGERY     • CATARACT EXTRACTION     • ESOPHAGOGASTRODUODENOSCOPY N/A 2/6/2017    Procedure: ESOPHAGOGASTRODUODENOSCOPY (EGD); Surgeon: Rosario Quiroz MD;  Location:  MAIN OR;  Service:    • [place] ESOPHAGOGASTRODUODENOSCOPY TRANSORAL DIAGNOSTIC N/A 4/26/2016    Procedure: ESOPHAGOGASTRODUODENOSCOPY (EGD);   Surgeon: Florence Ch MD;  Location:  MAIN OR;  Service: Gastroenterology   • TONSILLECTOMY         Family History:  Family history reviewed and non-contributory  Family History   Problem Relation Age of Onset   • Heart disease Mother         cardiac disorder   • Stroke Father    • Heart disease Father         cardiac disorder   • Heart disease Sister    • Diabetes Family    • Hypertension Family        Social History:  Social History     Socioeconomic History   • Marital status: /Civil Union     Spouse name: None   • Number of children: None   • Years of education: None   • Highest education level: None Occupational History   • None   Tobacco Use   • Smoking status: Former     Types: Cigarettes     Quit date: 1980     Years since quittin.7   • Smokeless tobacco: Never   • Tobacco comments:     quit 20 years ago   Vaping Use   • Vaping Use: Never used   Substance and Sexual Activity   • Alcohol use: Yes     Alcohol/week: 14.0 standard drinks of alcohol     Types: 14 Standard drinks or equivalent per week     Comment: 3-4 mixed drinks vodka per night/ 2L per week   • Drug use: No   • Sexual activity: Not Currently   Other Topics Concern   • None   Social History Narrative    Lives with Wife     Social Determinants of Health     Financial Resource Strain: Low Risk  (2023)    Overall Financial Resource Strain (CARDIA)    • Difficulty of Paying Living Expenses: Not very hard   Food Insecurity: No Food Insecurity (2023)    Hunger Vital Sign    • Worried About Running Out of Food in the Last Year: Never true    • Ran Out of Food in the Last Year: Never true   Transportation Needs: No Transportation Needs (2023)    PRAPARE - Transportation    • Lack of Transportation (Medical): No    • Lack of Transportation (Non-Medical): No   Physical Activity: Not on file   Stress: Not on file   Social Connections: Not on file   Intimate Partner Violence: Not At Risk (2023)    Humiliation, Afraid, Rape, and Kick questionnaire    • Fear of Current or Ex-Partner: No    • Emotionally Abused: No    • Physically Abused: No    • Sexually Abused: No   Housing Stability: Low Risk  (2023)    Housing Stability Vital Sign    • Unable to Pay for Housing in the Last Year: No    • Number of [address] in the Last Year: 1    • Unstable Housing in the Last Year: No       Allergies:    Allergies   Allergen Reactions   • Morphine And Related            Labs:  0   Lab Value Date/Time    HCT 27.0 (L) 2023 0344    HCT 27.5 (L) 2023 0300    HCT 24.6 (L) 2023 2104    HCT 40.9 2015 1248    HCT 31.5 (L) 08/28/2014 0600    HCT 37.8 08/27/2014 0618    HGB 9.0 (L) 09/08/2023 0344    HGB 8.8 (L) 09/07/2023 0300    HGB 8.1 (L) 09/06/2023 2104    HGB 14.0 12/22/2015 1248    HGB 10.0 (L) 08/28/2014 0600    HGB 12.1 08/27/2014 0618    INR 2.67 (H) 09/06/2023 2104    INR 1.06 08/26/2014 1320    WBC 15.82 (H) 09/08/2023 0344    WBC 19.52 (H) 09/07/2023 0300    WBC 18.94 (H) 09/06/2023 2104    WBC 8.80 12/22/2015 1248    WBC 5.42 08/28/2014 0600    WBC 7.12 08/27/2014 0618    ESR 76 (H) 04/14/2023 0938    CRP 6.5 (H) 01/19/2016 1257       Meds:    Current Facility-Administered Medications:   •  acetaminophen (TYLENOL) tablet 650 mg, 650 mg, Oral, Q6H PRN, Gabbi Litten, PA-C, 650 mg at 09/06/23 9763  •  aluminum-magnesium hydroxide-simethicone (MAALOX) oral suspension 30 mL, 30 mL, Oral, Q6H PRN, Gabbi Litten, PA-C  •  aspirin (ECOTRIN LOW STRENGTH) EC tablet 81 mg, 81 mg, Oral, Daily, Gabbi Litten, PA-C, 81 mg at 09/06/23 0915  •  budesonide (PULMICORT) inhalation solution 0.5 mg, 0.5 mg, Nebulization, Q12H, Gabbi Litten, PA-C, 0.5 mg at 09/08/23 9609  •  colchicine (COLCRYS) tablet 0.3 mg, 0.3 mg, Oral, Daily, Brina Vieyra MD  •  DAPTOmycin (CUBICIN) 550 mg in sodium chloride 0.9 % 50 mL IVPB, 6 mg/kg, Intravenous, Q48H, Manuela Johnson MD, Last Rate: 100 mL/hr at 09/07/23 0920, 550 mg at 09/07/23 0920  •  diltiazem (CARDIZEM CD) 24 hr capsule 240 mg, 240 mg, Oral, Daily, Joyce Holguin PA-C, 240 mg at 09/06/23 3584  •  fluticasone (FLONASE) 50 mcg/act nasal spray 1 spray, 1 spray, Nasal, Daily, Traci Litten, PA-C, 1 spray at 13/57/60 4942  •  folic acid (FOLVITE) tablet 1 mg, 1 mg, Oral, Daily, Traci Litten, PA-C, 1 mg at 09/06/23 0915  •  formoterol (PERFOROMIST) nebulizer solution 20 mcg, 20 mcg, Nebulization, Q12H, Gabbici Litten, PA-C, 20 mcg at 09/08/23 0732  •  guaiFENesin (MUCINEX) 12 hr tablet 600 mg, 600 mg, Oral, BID, Gabbici Litten, PA-C, 600 mg at 09/07/23 1651  • heparin (porcine) 25,000 units in 0.45% NaCl 250 mL infusion (premix), 3-20 Units/kg/hr (Order-Specific), Intravenous, Titrated, Melvia Snellen, MD, Last Rate: 7.3 mL/hr at 09/08/23 0624, 8.1 Units/kg/hr at 09/08/23 2449  •  heparin (porcine) injection 2,000 Units, 2,000 Units, Intravenous, Q6H PRN, Melvia Snellen, MD  •  heparin (porcine) injection 4,000 Units, 4,000 Units, Intravenous, Q6H PRN, Melvia Snellen, MD  •  ipratropium (ATROVENT) 0.02 % inhalation solution 0.5 mg, 0.5 mg, Nebulization, TID, Ja Soriano MD, 0.5 mg at 09/08/23 0706  •  lactulose retention enema 200 g, 200 g, Rectal, BID PRN, Keo Willett PA-C  •  levalbuterol Mayra Calico) inhalation solution 1.25 mg, 1.25 mg, Nebulization, TID, Ja Soriano MD, 1.25 mg at 09/08/23 0706  •  lidocaine (PF) (XYLOCAINE-MPF) 1 % injection 5 mL, 5 mL, Injection, Once, Pardeep Laguerre PA-C  •  loratadine (CLARITIN) tablet 10 mg, 10 mg, Oral, Daily, Vena Ashly, PA-C, 10 mg at 09/06/23 7602  •  magnesium Oxide (MAG-OX) tablet 400 mg, 400 mg, Oral, Daily, Vena Ashly, PA-C, 400 mg at 09/06/23 0915  •  metoprolol (LOPRESSOR) injection 5 mg, 5 mg, Intravenous, Q6H, Wandy Tuttle PA-C, 5 mg at 09/08/23 3487  •  ondansetron (ZOFRAN) injection 4 mg, 4 mg, Intravenous, Q6H PRN, Vena Ashly PA-C, 4 mg at 08/28/23 8798  •  oxyCODONE (ROXICODONE) IR tablet 5 mg, 5 mg, Oral, Q6H PRN, Ja Soriano MD, 5 mg at 09/05/23 0344  •  pantoprazole (PROTONIX) injection 40 mg, 40 mg, Intravenous, Q24H [facility], Wandy Tuttle PA-C, 40 mg at 09/07/23 0907  •  potassium chloride 20 mEq IVPB (premix), 20 mEq, Intravenous, Once, Melvia Snellen, MD  •  potassium chloride oral solution 40 mEq, 40 mEq, Oral, BID, Melvia Snellen, MD  •  saccharomyces boulardii (FLORASTOR) capsule 250 mg, 250 mg, Oral, BID, Wandy Tuttle PA-C, 250 mg at 09/07/23 1651  •  senna (SENOKOT) tablet 8.6 mg, 1 tablet, Oral, HS, Brenna Limon PA-C, 8.6 mg at 09/07/23 2110    Blood Culture:   Lab Results   Component Value Date    BLOODCX No Growth After 4 Days. 09/03/2023    BLOODCX No Growth After 4 Days. 09/03/2023       Wound Culture:   Lab Results   Component Value Date    WOUNDCULT 3 colonies Staphylococcus coagulase negative (A) 09/03/2023       Ins and Outs:  I/O last 24 hours: In: -   Out: 6392 [Urine:3475]          Physical Exam:   /71 (BP Location: Left arm)   Pulse 105   Temp 98.6 °F (37 °C) (Oral)   Resp 20   Ht 5' 8" (1.727 m)   Wt 91.5 kg (201 lb 11.5 oz)   SpO2 98%   BMI 30.67 kg/m²     Musculoskeletal: right upper extremity  · Skin no active drainage  · No erythema over olecrenon bursa  · No reaction or complaints with palpation to olecranon bursa  · +swelling and fluid collection of olecranon bursa  · Full active & passive ROM at elbow  · Edema throughout upper extremity. · SILT m/r/u.   · Motor intact ain/pin/m/r/u,   · 2+ rad pulse     Left upper extremity  · Skin no active drainage  · No erythema over olecrenon bursa  · No reaction or complaints with palpation to olecranon bursa  · +swelling and fluid collection of olecranon bursa  · Full active & passive ROM at elbow  · Edema throughout upper extremity  · SILT m/r/u.   · Motor intact ain/pin/m/r/u,   · 2+ rad pulse      Assessment:  88 y.o.male with bilateral olecranon bursitis    Plan:   · abx per primary team  · Right olecrenon bursa with continued swelling. Bedside I&D of right olecranon bursa was performed by Dr. Belia Chase this morning. Fluid was sent for cultures/gram stain, cell count and crystals. Fluid was purulent with possible gouty tophi. · Left olecranon bursa with swelling and palpable fluid collection, no significant erythema. Preliminary results show no growth. Will continue to follow up on those results. · Will continue daily dressing and packing changes on right side. · Ortho will follow.        Sade Burgess PA-C

## 2023-09-08 NOTE — ASSESSMENT & PLAN NOTE
Patient is confused likely acute metabolic encephalopathy in setting of infection Vs TOBI vs prolonged hospitalization  Frequent reorientation  Monitor CBC, CMP  Follow up on Cx  Neurochecks, delirium preacutions  On IV antibiotics  ID, nephrology consulted, appreciate recommendations

## 2023-09-08 NOTE — PLAN OF CARE
Patient continues to be lethargic, not alert to safely swallow PO medications, attending MD aware, suction at bedside. Will continue to monitor pt.

## 2023-09-08 NOTE — PROGRESS NOTES
NEPHROLOGY PROGRESS NOTE   Zena Nieves 82 y.o. male MRN: [id number]  Unit/Bed#: -01 Encounter: [id number]      HPI/24hr EVENTS:    -42-year-old male past medical history of CKD 3, atrial fibrillation, COPD. Presented with fever, nephrology consult for management of TOBI.     -Improvement in renal function, robust urine output    ASSESSMENT/PLAN:    TOBI on CKD 3  -Baseline creatinine: 1.5-1.7  -Creatinine on admission 0.7, creatinine peaking at 4.93 on 9/6, most recent creatinine 4.41 improved from the past 2 days  -Etiology: Suspect second to prerenal azotemia with component of ATN due to sepsis, ARB use, hypotension  -UA: 2+ protein, 1-2 RBCs  -Renal imaging: CTAP with multiple left renal cyst, no hydronephrosis, nonspecific bilateral perinephric edema  -Avoid hypotension, avoid nephrotoxins, avoid NSAIDS  -Trend BMP  -Urine eosinophils negative, CK normal  -Is azotemic with a BUN of 113  -No acute indications for dialysis today, is nonoliguric with 3.48 L urine output off Lasix infusion  -He has encephalopathy, this could possibly due to uremia versus delirium given prolonged hospitalization and additional comorbidities  -Was on Lasix infusion earlier with improvement in urine output, this was discontinued 9/7 when patient was n.p.o. was started on gentle IV fluid hydration with bicarb infusion, not a bicarb has normalized was placed on Plasma-Lyte which I would continue for now as patient's oral intake is poor and patient had high urine output over the past 24 hours  -Has Villasenor catheter, okay to discontinue, would utilize urinary retention protocol     Anion gap acidosis-> resolved  -Recent bicarb is 18, anion gap of 15  -Has had mild acidosis over the past few days intermittently resolving  -Suspect multifactorial, likely predominantly due to his TOBI  -Resolved with bicarb drip, can stop bicarb drip start Plasma-Lyte as above    Anemia  -Recent hemoglobin 9.0  -FLC/kappa lambda ratio was 0.84, SPEP with beta gamma bridging but no M spike, UPEP pending  -Iron panel with iron sat unable to be calculated, severely decreased TIBC, moderately elevated ferritin     Encephalopathy  -Mental status has been waxing and waning over the past several days  -Suspect multifactorial, prolonged hospitalization, delirium, uremia  -Continue to monitor     CKD MBD  -Prior Phos was elevated, has clear liquid diet, can continue monitoring Phos level     Blood pressure  -Currently on metoprolol 5 mg every 6 hours IV, Cardizem 240 milligrams daily  -Recent blood pressure trends acceptable     Sepsis/pneumonia/cystitis/olecranon bursitis  -Currently on daptomycin and meropenem per ID  -Management per primary team     Abdominal distention  -Concern for ileus, evaluated by general surgery, had NG tube placed now discontinued  -Currently on clear liquid diet     Discussed with jessica Gamino, discussed via phone with patient's spouse Pat, discussed with nephrology attending Dr. Karina Dumont:  Confused/unable to offer    ROS:  Review of Systems   Unable to perform ROS: Mental status change      A complete 10 point review of systems was performed and found to be negative unless otherwise noted above or in the HPI. OBJECTIVE:  Current Weight: Weight - Scale: 91.5 kg (201 lb 11.5 oz)  Vitals:    09/07/23 1949 09/08/23 0707 09/08/23 0732 09/08/23 0925   BP: 158/71   131/66   BP Location: Left arm      Pulse: 105   94   Resp: 20      Temp: 98.6 °F (37 °C)   98.2 °F (36.8 °C)   TempSrc: Oral      SpO2: 96% 96% 98% 93%   Weight:       Height:           Intake/Output Summary (Last 24 hours) at 9/8/2023 1035  Last data filed at 9/8/2023 0400  Gross per 24 hour   Intake --   Output 2475 ml   Net -2475 ml     Physical Exam  Vitals and nursing note reviewed. Constitutional:       General: He is not in acute distress. Appearance: He is not toxic-appearing or diaphoretic.       Comments: Sitting in bed, confused, frail-appearing   HENT: Head: Normocephalic and atraumatic. Nose: Nose normal.      Mouth/Throat:      Mouth: Mucous membranes are moist.   Eyes:      General: No scleral icterus. Cardiovascular:      Rate and Rhythm: Normal rate and regular rhythm. Pulses: Normal pulses. Pulmonary:      Effort: Pulmonary effort is normal. No respiratory distress. Breath sounds: No wheezing or rales. Abdominal:      General: Abdomen is flat. Genitourinary:     Comments: Villasenor catheter with clear yellow urine  Musculoskeletal:      Cervical back: Neck supple. Right lower leg: No edema. Left lower leg: No edema. Skin:     General: Skin is warm and dry. Capillary Refill: Capillary refill takes less than 2 seconds.    Neurological:      Comments: Confused, mumbling speech pattern          Medications:    Current Facility-Administered Medications:   •  acetaminophen (TYLENOL) tablet 650 mg, 650 mg, Oral, Q6H PRN, Joyce Verma PA-C, 650 mg at 09/06/23 6385  •  aluminum-magnesium hydroxide-simethicone (MAALOX) oral suspension 30 mL, 30 mL, Oral, Q6H PRN, Joyce Verma PA-C  •  aspirin (ECOTRIN LOW STRENGTH) EC tablet 81 mg, 81 mg, Oral, Daily, Joyce Verma PA-C, 81 mg at 09/06/23 0915  •  budesonide (PULMICORT) inhalation solution 0.5 mg, 0.5 mg, Nebulization, Q12H, Joyce Verma PA-C, 0.5 mg at 09/08/23 0208  •  colchicine (COLCRYS) tablet 0.3 mg, 0.3 mg, Oral, Daily, Monnie Dandy, MD  •  DAPTOmycin (CUBICIN) 550 mg in sodium chloride 0.9 % 50 mL IVPB, 6 mg/kg, Intravenous, Q48H, Cherelle Shaw MD, Last Rate: 100 mL/hr at 09/07/23 0920, 550 mg at 09/07/23 0920  •  diltiazem (CARDIZEM CD) 24 hr capsule 240 mg, 240 mg, Oral, Daily, Abigail Holguin PA-C, 240 mg at 09/06/23 1986  •  diltiazem (CARDIZEM) injection 10 mg, 10 mg, Intravenous, BID PRN, Monnie Dandy, MD  •  fluticasone (FLONASE) 50 mcg/act nasal spray 1 spray, 1 spray, Nasal, Daily, Joyce Verma PA-C, 1 spray at 09/06/23 1313  • folic acid (FOLVITE) tablet 1 mg, 1 mg, Oral, Daily, Sofiya Forman PA-C, 1 mg at 09/06/23 0915  •  formoterol (PERFOROMIST) nebulizer solution 20 mcg, 20 mcg, Nebulization, Q12H, Sofiya Forman PA-C, 20 mcg at 09/08/23 0732  •  guaiFENesin (MUCINEX) 12 hr tablet 600 mg, 600 mg, Oral, BID, Sofiya Forman PA-C, 600 mg at 09/07/23 1651  •  heparin (porcine) 25,000 units in 0.45% NaCl 250 mL infusion (premix), 3-20 Units/kg/hr (Order-Specific), Intravenous, Titrated, Mendoza Santillan MD, Last Rate: 7.3 mL/hr at 09/08/23 0624, 8.1 Units/kg/hr at 09/08/23 8723  •  heparin (porcine) injection 2,000 Units, 2,000 Units, Intravenous, Q6H PRN, Mendoza Santillan MD  •  heparin (porcine) injection 4,000 Units, 4,000 Units, Intravenous, Q6H PRN, Mendoza Santillan MD  •  ipratropium (ATROVENT) 0.02 % inhalation solution 0.5 mg, 0.5 mg, Nebulization, TID, Magaly Dunne MD, 0.5 mg at 09/08/23 0706  •  lactulose retention enema 200 g, 200 g, Rectal, BID PRN, Jenise Willett PA-C  •  levalbuterol Sai Pilling) inhalation solution 1.25 mg, 1.25 mg, Nebulization, TID, Magaly Dunne MD, 1.25 mg at 09/08/23 0706  •  loratadine (CLARITIN) tablet 10 mg, 10 mg, Oral, Daily, Sofiya Forman PA-C, 10 mg at 09/06/23 5637  •  magnesium Oxide (MAG-OX) tablet 400 mg, 400 mg, Oral, Daily, Sofiya Forman PA-C, 400 mg at 09/06/23 0915  •  metoprolol (LOPRESSOR) injection 5 mg, 5 mg, Intravenous, Q6H, Wandy Tuttle PA-C, 5 mg at 09/08/23 1024  •  multi-electrolyte (PLASMALYTE-A/ISOLYTE-S PH 7.4) IV solution, 75 mL/hr, Intravenous, Continuous, TOD Greenberg, Last Rate: 75 mL/hr at 09/08/23 1023, 75 mL/hr at 09/08/23 1023  •  ondansetron (ZOFRAN) injection 4 mg, 4 mg, Intravenous, Q6H PRN, Sofiya Forman PA-C, 4 mg at 08/28/23 1748  •  oxyCODONE (ROXICODONE) IR tablet 5 mg, 5 mg, Oral, Q6H PRN, Magaly Dunne MD, 5 mg at 09/05/23 0344  •  pantoprazole (PROTONIX) injection 40 mg, 40 mg, Intravenous, Q24H [address], Brittni Waller PA-C, 40 mg at 09/08/23 1024  •  potassium chloride 20 mEq IVPB (premix), 20 mEq, Intravenous, Once, Akshat Loomis MD  •  potassium chloride 20 mEq IVPB (premix), 20 mEq, Intravenous, Q2H, Akshat Loomis MD  •  saccharomyces boulardii (FLORASTOR) capsule 250 mg, 250 mg, Oral, BID, Wandy Tuttle PA-C, 250 mg at 09/07/23 1651  •  senna (SENOKOT) tablet 8.6 mg, 1 tablet, Oral, HS, [place] Madrigal PA-C, 8.6 mg at 09/07/23 2110    Laboratory Results:  Results from last 7 days   Lab Units 09/08/23  0344 09/07/23  0300 09/06/23  2104 09/06/23  0433 09/05/23  0336 09/04/23  1234 09/04/23  0527 09/03/23  0221 09/02/23  0336   WBC Thousand/uL 15.82* 19.52* 18.94* 16.32* 16.60*  --  14.04* 12.50* 15.57*   HEMOGLOBIN g/dL 9.0* 8.8* 8.1* 7.3* 7.4* 7.6* 7.0* 7.6* 8.0*   HEMATOCRIT % 27.0* 27.5* 24.6* 22.4* 23.1* 23.3* 21.8* 23.8* 24.7*   PLATELETS Thousands/uL 398* 396* 355 330 318  --  296 323 352   POTASSIUM mmol/L 2.8* 3.7  --  3.1* 4.0  --  3.3* 3.9 3.7   CHLORIDE mmol/L 99 104  --  105 108  --  107 105 105   CO2 mmol/L 30 18*  --  21 20*  --  20* 19* 21   BUN mg/dL 113* 109*  --  101* 94*  --  89* 76* 68*   CREATININE mg/dL 4.41* 4.80*  --  4.93* 4.67*  --  4.33* 3.63* 3.39*   CALCIUM mg/dL 9.5 9.3  --  9.1 9.0  --  9.2 8.9 8.8   MAGNESIUM mg/dL  --  2.4  --  2.1 2.3  --  2.2 2.1  --    PHOSPHORUS mg/dL  --  5.6*  --  5.8*  --   --   --   --   --        I have personally reviewed the blood work as stated above and in my note. I have personally reviewed internal medicine, general surgery, ID note.

## 2023-09-08 NOTE — ASSESSMENT & PLAN NOTE
· Went into A-fib with RVR on August 28 and was given IV Lopressor and Cardizem  · Home regimen: Coreg 25 Mg twice daily  · Started on Eliquis 2.5 mg twice daily for anticoagulation during hospitalization at [facility] 8/8-8/23  · Cardiology consult appreciated  · Rate controlled  · Will order Cardizem IV as PRN for poor oral intake,

## 2023-09-08 NOTE — SPEECH THERAPY NOTE
Speech Language/Pathology    Speech/Language Pathology Progress Note    Patient Name: Kellen Em  RRATW'W Date: 9/8/2023     Problem List  Active Problems:    Anemia of chronic disease    Atrial fibrillation (HCC)    Chronic obstructive pulmonary disease, unspecified COPD type ([address])    TOBI (acute kidney injury) ([address])    Central retinal vein occlusion of right eye    History of COVID-19    History of alcohol abuse    Acute encephalopathy    Olecranon bursitis    Abnormal computed tomography angiography (CTA) of abdomen and pelvis    Sepsis (HCC)    ESBL (extended spectrum beta-lactamase) producing bacteria infection    Sacral wound    Swelling of finger, left    Ileus (HCC)    Thrombocytosis       Past Medical History  Past Medical History:   Diagnosis Date   • Alcohol abuse    • Alcohol withdrawal seizure without complication ([address])    • Arthritis    • Asthma    • CVA (cerebral vascular accident) ([address])    • Decubitus ulcer of buttock     last assessed 07/20/16   • Diabetes ([address])    • Disease of thyroid gland    • Duodenal ulcer    • Emphysema lung (HCC)    • Esophageal varices (HCC)    • GERD (gastroesophageal reflux disease)    • History of transfusion    • Hypertension    • Irritable bowel syndrome with diarrhea    • Kidney stones    • Prostate cancer ([address])    • Urinary frequency     resolved 02/15/17        Past Surgical History  Past Surgical History:   Procedure Laterality Date   • BACK SURGERY     • CATARACT EXTRACTION     • ESOPHAGOGASTRODUODENOSCOPY N/A 2/6/2017    Procedure: ESOPHAGOGASTRODUODENOSCOPY (EGD); Surgeon: Chris Mckeon MD;  Location:  MAIN OR;  Service:    • IN ESOPHAGOGASTRODUODENOSCOPY TRANSORAL DIAGNOSTIC N/A 4/26/2016    Procedure: ESOPHAGOGASTRODUODENOSCOPY (EGD); Surgeon: Monalisa Ruano MD;  Location:  MAIN OR;  Service: Gastroenterology   • TONSILLECTOMY           Subjective:  Per RN, concerns for increased lethargy this am and inability to take PO.      Current Diet:  Clear liquids    Objective:  Pt seen for dx dysphagia tx w/ clear liquids. Pt is fully awake and alert, responding to questions Pt consistently opens oral cavity for PO. Complete labial seal for retrieval from spoon and straw. Good oral control without anterior loss. Prompt oral manipulation and transfer of thin liquids and jello. No oral residue. Swallows suspected fairly prompt. No overt s/s aspiration across trials of 4 oz Jello and 4 oz thin liquid via straw. Education provided to pt and family present regarding strategies to minimize aspiration risk/optimize swallow safety. Pt's family denies questions/concerns at this time. S/w physician regarding current aspiration risk w/ variable mentation, plan to monitor diet tolerance. Assessment:  Pt well tolerating clear liquids without significant oropharyngeal difficulties and/or s/s aspiration when fully awake/alert/participating this afternoon. Pt w/ increased aspiration risk given waxing/waning mentation, ?ability for consistent safe intake.      Plan/Recommendations:  Diet advancement per primary medical team/surgery team  Continue current diet as tolerated when fully awake/alert/participative  ST to f/u as able/appropriate  Frequent/thorough oral care

## 2023-09-08 NOTE — PROGRESS NOTES
Esteban Day  82 y.o.  male  1935  mrn [id number]    Assessment/Plan:  16-year-old man with fever, urinary tract infection, CKD, leukocytosis, bursitis, acute kidney injury    9/8: No fever but WBC count conts to be elevated but decreased to 15.8K. Ortho aspirated left elbow on 9/6 but only cx was done which is neg so far. Ortho aspirated right elbow today - cell count could not be done because the specimen was too thick. Specimen is being sent to [facility] to set up cx and to see if crystal analysis can be done on this specimen. He is on Dapto because of TOBI while on Vanco. Bld cx's are neg. I reviewed today's KUB which shows findings suggestive of ileus. NGT was removed and Surgery recommended starting clear liquids. He conts to have decreased MS c/w acute metab encephalopathy and is not awake enough for po intake     1) UTI -  patient with improvement in UTI symptoms, has completed Tx for same.    2) Fever, leukocytosis, bursitis, cellulitis, ileus - Pt with improvement GI Sx but repeat KUB conts to show presence of ileus. NGT was removed today and Surgery recommends clear liquid diet but he is not awake enough to allow po intake because of risk of aspiration. He underwent aspiration of both elbows. Left elbow fluid was only sent for cx which is neg so far. Right elbow fluid was sent for cell count, crystal analysis, and cx. Cell count could not be done because the specimen was too thick. Pt is currently on Colchicine by hospitalist service. Last uric acid on the Pt was done on 1/19/16 and was elevated at 8.5. ? Whether Pt could have polyarticular gout but agree that septic olecranon bursitis needs to be considered.  Creatinine has decreased to 4.41 c/w slowly resolving TOBI     ===> Cont Dapto for now to provide broad empirical Gram positive coverage while olecranon bursa cultures are pending  ===> Will follow WBC count  ===> Will check uric acid to see if it's elevated  ===> Awaiting results of elbow fluid cx's  ===> Hopefully crystal analysis can be done on today's right elbow fluid specimen  ===> Pt needs aspiration precautions - would be very hesitant to feed the Pt until his acute metab encephalopathy improves further - he is at risk for aspiration  ===> Will follow creatinine and re-adjust abx doses based on Pt's creatinine clearance  ===> If no evidence of septic olecranon bursitis, would consider course of steroids for possibility of polyarticular gout if uric acid is elevated instead of tx with Colchicine  ===> Will monitor for the development of toxicity to current abx tx     3) Acute on CKD - Will avoid nephrotoxins. Continue daptomycin.        Discussed case with Dr. Vicki Mclain and Zuri Alonzo RN    Subjective: Pt conts to be confused - mumbling. Right olecranon bursa was aspirated this AM. No fever. WBC count is starting to decrease. Creatinine is starting to decrease. No probs with current abx tx    Objective:  VSS, Tmax: 98.6  HEENT:  No scleral icterus  NECK: Supple  CARDIAC:  Irreg irreg rate and rhythm , nml S1, s2  LUNGS:  +Few rhonchi anteriorly  ABDOMEN: +BS, some distention, nontender  MUSCULOSKELETAL:  No calf tenderness  EXTREMITIES:  No LE edema  SKIN:  No rash      Labs:  CBC w/diff  Recent Labs     09/08/23  0344   WBC 15.82*   HGB 9.0*   HCT 27.0*   *   NEUTOPHILPCT 78*   LYMPHOPCT 10*   MONOPCT 7   EOSPCT 3     BMP  Recent Labs     09/08/23  0344   K 2.8*   CL 99   CO2 30   *   CREATININE 4.41*   CALCIUM 9.5     CMP  Recent Labs     09/07/23  0300 09/08/23  0344   K 3.7 2.8*    99   CO2 18* 30   * 113*   CREATININE 4.80* 4.41*   CALCIUM 9.3 9.5   ALKPHOS 75  --    ALT 5*  --    AST 17  --        .labrc    Cultures:  Lab Results   Component Value Date    BLOODCX No Growth After 4 Days. 09/03/2023    BLOODCX No Growth After 4 Days. 09/03/2023    BLOODCX No Growth After 5 Days. 09/01/2023    BLOODCX No Growth After 5 Days.  09/01/2023    BLOODCX No Growth After 5 Days. 08/26/2023    BLOODCX No Growth After 5 Days.  08/26/2023     Lab Results   Component Value Date    WOUNDCULT 3 colonies Staphylococcus coagulase negative (A) 09/03/2023     Lab Results   Component Value Date    URINECX >100,000 cfu/ml Escherichia coli ESBL (A) 08/27/2023     Lab Results   Component Value Date    SPUTUMCULTUR 2+ Growth of 08/27/2023       MED:  Daptomycin: #4      Current Facility-Administered Medications:   •  acetaminophen (TYLENOL) tablet 650 mg, 650 mg, Oral, Q6H PRN, Gil Ziegler PA-C, 650 mg at 09/06/23 4107  •  aluminum-magnesium hydroxide-simethicone (MAALOX) oral suspension 30 mL, 30 mL, Oral, Q6H PRN, ARSEN Lara-C  •  aspirin (ECOTRIN LOW STRENGTH) EC tablet 81 mg, 81 mg, Oral, Daily, Earradha Ziegler PA-C, 81 mg at 09/06/23 0915  •  budesonide (PULMICORT) inhalation solution 0.5 mg, 0.5 mg, Nebulization, Q12H, ARSEN Lara-C, 0.5 mg at 09/08/23 3485  •  colchicine (COLCRYS) tablet 0.3 mg, 0.3 mg, Oral, Daily, Damian Stephenson MD  •  DAPTOmycin (CUBICIN) 550 mg in sodium chloride 0.9 % 50 mL IVPB, 6 mg/kg, Intravenous, Q48H, Satya Serrano MD, Last Rate: 100 mL/hr at 09/07/23 0920, 550 mg at 09/07/23 0920  •  diltiazem (CARDIZEM CD) 24 hr capsule 240 mg, 240 mg, Oral, Daily, Ivis Holguin PA-C, 240 mg at 09/06/23 1863  •  diltiazem (CARDIZEM) injection 10 mg, 10 mg, Intravenous, BID PRN, Damian Stephenson MD  •  fluticasone (FLONASE) 50 mcg/act nasal spray 1 spray, 1 spray, Nasal, Daily, Gil Ziegler PA-C, 1 spray at 38/05/06 8319  •  folic acid (FOLVITE) tablet 1 mg, 1 mg, Oral, Daily, KATE LaraC, 1 mg at 09/06/23 0915  •  formoterol (PERFOROMIST) nebulizer solution 20 mcg, 20 mcg, Nebulization, Q12H, Earna Karlie, PA-C, 20 mcg at 09/08/23 0732  •  guaiFENesin (MUCINEX) 12 hr tablet 600 mg, 600 mg, Oral, BID, Earna Karlie, PA-C, 600 mg at 09/07/23 1651  •  heparin (porcine) 25,000 units in 0.45% NaCl 250 mL infusion (premix), 3-20 Units/kg/hr (Order-Specific), Intravenous, Titrated, Brian Vieyra MD, Last Rate: 7.3 mL/hr at 09/08/23 0624, 8.1 Units/kg/hr at 09/08/23 1067  •  heparin (porcine) injection 2,000 Units, 2,000 Units, Intravenous, Q6H PRN, Brian Vieyra MD  •  heparin (porcine) injection 4,000 Units, 4,000 Units, Intravenous, Q6H PRN, Brian Vieyra MD  •  ipratropium (ATROVENT) 0.02 % inhalation solution 0.5 mg, 0.5 mg, Nebulization, TID, Martín Kuo MD, 0.5 mg at 09/08/23 0706  •  lactulose retention enema 200 g, 200 g, Rectal, BID PRN, Coleman Willett PA-C  •  levalbuterol Everlena Promise) inhalation solution 1.25 mg, 1.25 mg, Nebulization, TID, Martín Kuo MD, 1.25 mg at 09/08/23 0706  •  loratadine (CLARITIN) tablet 10 mg, 10 mg, Oral, Daily, Traci Litten, PA-C, 10 mg at 09/06/23 5259  •  magnesium Oxide (MAG-OX) tablet 400 mg, 400 mg, Oral, Daily, Traci Litten, PA-C, 400 mg at 09/06/23 0915  •  metoprolol (LOPRESSOR) injection 5 mg, 5 mg, Intravenous, Q6H, Wandy Tuttle PA-C, 5 mg at 09/08/23 1024  •  multi-electrolyte (PLASMALYTE-A/ISOLYTE-S PH 7.4) IV solution, 75 mL/hr, Intravenous, Continuous, TOD Wiggins, Last Rate: 75 mL/hr at 09/08/23 1023, 75 mL/hr at 09/08/23 1023  •  ondansetron (ZOFRAN) injection 4 mg, 4 mg, Intravenous, Q6H PRN, Traci Litten, PA-C, 4 mg at 08/28/23 1748  •  oxyCODONE (ROXICODONE) IR tablet 5 mg, 5 mg, Oral, Q6H PRN, Martín Kuo MD, 5 mg at 09/05/23 0344  •  pantoprazole (PROTONIX) injection 40 mg, 40 mg, Intravenous, Q24H [facility], Wandy Tuttle PA-C, 40 mg at 09/08/23 1024  •  potassium chloride 20 mEq IVPB (premix), 20 mEq, Intravenous, Once, Brian Vieyra MD, Last Rate: 50 mL/hr at 09/08/23 1049, 20 mEq at 09/08/23 1049  •  potassium chloride 20 mEq IVPB (premix), 20 mEq, Intravenous, Q2H, Brian Vieyra MD  •  saccharomyces boulardii (FLORASTOR) capsule 250 mg, 250 mg, Oral, BID, Wandy Tuttle PA-C, 250 mg at 09/07/23 1651  •  senna (SENOKOT) tablet 8.6 mg, 1 tablet, Oral, HS, Aggie Rios PA-C, 8.6 mg at 09/07/23 2110    Active Problems:    Anemia of chronic disease    Atrial fibrillation (HCC)    Chronic obstructive pulmonary disease, unspecified COPD type ([address])    TOBI (acute kidney injury) ([address])    Central retinal vein occlusion of right eye    History of COVID-19    History of alcohol abuse    Acute encephalopathy    Olecranon bursitis    Abnormal computed tomography angiography (CTA) of abdomen and pelvis    Sepsis (HCC)    ESBL (extended spectrum beta-lactamase) producing bacteria infection    Sacral wound    Swelling of finger, left    Ileus (HCC)    Thrombocytosis      Ty Merritt MD

## 2023-09-08 NOTE — ASSESSMENT & PLAN NOTE
Continue Dapto  Wound culture ordered  Repeat wound CX ordered  ID and ortho following  Bedside I&D on right olecranon bursa  Follow-up on synovial fluid, sent for culture, Gram stain, cell count, crystals

## 2023-09-08 NOTE — PROCEDURES
Procedure: Bedside incision and drainage right olecranon bursa    After informed consent was obtained, the right olecranon bursa was first aspirated. The fluid was sent for culture/gram stain, cell count and crystals. Then a local block of 5cc 1% lidocaine was given to the right olecranon bursa . The area was then sterilely prepped and draped. Once adequate anesthesia was obtained small incision was made with 11 blade and purulent drainage with what appeared to be tophi was expressed. A hemostat was inserted into the wound and expanded to break up loculations. The wound was then irrigated with NS. 1/4" packing was inserted into the wound. The wound was dressed with xeroform, 4x4, jalen wrap and ace wrap. Pt tolerated the procedure well and was neurovascularly intact pre and post procedure. Procedure was performed by Dr. Romy Aly. pt endorsed to nurse alyssia parry

## 2023-09-08 NOTE — ASSESSMENT & PLAN NOTE
Abdominal distension with X ray KUB showing left lower quadrant loop distension- 9/7  Surgery following  PO contrast given for X ray obs series-which were negative  Ileus demonstrated  X ray KUB done today- showed trace contrast seen in rectosigmoid junction, contrast seen progressing well to the level of distal sigmoid colon.    Clear liquid  Will advance diet as tolerated

## 2023-09-08 NOTE — PLAN OF CARE
Problem: PHYSICAL THERAPY ADULT  Goal: Performs mobility at highest level of function for planned discharge setting. See evaluation for individualized goals. Description: Treatment/Interventions: Functional transfer training, LE strengthening/ROM, Therapeutic exercise, Endurance training, Patient/family training, Equipment eval/education, Bed mobility, Gait training          See flowsheet documentation for full assessment, interventions and recommendations. Note:    Problem List: Decreased strength, Decreased endurance, Impaired balance, Decreased mobility, Decreased cognition, Orthopedic restrictions, Pain  Assessment: Pt seen for PT intervention. Upon arrival, pt curled up on R side w/ head looking very uncomfortable in a sidebent position w/ RUE tucked under body. Pt repositioned towards [facility] w/ total A x 2. Rolled both L and R w/ total A x 2, due to both lethargy, but also significant pain w/ touching or PROM of limbs. Obtained prevalon boots for patient and placed on him, elevated BUEs. Pt placed in chair position in the ICU bed. Discharge recommendation is for Level 2 resources             See flowsheet documentation for full assessment.

## 2023-09-08 NOTE — PHYSICAL THERAPY NOTE
PHYSICAL THERAPY TREATMENT NOTE      Patient Name: Esteban VEGA Date: 9/8/2023 09/08/23 1523   PT Last Visit   PT Visit Date 09/08/23   Note Type   Note Type Treatment   Pain Assessment   Pain Assessment Tool FLACC   Pain Location/Orientation Location: Generalized   Pain Rating: FLACC (Rest) - Face 0   Pain Rating: FLACC (Rest) - Legs 0   Pain Rating: FLACC (Rest) - Activity 0   Pain Rating: FLACC (Rest) - Cry 0   Pain Rating: FLACC (Rest) - Consolability 0   Score: FLACC (Rest) 0   Pain Rating: FLACC (Activity) - Face 2   Pain Rating: FLACC (Activity) - Legs 2   Pain Rating: FLACC (Activity) - Activity 1   Pain Rating: FLACC (Activity) - Cry 2   Pain Rating: FLACC (Activity) - Consolability 2   Score: FLACC (Activity) 9   Restrictions/Precautions   Weight Bearing Precautions Per Order No   Other Precautions Contact/isolation;Cognitive; Bed Alarm;Telemetry; Fall Risk;Pain   General   Chart Reviewed Yes   Family/Caregiver Present No   Cognition   Overall Cognitive Status Impaired   Arousal/Participation Lethargic   Orientation Level Oriented to person   Memory Decreased recall of precautions;Decreased recall of recent events   Following Commands Follows one step commands inconsistently   Comments Pt very lethargic, only responding to pain. Bed Mobility   Rolling R 1  Dependent   Additional items Assist x 2   Rolling L 1  Dependent   Additional items Assist x 2   Supine to Sit Unable to assess   Additional Comments Pt requires total A x 2 to reposition towards HOB, total A x 2 to roll. Due to lethargy, pain, deferred attempting sitting EOB. Balance   Static Sitting Zero   Activity Tolerance   Activity Tolerance Patient limited by fatigue;Patient limited by pain   Nurse [id number] coordination w/ RN Ivis   Assessment   Problem List Decreased strength;Decreased endurance; Impaired balance;Decreased mobility; Decreased cognition;Orthopedic restrictions;Pain   Assessment Pt seen for PT intervention. Upon arrival, pt curled up on R side w/ head looking very uncomfortable in a sidebent position w/ RUE tucked under body. Pt repositioned towards [facility] w/ total A x 2. Rolled both L and R w/ total A x 2, due to both lethargy, but also significant pain w/ touching or PROM of limbs. Obtained prevalon boots for patient and placed on him, elevated BUEs. Pt placed in chair position in the ICU bed. Discharge recommendation is for Level 2 resources   Goals   Patient Goals none expressed   STG Expiration Date (S)  09/18/23   Short Term Goal #1 Patient will: Perform all bed mobility tasks w/ maxx1 to improve pt's independence w/ repositioning for decrease risk of skin breakdown, Perform all transfers w/ maxx1 consistently from various height surfaces in order to improve I w/ engagement w/ real-world environments/situations, Ambulate at least 15 ft. with roller walker max A x 2 w/o LOB to facilitate return and engagement w/ previous living environment and Tolerate 3 hr OOB to faciliate upright tolerance   Plan   Treatment/Interventions Functional transfer training;LE strengthening/ROM; Therapeutic exercise; Endurance training;Cognitive reorientation;Patient/family training;Equipment eval/education; Bed mobility   PT Frequency 2-3x/wk   Recommendation   UB Rehab Discharge Recommendation (PT/OT) Level 2   Additional Comments Recommend omar lift for OOB   AM-PAC Basic Mobility Inpatient   Turning in Flat Bed Without Bedrails 1   Lying on Back to Sitting on Edge of Flat Bed Without Bedrails 1   Moving Bed to Chair 1   Standing Up From Chair Using Arms 1   Walk in Room 1   Climb 3-5 Stairs With Railing 1   Basic Mobility Inpatient Raw Score 6   Turning Head Towards Sound 2   Follow Simple Instructions 2   Low Function Basic Mobility Raw Score  10   Low Function Basic Mobility Standardized Score  14.65   Highest Level Of Mobility   [facility] Goal 2: Bed activities/Dependent transfer   [address] [place] Achieved 2: Bed activities/Dependent transfer   End of Consult   Patient Position at End of Consult Supine;Bed/Chair alarm activated; All needs within reach       The patient's AM-PAC Basic Mobility Inpatient Short Form Raw Score is 6. A Raw score of less than or equal to 16 suggests the patient may benefit from discharge to post-acute rehabilitation services. Please also refer to the recommendation of the Physical Therapist for safe discharge planning. Pt would continue to benefit from skilled PT during this admission in order to progress patient towards goals to decrease risk of falls and maximize independence.      Collette Genao, PT, DPT

## 2023-09-08 NOTE — ASSESSMENT & PLAN NOTE
Left 3rd MCP joint, PIP  Tender to touch, erythematous  Given elevated uric acid we will change colchicine to IV prednisone, will taper depending on symptomatic improvement

## 2023-09-09 ENCOUNTER — APPOINTMENT (INPATIENT)
Dept: CT IMAGING | Facility: HOSPITAL | Age: 88
DRG: 193 | End: 2023-09-09
Payer: MEDICARE

## 2023-09-09 PROBLEM — M10.9 ACUTE GOUT OF MULTIPLE SITES: Status: ACTIVE | Noted: 2023-09-06

## 2023-09-09 LAB
ALBUMIN SERPL BCP-MCNC: 2.8 G/DL (ref 3.5–5)
ALBUMIN UR ELPH-MCNC: 22.2 %
ALP SERPL-CCNC: 63 U/L (ref 34–104)
ALPHA1 GLOB MFR UR ELPH: 4 %
ALPHA2 GLOB MFR UR ELPH: 11.9 %
ALT SERPL W P-5'-P-CCNC: 7 U/L (ref 7–52)
AMMONIA PLAS-SCNC: 29 UMOL/L (ref 18–72)
ANION GAP SERPL CALCULATED.3IONS-SCNC: 12 MMOL/L
APTT PPP: 71 SECONDS (ref 23–37)
AST SERPL W P-5'-P-CCNC: 17 U/L (ref 13–39)
B-GLOBULIN MFR UR ELPH: 2.4 %
BASE EX.OXY STD BLDV CALC-SCNC: 96.2 % (ref 60–80)
BASE EXCESS BLDV CALC-SCNC: 8.5 MMOL/L
BASOPHILS # BLD AUTO: 0.02 THOUSANDS/ÂΜL (ref 0–0.1)
BASOPHILS NFR BLD AUTO: 0 % (ref 0–1)
BILIRUB DIRECT SERPL-MCNC: 0.28 MG/DL (ref 0–0.2)
BILIRUB SERPL-MCNC: 0.54 MG/DL (ref 0.2–1)
BUN SERPL-MCNC: 110 MG/DL (ref 5–25)
CALCIUM SERPL-MCNC: 9.5 MG/DL (ref 8.4–10.2)
CHLORIDE SERPL-SCNC: 96 MMOL/L (ref 96–108)
CO2 SERPL-SCNC: 33 MMOL/L (ref 21–32)
CREAT SERPL-MCNC: 3.75 MG/DL (ref 0.6–1.3)
EOSINOPHIL # BLD AUTO: 0 THOUSAND/ÂΜL (ref 0–0.61)
EOSINOPHIL NFR BLD AUTO: 0 % (ref 0–6)
ERYTHROCYTE [DISTWIDTH] IN BLOOD BY AUTOMATED COUNT: 13.8 % (ref 11.6–15.1)
GAMMA GLOB MFR UR ELPH: 59.5 %
GFR SERPL CREATININE-BSD FRML MDRD: 13 ML/MIN/1.73SQ M
GLUCOSE SERPL-MCNC: 153 MG/DL (ref 65–140)
HCO3 BLDV-SCNC: 32.6 MMOL/L (ref 24–30)
HCT VFR BLD AUTO: 24.4 % (ref 36.5–49.3)
HGB BLD-MCNC: 7.9 G/DL (ref 12–17)
IMM GRANULOCYTES # BLD AUTO: 0.38 THOUSAND/UL (ref 0–0.2)
IMM GRANULOCYTES NFR BLD AUTO: 2 % (ref 0–2)
INTERPRETATION UR IFE-IMP: NORMAL
LYMPHOCYTES # BLD AUTO: 1.32 THOUSANDS/ÂΜL (ref 0.6–4.47)
LYMPHOCYTES NFR BLD AUTO: 8 % (ref 14–44)
MAGNESIUM SERPL-MCNC: 2.1 MG/DL (ref 1.9–2.7)
MCH RBC QN AUTO: 31.1 PG (ref 26.8–34.3)
MCHC RBC AUTO-ENTMCNC: 32.4 G/DL (ref 31.4–37.4)
MCV RBC AUTO: 96 FL (ref 82–98)
MONOCYTES # BLD AUTO: 0.54 THOUSAND/ÂΜL (ref 0.17–1.22)
MONOCYTES NFR BLD AUTO: 3 % (ref 4–12)
NEUTROPHILS # BLD AUTO: 14.37 THOUSANDS/ÂΜL (ref 1.85–7.62)
NEUTS SEG NFR BLD AUTO: 87 % (ref 43–75)
NRBC BLD AUTO-RTO: 0 /100 WBCS
O2 CT BLDV-SCNC: 13 ML/DL
PCO2 BLDV: 43.2 MM HG (ref 42–50)
PH BLDV: 7.5 [PH] (ref 7.3–7.4)
PLATELET # BLD AUTO: 350 THOUSANDS/UL (ref 149–390)
PMV BLD AUTO: 9.9 FL (ref 8.9–12.7)
PO2 BLDV: 143.2 MM HG (ref 35–45)
POTASSIUM SERPL-SCNC: 3.6 MMOL/L (ref 3.5–5.3)
PROT PATTERN UR ELPH-IMP: NORMAL
PROT SERPL-MCNC: 6.4 G/DL (ref 6.4–8.4)
PROT UR-MCNC: 48.2 MG/DL
RBC # BLD AUTO: 2.54 MILLION/UL (ref 3.88–5.62)
SODIUM SERPL-SCNC: 141 MMOL/L (ref 135–147)
WBC # BLD AUTO: 16.63 THOUSAND/UL (ref 4.31–10.16)

## 2023-09-09 PROCEDURE — 83735 ASSAY OF MAGNESIUM: CPT | Performed by: INTERNAL MEDICINE

## 2023-09-09 PROCEDURE — C9113 INJ PANTOPRAZOLE SODIUM, VIA: HCPCS | Performed by: INTERNAL MEDICINE

## 2023-09-09 PROCEDURE — 99233 SBSQ HOSP IP/OBS HIGH 50: CPT | Performed by: INTERNAL MEDICINE

## 2023-09-09 PROCEDURE — 82805 BLOOD GASES W/O2 SATURATION: CPT | Performed by: INTERNAL MEDICINE

## 2023-09-09 PROCEDURE — 82140 ASSAY OF AMMONIA: CPT | Performed by: INTERNAL MEDICINE

## 2023-09-09 PROCEDURE — 94760 N-INVAS EAR/PLS OXIMETRY 1: CPT

## 2023-09-09 PROCEDURE — 86335 IMMUNFIX E-PHORSIS/URINE/CSF: CPT | Performed by: STUDENT IN AN ORGANIZED HEALTH CARE EDUCATION/TRAINING PROGRAM

## 2023-09-09 PROCEDURE — 70450 CT HEAD/BRAIN W/O DYE: CPT

## 2023-09-09 PROCEDURE — 85025 COMPLETE CBC W/AUTO DIFF WBC: CPT | Performed by: INTERNAL MEDICINE

## 2023-09-09 PROCEDURE — 99232 SBSQ HOSP IP/OBS MODERATE 35: CPT | Performed by: INTERNAL MEDICINE

## 2023-09-09 PROCEDURE — 85730 THROMBOPLASTIN TIME PARTIAL: CPT | Performed by: INTERNAL MEDICINE

## 2023-09-09 PROCEDURE — 94640 AIRWAY INHALATION TREATMENT: CPT

## 2023-09-09 PROCEDURE — 80048 BASIC METABOLIC PNL TOTAL CA: CPT | Performed by: INTERNAL MEDICINE

## 2023-09-09 PROCEDURE — 99024 POSTOP FOLLOW-UP VISIT: CPT | Performed by: ORTHOPAEDIC SURGERY

## 2023-09-09 PROCEDURE — G1004 CDSM NDSC: HCPCS

## 2023-09-09 PROCEDURE — 84166 PROTEIN E-PHORESIS/URINE/CSF: CPT | Performed by: STUDENT IN AN ORGANIZED HEALTH CARE EDUCATION/TRAINING PROGRAM

## 2023-09-09 PROCEDURE — 80076 HEPATIC FUNCTION PANEL: CPT | Performed by: INTERNAL MEDICINE

## 2023-09-09 RX ADMIN — HEPARIN SODIUM 8.11 UNITS/KG/HR: 10000 INJECTION, SOLUTION INTRAVENOUS at 05:29

## 2023-09-09 RX ADMIN — BUDESONIDE 0.5 MG: 0.5 INHALANT ORAL at 07:36

## 2023-09-09 RX ADMIN — IPRATROPIUM BROMIDE 0.5 MG: 0.5 SOLUTION RESPIRATORY (INHALATION) at 07:36

## 2023-09-09 RX ADMIN — FLUTICASONE PROPIONATE 1 SPRAY: 50 SPRAY, METERED NASAL at 08:53

## 2023-09-09 RX ADMIN — SODIUM CHLORIDE, SODIUM GLUCONATE, SODIUM ACETATE, POTASSIUM CHLORIDE, MAGNESIUM CHLORIDE, SODIUM PHOSPHATE, DIBASIC, AND POTASSIUM PHOSPHATE 75 ML/HR: .53; .5; .37; .037; .03; .012; .00082 INJECTION, SOLUTION INTRAVENOUS at 16:24

## 2023-09-09 RX ADMIN — METOPROLOL TARTRATE 5 MG: 1 INJECTION, SOLUTION INTRAVENOUS at 15:33

## 2023-09-09 RX ADMIN — SODIUM CHLORIDE, SODIUM GLUCONATE, SODIUM ACETATE, POTASSIUM CHLORIDE, MAGNESIUM CHLORIDE, SODIUM PHOSPHATE, DIBASIC, AND POTASSIUM PHOSPHATE 75 ML/HR: .53; .5; .37; .037; .03; .012; .00082 INJECTION, SOLUTION INTRAVENOUS at 02:38

## 2023-09-09 RX ADMIN — FORMOTEROL FUMARATE DIHYDRATE 20 MCG: 20 SOLUTION RESPIRATORY (INHALATION) at 07:36

## 2023-09-09 RX ADMIN — METHYLPREDNISOLONE SODIUM SUCCINATE 20 MG: 40 INJECTION, POWDER, FOR SOLUTION INTRAMUSCULAR; INTRAVENOUS at 08:52

## 2023-09-09 RX ADMIN — PANTOPRAZOLE SODIUM 40 MG: 40 INJECTION, POWDER, FOR SOLUTION INTRAVENOUS at 08:52

## 2023-09-09 RX ADMIN — LORATADINE 10 MG: 10 TABLET ORAL at 09:47

## 2023-09-09 RX ADMIN — LEVALBUTEROL HYDROCHLORIDE 1.25 MG: 1.25 SOLUTION RESPIRATORY (INHALATION) at 07:36

## 2023-09-09 RX ADMIN — METOPROLOL TARTRATE 5 MG: 1 INJECTION, SOLUTION INTRAVENOUS at 19:53

## 2023-09-09 RX ADMIN — DAPTOMYCIN 550 MG: 500 INJECTION, POWDER, LYOPHILIZED, FOR SOLUTION INTRAVENOUS at 09:47

## 2023-09-09 RX ADMIN — METOPROLOL TARTRATE 5 MG: 1 INJECTION, SOLUTION INTRAVENOUS at 08:52

## 2023-09-09 RX ADMIN — METOPROLOL TARTRATE 5 MG: 1 INJECTION, SOLUTION INTRAVENOUS at 02:29

## 2023-09-09 NOTE — PROGRESS NOTES
Abby Ayon  82 y.o.  male  MR#: [id number]  9/9/2023    Post-op days: NA  Extremity: bilateral elbow    Subjective: 83 yo male with bilateral elbow swelling and redness. He is asleep and not in any pain. Not communicating. Vitals:   Vitals:    09/09/23 0739   BP:    Pulse:    Resp:    Temp:    SpO2: 95%       Exam:   Right elbow dressing intact  Passive ROM does not increase pain in the elbow  Left elbow skin is intact. No drainage  Passive ROM without increase pain    Labs:   WBC   Recent Labs     09/06/23 2104 09/07/23 0300 09/08/23  0344 09/09/23  0540   WBC 18.94* 19.52* 15.82* 16.63*     H/H   Recent Labs     09/06/23 2104 09/07/23 0300 09/08/23  0344 09/09/23  0540   HGB 8.1* 8.8* 9.0* 7.9*   /  Recent Labs     09/06/23 2104 09/07/23 0300 09/08/23 0344 09/09/23  0540   HCT 24.6* 27.5* 27.0* 24.4*     Sed Rate No results for input(s): "SEDRATE" in the last 72 hours. CRP No results for input(s): "CRP" in the last 72 hours.      Cultures negative  Positive gout crystal right elbow bursa fluid    Assessment:   Bilateral elbow Olecranon bursitis, right gout    Plan:   Continue pain control  IV antibiotic  Will change packing tomorrow right elbow

## 2023-09-09 NOTE — PLAN OF CARE
Problem: Potential for Falls  Goal: Patient will remain free of falls  Description: INTERVENTIONS:  - Educate patient/family on patient safety including physical limitations  - Instruct patient to call for assistance with activity   - Consult OT/PT to assist with strengthening/mobility   - Keep Call bell within reach  - Keep bed low and locked with side rails adjusted as appropriate  - Keep care items and personal belongings within reach  - Initiate and maintain comfort rounds  - Make Fall Risk Sign visible to staff  - Offer Toileting every 2 Hours, in advance of need  - Initiate/Maintain bed alarm  - Obtain necessary fall risk management equipment:   - Apply yellow socks and bracelet for high fall risk patients  - Consider moving patient to room near nurses station  Outcome: Progressing     Problem: PAIN - ADULT  Goal: Verbalizes/displays adequate comfort level or baseline comfort level  Description: Interventions:  - Encourage patient to monitor pain and request assistance  - Assess pain using appropriate pain scale  - Administer analgesics based on type and severity of pain and evaluate response  - Implement non-pharmacological measures as appropriate and evaluate response  - Consider cultural and social influences on pain and pain management  - Notify physician/advanced practitioner if interventions unsuccessful or patient reports new pain  Outcome: Progressing     Problem: INFECTION - ADULT  Goal: Absence or prevention of progression during hospitalization  Description: INTERVENTIONS:  - Assess and monitor for signs and symptoms of infection  - Monitor lab/diagnostic results  - Monitor all insertion sites, i.e. indwelling lines, tubes, and drains  - Monitor endotracheal if appropriate and nasal secretions for changes in amount and color  - [place] appropriate cooling/warming therapies per order  - Administer medications as ordered  - Instruct and encourage patient and family to use good hand hygiene technique  - Identify and instruct in appropriate isolation precautions for identified infection/condition  Outcome: Progressing  Goal: Absence of fever/infection during neutropenic period  Description: INTERVENTIONS:  - Monitor WBC    Outcome: Progressing     Problem: SAFETY ADULT  Goal: Maintain or return to baseline ADL function  Description: INTERVENTIONS:  -  Assess patient's ability to carry out ADLs; assess patient's baseline for ADL function and identify physical deficits which impact ability to perform ADLs (bathing, care of mouth/teeth, toileting, grooming, dressing, etc.)  - Assess/evaluate cause of self-care deficits   - Assess range of motion  - Assess patient's mobility; develop plan if impaired  - Assess patient's need for assistive devices and provide as appropriate  - Encourage maximum independence but intervene and supervise when necessary  - Involve family in performance of ADLs  - Assess for home care needs following discharge   - Consider OT consult to assist with ADL evaluation and planning for discharge  - Provide patient education as appropriate  Outcome: Progressing  Goal: Maintains/Returns to pre admission functional level  Description: INTERVENTIONS:  - Perform BMAT or MOVE assessment daily.   - Set and communicate daily mobility goal to care team and patient/family/caregiver. - Collaborate with rehabilitation services on mobility goals if consulted  - Perform Range of Motion 3 times a day. - Reposition patient every 2 hours.   - Dangle patient 2 times a day  - Stand patient 2 times a day  - Ambulate patient 2 times a day  - Out of bed to chair 2 times a day   - Out of bed for meals 2 times a day  - Out of bed for toileting  - Record patient progress and toleration of activity level   Outcome: Progressing     Problem: DISCHARGE PLANNING  Goal: Discharge to home or other facility with appropriate resources  Description: INTERVENTIONS:  - Identify barriers to discharge w/patient and caregiver  - Arrange for needed discharge resources and transportation as appropriate  - Identify discharge learning needs (meds, wound care, etc.)  - Arrange for interpretive services to assist at discharge as needed  - Refer to Case Management Department for coordinating discharge planning if the patient needs post-hospital services based on physician/advanced practitioner order or complex needs related to functional status, cognitive ability, or social support system  Outcome: Progressing     Problem: Knowledge Deficit  Goal: Patient/family/caregiver demonstrates understanding of disease process, treatment plan, medications, and discharge instructions  Description: Complete learning assessment and assess knowledge base.   Interventions:  - Provide teaching at level of understanding  - Provide teaching via preferred learning methods  Outcome: Progressing     Problem: Prexisting or High Potential for Compromised Skin Integrity  Goal: Skin integrity is maintained or improved  Description: INTERVENTIONS:  - Identify patients at risk for skin breakdown  - Assess and monitor skin integrity  - Assess and monitor nutrition and hydration status  - Monitor labs   - Assess for incontinence   - Turn and reposition patient  - Assist with mobility/ambulation  - Relieve pressure over bony prominences  - Avoid friction and shearing  - Provide appropriate hygiene as needed including keeping skin clean and dry  - Evaluate need for skin moisturizer/barrier cream  - Collaborate with interdisciplinary team   - Patient/family teaching  - Consider wound care consult   Outcome: Progressing

## 2023-09-09 NOTE — ASSESSMENT & PLAN NOTE
Left 3rd MCP joint, PIP, Elbow joint  Tender to touch, erythematous  Given elevated uric acid we will change colchicine to IV prednisone, will taper depending on symptomatic improvement

## 2023-09-09 NOTE — ASSESSMENT & PLAN NOTE
Lab Results   Component Value Date    EGFR 13 09/09/2023    EGFR 11 09/08/2023    EGFR 10 09/07/2023    CREATININE 3.75 (H) 09/09/2023    CREATININE 4.41 (H) 09/08/2023    CREATININE 4.80 (H) 09/07/2023   · Baseline creatinine 1.6-1.9, POA- 1.7  · Likely secondary to autoregulatory failure, ATN secondary to sepsis, medications  · S/p Lasix drip  · Currently on Plasma-Lyte, creatinine trending down  · Nephrology following  · Urinary retention protocol  · I/O monitoring  · Renally dose medications

## 2023-09-09 NOTE — ASSESSMENT & PLAN NOTE
· Went into A-fib with RVR on August 28 and was given IV Lopressor and Cardizem  · Home regimen: Coreg 25 Mg twice daily  · Started on Eliquis 2.5 mg twice daily for anticoagulation during hospitalization at [facility] 8/8-8/23  · Cardiology consult appreciated  · Rate controlled  · Will order Cardizem IV as PRN for poor oral intake,

## 2023-09-09 NOTE — PROGRESS NOTES
NEPHROLOGY PROGRESS NOTE   Nataliia Rea 82 y.o. 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  -Baseline creatinine 1.5-1.7.  -Presented with creatinine of 0.7 with peak creatinine of 4.9 on 9/6.  -Does not follow with nephrology as an outpatient, would recommend follow-up at discharge.  -Creatinine is currently improved to 3.75.  -Previously on Lasix drip, discontinued 9/7 and started on fluid hydration. Currently on Plasma-Lyte. -UA with moderate blood, 2+ protein, 1-2 RBCs. -CT scan with multiple left renal cyst, negative for hydro, nonspecific bilateral perinephric edema, unremarkable bladder.  -Urine eosinophils negative. CPK level normal.  -Checking bladder scans with urinary retention.  -Currently no urgent indication for dialysis at this time. -Recommend avoiding nephrotoxins, hypotension, IV contrast.  -I/O. Metabolic acidosis: In the setting of acute renal failure. Previosuly on bicarb drip (resolved)    Hypokalemia/hypomagnesemia:  -Repeat potassium level 3.6 with mag of 2.1.  -Continue to monitor and replace as needed.  -On magnesium supplementation 400 mg daily. Hypophosphatemia: In the setting of acute renal failure with underlying CKD. -Continue on PhosLo with meals. Recommend low phosphorus diet.  -Will repeat phos level in AM.    Blood pressure: Remains stable and acceptable. -Current medications: Cardizem 240 mg daily, metoprolol 5 mg IV every 6 hours.  -Avoid hypotension or high fluctuations in blood pressure. Volume status: With anasarca but appears to be intravascular depleted. -Receiving IV hydration. Continue the same today. Sepsis: Suspected due to ESBL UTI/septic olecranon bursitis. (resolved)  -Previously on daptomycin, discontinued per ID.  Cultures so far negative. Bilateral elbow olecranon bursitis/gout:  -Orthopedic team is following. Status post bedside I&D. Providing dressing changes and local care. -Received Dapto, discontinued.  -Previously on colchicine, discontinued and changed to IV prednisone with tapering dose depending on improvement. Acute encephalopathy: Suspected due to metabolic encephalopathy. Mental status has been waxing and waning.  -Continues with neurochecks.  -Further work-up and management per primary care team, considering checking CT of head. Ileus: Status post NG tube removal and advancing diet. Anemia:  -Continue to monitor and transfuse as needed for hemoglobin less than 7.0.  -Checking occult stools. -Iron panel: low iron and TIBC. -Myeloma work-up negative. Other: Sacral wound, atrial fibrillation, alcohol abuse, COVID infection. Disposition: Requiring additional stay due to medical needs. SUBJECTIVE:  The patient is resting in his bed. Appears to be comfortable.     OBJECTIVE:  Current Weight: Weight - Scale: 91.5 kg (201 lb 11.5 oz)  Vitals:    09/08/23 2020 09/09/23 0228 09/09/23 0727 09/09/23 0739   BP:  136/70 162/98    BP Location:  Left arm     Pulse:  90 88    Resp:  18 20    Temp:   (!) 97 °F (36.1 °C)    TempSrc:       SpO2: 100% 95% 93% 95%   Weight:       Height:           Intake/Output Summary (Last 24 hours) at 9/9/2023 1227  Last data filed at 9/9/2023 6585  Gross per 24 hour   Intake 120 ml   Output 700 ml   Net -580 ml     General: NAD  Skin: warm, dry, intact, no rash  HEENT: Moist mucous membranes, sclera anicteric, normocephalic, atraumatic  Neck: No apparent JVD appreciated  Chest: lung sounds clear B/L, on RA   CVS:Regular rate and rhythm, no murmer   Abdomen: Soft, round, non-tender, +BS, castanon present with clear yellow urine  Extremities: No B/L LE edema present  Neuro: Mental status waxing and waning, glasses  Psych: appropriate mood and affect     Medications:    Current Facility-Administered Medications:   •  acetaminophen (TYLENOL) tablet 650 mg, 650 mg, Oral, Q6H PRN, Amanda Be PA-C, 650 mg at 09/06/23 3591  •  aluminum-magnesium hydroxide-simethicone (MAALOX) oral suspension 30 mL, 30 mL, Oral, Q6H PRN, Amanda Be PA-C  •  aspirin (ECOTRIN LOW STRENGTH) EC tablet 81 mg, 81 mg, Oral, Daily, Amanda Be PA-C, 81 mg at 09/09/23 0947  •  diltiazem (CARDIZEM CD) 24 hr capsule 240 mg, 240 mg, Oral, Daily, Zachery Holguin PA-C, 240 mg at 09/09/23 2062  •  diltiazem (CARDIZEM) injection 10 mg, 10 mg, Intravenous, BID PRN, Blue Greene MD  •  fluticasone (FLONASE) 50 mcg/act nasal spray 1 spray, 1 spray, Nasal, Daily, Amanda Be PA-C, 1 spray at 29/86/70 9658  •  folic acid (FOLVITE) tablet 1 mg, 1 mg, Oral, Daily, Amanda Be PA-C, 1 mg at 09/09/23 0516  •  guaiFENesin (MUCINEX) 12 hr tablet 600 mg, 600 mg, Oral, BID, Amanda Be PA-C, 600 mg at 09/09/23 1819  •  heparin (porcine) 25,000 units in 0.45% NaCl 250 mL infusion (premix), 3-20 Units/kg/hr (Order-Specific), Intravenous, Titrated, Blue Greene MD, Last Rate: 7.3 mL/hr at 09/09/23 0529, 8.11 Units/kg/hr at 09/09/23 0529  •  heparin (porcine) injection 2,000 Units, 2,000 Units, Intravenous, Q6H PRN, Blue Greene MD  •  heparin (porcine) injection 4,000 Units, 4,000 Units, Intravenous, Q6H PRN, Blue Greene MD  •  lactulose retention enema 200 g, 200 g, Rectal, BID PRN, Alisha Willett PA-C  •  magnesium Oxide (MAG-OX) tablet 400 mg, 400 mg, Oral, Daily, Amanda Be PA-C, 400 mg at 09/09/23 0947  •  methylPREDNISolone sodium succinate (Solu-MEDROL) injection 20 mg, 20 mg, Intravenous, Daily, Blue Greene MD, 20 mg at 09/09/23 3817  •  metoprolol (LOPRESSOR) injection 5 mg, 5 mg, Intravenous, Q6H, Wandy Tuttle PA-C, 5 mg at 09/09/23 5289  •  multi-electrolyte (PLASMALYTE-A/ISOLYTE-S PH 7.4) IV solution, 75 mL/hr, Intravenous, Continuous, Ellie Mary Man Benitez, Last Rate: 75 mL/hr at 09/09/23 0238, 75 mL/hr at 09/09/23 0238  •  [facility]ron [facility]) injection 4 mg, 4 mg, Intravenous, Q6H PRN, Valerie Boswell PA-C, 4 mg at 08/28/23 1748  •  oxyCODONE (ROXICODONE) IR tablet 5 mg, 5 mg, Oral, Q6H PRN, Alan Razo MD, 5 mg at 09/05/23 0344  •  pantoprazole (PROTONIX) injection 40 mg, 40 mg, Intravenous, Q24H [address], Wandy Tuttle PA-C, 40 mg at 09/09/23 0176  •  potassium chloride (K-DUR,KLOR-CON) CR tablet 40 mEq, 40 mEq, Oral, Once, Jadon Casanova MD  •  saccharomyces boulardii (FLORASTOR) capsule 250 mg, 250 mg, Oral, BID, Wandy Tuttle PA-C, 250 mg at 09/09/23 0947  •  senna (SENOKOT) tablet 8.6 mg, 1 tablet, Oral, HS, Valerie Boswell PA-C, 8.6 mg at 09/07/23 2110    Laboratory Results:  Results from last 7 days   Lab Units 09/09/23  0540 09/08/23  0344 09/07/23  0300 09/06/23  2104 09/06/23  0433 09/05/23  0336   WBC Thousand/uL 16.63* 15.82* 19.52*   < > 16.32* 16.60*   HEMOGLOBIN g/dL 7.9* 9.0* 8.8*   < > 7.3* 7.4*   HEMATOCRIT % 24.4* 27.0* 27.5*   < > 22.4* 23.1*   PLATELETS Thousands/uL 350 398* 396*   < > 330 318   SODIUM mmol/L 141 140 137  --  140 139   POTASSIUM mmol/L 3.6 2.8* 3.7  --  3.1* 4.0   CHLORIDE mmol/L 96 99 104  --  105 108   CO2 mmol/L 33* 30 18*  --  21 20*   BUN mg/dL 110* 113* 109*  --  101* 94*   CREATININE mg/dL 3.75* 4.41* 4.80*  --  4.93* 4.67*   CALCIUM mg/dL 9.5 9.5 9.3  --  9.1 9.0   MAGNESIUM mg/dL 2.1  --  2.4  --  2.1 2.3   PHOSPHORUS mg/dL  --   --  5.6*  --  5.8*  --    ALK PHOS U/L  --   --  75  --  65 63   ALT U/L  --   --  5*  --  8 9   AST U/L  --   --  17  --  12* 17    < > = values in this interval not displayed.

## 2023-09-09 NOTE — ASSESSMENT & PLAN NOTE
Wound culture ordered, so far Cx yielded no growth  Repeat wound CX ordered  ID and ortho following  Bedside I&D on right olecranon bursa- 9/8  Follow-up on synovial fluid, sent for culture, Gram stain, cell count, crystals.  Synovial fluid positive for monosodium urate crystals  Dapto discontinued after discussing with ID

## 2023-09-09 NOTE — ASSESSMENT & PLAN NOTE
Meets criteria with leukocytosis, tachycardia  Likely secondary to ESBL UTI/Septic olecranon bursitis, possible sepsis is now resolved and is more likely SIRS with TOBI, and gout  F/u Blood Cx- Negative growth so far, wound Cx- growing coag negative staph  F/u vitals  F/u CBC, CMP and procal  Will dc Dapto

## 2023-09-09 NOTE — PROGRESS NOTES
[facility]  Progress Note  Name: Steven Iyer  MRN: [id number]  Unit/Bed#: -01 I Date of Admission: 8/26/2023   Date of Service: 9/9/2023 I Hospital Day: 14    Assessment/Plan   Sepsis [facility])  Assessment & Plan  Meets criteria with leukocytosis, tachycardia  Likely secondary to ESBL UTI/Septic olecranon bursitis, possible sepsis is now resolved and is more likely SIRS with TOBI, and gout  F/u Blood Cx- Negative growth so far, wound Cx- growing coag negative staph  F/u vitals  F/u CBC, CMP and procal  Will dc Dapto      Acute encephalopathy  Assessment & Plan  Patient is confused likely acute metabolic encephalopathy  Frequent reorientation  Very sleepy this morning, will hold PO meds if not able to swallow  Monitor CBC, CMP  Follow up on Cx  Neurochecks, delirium preacutions  Ammonia, VBG  If initial lab work up is negative, will do CT head      TOBI (acute kidney injury) [facility])  Assessment & Plan  Lab Results   Component Value Date    EGFR 13 09/09/2023    EGFR 11 09/08/2023    EGFR 10 09/07/2023    CREATININE 3.75 (H) 09/09/2023    CREATININE 4.41 (H) 09/08/2023    CREATININE 4.80 (H) 09/07/2023   · Baseline creatinine 1.6-1.9, POA- 1.7  · Likely secondary to autoregulatory failure, ATN secondary to sepsis, medications  · S/p Lasix drip  · Currently on Plasma-Lyte, creatinine trending down  · Nephrology following  · Urinary retention protocol  · I/O monitoring  · Renally dose medications          Olecranon bursitis  Assessment & Plan  Wound culture ordered, so far Cx yielded no growth  Repeat wound CX ordered  ID and ortho following  Bedside I&D on right olecranon bursa- 9/8  Follow-up on synovial fluid, sent for culture, Gram stain, cell count, crystals.  Synovial fluid positive for monosodium urate crystals  Dapto discontinued after discussing with ID      Thrombocytosis  Assessment & Plan  Likely reactive  Monitor platelet count    Ileus [facility])  Assessment & Plan  Abdominal distension with X ray KUB showing left lower quadrant loop distension- 9/7  Surgery following  PO contrast given for X ray obs series-which were negative  Ileus demonstrated  X ray KUB done today- showed trace contrast seen in rectosigmoid junction, contrast seen progressing well to the level of distal sigmoid colon. Clear liquid  Will advance diet as tolerated        Acute gout of multiple sites  Assessment & Plan  Left 3rd MCP joint, PIP, Elbow joint  Tender to touch, erythematous  Given elevated uric acid we will change colchicine to IV prednisone, will taper depending on symptomatic improvement    Sacral wound  Assessment & Plan  Wound care consulted  Appreciate recs    Atrial fibrillation [facility])  Assessment & Plan  · Went into A-fib with RVR on August 28 and was given IV Lopressor and Cardizem  · Home regimen: Coreg 25 Mg twice daily  · Started on Eliquis 2.5 mg twice daily for anticoagulation during hospitalization at [address] 8/8-8/23  · Cardiology consult appreciated  · Rate controlled  · Will order Cardizem IV as PRN for poor oral intake,                VTE Pharmacologic Prophylaxis: VTE Score: 5 High Risk (Score >/= 5) - Pharmacological DVT Prophylaxis Ordered: heparin drip. Sequential Compression Devices Ordered. Patient Centered Rounds: I performed bedside rounds with nursing staff today. Discussions with Specialists or Other Care Team Provider: ID, nursing    Education and Discussions with Family / Patient: Updated  (wife) via phone. Total Time Spent on Date of Encounter in care of patient: 35 minutes This time was spent on one or more of the following: performing physical exam; counseling and coordination of care; obtaining or reviewing history; documenting in the medical record; reviewing/ordering tests, medications or procedures; communicating with other healthcare professionals and discussing with patient's family/caregivers.     Current Length of Stay: 14 day(s)  Current Patient Status: Inpatient   Certification Statement: The patient will continue to require additional inpatient hospital stay due to encephalopathy  Discharge Plan: Anticipate discharge in 48-72 hrs to rehab facility. Code Status: Level 3 - DNAR and DNI    Subjective:   More sleepy this AM. Reevaluated again- still sleepy, denies complaints. Poor PO intake  Objective:     Vitals:   Temp (24hrs), Av.3 °F (36.8 °C), Min:97 °F (36.1 °C), Max:99.5 °F (37.5 °C)    Temp:  [97 °F (36.1 °C)-99.5 °F (37.5 °C)] 97 °F (36.1 °C)  HR:  [] 88  Resp:  [16-20] 20  BP: (130-162)/(68-98) 162/98  SpO2:  [92 %-100 %] 95 %  Body mass index is 30.67 kg/m². Input and Output Summary (last 24 hours): Intake/Output Summary (Last 24 hours) at 2023 1208  Last data filed at 2023 0727  Gross per 24 hour   Intake 120 ml   Output 700 ml   Net -580 ml       Physical Exam:   Physical Exam  HENT:      Head: Normocephalic and atraumatic. Mouth/Throat:      Mouth: Mucous membranes are dry. Pharynx: Oropharynx is clear. Eyes:      General: No scleral icterus. Right eye: No discharge. Left eye: No discharge. Conjunctiva/sclera: Conjunctivae normal.   Cardiovascular:      Rate and Rhythm: Tachycardia present. Rhythm irregular. Pulses: Normal pulses. Heart sounds: Normal heart sounds. Pulmonary:      Effort: Pulmonary effort is normal. No respiratory distress. Abdominal:      Palpations: Abdomen is soft. Tenderness: There is no abdominal tenderness. There is no guarding. Skin:     General: Skin is warm and dry. Capillary Refill: Capillary refill takes 2 to 3 seconds. Neurological:      Mental Status: He is alert. GCS: GCS eye subscore is 3. GCS verbal subscore is 4. GCS motor subscore is 5.           Additional Data:     Labs:  Results from last 7 days   Lab Units 23  0540   WBC Thousand/uL 16.63*   HEMOGLOBIN g/dL 7.9*   HEMATOCRIT % 24.4*   PLATELETS Thousands/uL 350   NEUTROS PCT % 87*   LYMPHS PCT % 8*   MONOS PCT % 3*   EOS PCT % 0     Results from last 7 days   Lab Units 09/09/23  0540 09/08/23  0344 09/07/23  0300   SODIUM mmol/L 141   < > 137   POTASSIUM mmol/L 3.6   < > 3.7   CHLORIDE mmol/L 96   < > 104   CO2 mmol/L 33*   < > 18*   BUN mg/dL 110*   < > 109*   CREATININE mg/dL 3.75*   < > 4.80*   ANION GAP mmol/L 12   < > 15   CALCIUM mg/dL 9.5   < > 9.3   ALBUMIN g/dL  --   --  3.2*   TOTAL BILIRUBIN mg/dL  --   --  0.67   ALK PHOS U/L  --   --  75   ALT U/L  --   --  5*   AST U/L  --   --  17   GLUCOSE RANDOM mg/dL 153*   < > 147*    < > = values in this interval not displayed. Results from last 7 days   Lab Units 09/06/23  2104   INR  2.67*             Results from last 7 days   Lab Units 09/05/23  0336 09/04/23  0527 09/03/23  0221   PROCALCITONIN ng/ml 2.42* 2.93* 2.83*       Lines/Drains:  Invasive Devices     Peripheral Intravenous Line  Duration           Peripheral IV 09/09/23 Dorsal (posterior); Left Wrist <1 day    Peripheral IV 09/09/23 Dorsal (posterior); Right Hand <1 day                      Imaging: No pertinent imaging reviewed. Recent Cultures (last 7 days):   Results from last 7 days   Lab Units 09/08/23  0930 09/06/23  1802 09/03/23  1141 09/03/23  1133   BLOOD CULTURE   --   --  No Growth After 5 Days. No Growth After 5 Days.   --    GRAM STAIN RESULT  1+ Polys  No bacteria seen 2+ Polys  No organisms seen  --  Rare Polys  No bacteria seen   WOUND CULTURE   --   --   --  3 colonies Staphylococcus coagulase negative*   BODY FLUID CULTURE, STERILE   --  No growth  --   --        Last 24 Hours Medication List:   Current Facility-Administered Medications   Medication Dose Route Frequency Provider Last Rate   • acetaminophen  650 mg Oral Q6H PRN ARSEN Velazquez-C     • aluminum-magnesium hydroxide-simethicone  30 mL Oral Q6H PRN Maríakeely Lawson PA-C     • aspirin  81 mg Oral Daily María Lawson PA-C     • diltiazem 240 mg Oral Daily Frank Holguin PA-C     • diltiazem  10 mg Intravenous BID PRN Jaison Harding MD     • fluticasone  1 spray Nasal Daily Keith Valencia PA-C     • folic acid  1 mg Oral Daily Keith Valencia PA-C     • guaiFENesin  600 mg Oral BID Keith Valencia PA-C     • heparin (porcine)  3-20 Units/kg/hr (Order-Specific) Intravenous Titrated Jaison Harding MD 8.11 Units/kg/hr (09/09/23 0529)   • heparin (porcine)  2,000 Units Intravenous Q6H PRN Jaison Harding MD     • heparin (porcine)  4,000 Units Intravenous Q6H PRN Jaison Harding MD     • lactulose  200 g Rectal BID PRN Lisagie Chuck Willett PA-C     • magnesium Oxide  400 mg Oral Daily Keith Valencia PA-C     • methylPREDNISolone sodium succinate  20 mg Intravenous Daily Jaison Harding MD     • metoprolol  5 mg Intravenous Q6H Tye Marti PA-C     • multi-electrolyte  75 mL/hr Intravenous Continuous Glorianne New, CRNP 75 mL/hr (09/09/23 0238)   • ondansetron  4 mg Intravenous Q6H PRN Keith Valencia PA-C     • oxyCODONE  5 mg Oral Q6H PRN Marien Hodgkins, MD     • pantoprazole  40 mg Intravenous Q24H [address]  Wandy Tuttle PA-C     • potassium chloride  40 mEq Oral Once Renata Rader MD     • saccharomyces boulardii  250 mg Oral BID Tye Marti PA-C     • senna  1 tablet Oral HS Keith Valencia PA-C          Today, Patient Was Seen By: Jaison Harding MD    **Please Note: This note may have been constructed using a voice recognition system. **

## 2023-09-09 NOTE — ASSESSMENT & PLAN NOTE
Patient is confused likely acute metabolic encephalopathy  Frequent reorientation  Very sleepy this morning, will hold PO meds if not able to swallow  Monitor CBC, CMP  Follow up on Cx  Neurochecks, delirium preacutions  Ammonia, VBG  If initial lab work up is negative, will do CT head

## 2023-09-10 PROBLEM — D75.839 THROMBOCYTOSIS: Status: RESOLVED | Noted: 2023-09-08 | Resolved: 2023-09-10

## 2023-09-10 LAB
ANION GAP SERPL CALCULATED.3IONS-SCNC: 13 MMOL/L
APTT PPP: 82 SECONDS (ref 23–37)
BACTERIA SPEC BFLD CULT: NO GROWTH
BUN SERPL-MCNC: 105 MG/DL (ref 5–25)
CALCIUM SERPL-MCNC: 9.3 MG/DL (ref 8.4–10.2)
CHLORIDE SERPL-SCNC: 94 MMOL/L (ref 96–108)
CO2 SERPL-SCNC: 33 MMOL/L (ref 21–32)
CREAT SERPL-MCNC: 2.83 MG/DL (ref 0.6–1.3)
GFR SERPL CREATININE-BSD FRML MDRD: 19 ML/MIN/1.73SQ M
GLUCOSE SERPL-MCNC: 186 MG/DL (ref 65–140)
GRAM STN SPEC: NORMAL
GRAM STN SPEC: NORMAL
MAGNESIUM SERPL-MCNC: 2.1 MG/DL (ref 1.9–2.7)
PHOSPHATE SERPL-MCNC: 5.4 MG/DL (ref 2.3–4.1)
POTASSIUM SERPL-SCNC: 3.1 MMOL/L (ref 3.5–5.3)
SODIUM SERPL-SCNC: 140 MMOL/L (ref 135–147)

## 2023-09-10 PROCEDURE — 99024 POSTOP FOLLOW-UP VISIT: CPT | Performed by: ORTHOPAEDIC SURGERY

## 2023-09-10 PROCEDURE — 99233 SBSQ HOSP IP/OBS HIGH 50: CPT | Performed by: INTERNAL MEDICINE

## 2023-09-10 PROCEDURE — 84100 ASSAY OF PHOSPHORUS: CPT | Performed by: INTERNAL MEDICINE

## 2023-09-10 PROCEDURE — C9113 INJ PANTOPRAZOLE SODIUM, VIA: HCPCS | Performed by: INTERNAL MEDICINE

## 2023-09-10 PROCEDURE — 85730 THROMBOPLASTIN TIME PARTIAL: CPT | Performed by: INTERNAL MEDICINE

## 2023-09-10 PROCEDURE — 99232 SBSQ HOSP IP/OBS MODERATE 35: CPT | Performed by: INTERNAL MEDICINE

## 2023-09-10 PROCEDURE — 80048 BASIC METABOLIC PNL TOTAL CA: CPT | Performed by: INTERNAL MEDICINE

## 2023-09-10 PROCEDURE — 83735 ASSAY OF MAGNESIUM: CPT | Performed by: INTERNAL MEDICINE

## 2023-09-10 RX ORDER — PREDNISONE 10 MG/1
10 TABLET ORAL DAILY
Status: DISCONTINUED | OUTPATIENT
Start: 2023-09-17 | End: 2023-09-15 | Stop reason: HOSPADM

## 2023-09-10 RX ORDER — PREDNISONE 20 MG/1
20 TABLET ORAL DAILY
Status: DISCONTINUED | OUTPATIENT
Start: 2023-09-13 | End: 2023-09-15 | Stop reason: HOSPADM

## 2023-09-10 RX ORDER — POTASSIUM CHLORIDE 14.9 MG/ML
20 INJECTION INTRAVENOUS
Status: COMPLETED | OUTPATIENT
Start: 2023-09-10 | End: 2023-09-10

## 2023-09-10 RX ADMIN — APIXABAN 2.5 MG: 2.5 TABLET, FILM COATED ORAL at 17:37

## 2023-09-10 RX ADMIN — HEPARIN SODIUM 8.11 UNITS/KG/HR: 10000 INJECTION, SOLUTION INTRAVENOUS at 14:38

## 2023-09-10 RX ADMIN — POTASSIUM CHLORIDE 20 MEQ: 14.9 INJECTION, SOLUTION INTRAVENOUS at 11:23

## 2023-09-10 RX ADMIN — DILTIAZEM HYDROCHLORIDE 240 MG: 240 CAPSULE, COATED, EXTENDED RELEASE ORAL at 09:01

## 2023-09-10 RX ADMIN — METOPROLOL TARTRATE 5 MG: 1 INJECTION, SOLUTION INTRAVENOUS at 02:33

## 2023-09-10 RX ADMIN — PANTOPRAZOLE SODIUM 40 MG: 40 INJECTION, POWDER, FOR SOLUTION INTRAVENOUS at 09:01

## 2023-09-10 RX ADMIN — POTASSIUM CHLORIDE 20 MEQ: 14.9 INJECTION, SOLUTION INTRAVENOUS at 09:01

## 2023-09-10 RX ADMIN — FOLIC ACID 1 MG: 1 TABLET ORAL at 09:03

## 2023-09-10 RX ADMIN — SODIUM CHLORIDE, SODIUM GLUCONATE, SODIUM ACETATE, POTASSIUM CHLORIDE, MAGNESIUM CHLORIDE, SODIUM PHOSPHATE, DIBASIC, AND POTASSIUM PHOSPHATE 75 ML/HR: .53; .5; .37; .037; .03; .012; .00082 INJECTION, SOLUTION INTRAVENOUS at 02:36

## 2023-09-10 RX ADMIN — FLUTICASONE PROPIONATE 1 SPRAY: 50 SPRAY, METERED NASAL at 09:03

## 2023-09-10 RX ADMIN — METOPROLOL TARTRATE 5 MG: 1 INJECTION, SOLUTION INTRAVENOUS at 14:38

## 2023-09-10 RX ADMIN — METOPROLOL TARTRATE 5 MG: 1 INJECTION, SOLUTION INTRAVENOUS at 20:34

## 2023-09-10 RX ADMIN — Medication 250 MG: at 17:04

## 2023-09-10 RX ADMIN — ASPIRIN 81 MG: 81 TABLET, COATED ORAL at 09:02

## 2023-09-10 RX ADMIN — METHYLPREDNISOLONE SODIUM SUCCINATE 20 MG: 40 INJECTION, POWDER, FOR SOLUTION INTRAMUSCULAR; INTRAVENOUS at 09:02

## 2023-09-10 RX ADMIN — Medication 250 MG: at 09:02

## 2023-09-10 RX ADMIN — MAGNESIUM OXIDE TAB 400 MG (241.3 MG ELEMENTAL MG) 400 MG: 400 (241.3 MG) TAB at 09:02

## 2023-09-10 RX ADMIN — SENNOSIDES 8.6 MG: 8.6 TABLET, FILM COATED ORAL at 21:35

## 2023-09-10 RX ADMIN — GUAIFENESIN 600 MG: 600 TABLET ORAL at 09:02

## 2023-09-10 RX ADMIN — METOPROLOL TARTRATE 5 MG: 1 INJECTION, SOLUTION INTRAVENOUS at 09:01

## 2023-09-10 RX ADMIN — GUAIFENESIN 600 MG: 600 TABLET ORAL at 17:04

## 2023-09-10 RX ADMIN — SODIUM CHLORIDE, SODIUM GLUCONATE, SODIUM ACETATE, POTASSIUM CHLORIDE, MAGNESIUM CHLORIDE, SODIUM PHOSPHATE, DIBASIC, AND POTASSIUM PHOSPHATE 75 ML/HR: .53; .5; .37; .037; .03; .012; .00082 INJECTION, SOLUTION INTRAVENOUS at 14:39

## 2023-09-10 NOTE — ASSESSMENT & PLAN NOTE
Patient is confused likely acute metabolic encephalopathy  Frequent reorientation  More awake today  Monitor CBC, CMP  Cultures, VBG, ammonia, CT head normal

## 2023-09-10 NOTE — ASSESSMENT & PLAN NOTE
Meets criteria with leukocytosis, tachycardia  Likely secondary to ESBL UTI/Septic olecranon bursitis, possible sepsis is now resolved .    More likely SIRS   F/u Blood Cx- Negative growth so far, wound Cx- growing coag negative staph  F/u vitals  F/u CBC, CMP and procal  Will dc Dapto

## 2023-09-10 NOTE — ASSESSMENT & PLAN NOTE
Abdominal distension with X ray KUB showing left lower quadrant loop distension- 9/7  Ileus demonstrated  X ray KUB done showed trace contrast seen in rectosigmoid junction, contrast seen progressing well to the level of distal sigmoid colon.    Advance diet to soft surgical  Serial abdominal exams

## 2023-09-10 NOTE — PROGRESS NOTES
Progress Note - Infectious Disease   Alex Burton 82 y.o. male MRN: [id number]  Unit/Bed#: -01 Encounter: [id number]    Assessment:  79-year-old man with fever, urinary tract infection, CKD, leukocytosis, bursitis, acute kidney injury     Plan:  1) UTI -  patient with improvement in UTI symptoms, has completed Tx for same.    2) Fever, leukocytosis, bursitis, cellulitis, ileus - Now with resolution of ileus, leukocytosis is improving as well. Given scant amount of coagulase-negative Staphylococci observed on elbow culture, but MSU crystals observed on bilateral aspiration, and bilateral nature of complaints strongly favor flare of gouty arthritis over infection as cause of patient's elbow complaints. Would continue to observe off of antibiotics, with the understanding that leukocytosis may persist for a bit given concomitant ministration of steroids.    ===> Continue to observe off of antibiotics, management of gout per primary team.     3) Acute on CKD - Will avoid nephrotoxins. Continue daptomycin.         ===> Discussed w/ 1' team   ===> Will sign off, but please do not hesitate to contact us with further questions, or if a change in the patient's clinical status warrants.    ===> Patient does NOT need to schedule f/u with ID on d/c UNLESS a change in clinical status or a recrudescence of symptoms warrants, but was given our contact information should any issues with prescribed post-d/c treatment arise. Subjective/Objective   Overnight, no acute events. Patient with growth on most recent bursa aspirates, however presence of MSU crystals was noted.  Antibiotics discontinued after discussion between myself and primary team. Patient without fever, chills, nausea, vomiting, resting comfortably at the time of my exam.      Temp:  [95.6 °F (35.3 °C)-97.8 °F (36.6 °C)] 97.8 °F (36.6 °C)  HR:  [76-90] 90  Resp:  [18] 18  BP: (137-176)/(78-97) 164/84  SpO2:  [96 %-99 %] 96 %  Temp (24hrs), Av.7 °F (35.9 °C), Min:95.6 °F (35.3 °C), Max:97.8 °F (36.6 °C)  Current: Temperature: 97.8 °F (36.6 °C)    Physical Exam:  Gen: AA, NAD, conversant, VS reviewed  ENT: MMM  CV: No m/r/g, S1, S2  Pulm: Lungs CTAB, no respiratory distress  ABD: S/NT/DT  Skin: No rash on exposed skin  Psych: Oriented, nl. affect     Invasive Devices     Peripheral Intravenous Line  Duration           Peripheral IV 09/09/23 Dorsal (posterior); Left Wrist 1 day    Peripheral IV 09/09/23 Dorsal (posterior); Right Hand 1 day                Lab, Imaging and other studies: I have personally reviewed pertinent reports.

## 2023-09-10 NOTE — PROGRESS NOTES
Alex Burton  82 y.o.  male  MR#: [id number]  9/10/2023    Post-op days: NA  Extremity: bilateral elbow    Subjective: Patient is awake today and able to communicate. He is not complaining of pain in both elbows. He denies fever, chill, or sweat. Vitals:   Vitals:    09/10/23 0616   BP: 166/82   Pulse: 76   Resp:    Temp:    SpO2: 97%       Exam:   Patient is alert, awake, and oriented  He is not in any acute distressed  Left elbow shows skin is intact with small amount of fluid collection in the olecranon bursa  Right elbow showed no erythema. The incision appeared to be clean. The packing was removed and repacked with iodoform. Serosanguineous drainage  Bilateral elbow range of motion without increased pain    Labs:   WBC   Recent Labs     09/08/23  0344 09/09/23  0540   WBC 15.82* 16.63*     H/H   Recent Labs     09/08/23  0344 09/09/23  0540   HGB 9.0* 7.9*   /  Recent Labs     09/08/23  0344 09/09/23  0540   HCT 27.0* 24.4*     Sed Rate No results for input(s): "SEDRATE" in the last 72 hours. CRP No results for input(s): "CRP" in the last 72 hours. Cultures are still negative from the left elbow.   The new culture taken 9/8/2023 from the right elbow is still negative    Assessment:   Bilateral elbow olecranon bursitis, most likely gout    Plan:   Continue right elbow wound care with packing  Elbow range of motion as tolerated  Avoid direct pressure over bilateral elbow  We will continue to follow cultures

## 2023-09-10 NOTE — ASSESSMENT & PLAN NOTE
Lab Results   Component Value Date    EGFR 19 09/10/2023    EGFR 13 09/09/2023    EGFR 11 09/08/2023    CREATININE 2.83 (H) 09/10/2023    CREATININE 3.75 (H) 09/09/2023    CREATININE 4.41 (H) 09/08/2023   · Baseline creatinine 1.6-1.9, POA- 1.7  · Likely secondary to autoregulatory failure, ATN secondary to sepsis, medications  · S/p Lasix drip  · Currently on Plasma-Lyte, creatinine trending down  · Nephrology following  · Urinary retention protocol  · I/O monitoring  · Renally dose medications

## 2023-09-10 NOTE — ASSESSMENT & PLAN NOTE
· Went into A-fib with RVR on August 28 and was given IV Lopressor and Cardizem  · Home regimen: Coreg 25 Mg twice daily  · Started on Eliquis 2.5 mg twice daily for anticoagulation during hospitalization at [facility] 8/8-8/23  · Cardiology consult appreciated  · Rate controlled  · Will order Cardizem IV as PRN for poor oral intake  · On heparin drip for poor p.o. intake-changed to Eliquis when able to take

## 2023-09-10 NOTE — PROGRESS NOTES
NEPHROLOGY PROGRESS NOTE   [facility] 82 y.o. 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  -Baseline creatinine 1.5-1.7.  -Presented with creatinine of 0.7 with peak creatinine of 4.9 on 9/6.  -Does not follow with nephrology as an outpatient, would recommend follow-up at discharge.  -Creatinine is currently improved to 2.8.  -Previously on Lasix drip, discontinued 9/7 and started on fluid hydration.  -Currently on Plasma-Lyte. Will discontinue later today. Patient is more awake/alert and is eating his meals.  -Continue to hold ARB. -UA with moderate blood, 2+ protein, 1-2 RBCs. -CT scan with multiple left renal cyst, negative for hydro, nonspecific bilateral perinephric edema, unremarkable bladder.  -Urine eosinophils negative. CPK level normal.  -Checking bladder scans with urinary retention.  -Currently no urgent indication for dialysis at this time. -Recommend avoiding nephrotoxins, hypotension, IV contrast.  -I/O.     Metabolic acidosis: In the setting of acute renal failure. Previosuly on bicarb drip (resolved)     Hypokalemia/hypomagnesemia:  -Repeat potassium level 3.1 with mag of 2.1.  -Continue to monitor and replace as needed.  -On magnesium supplementation 400 mg daily.     Hypophosphatemia: In the setting of acute renal failure with underlying CKD. -Continue on PhosLo with meals. Recommend low phosphorus diet.   -Repeat phos level improved to 5.4.     Blood pressure: Remains stable, above goal at times.  -Current medications: Cardizem 240 mg daily, metoprolol 5 mg IV every 6 hours.  -Will defer changing IV mediations to oral medication to cardiology.  -Home medications: Carvedilol 12.5 mg 2 times per day, losartan 50 mg daily.  -Avoid hypotension or high fluctuations in blood pressure.     Volume status: With anasarca but appears to be intravascular depleted. -Receiving IV hydration. Discontinue later today.     Sepsis: Suspected due to ESBL UTI/septic olecranon bursitis. (resolved)  -Previously on daptomycin, discontinued per ID. Cultures so far negative.     Bilateral elbow olecranon bursitis/gout:  -Orthopedic team is following. Status post bedside I&D. Providing dressing changes and local care. -Received Dapto, discontinued.  -Previously on colchicine, discontinued and changed to IV prednisone with tapering dose depending on improvement.     Acute encephalopathy: Suspected due to metabolic encephalopathy. Mental status has been waxing and waning.  -Continues with neurochecks.  -Further work-up and management per primary care team, CT of head is negative. .     Ileus: Status post NG tube removal and advancing diet.     Anemia:  -Continue to monitor and transfuse as needed for hemoglobin less than 7.0.  -Checking occult stools. -Iron panel: low iron and TIBC. -Myeloma work-up negative.     Other: Sacral wound, atrial fibrillation, alcohol abuse, COVID infection. Disposition: Requiring additional stay due to medical needs. SUBJECTIVE:  The patient is awake and alert today. His family is present at the bedside. He denies chest pain or shortness of breath. He reports feeling hungry and eating his meals today. He does need assistance with feeding and drinking. He continues to have upper extremity weakness with immobility.     OBJECTIVE:  Current Weight: Weight - Scale: 91.5 kg (201 lb 11.5 oz)  Vitals:    09/09/23 1523 09/09/23 1952 09/10/23 0231 09/10/23 0616   BP: 165/91 137/78 (!) 176/97 166/82   BP Location:  Left arm Left arm Left arm   Pulse: 87 83 84 76   Resp:  18 18    Temp:  (!) 96.8 °F (36 °C) (!) 95.6 °F (35.3 °C)    TempSrc:  Oral Oral    SpO2: 97% 99% 98% 97%   Weight:       Height:           Intake/Output Summary (Last 24 hours) at 9/10/2023 1300  Last data filed at 9/10/2023 1249  Gross per 24 hour   Intake 2671.25 ml   Output 650 ml   Net 2021.25 ml     General: NAD  Skin: warm, dry, intact, no rash  HEENT: Moist mucous membranes, sclera anicteric, normocephalic, atraumatic  Neck: No apparent JVD appreciated  Chest: lung sounds clear B/L, on RA   CVS:Regular rate and rhythm, no murmer   Abdomen: Soft, round, non-tender, +BS  Extremities: No B/L LE edema present, decreased motion B/L UE   Neuro: alert and oriented, glasses  Psych: appropriate mood and affect     Medications:    Current Facility-Administered Medications:   •  acetaminophen (TYLENOL) tablet 650 mg, 650 mg, Oral, Q6H PRN, Mykel Conrad PA-C, 650 mg at 09/06/23 9477  •  aluminum-magnesium hydroxide-simethicone (MAALOX) oral suspension 30 mL, 30 mL, Oral, Q6H PRN, Mykel Conrad PA-C  •  aspirin (ECOTRIN LOW STRENGTH) EC tablet 81 mg, 81 mg, Oral, Daily, Mykel Conrad PA-C, 81 mg at 09/10/23 7624  •  diltiazem (CARDIZEM CD) 24 hr capsule 240 mg, 240 mg, Oral, Daily, Sara Holguin PA-C, 240 mg at 09/10/23 0901  •  diltiazem (CARDIZEM) injection 10 mg, 10 mg, Intravenous, BID PRN, Trevin Devi MD  •  fluticasone (FLONASE) 50 mcg/act nasal spray 1 spray, 1 spray, Nasal, Daily, Mykel Conrad PA-C, 1 spray at 32/87/14 3710  •  folic acid (FOLVITE) tablet 1 mg, 1 mg, Oral, Daily, Mykel Conrad PA-C, 1 mg at 09/10/23 9311  •  guaiFENesin (MUCINEX) 12 hr tablet 600 mg, 600 mg, Oral, BID, Mykel Conrad PA-C, 600 mg at 09/10/23 5816  •  heparin (porcine) 25,000 units in 0.45% NaCl 250 mL infusion (premix), 3-20 Units/kg/hr (Order-Specific), Intravenous, Titrated, Trevin Devi MD, Last Rate: 7.3 mL/hr at 09/09/23 0529, 8.11 Units/kg/hr at 09/09/23 0529  •  heparin (porcine) injection 2,000 Units, 2,000 Units, Intravenous, Q6H PRN, Trevin Devi MD  •  heparin (porcine) injection 4,000 Units, 4,000 Units, Intravenous, Q6H PRN, Trevin Devi MD  •  lactulose retention enema 200 g, 200 g, Rectal, BID PRN, Elke Willett PA-C  •  magnesium Oxide (MAG-OX) tablet 400 mg, 400 mg, Oral, Daily, Jackie Hills PA-C, 400 mg at 09/10/23 0666  •  methylPREDNISolone sodium succinate (Solu-MEDROL) injection 20 mg, 20 mg, Intravenous, Daily, Maia Barreto MD, 20 mg at 09/10/23 0902  •  metoprolol (LOPRESSOR) injection 5 mg, 5 mg, Intravenous, Q6H, Wandy Tuttle PA-C, 5 mg at 09/10/23 0901  •  multi-electrolyte (PLASMALYTE-A/ISOLYTE-S PH 7.4) IV solution, 75 mL/hr, Intravenous, Continuous, TOD Heard, Last Rate: 75 mL/hr at 09/10/23 0236, 75 mL/hr at 09/10/23 0236  •  ondansetron (ZOFRAN) injection 4 mg, 4 mg, Intravenous, Q6H PRN, Jackie Hills PA-C, 4 mg at 08/28/23 1748  •  oxyCODONE (ROXICODONE) IR tablet 5 mg, 5 mg, Oral, Q6H PRN, Triny De La O MD, 5 mg at 09/05/23 0344  •  pantoprazole (PROTONIX) injection 40 mg, 40 mg, Intravenous, Q24H [facility], Wandy Tuttle PA-C, 40 mg at 09/10/23 0901  •  potassium chloride 20 mEq IVPB (premix), 20 mEq, Intravenous, Q2H, Maia Barreto MD, Last Rate: 50 mL/hr at 09/10/23 1123, 20 mEq at 09/10/23 1123  •  [START ON 9/13/2023] predniSONE tablet 20 mg, 20 mg, Oral, Daily **AND** [START ON 9/17/2023] predniSONE tablet 10 mg, 10 mg, Oral, Daily, Maia Barreto MD  •  saccharomyces boulardii (FLORASTOR) capsule 250 mg, 250 mg, Oral, BID, Wandy Tuttle PA-C, 250 mg at 09/10/23 0902  •  senna (SENOKOT) tablet 8.6 mg, 1 tablet, Oral, JAVED, Jackie Hills PA-C, 8.6 mg at 09/07/23 2110    Laboratory Results:  Results from last 7 days   Lab Units 09/10/23  0515 09/09/23  0540 09/08/23  0344 09/07/23  0300 09/06/23  2104 09/06/23  0433   WBC Thousand/uL  --  16.63* 15.82* 19.52*   < > 16.32*   HEMOGLOBIN g/dL  --  7.9* 9.0* 8.8*   < > 7.3*   HEMATOCRIT %  --  24.4* 27.0* 27.5*   < > 22.4*   PLATELETS Thousands/uL  --  350 398* 396*   < > 330   SODIUM mmol/L 140 141 140 137  --  140   POTASSIUM mmol/L 3.1* 3.6 2.8* 3.7  --  3.1* CHLORIDE mmol/L 94* 96 99 104  --  105   CO2 mmol/L 33* 33* 30 18*  --  21   BUN mg/dL 105* 110* 113* 109*  --  101*   CREATININE mg/dL 2.83* 3.75* 4.41* 4.80*  --  4.93*   CALCIUM mg/dL 9.3 9.5 9.5 9.3  --  9.1   MAGNESIUM mg/dL 2.1 2.1  --  2.4  --  2.1   PHOSPHORUS mg/dL 5.4*  --   --  5.6*  --  5.8*   ALK PHOS U/L  --  63  --  75  --  65   ALT U/L  --  7  --  5*  --  8   AST U/L  --  17  --  17  --  12*    < > = values in this interval not displayed.

## 2023-09-10 NOTE — ASSESSMENT & PLAN NOTE
Wound culture ordered, so far Cx yielded no growth  ID and ortho following  Follow-up on synovial fluid, sent for culture, Gram stain, cell count, crystals.  Synovial fluid positive for monosodium urate crystals  Dapto discontinued after discussing with ID

## 2023-09-10 NOTE — PROGRESS NOTES
[facility]  Progress Note  Name: Patricia Munroe  MRN: [id number]  Unit/Bed#: -01 I Date of Admission: 8/26/2023   Date of Service: 9/10/2023 I Hospital Day: 15    Assessment/Plan   Sepsis [facility])  Assessment & Plan  Meets criteria with leukocytosis, tachycardia  Likely secondary to ESBL UTI/Septic olecranon bursitis, possible sepsis is now resolved . More likely SIRS   F/u Blood Cx- Negative growth so far, wound Cx- growing coag negative staph  F/u vitals  F/u CBC, CMP and procal  Will dc Dapto      Acute encephalopathy  Assessment & Plan  Patient is confused likely acute metabolic encephalopathy  Frequent reorientation  More awake today  Monitor CBC, CMP  Cultures, VBG, ammonia, CT head normal        TOBI (acute kidney injury) [facility])  Assessment & Plan  Lab Results   Component Value Date    EGFR 19 09/10/2023    EGFR 13 09/09/2023    EGFR 11 09/08/2023    CREATININE 2.83 (H) 09/10/2023    CREATININE 3.75 (H) 09/09/2023    CREATININE 4.41 (H) 09/08/2023   · Baseline creatinine 1.6-1.9, POA- 1.7  · Likely secondary to autoregulatory failure, ATN secondary to sepsis, medications  · S/p Lasix drip  · Currently on Plasma-Lyte, creatinine trending down  · Nephrology following  · Urinary retention protocol  · I/O monitoring  · Renally dose medications          ESBL (extended spectrum beta-lactamase) producing bacteria infection  Assessment & Plan  Urine CX from 8/27- grew ESBL  Finished treatment        Olecranon bursitis  Assessment & Plan  Wound culture ordered, so far Cx yielded no growth  ID and ortho following  Follow-up on synovial fluid, sent for culture, Gram stain, cell count, crystals.  Synovial fluid positive for monosodium urate crystals  Dapto discontinued after discussing with ID      Ileus [facility])  Assessment & Plan  Abdominal distension with X ray KUB showing left lower quadrant loop distension- 9/7  Ileus demonstrated  X ray KUB done showed trace contrast seen in rectosigmoid junction, contrast seen progressing well to the level of distal sigmoid colon. Advance diet to soft surgical  Serial abdominal exams        Acute gout of multiple sites  Assessment & Plan  Left 3rd MCP joint, PIP, Elbow joint  Tender to touch, erythematous  Started steroid taper    Sacral wound  Assessment & Plan  Wound care consulted  Appreciate recs    Atrial fibrillation [facility])  Assessment & Plan  · Went into A-fib with RVR on August 28 and was given IV Lopressor and Cardizem  · Home regimen: Coreg 25 Mg twice daily  · Started on Eliquis 2.5 mg twice daily for anticoagulation during hospitalization at [facility] 8/8-8/23  · Cardiology consult appreciated  · Rate controlled  · Will order Cardizem IV as PRN for poor oral intake  · On heparin drip for poor p.o. intake-changed to Eliquis when able to take      Thrombocytosis-resolved as of 9/10/2023  Assessment & Plan  Likely reactive  Resolved             VTE Pharmacologic Prophylaxis: VTE Score: 5 High Risk (Score >/= 5) - Pharmacological DVT Prophylaxis Ordered: heparin drip. Sequential Compression Devices Ordered. Patient Centered Rounds: I performed bedside rounds with nursing staff today. Discussions with Specialists or Other Care Team Provider: Nursing    Education and Discussions with Family / Patient: Updated  (wife) via phone. Total Time Spent on Date of Encounter in care of patient: 55 minutes This time was spent on one or more of the following: performing physical exam; counseling and coordination of care; obtaining or reviewing history; documenting in the medical record; reviewing/ordering tests, medications or procedures; communicating with other healthcare professionals and discussing with patient's family/caregivers.     Current Length of Stay: 15 day(s)  Current Patient Status: Inpatient   Certification Statement: The patient will continue to require additional inpatient hospital stay due to Ileus, R/o DVT in bilateral UE, Encephalopathy  Discharge Plan: Anticipate discharge in 48 hrs to rehab facility. Code Status: Level 3 - DNAR and DNI    Subjective:     Alert, more awake, oriented X3. States his pain is much better    Objective:     Vitals:   Temp (24hrs), Av.1 °F (35.6 °C), Min:95.6 °F (35.3 °C), Max:96.8 °F (36 °C)    Temp:  [95.6 °F (35.3 °C)-96.8 °F (36 °C)] 95.6 °F (35.3 °C)  HR:  [76-87] 76  Resp:  [18] 18  BP: (137-176)/(78-97) 166/82  SpO2:  [97 %-99 %] 97 %  Body mass index is 30.67 kg/m². Input and Output Summary (last 24 hours): Intake/Output Summary (Last 24 hours) at 9/10/2023 1414  Last data filed at 9/10/2023 1249  Gross per 24 hour   Intake 2671.25 ml   Output 650 ml   Net 2021.25 ml       Physical Exam:   Physical Exam  HENT:      Head: Normocephalic and atraumatic. Mouth/Throat:      Mouth: Mucous membranes are dry. Pharynx: Oropharynx is clear. Eyes:      General: No scleral icterus. Right eye: No discharge. Left eye: No discharge. Conjunctiva/sclera: Conjunctivae normal.   Cardiovascular:      Rate and Rhythm: Tachycardia present. Rhythm irregular. Pulses: Normal pulses. Heart sounds: Normal heart sounds. Pulmonary:      Effort: Pulmonary effort is normal. No respiratory distress. Abdominal:      Palpations: Abdomen is soft. Tenderness: There is no abdominal tenderness. There is no guarding. Skin:     General: Skin is warm and dry. Capillary Refill: Capillary refill takes 2 to 3 seconds. Neurological:      Mental Status: He is alert and oriented to person, place, and time. GCS: GCS eye subscore is 3. GCS verbal subscore is 4. GCS motor subscore is 5.         Additional Data:     Labs:  Results from last 7 days   Lab Units 23  0540   WBC Thousand/uL 16.63*   HEMOGLOBIN g/dL 7.9*   HEMATOCRIT % 24.4*   PLATELETS Thousands/uL 350   NEUTROS PCT % 87*   LYMPHS PCT % 8*   MONOS PCT % 3*   EOS PCT % 0     Results from last 7 days   Lab Units 09/10/23  0515 09/09/23  0540   SODIUM mmol/L 140 141   POTASSIUM mmol/L 3.1* 3.6   CHLORIDE mmol/L 94* 96   CO2 mmol/L 33* 33*   BUN mg/dL 105* 110*   CREATININE mg/dL 2.83* 3.75*   ANION GAP mmol/L 13 12   CALCIUM mg/dL 9.3 9.5   ALBUMIN g/dL  --  2.8*   TOTAL BILIRUBIN mg/dL  --  0.54   ALK PHOS U/L  --  63   ALT U/L  --  7   AST U/L  --  17   GLUCOSE RANDOM mg/dL 186* 153*     Results from last 7 days   Lab Units 09/06/23  2104   INR  2.67*             Results from last 7 days   Lab Units 09/05/23  0336 09/04/23  0527   PROCALCITONIN ng/ml 2.42* 2.93*       Lines/Drains:  Invasive Devices     Peripheral Intravenous Line  Duration           Peripheral IV 09/09/23 Dorsal (posterior); Left Wrist 1 day    Peripheral IV 09/09/23 Dorsal (posterior); Right Hand 1 day                      Imaging: Reviewed radiology reports from this admission including: CT head    Recent Cultures (last 7 days):   Results from last 7 days   Lab Units 09/08/23  0930 09/06/23  1802   GRAM STAIN RESULT  1+ Polys  No bacteria seen 2+ Polys  No organisms seen   BODY FLUID CULTURE, STERILE  No growth No growth       Last 24 Hours Medication List:   Current Facility-Administered Medications   Medication Dose Route Frequency Provider Last Rate   • acetaminophen  650 mg Oral Q6H PRN Valerie Bowsell PA-C     • aluminum-magnesium hydroxide-simethicone  30 mL Oral Q6H PRN Valerei Boswell PA-C     • aspirin  81 mg Oral Daily Valerie Boswell PA-C     • diltiazem  240 mg Oral Daily Jassi Holguin PA-C     • diltiazem  10 mg Intravenous BID PRN Alexis Quinones MD     • fluticasone  1 spray Nasal Daily Valerie Boswell PA-C     • folic acid  1 mg Oral Daily Valerie Boswell PA-C     • guaiFENesin  600 mg Oral BID Valerie Boswell PA-C     • heparin (porcine)  3-20 Units/kg/hr (Order-Specific) Intravenous Titrated Alexis Quinones MD 8.11 Units/kg/hr (09/09/23 0529)   • heparin (porcine)  2,000 Units Intravenous Q6H PRN Vicki Mclain MD     • heparin (porcine)  4,000 Units Intravenous Q6H PRN Vicki Mclain MD     • lactulose  200 g Rectal BID PRN Aimee Willett PA-C     • magnesium Oxide  400 mg Oral Daily Latonia Cooley PA-C     • methylPREDNISolone sodium succinate  20 mg Intravenous Daily Vicki Mclain MD     • metoprolol  5 mg Intravenous Q6H Cristy Brandon PA-C     • multi-electrolyte  75 mL/hr Intravenous Continuous Fredda Acre, CRNP 75 mL/hr (09/10/23 0236)   • ondansetron  4 mg Intravenous Q6H PRN Latonia Cooley PA-C     • oxyCODONE  5 mg Oral Q6H PRN Emily Gay MD     • pantoprazole  40 mg Intravenous Q24H [facility] Wandy Tuttle PA-C     • [START ON 9/13/2023] predniSONE  20 mg Oral Daily Vicki Mclain MD      And   • [START ON 9/17/2023] predniSONE  10 mg Oral Daily Vicki Mclain MD     • saccharomyces boulardii  250 mg Oral BID Cristy Brandon PA-C     • senna  1 tablet Oral HS Latonia Cooley PA-C          Today, Patient Was Seen By: Vicki Mclain MD    **Please Note: This note may have been constructed using a voice recognition system. **

## 2023-09-10 NOTE — PLAN OF CARE
Problem: Potential for Falls  Goal: Patient will remain free of falls  Description: INTERVENTIONS:  - Educate patient/family on patient safety including physical limitations  - Instruct patient to call for assistance with activity   - Consult OT/PT to assist with strengthening/mobility   - Keep Call bell within reach  - Keep bed low and locked with side rails adjusted as appropriate  - Keep care items and personal belongings within reach  - Initiate and maintain comfort rounds  - Make Fall Risk Sign visible to staff  - Offer Toileting every 2 Hours, in advance of need  - Initiate/Maintain bed alarm  - Obtain necessary fall risk management equipment:   - Apply yellow socks and bracelet for high fall risk patients  - Consider moving patient to room near nurses station  Outcome: Progressing     Problem: PAIN - ADULT  Goal: Verbalizes/displays adequate comfort level or baseline comfort level  Description: Interventions:  - Encourage patient to monitor pain and request assistance  - Assess pain using appropriate pain scale  - Administer analgesics based on type and severity of pain and evaluate response  - Implement non-pharmacological measures as appropriate and evaluate response  - Consider cultural and social influences on pain and pain management  - Notify physician/advanced practitioner if interventions unsuccessful or patient reports new pain  Outcome: Progressing     Problem: INFECTION - ADULT  Goal: Absence or prevention of progression during hospitalization  Description: INTERVENTIONS:  - Assess and monitor for signs and symptoms of infection  - Monitor lab/diagnostic results  - Monitor all insertion sites, i.e. indwelling lines, tubes, and drains  - Monitor endotracheal if appropriate and nasal secretions for changes in amount and color  - [place] appropriate cooling/warming therapies per order  - Administer medications as ordered  - Instruct and encourage patient and family to use good hand hygiene technique  - Identify and instruct in appropriate isolation precautions for identified infection/condition  Outcome: Progressing  Goal: Absence of fever/infection during neutropenic period  Description: INTERVENTIONS:  - Monitor WBC    Outcome: Progressing     Problem: SAFETY ADULT  Goal: Maintain or return to baseline ADL function  Description: INTERVENTIONS:  -  Assess patient's ability to carry out ADLs; assess patient's baseline for ADL function and identify physical deficits which impact ability to perform ADLs (bathing, care of mouth/teeth, toileting, grooming, dressing, etc.)  - Assess/evaluate cause of self-care deficits   - Assess range of motion  - Assess patient's mobility; develop plan if impaired  - Assess patient's need for assistive devices and provide as appropriate  - Encourage maximum independence but intervene and supervise when necessary  - Involve family in performance of ADLs  - Assess for home care needs following discharge   - Consider OT consult to assist with ADL evaluation and planning for discharge  - Provide patient education as appropriate  Outcome: Progressing  Goal: Maintains/Returns to pre admission functional level  Description: INTERVENTIONS:  - Perform BMAT or MOVE assessment daily.   - Set and communicate daily mobility goal to care team and patient/family/caregiver. - Collaborate with rehabilitation services on mobility goals if consulted  - Perform Range of Motion 3 times a day. - Reposition patient every 2 hours.   - Dangle patient 2 times a day  - Stand patient 2 times a day  - Ambulate patient 2 times a day  - Out of bed to chair 2 times a day   - Out of bed for meals 2 times a day  - Out of bed for toileting  - Record patient progress and toleration of activity level   Outcome: Progressing     Problem: DISCHARGE PLANNING  Goal: Discharge to home or other facility with appropriate resources  Description: INTERVENTIONS:  - Identify barriers to discharge w/patient and caregiver  - Arrange for needed discharge resources and transportation as appropriate  - Identify discharge learning needs (meds, wound care, etc.)  - Arrange for interpretive services to assist at discharge as needed  - Refer to Case Management Department for coordinating discharge planning if the patient needs post-hospital services based on physician/advanced practitioner order or complex needs related to functional status, cognitive ability, or social support system  Outcome: Progressing     Problem: Knowledge Deficit  Goal: Patient/family/caregiver demonstrates understanding of disease process, treatment plan, medications, and discharge instructions  Description: Complete learning assessment and assess knowledge base.   Interventions:  - Provide teaching at level of understanding  - Provide teaching via preferred learning methods  Outcome: Progressing     Problem: Prexisting or High Potential for Compromised Skin Integrity  Goal: Skin integrity is maintained or improved  Description: INTERVENTIONS:  - Identify patients at risk for skin breakdown  - Assess and monitor skin integrity  - Assess and monitor nutrition and hydration status  - Monitor labs   - Assess for incontinence   - Turn and reposition patient  - Assist with mobility/ambulation  - Relieve pressure over bony prominences  - Avoid friction and shearing  - Provide appropriate hygiene as needed including keeping skin clean and dry  - Evaluate need for skin moisturizer/barrier cream  - Collaborate with interdisciplinary team   - Patient/family teaching  - Consider wound care consult   Outcome: Progressing

## 2023-09-11 ENCOUNTER — APPOINTMENT (INPATIENT)
Dept: NON INVASIVE DIAGNOSTICS | Facility: HOSPITAL | Age: 88
DRG: 193 | End: 2023-09-11
Payer: MEDICARE

## 2023-09-11 LAB
ANION GAP SERPL CALCULATED.3IONS-SCNC: 14 MMOL/L
APTT PPP: 38 SECONDS (ref 23–37)
BACTERIA SPEC BFLD CULT: NO GROWTH
BUN SERPL-MCNC: 101 MG/DL (ref 5–25)
CALCIUM SERPL-MCNC: 8.6 MG/DL (ref 8.4–10.2)
CHLORIDE SERPL-SCNC: 92 MMOL/L (ref 96–108)
CO2 SERPL-SCNC: 31 MMOL/L (ref 21–32)
CREAT SERPL-MCNC: 2.43 MG/DL (ref 0.6–1.3)
ERYTHROCYTE [DISTWIDTH] IN BLOOD BY AUTOMATED COUNT: 13.4 % (ref 11.6–15.1)
GFR SERPL CREATININE-BSD FRML MDRD: 23 ML/MIN/1.73SQ M
GLUCOSE SERPL-MCNC: 292 MG/DL (ref 65–140)
GRAM STN SPEC: NORMAL
GRAM STN SPEC: NORMAL
HCT VFR BLD AUTO: 24.9 % (ref 36.5–49.3)
HGB BLD-MCNC: 8.1 G/DL (ref 12–17)
MCH RBC QN AUTO: 31.6 PG (ref 26.8–34.3)
MCHC RBC AUTO-ENTMCNC: 32.5 G/DL (ref 31.4–37.4)
MCV RBC AUTO: 97 FL (ref 82–98)
PLATELET # BLD AUTO: 342 THOUSANDS/UL (ref 149–390)
PMV BLD AUTO: 9.8 FL (ref 8.9–12.7)
POTASSIUM SERPL-SCNC: 3.7 MMOL/L (ref 3.5–5.3)
RBC # BLD AUTO: 2.56 MILLION/UL (ref 3.88–5.62)
SODIUM SERPL-SCNC: 137 MMOL/L (ref 135–147)
WBC # BLD AUTO: 16.7 THOUSAND/UL (ref 4.31–10.16)

## 2023-09-11 PROCEDURE — 99024 POSTOP FOLLOW-UP VISIT: CPT

## 2023-09-11 PROCEDURE — 80048 BASIC METABOLIC PNL TOTAL CA: CPT | Performed by: INTERNAL MEDICINE

## 2023-09-11 PROCEDURE — 99232 SBSQ HOSP IP/OBS MODERATE 35: CPT | Performed by: HOSPITALIST

## 2023-09-11 PROCEDURE — 92526 ORAL FUNCTION THERAPY: CPT

## 2023-09-11 PROCEDURE — 93970 EXTREMITY STUDY: CPT | Performed by: SURGERY

## 2023-09-11 PROCEDURE — 85730 THROMBOPLASTIN TIME PARTIAL: CPT | Performed by: INTERNAL MEDICINE

## 2023-09-11 PROCEDURE — C9113 INJ PANTOPRAZOLE SODIUM, VIA: HCPCS | Performed by: INTERNAL MEDICINE

## 2023-09-11 PROCEDURE — 85027 COMPLETE CBC AUTOMATED: CPT | Performed by: INTERNAL MEDICINE

## 2023-09-11 PROCEDURE — 99232 SBSQ HOSP IP/OBS MODERATE 35: CPT | Performed by: INTERNAL MEDICINE

## 2023-09-11 PROCEDURE — 93970 EXTREMITY STUDY: CPT

## 2023-09-11 RX ORDER — PANTOPRAZOLE SODIUM 40 MG/1
40 TABLET, DELAYED RELEASE ORAL
Status: DISCONTINUED | OUTPATIENT
Start: 2023-09-12 | End: 2023-09-15 | Stop reason: HOSPADM

## 2023-09-11 RX ADMIN — FOLIC ACID 1 MG: 1 TABLET ORAL at 08:39

## 2023-09-11 RX ADMIN — METOPROLOL TARTRATE 5 MG: 1 INJECTION, SOLUTION INTRAVENOUS at 02:50

## 2023-09-11 RX ADMIN — DILTIAZEM HYDROCHLORIDE 240 MG: 240 CAPSULE, COATED, EXTENDED RELEASE ORAL at 08:39

## 2023-09-11 RX ADMIN — GUAIFENESIN 600 MG: 600 TABLET ORAL at 17:48

## 2023-09-11 RX ADMIN — ASPIRIN 81 MG: 81 TABLET, COATED ORAL at 08:39

## 2023-09-11 RX ADMIN — METHYLPREDNISOLONE SODIUM SUCCINATE 20 MG: 40 INJECTION, POWDER, FOR SOLUTION INTRAMUSCULAR; INTRAVENOUS at 08:46

## 2023-09-11 RX ADMIN — SENNOSIDES 8.6 MG: 8.6 TABLET, FILM COATED ORAL at 21:31

## 2023-09-11 RX ADMIN — FLUTICASONE PROPIONATE 1 SPRAY: 50 SPRAY, METERED NASAL at 08:38

## 2023-09-11 RX ADMIN — APIXABAN 2.5 MG: 2.5 TABLET, FILM COATED ORAL at 17:48

## 2023-09-11 RX ADMIN — Medication 250 MG: at 08:39

## 2023-09-11 RX ADMIN — METOPROLOL TARTRATE 25 MG: 25 TABLET, FILM COATED ORAL at 15:16

## 2023-09-11 RX ADMIN — PANTOPRAZOLE SODIUM 40 MG: 40 INJECTION, POWDER, FOR SOLUTION INTRAVENOUS at 08:46

## 2023-09-11 RX ADMIN — Medication 250 MG: at 17:48

## 2023-09-11 RX ADMIN — METOPROLOL TARTRATE 5 MG: 1 INJECTION, SOLUTION INTRAVENOUS at 08:47

## 2023-09-11 RX ADMIN — APIXABAN 2.5 MG: 2.5 TABLET, FILM COATED ORAL at 08:39

## 2023-09-11 RX ADMIN — GUAIFENESIN 600 MG: 600 TABLET ORAL at 08:39

## 2023-09-11 RX ADMIN — MAGNESIUM OXIDE TAB 400 MG (241.3 MG ELEMENTAL MG) 400 MG: 400 (241.3 MG) TAB at 08:39

## 2023-09-11 NOTE — ASSESSMENT & PLAN NOTE
· Hemoglobin 9.4 on admission  · Hemoglobin ranging 10-12 during recent [facility] admission  · No signs of active bleeding  · Trend CBC  · Pending Iron def anemia work up, will follow up.

## 2023-09-11 NOTE — WOUND OSTOMY CARE
Progress Note - Wound   Arnol Disla 82 y.o. male MRN: [id number]  Unit/Bed#: -01 Encounter: [id number]        Assessment:   Patient seen today for wound care follow up assessment. Patient is on P-500 specialty bed, being turned and repositioned with green foam wedges. Patient is incontinent of bowel and bladder and dependent for all care. 1. HA DTI B/L heels- purple, intact nonblanchable erythema, no drainage present. Both wounds have improved with this week's assessment. 2. HA DTI sacral/buttocks now unstageable pressure injury- wound is full thickness with 100% brown/yellow slough tissue, well adhered, true depth unknown related to devitalized tissue, small amount of serosanguineous drainage. Periwound skin is pink and fragile. 3. Right elbow- ortho team performed I&D over weekend, packing removed yesterday, wound is full thickness with pink and yellow tissue, no longer requires packing. No induration, fluctuance, odor, warmth/temperature differences, redness, or purulence noted to the above noted wounds and skin areas assessed. New dressings applied per orders listed below. Patient tolerated well- no s/s of non-verbal pain or discomfort observed during the encounter. Bedside nurse aware of plan of care. See flow sheets for more detailed assessment findings. Wound care will continue to follow. Skin and Wound Care Plan:   1. Allevyn life heel foams to bilateral heels, karla with T, date, and peel back daily for skin assessment, change every 3 days or with soilage or dislodgement. 2. Cleanse upper buttocks-sacrum and right elbow with Remedy foaming cleanser, pat dry. Place Xeroform on wound beds to right and left buttock, cover with Allevyn life silicone bordered foam dressing, karla with T, date, change every other day and PRN  3. P-500 low air loss therapy surface  4. Turn and reposition patient Q2 hours  5. Ehob offloading cushion to chair when OOB  6.  Elevate heels off of bed with pillows at all times to offload  7. Apply skin nourishing cream to the skin daily    Wound 08/30/23 Elbow Posterior;Right (Active)   Wound Image   09/11/23 1123   Wound Description Yellow;Babb 09/11/23 1123   Berenice-wound Assessment Clean;Dry; Intact 09/11/23 1123   Wound Length (cm) 1 cm 09/11/23 1123   Wound Width (cm) 0.5 cm 09/11/23 1123   Wound Depth (cm) 0.6 cm 09/11/23 1123   Wound Surface Area (cm^2) 0.5 cm^2 09/11/23 1123   Wound Volume (cm^3) 0.3 cm^3 09/11/23 1123   Calculated Wound Volume (cm^3) 0.3 cm^3 09/11/23 1123   Tunneling 0 cm 09/11/23 1123   Tunneling in depth located at 0 09/11/23 1123   Undermining 0 09/11/23 1123   Undermining is depth extending from 0 09/11/23 1123   Wound Site Closure DENIS 09/11/23 1123   Drainage Amount Small 09/11/23 1123   Drainage Description Serosanguineous 09/11/23 1123   Non-staged Wound Description Full thickness 09/11/23 1123   Treatments Cleansed 09/11/23 1123   Dressing Foam, Silicon (eg. Allevyn, etc); Xeroform 09/11/23 1123   Wound packed? No 09/11/23 1123   Packing- # removed 0 09/11/23 1123   Packing- # inserted 0 09/11/23 1123   Dressing Changed New 09/11/23 1123   Patient Tolerance Tolerated well 09/11/23 1123   Dressing Status Clean;Dry; Intact 09/11/23 1123       Wound 08/30/23 Other (comment) Foot Anterior;Right (Active)   Wound Image    08/30/23 1028   Wound Description DENIS 09/11/23 0840   Berenice-wound Assessment DENIS 09/11/23 0840   Dressing Foam, Silicon (eg. Allevyn, etc) 09/11/23 0840   Dressing Status Clean;Dry; Intact 09/11/23 0840       Wound 09/05/23 Heel Left (Active)   Wound Image   09/11/23 1126   Wound Description Non-blanchable erythema 09/11/23 1126   Pressure Injury Stage DTPI 09/11/23 1126   Berenice-wound Assessment Clean;Dry; Intact 09/11/23 1126   Wound Length (cm) 1 cm 09/11/23 1126   Wound Width (cm) 2 cm 09/11/23 1126   Wound Depth (cm) 0 cm 09/11/23 1126   Wound Surface Area (cm^2) 2 cm^2 09/11/23 1126   Wound Volume (cm^3) 0 cm^3 09/11/23 1126 Calculated Wound Volume (cm^3) 0 cm^3 09/11/23 1126   Tunneling 0 cm 09/11/23 1126   Tunneling in depth located at 0 09/11/23 1126   Undermining 0 09/11/23 1126   Undermining is depth extending from 0 09/11/23 1126   Wound Site Closure DENIS 09/11/23 1126   Drainage Amount None 09/11/23 1126   Non-staged Wound Description Not applicable 15/20/63 4585   Treatments Cleansed 09/11/23 1126   Dressing Foam, Silicon (eg. Allevyn, etc) 09/11/23 1126   Wound packed? No 09/11/23 1126   Packing- # removed 0 09/11/23 1126   Packing- # inserted 0 09/11/23 1126   Dressing Changed New 09/11/23 1126   Patient Tolerance Tolerated well 09/11/23 1126   Dressing Status Clean;Dry; Intact 09/11/23 1126       Wound 09/05/23 Pressure Injury Sacrum (Active)   Wound Image   09/11/23 1128   Wound Description Yellow;Brown 09/11/23 1128   Pressure Injury Stage U 09/11/23 1128   Berenice-wound Assessment Pink;Scar Tissue 09/11/23 1128   Wound Length (cm) 5 cm 09/11/23 1128   Wound Width (cm) 3 cm 09/11/23 1128   Wound Depth (cm) 0.2 cm 09/11/23 1128   Wound Surface Area (cm^2) 15 cm^2 09/11/23 1128   Wound Volume (cm^3) 3 cm^3 09/11/23 1128   Calculated Wound Volume (cm^3) 3 cm^3 09/11/23 1128   Change in Wound Size % 53.85 09/11/23 1128   Tunneling 0 cm 09/11/23 1128   Tunneling in depth located at 0 09/11/23 1128   Undermining 0 09/11/23 1128   Undermining is depth extending from 0 09/11/23 1128   Wound Site Closure DENIS 09/11/23 1128   Drainage Amount Small 09/11/23 1128   Drainage Description Serosanguineous 09/11/23 1128   Non-staged Wound Description Full thickness 09/11/23 1128   Treatments Cleansed 09/11/23 1128   Dressing Foam, Silicon (eg. Allevyn, etc); Xeroform 09/11/23 1128   Wound packed? No 09/11/23 1128   Packing- # removed 0 09/11/23 1128   Packing- # inserted 0 09/11/23 1128   Dressing Changed New 09/11/23 1128   Patient Tolerance Tolerated well 09/11/23 1128   Dressing Status Clean;Dry; Intact 09/11/23 1128       Wound 09/05/23 Heel Right (Active)   Wound Image   09/11/23 1127   Wound Description Non-blanchable erythema 09/11/23 1127   Pressure Injury Stage DTPI 09/11/23 1127   Berenice-wound Assessment Dry;Clean; Intact 09/11/23 1127   Wound Length (cm) 0.5 cm 09/11/23 1127   Wound Width (cm) 0.5 cm 09/11/23 1127   Wound Depth (cm) 0 cm 09/11/23 1127   Wound Surface Area (cm^2) 0.25 cm^2 09/11/23 1127   Wound Volume (cm^3) 0 cm^3 09/11/23 1127   Calculated Wound Volume (cm^3) 0 cm^3 09/11/23 1127   Tunneling 0 cm 09/11/23 1127   Tunneling in depth located at 0 09/11/23 1127   Undermining 0 09/11/23 1127   Undermining is depth extending from 0 09/11/23 1127   Wound Site Closure DENIS 09/11/23 1127   Drainage Amount None 09/11/23 1127   Non-staged Wound Description Not applicable 79/98/86 6724   Treatments Cleansed 09/11/23 1127   Dressing Foam, Silicon (eg. Allevyn, etc) 09/11/23 1127   Wound packed? No 09/11/23 1127   Packing- # removed 0 09/11/23 1127   Packing- # inserted 0 09/11/23 1127   Dressing Changed New 09/11/23 1127   Patient Tolerance Tolerated well 09/11/23 1127   Dressing Status Clean;Dry; Intact 09/11/23 1127         Jennifer Soto BSN, RN, [facility]

## 2023-09-11 NOTE — ASSESSMENT & PLAN NOTE
· Seen by ophthalmology during recent [address] [facility] admission, diagnosed with CRVO and ocular hypertension  · Patient recommended for formal evaluation outpatient of OCT macular degeneration, discussion of possible anti-VEGF therapy  · Encouraged ophthalmology follow-up outpatient

## 2023-09-11 NOTE — PROGRESS NOTES
NEPHROLOGY PROGRESS NOTE   Edmundo Gonzales 82 y.o. male MRN: [id number]  Unit/Bed#: -01 Encounter: [id number]  Reason for Consult: Acute kidney injury    ASSESSMENT/PLAN:  1. Acute kidney injury, etiology multifactorial.  Initial component due to prerenal azotemia/ATN (sepsis previous angiotensin receptor blocker use and hypotension) additional component due to volume overload. 2. Chronic kidney disease stage IIIb, baseline creatinine 1.5-1.7  3. Previous sepsis secondary to pneumonia/cystitis  4. Atrial fibrillation with ejection fraction of 60-65% with grade 1 diastolic dysfunction  5. Anemia of chronic disease hemoglobin currently 8.1  6. Hyperphosphatemia, recently 5.4, continue to monitor  7. Bilateral elbow olecranon bursitis. Etiology suspected secondary to gout. PLAN:  · Renal function overall appears to be slowly improving  · Volume status acceptable, continue to hold maintenance loop diuretic therapy  · No changes from nephrology standpoint, will continue monitor renal function closely. SUBJECTIVE:  And examined. Wife at bedside. Confused at times. Does not appear short of breath. No active chest pain. Review of Systems    OBJECTIVE:  Current Weight: Weight - Scale: 91.5 kg (201 lb 11.5 oz)  Vitals:    09/10/23 2032 09/11/23 0248 09/11/23 0839 09/11/23 0842   BP: 143/73 141/71 152/71 152/71   BP Location: Left arm   Left arm   Pulse: 82 79  72   Resp: 16   15   Temp: (!) 97.2 °F (36.2 °C)   97.8 °F (36.6 °C)   TempSrc: Oral   Oral   SpO2: 96% 93%  96%   Weight:       Height:           Intake/Output Summary (Last 24 hours) at 9/11/2023 1332  Last data filed at 9/11/2023 0840  Gross per 24 hour   Intake --   Output 862 ml   Net -862 ml       Physical Exam  Constitutional:       Appearance: He is not ill-appearing. Eyes:      General: No scleral icterus. Cardiovascular:      Rate and Rhythm: Normal rate. Rhythm irregular.    Pulmonary:      Effort: Pulmonary effort is normal. Breath sounds: Normal breath sounds. Abdominal:      General: There is no distension. Palpations: Abdomen is soft. Musculoskeletal:      Right lower leg: Edema present. Left lower leg: Edema present. Skin:     General: Skin is warm and dry. Neurological:      Mental Status: He is alert. He is disoriented.          Medications:    Current Facility-Administered Medications:   •  acetaminophen (TYLENOL) tablet 650 mg, 650 mg, Oral, Q6H PRN, Sofiya Forman PA-C, 650 mg at 09/06/23 8100  •  aluminum-magnesium hydroxide-simethicone (MAALOX) oral suspension 30 mL, 30 mL, Oral, Q6H PRN, oSfiya Forman PA-C  •  apixaban (ELIQUIS) tablet 2.5 mg, 2.5 mg, Oral, BID, Mendoza Santillan MD, 2.5 mg at 09/11/23 0839  •  aspirin (ECOTRIN LOW STRENGTH) EC tablet 81 mg, 81 mg, Oral, Daily, Sofiya Forman PA-C, 81 mg at 09/11/23 6677  •  diltiazem (CARDIZEM CD) 24 hr capsule 240 mg, 240 mg, Oral, Daily, Shannan Holguin PA-C, 240 mg at 09/11/23 0740  •  diltiazem (CARDIZEM) injection 10 mg, 10 mg, Intravenous, BID PRN, Mendoza Santillan MD  •  fluticasone (FLONASE) 50 mcg/act nasal spray 1 spray, 1 spray, Nasal, Daily, Sofiya Forman PA-C, 1 spray at 76/86/92 0078  •  folic acid (FOLVITE) tablet 1 mg, 1 mg, Oral, Daily, Sofiya Forman PA-C, 1 mg at 09/11/23 1341  •  guaiFENesin (MUCINEX) 12 hr tablet 600 mg, 600 mg, Oral, BID, Sofiya Forman PA-C, 600 mg at 09/11/23 0568  •  lactulose retention enema 200 g, 200 g, Rectal, BID PRN, Jenise Willett PA-C  •  magnesium Oxide (MAG-OX) tablet 400 mg, 400 mg, Oral, Daily, Sofiya Forman PA-C, 400 mg at 09/11/23 2157  •  methylPREDNISolone sodium succinate (Solu-MEDROL) injection 20 mg, 20 mg, Intravenous, Daily, Mendoza Santillan MD, 20 mg at 09/11/23 0846  •  metoprolol tartrate (LOPRESSOR) tablet 25 mg, 25 mg, Oral, Q12H [address] [facility], [facility] Charlotte Childs MD  •  ondansetron [facility]) injection 4 mg, 4 mg, Intravenous, Q6H PRN, Sofiya Forman, LOLI, 4 mg at 08/28/23 1748  •  oxyCODONE (ROXICODONE) IR tablet 5 mg, 5 mg, Oral, Q6H PRN, Daymon Skiff, MD, 5 mg at 09/05/23 0344  •  [START ON 9/12/2023] pantoprazole (PROTONIX) EC tablet 40 mg, 40 mg, Oral, Early Morning, Monika Quigley MD  •  [START ON 9/13/2023] predniSONE tablet 20 mg, 20 mg, Oral, Daily **AND** [START ON 9/17/2023] predniSONE tablet 10 mg, 10 mg, Oral, Daily, Nasir Jacobson MD  •  saccharomyces boulardii (FLORASTOR) capsule 250 mg, 250 mg, Oral, BID, Wandy Tuttle PA-C, 250 mg at 09/11/23 9530  •  senna (SENOKOT) tablet 8.6 mg, 1 tablet, Oral, HS, Katerin Hassan PA-C, 8.6 mg at 09/10/23 2135    Laboratory Results:  Results from last 7 days   Lab Units 09/11/23  0229 09/10/23  0515 09/09/23  0540 09/08/23  0344 09/07/23  0300 09/06/23  2104 09/06/23  0433 09/05/23  0336   WBC Thousand/uL 16.70*  --  16.63* 15.82* 19.52* 18.94* 16.32* 16.60*   HEMOGLOBIN g/dL 8.1*  --  7.9* 9.0* 8.8* 8.1* 7.3* 7.4*   HEMATOCRIT % 24.9*  --  24.4* 27.0* 27.5* 24.6* 22.4* 23.1*   PLATELETS Thousands/uL 342  --  350 398* 396* 355 330 318   POTASSIUM mmol/L 3.7 3.1* 3.6 2.8* 3.7  --  3.1* 4.0   CHLORIDE mmol/L 92* 94* 96 99 104  --  105 108   CO2 mmol/L 31 33* 33* 30 18*  --  21 20*   BUN mg/dL 101* 105* 110* 113* 109*  --  101* 94*   CREATININE mg/dL 2.43* 2.83* 3.75* 4.41* 4.80*  --  4.93* 4.67*   CALCIUM mg/dL 8.6 9.3 9.5 9.5 9.3  --  9.1 9.0   MAGNESIUM mg/dL  --  2.1 2.1  --  2.4  --  2.1 2.3   PHOSPHORUS mg/dL  --  5.4*  --   --  5.6*  --  5.8*  --

## 2023-09-11 NOTE — PROGRESS NOTES
[address] [facility]  Progress Note  Name: Kasandra Alvarado  MRN: [id number]  Unit/Bed#: -01 I Date of Admission: 8/26/2023   Date of Service: 9/11/2023 I Hospital Day: 16    Assessment/Plan   Ileus [facility])  Assessment & Plan  Abdominal distension with X ray KUB showing left lower quadrant loop distension- 9/7  Ileus demonstrated  X ray KUB done showed trace contrast seen in rectosigmoid junction, contrast seen progressing well to the level of distal sigmoid colon. Advance diet to soft surgical  Serial abdominal exams        Acute gout of multiple sites  Assessment & Plan  Left 3rd MCP joint, PIP, Elbow joint  Tender to touch, erythematous  Started steroid taper    Sacral wound  Assessment & Plan  Wound care consulted  Appreciate recs    ESBL (extended spectrum beta-lactamase) producing bacteria infection  Assessment & Plan  Urine CX from 8/27- grew ESBL  Finished treatment        Sepsis [facility])  Assessment & Plan  Meets criteria with leukocytosis, tachycardia  Likely secondary to ESBL UTI/Septic olecranon bursitis, possible sepsis is now resolved . More likely SIRS   F/u Blood Cx- Negative growth so far, wound Cx- growing coag negative staph  RESOLVING. Stable thus far off  Dapto      Abnormal computed tomography angiography (CTA) of abdomen and pelvis  Assessment & Plan  CT abdomen pelvis showed-  Cholelithiasis with possible impacted stone at the neck, gallbladder wall thickening and/or pericholecystic edema and sigmoid colon stricturing. No abdominal tenderness, Surgery and GI recommends follow up with serial abdominal exams. No plan for surgery as it could be likely chronic problem        Olecranon bursitis  Assessment & Plan  Wound culture ordered, so far Cx yielded no growth  ID and ortho following  Follow-up on synovial fluid, sent for culture, Gram stain, cell count, crystals.  Synovial fluid positive for monosodium urate crystals  Dapto discontinued after discussing with ID  Cont steroid taper.     Acute encephalopathy  Assessment & Plan  Patient is confused likely acute metabolic encephalopathy  Frequent reorientation  More awake today  Monitor CBC, CMP  Cultures, VBG, ammonia, CT head normal        History of alcohol abuse  Assessment & Plan  · History of alcohol abuse with withdrawal seizures, has not drink since prior to [facility] hospitalization 8/8    Central retinal vein occlusion of right eye  Assessment & Plan  · Seen by ophthalmology during recent [facility] admission, diagnosed with CRVO and ocular hypertension  · Patient recommended for formal evaluation outpatient of OCT macular degeneration, discussion of possible anti-VEGF therapy  · Encouraged ophthalmology follow-up outpatient    TOBI (acute kidney injury) [facility])  Assessment & Plan  Lab Results   Component Value Date    EGFR 23 09/11/2023    EGFR 19 09/10/2023    EGFR 13 09/09/2023    CREATININE 2.43 (H) 09/11/2023    CREATININE 2.83 (H) 09/10/2023    CREATININE 3.75 (H) 09/09/2023   · Baseline creatinine 1.6-1.9, POA- 1.7  · Likely secondary to autoregulatory failure, ATN secondary to sepsis, medications  · S/p Lasix drip  · ivf per nephrology, currently off  · Nephrology following  · Urinary retention protocol  · I/O monitoring  · Renally dose medications          Chronic obstructive pulmonary disease, unspecified COPD type ([address])  Assessment & Plan  · Not on maintenance inhalers outpatient  · No signs of acute exacerbation on admission  · If patient were to develop wheezing in setting of pneumonia, would start Xopenex/Atrovent nebulizers    Atrial fibrillation ([address])  Assessment & Plan  · Went into A-fib with RVR on August 28 and was given IV Lopressor and Cardizem  · Home regimen: Coreg 25 Mg twice daily  · Started on Eliquis 2.5 mg twice daily for anticoagulation during hospitalization at [facility] 8/8-8/23  · Cardiology consult appreciated  · Rate controlled  · Will order Cardizem IV as PRN for poor oral intake  · Cont eliquis      Anemia of chronic disease  Assessment & Plan  · Hemoglobin 9.4 on admission  · Hemoglobin ranging 10-12 during recent [facility] admission  · No signs of active bleeding  · Trend CBC  · Pending Iron def anemia work up, will follow up. VTE  Prophylaxis:   Pharmacologic: in place  Mechanical VTE Prophylaxis in Place: Yes    Patient Centered Rounds: I have performed bedside rounds with nursing staff today. Discussions with Specialists or Other Care Team Provider: case management    Education and Discussions with Family / Patient: Patient      Current Length of Stay: 16 day(s)    Current Patient Status: Inpatient        Code Status: Level 3 - DNAR and DNI    Discharge Plan: Pt will require continued inpatient hospitalization. Subjective:     Patient reported to be more alert by nursing staff today  He is complaining of gouty like pain    Patient is seen and examined at bedside. All other ROS are negative. Objective:     Vitals:   Temp (24hrs), Av.6 °F (36.4 °C), Min:97.2 °F (36.2 °C), Max:97.8 °F (36.6 °C)    Temp:  [97.2 °F (36.2 °C)-97.8 °F (36.6 °C)] 97.8 °F (36.6 °C)  HR:  [72-90] 72  Resp:  [15-16] 15  BP: (141-164)/(71-84) 152/71  SpO2:  [93 %-98 %] 96 %  Body mass index is 30.67 kg/m². Input and Output Summary (last 24 hours):        Intake/Output Summary (Last 24 hours) at 2023 1023  Last data filed at 2023 0840  Gross per 24 hour   Intake 180 ml   Output 862 ml   Net -682 ml       Physical Exam:       GEN: No acute distress, comfortable  HEEENT: No JVD, PERRLA, no scleral icterus  RESP: Lungs clear to auscultation bilaterally  CV: RRR, +s1/s2   ABD: SOFT NON TENDER, POSITIVE BOWEL SOUNDS, NO DISTENTION  PSYCH: CALM, cognitive impairment  NEURO: Mentation baseline, NO FOCAL DEFICITS  SKIN: NO RASH  EXTREM: NO EDEMA    Additional Data:     Labs:    Results from last 7 days   Lab Units 23  0229 23  0540   WBC Thousand/uL 16.70* 16.63*   HEMOGLOBIN g/dL 8.1* 7.9*   HEMATOCRIT % 24.9* 24.4*   PLATELETS Thousands/uL 342 350   NEUTROS PCT %  --  87*   LYMPHS PCT %  --  8*   MONOS PCT %  --  3*   EOS PCT %  --  0     Results from last 7 days   Lab Units 09/11/23  0229 09/10/23  0515 09/09/23  0540   SODIUM mmol/L 137   < > 141   POTASSIUM mmol/L 3.7   < > 3.6   CHLORIDE mmol/L 92*   < > 96   CO2 mmol/L 31   < > 33*   BUN mg/dL 101*   < > 110*   CREATININE mg/dL 2.43*   < > 3.75*   ANION GAP mmol/L 14   < > 12   CALCIUM mg/dL 8.6   < > 9.5   ALBUMIN g/dL  --   --  2.8*   TOTAL BILIRUBIN mg/dL  --   --  0.54   ALK PHOS U/L  --   --  63   ALT U/L  --   --  7   AST U/L  --   --  17   GLUCOSE RANDOM mg/dL 292*   < > 153*    < > = values in this interval not displayed. Results from last 7 days   Lab Units 09/06/23  2104   INR  2.67*             Results from last 7 days   Lab Units 09/05/23  0336   PROCALCITONIN ng/ml 2.42*       Lines/Drains:  Invasive Devices     Peripheral Intravenous Line  Duration           Peripheral IV 09/09/23 Dorsal (posterior); Left Wrist 2 days    Peripheral IV 09/09/23 Dorsal (posterior); Right Hand 1 day                Telemetry:        * I Have Reviewed All Lab Data Listed Above. Imaging:     Results for orders placed during the hospital encounter of 08/26/23    XR chest portable    Narrative  CHEST    INDICATION:   NGT placement. COMPARISON: Chest CT 9/2/2023, CXR 9/1/2023. EXAM PERFORMED/VIEWS:  XR CHEST PORTABLE. FINDINGS: NG tube in stomach. Mild cardiomegaly. Question mild left base atelectasis. No effusion or pneumothorax. Upper abdomen normal. Bones normal for age. Impression  Question mild left base atelectasis. NG tube in stomach. Workstation performed: [id number]    Results for orders placed during the hospital encounter of 01/10/20    XR chest 2 views    Narrative  CHEST    INDICATION:   Chest Pain.     COMPARISON:  3/24/2017    EXAM PERFORMED/VIEWS: XR CHEST PA & LATERAL  Images: 2    FINDINGS:    Cardiomediastinal silhouette appears unremarkable. No congestive failure. No pneumothorax. No pneumonia. No effusions. Osseous structures appear within normal limits for patient age. Impression  No acute cardiopulmonary disease.         Workstation performed: [id number]      *I have reviewed all imaging reports listed above      Recent Cultures (last 7 days):     Results from last 7 days   Lab Units 09/08/23  0930 09/06/23  1802   GRAM STAIN RESULT  1+ Polys  No bacteria seen 2+ Polys  No organisms seen   BODY FLUID CULTURE, STERILE  No growth No growth       Last 24 Hours Medication List:   Current Facility-Administered Medications   Medication Dose Route Frequency Provider Last Rate   • acetaminophen  650 mg Oral Q6H PRN Isiah Gutierrez PA-C     • aluminum-magnesium hydroxide-simethicone  30 mL Oral Q6H PRN Isiah Gutierrez PA-C     • apixaban  2.5 mg Oral BID Adriana Lawrence MD     • aspirin  81 mg Oral Daily Isiah Gutierrez PA-C     • diltiazem  240 mg Oral Daily Jassi Holguin PA-C     • diltiazem  10 mg Intravenous BID PRN Adriana Lawrence MD     • fluticasone  1 spray Nasal Daily Isiah Gutierrez PA-C     • folic acid  1 mg Oral Daily Isiah Gutierrez PA-C     • guaiFENesin  600 mg Oral BID Isiah Gutierrez PA-C     • lactulose  200 g Rectal BID PRN Sulma Willett PA-C     • magnesium Oxide  400 mg Oral Daily Isiah Gutierrez PA-C     • methylPREDNISolone sodium succinate  20 mg Intravenous Daily Adriana Lawrence MD     • metoprolol  5 mg Intravenous Q6H Wandy Tuttle PA-C     • ondansetron  4 mg Intravenous Q6H PRN Isiah Gutierrez PA-C     • oxyCODONE  5 mg Oral Q6H PRN Tree Cano MD     • pantoprazole  40 mg Intravenous Q24H [address] Wandy Tuttle PA-C     • [START ON 9/13/2023] predniSONE  20 mg Oral Daily Adriana Lawrence MD      And   • [START ON 9/17/2023] predniSONE  10 mg Oral Daily Adriana Lawrence MD     • saccharomyces boulardii  250 mg Oral BID Marvin Nina PA-C     • senna  1 tablet Oral HS Tony Pickett PA-C          Today, Patient Was Seen By: Ed Friedman MD    ** Please Note: Dictation voice to text software may have been used in the creation of this document.  **

## 2023-09-11 NOTE — PLAN OF CARE
Problem: Potential for Falls  Goal: Patient will remain free of falls  Description: INTERVENTIONS:  - Educate patient/family on patient safety including physical limitations  - Instruct patient to call for assistance with activity   - Consult OT/PT to assist with strengthening/mobility   - Keep Call bell within reach  - Keep bed low and locked with side rails adjusted as appropriate  - Keep care items and personal belongings within reach  - Initiate and maintain comfort rounds  - Make Fall Risk Sign visible to staff  - Offer Toileting every 2 Hours, in advance of need  - Initiate/Maintain bed alarm  - Obtain necessary fall risk management equipment:   - Apply yellow socks and bracelet for high fall risk patients  - Consider moving patient to room near nurses station  Outcome: Progressing     Problem: PAIN - ADULT  Goal: Verbalizes/displays adequate comfort level or baseline comfort level  Description: Interventions:  - Encourage patient to monitor pain and request assistance  - Assess pain using appropriate pain scale  - Administer analgesics based on type and severity of pain and evaluate response  - Implement non-pharmacological measures as appropriate and evaluate response  - Consider cultural and social influences on pain and pain management  - Notify physician/advanced practitioner if interventions unsuccessful or patient reports new pain  Outcome: Progressing     Problem: INFECTION - ADULT  Goal: Absence or prevention of progression during hospitalization  Description: INTERVENTIONS:  - Assess and monitor for signs and symptoms of infection  - Monitor lab/diagnostic results  - Monitor all insertion sites, i.e. indwelling lines, tubes, and drains  - Monitor endotracheal if appropriate and nasal secretions for changes in amount and color  - [place] appropriate cooling/warming therapies per order  - Administer medications as ordered  - Instruct and encourage patient and family to use good hand hygiene technique  - Identify and instruct in appropriate isolation precautions for identified infection/condition  Outcome: Progressing  Goal: Absence of fever/infection during neutropenic period  Description: INTERVENTIONS:  - Monitor WBC    Outcome: Progressing     Problem: SAFETY ADULT  Goal: Maintain or return to baseline ADL function  Description: INTERVENTIONS:  -  Assess patient's ability to carry out ADLs; assess patient's baseline for ADL function and identify physical deficits which impact ability to perform ADLs (bathing, care of mouth/teeth, toileting, grooming, dressing, etc.)  - Assess/evaluate cause of self-care deficits   - Assess range of motion  - Assess patient's mobility; develop plan if impaired  - Assess patient's need for assistive devices and provide as appropriate  - Encourage maximum independence but intervene and supervise when necessary  - Involve family in performance of ADLs  - Assess for home care needs following discharge   - Consider OT consult to assist with ADL evaluation and planning for discharge  - Provide patient education as appropriate  Outcome: Progressing  Goal: Maintains/Returns to pre admission functional level  Description: INTERVENTIONS:  - Perform BMAT or MOVE assessment daily.   - Set and communicate daily mobility goal to care team and patient/family/caregiver. - Collaborate with rehabilitation services on mobility goals if consulted  - Perform Range of Motion 3 times a day. - Reposition patient every 2 hours.   - Dangle patient 2 times a day  - Stand patient 2 times a day  - Ambulate patient 2 times a day  - Out of bed to chair 2 times a day   - Out of bed for meals 2 times a day  - Out of bed for toileting  - Record patient progress and toleration of activity level   Outcome: Progressing     Problem: DISCHARGE PLANNING  Goal: Discharge to home or other facility with appropriate resources  Description: INTERVENTIONS:  - Identify barriers to discharge w/patient and caregiver  - Arrange for needed discharge resources and transportation as appropriate  - Identify discharge learning needs (meds, wound care, etc.)  - Arrange for interpretive services to assist at discharge as needed  - Refer to Case Management Department for coordinating discharge planning if the patient needs post-hospital services based on physician/advanced practitioner order or complex needs related to functional status, cognitive ability, or social support system  Outcome: Progressing     Problem: Knowledge Deficit  Goal: Patient/family/caregiver demonstrates understanding of disease process, treatment plan, medications, and discharge instructions  Description: Complete learning assessment and assess knowledge base.   Interventions:  - Provide teaching at level of understanding  - Provide teaching via preferred learning methods  Outcome: Progressing     Problem: Prexisting or High Potential for Compromised Skin Integrity  Goal: Skin integrity is maintained or improved  Description: INTERVENTIONS:  - Identify patients at risk for skin breakdown  - Assess and monitor skin integrity  - Assess and monitor nutrition and hydration status  - Monitor labs   - Assess for incontinence   - Turn and reposition patient  - Assist with mobility/ambulation  - Relieve pressure over bony prominences  - Avoid friction and shearing  - Provide appropriate hygiene as needed including keeping skin clean and dry  - Evaluate need for skin moisturizer/barrier cream  - Collaborate with interdisciplinary team   - Patient/family teaching  - Consider wound care consult   Outcome: Progressing     Problem: MOBILITY - ADULT  Goal: Maintain or return to baseline ADL function  Description: INTERVENTIONS:  -  Assess patient's ability to carry out ADLs; assess patient's baseline for ADL function and identify physical deficits which impact ability to perform ADLs (bathing, care of mouth/teeth, toileting, grooming, dressing, etc.)  - Assess/evaluate cause of self-care deficits   - Assess range of motion  - Assess patient's mobility; develop plan if impaired  - Assess patient's need for assistive devices and provide as appropriate  - Encourage maximum independence but intervene and supervise when necessary  - Involve family in performance of ADLs  - Assess for home care needs following discharge   - Consider OT consult to assist with ADL evaluation and planning for discharge  - Provide patient education as appropriate  Outcome: Progressing  Goal: Maintains/Returns to pre admission functional level  Description: INTERVENTIONS:  - Perform BMAT or MOVE assessment daily.   - Set and communicate daily mobility goal to care team and patient/family/caregiver. - Collaborate with rehabilitation services on mobility goals if consulted  - Perform Range of Motion 3 times a day. - Reposition patient every 2 hours. - Dangle patient 2 times a day  - Stand patient 2 times a day  - Ambulate patient 2 times a day  - Out of bed to chair 2 times a day   - Out of bed for meals 2 times a day  - Out of bed for toileting  - Record patient progress and toleration of activity level   Outcome: Progressing     Problem: Nutrition/Hydration-ADULT  Goal: Nutrient/Hydration intake appropriate for improving, restoring or maintaining nutritional needs  Description: Monitor and assess patient's nutrition/hydration status for malnutrition. Collaborate with interdisciplinary team and initiate plan and interventions as ordered. Monitor patient's weight and dietary intake as ordered or per policy. Utilize nutrition screening tool and intervene as necessary. Determine patient's food preferences and provide high-protein, high-caloric foods as appropriate.      INTERVENTIONS:  - Monitor oral intake, urinary output, labs, and treatment plans  - Assess nutrition and hydration status and recommend course of action  - Evaluate amount of meals eaten  - Assist patient with eating if necessary   - Allow adequate time for meals  - Recommend/ encourage appropriate diets, oral nutritional supplements, and vitamin/mineral supplements  - Order, calculate, and assess calorie counts as needed  - Recommend, monitor, and adjust tube feedings and TPN/PPN based on assessed needs  - Assess need for intravenous fluids  - Provide specific nutrition/hydration education as appropriate  - Include patient/family/caregiver in decisions related to nutrition  Outcome: Progressing

## 2023-09-11 NOTE — ASSESSMENT & PLAN NOTE
Lab Results   Component Value Date    EGFR 23 09/11/2023    EGFR 19 09/10/2023    EGFR 13 09/09/2023    CREATININE 2.43 (H) 09/11/2023    CREATININE 2.83 (H) 09/10/2023    CREATININE 3.75 (H) 09/09/2023   · Baseline creatinine 1.6-1.9, POA- 1.7  · Likely secondary to autoregulatory failure, ATN secondary to sepsis, medications  · S/p Lasix drip  · ivf per nephrology, currently off  · Nephrology following  · Urinary retention protocol  · I/O monitoring  · Renally dose medications

## 2023-09-11 NOTE — PLAN OF CARE
Problem: Potential for Falls  Goal: Patient will remain free of falls  Description: INTERVENTIONS:  - Educate patient/family on patient safety including physical limitations  - Instruct patient to call for assistance with activity   - Consult OT/PT to assist with strengthening/mobility   - Keep Call bell within reach  - Keep bed low and locked with side rails adjusted as appropriate  - Keep care items and personal belongings within reach  - Initiate and maintain comfort rounds  - Make Fall Risk Sign visible to staff  - Offer Toileting every 2 Hours, in advance of need  - Initiate/Maintain bed alarm  - Obtain necessary fall risk management equipment:   - Apply yellow socks and bracelet for high fall risk patients  - Consider moving patient to room near nurses station  Outcome: Progressing     Problem: PAIN - ADULT  Goal: Verbalizes/displays adequate comfort level or baseline comfort level  Description: Interventions:  - Encourage patient to monitor pain and request assistance  - Assess pain using appropriate pain scale  - Administer analgesics based on type and severity of pain and evaluate response  - Implement non-pharmacological measures as appropriate and evaluate response  - Consider cultural and social influences on pain and pain management  - Notify physician/advanced practitioner if interventions unsuccessful or patient reports new pain  Outcome: Progressing     Problem: INFECTION - ADULT  Goal: Absence or prevention of progression during hospitalization  Description: INTERVENTIONS:  - Assess and monitor for signs and symptoms of infection  - Monitor lab/diagnostic results  - Monitor all insertion sites, i.e. indwelling lines, tubes, and drains  - Monitor endotracheal if appropriate and nasal secretions for changes in amount and color  - [place] appropriate cooling/warming therapies per order  - Administer medications as ordered  - Instruct and encourage patient and family to use good hand hygiene technique  - Identify and instruct in appropriate isolation precautions for identified infection/condition  Outcome: Progressing  Goal: Absence of fever/infection during neutropenic period  Description: INTERVENTIONS:  - Monitor WBC    Outcome: Progressing     Problem: SAFETY ADULT  Goal: Maintain or return to baseline ADL function  Description: INTERVENTIONS:  -  Assess patient's ability to carry out ADLs; assess patient's baseline for ADL function and identify physical deficits which impact ability to perform ADLs (bathing, care of mouth/teeth, toileting, grooming, dressing, etc.)  - Assess/evaluate cause of self-care deficits   - Assess range of motion  - Assess patient's mobility; develop plan if impaired  - Assess patient's need for assistive devices and provide as appropriate  - Encourage maximum independence but intervene and supervise when necessary  - Involve family in performance of ADLs  - Assess for home care needs following discharge   - Consider OT consult to assist with ADL evaluation and planning for discharge  - Provide patient education as appropriate  Outcome: Progressing  Goal: Maintains/Returns to pre admission functional level  Description: INTERVENTIONS:  - Perform BMAT or MOVE assessment daily.   - Set and communicate daily mobility goal to care team and patient/family/caregiver. - Collaborate with rehabilitation services on mobility goals if consulted  - Perform Range of Motion 3 times a day. - Reposition patient every 2 hours.   - Dangle patient 2 times a day  - Stand patient 2 times a day  - Ambulate patient 2 times a day  - Out of bed to chair 2 times a day   - Out of bed for meals 2 times a day  - Out of bed for toileting  - Record patient progress and toleration of activity level   Outcome: Progressing     Problem: DISCHARGE PLANNING  Goal: Discharge to home or other facility with appropriate resources  Description: INTERVENTIONS:  - Identify barriers to discharge w/patient and caregiver  - Arrange for needed discharge resources and transportation as appropriate  - Identify discharge learning needs (meds, wound care, etc.)  - Arrange for interpretive services to assist at discharge as needed  - Refer to Case Management Department for coordinating discharge planning if the patient needs post-hospital services based on physician/advanced practitioner order or complex needs related to functional status, cognitive ability, or social support system  Outcome: Progressing     Problem: Knowledge Deficit  Goal: Patient/family/caregiver demonstrates understanding of disease process, treatment plan, medications, and discharge instructions  Description: Complete learning assessment and assess knowledge base.   Interventions:  - Provide teaching at level of understanding  - Provide teaching via preferred learning methods  Outcome: Progressing     Problem: Prexisting or High Potential for Compromised Skin Integrity  Goal: Skin integrity is maintained or improved  Description: INTERVENTIONS:  - Identify patients at risk for skin breakdown  - Assess and monitor skin integrity  - Assess and monitor nutrition and hydration status  - Monitor labs   - Assess for incontinence   - Turn and reposition patient  - Assist with mobility/ambulation  - Relieve pressure over bony prominences  - Avoid friction and shearing  - Provide appropriate hygiene as needed including keeping skin clean and dry  - Evaluate need for skin moisturizer/barrier cream  - Collaborate with interdisciplinary team   - Patient/family teaching  - Consider wound care consult   Outcome: Progressing     Problem: MOBILITY - ADULT  Goal: Maintain or return to baseline ADL function  Description: INTERVENTIONS:  -  Assess patient's ability to carry out ADLs; assess patient's baseline for ADL function and identify physical deficits which impact ability to perform ADLs (bathing, care of mouth/teeth, toileting, grooming, dressing, etc.)  - Assess/evaluate cause of self-care deficits   - Assess range of motion  - Assess patient's mobility; develop plan if impaired  - Assess patient's need for assistive devices and provide as appropriate  - Encourage maximum independence but intervene and supervise when necessary  - Involve family in performance of ADLs  - Assess for home care needs following discharge   - Consider OT consult to assist with ADL evaluation and planning for discharge  - Provide patient education as appropriate  Outcome: Progressing  Goal: Maintains/Returns to pre admission functional level  Description: INTERVENTIONS:  - Perform BMAT or MOVE assessment daily.   - Set and communicate daily mobility goal to care team and patient/family/caregiver. - Collaborate with rehabilitation services on mobility goals if consulted  - Perform Range of Motion 3 times a day. - Reposition patient every 2 hours. - Dangle patient 2 times a day  - Stand patient 2 times a day  - Ambulate patient 2 times a day  - Out of bed to chair 2 times a day   - Out of bed for meals 2 times a day  - Out of bed for toileting  - Record patient progress and toleration of activity level   Outcome: Progressing     Problem: Nutrition/Hydration-ADULT  Goal: Nutrient/Hydration intake appropriate for improving, restoring or maintaining nutritional needs  Description: Monitor and assess patient's nutrition/hydration status for malnutrition. Collaborate with interdisciplinary team and initiate plan and interventions as ordered. Monitor patient's weight and dietary intake as ordered or per policy. Utilize nutrition screening tool and intervene as necessary. Determine patient's food preferences and provide high-protein, high-caloric foods as appropriate.      INTERVENTIONS:  - Monitor oral intake, urinary output, labs, and treatment plans  - Assess nutrition and hydration status and recommend course of action  - Evaluate amount of meals eaten  - Assist patient with eating if necessary   - Allow adequate time for meals  - Recommend/ encourage appropriate diets, oral nutritional supplements, and vitamin/mineral supplements  - Order, calculate, and assess calorie counts as needed  - Recommend, monitor, and adjust tube feedings and TPN/PPN based on assessed needs  - Assess need for intravenous fluids  - Provide specific nutrition/hydration education as appropriate  - Include patient/family/caregiver in decisions related to nutrition  Outcome: Progressing

## 2023-09-11 NOTE — ASSESSMENT & PLAN NOTE
Meets criteria with leukocytosis, tachycardia  Likely secondary to ESBL UTI/Septic olecranon bursitis, possible sepsis is now resolved . More likely SIRS   F/u Blood Cx- Negative growth so far, wound Cx- growing coag negative staph  RESOLVING.   Stable thus far off  Dapto

## 2023-09-11 NOTE — ASSESSMENT & PLAN NOTE
· Went into A-fib with RVR on August 28 and was given IV Lopressor and Cardizem  · Home regimen: Coreg 25 Mg twice daily  · Started on Eliquis 2.5 mg twice daily for anticoagulation during hospitalization at [address] 8/8-8/23  · Cardiology consult appreciated  · Rate controlled  · Will order Cardizem IV as PRN for poor oral intake  · Cont eliquis

## 2023-09-11 NOTE — ASSESSMENT & PLAN NOTE
Wound culture ordered, so far Cx yielded no growth  ID and ortho following  Follow-up on synovial fluid, sent for culture, Gram stain, cell count, crystals. Synovial fluid positive for monosodium urate crystals  Dapto discontinued after discussing with ID  Cont steroid taper.

## 2023-09-11 NOTE — SPEECH THERAPY NOTE
Speech Language/Pathology    Speech/Language Pathology Progress Note    Patient Name: Abby Ayon  YWBFA'F Date: 9/11/2023     Problem List  Active Problems:    Anemia of chronic disease    Atrial fibrillation (HCC)    Chronic obstructive pulmonary disease, unspecified COPD type ([address])    TOBI (acute kidney injury) ([address])    Central retinal vein occlusion of right eye    History of alcohol abuse    Acute encephalopathy    Olecranon bursitis    Abnormal computed tomography angiography (CTA) of abdomen and pelvis    Sepsis (HCC)    ESBL (extended spectrum beta-lactamase) producing bacteria infection    Sacral wound    Acute gout of multiple sites    [facility])       Past Medical History  Past Medical History:   Diagnosis Date   • Alcohol abuse    • Alcohol withdrawal seizure without complication ([address])    • Arthritis    • Asthma    • CVA (cerebral vascular accident) ([address])    • Decubitus ulcer of buttock     last assessed 07/20/16   • Diabetes ([address])    • Disease of thyroid gland    • Duodenal ulcer    • Emphysema lung (HCC)    • Esophageal varices (HCC)    • GERD (gastroesophageal reflux disease)    • History of transfusion    • Hypertension    • Irritable bowel syndrome with diarrhea    • Kidney stones    • Prostate cancer ([address])    • Urinary frequency     resolved 02/15/17        Past Surgical History  Past Surgical History:   Procedure Laterality Date   • BACK SURGERY     • CATARACT EXTRACTION     • ESOPHAGOGASTRODUODENOSCOPY N/A 2/6/2017    Procedure: ESOPHAGOGASTRODUODENOSCOPY (EGD); Surgeon: Megha Valdes MD;  Location:  MAIN OR;  Service:    • NM ESOPHAGOGASTRODUODENOSCOPY TRANSORAL DIAGNOSTIC N/A 4/26/2016    Procedure: ESOPHAGOGASTRODUODENOSCOPY (EGD); Surgeon: Romi Negrete MD;  Location:  MAIN OR;  Service: Gastroenterology   • TONSILLECTOMY           Subjective:  Pt fully awake and alert, significant improvement from previous session.    "I like two honeys in my tea"    Current Diet:  Surgical lite, thin liquids     Objective:  Pt seen for dx dysphagia tx f/u to assess safety of PO intake. Pt assessed w/ regular texture meatball sandwich, banana, pudding, and thin liquids via straw. Pt requires full assistance for feeding. Mastication and bolus organization is timely and effective. Adequate transfers without significant difficulty. Thin liquids drawn appropriately via straw. Swallows suspected fairly prompt. No overt s/s aspiration across meal today. Pt denies concerns for dysphagia/aspiration, pt's significant other at bedside also denies questions/concerns. Assessment:  Oropharyngeal swallow skill appears WFL at this time .      Plan/Recommendations:  Continue current diet  Frequent/thorough oral care  No further IP ST needs, please re-consult if medically necessary

## 2023-09-11 NOTE — PROGRESS NOTES
Progress Note - Orthopedics   Catrachita Comment 82 y.o. male MRN: [id number]  Unit/Bed#: -01      Assessment:  82 y.o.male s/p bilateral elbow olecranon brusitis, etiology most likely gout. S/p right olecranon bursa aspiration on 9/8 and 9/3. S/p left olecranon bursa aspiration on 9/6. Plan:  · WBAT bilateral upper extremities  · Continue right elbow wound care with packing   · Encourage elbow ROM as tolerated  · Avoid direct pressure over bilateral elbow  · Follow up on final culture results. Currently negative  · PT/OT  · Dispo: Ortho will follow      Subjective:    88 y.o.male Seen and examined at bedside. Patient states he is having difficulty using his hands and fingers but overall the elbows are feeling improved. Patient states no acute events or complaints overnight. Denies fevers, chills, CP, SOB, N/V, numbness or tingling.      Labs:  0   Lab Value Date/Time    HCT 24.4 (L) 09/09/2023 0540    HCT 27.0 (L) 09/08/2023 0344    HCT 27.5 (L) 09/07/2023 0300    HCT 40.9 12/22/2015 1248    HCT 31.5 (L) 08/28/2014 0600    HCT 37.8 08/27/2014 0618    HGB 7.9 (L) 09/09/2023 0540    HGB 9.0 (L) 09/08/2023 0344    HGB 8.8 (L) 09/07/2023 0300    HGB 14.0 12/22/2015 1248    HGB 10.0 (L) 08/28/2014 0600    HGB 12.1 08/27/2014 0618    INR 2.67 (H) 09/06/2023 2104    INR 1.06 08/26/2014 1320    WBC 16.63 (H) 09/09/2023 0540    WBC 15.82 (H) 09/08/2023 0344    WBC 19.52 (H) 09/07/2023 0300    WBC 8.80 12/22/2015 1248    WBC 5.42 08/28/2014 0600    WBC 7.12 08/27/2014 0618    ESR 76 (H) 04/14/2023 0938    CRP 6.5 (H) 01/19/2016 1257       Meds:    Current Facility-Administered Medications:   •  acetaminophen (TYLENOL) tablet 650 mg, 650 mg, Oral, Q6H PRN, Latonia Cooley PA-C, 650 mg at 09/06/23 9791  •  aluminum-magnesium hydroxide-simethicone (MAALOX) oral suspension 30 mL, 30 mL, Oral, Q6H PRN, Latonia Cooley PA-C  •  apixaban (ELIQUIS) tablet 2.5 mg, 2.5 mg, Oral, BID, Vicki Mclain MD, 2.5 mg at 09/10/23 1737  •  aspirin (ECOTRIN LOW STRENGTH) EC tablet 81 mg, 81 mg, Oral, Daily, Mykel Conrad PA-C, 81 mg at 09/10/23 0808  •  diltiazem (CARDIZEM CD) 24 hr capsule 240 mg, 240 mg, Oral, Daily, Sara Holguin PA-C, 240 mg at 09/10/23 0901  •  diltiazem (CARDIZEM) injection 10 mg, 10 mg, Intravenous, BID PRN, Trevin Devi MD  •  fluticasone (FLONASE) 50 mcg/act nasal spray 1 spray, 1 spray, Nasal, Daily, Mykel Conrad PA-C, 1 spray at 06/51/77 8513  •  folic acid (FOLVITE) tablet 1 mg, 1 mg, Oral, Daily, Mykel Conrad PA-C, 1 mg at 09/10/23 2598  •  guaiFENesin (MUCINEX) 12 hr tablet 600 mg, 600 mg, Oral, BID, Mykel Conrad PA-C, 600 mg at 09/10/23 1704  •  lactulose retention enema 200 g, 200 g, Rectal, BID PRN, Kim Willett PA-C  •  magnesium Oxide (MAG-OX) tablet 400 mg, 400 mg, Oral, Daily, Mykel Conrad PA-C, 400 mg at 09/10/23 4941  •  methylPREDNISolone sodium succinate (Solu-MEDROL) injection 20 mg, 20 mg, Intravenous, Daily, Trevin Devi MD, 20 mg at 09/10/23 0902  •  metoprolol (LOPRESSOR) injection 5 mg, 5 mg, Intravenous, Q6H, Wandy Tuttle PA-C, 5 mg at 09/10/23 2034  •  [facility]ansetron [facility] injection 4 mg, 4 mg, Intravenous, Q6H PRN, Mykel Conrad PA-C, 4 mg at 08/28/23 1748  •  oxyCODONE (ROXICODONE) IR tablet 5 mg, 5 mg, Oral, Q6H PRN, Nisha Gayle MD, 5 mg at 09/05/23 0344  •  pantoprazole (PROTONIX) injection 40 mg, 40 mg, Intravenous, Q24H [address], Wandy Tuttle PA-C, 40 mg at 09/10/23 0901  •  [START ON 9/13/2023] predniSONE tablet 20 mg, 20 mg, Oral, Daily **AND** [START ON 9/17/2023] predniSONE tablet 10 mg, 10 mg, Oral, Daily, Trevin Devi MD  •  saccharomyces boulardii (FLORASTOR) capsule 250 mg, 250 mg, Oral, BID, Wandy Tuttle PA-C, 250 mg at 09/10/23 1704  •  senna (SENOKOT) tablet 8.6 mg, 1 tablet, Oral, HS, Mykel Conrad PA-C, 8.6 mg at 09/10/23 2135    Blood Culture:   Lab Results   Component Value Date    BLOODCX No Growth After 5 Days. 09/03/2023    BLOODCX No Growth After 5 Days. 09/03/2023       Wound Culture:   Lab Results   Component Value Date    WOUNDCULT 3 colonies Staphylococcus coagulase negative (A) 09/03/2023       Ins and Outs:  I/O last 24 hours: In: 180 [P.O.:180]  Out: 1087 [Urine:1087]          Physical:  Vitals:    09/10/23 2032   BP: 143/73   Pulse: 82   Resp:    Temp: (!) 97.2 °F (36.2 °C)   SpO2: 96%     Musculoskeletal: right Upper Extremity  · Skin -  No erythema or ecchymosis. Compartments soft and compressible  · Dressing - packing removed -  c/d/I, covered with xeroform, 4x4, abd, ace wrap  · NTTP  · Sensation intact to median/radial/ulnar nerve distribution   · Motor intact anterior interosseous nerve/posterior interosseous nerve/median/radial/ulnar nerve distributions  · 2+ radial pulse, symmetric bilaterally  · Digits warm and well perfused  · Capillary refill < 2 seconds    Left upper extremity  · Olecranon covered with pressure dressing.  C/d/i  · Sensation intact to median/radial/ulnar nerve distribution   · Motor intact anterior interosseous nerve/posterior interosseous nerve/median/radial/ulnar nerve distributions  · 2+ radial pulse, symmetric bilaterally  · Digits warm and well perfused  · Capillary refill < 2 seconds      Maribel Quiñonez PA-C

## 2023-09-12 LAB
ALBUMIN SERPL BCP-MCNC: 2.9 G/DL (ref 3.5–5)
ALP SERPL-CCNC: 81 U/L (ref 34–104)
ALT SERPL W P-5'-P-CCNC: 26 U/L (ref 7–52)
ANION GAP SERPL CALCULATED.3IONS-SCNC: 10 MMOL/L
AST SERPL W P-5'-P-CCNC: 34 U/L (ref 13–39)
BASOPHILS # BLD MANUAL: 0 THOUSAND/UL (ref 0–0.1)
BASOPHILS NFR MAR MANUAL: 0 % (ref 0–1)
BILIRUB SERPL-MCNC: 0.43 MG/DL (ref 0.2–1)
BUN SERPL-MCNC: 103 MG/DL (ref 5–25)
CALCIUM ALBUM COR SERPL-MCNC: 9.9 MG/DL (ref 8.3–10.1)
CALCIUM SERPL-MCNC: 9 MG/DL (ref 8.4–10.2)
CHLORIDE SERPL-SCNC: 93 MMOL/L (ref 96–108)
CO2 SERPL-SCNC: 35 MMOL/L (ref 21–32)
CREAT SERPL-MCNC: 2.16 MG/DL (ref 0.6–1.3)
EOSINOPHIL # BLD MANUAL: 0 THOUSAND/UL (ref 0–0.4)
EOSINOPHIL NFR BLD MANUAL: 0 % (ref 0–6)
ERYTHROCYTE [DISTWIDTH] IN BLOOD BY AUTOMATED COUNT: 13.3 % (ref 11.6–15.1)
GFR SERPL CREATININE-BSD FRML MDRD: 26 ML/MIN/1.73SQ M
GLUCOSE SERPL-MCNC: 160 MG/DL (ref 65–140)
HCT VFR BLD AUTO: 27.7 % (ref 36.5–49.3)
HGB BLD-MCNC: 9.3 G/DL (ref 12–17)
LYMPHOCYTES # BLD AUTO: 26 % (ref 14–44)
LYMPHOCYTES # BLD AUTO: 3.37 THOUSAND/UL (ref 0.6–4.47)
MCH RBC QN AUTO: 32 PG (ref 26.8–34.3)
MCHC RBC AUTO-ENTMCNC: 33.6 G/DL (ref 31.4–37.4)
MCV RBC AUTO: 95 FL (ref 82–98)
METAMYELOCYTES NFR BLD MANUAL: 1 % (ref 0–1)
MONOCYTES # BLD AUTO: 1.04 THOUSAND/UL (ref 0–1.22)
MONOCYTES NFR BLD: 8 % (ref 4–12)
NEUTROPHILS # BLD MANUAL: 8.42 THOUSAND/UL (ref 1.85–7.62)
NEUTS SEG NFR BLD AUTO: 65 % (ref 43–75)
PLATELET # BLD AUTO: 401 THOUSANDS/UL (ref 149–390)
PLATELET BLD QL SMEAR: ABNORMAL
PMV BLD AUTO: 9.6 FL (ref 8.9–12.7)
POTASSIUM SERPL-SCNC: 3.5 MMOL/L (ref 3.5–5.3)
PROT SERPL-MCNC: 6.3 G/DL (ref 6.4–8.4)
RBC # BLD AUTO: 2.91 MILLION/UL (ref 3.88–5.62)
RBC MORPH BLD: NORMAL
SODIUM SERPL-SCNC: 138 MMOL/L (ref 135–147)
WBC # BLD AUTO: 12.96 THOUSAND/UL (ref 4.31–10.16)

## 2023-09-12 PROCEDURE — 97530 THERAPEUTIC ACTIVITIES: CPT

## 2023-09-12 PROCEDURE — 85027 COMPLETE CBC AUTOMATED: CPT | Performed by: HOSPITALIST

## 2023-09-12 PROCEDURE — 85007 BL SMEAR W/DIFF WBC COUNT: CPT | Performed by: HOSPITALIST

## 2023-09-12 PROCEDURE — 99232 SBSQ HOSP IP/OBS MODERATE 35: CPT | Performed by: INTERNAL MEDICINE

## 2023-09-12 PROCEDURE — 99232 SBSQ HOSP IP/OBS MODERATE 35: CPT | Performed by: HOSPITALIST

## 2023-09-12 PROCEDURE — 80053 COMPREHEN METABOLIC PANEL: CPT | Performed by: HOSPITALIST

## 2023-09-12 RX ADMIN — GUAIFENESIN 600 MG: 600 TABLET ORAL at 17:59

## 2023-09-12 RX ADMIN — MAGNESIUM OXIDE TAB 400 MG (241.3 MG ELEMENTAL MG) 400 MG: 400 (241.3 MG) TAB at 08:27

## 2023-09-12 RX ADMIN — PANTOPRAZOLE SODIUM 40 MG: 40 TABLET, DELAYED RELEASE ORAL at 05:13

## 2023-09-12 RX ADMIN — FLUTICASONE PROPIONATE 1 SPRAY: 50 SPRAY, METERED NASAL at 08:29

## 2023-09-12 RX ADMIN — METOPROLOL TARTRATE 25 MG: 25 TABLET, FILM COATED ORAL at 08:28

## 2023-09-12 RX ADMIN — APIXABAN 2.5 MG: 2.5 TABLET, FILM COATED ORAL at 08:28

## 2023-09-12 RX ADMIN — SENNOSIDES 8.6 MG: 8.6 TABLET, FILM COATED ORAL at 21:56

## 2023-09-12 RX ADMIN — ASPIRIN 81 MG: 81 TABLET, COATED ORAL at 08:28

## 2023-09-12 RX ADMIN — Medication 250 MG: at 08:28

## 2023-09-12 RX ADMIN — METOPROLOL TARTRATE 25 MG: 25 TABLET, FILM COATED ORAL at 21:56

## 2023-09-12 RX ADMIN — FOLIC ACID 1 MG: 1 TABLET ORAL at 08:28

## 2023-09-12 RX ADMIN — METHYLPREDNISOLONE SODIUM SUCCINATE 20 MG: 40 INJECTION, POWDER, FOR SOLUTION INTRAMUSCULAR; INTRAVENOUS at 08:28

## 2023-09-12 RX ADMIN — GUAIFENESIN 600 MG: 600 TABLET ORAL at 08:27

## 2023-09-12 RX ADMIN — DILTIAZEM HYDROCHLORIDE 240 MG: 240 CAPSULE, COATED, EXTENDED RELEASE ORAL at 08:40

## 2023-09-12 RX ADMIN — Medication 250 MG: at 17:59

## 2023-09-12 RX ADMIN — ACETAMINOPHEN 325MG 650 MG: 325 TABLET ORAL at 04:03

## 2023-09-12 RX ADMIN — APIXABAN 2.5 MG: 2.5 TABLET, FILM COATED ORAL at 17:59

## 2023-09-12 NOTE — ASSESSMENT & PLAN NOTE
Lab Results   Component Value Date    EGFR 26 09/12/2023    EGFR 23 09/11/2023    EGFR 19 09/10/2023    CREATININE 2.16 (H) 09/12/2023    CREATININE 2.43 (H) 09/11/2023    CREATININE 2.83 (H) 09/10/2023   · Baseline creatinine 1.6-1.9, POA- 1.7  · Likely secondary to autoregulatory failure, ATN secondary to sepsis, medications  · S/p Lasix drip  · ivf per nephrology, currently off  · Nephrology following. IMPROVING.    · Urinary retention protocol  · I/O monitoring  · Renally dose medications

## 2023-09-12 NOTE — OCCUPATIONAL THERAPY NOTE
Occupational Therapy Treatment Note    Patient Name: Moni Jose  UOFTD'T Date: 9/12/2023 09/12/23 1500   OT Last Visit   OT Visit Date 09/12/23   Note Type   Note Type Treatment   Pain Assessment   Pain Assessment Tool 0-10   Pain Score No Pain   Restrictions/Precautions   Weight Bearing Precautions Per Order No   Other Precautions Contact/isolation;Cognitive; Chair Alarm; Bed Alarm;Telemetry; Fall Risk;Pain   Bed Mobility   Rolling R 2  Maximal assistance   Additional items Assist x 2   Rolling L 2  Maximal assistance   Additional items Assist x 2   Additional Comments to change linens/place garrett pad   Transfers   Additional Comments Garrett lift used to transfer pt to chair   Subjective   Subjective "It feels SO good to be out of bed!"   Cognition   Overall Cognitive Status Impaired   Arousal/Participation Alert; Cooperative   Attention Within functional limits   Orientation Level Oriented to person;Oriented to place; Disoriented to time;Disoriented to situation   Memory Decreased recall of precautions;Decreased recall of recent events   Following Commands Follows one step commands with increased time or repetition   Comments More alert this session   Assessment   Assessment Patient participated in Skilled OT session this date with interventions consisting of  bed mobility* . Patient agreeable to OT treatment session, upon arrival patient was found supine in bed. Treatment session as follows: Max A x2 to roll for linen change garrett pad placement prior to nurses getting pt OOB w garrett lift. Pt thrilled to be OOB in chair at end of session. Patient continues to be functioning below baseline level, occupational performance remains limited secondary to factors listed above and increased risk for falls and injury. The patient's raw score on the AM-PAC Daily Activity inpatient short form is 11, standardized score is 29.04, less than 39.4.  Patients at this level are likely to benefit from DC to post-acute rehabilitation services. From OT standpoint, recommendation at time of d/c would be level 2. Patient to benefit from continued Occupational Therapy treatment while in the hospital to address deficits as defined above and maximize level of functional independence with ADLs and functional mobility. Pt left with RN staff.    Plan   OT Treatment Day 2   Recommendation   UB Rehab Discharge Recommendation (PT/OT) Level 2   AM-PAC Daily Activity Inpatient   Lower Body Dressing 1   Bathing 2   Toileting 1   Upper Body Dressing 2   Grooming 2   Eating 3   Daily Activity Raw Score 11   Daily Activity Standardized Score (Calc for Raw Score >=11) 29.04   AM-PAC Applied Cognition Inpatient   Following a Speech/Presentation 3   Understanding Ordinary Conversation 3   Taking Medications 2   Remembering Where Things Are Placed or Put Away 2   Remembering List of 4-5 Errands 2   Taking Care of Complicated Tasks 2   Applied Cognition Raw Score 14   Applied Cognition Standardized Score 32.02     "[employer] MS, OTR/L

## 2023-09-12 NOTE — PLAN OF CARE
Problem: Potential for Falls  Goal: Patient will remain free of falls  Description: INTERVENTIONS:  - Educate patient/family on patient safety including physical limitations  - Instruct patient to call for assistance with activity   - Consult OT/PT to assist with strengthening/mobility   - Keep Call bell within reach  - Keep bed low and locked with side rails adjusted as appropriate  - Keep care items and personal belongings within reach  - Initiate and maintain comfort rounds  - Make Fall Risk Sign visible to staff  - Offer Toileting every 2 Hours, in advance of need  - Initiate/Maintain bed alarm  - Obtain necessary fall risk management equipment:   - Apply yellow socks and bracelet for high fall risk patients  - Consider moving patient to room near nurses station  Outcome: Progressing     Problem: PAIN - ADULT  Goal: Verbalizes/displays adequate comfort level or baseline comfort level  Description: Interventions:  - Encourage patient to monitor pain and request assistance  - Assess pain using appropriate pain scale  - Administer analgesics based on type and severity of pain and evaluate response  - Implement non-pharmacological measures as appropriate and evaluate response  - Consider cultural and social influences on pain and pain management  - Notify physician/advanced practitioner if interventions unsuccessful or patient reports new pain  Outcome: Progressing     Problem: INFECTION - ADULT  Goal: Absence or prevention of progression during hospitalization  Description: INTERVENTIONS:  - Assess and monitor for signs and symptoms of infection  - Monitor lab/diagnostic results  - Monitor all insertion sites, i.e. indwelling lines, tubes, and drains  - Monitor endotracheal if appropriate and nasal secretions for changes in amount and color  - [place] appropriate cooling/warming therapies per order  - Administer medications as ordered  - Instruct and encourage patient and family to use good hand hygiene technique  - Identify and instruct in appropriate isolation precautions for identified infection/condition  Outcome: Progressing  Goal: Absence of fever/infection during neutropenic period  Description: INTERVENTIONS:  - Monitor WBC    Outcome: Progressing     Problem: SAFETY ADULT  Goal: Maintain or return to baseline ADL function  Description: INTERVENTIONS:  -  Assess patient's ability to carry out ADLs; assess patient's baseline for ADL function and identify physical deficits which impact ability to perform ADLs (bathing, care of mouth/teeth, toileting, grooming, dressing, etc.)  - Assess/evaluate cause of self-care deficits   - Assess range of motion  - Assess patient's mobility; develop plan if impaired  - Assess patient's need for assistive devices and provide as appropriate  - Encourage maximum independence but intervene and supervise when necessary  - Involve family in performance of ADLs  - Assess for home care needs following discharge   - Consider OT consult to assist with ADL evaluation and planning for discharge  - Provide patient education as appropriate  Outcome: Progressing  Goal: Maintains/Returns to pre admission functional level  Description: INTERVENTIONS:  - Perform BMAT or MOVE assessment daily.   - Set and communicate daily mobility goal to care team and patient/family/caregiver. - Collaborate with rehabilitation services on mobility goals if consulted  - Perform Range of Motion 3 times a day. - Reposition patient every 2 hours.   - Dangle patient 2 times a day  - Stand patient 2 times a day  - Ambulate patient 2 times a day  - Out of bed to chair 2 times a day   - Out of bed for meals 2 times a day  - Out of bed for toileting  - Record patient progress and toleration of activity level   Outcome: Progressing     Problem: DISCHARGE PLANNING  Goal: Discharge to home or other facility with appropriate resources  Description: INTERVENTIONS:  - Identify barriers to discharge w/patient and caregiver  - Arrange for needed discharge resources and transportation as appropriate  - Identify discharge learning needs (meds, wound care, etc.)  - Arrange for interpretive services to assist at discharge as needed  - Refer to Case Management Department for coordinating discharge planning if the patient needs post-hospital services based on physician/advanced practitioner order or complex needs related to functional status, cognitive ability, or social support system  Outcome: Progressing     Problem: Knowledge Deficit  Goal: Patient/family/caregiver demonstrates understanding of disease process, treatment plan, medications, and discharge instructions  Description: Complete learning assessment and assess knowledge base.   Interventions:  - Provide teaching at level of understanding  - Provide teaching via preferred learning methods  Outcome: Progressing     Problem: Prexisting or High Potential for Compromised Skin Integrity  Goal: Skin integrity is maintained or improved  Description: INTERVENTIONS:  - Identify patients at risk for skin breakdown  - Assess and monitor skin integrity  - Assess and monitor nutrition and hydration status  - Monitor labs   - Assess for incontinence   - Turn and reposition patient  - Assist with mobility/ambulation  - Relieve pressure over bony prominences  - Avoid friction and shearing  - Provide appropriate hygiene as needed including keeping skin clean and dry  - Evaluate need for skin moisturizer/barrier cream  - Collaborate with interdisciplinary team   - Patient/family teaching  - Consider wound care consult   Outcome: Progressing     Problem: MOBILITY - ADULT  Goal: Maintain or return to baseline ADL function  Description: INTERVENTIONS:  -  Assess patient's ability to carry out ADLs; assess patient's baseline for ADL function and identify physical deficits which impact ability to perform ADLs (bathing, care of mouth/teeth, toileting, grooming, dressing, etc.)  - Assess/evaluate cause of self-care deficits   - Assess range of motion  - Assess patient's mobility; develop plan if impaired  - Assess patient's need for assistive devices and provide as appropriate  - Encourage maximum independence but intervene and supervise when necessary  - Involve family in performance of ADLs  - Assess for home care needs following discharge   - Consider OT consult to assist with ADL evaluation and planning for discharge  - Provide patient education as appropriate  Outcome: Progressing  Goal: Maintains/Returns to pre admission functional level  Description: INTERVENTIONS:  - Perform BMAT or MOVE assessment daily.   - Set and communicate daily mobility goal to care team and patient/family/caregiver. - Collaborate with rehabilitation services on mobility goals if consulted  - Perform Range of Motion 3 times a day. - Reposition patient every 2 hours. - Dangle patient 2 times a day  - Stand patient 2 times a day  - Ambulate patient 2 times a day  - Out of bed to chair 2 times a day   - Out of bed for meals 2 times a day  - Out of bed for toileting  - Record patient progress and toleration of activity level   Outcome: Progressing     Problem: Nutrition/Hydration-ADULT  Goal: Nutrient/Hydration intake appropriate for improving, restoring or maintaining nutritional needs  Description: Monitor and assess patient's nutrition/hydration status for malnutrition. Collaborate with interdisciplinary team and initiate plan and interventions as ordered. Monitor patient's weight and dietary intake as ordered or per policy. Utilize nutrition screening tool and intervene as necessary. Determine patient's food preferences and provide high-protein, high-caloric foods as appropriate.      INTERVENTIONS:  - Monitor oral intake, urinary output, labs, and treatment plans  - Assess nutrition and hydration status and recommend course of action  - Evaluate amount of meals eaten  - Assist patient with eating if necessary   - Allow adequate time for meals  - Recommend/ encourage appropriate diets, oral nutritional supplements, and vitamin/mineral supplements  - Order, calculate, and assess calorie counts as needed  - Recommend, monitor, and adjust tube feedings and TPN/PPN based on assessed needs  - Assess need for intravenous fluids  - Provide specific nutrition/hydration education as appropriate  - Include patient/family/caregiver in decisions related to nutrition  Outcome: Progressing

## 2023-09-12 NOTE — ASSESSMENT & PLAN NOTE
· Seen by ophthalmology during recent [facility] admission, diagnosed with CRVO and ocular hypertension  · Patient recommended for formal evaluation outpatient of OCT macular degeneration, discussion of possible anti-VEGF therapy  · Encouraged ophthalmology follow-up outpatient

## 2023-09-12 NOTE — ASSESSMENT & PLAN NOTE
Meets criteria with leukocytosis, tachycardia  Likely secondary to ESBL UTI/Septic olecranon bursitis, possible sepsis is now resolved . More likely SIRS   F/u Blood Cx- Negative growth so far, wound Cx- growing coag negative staph  RESOLVING. Stable thus far off  Dapto, leukocytosis improving and likely 2/2 steroid exposure for gout.

## 2023-09-12 NOTE — PLAN OF CARE
Problem: OCCUPATIONAL THERAPY ADULT  Goal: Performs self-care activities at highest level of function for planned discharge setting. See evaluation for individualized goals. Description:   Outcome: Progressing  Note: Limitation: Decreased ADL status, Decreased UE strength, Decreased UE ROM, Decreased endurance, Decreased cognition, Decreased self-care trans, Decreased high-level ADLs  Prognosis: Poor  Assessment: Patient participated in Skilled OT session this date with interventions consisting of  bed mobility* . Patient agreeable to OT treatment session, upon arrival patient was found supine in bed. Treatment session as follows: Max A x2 to roll for linen change omar pad placement prior to nurses getting pt OOB w omar lift. Pt thrilled to be OOB in chair at end of session. Patient continues to be functioning below baseline level, occupational performance remains limited secondary to factors listed above and increased risk for falls and injury. The patient's raw score on the -PAC Daily Activity inpatient short form is 11, standardized score is 29.04, less than 39.4. Patients at this level are likely to benefit from DC to post-acute rehabilitation services. From OT standpoint, recommendation at time of d/c would be level 2. Patient to benefit from continued Occupational Therapy treatment while in the hospital to address deficits as defined above and maximize level of functional independence with ADLs and functional mobility. Pt left with RN staff.

## 2023-09-12 NOTE — ASSESSMENT & PLAN NOTE
· Went into A-fib with RVR on August 28 and was given IV Lopressor and Cardizem  · Home regimen: Coreg 25 Mg twice daily  · Started on Eliquis 2.5 mg twice daily for anticoagulation during hospitalization at [facility] 8/8-8/23  · Cardiology consult appreciated  · Rate controlled  · Will order Cardizem IV as PRN for poor oral intake  · Cont eliquis

## 2023-09-12 NOTE — PROGRESS NOTES
NEPHROLOGY PROGRESS NOTE   Sharon Holliday 82 y.o. male MRN: [id number]  Unit/Bed#: -01 Encounter: [id number]      HPI/24hr EVENTS:    -79-year-old male past medical history of CKD 3, atrial fibrillation, COPD.  Presented with fever, nephrology consult for management of TOBI.     -Continued improvement in renal function    ASSESSMENT/PLAN:    TOBI on CKD 3  -Baseline creatinine: 1.5-1.7  -Creatinine on admission 0.7, creatinine peaking at 4.93 on 9/6, most recent creatinine 2.16  -Etiology: Suspect second to prerenal azotemia with component of ATN due to sepsis, ARB use, hypotension  -UA: 2+ protein, 1-2 RBCs  -Renal imaging: CTAP with multiple left renal cyst, no hydronephrosis, nonspecific bilateral perinephric edema  -Avoid hypotension, avoid nephrotoxins, avoid NSAIDS  -Trend BMP  -Urine eosinophils negative, CK normal  -Is azotemic with a BUN of 103 likely due to steroid use now  -Urine output is adequate  -Status post Lasix infusion to convert to nonoliguric state and IV fluids, currently off all therapies  -Villasenor catheter has been discontinued, currently on urinary retention protocol     Anemia  -Recent hemoglobin 9.3  -FLC/kappa lambda ratio was 0.84, SPEP with beta gamma bridging but no M spike, UPEP nonselective proteinuria and no monoclonal spike  -Iron panel with iron sat unable to be calculated, severely decreased TIBC, moderately elevated ferritin     Encephalopathy  -Mental status has been waxing and waning over the past several days  -Suspect multifactorial, prolonged hospitalization, delirium, uremia  -Continue to monitor     CKD MBD  -Prior Phos was elevated, has clear liquid diet, can continue monitoring Phos level     Blood pressure  -Currently on Cardizem 240 mg daily, Lopressor 25 mg twice daily Cardizem 240 milligrams daily  -Recent blood pressure trends acceptable     Sepsis/pneumonia/cystitis/olecranon bursitis  -Currently off antibiotics  -Management per primary team    SUBJECTIVE:  Denies focal complaints, reports he feels ill overall    ROS:  Review of Systems   Constitutional: Positive for fatigue. Respiratory: Negative. Cardiovascular: Negative. Genitourinary: Negative. A complete 10 point review of systems was performed and found to be negative unless otherwise noted above or in the HPI. OBJECTIVE:  Current Weight: Weight - Scale: 91.5 kg (201 lb 11.5 oz)  Vitals:    09/11/23 1653 09/11/23 1917 09/11/23 2200 09/12/23 0742   BP: 148/72 145/70  144/67   BP Location:  Left arm  Left arm   Pulse: 75 74  67   Resp:  18  16   Temp: 97.7 °F (36.5 °C) 97.6 °F (36.4 °C)  98 °F (36.7 °C)   TempSrc:  Oral  Oral   SpO2: 97% 95% 96% 92%   Weight:       Height:           Intake/Output Summary (Last 24 hours) at 9/12/2023 1042  Last data filed at 9/12/2023 0616  Gross per 24 hour   Intake 240 ml   Output 1900 ml   Net -1660 ml     Physical Exam  Vitals and nursing note reviewed. Constitutional:       General: He is not in acute distress. Appearance: He is not toxic-appearing or diaphoretic. Comments: Awake sitting in bed, frail-appearing, intermittently confused   HENT:      Head: Normocephalic and atraumatic. Nose: Nose normal.      Mouth/Throat:      Mouth: Mucous membranes are moist.   Eyes:      General: No scleral icterus. Cardiovascular:      Rate and Rhythm: Normal rate. Rhythm irregular. Pulses: Normal pulses. Pulmonary:      Effort: Pulmonary effort is normal. No respiratory distress. Breath sounds: Normal breath sounds. No wheezing or rales. Abdominal:      General: Abdomen is flat. Musculoskeletal:      Cervical back: Neck supple. Right lower leg: No edema. Left lower leg: No edema. Skin:     General: Skin is warm and dry. Neurological:      Mental Status: He is alert.       Comments: Awake, interactive          Medications:    Current Facility-Administered Medications:   •  acetaminophen (TYLENOL) tablet 650 mg, 650 mg, Oral, Q6H PRN, Tamiko Moran PA-C, 650 mg at 09/12/23 0403  •  aluminum-magnesium hydroxide-simethicone (MAALOX) oral suspension 30 mL, 30 mL, Oral, Q6H PRN, Tamiko Morna PA-C  •  apixaban (ELIQUIS) tablet 2.5 mg, 2.5 mg, Oral, BID, Karon Ritchie MD, 2.5 mg at 09/12/23 2166  •  aspirin (ECOTRIN LOW STRENGTH) EC tablet 81 mg, 81 mg, Oral, Daily, Tamiko Moran PA-C, 81 mg at 09/12/23 1043  •  diltiazem (CARDIZEM CD) 24 hr capsule 240 mg, 240 mg, Oral, Daily, Kalyani Holguin PA-C, 240 mg at 09/12/23 0840  •  diltiazem (CARDIZEM) injection 10 mg, 10 mg, Intravenous, BID PRN, Karon Ritchie MD  •  fluticasone (FLONASE) 50 mcg/act nasal spray 1 spray, 1 spray, Nasal, Daily, Tamiko Moran PA-C, 1 spray at 48/11/08 5308  •  folic acid (FOLVITE) tablet 1 mg, 1 mg, Oral, Daily, Tamiko Moran PA-C, 1 mg at 09/12/23 6765  •  guaiFENesin (MUCINEX) 12 hr tablet 600 mg, 600 mg, Oral, BID, Tamiko Moran PA-C, 600 mg at 09/12/23 0827  •  lactulose retention enema 200 g, 200 g, Rectal, BID PRN, Ameena Willett PA-C  •  magnesium Oxide (MAG-OX) tablet 400 mg, 400 mg, Oral, Daily, Tamiko Moran PA-C, 400 mg at 09/12/23 0827  •  metoprolol tartrate (LOPRESSOR) tablet 25 mg, 25 mg, Oral, Q12H [facility], Aminta Escobar MD, 25 mg at 09/12/23 0828  •  ondansetron (ZOFRAN) injection 4 mg, 4 mg, Intravenous, Q6H PRN, Tamiko Moran PA-C, 4 mg at 08/28/23 1748  •  oxyCODONE (ROXICODONE) IR tablet 5 mg, 5 mg, Oral, Q6H PRN, Lew Ahuja MD, 5 mg at 09/05/23 0344  •  pantoprazole (PROTONIX) EC tablet 40 mg, 40 mg, Oral, Early Morning, Aminta Escobar MD, 40 mg at 09/12/23 0513  •  [START ON 9/13/2023] predniSONE tablet 20 mg, 20 mg, Oral, Daily **AND** [START ON 9/17/2023] predniSONE tablet 10 mg, 10 mg, Oral, Daily, Karon Ritchie MD  •  saccharomyces boulardii (FLORASTOR) capsule 250 mg, 250 mg, Oral, BID, Wandy Tuttle PA-C, 250 mg at 09/12/23 5274  •  senna [facility]) tablet 8.6 mg, 1 tablet, Oral, HS, Traci Litten, PA-C, 8.6 mg at 09/11/23 2131    Laboratory Results:  Results from last 7 days   Lab Units 09/12/23  0350 09/11/23  0229 09/10/23  0515 09/09/23  0540 09/08/23  0344 09/07/23  0300 09/06/23  2104 09/06/23  0433   WBC Thousand/uL 12.96* 16.70*  --  16.63* 15.82* 19.52* 18.94* 16.32*   HEMOGLOBIN g/dL 9.3* 8.1*  --  7.9* 9.0* 8.8* 8.1* 7.3*   HEMATOCRIT % 27.7* 24.9*  --  24.4* 27.0* 27.5* 24.6* 22.4*   PLATELETS Thousands/uL 401* 342  --  350 398* 396* 355 330   POTASSIUM mmol/L 3.5 3.7 3.1* 3.6 2.8* 3.7  --  3.1*   CHLORIDE mmol/L 93* 92* 94* 96 99 104  --  105   CO2 mmol/L 35* 31 33* 33* 30 18*  --  21   BUN mg/dL 103* 101* 105* 110* 113* 109*  --  101*   CREATININE mg/dL 2.16* 2.43* 2.83* 3.75* 4.41* 4.80*  --  4.93*   CALCIUM mg/dL 9.0 8.6 9.3 9.5 9.5 9.3  --  9.1   MAGNESIUM mg/dL  --   --  2.1 2.1  --  2.4  --  2.1   PHOSPHORUS mg/dL  --   --  5.4*  --   --  5.6*  --  5.8*       I have personally reviewed the blood work as stated above and in my note. I have personally reviewed internal medicine note.

## 2023-09-12 NOTE — ASSESSMENT & PLAN NOTE
· Hemoglobin 9.4 on admission  · Hemoglobin ranging 10-12 during recent [facility] admission  · No signs of active bleeding  · Trend CBC  · Pending Iron def anemia work up, will follow up.

## 2023-09-12 NOTE — PLAN OF CARE
Problem: PAIN - ADULT  Goal: Verbalizes/displays adequate comfort level or baseline comfort level  Description: Interventions:  - Encourage patient to monitor pain and request assistance  - Assess pain using appropriate pain scale  - Administer analgesics based on type and severity of pain and evaluate response  - Implement non-pharmacological measures as appropriate and evaluate response  - Consider cultural and social influences on pain and pain management  - Notify physician/advanced practitioner if interventions unsuccessful or patient reports new pain  Outcome: Progressing     Problem: INFECTION - ADULT  Goal: Absence or prevention of progression during hospitalization  Description: INTERVENTIONS:  - Assess and monitor for signs and symptoms of infection  - Monitor lab/diagnostic results  - Monitor all insertion sites, i.e. indwelling lines, tubes, and drains  - Monitor endotracheal if appropriate and nasal secretions for changes in amount and color  - [place] appropriate cooling/warming therapies per order  - Administer medications as ordered  - Instruct and encourage patient and family to use good hand hygiene technique  - Identify and instruct in appropriate isolation precautions for identified infection/condition  Outcome: Progressing     Problem: Prexisting or High Potential for Compromised Skin Integrity  Goal: Skin integrity is maintained or improved  Description: INTERVENTIONS:  - Identify patients at risk for skin breakdown  - Assess and monitor skin integrity  - Assess and monitor nutrition and hydration status  - Monitor labs   - Assess for incontinence   - Turn and reposition patient  - Assist with mobility/ambulation  - Relieve pressure over bony prominences  - Avoid friction and shearing  - Provide appropriate hygiene as needed including keeping skin clean and dry  - Evaluate need for skin moisturizer/barrier cream  - Collaborate with interdisciplinary team   - Patient/family teaching  - Consider wound care consult   Outcome: Progressing     Problem: Knowledge Deficit  Goal: Patient/family/caregiver demonstrates understanding of disease process, treatment plan, medications, and discharge instructions  Description: Complete learning assessment and assess knowledge base. Interventions:  - Provide teaching at level of understanding  - Provide teaching via preferred learning methods  Outcome: Progressing     Problem: DISCHARGE PLANNING  Goal: Discharge to home or other facility with appropriate resources  Description: INTERVENTIONS:  - Identify barriers to discharge w/patient and caregiver  - Arrange for needed discharge resources and transportation as appropriate  - Identify discharge learning needs (meds, wound care, etc.)  - Arrange for interpretive services to assist at discharge as needed  - Refer to Case Management Department for coordinating discharge planning if the patient needs post-hospital services based on physician/advanced practitioner order or complex needs related to functional status, cognitive ability, or social support system  Outcome: Progressing     Problem: Nutrition/Hydration-ADULT  Goal: Nutrient/Hydration intake appropriate for improving, restoring or maintaining nutritional needs  Description: Monitor and assess patient's nutrition/hydration status for malnutrition. Collaborate with interdisciplinary team and initiate plan and interventions as ordered. Monitor patient's weight and dietary intake as ordered or per policy. Utilize nutrition screening tool and intervene as necessary. Determine patient's food preferences and provide high-protein, high-caloric foods as appropriate.      INTERVENTIONS:  - Monitor oral intake, urinary output, labs, and treatment plans  - Assess nutrition and hydration status and recommend course of action  - Evaluate amount of meals eaten  - Assist patient with eating if necessary   - Allow adequate time for meals  - Recommend/ encourage appropriate diets, oral nutritional supplements, and vitamin/mineral supplements  - Order, calculate, and assess calorie counts as needed  - Recommend, monitor, and adjust tube feedings and TPN/PPN based on assessed needs  - Assess need for intravenous fluids  - Provide specific nutrition/hydration education as appropriate  - Include patient/family/caregiver in decisions related to nutrition  Outcome: Progressing

## 2023-09-12 NOTE — ASSESSMENT & PLAN NOTE
· History of alcohol abuse with withdrawal seizures, has not drink since prior to [facility] hospitalization 8/8

## 2023-09-12 NOTE — PROGRESS NOTES
[facility]  Progress Note  Name: Macario Gruber  MRN: [id number]  Unit/Bed#: -01 I Date of Admission: 8/26/2023   Date of Service: 9/12/2023 I Hospital Day: 17    Assessment/Plan   Ileus [facility])  Assessment & Plan  Abdominal distension with X ray KUB showing left lower quadrant loop distension- 9/7  Ileus demonstrated  X ray KUB done showed trace contrast seen in rectosigmoid junction, contrast seen progressing well to the level of distal sigmoid colon. Advance diet to soft surgical  Serial abdominal exams        Acute gout of multiple sites  Assessment & Plan  Left 3rd MCP joint, PIP, Elbow joint  Tender to touch, erythematous  Started steroid taper    Sacral wound  Assessment & Plan  Wound care consulted  Appreciate recs    ESBL (extended spectrum beta-lactamase) producing bacteria infection  Assessment & Plan  Urine CX from 8/27- grew ESBL  Finished treatment        Sepsis [facility])  Assessment & Plan  Meets criteria with leukocytosis, tachycardia  Likely secondary to ESBL UTI/Septic olecranon bursitis, possible sepsis is now resolved . More likely SIRS   F/u Blood Cx- Negative growth so far, wound Cx- growing coag negative staph  RESOLVING. Stable thus far off  Dapto, leukocytosis improving and likely 2/2 steroid exposure for gout. Abnormal computed tomography angiography (CTA) of abdomen and pelvis  Assessment & Plan  CT abdomen pelvis showed-  Cholelithiasis with possible impacted stone at the neck, gallbladder wall thickening and/or pericholecystic edema and sigmoid colon stricturing. No abdominal tenderness, Surgery and GI recommends follow up with serial abdominal exams. No plan for surgery as it could be likely chronic problem        Olecranon bursitis  Assessment & Plan  Wound culture ordered, so far Cx yielded no growth  ID and ortho following  Follow-up on synovial fluid, sent for culture, Gram stain, cell count, crystals.  Synovial fluid positive for monosodium urate crystals  Dapto discontinued after discussing with ID  Cont steroid taper. Acute encephalopathy  Assessment & Plan  Patient is confused likely acute metabolic encephalopathy  Frequent reorientation  More awake today  Monitor CBC, CMP  Cultures, VBG, ammonia, CT head normal        History of alcohol abuse  Assessment & Plan  · History of alcohol abuse with withdrawal seizures, has not drink since prior to [facility] hospitalization 8/8    Central retinal vein occlusion of right eye  Assessment & Plan  · Seen by ophthalmology during recent [facility] admission, diagnosed with CRVO and ocular hypertension  · Patient recommended for formal evaluation outpatient of OCT macular degeneration, discussion of possible anti-VEGF therapy  · Encouraged ophthalmology follow-up outpatient    TOBI (acute kidney injury) [facility])  Assessment & Plan  Lab Results   Component Value Date    EGFR 26 09/12/2023    EGFR 23 09/11/2023    EGFR 19 09/10/2023    CREATININE 2.16 (H) 09/12/2023    CREATININE 2.43 (H) 09/11/2023    CREATININE 2.83 (H) 09/10/2023   · Baseline creatinine 1.6-1.9, POA- 1.7  · Likely secondary to autoregulatory failure, ATN secondary to sepsis, medications  · S/p Lasix drip  · ivf per nephrology, currently off  · Nephrology following. IMPROVING.    · Urinary retention protocol  · I/O monitoring  · Renally dose medications          Chronic obstructive pulmonary disease, unspecified COPD type ([address])  Assessment & Plan  · Not on maintenance inhalers outpatient  · No signs of acute exacerbation on admission  · If patient were to develop wheezing in setting of pneumonia, would start Xopenex/Atrovent nebulizers    Atrial fibrillation [facility])  Assessment & Plan  · Went into A-fib with RVR on August 28 and was given IV Lopressor and Cardizem  · Home regimen: Coreg 25 Mg twice daily  · Started on Eliquis 2.5 mg twice daily for anticoagulation during hospitalization at [facility] 8/8-8/23  · Cardiology consult appreciated  · Rate controlled  · Will order Cardizem IV as PRN for poor oral intake  · Cont eliquis      Anemia of chronic disease  Assessment & Plan  · Hemoglobin 9.4 on admission  · Hemoglobin ranging 10-12 during recent [facility] admission  · No signs of active bleeding  · Trend CBC  · Pending Iron def anemia work up, will follow up. VTE  Prophylaxis:   Pharmacologic: in place  Mechanical VTE Prophylaxis in Place: Yes    Patient Centered Rounds: I have performed bedside rounds with nursing staff today. Discussions with Specialists or Other Care Team Provider: case management    Education and Discussions with Family / Patient: pt      Current Length of Stay: 17 day(s)    Current Patient Status: Inpatient        Code Status: Level 3 - DNAR and DNI    Discharge Plan: Pt will require continued inpatient hospitalization. Subjective:     Pt afebrile, states gouty pain is moderate. Patient is seen and examined at bedside. All other ROS are negative. Objective:     Vitals:   Temp (24hrs), Av.6 °F (36.4 °C), Min:96.9 °F (36.1 °C), Max:98 °F (36.7 °C)    Temp:  [96.9 °F (36.1 °C)-98 °F (36.7 °C)] 98 °F (36.7 °C)  HR:  [67-75] 67  Resp:  [15-18] 16  BP: (134-152)/(67-72) 144/67  SpO2:  [92 %-97 %] 92 %  Body mass index is 30.67 kg/m². Input and Output Summary (last 24 hours): Intake/Output Summary (Last 24 hours) at 2023 0838  Last data filed at 2023 0616  Gross per 24 hour   Intake 240 ml   Output 2025 ml   Net -1785 ml       Physical Exam:       GEN: No acute distress, comfortable  HEEENT: No JVD, PERRLA, no scleral icterus  RESP: Lungs clear to auscultation bilaterally  CV: RRR, +s1/s2   ABD: SOFT NON TENDER, POSITIVE BOWEL SOUNDS, NO DISTENTION  PSYCH: CALM, apparent dementia.   NEURO: Mentation baseline, NO FOCAL DEFICITS  SKIN: NO RASH  EXTREM: NO EDEMA    Additional Data:     Labs:    Results from last 7 days   Lab Units 23  0350 23  9905 09/09/23  0540   WBC Thousand/uL 12.96*   < > 16.63*   HEMOGLOBIN g/dL 9.3*   < > 7.9*   HEMATOCRIT % 27.7*   < > 24.4*   PLATELETS Thousands/uL 401*   < > 350   NEUTROS PCT %  --   --  87*   LYMPHS PCT %  --   --  8*   LYMPHO PCT % 26  --   --    MONOS PCT %  --   --  3*   MONO PCT % 8  --   --    EOS PCT % 0  --  0    < > = values in this interval not displayed. Results from last 7 days   Lab Units 09/12/23  0350   SODIUM mmol/L 138   POTASSIUM mmol/L 3.5   CHLORIDE mmol/L 93*   CO2 mmol/L 35*   BUN mg/dL 103*   CREATININE mg/dL 2.16*   ANION GAP mmol/L 10   CALCIUM mg/dL 9.0   ALBUMIN g/dL 2.9*   TOTAL BILIRUBIN mg/dL 0.43   ALK PHOS U/L 81   ALT U/L 26   AST U/L 34   GLUCOSE RANDOM mg/dL 160*     Results from last 7 days   Lab Units 09/06/23  2104   INR  2.67*                   Lines/Drains:  Invasive Devices     Peripheral Intravenous Line  Duration           Peripheral IV 09/09/23 Dorsal (posterior); Left Wrist 3 days    Peripheral IV 09/09/23 Dorsal (posterior); Right Hand 2 days                Telemetry:        * I Have Reviewed All Lab Data Listed Above. Imaging:     Results for orders placed during the hospital encounter of 08/26/23    XR chest portable    Narrative  CHEST    INDICATION:   NGT placement. COMPARISON: Chest CT 9/2/2023, CXR 9/1/2023. EXAM PERFORMED/VIEWS:  XR CHEST PORTABLE. FINDINGS: NG tube in stomach. Mild cardiomegaly. Question mild left base atelectasis. No effusion or pneumothorax. Upper abdomen normal. Bones normal for age. Impression  Question mild left base atelectasis. NG tube in stomach. Workstation performed: [id number]    Results for orders placed during the hospital encounter of 01/10/20    XR chest 2 views    Narrative  CHEST    INDICATION:   Chest Pain. COMPARISON:  3/24/2017    EXAM PERFORMED/VIEWS:  XR CHEST PA & LATERAL  Images: 2    FINDINGS:    Cardiomediastinal silhouette appears unremarkable. No congestive failure.   No pneumothorax. No pneumonia. No effusions. Osseous structures appear within normal limits for patient age. Impression  No acute cardiopulmonary disease.         Workstation performed: [id number]      *I have reviewed all imaging reports listed above      Recent Cultures (last 7 days):     Results from last 7 days   Lab Units 09/08/23  0930 09/06/23  1802   GRAM STAIN RESULT  1+ Polys  No bacteria seen 2+ Polys  No organisms seen   BODY FLUID CULTURE, STERILE  No growth No growth       Last 24 Hours Medication List:   Current Facility-Administered Medications   Medication Dose Route Frequency Provider Last Rate   • acetaminophen  650 mg Oral Q6H PRN James Pritchett PA-C     • aluminum-magnesium hydroxide-simethicone  30 mL Oral Q6H PRN James Pritchett PA-C     • apixaban  2.5 mg Oral BID Chandni Blandon MD     • aspirin  81 mg Oral Daily James Pritchett PA-C     • diltiazem  240 mg Oral Daily Jassi Holguin PA-C     • diltiazem  10 mg Intravenous BID PRN Chandni Blandon MD     • fluticasone  1 spray Nasal Daily James Pritchett PA-C     • folic acid  1 mg Oral Daily James Pritchett PA-C     • guaiFENesin  600 mg Oral BID James Pritchett PA-C     • lactulose  200 g Rectal BID PRN Rachana Willett PA-C     • magnesium Oxide  400 mg Oral Daily James Pritchett PA-C     • metoprolol tartrate  25 mg Oral Q12H Azar Gabriel MD     • ondansetron  4 mg Intravenous Q6H PRN James Pirtchett PA-C     • oxyCODONE  5 mg Oral Q6H PRN Mushtaq Whitt MD     • pantoprazole  40 mg Oral Early Morning Hansel Aaron MD     • [START ON 9/13/2023] predniSONE  20 mg Oral Daily Chandni Blandon MD      And   • [START ON 9/17/2023] predniSONE  10 mg Oral Daily Chandni Blandon MD     • saccharomyces boulardii  250 mg Oral BID Dalila Holloway PA-C     • senna  1 tablet Oral HS James Pritchett PA-C          Today, Patient Was Seen By: Hansel Aaron MD    ** Please Note: Dictation voice to text software may have been used in the creation of this document.  **

## 2023-09-13 LAB
ALBUMIN SERPL BCP-MCNC: 2.9 G/DL (ref 3.5–5)
ALP SERPL-CCNC: 70 U/L (ref 34–104)
ALT SERPL W P-5'-P-CCNC: 20 U/L (ref 7–52)
ANION GAP SERPL CALCULATED.3IONS-SCNC: 9 MMOL/L
AST SERPL W P-5'-P-CCNC: 17 U/L (ref 13–39)
BASOPHILS # BLD MANUAL: 0 THOUSAND/UL (ref 0–0.1)
BASOPHILS NFR MAR MANUAL: 0 % (ref 0–1)
BILIRUB SERPL-MCNC: 0.44 MG/DL (ref 0.2–1)
BUN SERPL-MCNC: 97 MG/DL (ref 5–25)
CALCIUM ALBUM COR SERPL-MCNC: 9.9 MG/DL (ref 8.3–10.1)
CALCIUM SERPL-MCNC: 9 MG/DL (ref 8.4–10.2)
CHLORIDE SERPL-SCNC: 93 MMOL/L (ref 96–108)
CO2 SERPL-SCNC: 35 MMOL/L (ref 21–32)
CREAT SERPL-MCNC: 2.04 MG/DL (ref 0.6–1.3)
EOSINOPHIL # BLD MANUAL: 0 THOUSAND/UL (ref 0–0.4)
EOSINOPHIL NFR BLD MANUAL: 0 % (ref 0–6)
ERYTHROCYTE [DISTWIDTH] IN BLOOD BY AUTOMATED COUNT: 13.3 % (ref 11.6–15.1)
GFR SERPL CREATININE-BSD FRML MDRD: 28 ML/MIN/1.73SQ M
GLUCOSE SERPL-MCNC: 252 MG/DL (ref 65–140)
HCT VFR BLD AUTO: 26.7 % (ref 36.5–49.3)
HGB BLD-MCNC: 8.6 G/DL (ref 12–17)
LYMPHOCYTES # BLD AUTO: 16 % (ref 14–44)
LYMPHOCYTES # BLD AUTO: 2.61 THOUSAND/UL (ref 0.6–4.47)
MCH RBC QN AUTO: 30.6 PG (ref 26.8–34.3)
MCHC RBC AUTO-ENTMCNC: 32.2 G/DL (ref 31.4–37.4)
MCV RBC AUTO: 95 FL (ref 82–98)
METAMYELOCYTES NFR BLD MANUAL: 1 % (ref 0–1)
MONOCYTES # BLD AUTO: 0.65 THOUSAND/UL (ref 0–1.22)
MONOCYTES NFR BLD: 4 % (ref 4–12)
NEUTROPHILS # BLD MANUAL: 12.9 THOUSAND/UL (ref 1.85–7.62)
NEUTS SEG NFR BLD AUTO: 79 % (ref 43–75)
PLATELET # BLD AUTO: 347 THOUSANDS/UL (ref 149–390)
PLATELET BLD QL SMEAR: ADEQUATE
PMV BLD AUTO: 9.6 FL (ref 8.9–12.7)
POTASSIUM SERPL-SCNC: 3.4 MMOL/L (ref 3.5–5.3)
PROT SERPL-MCNC: 6.1 G/DL (ref 6.4–8.4)
RBC # BLD AUTO: 2.81 MILLION/UL (ref 3.88–5.62)
RBC MORPH BLD: NORMAL
SODIUM SERPL-SCNC: 137 MMOL/L (ref 135–147)
WBC # BLD AUTO: 16.33 THOUSAND/UL (ref 4.31–10.16)

## 2023-09-13 PROCEDURE — 99232 SBSQ HOSP IP/OBS MODERATE 35: CPT | Performed by: INTERNAL MEDICINE

## 2023-09-13 PROCEDURE — 99232 SBSQ HOSP IP/OBS MODERATE 35: CPT | Performed by: HOSPITALIST

## 2023-09-13 PROCEDURE — 85027 COMPLETE CBC AUTOMATED: CPT | Performed by: HOSPITALIST

## 2023-09-13 PROCEDURE — 97530 THERAPEUTIC ACTIVITIES: CPT

## 2023-09-13 PROCEDURE — 80053 COMPREHEN METABOLIC PANEL: CPT | Performed by: HOSPITALIST

## 2023-09-13 PROCEDURE — 85007 BL SMEAR W/DIFF WBC COUNT: CPT | Performed by: HOSPITALIST

## 2023-09-13 RX ORDER — POTASSIUM CHLORIDE 20 MEQ/1
40 TABLET, EXTENDED RELEASE ORAL ONCE
Status: COMPLETED | OUTPATIENT
Start: 2023-09-13 | End: 2023-09-13

## 2023-09-13 RX ADMIN — MAGNESIUM OXIDE TAB 400 MG (241.3 MG ELEMENTAL MG) 400 MG: 400 (241.3 MG) TAB at 09:02

## 2023-09-13 RX ADMIN — POTASSIUM CHLORIDE 40 MEQ: 1500 TABLET, EXTENDED RELEASE ORAL at 09:02

## 2023-09-13 RX ADMIN — FOLIC ACID 1 MG: 1 TABLET ORAL at 09:02

## 2023-09-13 RX ADMIN — Medication 250 MG: at 09:02

## 2023-09-13 RX ADMIN — PREDNISONE 20 MG: 20 TABLET ORAL at 09:03

## 2023-09-13 RX ADMIN — SENNOSIDES 8.6 MG: 8.6 TABLET, FILM COATED ORAL at 21:33

## 2023-09-13 RX ADMIN — PANTOPRAZOLE SODIUM 40 MG: 40 TABLET, DELAYED RELEASE ORAL at 06:18

## 2023-09-13 RX ADMIN — GUAIFENESIN 600 MG: 600 TABLET ORAL at 17:33

## 2023-09-13 RX ADMIN — ASPIRIN 81 MG: 81 TABLET, COATED ORAL at 09:02

## 2023-09-13 RX ADMIN — GUAIFENESIN 600 MG: 600 TABLET ORAL at 09:02

## 2023-09-13 RX ADMIN — DILTIAZEM HYDROCHLORIDE 240 MG: 240 CAPSULE, COATED, EXTENDED RELEASE ORAL at 09:03

## 2023-09-13 RX ADMIN — APIXABAN 2.5 MG: 2.5 TABLET, FILM COATED ORAL at 17:33

## 2023-09-13 RX ADMIN — APIXABAN 2.5 MG: 2.5 TABLET, FILM COATED ORAL at 09:02

## 2023-09-13 RX ADMIN — METOPROLOL TARTRATE 25 MG: 25 TABLET, FILM COATED ORAL at 09:03

## 2023-09-13 RX ADMIN — FLUTICASONE PROPIONATE 1 SPRAY: 50 SPRAY, METERED NASAL at 09:03

## 2023-09-13 RX ADMIN — METOPROLOL TARTRATE 25 MG: 25 TABLET, FILM COATED ORAL at 21:32

## 2023-09-13 RX ADMIN — Medication 250 MG: at 17:33

## 2023-09-13 NOTE — PLAN OF CARE
Problem: Potential for Falls  Goal: Patient will remain free of falls  Description: INTERVENTIONS:  - Educate patient/family on patient safety including physical limitations  - Instruct patient to call for assistance with activity   - Consult OT/PT to assist with strengthening/mobility   - Keep Call bell within reach  - Keep bed low and locked with side rails adjusted as appropriate  - Keep care items and personal belongings within reach  - Initiate and maintain comfort rounds  - Make Fall Risk Sign visible to staff  - Offer Toileting every 2 Hours, in advance of need  - Initiate/Maintain bed alarm  - Obtain necessary fall risk management equipment:   - Apply yellow socks and bracelet for high fall risk patients  - Consider moving patient to room near nurses station  Outcome: Progressing     Problem: PAIN - ADULT  Goal: Verbalizes/displays adequate comfort level or baseline comfort level  Description: Interventions:  - Encourage patient to monitor pain and request assistance  - Assess pain using appropriate pain scale  - Administer analgesics based on type and severity of pain and evaluate response  - Implement non-pharmacological measures as appropriate and evaluate response  - Consider cultural and social influences on pain and pain management  - Notify physician/advanced practitioner if interventions unsuccessful or patient reports new pain  Outcome: Progressing     Problem: INFECTION - ADULT  Goal: Absence or prevention of progression during hospitalization  Description: INTERVENTIONS:  - Assess and monitor for signs and symptoms of infection  - Monitor lab/diagnostic results  - Monitor all insertion sites, i.e. indwelling lines, tubes, and drains  - Monitor endotracheal if appropriate and nasal secretions for changes in amount and color  - [place] appropriate cooling/warming therapies per order  - Administer medications as ordered  - Instruct and encourage patient and family to use good hand hygiene technique  - Identify and instruct in appropriate isolation precautions for identified infection/condition  Outcome: Progressing  Goal: Absence of fever/infection during neutropenic period  Description: INTERVENTIONS:  - Monitor WBC    Outcome: Progressing     Problem: SAFETY ADULT  Goal: Maintain or return to baseline ADL function  Description: INTERVENTIONS:  -  Assess patient's ability to carry out ADLs; assess patient's baseline for ADL function and identify physical deficits which impact ability to perform ADLs (bathing, care of mouth/teeth, toileting, grooming, dressing, etc.)  - Assess/evaluate cause of self-care deficits   - Assess range of motion  - Assess patient's mobility; develop plan if impaired  - Assess patient's need for assistive devices and provide as appropriate  - Encourage maximum independence but intervene and supervise when necessary  - Involve family in performance of ADLs  - Assess for home care needs following discharge   - Consider OT consult to assist with ADL evaluation and planning for discharge  - Provide patient education as appropriate  Outcome: Progressing  Goal: Maintains/Returns to pre admission functional level  Description: INTERVENTIONS:  - Perform BMAT or MOVE assessment daily.   - Set and communicate daily mobility goal to care team and patient/family/caregiver. - Collaborate with rehabilitation services on mobility goals if consulted  - Perform Range of Motion 3 times a day. - Reposition patient every 2 hours.   - Dangle patient 2 times a day  - Stand patient 2 times a day  - Ambulate patient 2 times a day  - Out of bed to chair 2 times a day   - Out of bed for meals 2 times a day  - Out of bed for toileting  - Record patient progress and toleration of activity level   Outcome: Progressing     Problem: DISCHARGE PLANNING  Goal: Discharge to home or other facility with appropriate resources  Description: INTERVENTIONS:  - Identify barriers to discharge w/patient and caregiver  - Arrange for needed discharge resources and transportation as appropriate  - Identify discharge learning needs (meds, wound care, etc.)  - Arrange for interpretive services to assist at discharge as needed  - Refer to Case Management Department for coordinating discharge planning if the patient needs post-hospital services based on physician/advanced practitioner order or complex needs related to functional status, cognitive ability, or social support system  Outcome: Progressing     Problem: Knowledge Deficit  Goal: Patient/family/caregiver demonstrates understanding of disease process, treatment plan, medications, and discharge instructions  Description: Complete learning assessment and assess knowledge base.   Interventions:  - Provide teaching at level of understanding  - Provide teaching via preferred learning methods  Outcome: Progressing     Problem: Prexisting or High Potential for Compromised Skin Integrity  Goal: Skin integrity is maintained or improved  Description: INTERVENTIONS:  - Identify patients at risk for skin breakdown  - Assess and monitor skin integrity  - Assess and monitor nutrition and hydration status  - Monitor labs   - Assess for incontinence   - Turn and reposition patient  - Assist with mobility/ambulation  - Relieve pressure over bony prominences  - Avoid friction and shearing  - Provide appropriate hygiene as needed including keeping skin clean and dry  - Evaluate need for skin moisturizer/barrier cream  - Collaborate with interdisciplinary team   - Patient/family teaching  - Consider wound care consult   Outcome: Progressing     Problem: MOBILITY - ADULT  Goal: Maintain or return to baseline ADL function  Description: INTERVENTIONS:  -  Assess patient's ability to carry out ADLs; assess patient's baseline for ADL function and identify physical deficits which impact ability to perform ADLs (bathing, care of mouth/teeth, toileting, grooming, dressing, etc.)  - Assess/evaluate cause of self-care deficits   - Assess range of motion  - Assess patient's mobility; develop plan if impaired  - Assess patient's need for assistive devices and provide as appropriate  - Encourage maximum independence but intervene and supervise when necessary  - Involve family in performance of ADLs  - Assess for home care needs following discharge   - Consider OT consult to assist with ADL evaluation and planning for discharge  - Provide patient education as appropriate  Outcome: Progressing  Goal: Maintains/Returns to pre admission functional level  Description: INTERVENTIONS:  - Perform BMAT or MOVE assessment daily.   - Set and communicate daily mobility goal to care team and patient/family/caregiver. - Collaborate with rehabilitation services on mobility goals if consulted  - Perform Range of Motion 3 times a day. - Reposition patient every 2 hours. - Dangle patient 2 times a day  - Stand patient 2 times a day  - Ambulate patient 2 times a day  - Out of bed to chair 2 times a day   - Out of bed for meals 2 times a day  - Out of bed for toileting  - Record patient progress and toleration of activity level   Outcome: Progressing     Problem: Nutrition/Hydration-ADULT  Goal: Nutrient/Hydration intake appropriate for improving, restoring or maintaining nutritional needs  Description: Monitor and assess patient's nutrition/hydration status for malnutrition. Collaborate with interdisciplinary team and initiate plan and interventions as ordered. Monitor patient's weight and dietary intake as ordered or per policy. Utilize nutrition screening tool and intervene as necessary. Determine patient's food preferences and provide high-protein, high-caloric foods as appropriate.      INTERVENTIONS:  - Monitor oral intake, urinary output, labs, and treatment plans  - Assess nutrition and hydration status and recommend course of action  - Evaluate amount of meals eaten  - Assist patient with eating if necessary   - Allow adequate time for meals  - Recommend/ encourage appropriate diets, oral nutritional supplements, and vitamin/mineral supplements  - Order, calculate, and assess calorie counts as needed  - Recommend, monitor, and adjust tube feedings and TPN/PPN based on assessed needs  - Assess need for intravenous fluids  - Provide specific nutrition/hydration education as appropriate  - Include patient/family/caregiver in decisions related to nutrition  Outcome: Progressing

## 2023-09-13 NOTE — PLAN OF CARE
Problem: PAIN - ADULT  Goal: Verbalizes/displays adequate comfort level or baseline comfort level  Description: Interventions:  - Encourage patient to monitor pain and request assistance  - Assess pain using appropriate pain scale  - Administer analgesics based on type and severity of pain and evaluate response  - Implement non-pharmacological measures as appropriate and evaluate response  - Consider cultural and social influences on pain and pain management  - Notify physician/advanced practitioner if interventions unsuccessful or patient reports new pain  Outcome: Progressing     Problem: INFECTION - ADULT  Goal: Absence or prevention of progression during hospitalization  Description: INTERVENTIONS:  - Assess and monitor for signs and symptoms of infection  - Monitor lab/diagnostic results  - Monitor all insertion sites, i.e. indwelling lines, tubes, and drains  - Monitor endotracheal if appropriate and nasal secretions for changes in amount and color  - [place] appropriate cooling/warming therapies per order  - Administer medications as ordered  - Instruct and encourage patient and family to use good hand hygiene technique  - Identify and instruct in appropriate isolation precautions for identified infection/condition  Outcome: Progressing     Problem: Prexisting or High Potential for Compromised Skin Integrity  Goal: Skin integrity is maintained or improved  Description: INTERVENTIONS:  - Identify patients at risk for skin breakdown  - Assess and monitor skin integrity  - Assess and monitor nutrition and hydration status  - Monitor labs   - Assess for incontinence   - Turn and reposition patient  - Assist with mobility/ambulation  - Relieve pressure over bony prominences  - Avoid friction and shearing  - Provide appropriate hygiene as needed including keeping skin clean and dry  - Evaluate need for skin moisturizer/barrier cream  - Collaborate with interdisciplinary team   - Patient/family teaching  - Consider wound care consult   Outcome: Progressing     Problem: Nutrition/Hydration-ADULT  Goal: Nutrient/Hydration intake appropriate for improving, restoring or maintaining nutritional needs  Description: Monitor and assess patient's nutrition/hydration status for malnutrition. Collaborate with interdisciplinary team and initiate plan and interventions as ordered. Monitor patient's weight and dietary intake as ordered or per policy. Utilize nutrition screening tool and intervene as necessary. Determine patient's food preferences and provide high-protein, high-caloric foods as appropriate.      INTERVENTIONS:  - Monitor oral intake, urinary output, labs, and treatment plans  - Assess nutrition and hydration status and recommend course of action  - Evaluate amount of meals eaten  - Assist patient with eating if necessary   - Allow adequate time for meals  - Recommend/ encourage appropriate diets, oral nutritional supplements, and vitamin/mineral supplements  - Order, calculate, and assess calorie counts as needed  - Recommend, monitor, and adjust tube feedings and TPN/PPN based on assessed needs  - Assess need for intravenous fluids  - Provide specific nutrition/hydration education as appropriate  - Include patient/family/caregiver in decisions related to nutrition  Outcome: Progressing

## 2023-09-13 NOTE — CASE MANAGEMENT
Case Management Discharge Planning Note    Patient name Sharon Holliday  Location /-60 MRN [id number]  : 1935 Date 2023       Current Admission Date: 2023  Current Admission Diagnosis:Chronic obstructive pulmonary disease, unspecified COPD type [facility])   Patient Active Problem List    Diagnosis Date Noted   • Chronic renal impairment    • Ileus ([address]) 2023   • Acute gout of multiple sites 2023   • Sacral wound 2023   • Sepsis ([address]) 2023   • ESBL (extended spectrum beta-lactamase) producing bacteria infection 2023   • Olecranon bursitis 2023   • Abnormal computed tomography angiography (CTA) of abdomen and pelvis 2023   • Acute encephalopathy 2023   • Leukocytosis 2023   • Central retinal vein occlusion of right eye 2023   • History of COVID-19 2023   • History of alcohol abuse 2023   • Cystitis 2023   • Chronic obstructive pulmonary disease, unspecified COPD type ([address]) 2021   • TOBI (acute kidney injury) ([address]) 2021   • Thrombocytopenia, unspecified ([address]) 2021   • CVA (cerebral vascular accident) ([address]) 01/10/2020   • GERD (gastroesophageal reflux disease) 2018   • Ambulatory dysfunction 2017   • Accelerated hypertension 2017   • Atrial fibrillation ([address]) 2017   • 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   • Acute ischemic stroke ([address]) 03/10/2017   • Hyponatremia 02/15/2017   • Chronic sinusitis 2016   • Rheumatoid arthritis ([address]) 2016   • Allergic rhinitis 2015   • Generalized osteoarthritis of multiple sites 2014   • Hyperglycemia 2014   • Eczema 2013   • Anemia of chronic disease 2013   • Edema 2013   • Gait disturbance 2013   • Hypomagnesemia 2013   • Peripheral neuropathy 2013      LOS (days): 18  Geometric Mean LOS (GMLOS) (days): 4.00  Days to GMLOS:-13.7     OBJECTIVE:  Risk of Unplanned Readmission Score: 24.44         Current admission status: Inpatient   Preferred Pharmacy:   [address], [address] [facility] [address]  [address] [facility] [address]  Phone: [phone] Fax: [phone]    [facility]  [address]  [address] [address]  Phone: [phone] Fax: [phone]    Primary Care Provider: Robert Turcios DO    Primary Insurance: MEDICARE  Secondary Insurance: Abhinav Cabral DETAILS:    IMM Given (Date):: 09/13/23  IMM Given to[de-identified] Family     Additional Comments: Updated [facility] admissions via [address] [place] [address] of Pt's medical status and tentative discharge for Thursday or Friday. Met with Pt's wife at bedside and informed of tentative discharge back to [facility] either Thursday or Friday. Pt's wife in agreement.

## 2023-09-13 NOTE — PLAN OF CARE
Problem: OCCUPATIONAL THERAPY ADULT  Goal: Performs self-care activities at highest level of function for planned discharge setting. See evaluation for individualized goals. Description: Treatment Interventions: ADL retraining, Functional transfer training, Endurance training, Patient/family training, Equipment evaluation/education, Compensatory technique education, Continued evaluation          See flowsheet documentation for full assessment, interventions and recommendations. Outcome: Not Progressing  Note: Limitation: Decreased ADL status, Decreased UE strength, Decreased UE ROM, Decreased endurance, Decreased cognition, Decreased self-care trans, Decreased high-level ADLs  Prognosis: Poor  Assessment: Pt seen for OT tx session with focus on functional balance, ADL status, and unsupported sitting tolerance, & sitting balance. Patient agreeable to OT treatment session. Pt received supine in bed. Pt required Max Ax2 for supine <> sit. Pt varied between [address] [place] [address] for static sitting balance. Pt able to tolerate for approx 10 min before becoming fatigued. Pt required Min A for grooming while sitting EOB. Patient continues to be functioning below baseline level, occupational performance remains limited secondary to factors listed above, and pt at increased risk for falls and injury. The patient's raw score on the AM-PAC Daily Activity inpatient short form is 10, standardized score is 29.04, less than 39.4. Patients at this level are likely to benefit from DC to post-acute rehabilitation services. Please refer to the recommendation of the Occupational Therapist for safe DC planning. From OT standpoint, recommendation at time of D/C would be DC with Level II resources. Patient to benefit from continued Occupational Therapy treatment while in the hospital to address deficits as defined above and maximize level of functional independence with ADLs and functional mobility.  Pt left semi- supine in bed with call bell in reach, tray table in reach, needs met, bed alarm activated, SCDs on, RN informed.

## 2023-09-13 NOTE — OCCUPATIONAL THERAPY NOTE
Occupational Therapy Tx Note     Patient Name: Henrik Owens  DCUZA'D Date: 9/13/2023  Problem List  Active Problems:    Anemia of chronic disease    Atrial fibrillation (HCC)    Chronic obstructive pulmonary disease, unspecified COPD type ([address])    TOBI (acute kidney injury) ([address])    Central retinal vein occlusion of right eye    History of alcohol abuse    Acute encephalopathy    Olecranon bursitis    Abnormal computed tomography angiography (CTA) of abdomen and pelvis    Sepsis (HCC)    ESBL (extended spectrum beta-lactamase) producing bacteria infection    Sacral wound    Acute gout of multiple sites    Ileus ([address])    Chronic renal impairment              09/13/23 1253   OT Last Visit   OT Visit Date 09/13/23   Note Type   Note Type Treatment for insurance authorization   Pain Assessment   Pain Assessment Tool 0-10   Pain Score No Pain   Restrictions/Precautions   Weight Bearing Precautions Per Order No   Other Precautions Contact/isolation;Cognitive; Bed Alarm;Telemetry; Fall Risk   ADL   Where Assessed Edge of bed   Grooming Assistance 4  Minimal Assistance   Grooming Deficit Wash/dry face; Supervision/safety; Increased time to complete   Grooming Comments Pt required Min A for thoroughness to wash face. Pt able to dry face & don glasses with set-up. Bed Mobility   Supine to Sit 2  Maximal assistance   Additional items Assist x 2; Increased time required;Verbal cues;LE management;HOB elevated   Sit to Supine 2  Maximal assistance   Additional items Assist x 2;Verbal cues;LE management; Increased time required   Additional Comments Pt sat EOB x10 min, varying between S & Min A for static sitting balance. Pt able to participate in grooming tasks while sitting EOB. Transfers   Sit to Stand Unable to assess   Subjective   Subjective "I'm ready to get out of here"   Cognition   Overall Cognitive Status Impaired   Arousal/Participation Alert; Cooperative   Attention Within functional limits   Orientation Level Oriented to person;Oriented to place; Disoriented to time;Disoriented to situation   Memory Decreased recall of precautions;Decreased recall of recent events   Following Commands Follows one step commands with increased time or repetition   Activity Tolerance   Activity Tolerance Patient limited by fatigue   Medical Staff Made Aware PT JANE Cruz   Assessment   Assessment Pt seen for OT tx session with focus on functional balance, ADL status, and unsupported sitting tolerance, & sitting balance. Patient agreeable to OT treatment session. Pt received supine in bed. Pt required Max Ax2 for supine <> sit. Pt varied between [address] [place] [address] for static sitting balance. Pt able to tolerate for approx 10 min before becoming fatigued. Pt required Min A for grooming while sitting EOB. Patient continues to be functioning below baseline level, occupational performance remains limited secondary to factors listed above, and pt at increased risk for falls and injury. The patient's raw score on the AM-PAC Daily Activity inpatient short form is 10, standardized score is 29.04, less than 39.4. Patients at this level are likely to benefit from DC to post-acute rehabilitation services. Please refer to the recommendation of the Occupational Therapist for safe DC planning. From OT standpoint, recommendation at time of D/C would be DC with Level II resources. Patient to benefit from continued Occupational Therapy treatment while in the hospital to address deficits as defined above and maximize level of functional independence with ADLs and functional mobility. Pt left semi- supine in bed with call bell in reach, tray table in reach, needs met, bed alarm activated, SCDs on, RN informed. Plan   Treatment Interventions ADL retraining;Functional transfer training; Endurance training;Patient/family training;Equipment evaluation/education; Compensatory technique education;Continued evaluation   Goal Expiration Date 09/23/23   OT Treatment Day 3   OT Frequency 3-5x/wk   Recommendation   UB Rehab Discharge Recommendation (PT/OT) Level 2   AM-PAC Daily Activity Inpatient   Lower Body Dressing 1   Bathing 2   Toileting 1   Upper Body Dressing 2   Grooming 2   Eating 2   Daily Activity Raw Score 10   Turning Head Towards Sound 3   Follow Simple Instructions 3   Low Function Daily Activity Raw Score 16   Low Function Daily Activity Standardized Score  27.65   AM-PAC Applied Cognition Inpatient   Following a Speech/Presentation 2   Understanding Ordinary Conversation 3   Taking Medications 2   Remembering Where Things Are Placed or Put Away 2   Remembering List of 4-5 Errands 2   Taking Care of Complicated Tasks 2   Applied Cognition Raw Score 13   Applied Cognition Standardized Score 30.46   End of Consult   Education Provided Yes   Patient Position at End of Consult Supine;Bed/Chair alarm activated; All needs within reach   Nurse Communication Nurse aware of consult       Pt will achieve the following goals within 10 days. *Pt will complete grooming with set-up. *Pt will complete UB bathing and dressing with Mod A.    *Pt will complete LB bathing and dressing with Max A & DME PRN. *Pt will complete toileting w/ Max A w/ G hygiene/thoroughness using DME PRN    *Pt will complete bed mobility with Mod Ax1, with HOB elevated & use of side rails PRN to prep for purposeful tasks    *Pt will sit on EOB for 15 minutes with S for increased safety with seated activity tolerance during ADL tasks. *Pt will improve functional activity tolerance to 20 minutes of sustained functional tasks to increase participation in basic self-care and decrease assistance level. *Pt will orient self x 4 with minimal verbal cues to increase overall awareness and promote safety with ADL/IADL tasks.        Steff Lizarraga, OTR/L

## 2023-09-13 NOTE — ASSESSMENT & PLAN NOTE
Lab Results   Component Value Date    EGFR 28 09/13/2023    EGFR 26 09/12/2023    EGFR 23 09/11/2023    CREATININE 2.04 (H) 09/13/2023    CREATININE 2.16 (H) 09/12/2023    CREATININE 2.43 (H) 09/11/2023   · Baseline creatinine 1.6-1.9, POA- 1.7  · Likely secondary to autoregulatory failure, ATN secondary to sepsis, medications  · S/p Lasix drip  · ivf per nephrology, currently off  · Nephrology following. IMPROVING.    · Urinary retention protocol  · I/O monitoring  · Renally dose medications

## 2023-09-13 NOTE — ASSESSMENT & PLAN NOTE
· Went into A-fib with RVR on August 28 and was given IV Lopressor and Cardizem  · Home regimen: Coreg 25 Mg twice daily  · Started on Eliquis 2.5 mg twice daily for anticoagulation during hospitalization at [facility] 8/8-8/23  · Cardiology consult appreciated  · Rate controlled  · Will order Cardizem IV as PRN for poor oral intake  · Cont eliquis

## 2023-09-13 NOTE — PROGRESS NOTES
[facility]  Progress Note  Name: Angel Calzada  MRN: [id number]  Unit/Bed#: -01 I Date of Admission: 8/26/2023   Date of Service: 9/13/2023 I Hospital Day: 18    Assessment/Plan   Chronic renal impairment  Assessment & Plan  Lab Results   Component Value Date    EGFR 28 09/13/2023    EGFR 26 09/12/2023    EGFR 23 09/11/2023    CREATININE 2.04 (H) 09/13/2023    CREATININE 2.16 (H) 09/12/2023    CREATININE 2.43 (H) 09/11/2023       Ileus (HCC)  Assessment & Plan  Abdominal distension with X ray KUB showing left lower quadrant loop distension- 9/7  Ileus demonstrated  X ray KUB done showed trace contrast seen in rectosigmoid junction, contrast seen progressing well to the level of distal sigmoid colon. Advance diet to soft surgical  Serial abdominal exams        Acute gout of multiple sites  Assessment & Plan  Left 3rd MCP joint, PIP, Elbow joint  Tender to touch, erythematous  Started steroid taper    Sacral wound  Assessment & Plan  Wound care consulted  Appreciate recs    ESBL (extended spectrum beta-lactamase) producing bacteria infection  Assessment & Plan  Urine CX from 8/27- grew ESBL  Finished treatment        Sepsis [facility])  Assessment & Plan  Meets criteria with leukocytosis, tachycardia  Likely secondary to ESBL UTI/Septic olecranon bursitis, possible sepsis is now resolved . More likely SIRS   F/u Blood Cx- Negative growth so far, wound Cx- growing coag negative staph  RESOLVING. Stable thus far off  Dapto, leukocytosis improving and likely 2/2 steroid exposure for gout. Abnormal computed tomography angiography (CTA) of abdomen and pelvis  Assessment & Plan  CT abdomen pelvis showed-  Cholelithiasis with possible impacted stone at the neck, gallbladder wall thickening and/or pericholecystic edema and sigmoid colon stricturing. No abdominal tenderness, Surgery and GI recommends follow up with serial abdominal exams.  No plan for surgery as it could be likely chronic problem        Olecranon bursitis  Assessment & Plan  Wound culture ordered, so far Cx yielded no growth  ID and ortho following  Follow-up on synovial fluid, sent for culture, Gram stain, cell count, crystals. Synovial fluid positive for monosodium urate crystals  Dapto discontinued after discussing with ID  Cont steroid taper. Acute encephalopathy  Assessment & Plan  Patient is confused likely acute metabolic encephalopathy  Frequent reorientation  More awake today  Monitor CBC, CMP  Cultures, VBG, ammonia, CT head normal        History of alcohol abuse  Assessment & Plan  · History of alcohol abuse with withdrawal seizures, has not drink since prior to [facility] hospitalization 8/8    Central retinal vein occlusion of right eye  Assessment & Plan  · Seen by ophthalmology during recent [facility] admission, diagnosed with CRVO and ocular hypertension  · Patient recommended for formal evaluation outpatient of OCT macular degeneration, discussion of possible anti-VEGF therapy  · Encouraged ophthalmology follow-up outpatient    TOBI (acute kidney injury) [facility])  Assessment & Plan  Lab Results   Component Value Date    EGFR 28 09/13/2023    EGFR 26 09/12/2023    EGFR 23 09/11/2023    CREATININE 2.04 (H) 09/13/2023    CREATININE 2.16 (H) 09/12/2023    CREATININE 2.43 (H) 09/11/2023   · Baseline creatinine 1.6-1.9, POA- 1.7  · Likely secondary to autoregulatory failure, ATN secondary to sepsis, medications  · S/p Lasix drip  · ivf per nephrology, currently off  · Nephrology following. IMPROVING.    · Urinary retention protocol  · I/O monitoring  · Renally dose medications          Chronic obstructive pulmonary disease, unspecified COPD type ([address])  Assessment & Plan  · Not on maintenance inhalers outpatient  · No signs of acute exacerbation on admission  · If patient were to develop wheezing in setting of pneumonia, would start Xopenex/Atrovent nebulizers    Atrial fibrillation [facility])  Assessment & Plan  · Carola Brody into A-fib with RVR on  and was given IV Lopressor and Cardizem  · Home regimen: Coreg 25 Mg twice daily  · Started on Eliquis 2.5 mg twice daily for anticoagulation during hospitalization at [facility] -  · Cardiology consult appreciated  · Rate controlled  · Will order Cardizem IV as PRN for poor oral intake  · Cont eliquis      Anemia of chronic disease  Assessment & Plan  · Hemoglobin 9.4 on admission  · Hemoglobin ranging 10-12 during recent [facility] admission  · No signs of active bleeding  · Trend CBC  Iron def anemia work up, inconlusive. VTE  Prophylaxis:   Pharmacologic: in place  Mechanical VTE Prophylaxis in Place: Yes    Patient Centered Rounds: I have performed bedside rounds with nursing staff today. Discussions with Specialists or Other Care Team Provider: case management    Education and Discussions with Family / Patient: pt wife      Current Length of Stay: 25 day(s)    Current Patient Status: Inpatient        Code Status: Level 3 - DNAR and DNI    Discharge Plan: Pt will require continued inpatient hospitalization. Subjective:   Pt states gout is only concern at this time, improving  No fevers or chills. Patient is seen and examined at bedside. All other ROS are negative. Objective:     Vitals:   Temp (24hrs), Av.6 °F (36.4 °C), Min:97.4 °F (36.3 °C), Max:97.9 °F (36.6 °C)    Temp:  [97.4 °F (36.3 °C)-97.9 °F (36.6 °C)] 97.9 °F (36.6 °C)  HR:  [75-85] 75  Resp:  [20] 20  BP: (132-149)/(62-71) 149/62  SpO2:  [97 %-98 %] 98 %  Body mass index is 30.67 kg/m². Input and Output Summary (last 24 hours):        Intake/Output Summary (Last 24 hours) at 2023 0988  Last data filed at 2023 0419  Gross per 24 hour   Intake --   Output 1950 ml   Net -1950 ml       Physical Exam:       GEN: No acute distress, comfortable  HEEENT: No JVD, PERRLA, no scleral icterus  RESP: Lungs clear to auscultation bilaterally  CV: RRR, +s1/s2 ABD: SOFT NON TENDER, POSITIVE BOWEL SOUNDS, NO DISTENTION  PSYCH: CALM, apparent dementia. NEURO: Mentation baseline, NO FOCAL DEFICITS  SKIN: NO RASH  EXTREM: NO EDEMA    Additional Data:     Labs:    Results from last 7 days   Lab Units 09/13/23  0417 09/11/23  0229 09/09/23  0540   WBC Thousand/uL 16.33*   < > 16.63*   HEMOGLOBIN g/dL 8.6*   < > 7.9*   HEMATOCRIT % 26.7*   < > 24.4*   PLATELETS Thousands/uL 347   < > 350   NEUTROS PCT %  --   --  87*   LYMPHS PCT %  --   --  8*   LYMPHO PCT % 16   < >  --    MONOS PCT %  --   --  3*   MONO PCT % 4   < >  --    EOS PCT % 0   < > 0    < > = values in this interval not displayed. Results from last 7 days   Lab Units 09/13/23  0417   SODIUM mmol/L 137   POTASSIUM mmol/L 3.4*   CHLORIDE mmol/L 93*   CO2 mmol/L 35*   BUN mg/dL 97*   CREATININE mg/dL 2.04*   ANION GAP mmol/L 9   CALCIUM mg/dL 9.0   ALBUMIN g/dL 2.9*   TOTAL BILIRUBIN mg/dL 0.44   ALK PHOS U/L 70   ALT U/L 20   AST U/L 17   GLUCOSE RANDOM mg/dL 252*     Results from last 7 days   Lab Units 09/06/23  2104   INR  2.67*                   Lines/Drains:  Invasive Devices     Peripheral Intravenous Line  Duration           Peripheral IV 09/13/23 Distal;Dorsal (posterior); Right Forearm <1 day                Telemetry:        * I Have Reviewed All Lab Data Listed Above. Imaging:     Results for orders placed during the hospital encounter of 08/26/23    XR chest portable    Narrative  CHEST    INDICATION:   NGT placement. COMPARISON: Chest CT 9/2/2023, CXR 9/1/2023. EXAM PERFORMED/VIEWS:  XR CHEST PORTABLE. FINDINGS: NG tube in stomach. Mild cardiomegaly. Question mild left base atelectasis. No effusion or pneumothorax. Upper abdomen normal. Bones normal for age. Impression  Question mild left base atelectasis. NG tube in stomach.       Workstation performed: [id number]    Results for orders placed during the hospital encounter of 01/10/20    XR chest 2 views    Narrative  CHEST    INDICATION:   Chest Pain. COMPARISON:  3/24/2017    EXAM PERFORMED/VIEWS:  XR CHEST PA & LATERAL  Images: 2    FINDINGS:    Cardiomediastinal silhouette appears unremarkable. No congestive failure. No pneumothorax. No pneumonia. No effusions. Osseous structures appear within normal limits for patient age. Impression  No acute cardiopulmonary disease.         Workstation performed: [id number]      *I have reviewed all imaging reports listed above      Recent Cultures (last 7 days):     Results from last 7 days   Lab Units 09/08/23  0930 09/06/23  1802   GRAM STAIN RESULT  1+ Polys  No bacteria seen 2+ Polys  No organisms seen   BODY FLUID CULTURE, STERILE  No growth No growth       Last 24 Hours Medication List:   Current Facility-Administered Medications   Medication Dose Route Frequency Provider Last Rate   • acetaminophen  650 mg Oral Q6H PRN Tony Pickett PA-C     • aluminum-magnesium hydroxide-simethicone  30 mL Oral Q6H PRN Tony Pickett PA-C     • apixaban  2.5 mg Oral BID Sana Jewell MD     • aspirin  81 mg Oral Daily Tony Pickett PA-C     • diltiazem  240 mg Oral Daily Jassi Holguin PA-C     • diltiazem  10 mg Intravenous BID PRN Sana Jewell MD     • fluticasone  1 spray Nasal Daily Tony Pickett PA-C     • folic acid  1 mg Oral Daily Tony Pickett PA-C     • guaiFENesin  600 mg Oral BID Tony Pickett PA-C     • lactulose  200 g Rectal BID PRN Av Willett PA-C     • magnesium Oxide  400 mg Oral Daily Tony Pickett PA-C     • metoprolol tartrate  25 mg Oral Q12H Derrick Herrera MD     • ondansetron  4 mg Intravenous Q6H PRN Tony Pickett PA-C     • oxyCODONE  5 mg Oral Q6H PRN Lizbeth Joya MD     • pantoprazole  40 mg Oral Early Morning Ed Friedman MD     • predniSONE  20 mg Oral Daily Sana Jewell MD      And   • [START ON 9/17/2023] predniSONE  10 mg Oral Daily Vicki Mclain MD     • saccharomyces boulardii  250 mg Oral BID Cristy Brandon PA-C     • senna  1 tablet Oral HS Latonia Cooley PA-C          Today, Patient Was Seen By: Julia Vizcarra MD    ** Please Note: Dictation voice to text software may have been used in the creation of this document.  **

## 2023-09-13 NOTE — PROGRESS NOTES
NEPHROLOGY PROGRESS NOTE   Johnnie Alanis 82 y.o. male MRN: [id number]  Unit/Bed#: -01 Encounter: [id number]      HPI/24hr EVENTS:    -62-year-old male past medical history of CKD 3, atrial fibrillation, COPD.  Presented with fever, nephrology consult for management of TOBI.     -Continued improvement in renal function    ASSESSMENT/PLAN:    TOBI on CKD 3  -Baseline creatinine: 1.5-1.7  -Creatinine on admission 0.7, creatinine peaking at 4.93 on 9/6, most recent creatinine 2.04  -Etiology: Suspect second to prerenal azotemia with component of ATN due to sepsis, ARB use, hypotension  -UA: 2+ protein, 1-2 RBCs  -Renal imaging: CTAP with multiple left renal cyst, no hydronephrosis, nonspecific bilateral perinephric edema  -Avoid hypotension, avoid nephrotoxins, avoid NSAIDS  -Trend BMP  -Urine eosinophils negative, CK normal  -Is azotemic with a BUN of 97 likely due to steroid use now  -Urine output is adequate  -Status post Lasix infusion to convert to nonoliguric state and IV fluids, currently off all therapies  -Villasenor catheter has been discontinued, currently on urinary retention protocol     Anemia  -Recent hemoglobin 8.6  -FLC/kappa lambda ratio was 0.84, SPEP with beta gamma bridging but no M spike, UPEP nonselective proteinuria and no monoclonal spike  -Iron panel with iron sat unable to be calculated, severely decreased TIBC, moderately elevated ferritin     Encephalopathy  -Mental status has been waxing and waning over the past several days  -Suspect multifactorial, prolonged hospitalization, delirium, uremia  -Continue to monitor     CKD MBD  -Prior Phos was elevated, has clear liquid diet, can continue monitoring Phos level     Blood pressure  -Currently on Cardizem 240 mg daily, Lopressor 25 mg twice daily Cardizem 240 milligrams daily  -Recent blood pressure trends acceptable     Sepsis/pneumonia/cystitis/olecranon bursitis  -Currently off antibiotics  -Management per primary team    SUBJECTIVE:  Reports he ate breakfast, remains hungry, had difficulty using his hands due to weakness    ROS:  Review of Systems   Constitutional: Negative. Respiratory: Negative. Cardiovascular: Negative. Gastrointestinal: Negative. Musculoskeletal: Positive for myalgias. Hand pain   Neurological: Positive for weakness. A complete 10 point review of systems was performed and found to be negative unless otherwise noted above or in the HPI. OBJECTIVE:  Current Weight: Weight - Scale: 91.5 kg (201 lb 11.5 oz)  Vitals:    09/12/23 0742 09/12/23 1549 09/12/23 2003 09/13/23 0737   BP: 144/67 132/65 137/71 149/62   BP Location: Left arm      Pulse: 67 83 85 75   Resp: 16   20   Temp: 98 °F (36.7 °C) 97.6 °F (36.4 °C) (!) 97.4 °F (36.3 °C) 97.9 °F (36.6 °C)   TempSrc: Oral      SpO2: 92% 98% 97% 98%   Weight:       Height:           Intake/Output Summary (Last 24 hours) at 9/13/2023 1101  Last data filed at 9/13/2023 1033  Gross per 24 hour   Intake 420 ml   Output 1950 ml   Net -1530 ml     Physical Exam  Vitals and nursing note reviewed. Constitutional:       General: He is not in acute distress. Appearance: He is not toxic-appearing or diaphoretic. Comments: Awake sitting in bed, frail-appearing   HENT:      Head: Normocephalic and atraumatic. Nose: Nose normal.      Mouth/Throat:      Mouth: Mucous membranes are moist.   Eyes:      General: No scleral icterus. Cardiovascular:      Rate and Rhythm: Normal rate and regular rhythm. Pulses: Normal pulses. Pulmonary:      Effort: Pulmonary effort is normal. No respiratory distress. Breath sounds: No wheezing or rales. Abdominal:      General: Abdomen is flat. Musculoskeletal:      Cervical back: Neck supple. Right lower leg: No edema. Left lower leg: No edema. Skin:     General: Skin is warm and dry. Capillary Refill: Capillary refill takes less than 2 seconds.    Neurological:      Mental Status: He is alert and oriented to person, place, and time.           Medications:    Current Facility-Administered Medications:   •  acetaminophen (TYLENOL) tablet 650 mg, 650 mg, Oral, Q6H PRN, Tesfaye Medrano PA-C, 650 mg at 09/12/23 0403  •  aluminum-magnesium hydroxide-simethicone (MAALOX) oral suspension 30 mL, 30 mL, Oral, Q6H PRN, Tesfaye Medrano PA-C  •  apixaban (ELIQUIS) tablet 2.5 mg, 2.5 mg, Oral, BID, Rocael Calixto MD, 2.5 mg at 09/13/23 0902  •  aspirin (ECOTRIN LOW STRENGTH) EC tablet 81 mg, 81 mg, Oral, Daily, Tesfaye Medrano PA-C, 81 mg at 09/13/23 0130  •  diltiazem (CARDIZEM CD) 24 hr capsule 240 mg, 240 mg, Oral, Daily, Suzanna Holguin PA-C, 240 mg at 09/13/23 7371  •  diltiazem (CARDIZEM) injection 10 mg, 10 mg, Intravenous, BID PRN, Rocael Calixto MD  •  fluticasone (FLONASE) 50 mcg/act nasal spray 1 spray, 1 spray, Nasal, Daily, Tesfaye Medrano PA-C, 1 spray at 93/44/26 1559  •  folic acid (FOLVITE) tablet 1 mg, 1 mg, Oral, Daily, Tesfaye Medrano PA-C, 1 mg at 09/13/23 0772  •  guaiFENesin (MUCINEX) 12 hr tablet 600 mg, 600 mg, Oral, BID, Tesfaye Medrano PA-C, 600 mg at 09/13/23 0722  •  lactulose retention enema 200 g, 200 g, Rectal, BID PRN, Ana Rosa Willett PA-C  •  magnesium Oxide (MAG-OX) tablet 400 mg, 400 mg, Oral, Daily, Tesfaye Medrano PA-C, 400 mg at 09/13/23 0332  •  metoprolol tartrate (LOPRESSOR) tablet 25 mg, 25 mg, Oral, Q12H [address], Yashira Luna MD, 25 mg at 09/13/23 0903  •  ondansetron (ZOFRAN) injection 4 mg, 4 mg, Intravenous, Q6H PRN, Tesfaye Medrano PA-C, 4 mg at 08/28/23 1748  •  oxyCODONE (ROXICODONE) IR tablet 5 mg, 5 mg, Oral, Q6H PRN, Agata Lee MD, 5 mg at 09/05/23 0344  •  pantoprazole (PROTONIX) EC tablet 40 mg, 40 mg, Oral, Early Morning, Yashira Luna MD, 40 mg at 09/13/23 0618  •  predniSONE tablet 20 mg, 20 mg, Oral, Daily, 20 mg at 09/13/23 0903 **AND** [START ON 9/17/2023] predniSONE tablet 10 mg, 10 mg, Oral, Daily, Keren Meza MD  •  saccharomyces boulardii (FLORASTOR) capsule 250 mg, 250 mg, Oral, BID, Wandy Tuttle PA-C, 250 mg at 09/13/23 1799  •  senna (SENOKOT) tablet 8.6 mg, 1 tablet, Oral, HS, Jeanne Cardoso PA-C, 8.6 mg at 09/12/23 2156    Laboratory Results:  Results from last 7 days   Lab Units 09/13/23  0417 09/12/23  0350 09/11/23  0229 09/10/23  0515 09/09/23  0540 09/08/23  0344 09/07/23  0300 09/06/23  2104   WBC Thousand/uL 16.33* 12.96* 16.70*  --  16.63* 15.82* 19.52* 18.94*   HEMOGLOBIN g/dL 8.6* 9.3* 8.1*  --  7.9* 9.0* 8.8* 8.1*   HEMATOCRIT % 26.7* 27.7* 24.9*  --  24.4* 27.0* 27.5* 24.6*   PLATELETS Thousands/uL 347 401* 342  --  350 398* 396* 355   POTASSIUM mmol/L 3.4* 3.5 3.7 3.1* 3.6 2.8* 3.7  --    CHLORIDE mmol/L 93* 93* 92* 94* 96 99 104  --    CO2 mmol/L 35* 35* 31 33* 33* 30 18*  --    BUN mg/dL 97* 103* 101* 105* 110* 113* 109*  --    CREATININE mg/dL 2.04* 2.16* 2.43* 2.83* 3.75* 4.41* 4.80*  --    CALCIUM mg/dL 9.0 9.0 8.6 9.3 9.5 9.5 9.3  --    MAGNESIUM mg/dL  --   --   --  2.1 2.1  --  2.4  --    PHOSPHORUS mg/dL  --   --   --  5.4*  --   --  5.6*  --        I have personally reviewed the blood work as stated above and in my note. I have personally reviewed internal medicine note.

## 2023-09-13 NOTE — PLAN OF CARE
Problem: PHYSICAL THERAPY ADULT  Goal: Performs mobility at highest level of function for planned discharge setting. See evaluation for individualized goals. Description: Treatment/Interventions: Functional transfer training, LE strengthening/ROM, Therapeutic exercise, Endurance training, Patient/family training, Equipment eval/education, Bed mobility, Gait training          See flowsheet documentation for full assessment, interventions and recommendations. Note:    Problem List: Decreased strength, Decreased endurance, Impaired balance, Decreased mobility, Decreased cognition, Orthopedic restrictions, Pain  Assessment: Pt seen for PT intervention. Pt w/ significantly improved alertness and engagement today. He also does not complain of any pain, which improves his tolerance to activity. He requires max A x 2 to sit EOB, is able to tolerate sitting EOB w/ min A x 1 --> S for 10 minutes. Pt c/o dizziness and requests to lay back down. DC Recommendation continues to be for Level 2 upon DC             See flowsheet documentation for full assessment.

## 2023-09-13 NOTE — ASSESSMENT & PLAN NOTE
Lab Results   Component Value Date    EGFR 28 09/13/2023    EGFR 26 09/12/2023    EGFR 23 09/11/2023    CREATININE 2.04 (H) 09/13/2023    CREATININE 2.16 (H) 09/12/2023    CREATININE 2.43 (H) 09/11/2023

## 2023-09-13 NOTE — ASSESSMENT & PLAN NOTE
· Seen by ophthalmology during recent [facility] admission, diagnosed with CRVO and ocular hypertension  · Patient recommended for formal evaluation outpatient of OCT macular degeneration, discussion of possible anti-VEGF therapy  · Encouraged ophthalmology follow-up outpatient

## 2023-09-13 NOTE — PHYSICAL THERAPY NOTE
PHYSICAL THERAPY TREATMENT NOTE    Patient Name: Kellen LITTLEJOHNU Date: 9/13/2023 09/13/23 1252   PT Last Visit   PT Visit Date 09/13/23   Note Type   Note Type Treatment for insurance authorization   Pain Assessment   Pain Assessment Tool 0-10   Pain Score No Pain   Restrictions/Precautions   Weight Bearing Precautions Per Order No   Other Precautions Contact/isolation;Cognitive; Bed Alarm;Telemetry; Fall Risk;Visual impairment   General   Chart Reviewed Yes   Family/Caregiver Present No   Cognition   Overall Cognitive Status Impaired   Arousal/Participation Alert; Cooperative   Attention Within functional limits   Orientation Level Oriented to place;Oriented to person;Disoriented to situation;Disoriented to time   Memory Decreased recall of precautions;Decreased recall of recent events   Following Commands Follows one step commands with increased time or repetition   Comments Pt pleasant and alert. Often speaking in [facility]   Bed Mobility   Supine to Sit 2  Maximal assistance   Additional items Assist x 2; Increased time required;Verbal cues   Sit to Supine 2  Maximal assistance   Additional items Assist x 2; Increased time required;LE management   Additional Comments Pt sat EOB x 10 min w/ intermittent min A x 1 to supervision balance. Pt c/o feeling dizzy, deferred STS for OOB and laid back down. Lunch tray also arrived for patient   Transfers   Additional Comments Pt placed in chair position in ICU [facility] bed   Balance   Static Sitting Fair -   Activity Tolerance   Activity Tolerance Patient limited by fatigue   Medical Staff Made Aware OT Liliana   Nurse Made Aware JANE Springer   Assessment   Problem List Decreased strength;Decreased endurance; Impaired balance;Decreased mobility; Decreased cognition;Orthopedic restrictions;Pain   Assessment Pt seen for PT intervention.  Pt w/ significantly improved alertness and engagement today. He also does not complain of any pain, which improves his tolerance to activity. He requires max A x 2 to sit EOB, is able to tolerate sitting EOB w/ min A x 1 --> S for 10 minutes. Pt c/o dizziness and requests to lay back down. DC Recommendation continues to be for Level 2 upon DC   Goals   Patient Goals to sit up   STG Expiration Date 09/18/23   Short Term Goal #1 Patient will: Perform all bed mobility tasks w/ maxx1 to improve pt's independence w/ repositioning for decrease risk of skin breakdown, Perform all transfers w/ maxx1 consistently from various height surfaces in order to improve I w/ engagement w/ real-world environments/situations, Ambulate at least 15 ft. with roller walker max A x 2 w/o LOB to facilitate return and engagement w/ previous living environment and Tolerate 3 hr OOB to faciliate upright tolerance   PT Treatment Day 0   Plan   Treatment/Interventions Functional transfer training;LE strengthening/ROM; Therapeutic exercise; Endurance training;Cognitive reorientation;Patient/family training;Equipment eval/education; Bed mobility   PT Frequency 3-5x/wk   Recommendation   UB Rehab Discharge Recommendation (PT/OT) Level 2   AM-PAC Basic Mobility Inpatient   Turning in Flat Bed Without Bedrails 2   Lying on Back to Sitting on Edge of Flat Bed Without Bedrails 1   Moving Bed to Chair 1   Standing Up From Chair Using Arms 1   Walk in Room 1   Climb 3-5 Stairs With Railing 1   Basic Mobility Inpatient Raw Score 7   Turning Head Towards Sound 3   Follow Simple Instructions 3   Low Function Basic Mobility Raw Score  13   Low Function Basic Mobility Standardized Score  20.14   Highest Level Of Mobility   JH-HLM Goal 2: Bed activities/Dependent transfer   JH-HLM Achieved 3: Sit at edge of bed   Education   Education Provided Mobility training   End of Consult   Patient Position at End of Consult Supine;Bed/Chair alarm activated; All needs within reach  (Chair position in ICU bed)       Patient requires PT/OT co-treat due to signficant assistance with mobility, cognitive-behavioral impairments, and to challenge activity tolerance. Both PT and OT goals were addressed separately during this session. The patient's AM-PAC Basic Mobility Inpatient Short Form Raw Score is 7. A Raw score of less than or equal to 16 suggests the patient may benefit from discharge to post-acute rehabilitation services. Please also refer to the recommendation of the Physical Therapist for safe discharge planning. Pt would continue to benefit from skilled PT during this admission in order to progress patient towards goals to decrease risk of falls and maximize independence.      Lio Morin, PT, DPT

## 2023-09-13 NOTE — ASSESSMENT & PLAN NOTE
· Hemoglobin 9.4 on admission  · Hemoglobin ranging 10-12 during recent [facility] admission  · No signs of active bleeding  · Trend CBC  Iron def anemia work up, inconlusive.

## 2023-09-14 LAB
ALBUMIN SERPL BCP-MCNC: 2.9 G/DL (ref 3.5–5)
ALP SERPL-CCNC: 67 U/L (ref 34–104)
ALT SERPL W P-5'-P-CCNC: 20 U/L (ref 7–52)
ANION GAP SERPL CALCULATED.3IONS-SCNC: 8 MMOL/L
AST SERPL W P-5'-P-CCNC: 20 U/L (ref 13–39)
BASOPHILS # BLD AUTO: 0.02 THOUSANDS/ÂΜL (ref 0–0.1)
BASOPHILS NFR BLD AUTO: 0 % (ref 0–1)
BILIRUB SERPL-MCNC: 0.46 MG/DL (ref 0.2–1)
BUN SERPL-MCNC: 104 MG/DL (ref 5–25)
CALCIUM ALBUM COR SERPL-MCNC: 9.8 MG/DL (ref 8.3–10.1)
CALCIUM SERPL-MCNC: 8.9 MG/DL (ref 8.4–10.2)
CHLORIDE SERPL-SCNC: 94 MMOL/L (ref 96–108)
CO2 SERPL-SCNC: 33 MMOL/L (ref 21–32)
CREAT SERPL-MCNC: 1.94 MG/DL (ref 0.6–1.3)
EOSINOPHIL # BLD AUTO: 0.02 THOUSAND/ÂΜL (ref 0–0.61)
EOSINOPHIL NFR BLD AUTO: 0 % (ref 0–6)
ERYTHROCYTE [DISTWIDTH] IN BLOOD BY AUTOMATED COUNT: 13.7 % (ref 11.6–15.1)
GFR SERPL CREATININE-BSD FRML MDRD: 30 ML/MIN/1.73SQ M
GLUCOSE SERPL-MCNC: 242 MG/DL (ref 65–140)
HCT VFR BLD AUTO: 26.6 % (ref 36.5–49.3)
HGB BLD-MCNC: 8.4 G/DL (ref 12–17)
IMM GRANULOCYTES # BLD AUTO: 0.33 THOUSAND/UL (ref 0–0.2)
IMM GRANULOCYTES NFR BLD AUTO: 2 % (ref 0–2)
LYMPHOCYTES # BLD AUTO: 1.57 THOUSANDS/ÂΜL (ref 0.6–4.47)
LYMPHOCYTES NFR BLD AUTO: 10 % (ref 14–44)
MCH RBC QN AUTO: 31.3 PG (ref 26.8–34.3)
MCHC RBC AUTO-ENTMCNC: 31.6 G/DL (ref 31.4–37.4)
MCV RBC AUTO: 99 FL (ref 82–98)
MONOCYTES # BLD AUTO: 0.9 THOUSAND/ÂΜL (ref 0.17–1.22)
MONOCYTES NFR BLD AUTO: 5 % (ref 4–12)
NEUTROPHILS # BLD AUTO: 13.68 THOUSANDS/ÂΜL (ref 1.85–7.62)
NEUTS SEG NFR BLD AUTO: 83 % (ref 43–75)
NRBC BLD AUTO-RTO: 0 /100 WBCS
PLATELET # BLD AUTO: 342 THOUSANDS/UL (ref 149–390)
PMV BLD AUTO: 9.9 FL (ref 8.9–12.7)
POTASSIUM SERPL-SCNC: 4.3 MMOL/L (ref 3.5–5.3)
PROT SERPL-MCNC: 6.2 G/DL (ref 6.4–8.4)
RBC # BLD AUTO: 2.68 MILLION/UL (ref 3.88–5.62)
SODIUM SERPL-SCNC: 135 MMOL/L (ref 135–147)
WBC # BLD AUTO: 16.52 THOUSAND/UL (ref 4.31–10.16)

## 2023-09-14 PROCEDURE — 80053 COMPREHEN METABOLIC PANEL: CPT | Performed by: HOSPITALIST

## 2023-09-14 PROCEDURE — 99232 SBSQ HOSP IP/OBS MODERATE 35: CPT | Performed by: HOSPITALIST

## 2023-09-14 PROCEDURE — 99232 SBSQ HOSP IP/OBS MODERATE 35: CPT | Performed by: INTERNAL MEDICINE

## 2023-09-14 PROCEDURE — 85025 COMPLETE CBC W/AUTO DIFF WBC: CPT | Performed by: HOSPITALIST

## 2023-09-14 RX ADMIN — FOLIC ACID 1 MG: 1 TABLET ORAL at 08:49

## 2023-09-14 RX ADMIN — FLUTICASONE PROPIONATE 1 SPRAY: 50 SPRAY, METERED NASAL at 08:50

## 2023-09-14 RX ADMIN — GUAIFENESIN 600 MG: 600 TABLET ORAL at 17:50

## 2023-09-14 RX ADMIN — DILTIAZEM HYDROCHLORIDE 240 MG: 240 CAPSULE, COATED, EXTENDED RELEASE ORAL at 08:49

## 2023-09-14 RX ADMIN — PREDNISONE 20 MG: 20 TABLET ORAL at 08:50

## 2023-09-14 RX ADMIN — GUAIFENESIN 600 MG: 600 TABLET ORAL at 08:49

## 2023-09-14 RX ADMIN — Medication 250 MG: at 17:50

## 2023-09-14 RX ADMIN — ACETAMINOPHEN 325MG 650 MG: 325 TABLET ORAL at 13:37

## 2023-09-14 RX ADMIN — METOPROLOL TARTRATE 25 MG: 25 TABLET, FILM COATED ORAL at 08:49

## 2023-09-14 RX ADMIN — OXYCODONE HYDROCHLORIDE 5 MG: 5 TABLET ORAL at 21:37

## 2023-09-14 RX ADMIN — ACETAMINOPHEN 325MG 650 MG: 325 TABLET ORAL at 00:54

## 2023-09-14 RX ADMIN — MAGNESIUM OXIDE TAB 400 MG (241.3 MG ELEMENTAL MG) 400 MG: 400 (241.3 MG) TAB at 08:49

## 2023-09-14 RX ADMIN — METOPROLOL TARTRATE 25 MG: 25 TABLET, FILM COATED ORAL at 19:34

## 2023-09-14 RX ADMIN — PANTOPRAZOLE SODIUM 40 MG: 40 TABLET, DELAYED RELEASE ORAL at 05:18

## 2023-09-14 RX ADMIN — APIXABAN 2.5 MG: 2.5 TABLET, FILM COATED ORAL at 08:49

## 2023-09-14 RX ADMIN — Medication 250 MG: at 08:50

## 2023-09-14 RX ADMIN — SENNOSIDES 8.6 MG: 8.6 TABLET, FILM COATED ORAL at 19:34

## 2023-09-14 RX ADMIN — ASPIRIN 81 MG: 81 TABLET, COATED ORAL at 08:50

## 2023-09-14 RX ADMIN — APIXABAN 2.5 MG: 2.5 TABLET, FILM COATED ORAL at 17:50

## 2023-09-14 NOTE — PLAN OF CARE
Problem: Potential for Falls  Goal: Patient will remain free of falls  Description: INTERVENTIONS:  - Educate patient/family on patient safety including physical limitations  - Instruct patient to call for assistance with activity   - Consult OT/PT to assist with strengthening/mobility   - Keep Call bell within reach  - Keep bed low and locked with side rails adjusted as appropriate  - Keep care items and personal belongings within reach  - Initiate and maintain comfort rounds  - Make Fall Risk Sign visible to staff  - Offer Toileting every 2 Hours, in advance of need  - Initiate/Maintain bed alarm  - Obtain necessary fall risk management equipment:   - Apply yellow socks and bracelet for high fall risk patients  - Consider moving patient to room near nurses station  Outcome: Progressing     Problem: PAIN - ADULT  Goal: Verbalizes/displays adequate comfort level or baseline comfort level  Description: Interventions:  - Encourage patient to monitor pain and request assistance  - Assess pain using appropriate pain scale  - Administer analgesics based on type and severity of pain and evaluate response  - Implement non-pharmacological measures as appropriate and evaluate response  - Consider cultural and social influences on pain and pain management  - Notify physician/advanced practitioner if interventions unsuccessful or patient reports new pain  Outcome: Progressing     Problem: INFECTION - ADULT  Goal: Absence or prevention of progression during hospitalization  Description: INTERVENTIONS:  - Assess and monitor for signs and symptoms of infection  - Monitor lab/diagnostic results  - Monitor all insertion sites, i.e. indwelling lines, tubes, and drains  - Monitor endotracheal if appropriate and nasal secretions for changes in amount and color  - [place] appropriate cooling/warming therapies per order  - Administer medications as ordered  - Instruct and encourage patient and family to use good hand hygiene technique  - Identify and instruct in appropriate isolation precautions for identified infection/condition  Outcome: Progressing  Goal: Absence of fever/infection during neutropenic period  Description: INTERVENTIONS:  - Monitor WBC    Outcome: Progressing     Problem: SAFETY ADULT  Goal: Maintain or return to baseline ADL function  Description: INTERVENTIONS:  -  Assess patient's ability to carry out ADLs; assess patient's baseline for ADL function and identify physical deficits which impact ability to perform ADLs (bathing, care of mouth/teeth, toileting, grooming, dressing, etc.)  - Assess/evaluate cause of self-care deficits   - Assess range of motion  - Assess patient's mobility; develop plan if impaired  - Assess patient's need for assistive devices and provide as appropriate  - Encourage maximum independence but intervene and supervise when necessary  - Involve family in performance of ADLs  - Assess for home care needs following discharge   - Consider OT consult to assist with ADL evaluation and planning for discharge  - Provide patient education as appropriate  Outcome: Progressing  Goal: Maintains/Returns to pre admission functional level  Description: INTERVENTIONS:  - Perform BMAT or MOVE assessment daily.   - Set and communicate daily mobility goal to care team and patient/family/caregiver. - Collaborate with rehabilitation services on mobility goals if consulted  - Perform Range of Motion 3 times a day. - Reposition patient every 2 hours.   - Dangle patient 2 times a day  - Stand patient 2 times a day  - Ambulate patient 2 times a day  - Out of bed to chair 2 times a day   - Out of bed for meals 2 times a day  - Out of bed for toileting  - Record patient progress and toleration of activity level   Outcome: Progressing     Problem: DISCHARGE PLANNING  Goal: Discharge to home or other facility with appropriate resources  Description: INTERVENTIONS:  - Identify barriers to discharge w/patient and caregiver  - Arrange for needed discharge resources and transportation as appropriate  - Identify discharge learning needs (meds, wound care, etc.)  - Arrange for interpretive services to assist at discharge as needed  - Refer to Case Management Department for coordinating discharge planning if the patient needs post-hospital services based on physician/advanced practitioner order or complex needs related to functional status, cognitive ability, or social support system  Outcome: Progressing     Problem: Knowledge Deficit  Goal: Patient/family/caregiver demonstrates understanding of disease process, treatment plan, medications, and discharge instructions  Description: Complete learning assessment and assess knowledge base.   Interventions:  - Provide teaching at level of understanding  - Provide teaching via preferred learning methods  Outcome: Progressing     Problem: Prexisting or High Potential for Compromised Skin Integrity  Goal: Skin integrity is maintained or improved  Description: INTERVENTIONS:  - Identify patients at risk for skin breakdown  - Assess and monitor skin integrity  - Assess and monitor nutrition and hydration status  - Monitor labs   - Assess for incontinence   - Turn and reposition patient  - Assist with mobility/ambulation  - Relieve pressure over bony prominences  - Avoid friction and shearing  - Provide appropriate hygiene as needed including keeping skin clean and dry  - Evaluate need for skin moisturizer/barrier cream  - Collaborate with interdisciplinary team   - Patient/family teaching  - Consider wound care consult   Outcome: Progressing     Problem: MOBILITY - ADULT  Goal: Maintain or return to baseline ADL function  Description: INTERVENTIONS:  -  Assess patient's ability to carry out ADLs; assess patient's baseline for ADL function and identify physical deficits which impact ability to perform ADLs (bathing, care of mouth/teeth, toileting, grooming, dressing, etc.)  - Assess/evaluate cause of self-care deficits   - Assess range of motion  - Assess patient's mobility; develop plan if impaired  - Assess patient's need for assistive devices and provide as appropriate  - Encourage maximum independence but intervene and supervise when necessary  - Involve family in performance of ADLs  - Assess for home care needs following discharge   - Consider OT consult to assist with ADL evaluation and planning for discharge  - Provide patient education as appropriate  Outcome: Progressing  Goal: Maintains/Returns to pre admission functional level  Description: INTERVENTIONS:  - Perform BMAT or MOVE assessment daily.   - Set and communicate daily mobility goal to care team and patient/family/caregiver. - Collaborate with rehabilitation services on mobility goals if consulted  - Perform Range of Motion 3 times a day. - Reposition patient every 2 hours. - Dangle patient 2 times a day  - Stand patient 2 times a day  - Ambulate patient 2 times a day  - Out of bed to chair 2 times a day   - Out of bed for meals 2 times a day  - Out of bed for toileting  - Record patient progress and toleration of activity level   Outcome: Progressing     Problem: Nutrition/Hydration-ADULT  Goal: Nutrient/Hydration intake appropriate for improving, restoring or maintaining nutritional needs  Description: Monitor and assess patient's nutrition/hydration status for malnutrition. Collaborate with interdisciplinary team and initiate plan and interventions as ordered. Monitor patient's weight and dietary intake as ordered or per policy. Utilize nutrition screening tool and intervene as necessary. Determine patient's food preferences and provide high-protein, high-caloric foods as appropriate.      INTERVENTIONS:  - Monitor oral intake, urinary output, labs, and treatment plans  - Assess nutrition and hydration status and recommend course of action  - Evaluate amount of meals eaten  - Assist patient with eating if necessary   - Allow adequate time for meals  - Recommend/ encourage appropriate diets, oral nutritional supplements, and vitamin/mineral supplements  - Order, calculate, and assess calorie counts as needed  - Recommend, monitor, and adjust tube feedings and TPN/PPN based on assessed needs  - Assess need for intravenous fluids  - Provide specific nutrition/hydration education as appropriate  - Include patient/family/caregiver in decisions related to nutrition  Outcome: Progressing

## 2023-09-14 NOTE — PLAN OF CARE
Problem: Potential for Falls  Goal: Patient will remain free of falls  Description: INTERVENTIONS:  - Educate patient/family on patient safety including physical limitations  - Instruct patient to call for assistance with activity   - Consult OT/PT to assist with strengthening/mobility   - Keep Call bell within reach  - Keep bed low and locked with side rails adjusted as appropriate  - Keep care items and personal belongings within reach  - Initiate and maintain comfort rounds  - Make Fall Risk Sign visible to staff  - Offer Toileting every 2 Hours, in advance of need  - Initiate/Maintain bed alarm  - Obtain necessary fall risk management equipment:   - Apply yellow socks and bracelet for high fall risk patients  - Consider moving patient to room near nurses station  Outcome: Progressing     Problem: PAIN - ADULT  Goal: Verbalizes/displays adequate comfort level or baseline comfort level  Description: Interventions:  - Encourage patient to monitor pain and request assistance  - Assess pain using appropriate pain scale  - Administer analgesics based on type and severity of pain and evaluate response  - Implement non-pharmacological measures as appropriate and evaluate response  - Consider cultural and social influences on pain and pain management  - Notify physician/advanced practitioner if interventions unsuccessful or patient reports new pain  Outcome: Progressing     Problem: INFECTION - ADULT  Goal: Absence or prevention of progression during hospitalization  Description: INTERVENTIONS:  - Assess and monitor for signs and symptoms of infection  - Monitor lab/diagnostic results  - Monitor all insertion sites, i.e. indwelling lines, tubes, and drains  - Monitor endotracheal if appropriate and nasal secretions for changes in amount and color  - [place] appropriate cooling/warming therapies per order  - Administer medications as ordered  - Instruct and encourage patient and family to use good hand hygiene technique  - Identify and instruct in appropriate isolation precautions for identified infection/condition  Outcome: Progressing  Goal: Absence of fever/infection during neutropenic period  Description: INTERVENTIONS:  - Monitor WBC    Outcome: Progressing     Problem: SAFETY ADULT  Goal: Maintain or return to baseline ADL function  Description: INTERVENTIONS:  -  Assess patient's ability to carry out ADLs; assess patient's baseline for ADL function and identify physical deficits which impact ability to perform ADLs (bathing, care of mouth/teeth, toileting, grooming, dressing, etc.)  - Assess/evaluate cause of self-care deficits   - Assess range of motion  - Assess patient's mobility; develop plan if impaired  - Assess patient's need for assistive devices and provide as appropriate  - Encourage maximum independence but intervene and supervise when necessary  - Involve family in performance of ADLs  - Assess for home care needs following discharge   - Consider OT consult to assist with ADL evaluation and planning for discharge  - Provide patient education as appropriate  Outcome: Progressing  Goal: Maintains/Returns to pre admission functional level  Description: INTERVENTIONS:  - Perform BMAT or MOVE assessment daily.   - Set and communicate daily mobility goal to care team and patient/family/caregiver. - Collaborate with rehabilitation services on mobility goals if consulted  - Perform Range of Motion 3 times a day. - Reposition patient every 2 hours.   - Dangle patient 2 times a day  - Stand patient 2 times a day  - Ambulate patient 2 times a day  - Out of bed to chair 2 times a day   - Out of bed for meals 2 times a day  - Out of bed for toileting  - Record patient progress and toleration of activity level   Outcome: Progressing

## 2023-09-14 NOTE — CASE MANAGEMENT
Case Management Discharge Planning Note    Patient name Kellen Em  Location /-67 MRN [id number]  : 1935 Date 2023       Current Admission Date: 2023  Current Admission Diagnosis:Chronic obstructive pulmonary disease, unspecified COPD type [facility])   Patient Active Problem List    Diagnosis Date Noted   • Chronic renal impairment    • Ileus ([address]) 2023   • Acute gout of multiple sites 2023   • Sacral wound 2023   • Sepsis ([address]) 2023   • ESBL (extended spectrum beta-lactamase) producing bacteria infection 2023   • Olecranon bursitis 2023   • Abnormal computed tomography angiography (CTA) of abdomen and pelvis 2023   • Acute encephalopathy 2023   • Leukocytosis 2023   • Central retinal vein occlusion of right eye 2023   • History of COVID-19 2023   • History of alcohol abuse 2023   • Cystitis 2023   • Chronic obstructive pulmonary disease, unspecified COPD type ([address]) 2021   • TOBI (acute kidney injury) ([address]) 2021   • Thrombocytopenia, unspecified ([address]) 2021   • CVA (cerebral vascular accident) ([address]) 01/10/2020   • GERD (gastroesophageal reflux disease) 2018   • Ambulatory dysfunction 2017   • Accelerated hypertension 2017   • Atrial fibrillation ([address]) 2017   • 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   • Acute ischemic stroke ([address]) 03/10/2017   • Hyponatremia 02/15/2017   • Chronic sinusitis 2016   • Rheumatoid arthritis ([address]) 2016   • Allergic rhinitis 2015   • Generalized osteoarthritis of multiple sites 2014   • Hyperglycemia 2014   • Eczema 2013   • Anemia of chronic disease 2013   • Edema 2013   • Gait disturbance 2013   • Hypomagnesemia 2013   • Peripheral neuropathy 2013      LOS (days): 19  Geometric Mean LOS (GMLOS) (days): 4.00  Days to GMLOS:-14.6     OBJECTIVE:  Risk of Unplanned Readmission Score: 24.54         Current admission status: Inpatient   Preferred Pharmacy:   [address], [address] [facility] [address] [facility]  [address] [facility] [address]  Phone: [phone] Fax: [phone]    [facility].  [address]  [address] [address]  Phone: [phone] Fax: [phone]    Primary Care Provider: Ro English DO    Primary Insurance: MEDICARE  Secondary Insurance: [employer] DETAILS:    Additional Comments: [facility] transport to [address] [place] [address] on 9/15. Pickup is 11:00am. Informed [address] [place] [address] admissions via [address] of transport time for 9/15. Call placed to Pt's wife(Marti: [phone]), left message to inform of transport time to [address] [place] [address] on 9/15. Anticipate return call. Pt's nurse(Racheal) informed of transport time for 9/15.

## 2023-09-14 NOTE — PROGRESS NOTES
NEPHROLOGY PROGRESS NOTE   Edmundo Gonzales 82 y.o. male MRN: [id number]  Unit/Bed#: -01 Encounter: [id number]  Reason for Consult: TOBI on CKD 1   61-year-old male with CKD 3, AF, COPD p/w fever. Nephrology consulted for management of TOBI    ASSESSMENT/PLAN:  TOBI:  Suspect due to prerenal azotemia with component of ATN in setting of sepsis, hypotension and autoregulatory dysfunction with ARB  -Admission creatinine 1.76 on 8/26/2023. Today's creatinine 1.94, continue to trend down  -Peak creatinine 4.93 on 9/6/2023  -baseline creatinine 1.5-1.7  -workup: UA with 2+ protein, 1-2 RBC  -Urine eosinophils negative, CK normal  -Imaging: CT with multiple left renal cysts with no hydronephrosis  -Required Lasix drip to convert to nonoliguric state which has subsequently been discontinued.  -nonoliguric.  + Villasenor catheter  -Plan:  • Renal function stable and creatinine continues to trend down, nearing baseline  • Clinically, no acute indication for renal replacement therapy (dialysis)  • Continue to hold losartan  • Strict I/Os, daily weights  • Avoid nephrotoxins, NSAIDs, IV contrast if possible  • Avoid hypotension. Maintain MAP >65  • Trend BMP  • Adjust meds to appropriate GFR  • Optimize hemodynamic status to avoid delay in renal recovery  • Will d/w Dr. Vessie Sacks    Chronic kidney disease III:    -Baseline creatinine 1.5-1.7  -Outpatient Nephrologist:  None  -Will need follow-up on discharge    Hypertension/Volume status:  -BP elevated this a.m. but in general previously acceptable  -volume status near euvolemic  -Home medications: Carvedilol 25 mg twice daily, losartan 50 mg daily  -Current medications: Diltiazem 240 mg daily, metoprolol 25 mg every 12 hours  -Continue to hold losartan. Avoid ACEi/ARB  -Optimize hemodynamic status to avoid delay in renal recovery  -recommend hold parameters on antihypertensive's for SBP <130 mmHg. -Avoid hypotension or fluctuations in blood pressure.   Maintain MAP >65  -low sodium (2 gm) diet  -continue to trend    Anemia of CKD:  -Hgb 8.4 today, below goal but stable. Goal >10  -Iron studies: Iron saturation unable to be calculated, severely decreased TIBC, moderately elevated ferritin  -Kappa lambda ratio 0.84, SPEP with no M spike, UPEP no monoclonal bands  -continue to trend  -Transfuse for Hgb <7.0  -management per primary team    Bone Mineral Disease of CKD:  -Calcium and magnesium stable  -Hyperphosphatemia: In the setting of TOBI. Phosphorus 5.4 on 9/10/2023. Low phosphorus diet. Continue to monitor. Repeat in AM.  -continue to monitor and follow phosphorus, PTH, calcium, magnesium as outpatient    Encephalopathy:  -Suspect multifactorial due to prolonged hospitalization, delirium and uremia  -Mental status waxing and waning  -Management per primary team    Sepsis/pneumonia/ascites/olecranon bursitis:  -Currently off antibiotics  -Management per primary team     SUBJECTIVE:  Patient awake, alert without complaints. Villasenor catheter in place. Creatinine continues to trend down at 1.94. Adequate urine output, 1.7L/24 hrs. VSS    A complete review of systems was performed. Pertinent positives and negatives noted in the HPI. Otherwise the review of systems was negative. OBJECTIVE:  Current Weight: Weight - Scale: 91.5 kg (201 lb 11.5 oz)  Vitals:    09/13/23 1440 09/13/23 1918 09/13/23 2131 09/14/23 0716   BP: 156/85 151/75 133/59 168/85   BP Location:  Right arm     Pulse: 89 85 83 72   Resp: 18 16  16   Temp: 97.5 °F (36.4 °C) 98.2 °F (36.8 °C)  97.8 °F (36.6 °C)   TempSrc:  Oral     SpO2: 95% 95% 98% 97%   Weight:       Height:           Intake/Output Summary (Last 24 hours) at 9/14/2023 1113  Last data filed at 9/14/2023 3871  Gross per 24 hour   Intake 180 ml   Output 2325 ml   Net -2145 ml       General:  Awake, alert, appears comfortable and in no acute distress. Nontoxic. Skin:  No rash, warm, good skin turgor   Eyes:  PERRL, EOMI, sclerae nonicteric.   no periorbital edema   ENT:  Moist mucous membranes  Neck:  Trachea midline, symmetric. No JVD. Chest:  Clear to auscultation bilaterally without wheezes, crackles or rhonchi  CVS:  Regular rate and rhythm without murmur, gallop or rub. S1 and S2 identified and normal.  No S3, S4.   Abdomen:  Soft, nontender, nondistended without masses. Normal bowel sounds x 4 quadrants. No bruit. Extremities:  Warm, pink, motor and sensory intact and well perfused. No cyanosis, pallor. Trace BLE edema. Neuro:  Awake, alert, oriented x3. Grossly intact  Psych:  Appropriate affect.    : Villasenor catheter in place draining dilute urine      Medications:    Current Facility-Administered Medications:   •  acetaminophen (TYLENOL) tablet 650 mg, 650 mg, Oral, Q6H PRN, Godfrey Fernandez PA-C, 650 mg at 09/14/23 0054  •  aluminum-magnesium hydroxide-simethicone (MAALOX) oral suspension 30 mL, 30 mL, Oral, Q6H PRN, Godfrey Fernandez PA-C  •  apixaban (ELIQUIS) tablet 2.5 mg, 2.5 mg, Oral, BID, Marietta Hathaway MD, 2.5 mg at 09/14/23 0849  •  aspirin (ECOTRIN LOW STRENGTH) EC tablet 81 mg, 81 mg, Oral, Daily, Godfrey Fernandez PA-C, 81 mg at 09/14/23 0850  •  diltiazem (CARDIZEM CD) 24 hr capsule 240 mg, 240 mg, Oral, Daily, Caitlin Sajan Holguin PA-C, 240 mg at 09/14/23 0849  •  diltiazem (CARDIZEM) injection 10 mg, 10 mg, Intravenous, BID PRN, Marietta Hathaway MD  •  fluticasone (FLONASE) 50 mcg/act nasal spray 1 spray, 1 spray, Nasal, Daily, Godfrey Fernandez PA-C, 1 spray at 88/57/95 5869  •  folic acid (FOLVITE) tablet 1 mg, 1 mg, Oral, Daily, Godfrey Fernandez PA-C, 1 mg at 09/14/23 7272  •  guaiFENesin (MUCINEX) 12 hr tablet 600 mg, 600 mg, Oral, BID, Godfrey Fernandez PA-C, 600 mg at 09/14/23 7378  •  lactulose retention enema 200 g, 200 g, Rectal, BID PRN, Karlie Willett PA-C  •  magnesium Oxide (MAG-OX) tablet 400 mg, 400 mg, Oral, Daily, Godfrey Fernandez PA-C, 400 mg at 09/14/23 0849  •  metoprolol tartrate (LOPRESSOR) tablet 25 mg, 25 mg, Oral, Q12H [address] [facility] Nichelle Hartman MD, 25 mg at 09/14/23 0849  •  ondansetron [facility]) injection 4 mg, 4 mg, Intravenous, Q6H PRN, Jackie Hills PA-C, 4 mg at 08/28/23 1748  •  oxyCODONE (ROXICODONE) IR tablet 5 mg, 5 mg, Oral, Q6H PRN, Triny De La O MD, 5 mg at 09/05/23 0344  •  pantoprazole (PROTONIX) EC tablet 40 mg, 40 mg, Oral, Early Morning, Prabhu Blank MD, 40 mg at 09/14/23 0518  •  predniSONE tablet 20 mg, 20 mg, Oral, Daily, 20 mg at 09/14/23 0850 **AND** [START ON 9/17/2023] predniSONE tablet 10 mg, 10 mg, Oral, Daily, Maia Barreto MD  •  saccharomyces boulardii (FLORASTOR) capsule 250 mg, 250 mg, Oral, BID, Wandy Tuttle PA-C, 250 mg at 09/14/23 0850  •  senna (SENOKOT) tablet 8.6 mg, 1 tablet, Oral, HS, Jackie Hills PA-C, 8.6 mg at 09/13/23 2133    Laboratory Results:  Results from last 7 days   Lab Units 09/14/23  0306 09/13/23  0417 09/12/23  0350 09/11/23  0229 09/10/23  0515 09/09/23  0540 09/08/23  0344   WBC Thousand/uL 16.52* 16.33* 12.96* 16.70*  --  16.63* 15.82*   HEMOGLOBIN g/dL 8.4* 8.6* 9.3* 8.1*  --  7.9* 9.0*   HEMATOCRIT % 26.6* 26.7* 27.7* 24.9*  --  24.4* 27.0*   PLATELETS Thousands/uL 342 347 401* 342  --  350 398*   SODIUM mmol/L 135 137 138 137 140 141 140   POTASSIUM mmol/L 4.3 3.4* 3.5 3.7 3.1* 3.6 2.8*   CHLORIDE mmol/L 94* 93* 93* 92* 94* 96 99   CO2 mmol/L 33* 35* 35* 31 33* 33* 30   BUN mg/dL 104* 97* 103* 101* 105* 110* 113*   CREATININE mg/dL 1.94* 2.04* 2.16* 2.43* 2.83* 3.75* 4.41*   CALCIUM mg/dL 8.9 9.0 9.0 8.6 9.3 9.5 9.5   MAGNESIUM mg/dL  --   --   --   --  2.1 2.1  --    PHOSPHORUS mg/dL  --   --   --   --  5.4*  --   --        I have personally reviewed the blood work as stated above and in my note. I have personally reviewed internal Medicine, co-consultants and previous nephrology notes.

## 2023-09-14 NOTE — PROGRESS NOTES
[address]  Progress Note  Name: Jayne Canela  MRN: [id number]  Unit/Bed#: -01 I Date of Admission: 8/26/2023   Date of Service: 9/14/2023 I Hospital Day: 19    Assessment/Plan   Chronic renal impairment  Assessment & Plan  Lab Results   Component Value Date    EGFR 30 09/14/2023    EGFR 28 09/13/2023    EGFR 26 09/12/2023    CREATININE 1.94 (H) 09/14/2023    CREATININE 2.04 (H) 09/13/2023    CREATININE 2.16 (H) 09/12/2023       Ileus (HCC)  Assessment & Plan  Abdominal distension with X ray KUB showing left lower quadrant loop distension- 9/7  Ileus demonstrated  X ray KUB done showed trace contrast seen in rectosigmoid junction, contrast seen progressing well to the level of distal sigmoid colon. Advance diet to soft surgical  Serial abdominal exams        Acute gout of multiple sites  Assessment & Plan  Left 3rd MCP joint, PIP, Elbow joint  Tender to touch, erythematous  Started steroid taper    Sacral wound  Assessment & Plan  Wound care consulted  Appreciate recs    ESBL (extended spectrum beta-lactamase) producing bacteria infection  Assessment & Plan  Urine CX from 8/27- grew ESBL  Finished treatment        Sepsis [facility])  Assessment & Plan  Meets criteria with leukocytosis, tachycardia  Likely secondary to ESBL UTI/Septic olecranon bursitis, possible sepsis is now resolved . More likely SIRS   F/u Blood Cx- Negative growth so far, wound Cx- growing coag negative staph  RESOLVING. Stable thus far off  Dapto, leukocytosis improving and likely 2/2 steroid exposure for gout. Abnormal computed tomography angiography (CTA) of abdomen and pelvis  Assessment & Plan  CT abdomen pelvis showed-  Cholelithiasis with possible impacted stone at the neck, gallbladder wall thickening and/or pericholecystic edema and sigmoid colon stricturing. No abdominal tenderness, Surgery and GI recommends follow up with serial abdominal exams.  No plan for surgery as it could be likely chronic problem        Olecranon bursitis  Assessment & Plan  Wound culture ordered, so far Cx yielded no growth  ID and ortho following  Follow-up on synovial fluid, sent for culture, Gram stain, cell count, crystals. Synovial fluid positive for monosodium urate crystals  Dapto discontinued after discussing with ID  Cont steroid taper. Acute encephalopathy  Assessment & Plan  Patient is confused likely acute metabolic encephalopathy suspected underlying cognitive impairment   Frequent reorientation  Improving with resolution of infection and improvement of TOBI. Monitor CBC, CMP  Cultures, VBG, ammonia, CT head normal        History of alcohol abuse  Assessment & Plan  · History of alcohol abuse with withdrawal seizures, has not drink since prior to [facility] hospitalization 8/8    Central retinal vein occlusion of right eye  Assessment & Plan  · Seen by ophthalmology during recent [address] [facility] admission, diagnosed with CRVO and ocular hypertension  · Patient recommended for formal evaluation outpatient of OCT macular degeneration, discussion of possible anti-VEGF therapy  · Encouraged ophthalmology follow-up outpatient    TOIB (acute kidney injury) [facility])  Assessment & Plan  Lab Results   Component Value Date    EGFR 30 09/14/2023    EGFR 28 09/13/2023    EGFR 26 09/12/2023    CREATININE 1.94 (H) 09/14/2023    CREATININE 2.04 (H) 09/13/2023    CREATININE 2.16 (H) 09/12/2023   · Baseline creatinine 1.6-1.9, POA- 1.7  · Likely secondary to autoregulatory failure, ATN secondary to sepsis, medications  · S/p Lasix drip  · ivf per nephrology, currently off  · Nephrology following. IMPROVING.    · Urinary retention protocol  · I/O monitoring  · Renally dose medications          Chronic obstructive pulmonary disease, unspecified COPD type ([address])  Assessment & Plan  · Not on maintenance inhalers outpatient  · No signs of acute exacerbation on admission  · If patient were to develop wheezing in setting of pneumonia, would start Xopenex/Atrovent nebulizers    Atrial fibrillation ([address])  Assessment & Plan  · Went into A-fib with RVR on  and was given IV Lopressor and Cardizem  · Home regimen: Coreg 25 Mg twice daily  · Started on Eliquis 2.5 mg twice daily for anticoagulation during hospitalization at [address] [facility] -  · Cardiology consult appreciated  · Rate controlled  · Will order Cardizem IV as PRN for poor oral intake  · Cont eliquis      Anemia of chronic disease  Assessment & Plan  · Hemoglobin 9.4 on admission  · Hemoglobin ranging 10-12 during recent [facility] admission  · No signs of active bleeding  · Trend CBC  Iron def anemia work up, inconlusive. VTE  Prophylaxis:   Pharmacologic: in place  Mechanical VTE Prophylaxis in Place: Yes    Patient Centered Rounds: I have performed bedside rounds with nursing staff today. Discussions with Specialists or Other Care Team Provider: case management    Education and Discussions with Family / Patient: pt      Current Length of Stay: 19 day(s)    Current Patient Status: Inpatient        Code Status: Level 3 - DNAR and DNI    Discharge Plan: Pt will require continued inpatient hospitalization. Subjective:     Pt denies complaints    Patient is seen and examined at bedside. All other ROS are negative. Objective:     Vitals:   Temp (24hrs), Av.8 °F (36.6 °C), Min:97.5 °F (36.4 °C), Max:98.2 °F (36.8 °C)    Temp:  [97.5 °F (36.4 °C)-98.2 °F (36.8 °C)] 97.8 °F (36.6 °C)  HR:  [72-89] 72  Resp:  [16-18] 16  BP: (133-168)/(59-85) 168/85  SpO2:  [95 %-98 %] 97 %  Body mass index is 30.67 kg/m². Input and Output Summary (last 24 hours):        Intake/Output Summary (Last 24 hours) at 2023 0956  Last data filed at 2023 0937  Gross per 24 hour   Intake 420 ml   Output 2325 ml   Net -1905 ml       Physical Exam:       GEN: No acute distress, comfortable  HEEENT: No JVD, PERRLA, no scleral icterus  RESP: Lungs clear to auscultation bilaterally  CV: RRR, +s1/s2   ABD: SOFT NON TENDER, POSITIVE BOWEL SOUNDS, NO DISTENTION  PSYCH: CALM, cognitive impairment. NEURO: Mentation baseline, NO FOCAL DEFICITS  SKIN: NO RASH  EXTREM: NO EDEMA    Additional Data:     Labs:    Results from last 7 days   Lab Units 09/14/23  0306   WBC Thousand/uL 16.52*   HEMOGLOBIN g/dL 8.4*   HEMATOCRIT % 26.6*   PLATELETS Thousands/uL 342   NEUTROS PCT % 83*   LYMPHS PCT % 10*   MONOS PCT % 5   EOS PCT % 0     Results from last 7 days   Lab Units 09/14/23  0306   SODIUM mmol/L 135   POTASSIUM mmol/L 4.3   CHLORIDE mmol/L 94*   CO2 mmol/L 33*   BUN mg/dL 104*   CREATININE mg/dL 1.94*   ANION GAP mmol/L 8   CALCIUM mg/dL 8.9   ALBUMIN g/dL 2.9*   TOTAL BILIRUBIN mg/dL 0.46   ALK PHOS U/L 67   ALT U/L 20   AST U/L 20   GLUCOSE RANDOM mg/dL 242*                       Lines/Drains:  Invasive Devices     Peripheral Intravenous Line  Duration           Peripheral IV 09/13/23 Distal;Dorsal (posterior); Right Forearm 1 day          Drain  Duration           External Urinary Catheter -- days                Telemetry:        * I Have Reviewed All Lab Data Listed Above. Imaging:     Results for orders placed during the hospital encounter of 08/26/23    XR chest portable    Narrative  CHEST    INDICATION:   NGT placement. COMPARISON: Chest CT 9/2/2023, CXR 9/1/2023. EXAM PERFORMED/VIEWS:  XR CHEST PORTABLE. FINDINGS: NG tube in stomach. Mild cardiomegaly. Question mild left base atelectasis. No effusion or pneumothorax. Upper abdomen normal. Bones normal for age. Impression  Question mild left base atelectasis. NG tube in stomach. Workstation performed: [id number]    Results for orders placed during the hospital encounter of 01/10/20    XR chest 2 views    Narrative  CHEST    INDICATION:   Chest Pain.     COMPARISON:  3/24/2017    EXAM PERFORMED/VIEWS:  XR CHEST PA & LATERAL  Images: 2    FINDINGS:    Cardiomediastinal silhouette appears unremarkable. No congestive failure. No pneumothorax. No pneumonia. No effusions. Osseous structures appear within normal limits for patient age. Impression  No acute cardiopulmonary disease.         Workstation performed: [id number]      *I have reviewed all imaging reports listed above      Recent Cultures (last 7 days):     Results from last 7 days   Lab Units 09/08/23  0930   GRAM STAIN RESULT  1+ Polys  No bacteria seen   BODY FLUID CULTURE, STERILE  No growth       Last 24 Hours Medication List:   Current Facility-Administered Medications   Medication Dose Route Frequency Provider Last Rate   • acetaminophen  650 mg Oral Q6H PRN Radha Saldaña PA-C     • aluminum-magnesium hydroxide-simethicone  30 mL Oral Q6H PRN Radha Saldaña PA-C     • apixaban  2.5 mg Oral BID Bird Taveras MD     • aspirin  81 mg Oral Daily Radha Saldaña PA-C     • diltiazem  240 mg Oral Daily Jassi Holguin PA-C     • diltiazem  10 mg Intravenous BID PRN Bird Taveras MD     • fluticasone  1 spray Nasal Daily Radha Saldaña PA-C     • folic acid  1 mg Oral Daily Radha Saldaña PA-C     • guaiFENesin  600 mg Oral BID Radha Saldaña PA-C     • lactulose  200 g Rectal BID PRN Emery Willett PA-C     • magnesium Oxide  400 mg Oral Daily Radha Saldaña PA-C     • metoprolol tartrate  25 mg Oral Q12H Ethan Salguero MD     • ondansetron  4 mg Intravenous Q6H PRN Radha Saldaña PA-C     • oxyCODONE  5 mg Oral Q6H PRN Gonzalez Daniel MD     • pantoprazole  40 mg Oral Early Morning Alley Ramos MD     • predniSONE  20 mg Oral Daily Bird Taveras MD      And   • [START ON 9/17/2023] predniSONE  10 mg Oral Daily Bird Taveras MD     • saccharomyces boulardii  250 mg Oral BID Jamaal Mendez PA-C     • senna  1 tablet Oral HS Radha Saldaña PA-C          Today, Patient Was Seen By: Alley Ramos MD    ** Please Note: Dictation voice to text software may have been used in the creation of this document.  **

## 2023-09-14 NOTE — ASSESSMENT & PLAN NOTE
Lab Results   Component Value Date    EGFR 30 09/14/2023    EGFR 28 09/13/2023    EGFR 26 09/12/2023    CREATININE 1.94 (H) 09/14/2023    CREATININE 2.04 (H) 09/13/2023    CREATININE 2.16 (H) 09/12/2023

## 2023-09-14 NOTE — ASSESSMENT & PLAN NOTE
· Went into A-fib with RVR on August 28 and was given IV Lopressor and Cardizem  · Home regimen: Coreg 25 Mg twice daily  · Started on Eliquis 2.5 mg twice daily for anticoagulation during hospitalization at [address] [facility] 8/8-8/23  · Cardiology consult appreciated  · Rate controlled  · Will order Cardizem IV as PRN for poor oral intake  · Cont eliquis

## 2023-09-14 NOTE — ASSESSMENT & PLAN NOTE
· Hemoglobin 9.4 on admission  · Hemoglobin ranging 10-12 during recent [facility] admission  · No signs of active bleeding  · Trend CBC  Iron def anemia work up, inconlusive.

## 2023-09-14 NOTE — ASSESSMENT & PLAN NOTE
Lab Results   Component Value Date    EGFR 30 09/14/2023    EGFR 28 09/13/2023    EGFR 26 09/12/2023    CREATININE 1.94 (H) 09/14/2023    CREATININE 2.04 (H) 09/13/2023    CREATININE 2.16 (H) 09/12/2023   · Baseline creatinine 1.6-1.9, POA- 1.7  · Likely secondary to autoregulatory failure, ATN secondary to sepsis, medications  · S/p Lasix drip  · ivf per nephrology, currently off  · Nephrology following. IMPROVING.    · Urinary retention protocol  · I/O monitoring  · Renally dose medications

## 2023-09-14 NOTE — ASSESSMENT & PLAN NOTE
Patient is confused likely acute metabolic encephalopathy suspected underlying cognitive impairment   Frequent reorientation  Improving with resolution of infection and improvement of TOBI.   Monitor CBC, CMP  Cultures, VBG, ammonia, CT head normal

## 2023-09-14 NOTE — ASSESSMENT & PLAN NOTE
· History of alcohol abuse with withdrawal seizures, has not drink since prior to [facility] hospitalization 8/8

## 2023-09-14 NOTE — PLAN OF CARE
Problem: Potential for Falls  Goal: Patient will remain free of falls  Description: INTERVENTIONS:  - Educate patient/family on patient safety including physical limitations  - Instruct patient to call for assistance with activity   - Consult OT/PT to assist with strengthening/mobility   - Keep Call bell within reach  - Keep bed low and locked with side rails adjusted as appropriate  - Keep care items and personal belongings within reach  - Initiate and maintain comfort rounds  - Make Fall Risk Sign visible to staff  - Offer Toileting every 2 Hours, in advance of need  - Initiate/Maintain bed alarm  - Obtain necessary fall risk management equipment:   - Apply yellow socks and bracelet for high fall risk patients  - Consider moving patient to room near nurses station  Outcome: Progressing     Problem: PAIN - ADULT  Goal: Verbalizes/displays adequate comfort level or baseline comfort level  Description: Interventions:  - Encourage patient to monitor pain and request assistance  - Assess pain using appropriate pain scale  - Administer analgesics based on type and severity of pain and evaluate response  - Implement non-pharmacological measures as appropriate and evaluate response  - Consider cultural and social influences on pain and pain management  - Notify physician/advanced practitioner if interventions unsuccessful or patient reports new pain  Outcome: Progressing     Problem: INFECTION - ADULT  Goal: Absence or prevention of progression during hospitalization  Description: INTERVENTIONS:  - Assess and monitor for signs and symptoms of infection  - Monitor lab/diagnostic results  - Monitor all insertion sites, i.e. indwelling lines, tubes, and drains  - Monitor endotracheal if appropriate and nasal secretions for changes in amount and color  - [facility] appropriate cooling/warming therapies per order  - Administer medications as ordered  - Instruct and encourage patient and family to use good hand hygiene technique  - Identify and instruct in appropriate isolation precautions for identified infection/condition  Outcome: Progressing  Goal: Absence of fever/infection during neutropenic period  Description: INTERVENTIONS:  - Monitor WBC    Outcome: Progressing

## 2023-09-15 ENCOUNTER — TRANSITIONAL CARE MANAGEMENT (OUTPATIENT)
Dept: FAMILY MEDICINE CLINIC | Facility: CLINIC | Age: 88
End: 2023-09-15

## 2023-09-15 VITALS
BODY MASS INDEX: 30.57 KG/M2 | HEART RATE: 73 BPM | DIASTOLIC BLOOD PRESSURE: 82 MMHG | OXYGEN SATURATION: 97 % | TEMPERATURE: 97.5 F | SYSTOLIC BLOOD PRESSURE: 159 MMHG | WEIGHT: 201.72 LBS | RESPIRATION RATE: 17 BRPM | HEIGHT: 68 IN

## 2023-09-15 LAB
ALBUMIN SERPL BCP-MCNC: 2.9 G/DL (ref 3.5–5)
ALP SERPL-CCNC: 66 U/L (ref 34–104)
ALT SERPL W P-5'-P-CCNC: 27 U/L (ref 7–52)
ANION GAP SERPL CALCULATED.3IONS-SCNC: 7 MMOL/L
AST SERPL W P-5'-P-CCNC: 18 U/L (ref 13–39)
BASOPHILS # BLD AUTO: 0.01 THOUSANDS/ÂΜL (ref 0–0.1)
BASOPHILS NFR BLD AUTO: 0 % (ref 0–1)
BILIRUB SERPL-MCNC: 0.42 MG/DL (ref 0.2–1)
BUN SERPL-MCNC: 107 MG/DL (ref 5–25)
CALCIUM ALBUM COR SERPL-MCNC: 10 MG/DL (ref 8.3–10.1)
CALCIUM SERPL-MCNC: 9.1 MG/DL (ref 8.4–10.2)
CHLORIDE SERPL-SCNC: 94 MMOL/L (ref 96–108)
CO2 SERPL-SCNC: 32 MMOL/L (ref 21–32)
CREAT SERPL-MCNC: 1.82 MG/DL (ref 0.6–1.3)
EOSINOPHIL # BLD AUTO: 0.03 THOUSAND/ÂΜL (ref 0–0.61)
EOSINOPHIL NFR BLD AUTO: 0 % (ref 0–6)
ERYTHROCYTE [DISTWIDTH] IN BLOOD BY AUTOMATED COUNT: 13.5 % (ref 11.6–15.1)
GFR SERPL CREATININE-BSD FRML MDRD: 32 ML/MIN/1.73SQ M
GLUCOSE SERPL-MCNC: 256 MG/DL (ref 65–140)
HCT VFR BLD AUTO: 25.7 % (ref 36.5–49.3)
HGB BLD-MCNC: 8.3 G/DL (ref 12–17)
IMM GRANULOCYTES # BLD AUTO: 0.26 THOUSAND/UL (ref 0–0.2)
IMM GRANULOCYTES NFR BLD AUTO: 2 % (ref 0–2)
LYMPHOCYTES # BLD AUTO: 1.36 THOUSANDS/ÂΜL (ref 0.6–4.47)
LYMPHOCYTES NFR BLD AUTO: 9 % (ref 14–44)
MCH RBC QN AUTO: 31.4 PG (ref 26.8–34.3)
MCHC RBC AUTO-ENTMCNC: 32.3 G/DL (ref 31.4–37.4)
MCV RBC AUTO: 97 FL (ref 82–98)
MONOCYTES # BLD AUTO: 0.86 THOUSAND/ÂΜL (ref 0.17–1.22)
MONOCYTES NFR BLD AUTO: 6 % (ref 4–12)
NEUTROPHILS # BLD AUTO: 13.09 THOUSANDS/ÂΜL (ref 1.85–7.62)
NEUTS SEG NFR BLD AUTO: 83 % (ref 43–75)
NRBC BLD AUTO-RTO: 0 /100 WBCS
PHOSPHATE SERPL-MCNC: 3.2 MG/DL (ref 2.3–4.1)
PLATELET # BLD AUTO: 312 THOUSANDS/UL (ref 149–390)
PMV BLD AUTO: 9.8 FL (ref 8.9–12.7)
POTASSIUM SERPL-SCNC: 3.9 MMOL/L (ref 3.5–5.3)
PROT SERPL-MCNC: 6.2 G/DL (ref 6.4–8.4)
RBC # BLD AUTO: 2.64 MILLION/UL (ref 3.88–5.62)
SODIUM SERPL-SCNC: 133 MMOL/L (ref 135–147)
WBC # BLD AUTO: 15.61 THOUSAND/UL (ref 4.31–10.16)

## 2023-09-15 PROCEDURE — 99239 HOSP IP/OBS DSCHRG MGMT >30: CPT | Performed by: HOSPITALIST

## 2023-09-15 PROCEDURE — 99232 SBSQ HOSP IP/OBS MODERATE 35: CPT | Performed by: INTERNAL MEDICINE

## 2023-09-15 PROCEDURE — 85025 COMPLETE CBC W/AUTO DIFF WBC: CPT | Performed by: HOSPITALIST

## 2023-09-15 PROCEDURE — 80053 COMPREHEN METABOLIC PANEL: CPT | Performed by: HOSPITALIST

## 2023-09-15 PROCEDURE — 84100 ASSAY OF PHOSPHORUS: CPT | Performed by: HOSPITALIST

## 2023-09-15 RX ORDER — DILTIAZEM HYDROCHLORIDE 240 MG/1
240 CAPSULE, COATED, EXTENDED RELEASE ORAL DAILY
Refills: 0
Start: 2023-09-16

## 2023-09-15 RX ORDER — PREDNISONE 20 MG/1
20 TABLET ORAL DAILY
Qty: 1 TABLET | Refills: 0
Start: 2023-09-16 | End: 2023-09-17

## 2023-09-15 RX ORDER — PREDNISONE 10 MG/1
10 TABLET ORAL DAILY
Qty: 4 TABLET | Refills: 0
Start: 2023-09-17 | End: 2023-09-21

## 2023-09-15 RX ADMIN — METOPROLOL TARTRATE 25 MG: 25 TABLET, FILM COATED ORAL at 08:09

## 2023-09-15 RX ADMIN — MAGNESIUM OXIDE TAB 400 MG (241.3 MG ELEMENTAL MG) 400 MG: 400 (241.3 MG) TAB at 08:09

## 2023-09-15 RX ADMIN — APIXABAN 2.5 MG: 2.5 TABLET, FILM COATED ORAL at 08:09

## 2023-09-15 RX ADMIN — DILTIAZEM HYDROCHLORIDE 240 MG: 240 CAPSULE, COATED, EXTENDED RELEASE ORAL at 08:09

## 2023-09-15 RX ADMIN — PANTOPRAZOLE SODIUM 40 MG: 40 TABLET, DELAYED RELEASE ORAL at 04:35

## 2023-09-15 RX ADMIN — FLUTICASONE PROPIONATE 1 SPRAY: 50 SPRAY, METERED NASAL at 08:09

## 2023-09-15 RX ADMIN — Medication 250 MG: at 08:09

## 2023-09-15 RX ADMIN — GUAIFENESIN 600 MG: 600 TABLET ORAL at 08:09

## 2023-09-15 RX ADMIN — FOLIC ACID 1 MG: 1 TABLET ORAL at 08:09

## 2023-09-15 RX ADMIN — PREDNISONE 20 MG: 20 TABLET ORAL at 08:09

## 2023-09-15 RX ADMIN — ASPIRIN 81 MG: 81 TABLET, COATED ORAL at 08:09

## 2023-09-15 NOTE — PROGRESS NOTES
NEPHROLOGY PROGRESS NOTE   Sharon Holliday 82 y.o. male MRN: [id number]  Unit/Bed#: -01 Encounter: [id number]      HPI/24hr EVENTS:    -16-year-old male past medical history of CKD 3, atrial fibrillation, COPD.  Presented with fever, nephrology consult for management of TOBI.     -Continued improvement in renal function, no acute events overnight    ASSESSMENT/PLAN:    TOBI on CKD 3  -Baseline creatinine: 1.5-1.7  -Creatinine on admission 0.7, creatinine peaking at 4.93 on 9/6, most recent creatinine 1.84 almost back to baseline  -Etiology: Suspect second to prerenal azotemia with component of ATN due to sepsis, ARB use, hypotension  -UA: 2+ protein, 1-2 RBCs  -Renal imaging: CTAP with multiple left renal cyst, no hydronephrosis, nonspecific bilateral perinephric edema  -Avoid hypotension, avoid nephrotoxins, avoid NSAIDS  -Trend BMP  -Urine eosinophils negative, CK normal  -Is azotemic with a BUN of 97 likely due to steroid use now  -Urine output is adequate  -Status post Lasix infusion to convert to nonoliguric state and IV fluids, currently off all therapies  -Villasenor catheter has been discontinued, currently on urinary retention protocol, has condom catheter  -Stable for discharge from nephrology standpoint, should have repeat BMP as outpatient with his PCP in 7 to 10 days    Anemia  -Recent hemoglobin 8.3  -FLC/kappa lambda ratio was 0.84, SPEP with beta gamma bridging but no M spike, UPEP nonselective proteinuria and no monoclonal spike  -Iron panel with iron sat unable to be calculated, severely decreased TIBC, moderately elevated ferritin     Hyponatremia  -Due to hyperosmolar hyponatremia, Sodium is 133, glucose corrected to 135  -DM management per primary team    Blood pressure  -Currently on, Lopressor 25 mg twice daily, Cardizem 240 milligrams daily  -Recent blood pressure trends acceptable     Sepsis/pneumonia/cystitis/olecranon bursitis  -Currently off antibiotics  -Management per primary team    SUBJECTIVE:  No complaints    ROS:  Review of Systems   Constitutional: Negative. HENT: Negative. A complete 10 point review of systems was performed and found to be negative unless otherwise noted above or in the HPI. OBJECTIVE:  Current Weight: Weight - Scale: 91.5 kg (201 lb 11.5 oz)  Vitals:    09/14/23 1500 09/14/23 1927 09/15/23 0040 09/15/23 0741   BP: 130/69 138/55  159/82   BP Location:  Right arm     Pulse: 85 79 72 73   Resp: 18 16  17   Temp: 98.1 °F (36.7 °C)  97.9 °F (36.6 °C) 97.5 °F (36.4 °C)   TempSrc:       SpO2: 95% 96% 98% 97%   Weight:       Height:           Intake/Output Summary (Last 24 hours) at 9/15/2023 1017  Last data filed at 9/15/2023 0930  Gross per 24 hour   Intake 470 ml   Output 1600 ml   Net -1130 ml     Physical Exam  Vitals and nursing note reviewed. Constitutional:       General: He is not in acute distress. Appearance: He is not toxic-appearing or diaphoretic. Comments: Awake, sitting in bed, frail-appearing   HENT:      Head: Normocephalic and atraumatic. Nose: Nose normal.      Mouth/Throat:      Mouth: Mucous membranes are moist.   Eyes:      General: No scleral icterus. Cardiovascular:      Rate and Rhythm: Normal rate. Pulses: Normal pulses. Pulmonary:      Effort: Pulmonary effort is normal. No respiratory distress. Breath sounds: No wheezing or rales. Abdominal:      General: Abdomen is flat. Genitourinary:     Comments: Condom catheter with clear yellow urine in bag  Musculoskeletal:      Cervical back: Neck supple. Right lower leg: No edema. Left lower leg: No edema. Skin:     General: Skin is warm and dry. Capillary Refill: Capillary refill takes less than 2 seconds. Neurological:      Mental Status: He is alert.       Comments: Awake, interactive          Medications:    Current Facility-Administered Medications:   •  acetaminophen (TYLENOL) tablet 650 mg, 650 mg, Oral, Q6H PRN, Екатерина Quinn, LOLI, 650 mg at 09/14/23 1337  •  aluminum-magnesium hydroxide-simethicone (MAALOX) oral suspension 30 mL, 30 mL, Oral, Q6H PRN, KATE MontesC  •  apixaban (ELIQUIS) tablet 2.5 mg, 2.5 mg, Oral, BID, Sade Acuna MD, 2.5 mg at 09/15/23 0809  •  aspirin (ECOTRIN LOW STRENGTH) EC tablet 81 mg, 81 mg, Oral, Daily, NapoleARSEN Cleary-C, 81 mg at 09/15/23 0809  •  diltiazem (CARDIZEM CD) 24 hr capsule 240 mg, 240 mg, Oral, Daily, Gianna Holguin PA-C, 240 mg at 09/15/23 0809  •  diltiazem (CARDIZEM) injection 10 mg, 10 mg, Intravenous, BID PRN, Sade Acuna MD  •  fluticasone (FLONASE) 50 mcg/act nasal spray 1 spray, 1 spray, Nasal, Daily, KATE MontesC, 1 spray at 83/51/09 2492  •  folic acid (FOLVITE) tablet 1 mg, 1 mg, Oral, Daily, Celia Blanco PA-C, 1 mg at 09/15/23 0809  •  guaiFENesin (MUCINEX) 12 hr tablet 600 mg, 600 mg, Oral, BID, Celia Blanco PA-C, 600 mg at 09/15/23 0809  •  lactulose retention enema 200 g, 200 g, Rectal, BID PRN, Elizabeth Willett PA-C  •  magnesium Oxide (MAG-OX) tablet 400 mg, 400 mg, Oral, Daily, Celia Blanco PA-C, 400 mg at 09/15/23 0809  •  metoprolol tartrate (LOPRESSOR) tablet 25 mg, 25 mg, Oral, Q12H [address] [facility], 25 mg at 09/15/23 0809  •  ondansetron (ZOFRAN) injection 4 mg, 4 mg, Intravenous, Q6H PRN, Celia Blanco PA-C, 4 mg at 08/28/23 1748  •  oxyCODONE (ROXICODONE) IR tablet 5 mg, 5 mg, Oral, Q6H PRN, Bernardo Ryan MD, 5 mg at 09/14/23 2137  •  pantoprazole (PROTONIX) EC tablet 40 mg, 40 mg, Oral, Early Morning, Gertrudis Flores MD, 40 mg at 09/15/23 0435  •  predniSONE tablet 20 mg, 20 mg, Oral, Daily, 20 mg at 09/15/23 0809 **AND** [START ON 9/17/2023] predniSONE tablet 10 mg, 10 mg, Oral, Daily, Sade Acuna MD  •  saccharomyces boulardii (FLORASTOR) capsule 250 mg, 250 mg, Oral, BID, Wandy Tuttle PA-C, 250 mg at 09/15/23 0809  •  senna (SENOKOT) tablet 8.6 mg, 1 tablet, Oral, HS, Andrade Dai PA-C, 8.6 mg at 09/14/23 1934    Laboratory Results:  Results from last 7 days   Lab Units 09/15/23  0408 09/14/23  0306 09/13/23  0417 09/12/23  0350 09/11/23  0229 09/10/23  0515 09/09/23  0540   WBC Thousand/uL 15.61* 16.52* 16.33* 12.96* 16.70*  --  16.63*   HEMOGLOBIN g/dL 8.3* 8.4* 8.6* 9.3* 8.1*  --  7.9*   HEMATOCRIT % 25.7* 26.6* 26.7* 27.7* 24.9*  --  24.4*   PLATELETS Thousands/uL 312 342 347 401* 342  --  350   POTASSIUM mmol/L 3.9 4.3 3.4* 3.5 3.7 3.1* 3.6   CHLORIDE mmol/L 94* 94* 93* 93* 92* 94* 96   CO2 mmol/L 32 33* 35* 35* 31 33* 33*   BUN mg/dL 107* 104* 97* 103* 101* 105* 110*   CREATININE mg/dL 1.82* 1.94* 2.04* 2.16* 2.43* 2.83* 3.75*   CALCIUM mg/dL 9.1 8.9 9.0 9.0 8.6 9.3 9.5   MAGNESIUM mg/dL  --   --   --   --   --  2.1 2.1   PHOSPHORUS mg/dL 3.2  --   --   --   --  5.4*  --        I have personally reviewed the blood work as stated above and in my note. I have personally reviewed internal medicine note.

## 2023-09-15 NOTE — DISCHARGE SUMMARY
Chronic renal impairment  Assessment & Plan        Lab Results   Component Value Date     EGFR 30 09/14/2023     EGFR 28 09/13/2023     EGFR 26 09/12/2023     CREATININE 1.94 (H) 09/14/2023     CREATININE 2.04 (H) 09/13/2023     CREATININE 2.16 (H) 09/12/2023         Ileus (HCC)  Assessment & Plan  Abdominal distension with X ray KUB showing left lower quadrant loop distension- 9/7  Ileus demonstrated  X ray KUB done showed trace contrast seen in rectosigmoid junction, contrast seen progressing well to the level of distal sigmoid colon. Advance diet to soft surgical  Serial abdominal exams           Acute gout of multiple sites  Assessment & Plan  Left 3rd MCP joint, PIP, Elbow joint  Tender to touch, erythematous  Started steroid taper     Sacral wound  Assessment & Plan  Wound care consulted  Appreciate recs     ESBL (extended spectrum beta-lactamase) producing bacteria infection  Assessment & Plan  Urine CX from 8/27- grew ESBL  Finished treatment           Sepsis ([address])  Assessment & Plan  Meets criteria with leukocytosis, tachycardia  Likely secondary to ESBL UTI/Septic olecranon bursitis, possible sepsis is now resolved . More likely SIRS   F/u Blood Cx- Negative growth so far, wound Cx- growing coag negative staph  RESOLVING. Stable thus far off  Dapto, leukocytosis improving and likely 2/2 steroid exposure for gout.         Abnormal computed tomography angiography (CTA) of abdomen and pelvis  Assessment & Plan  CT abdomen pelvis showed-  Cholelithiasis with possible impacted stone at the neck, gallbladder wall thickening and/or pericholecystic edema and sigmoid colon stricturing. No abdominal tenderness, Surgery and GI recommends follow up with serial abdominal exams.  No plan for surgery as it could be likely chronic problem           Olecranon bursitis  Assessment & Plan  Wound culture ordered, so far Cx yielded no growth  ID and ortho following  Follow-up on synovial fluid, sent for culture, Gram stain, cell count, crystals. Synovial fluid positive for monosodium urate crystals  Dapto discontinued after discussing with ID  Cont steroid taper.     Acute encephalopathy  Assessment & Plan  Patient is confused likely acute metabolic encephalopathy suspected underlying cognitive impairment   Frequent reorientation  Improving with resolution of infection and improvement of TOBI. Monitor CBC, CMP  Cultures, VBG, ammonia, CT head normal           History of alcohol abuse  Assessment & Plan  • History of alcohol abuse with withdrawal seizures, has not drink since prior to [facility] hospitalization 8/8     Central retinal vein occlusion of right eye  Assessment & Plan  • Seen by ophthalmology during recent [address] [facility] admission, diagnosed with CRVO and ocular hypertension  • Patient recommended for formal evaluation outpatient of OCT macular degeneration, discussion of possible anti-VEGF therapy  • Encouraged ophthalmology follow-up outpatient     TOBI (acute kidney injury) [facility])  Assessment & Plan        • Lab Results   • Component • Value • Date   •   • EGFR • 30 • 09/14/2023   •   • EGFR • 28 • 09/13/2023   •   • EGFR • 26 • 09/12/2023   •   • CREATININE • 1.94 (H) • 09/14/2023   •   • CREATININE • 2.04 (H) • 09/13/2023   •   • CREATININE • 2.16 (H) • 09/12/2023   • Baseline creatinine 1.6-1.9, POA- 1.7  • Likely secondary to autoregulatory failure, ATN secondary to sepsis, medications  • S/p Lasix drip  • ivf per nephrology, currently off  • Nephrology following. IMPROVING.    • Urinary retention protocol  • I/O monitoring  • Renally dose medications              Chronic obstructive pulmonary disease, unspecified COPD type (HCC)  Assessment & Plan  • Not on maintenance inhalers outpatient  • No signs of acute exacerbation on admission  • If patient were to develop wheezing in setting of pneumonia, would start Xopenex/Atrovent nebulizers     Atrial fibrillation [facility])  Assessment & Plan  • Went into A-fib with RVR on August 28 and was given IV Lopressor and Cardizem  • Home regimen: Coreg 25 Mg twice daily  • Started on Eliquis 2.5 mg twice daily for anticoagulation during hospitalization at [address] [facility] [address] 8/8-8/23  • Cardiology consult appreciated  • Rate controlled  • Will order Cardizem IV as PRN for poor oral intake  • Cont eliquis        Anemia of chronic disease  Assessment & Plan  • Hemoglobin 9.4 on admission  • Hemoglobin ranging 10-12 during recent [facility] admission  • No signs of active bleeding  • Trend CBC  Iron def anemia work up, inconlusive. Hospital Course:     Talita Henriquez is a 82 y.o. male patient who originally presented to the hospital on   Admission Orders (From admission, onward)     Ordered        08/26/23 2230  INPATIENT ADMISSION  Once                     due to  Hospitalized with multiple issues. Patient had A-fib with RVR that was stabilized with cardiology guidance. Patient had prolonged TOBI secondary to autoregulatory failure ATN, medicines. He briefly required a Lasix drip. His creatinine is at the upper end of his baseline at the time of discharge. Please see above list of diagnoses and related plan for additional information. Physical Exam:    GEN: No acute distress, comfortable  HEEENT: No JVD, PERRLA, no scleral icterus  RESP: Lungs clear to auscultation bilaterally  CV: RRR, +s1/s2   ABD: SOFT NON TENDER, POSITIVE BOWEL SOUNDS, NO DISTENTION  PSYCH: CALM, apparent dementia.    NEURO: Mentation baseline, NO FOCAL DEFICITS  SKIN: NO RASH  EXTREM: NO EDEMA    CONSULTING PROVIDERS   IP CONSULT TO CARDIOLOGY  IP CONSULT TO INFECTIOUS DISEASES  IP CONSULT TO ORTHOPEDIC SURGERY  IP CONSULT TO NEPHROLOGY  IP CONSULT TO ACUTE CARE SURGERY  IP CONSULT TO GASTROENTEROLOGY    PROCEDURES PERFORMED  * No surgery found *    RADIOLOGY RESULTS  VAS upper limb venous duplex scan, complete, bilateral    Result Date: 9/11/2023  Narrative:  [facility] REPORT CLINICAL: Indications: Patient presents with bilateral upper extremity edema . Operative History: No known cardiovascular surgeries   CONCLUSION: Impression RIGHT UPPER LIMB: No evidence of acute or chronic deep vein thrombosis. No evidence of superficial thrombophlebitis noted. Doppler evaluation shows a normal response to augmentation maneuvers. LEFT UPPER LIMB: No evidence of acute or chronic deep vein thrombosis. No evidence of superficial thrombophlebitis noted. Doppler evaluation shows a normal response to augmentation maneuvers. SIGNATURE: Electronically Signed by: Matt Wilks MD [facility] on 2023-09-11 07:15:35 PM    CT head wo contrast    Result Date: 9/10/2023  Narrative: CT BRAIN - WITHOUT CONTRAST INDICATION:   Delirium lethargy, encephalopathy, R/o acute stroke. COMPARISON: 1/10/2020 TECHNIQUE:  CT examination of the brain was performed. Multiplanar 2D reformatted images were created from the source data. Radiation dose length product (DLP) for this visit:  857.62 mGy-cm . This examination, like all CT scans performed in the [facility], was performed utilizing techniques to minimize radiation dose exposure, including the use of iterative  reconstruction and automated exposure control. IMAGE QUALITY:  Diagnostic. FINDINGS: PARENCHYMA: Decreased attenuation is noted in periventricular and subcortical white matter demonstrating an appearance that is statistically most likely to represent mild microangiopathic change; this appearance is similar when compared to most recent prior examination. No CT signs of acute infarction. No intracranial mass, mass effect or midline shift. No acute parenchymal hemorrhage. VENTRICLES AND EXTRA-AXIAL SPACES:  Normal for the patient's age. VISUALIZED ORBITS: Normal visualized orbits. PARANASAL SINUSES: Mild mucosal thickening within the inferior maxillary sinuses. CALVARIUM AND EXTRACRANIAL SOFT TISSUES:  Normal.     Impression: No acute intracranial abnormality. Stable mild chronic microangiopathic changes within the brain. Workstation performed: [id number]     XR elbow 3+ vw left    Result Date: 9/8/2023  Narrative: LEFT ELBOW INDICATION:   bursitis. Pain and swelling COMPARISON:  None VIEWS:  XR ELBOW 3+ VW LEFT FINDINGS: There is no acute fracture or dislocation. There is no joint effusion. Olecranon spur is noted. . No lytic or blastic osseous lesion. There is evidence of chondrocalcinosis. Soft tissue swelling at the olecranon is noted. Impression: No acute osseous abnormality. Soft tissue swelling adjacent to the olecranon. Workstation performed: [id number]     XR abdomen 1 view kub    Result Date: 9/8/2023  Narrative: ABDOMEN INDICATION:   ileus. Follow-up to assess for progression of contrast into the rectum. COMPARISON: Radiograph abdomen 9/6/2023 and 9/7/2023. CT abdomen pelvis 9/2/2023. VIEWS:  AP supine FINDINGS: Contrast is seen throughout the colon reaching the level of the mid to distal sigmoid colon. Trace contrast is seen progressing to the upper rectum/rectosigmoid junction. No discernible free air on this supine study. Upright or left lateral decubitus imaging is more sensitive to detect subtle free air in the appropriate setting. No pathologic calcifications or soft tissue masses. Left basilar atelectasis with possible trace effusion. No evidence of acute osseous abnormality. Impression: 1. Contrast is seen throughout the colon progressing to the level of the distal sigmoid colon. Trace contrast is seen extending to the rectosigmoid junction/upper rectum. Resident: Bernardino Peter, the attending radiologist, have reviewed the images and agree with the final report above. Workstation performed: [id number]     XR abdomen obstruction series    Result Date: 9/7/2023  Narrative: OBSTRUCTION SERIES INDICATION:   ileus. COMPARISON: Abdomen radiograph 9/6/2023.  EXAM PERFORMED/VIEWS:  XR ABDOMEN OBSTRUCTION SERIES FINDINGS: Prominent loops of large bowel with decreased caliber of the loop of sigmoid colon. Oral contrast progressed into the large bowel to the level of the sigmoid colon, but not within the prominent loop of sigmoid colon or the the rectum. No free air beneath the hemidiaphragms. No pathologic calcifications or soft tissue masses evident. Osseous structures are unremarkable. Examination of the chest reveals a normal cardiomediastinal silhouette. Lungs are clear. Nasogastric tube distal tip in the proximal stomach. Small amount of residual contrast within the tubing of the nasogastric tube. Multiple prostatic seeds. Impression: Prominent loops of large bowel with decreased caliber of the central loop of sigmoid colon with oral contrast progressing to the level of the sigmoid colon. Findings again favor an adynamic ileus. Recommend short-term follow-up abdomen radiograph to assess for progression of contrast into the rectum. Small amount of residual contrast within the tubing of the nasogastric tube. Consider flushing with normal saline to prevent blockage. No acute cardiopulmonary disease. The study was marked in [facility] for immediate notification. Workstation performed: [id number]     XR abdomen 1 view kub    Result Date: 9/7/2023  Narrative: ABDOMEN INDICATION:   abdominal distention. COMPARISON: Obstruction series 8/27/2014. CT chest, abdomen, pelvis 9/2/2023. VIEWS:  AP supine FINDINGS: Increased dilation of a loop of sigmoid colon with the remainder of the colon air-filled but nondilated. There is gas within the rectum. No dilated loop of small bowel identified. No discernible free air on this supine study. Upright or left lateral decubitus imaging is more sensitive to detect subtle free air in the appropriate setting. No pathologic calcifications or soft tissue masses. Visualized lung bases are clear. Visualized osseous structures are unremarkable for the patient's age. Multiple prostatic seeds.      Impression: Increased dilation of a loop of sigmoid colon with the remainder of the colon air-filled but nondilated and gas within the rectum. These findings favor an adynamic ileus. Recommend continued follow-up. The study was marked in [facility] for immediate notification. Workstation performed: [id number]     XR chest portable    Result Date: 9/7/2023  Narrative: CHEST INDICATION:   NGT placement. COMPARISON: Chest CT 9/2/2023, CXR 9/1/2023. EXAM PERFORMED/VIEWS:  XR CHEST PORTABLE. FINDINGS: NG tube in stomach. Mild cardiomegaly. Question mild left base atelectasis. No effusion or pneumothorax. Upper abdomen normal. Bones normal for age. Impression: Question mild left base atelectasis. NG tube in stomach. Workstation performed: [id number]     XR elbow 3+ vw right    Result Date: 9/5/2023  Narrative: RIGHT ELBOW INDICATION:   right elbow swelling. COMPARISON:  None VIEWS:  XR ELBOW 3+ VW RIGHT Images: 3 FINDINGS: There is no acute fracture or dislocation. There is no joint effusion. No significant degenerative changes. No lytic or blastic osseous lesion. Soft tissue swelling in the region of the olecranon bursa     Impression: No acute displaced fracture No elbow effusion Soft tissue swelling in the region of the olecranon can be clinically correlated No lytic lesion or periosteal reaction Workstation performed: [id number]     US right upper quadrant    Result Date: 9/4/2023  Narrative: RIGHT UPPER QUADRANT ULTRASOUND INDICATION:     cholecystitis. COMPARISON: CT scan from yesterday TECHNIQUE:   Real-time ultrasound of the right upper quadrant was performed with a curvilinear transducer with both volumetric sweeps and still imaging techniques. FINDINGS: PANCREAS: The pancreas was obscured by bowel gas. AORTA AND IVC:  Obscured by bowel gas. LIVER: Size:  Within normal range. The liver measures 15.0 cm in the midclavicular line. Contour:  Surface contour is smooth. Parenchyma:  Echogenicity and echotexture are within normal limits.  No liver mass identified. Limited imaging of the main portal vein shows it to be patent and hepatopetal. BILIARY: The gallbladder is normal in caliber. There is gallbladder wall thickening and pericholecystic fluid. There are multiple dependent calculi without sludge. Gallstone at the gallbladder neck. Sonographic Mari's sign cannot be accurately assessed because patient was not responsive. No intrahepatic biliary dilatation. CBD measures 3.0 mm. No choledocholithiasis. KIDNEY: Right kidney measures 11.4 x 6.8 x 4.6 cm. Volume 188.7 mL Kidney within normal limits. ASCITES:   None. Impression: Cholelithiasis with noted gallstone at the gallbladder neck. Associated gallbladder wall thickening. Cannot exclude acute cholecystitis in the appropriate clinical situation. The sonographic Mari's sign was not able to be assessed as the patient was not responding. Consider HIDA scan if clinically indicated. The study was marked in [facility] for immediate notification. Workstation performed: [id number]     CT chest abdomen pelvis wo contrast    Result Date: 9/2/2023  Narrative: CT CHEST, ABDOMEN AND PELVIS WITHOUT IV CONTRAST INDICATION:  Sepsis, fever and leukocytosis. Patient tested positive for COVID-19   8/12/2023. COMPARISON: CT abdomen pelvis 3/24/2017, chest radiograph 9/1/2023 TECHNIQUE: CT examination of the chest, abdomen and pelvis was performed without intravenous contrast. Multiplanar 2D reformatted images were created from the source data. This examination, like all CT scans performed in the [facility], was performed utilizing techniques to minimize radiation dose exposure, including the use of iterative reconstruction and automated exposure control. Radiation dose length product (DLP) for this visit:  1339.82 mGy-cm Enteric contrast was not administered. FINDINGS: CHEST LUNGS: Limited by motion. Minor dependent hypoventilatory changes. No consolidation to suggest pneumonia.  The airway is diminished in caliber suggesting under aeration. No significant central filling defects. Within limitations, no convincing CT findings in the lungs to suggest COVID-pneumonia. PLEURA:  Unremarkable. HEART/GREAT VESSELS: Heart is unremarkable for patient's age. Small pericardial effusion. Atherosclerotic changes thoracic aorta and coronary arteries. No thoracic aortic aneurysm. MEDIASTINUM AND LAURITA: No pathologic adenopathy. Mild mediastinal lipomatosis. The esophagus is unremarkable. CHEST WALL AND LOWER NECK: Mild bilateral gynecomastia. ABDOMEN Evaluation of the solid organs is limited without IV contrast as well as by mild respiratory motion and streak artifact from patient's arms. LIVER/BILIARY TREE:  Unremarkable. GALLBLADDER: Cholelithiasis. There is a stone potentially lodged at the gallbladder neck with pericholecystic fluid and/or mild wall thickening. Findings could be indicative of acute cholecystitis. SPLEEN:  Unremarkable. PANCREAS: Mild to moderate fatty involution without acute findings. ADRENAL GLANDS:  Unremarkable. KIDNEYS/URETERS: Multiple left renal cysts. No hydronephrosis. Nonspecific bilateral perinephric edema. STOMACH AND BOWEL: Limited evaluation of the bowel without oral or IV contrast. There is a long segment of sigmoid colon which appears collapsed. The upstream sigmoid is quite tortuous, air-filled and slightly dilated up to about 7.4 cm, however, is without evidence for volvulus. Possibility of a long segment sigmoid stricture or rectal thickening cannot be excluded. Overall the remainder of the colon has a more normal caliber, although filled with a large amount of stool from the cecum through the distal transverse colon, suggesting constipation. Evaluation of the stomach is limited by underdistention. No focal pathology seen within limitations. Small bowel is normal caliber. APPENDIX:  No findings to suggest appendicitis. ABDOMINOPELVIC CAVITY:  No pneumoperitoneum.  There is a modest amount of near water density edema/fluid in the presacral region which has significantly increased from the prior study. Small amount of pelvic free fluid also evident (2/203). Additionally, suspected 2.1 x 1.6 cm left internal iliac chain lymph node (2/206) which is new from the previous study. 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 VESSELS: Atherosclerotic changes abdominal aorta. 1.8 cm saccular aneurysm on the left (601/82), previously about 1 cm in retrospect. PELVIS REPRODUCTIVE ORGANS: Prostate brachytherapy seeds. URINARY BLADDER:  Unremarkable. ABDOMINAL WALL/INGUINAL REGIONS: Subcutaneous edema noted throughout the abdominal/flank walls. OSSEOUS STRUCTURES:  No acute fracture or destructive osseous lesion. Multilevel degenerative changes of the spine. Severe osteoarthritis left shoulder with periarticular bursal fluid and calcifications suggesting bursitis. Some intra-articular loose bodies may also be present. Right glenohumeral joint osteoarthritis also evident. Bilateral hip osteoarthritis. Old right-sided rib fractures. Impression: 1. Cholelithiasis with possible impacted stone at the neck, gallbladder wall thickening and/or pericholecystic edema. Findings raise suspicious for acute cholecystitis given the clinical setting. Correlation with ultrasound recommended. 2. 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 3. Severely tortuous sigmoid colon with a long segment of sigmoid which appears collapsed. Large portion of the sigmoid proximal to the collapsed segment appears air-filled and distended. However, the colon further upstream from the sigmoid (descending and proximal) does not appear distended to indicate a colonic obstruction. Furthermore there is no evidence for volvulus.  Although I am not strongly suspicious, cannot completely exclude stricturing of this long sigmoid segment or more distal rectal thickening. Follow-up may be considered. 4. Limited evaluation of the lungs due to motion without convincing evidence for pneumonia. 5. 1.8 cm saccular abdominal aortic aneurysm on the left (601/82), previously about 1 cm in retrospect. 6. New, small amount of pelvic free fluid. 7. Small pericardial effusion. Additional incidental findings as above. Limited study without IV or oral contrast. The study was marked in [employer] for immediate notification. Workstation performed: [id number]     XR chest portable    Result Date: 9/1/2023  Narrative: CHEST INDICATION:   wheezing. COVID-positive 8/12/2023. COMPARISON: CXR 8/29/2023, abdomen CT 3/24/2017. EXAM PERFORMED/VIEWS:  XR CHEST PORTABLE. FINDINGS: Cardiomediastinal silhouette normal. Lungs clear. No effusion or pneumothorax. Upper abdomen normal. Bones normal for age. Impression: No acute cardiopulmonary disease. Workstation performed: [id number]     XR chest portable    Result Date: 8/29/2023  Narrative: CHEST INDICATION:   SOB. COMPARISON: 8/26/2023 EXAM PERFORMED/VIEWS:  XR CHEST PORTABLE FINDINGS: Cardiomediastinal silhouette appears unremarkable. The lungs are clear. No pneumothorax or pleural effusion. Osseous structures appear within normal limits for patient age. Impression: No acute cardiopulmonary disease. Workstation performed: [id number]     XR chest portable    Result Date: 8/27/2023  Narrative: CHEST INDICATION:   fever, cough. COVID-positive 8/12/2023. COMPARISON: CXR 1/23/2023, abdomen CT 3/24/2017. EXAM PERFORMED/VIEWS:  XR CHEST PORTABLE. FINDINGS: Cardiomediastinal silhouette normal. Lungs clear. No effusion or pneumothorax. Upper abdomen normal. Moderate left glenohumeral degenerative disease. Impression: No acute cardiopulmonary disease. Workstation performed: [id number]     VAS CAROTID DUPLEX BILATERAL COMPLETE    Result Date: 8/21/2023  Narrative: This result has an attachment that is not available.  Table formatting from the original result was not included. Ultrasound Study: Carotid Duplex Reason for Study: change in vision, ?amarosis fugax Technical Quality: adequate Please Note:   1) Measurement of carotid stenosis is based on velocity parameters that correlate the residual internal carotid diameter with NASCET-based stenosis levels.   2) Stenosis or occlusion may factitiously elevate the blood flow velocity on the opposite side thereby upgrading a lesser degree of stenosis.    3) Calcifications with acoustic shadowing may "mask" a significant stenosis and another modality such as MRA or CTA may be needed for better "visualization/evaluation." 4) Presumed occlusion should be verified by a different imaging modality such as CTA. Interpretation: B-mode two-dimensional vascular structure imaging, Doppler spectral analysis, and color flow Doppler imaging were performed. Peak systolic velocities (PSV in cm/sec), end diastolic velocity (EDV in cm/sec), and waveform and arterial characteristics are as described below. RIGHT: Doppler spectral analysis: Minimal peak systolic velocity (PSV) for the common carotid is 84 cm/sec; maximum peak systolic velocity for the internal carotid is  66 with an end diastolic velocity of  86;  the external carotid PSV is 93; and the vertebral artery PSV is 48.  The ICA/CCA ratio is normal.  On B mode imaging, irregular heterogenous plaque is seen in proximal ICA.   LEFT: Doppler spectral analysis: Minimal peak systolic velocity (PSV) for the common carotid is 85 cm/sec; maximum peak systolic velocity for the internal carotid is  65 with an end diastolic velocity of  10;  the external carotid PSV is 96; and the vertebral artery PSV is 57.  The ICA/CCA ratio is normal.  On B mode imaging, irregular heterogenous plaque is seen in proximal ICA.      Impression: RIGHT internal carotid artery with <50 % stenosis, which is hemodynamically insignificant.  LEFT internal carotid artery with <50 % stenosis, which is hemodynamically insignificant. Right vertebral artery with normally directed antegrade flow.     Left vertebral artery with normally directed antegrade flow.        LABS  Results from last 7 days   Lab Units 09/15/23  0408 09/14/23  0306 09/13/23  0417 09/12/23  0350 09/11/23  0229 09/09/23  0540   WBC Thousand/uL 15.61* 16.52* 16.33* 12.96* 16.70* 16.63*   HEMOGLOBIN g/dL 8.3* 8.4* 8.6* 9.3* 8.1* 7.9*   HEMATOCRIT % 25.7* 26.6* 26.7* 27.7* 24.9* 24.4*   MCV fL 97 99* 95 95 97 96   TOTAL NEUT ABS Thousand/uL  --   --  12.90* 8.42*  --   --    PLATELETS Thousands/uL 312 342 347 401* 342 350     Results from last 7 days   Lab Units 09/15/23  0408 09/14/23  0306 09/13/23  0417 09/12/23  0350 09/11/23  0229 09/10/23  0515 09/09/23  0540   SODIUM mmol/L 133* 135 137 138 137 140 141   POTASSIUM mmol/L 3.9 4.3 3.4* 3.5 3.7 3.1* 3.6   CHLORIDE mmol/L 94* 94* 93* 93* 92* 94* 96   CO2 mmol/L 32 33* 35* 35* 31 33* 33*   BUN mg/dL 107* 104* 97* 103* 101* 105* 110*   CREATININE mg/dL 1.82* 1.94* 2.04* 2.16* 2.43* 2.83* 3.75*   CALCIUM mg/dL 9.1 8.9 9.0 9.0 8.6 9.3 9.5   ALBUMIN g/dL 2.9* 2.9* 2.9* 2.9*  --   --  2.8*   TOTAL BILIRUBIN mg/dL 0.42 0.46 0.44 0.43  --   --  0.54   ALK PHOS U/L 66 67 70 81  --   --  63   ALT U/L 27 20 20 26  --   --  7   AST U/L 18 20 17 34  --   --  17   EGFR ml/min/1.73sq m 32 30 28 26 23 19 13   GLUCOSE RANDOM mg/dL 256* 242* 252* 160* 292* 186* 153*                                        Cultures:         Invalid input(s): "Vivien Huang"        Results from last 7 days   Lab Units 09/08/23  0930   GRAM STAIN RESULT  1+ Polys  No bacteria seen   BODY FLUID CULTURE, STERILE  No growth         Condition at Discharge:  good      Discharge instructions/Information to patient and family:   See after visit summary for information provided to patient and family. Provisions for Follow-Up Care:  See after visit summary for information related to follow-up care and any pertinent home health orders. Disposition:     Other: snf       Discharge Statement:  I spent 39 minutes discharging the patient. This time was spent on the day of discharge. I had direct contact with the patient on the day of discharge. Greater than 50% of the total time was spent examining patient, answering all patient questions, arranging and discussing plan of care with patient as well as directly providing post-discharge instructions. Additional time then spent on discharge activities. Discharge Medications:  See after visit summary for reconciled discharge medications provided to patient and family.       ** Please Note: This note has been constructed using a voice recognition system **

## 2023-10-17 ENCOUNTER — HOSPITAL ENCOUNTER (INPATIENT)
Facility: HOSPITAL | Age: 88
LOS: 4 days | Discharge: NURSING, REHAB OR LONG-TERM CARE | DRG: 553 | End: 2023-10-21
Attending: EMERGENCY MEDICINE | Admitting: INTERNAL MEDICINE
Payer: MEDICARE

## 2023-10-17 ENCOUNTER — APPOINTMENT (EMERGENCY)
Dept: RADIOLOGY | Facility: HOSPITAL | Age: 88
DRG: 553 | End: 2023-10-17
Payer: MEDICARE

## 2023-10-17 DIAGNOSIS — S31.000A SACRAL WOUND: ICD-10-CM

## 2023-10-17 DIAGNOSIS — M25.532 ACUTE PAIN OF BOTH WRISTS: ICD-10-CM

## 2023-10-17 DIAGNOSIS — R79.89 ELEVATED TSH: ICD-10-CM

## 2023-10-17 DIAGNOSIS — I10 HYPERTENSION: ICD-10-CM

## 2023-10-17 DIAGNOSIS — S31.000A WOUND OF SACRAL REGION, INITIAL ENCOUNTER: ICD-10-CM

## 2023-10-17 DIAGNOSIS — I48.91 ATRIAL FIBRILLATION (HCC): ICD-10-CM

## 2023-10-17 DIAGNOSIS — M25.531 ACUTE PAIN OF BOTH WRISTS: ICD-10-CM

## 2023-10-17 DIAGNOSIS — D64.9 ANEMIA: ICD-10-CM

## 2023-10-17 DIAGNOSIS — A41.9 SEPSIS (HCC): Primary | ICD-10-CM

## 2023-10-17 DIAGNOSIS — S31.000D WOUND OF SACRAL REGION, SUBSEQUENT ENCOUNTER: ICD-10-CM

## 2023-10-17 DIAGNOSIS — J18.9 PNEUMONIA: ICD-10-CM

## 2023-10-17 DIAGNOSIS — Z22.1 CLOSTRIDIOIDES DIFFICILE CARRIER: ICD-10-CM

## 2023-10-17 PROBLEM — N18.30 CKD (CHRONIC KIDNEY DISEASE) STAGE 3, GFR 30-59 ML/MIN (HCC): Status: ACTIVE | Noted: 2023-10-17

## 2023-10-17 PROBLEM — R65.10 SIRS (SYSTEMIC INFLAMMATORY RESPONSE SYNDROME) (HCC): Status: ACTIVE | Noted: 2023-10-17

## 2023-10-17 LAB
2HR DELTA HS TROPONIN: -1 NG/L
ALBUMIN SERPL BCP-MCNC: 2.9 G/DL (ref 3.5–5)
ALP SERPL-CCNC: 68 U/L (ref 34–104)
ALT SERPL W P-5'-P-CCNC: 7 U/L (ref 7–52)
ANION GAP SERPL CALCULATED.3IONS-SCNC: 7 MMOL/L
APTT PPP: 41 SECONDS (ref 23–37)
AST SERPL W P-5'-P-CCNC: 11 U/L (ref 13–39)
BACTERIA UR QL AUTO: NORMAL /HPF
BASOPHILS # BLD AUTO: 0.08 THOUSANDS/ÂΜL (ref 0–0.1)
BASOPHILS NFR BLD AUTO: 1 % (ref 0–1)
BILIRUB SERPL-MCNC: 0.4 MG/DL (ref 0.2–1)
BILIRUB UR QL STRIP: NEGATIVE
BNP SERPL-MCNC: 198 PG/ML (ref 0–100)
BUN SERPL-MCNC: 40 MG/DL (ref 5–25)
CALCIUM ALBUM COR SERPL-MCNC: 10.2 MG/DL (ref 8.3–10.1)
CALCIUM SERPL-MCNC: 9.3 MG/DL (ref 8.4–10.2)
CARDIAC TROPONIN I PNL SERPL HS: 13 NG/L
CARDIAC TROPONIN I PNL SERPL HS: 14 NG/L
CHLORIDE SERPL-SCNC: 102 MMOL/L (ref 96–108)
CLARITY UR: CLEAR
CO2 SERPL-SCNC: 27 MMOL/L (ref 21–32)
COLOR UR: YELLOW
CREAT SERPL-MCNC: 1.5 MG/DL (ref 0.6–1.3)
CRP SERPL QL: 155.5 MG/L
EOSINOPHIL # BLD AUTO: 0.36 THOUSAND/ÂΜL (ref 0–0.61)
EOSINOPHIL NFR BLD AUTO: 3 % (ref 0–6)
ERYTHROCYTE [DISTWIDTH] IN BLOOD BY AUTOMATED COUNT: 14.4 % (ref 11.6–15.1)
ERYTHROCYTE [SEDIMENTATION RATE] IN BLOOD: 70 MM/HOUR (ref 0–19)
FLUAV RNA RESP QL NAA+PROBE: NEGATIVE
FLUBV RNA RESP QL NAA+PROBE: NEGATIVE
GFR SERPL CREATININE-BSD FRML MDRD: 41 ML/MIN/1.73SQ M
GLUCOSE SERPL-MCNC: 129 MG/DL (ref 65–140)
GLUCOSE UR STRIP-MCNC: NEGATIVE MG/DL
HCT VFR BLD AUTO: 27.5 % (ref 36.5–49.3)
HGB BLD-MCNC: 8.8 G/DL (ref 12–17)
HGB UR QL STRIP.AUTO: NEGATIVE
IMM GRANULOCYTES # BLD AUTO: 0.07 THOUSAND/UL (ref 0–0.2)
IMM GRANULOCYTES NFR BLD AUTO: 1 % (ref 0–2)
INR PPP: 1.57 (ref 0.84–1.19)
KETONES UR STRIP-MCNC: NEGATIVE MG/DL
LACTATE SERPL-SCNC: 1.3 MMOL/L (ref 0.5–2)
LEUKOCYTE ESTERASE UR QL STRIP: NEGATIVE
LYMPHOCYTES # BLD AUTO: 4.89 THOUSANDS/ÂΜL (ref 0.6–4.47)
LYMPHOCYTES NFR BLD AUTO: 37 % (ref 14–44)
MCH RBC QN AUTO: 30.2 PG (ref 26.8–34.3)
MCHC RBC AUTO-ENTMCNC: 32 G/DL (ref 31.4–37.4)
MCV RBC AUTO: 95 FL (ref 82–98)
MONOCYTES # BLD AUTO: 1.35 THOUSAND/ÂΜL (ref 0.17–1.22)
MONOCYTES NFR BLD AUTO: 10 % (ref 4–12)
NEUTROPHILS # BLD AUTO: 6.37 THOUSANDS/ÂΜL (ref 1.85–7.62)
NEUTS SEG NFR BLD AUTO: 48 % (ref 43–75)
NITRITE UR QL STRIP: NEGATIVE
NON-SQ EPI CELLS URNS QL MICRO: NORMAL /HPF
NRBC BLD AUTO-RTO: 0 /100 WBCS
PH UR STRIP.AUTO: 6 [PH]
PLATELET # BLD AUTO: 356 THOUSANDS/UL (ref 149–390)
PMV BLD AUTO: 9.4 FL (ref 8.9–12.7)
POTASSIUM SERPL-SCNC: 4.1 MMOL/L (ref 3.5–5.3)
PROCALCITONIN SERPL-MCNC: 0.39 NG/ML
PROT SERPL-MCNC: 6.6 G/DL (ref 6.4–8.4)
PROT UR STRIP-MCNC: ABNORMAL MG/DL
PROTHROMBIN TIME: 19.2 SECONDS (ref 11.6–14.5)
RBC # BLD AUTO: 2.91 MILLION/UL (ref 3.88–5.62)
RBC #/AREA URNS AUTO: NORMAL /HPF
RSV RNA RESP QL NAA+PROBE: NEGATIVE
SARS-COV-2 RNA RESP QL NAA+PROBE: NEGATIVE
SODIUM SERPL-SCNC: 136 MMOL/L (ref 135–147)
SP GR UR STRIP.AUTO: 1.01 (ref 1–1.03)
TSH SERPL DL<=0.05 MIU/L-ACNC: 6.59 UIU/ML (ref 0.45–4.5)
UROBILINOGEN UR STRIP-ACNC: <2 MG/DL
WBC # BLD AUTO: 13.12 THOUSAND/UL (ref 4.31–10.16)
WBC #/AREA URNS AUTO: NORMAL /HPF

## 2023-10-17 PROCEDURE — 86140 C-REACTIVE PROTEIN: CPT | Performed by: INTERNAL MEDICINE

## 2023-10-17 PROCEDURE — 85610 PROTHROMBIN TIME: CPT

## 2023-10-17 PROCEDURE — 84443 ASSAY THYROID STIM HORMONE: CPT

## 2023-10-17 PROCEDURE — 81001 URINALYSIS AUTO W/SCOPE: CPT

## 2023-10-17 PROCEDURE — 99223 1ST HOSP IP/OBS HIGH 75: CPT | Performed by: INTERNAL MEDICINE

## 2023-10-17 PROCEDURE — 84439 ASSAY OF FREE THYROXINE: CPT

## 2023-10-17 PROCEDURE — 99285 EMERGENCY DEPT VISIT HI MDM: CPT

## 2023-10-17 PROCEDURE — 80053 COMPREHEN METABOLIC PANEL: CPT

## 2023-10-17 PROCEDURE — 84145 PROCALCITONIN (PCT): CPT

## 2023-10-17 PROCEDURE — 85652 RBC SED RATE AUTOMATED: CPT | Performed by: INTERNAL MEDICINE

## 2023-10-17 PROCEDURE — 85730 THROMBOPLASTIN TIME PARTIAL: CPT

## 2023-10-17 PROCEDURE — 36415 COLL VENOUS BLD VENIPUNCTURE: CPT

## 2023-10-17 PROCEDURE — 87040 BLOOD CULTURE FOR BACTERIA: CPT

## 2023-10-17 PROCEDURE — 84484 ASSAY OF TROPONIN QUANT: CPT

## 2023-10-17 PROCEDURE — 83880 ASSAY OF NATRIURETIC PEPTIDE: CPT

## 2023-10-17 PROCEDURE — 83605 ASSAY OF LACTIC ACID: CPT

## 2023-10-17 PROCEDURE — 71045 X-RAY EXAM CHEST 1 VIEW: CPT

## 2023-10-17 PROCEDURE — 93005 ELECTROCARDIOGRAM TRACING: CPT

## 2023-10-17 PROCEDURE — 85025 COMPLETE CBC W/AUTO DIFF WBC: CPT

## 2023-10-17 PROCEDURE — 0241U HB NFCT DS VIR RESP RNA 4 TRGT: CPT

## 2023-10-17 RX ORDER — FOLIC ACID 1 MG/1
1 TABLET ORAL DAILY
Status: DISCONTINUED | OUTPATIENT
Start: 2023-10-18 | End: 2023-10-21 | Stop reason: HOSPADM

## 2023-10-17 RX ORDER — CEFEPIME HYDROCHLORIDE 2 G/50ML
2000 INJECTION, SOLUTION INTRAVENOUS EVERY 12 HOURS
Status: DISCONTINUED | OUTPATIENT
Start: 2023-10-18 | End: 2023-10-21 | Stop reason: HOSPADM

## 2023-10-17 RX ORDER — ACETAMINOPHEN 325 MG/1
650 TABLET ORAL EVERY 6 HOURS PRN
Status: DISCONTINUED | OUTPATIENT
Start: 2023-10-17 | End: 2023-10-21 | Stop reason: HOSPADM

## 2023-10-17 RX ORDER — CEFTRIAXONE 2 G/50ML
2000 INJECTION, SOLUTION INTRAVENOUS ONCE
Status: COMPLETED | OUTPATIENT
Start: 2023-10-17 | End: 2023-10-17

## 2023-10-17 RX ORDER — LANOLIN ALCOHOL/MO/W.PET/CERES
400 CREAM (GRAM) TOPICAL DAILY
Status: DISCONTINUED | OUTPATIENT
Start: 2023-10-18 | End: 2023-10-21 | Stop reason: HOSPADM

## 2023-10-17 RX ORDER — BENZONATATE 100 MG/1
100 CAPSULE ORAL 3 TIMES DAILY PRN
Status: DISCONTINUED | OUTPATIENT
Start: 2023-10-17 | End: 2023-10-21 | Stop reason: HOSPADM

## 2023-10-17 RX ORDER — PANTOPRAZOLE SODIUM 40 MG/1
40 TABLET, DELAYED RELEASE ORAL
Status: DISCONTINUED | OUTPATIENT
Start: 2023-10-18 | End: 2023-10-18

## 2023-10-17 RX ORDER — VANCOMYCIN HYDROCHLORIDE 1 G/200ML
1000 INJECTION, SOLUTION INTRAVENOUS EVERY 24 HOURS
Status: DISCONTINUED | OUTPATIENT
Start: 2023-10-18 | End: 2023-10-20

## 2023-10-17 RX ORDER — DILTIAZEM HYDROCHLORIDE 240 MG/1
240 CAPSULE, COATED, EXTENDED RELEASE ORAL DAILY
Status: DISCONTINUED | OUTPATIENT
Start: 2023-10-18 | End: 2023-10-21 | Stop reason: HOSPADM

## 2023-10-17 RX ADMIN — METOPROLOL TARTRATE 25 MG: 25 TABLET, FILM COATED ORAL at 23:40

## 2023-10-17 RX ADMIN — CEFTRIAXONE 2000 MG: 2 INJECTION, SOLUTION INTRAVENOUS at 21:52

## 2023-10-17 RX ADMIN — SODIUM CHLORIDE 500 ML: 0.9 INJECTION, SOLUTION INTRAVENOUS at 22:40

## 2023-10-18 LAB
ANION GAP SERPL CALCULATED.3IONS-SCNC: 7 MMOL/L
ATRIAL RATE: 104 BPM
BASOPHILS # BLD AUTO: 0.07 THOUSANDS/ÂΜL (ref 0–0.1)
BASOPHILS NFR BLD AUTO: 1 % (ref 0–1)
BUN SERPL-MCNC: 40 MG/DL (ref 5–25)
CALCIUM SERPL-MCNC: 8.7 MG/DL (ref 8.4–10.2)
CHLORIDE SERPL-SCNC: 106 MMOL/L (ref 96–108)
CO2 SERPL-SCNC: 24 MMOL/L (ref 21–32)
CREAT SERPL-MCNC: 1.38 MG/DL (ref 0.6–1.3)
EOSINOPHIL # BLD AUTO: 0.41 THOUSAND/ÂΜL (ref 0–0.61)
EOSINOPHIL NFR BLD AUTO: 4 % (ref 0–6)
ERYTHROCYTE [DISTWIDTH] IN BLOOD BY AUTOMATED COUNT: 14.4 % (ref 11.6–15.1)
GFR SERPL CREATININE-BSD FRML MDRD: 45 ML/MIN/1.73SQ M
GLUCOSE SERPL-MCNC: 108 MG/DL (ref 65–140)
HCT VFR BLD AUTO: 23.4 % (ref 36.5–49.3)
HGB BLD-MCNC: 7.4 G/DL (ref 12–17)
IMM GRANULOCYTES # BLD AUTO: 0.06 THOUSAND/UL (ref 0–0.2)
IMM GRANULOCYTES NFR BLD AUTO: 1 % (ref 0–2)
L PNEUMO1 AG UR QL IA.RAPID: NEGATIVE
LYMPHOCYTES # BLD AUTO: 3.67 THOUSANDS/ÂΜL (ref 0.6–4.47)
LYMPHOCYTES NFR BLD AUTO: 36 % (ref 14–44)
MAGNESIUM SERPL-MCNC: 1.4 MG/DL (ref 1.9–2.7)
MCH RBC QN AUTO: 29.8 PG (ref 26.8–34.3)
MCHC RBC AUTO-ENTMCNC: 31.6 G/DL (ref 31.4–37.4)
MCV RBC AUTO: 94 FL (ref 82–98)
MONOCYTES # BLD AUTO: 1.21 THOUSAND/ÂΜL (ref 0.17–1.22)
MONOCYTES NFR BLD AUTO: 12 % (ref 4–12)
NEUTROPHILS # BLD AUTO: 4.67 THOUSANDS/ÂΜL (ref 1.85–7.62)
NEUTS SEG NFR BLD AUTO: 46 % (ref 43–75)
NRBC BLD AUTO-RTO: 0 /100 WBCS
P AXIS: 85 DEGREES
PLATELET # BLD AUTO: 307 THOUSANDS/UL (ref 149–390)
PMV BLD AUTO: 9.3 FL (ref 8.9–12.7)
POTASSIUM SERPL-SCNC: 3.5 MMOL/L (ref 3.5–5.3)
PR INTERVAL: 210 MS
PROCALCITONIN SERPL-MCNC: 0.38 NG/ML
QRS AXIS: -51 DEGREES
QRSD INTERVAL: 84 MS
QT INTERVAL: 336 MS
QTC INTERVAL: 441 MS
RBC # BLD AUTO: 2.48 MILLION/UL (ref 3.88–5.62)
S PNEUM AG UR QL: NEGATIVE
SODIUM SERPL-SCNC: 137 MMOL/L (ref 135–147)
T WAVE AXIS: 78 DEGREES
T4 FREE SERPL-MCNC: 1.23 NG/DL (ref 0.61–1.12)
VENTRICULAR RATE: 104 BPM
WBC # BLD AUTO: 10.09 THOUSAND/UL (ref 4.31–10.16)

## 2023-10-18 PROCEDURE — 80048 BASIC METABOLIC PNL TOTAL CA: CPT | Performed by: INTERNAL MEDICINE

## 2023-10-18 PROCEDURE — 87449 NOS EACH ORGANISM AG IA: CPT | Performed by: INTERNAL MEDICINE

## 2023-10-18 PROCEDURE — 99221 1ST HOSP IP/OBS SF/LOW 40: CPT | Performed by: SURGERY

## 2023-10-18 PROCEDURE — 84145 PROCALCITONIN (PCT): CPT | Performed by: INTERNAL MEDICINE

## 2023-10-18 PROCEDURE — 87081 CULTURE SCREEN ONLY: CPT | Performed by: INTERNAL MEDICINE

## 2023-10-18 PROCEDURE — 93010 ELECTROCARDIOGRAM REPORT: CPT | Performed by: INTERNAL MEDICINE

## 2023-10-18 PROCEDURE — 87493 C DIFF AMPLIFIED PROBE: CPT | Performed by: INTERNAL MEDICINE

## 2023-10-18 PROCEDURE — 83735 ASSAY OF MAGNESIUM: CPT | Performed by: INTERNAL MEDICINE

## 2023-10-18 PROCEDURE — 99232 SBSQ HOSP IP/OBS MODERATE 35: CPT | Performed by: INTERNAL MEDICINE

## 2023-10-18 PROCEDURE — 85025 COMPLETE CBC W/AUTO DIFF WBC: CPT | Performed by: INTERNAL MEDICINE

## 2023-10-18 RX ORDER — MAGNESIUM SULFATE HEPTAHYDRATE 40 MG/ML
4 INJECTION, SOLUTION INTRAVENOUS ONCE
Status: COMPLETED | OUTPATIENT
Start: 2023-10-18 | End: 2023-10-18

## 2023-10-18 RX ORDER — PANTOPRAZOLE SODIUM 40 MG/1
40 TABLET, DELAYED RELEASE ORAL
Status: DISCONTINUED | OUTPATIENT
Start: 2023-10-18 | End: 2023-10-21 | Stop reason: HOSPADM

## 2023-10-18 RX ADMIN — FOLIC ACID 1 MG: 1 TABLET ORAL at 08:32

## 2023-10-18 RX ADMIN — VANCOMYCIN HYDROCHLORIDE 1000 MG: 1 INJECTION, SOLUTION INTRAVENOUS at 13:50

## 2023-10-18 RX ADMIN — VANCOMYCIN HYDROCHLORIDE 1750 MG: 1 INJECTION, POWDER, LYOPHILIZED, FOR SOLUTION INTRAVENOUS at 00:01

## 2023-10-18 RX ADMIN — ACETAMINOPHEN 650 MG: 325 TABLET, FILM COATED ORAL at 02:28

## 2023-10-18 RX ADMIN — ASPIRIN 81 MG: 81 TABLET, COATED ORAL at 08:32

## 2023-10-18 RX ADMIN — PANTOPRAZOLE SODIUM 40 MG: 40 TABLET, DELAYED RELEASE ORAL at 05:26

## 2023-10-18 RX ADMIN — APIXABAN 2.5 MG: 2.5 TABLET, FILM COATED ORAL at 08:32

## 2023-10-18 RX ADMIN — METOPROLOL TARTRATE 25 MG: 25 TABLET, FILM COATED ORAL at 08:32

## 2023-10-18 RX ADMIN — COLLAGENASE SANTYL: 250 OINTMENT TOPICAL at 20:10

## 2023-10-18 RX ADMIN — APIXABAN 2.5 MG: 2.5 TABLET, FILM COATED ORAL at 20:22

## 2023-10-18 RX ADMIN — CEFEPIME HYDROCHLORIDE 2000 MG: 2 INJECTION, SOLUTION INTRAVENOUS at 20:23

## 2023-10-18 RX ADMIN — MAGNESIUM SULFATE HEPTAHYDRATE 4 G: 40 INJECTION, SOLUTION INTRAVENOUS at 08:32

## 2023-10-18 RX ADMIN — CEFEPIME HYDROCHLORIDE 2000 MG: 2 INJECTION, SOLUTION INTRAVENOUS at 05:26

## 2023-10-18 RX ADMIN — DILTIAZEM HYDROCHLORIDE 240 MG: 240 CAPSULE, COATED, EXTENDED RELEASE ORAL at 08:32

## 2023-10-18 RX ADMIN — METOPROLOL TARTRATE 25 MG: 25 TABLET, FILM COATED ORAL at 20:22

## 2023-10-18 RX ADMIN — MAGNESIUM OXIDE TAB 400 MG (241.3 MG ELEMENTAL MG) 400 MG: 400 (241.3 MG) TAB at 08:32

## 2023-10-18 NOTE — PROGRESS NOTES
Aram Baeza is a 82 y.o. male who is currently ordered Vancomycin IV with management by the Pharmacy Consult service. Relevant clinical data and objective / subjective history reviewed. Vancomycin Assessment:  Indication and Goal AUC/Trough: Soft tissue (goal -600, trough >10)  Clinical Status: stable  Micro:   Pending  Renal Function:  SCr: 1.38 mg/dL  CrCl: 37.2 mL/min  Renal replacement: Not on dialysis  Days of Therapy: 1- First dose after midnight, thus today is Day 1  Current Dose: 1000mg every 24 hours  Vancomycin Plan:  New Dosing: No change  Estimated AUC: 489 mcg*hr/mL  Estimated Trough: 15.7 mcg/mL  Next Level: 10/20/23 at 0600  Renal Function Monitoring: Daily BMP and [place] will continue to follow closely for s/sx of nephrotoxicity, infusion reactions and appropriateness of therapy. BMP and CBC will be ordered per protocol. We will continue to follow the patient’s culture results and clinical progress daily. Also, cefepime will be adjusted renally if necessary.     Adan Whitt, Pharmacist, PharmD, BCPS

## 2023-10-18 NOTE — ED NOTES
Pt presented to the ER with a POLST that states comfort measures only. Pt is alert and oriented x4 and requesting treatment. Provider made aware.      [address] [facility] [place], RN  10/17/23 2009

## 2023-10-18 NOTE — ASSESSMENT & PLAN NOTE
Hemoglobin 8.8  Baseline appears to be about 8-9  No active bleeding  Continue to monitor and transfuse for less than 7

## 2023-10-18 NOTE — PLAN OF CARE
Problem: MOBILITY - ADULT  Goal: Maintain or return to baseline ADL function  Description: INTERVENTIONS:  -  Assess patient's ability to carry out ADLs; assess patient's baseline for ADL function and identify physical deficits which impact ability to perform ADLs (bathing, care of mouth/teeth, toileting, grooming, dressing, etc.)  - Assess/evaluate cause of self-care deficits   - Assess range of motion  - Assess patient's mobility; develop plan if impaired  - Assess patient's need for assistive devices and provide as appropriate  - Encourage maximum independence but intervene and supervise when necessary  - Involve family in performance of ADLs  - Assess for home care needs following discharge   - Consider OT consult to assist with ADL evaluation and planning for discharge  - Provide patient education as appropriate  Outcome: Progressing  Goal: Maintains/Returns to pre admission functional level  Description: INTERVENTIONS:  - Perform BMAT or MOVE assessment daily.   - Set and communicate daily mobility goal to care team and patient/family/caregiver. - Collaborate with rehabilitation services on mobility goals if consulted  - Perform Range of Motion 2 times a day. 2  - Reposition patient every 2 hours.   - Dangle patient 2 times a day  - Stand patient 2 times a day  - Ambulate patient 2 times a day  - Out of bed to chair 2 times a day   - Out of bed for meals 2 times a day  - Out of bed for toileting  - Record patient progress and toleration of activity level   Outcome: Progressing

## 2023-10-18 NOTE — H&P
[facility]  H&P  Name: Stephanie Bhandari 82 y.o. male I MRN: [id number]  Unit/Bed#: ED 12 I Date of Admission: 10/17/2023   Date of Service: 10/17/2023 I Hospital Day: 0      Assessment/Plan   * SIRS (systemic inflammatory response syndrome) (HCC)  Assessment & Plan  [facility] due to fever, weakness, cough and mild SOB for about 1 week  Afebrile thus far. On room air  SIRS: tachycardia, WBC (13.12, possibly downtrending from recent admission)  Lactic WNL   Procal mildly elevated 0.39  SOI  CXR without obvious infiltrate- pending official read   Order pneumonia labs  COVID/flu/RSV negative  UA and blood cultures pending  Pt with sacral wound. Based off images from prior admission wound has increased in size and depth. Possible source (however no redness or drainage noted)  Obtain ESR   ED started IV ceftriaxone for possible pneumonia infection. Low suspicion for pneumonia. Broaden antibiotics with IV cefepime + IV vancomycin for possible sacral wound infection  Blood cultures pending   Consider ID consult if BC negative and worsening clinical status. Sacral wound  Assessment & Plan  Chronic sacral wound. Does not appear acutely infected but appears larger in size and depth from recent admit   Turn patient every 2 hours  Wound care consult  ESR    CKD (chronic kidney disease) stage 3, GFR 30-59 ml/min [facility])  Assessment & Plan  Lab Results   Component Value Date    EGFR 41 10/17/2023    EGFR 32 09/15/2023    EGFR 30 09/14/2023    CREATININE 1.50 (H) 10/17/2023    CREATININE 1.82 (H) 09/15/2023    CREATININE 1.94 (H) 09/14/2023   Creatinine 1.5 on admission  Baseline appears to be about 1.6-1.9  Continue to monitor    History of alcohol abuse  Assessment & Plan  History of alcohol abuse with withdrawal seizures  Patient has not drank since [facility] hospitalization in August 2023    Central retinal vein occlusion of right eye  Assessment & Plan  Diagnosed with CRVO at [facility]. 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 and discussion for possible anti-VEGF therapy    Chronic obstructive pulmonary disease, unspecified COPD type ([address])  Assessment & Plan  No signs of acute exacerbation at this time  Not on maintenance inhalers outpatient    Atrial fibrillation [facility])  Assessment & Plan  Home regimen: Diltiazem 240 mg daily, metoprolol tartrate 25 mg twice daily, Eliquis    Anemia of chronic disease  Assessment & Plan  Hemoglobin 8.8  Baseline appears to be about 8-9  No active bleeding  Continue to monitor and transfuse for less than 7           VTE Pharmacologic Prophylaxis: VTE Score: 4 Moderate Risk (Score 3-4) - Pharmacological DVT Prophylaxis Ordered: apixaban (Eliquis). Code Status: Level 3 - DNAR and DNI   Discussion with family: Patient declined call to . Anticipated Length of Stay: Patient will be admitted on an inpatient basis with an anticipated length of stay of greater than 2 midnights secondary to SIRS. Total Time Spent on Date of Encounter in care of patient: 55 mins. This time was spent on one or more of the following: performing physical exam; counseling and coordination of care; obtaining or reviewing history; documenting in the medical record; reviewing/ordering tests, medications or procedures; communicating with other healthcare professionals and discussing with patient's family/caregivers. Chief Complaint: fevers     History of Present Illness:  Gordy Price is a 82 y.o. male with a PMH of anemia, afib, COPD, CKD 3, Prior alcohol abuse who presents from [address] [place] [address] due to low grade fevers and weakness. Facility had noted worsening sacral wound in size and depth. Patient was started on bactrim but due to low grade fever facility sent patient to the ER. No drainage or redness noted on exam. Appears worse compared to prior images in chart. Met sirs criteria due to tachycardia and leukocytosis.  Denies sob, congestion, abdominal pain or chills. Compliant with home medications. Review of Systems:  Review of Systems   Constitutional:  Positive for fever. Negative for fatigue. HENT:  Negative for sore throat. Respiratory:  Positive for cough (dry). Negative for chest tightness and shortness of breath. Cardiovascular:  Negative for chest pain. Gastrointestinal:  Negative for abdominal distention, abdominal pain, diarrhea, nausea and vomiting. Genitourinary:  Negative for difficulty urinating. Musculoskeletal:  Negative for arthralgias. Skin:  Positive for wound (sacral). Neurological:  Positive for weakness. Negative for headaches. Psychiatric/Behavioral:  Negative for agitation and behavioral problems. All other systems reviewed and are negative. Past Medical and Surgical History:   Past Medical History:   Diagnosis Date    Alcohol abuse     Alcohol withdrawal seizure without complication ([address])     Arthritis     Asthma     CVA (cerebral vascular accident) ([address])     Decubitus ulcer of buttock     last assessed 07/20/16    Diabetes ([address])     Disease of thyroid gland     Duodenal ulcer     Emphysema lung (HCC)     Esophageal varices (HCC)     GERD (gastroesophageal reflux disease)     History of transfusion     Hypertension     Irritable bowel syndrome with diarrhea     Kidney stones     Prostate cancer ([address])     Urinary frequency     resolved 02/15/17       Past Surgical History:   Procedure Laterality Date    BACK SURGERY      CATARACT EXTRACTION      ESOPHAGOGASTRODUODENOSCOPY N/A 2/6/2017    Procedure: ESOPHAGOGASTRODUODENOSCOPY (EGD); Surgeon: Jaswinder Mccormack MD;  Location:  MAIN OR;  Service:     AK ESOPHAGOGASTRODUODENOSCOPY TRANSORAL DIAGNOSTIC N/A 4/26/2016    Procedure: ESOPHAGOGASTRODUODENOSCOPY (EGD);   Surgeon: Joe Royal MD;  Location:  MAIN OR;  Service: Gastroenterology    TONSILLECTOMY         Meds/Allergies:  Prior to Admission medications    Medication Sig Start Date End Date Taking? Authorizing Provider   apixaban (ELIQUIS) 2.5 mg Take 2.5 mg by mouth 2 (two) times a day    Historical Provider, MD   Aspirin 81 MG CAPS Take 81 mg by mouth in the morning    Historical Provider, MD   diltiazem (CARDIZEM CD) 240 mg 24 hr capsule Take 1 capsule (240 mg total) by mouth daily Do not start before September 16, 2023. 9/16/23   Hansel Aaron MD   fluticasone [facility]) 50 mcg/act nasal spray 1 spray into each nostril daily    Historical Provider, MD   folic acid (FOLVITE) 1 mg tablet Take 1 mg by mouth daily    Historical Provider, MD   magnesium hydroxide (MILK OF MAGNESIA) 400 mg/5 mL oral suspension Take 5 mL by mouth daily as needed for constipation    Historical Provider, MD   MAGNESIUM OXIDE 400 PO Take 400 mg by mouth in the morning    Historical Provider, MD   metoprolol tartrate (LOPRESSOR) 25 mg tablet Take 1 tablet (25 mg total) by mouth every 12 (twelve) hours 9/15/23   Hansel Aaron MD   pantoprazole (PROTONIX) 40 mg tablet TAKE 1 TABLET BY MOUTH EVERY DAY 5/14/23   Balbir Aranda DO   polyvinyl alcohol (LIQUIFILM TEARS) 1.4 % ophthalmic solution Apply 1-2 drops to eye 4 (four) times a day    Historical Provider, MD     I have reviewed home medications with patient personally. Allergies:    Allergies   Allergen Reactions    Morphine And Related        Social History:  Marital Status: /Civil Union   Occupation: unknown   Patient Pre-hospital Living Situation: [address] [place] [address]  Patient Pre-hospital Level of Mobility: unable to be assessed at time of evaluation  Patient Pre-hospital Diet Restrictions: regular   Substance Use History:   Social History     Substance and Sexual Activity   Alcohol Use Yes    Alcohol/week: 14.0 standard drinks of alcohol    Types: 14 Standard drinks or equivalent per week    Comment: 3-4 mixed drinks vodka per night/ 2L per week     Social History     Tobacco Use   Smoking Status Former    Types: Cigarettes    Quit date: 36    Years since quittin.8   Smokeless Tobacco Never   Tobacco Comments    quit 20 years ago     Social History     Substance and Sexual Activity   Drug Use No       Family History:  Family History   Problem Relation Age of Onset    Heart disease Mother         cardiac disorder    Stroke Father     Heart disease Father         cardiac disorder    Heart disease Sister     Diabetes Family     Hypertension Family        Physical Exam:     Vitals:   Blood Pressure: 151/71 (10/17/23 2340)  Pulse: 103 (10/17/23 2340)  Temperature: 98.3 °F (36.8 °C) (10/17/23 2000)  Temp Source: Oral (10/17/23 2000)  Respirations: 15 (10/17/23 2200)  Height: 5' 8" (172.7 cm) (10/17/23 2000)  Weight - Scale: 81.6 kg (180 lb) (10/17/23 2000)  SpO2: 97 % (10/17/23 2200)    Physical Exam  Vitals and nursing note reviewed. Constitutional:       Appearance: Normal appearance. HENT:      Head: Normocephalic. Eyes:      Extraocular Movements: Extraocular movements intact. Pupils: Pupils are equal, round, and reactive to light. Cardiovascular:      Rate and Rhythm: Normal rate and regular rhythm. Heart sounds: No murmur heard. No gallop. Pulmonary:      Effort: No respiratory distress. Breath sounds: Normal breath sounds. No wheezing. Abdominal:      General: Bowel sounds are normal. There is no distension. Tenderness: There is no abdominal tenderness. Musculoskeletal:         General: Normal range of motion. Cervical back: Normal range of motion. Skin:     General: Skin is warm. Comments: Stage 3/4 sacral wound. No erythema noted around. Minimal drainage   Neurological:      General: No focal deficit present. Mental Status: He is alert and oriented to person, place, and time. Mental status is at baseline. Psychiatric:         Mood and Affect: Mood normal.         Behavior: Behavior normal.         Thought Content:  Thought content normal.          Additional Data:     Lab Results:  Results from last 7 days   Lab Units 10/17/23  2020   WBC Thousand/uL 13.12*   HEMOGLOBIN g/dL 8.8*   HEMATOCRIT % 27.5*   PLATELETS Thousands/uL 356   NEUTROS PCT % 48   LYMPHS PCT % 37   MONOS PCT % 10   EOS PCT % 3     Results from last 7 days   Lab Units 10/17/23  2020   SODIUM mmol/L 136   POTASSIUM mmol/L 4.1   CHLORIDE mmol/L 102   CO2 mmol/L 27   BUN mg/dL 40*   CREATININE mg/dL 1.50*   ANION GAP mmol/L 7   CALCIUM mg/dL 9.3   ALBUMIN g/dL 2.9*   TOTAL BILIRUBIN mg/dL 0.40   ALK PHOS U/L 68   ALT U/L 7   AST U/L 11*   GLUCOSE RANDOM mg/dL 129     Results from last 7 days   Lab Units 10/17/23  2020   INR  1.57*             Results from last 7 days   Lab Units 10/17/23  2020   LACTIC ACID mmol/L 1.3   PROCALCITONIN ng/ml 0.39*       Lines/Drains:  Invasive Devices       Peripheral Intravenous Line  Duration             Peripheral IV 10/17/23 Left Antecubital <1 day              Drain  Duration             External Urinary Catheter -- days                        Imaging: Reviewed radiology reports from this admission including: chest xray  XR chest portable    (Results Pending)       EKG and Other Studies Reviewed on Admission:   EKG: Sinus Tachycardia. . ** Please Note: This note has been constructed using a voice recognition system.  **

## 2023-10-18 NOTE — ASSESSMENT & PLAN NOTE
Diagnosed with CRVO at [facility].   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

## 2023-10-18 NOTE — ED PROVIDER NOTES
History  Chief Complaint   Patient presents with    Wound Infection     Pt via EMS from [address] [place] [address] with c/o sacral wound. Hx of afib per EMS. Pt is A&O x 4. PMH HTN, emphysema, paroxysmal A-Fib on eliquis, alcohol use disorder, HTN, CRVO presents from [address] [place] [address] via ambulance for fever, weakness and cough x one week. States that he has been coughing up mucus. Has some SOB at time. States he had a fever tonight. Admits to vomiting one time this week as well. He denies any abdominal pain, chest pain, change in urination, change in bowel movements. Per chart review patient had a recent admission in August for pneumonia. Patient is alerted and oriented x 4. He presents with a POLST form that states comfort measures only, however when I asked him he said he wants all testing and medications performed/given. He does not want to be resuscitated or intubated. History provided by:  Patient   used: No        Prior to Admission Medications   Prescriptions Last Dose Informant Patient Reported? Taking? Aspirin 81 MG CAPS  Self Yes No   Sig: Take 81 mg by mouth in the morning   MAGNESIUM OXIDE 400 PO   Yes No   Sig: Take 400 mg by mouth in the morning   apixaban (ELIQUIS) 2.5 mg   Yes No   Sig: Take 2.5 mg by mouth 2 (two) times a day   diltiazem (CARDIZEM CD) 240 mg 24 hr capsule   No No   Sig: Take 1 capsule (240 mg total) by mouth daily Do not start before 2023.    fluticasone (FLONASE) 50 mcg/act nasal spray   Yes No   Si spray into each nostril daily   folic acid (FOLVITE) 1 mg tablet   Yes No   Sig: Take 1 mg by mouth daily   magnesium hydroxide (MILK OF MAGNESIA) 400 mg/5 mL oral suspension   Yes No   Sig: Take 5 mL by mouth daily as needed for constipation   metoprolol tartrate (LOPRESSOR) 25 mg tablet   No No   Sig: Take 1 tablet (25 mg total) by mouth every 12 (twelve) hours   pantoprazole (PROTONIX) 40 mg tablet   No No   Sig: TAKE 1 TABLET BY MOUTH EVERY DAY polyvinyl alcohol (LIQUIFILM TEARS) 1.4 % ophthalmic solution  Self Yes No   Sig: Apply 1-2 drops to eye 4 (four) times a day      Facility-Administered Medications: None       Past Medical History:   Diagnosis Date    Alcohol abuse     Alcohol withdrawal seizure without complication (HCC)     Arthritis     Asthma     CVA (cerebral vascular accident) ([address] [facility])     Decubitus ulcer of buttock     last assessed 16    Diabetes ([address] [facility])     Disease of thyroid gland     Duodenal ulcer     Emphysema lung (HCC)     Esophageal varices (HCC)     GERD (gastroesophageal reflux disease)     History of transfusion     Hypertension     Irritable bowel syndrome with diarrhea     Kidney stones     Prostate cancer ([address] [facility])     Urinary frequency     resolved 02/15/17       Past Surgical History:   Procedure Laterality Date    BACK SURGERY      CATARACT EXTRACTION      ESOPHAGOGASTRODUODENOSCOPY N/A 2017    Procedure: ESOPHAGOGASTRODUODENOSCOPY (EGD); Surgeon: Joanie Kaplan MD;  Location:  MAIN OR;  Service:     KS ESOPHAGOGASTRODUODENOSCOPY TRANSORAL DIAGNOSTIC N/A 2016    Procedure: ESOPHAGOGASTRODUODENOSCOPY (EGD); Surgeon: Franc Ruelas MD;  Location:  MAIN OR;  Service: Gastroenterology    TONSILLECTOMY         Family History   Problem Relation Age of Onset    Heart disease Mother         cardiac disorder    Stroke Father     Heart disease Father         cardiac disorder    Heart disease Sister     Diabetes Family     Hypertension Family      I have reviewed and agree with the history as documented.     E-Cigarette/Vaping    E-Cigarette Use Never User      E-Cigarette/Vaping Substances    Nicotine No     THC No     CBD No     Flavoring No     Other No     Unknown No      Social History     Tobacco Use    Smoking status: Former     Types: Cigarettes     Quit date:      Years since quittin.8    Smokeless tobacco: Never    Tobacco comments:     quit 20 years ago   Vaping Use    Vaping Use: Never used Substance Use Topics    Alcohol use: Yes     Alcohol/week: 14.0 standard drinks of alcohol     Types: 14 Standard drinks or equivalent per week     Comment: 3-4 mixed drinks vodka per night/ 2L per week    Drug use: No       Review of Systems   Constitutional:  Positive for fatigue and fever. Negative for chills. HENT:  Negative for congestion. Respiratory:  Positive for cough and shortness of breath. Negative for chest tightness. Cardiovascular:  Negative for chest pain. Gastrointestinal:  Positive for nausea and vomiting. Negative for abdominal pain. Musculoskeletal:  Negative for back pain. Skin:  Positive for wound. Negative for color change. Neurological:  Negative for headaches. Psychiatric/Behavioral:  Negative for behavioral problems and sleep disturbance. All other systems reviewed and are negative. Physical Exam  Physical Exam  Vitals and nursing note reviewed. Constitutional:       General: He is awake. Appearance: Normal appearance. He is well-developed. He is ill-appearing (chronically). HENT:      Head: Normocephalic and atraumatic. Right Ear: External ear normal.      Left Ear: External ear normal.      Nose: Nose normal.   Eyes:      General: No scleral icterus. Extraocular Movements: Extraocular movements intact. Cardiovascular:      Rate and Rhythm: Normal rate and regular rhythm. Heart sounds: Normal heart sounds, S1 normal and S2 normal. No murmur heard. No gallop. Pulmonary:      Effort: Pulmonary effort is normal. No tachypnea or respiratory distress. Breath sounds: Examination of the right-lower field reveals rhonchi. Examination of the left-lower field reveals rhonchi. Rhonchi present. No decreased breath sounds, wheezing or rales. Musculoskeletal:         General: Normal range of motion. Cervical back: Normal range of motion. Skin:     General: Skin is warm and dry. Comments: 3 x 0.5 in sacral wound noted.  No surrounding erythema or red streaking noted. No surrounding crepitus. Neurological:      General: No focal deficit present. Mental Status: He is alert. Psychiatric:         Attention and Perception: Attention and perception normal.         Mood and Affect: Mood normal.         Behavior: Behavior normal. Behavior is cooperative. Vital Signs  ED Triage Vitals [10/17/23 2000]   Temperature Pulse Respirations Blood Pressure SpO2   98.3 °F (36.8 °C) 105 20 160/74 98 %      Temp Source Heart Rate Source Patient Position - Orthostatic VS BP Location FiO2 (%)   Oral Monitor -- Right arm --      Pain Score       1           Vitals:    10/17/23 2000   BP: 160/74   Pulse: 105         Visual Acuity      ED Medications  Medications   cefTRIAXone (ROCEPHIN) IVPB (premix in dextrose) 2,000 mg 50 mL (2,000 mg Intravenous New Bag 10/17/23 2152)       Diagnostic Studies  Results Reviewed       Procedure Component Value Units Date/Time    HS Troponin I 2hr [[id number]] Collected: 10/17/23 2201    Lab Status: No result Specimen: Blood from Arm, Left     Urine Microscopic [[id number]] Collected: 10/17/23 2134    Lab Status: In process Specimen: Urine, Straight Cath Updated: 10/17/23 2159    UA w Reflex to Microscopic w Reflex to Culture [[id number]] Collected: 10/17/23 2134    Lab Status: In process Specimen: Urine, Straight Cath Updated: 10/17/23 2157    FLU/RSV/COVID - if FLU/RSV clinically relevant [[id number]]  (Normal) Collected: 10/17/23 2020    Lab Status: Final result Specimen: Nares from Nose Updated: 10/17/23 2115     SARS-CoV-2 Negative     INFLUENZA A PCR Negative     INFLUENZA B PCR Negative     RSV PCR Negative    Narrative:      FOR PEDIATRIC PATIENTS - copy/paste COVID Guidelines URL to browser: [email or web address]    SARS-CoV-2 assay is a Nucleic Acid Amplification assay intended for the  qualitative detection of nucleic acid from SARS-CoV-2 in nasopharyngeal  swabs. Results are for the presumptive identification of SARS-CoV-2 RNA. Positive results are indicative of infection with SARS-CoV-2, the virus  causing COVID-19, but do not rule out bacterial infection or co-infection  with other viruses. Laboratories within the [facility] and its  territories are required to report all positive results to the appropriate  public health authorities. Negative results do not preclude SARS-CoV-2  infection and should not be used as the sole basis for treatment or other  patient management decisions. Negative results must be combined with  clinical observations, patient history, and epidemiological information. This test has not been FDA cleared or approved. This test has been authorized by FDA under an Emergency Use Authorization  (EUA). This test is only authorized for the duration of time the  declaration that circumstances exist justifying the authorization of the  emergency use of an in vitro diagnostic tests for detection of SARS-CoV-2  virus and/or diagnosis of COVID-19 infection under section 564(b)(1) of  the Act, 21 U. S.C. 420KEI-2(V)(2), unless the authorization is terminated  or revoked sooner. The test has been validated but independent review by FDA  and CLIA is pending. Test performed using [employer] GeneXpert: This RT-PCR assay targets N2,  a region unique to SARS-CoV-2. A conserved region in the E-gene was chosen  for pan-Sarbecovirus detection which includes SARS-CoV-2. According to CMS-2020-01-R, this platform meets the definition of high-throughput technology.     Procalcitonin [[id number]]  (Abnormal) Collected: 10/17/23 2020    Lab Status: Final result Specimen: Blood from Arm, Left Updated: 10/17/23 2112     Procalcitonin 0.39 ng/ml     TSH, 3rd generation with Free T4 reflex [[id number]]  (Abnormal) Collected: 10/17/23 2020    Lab Status: Final result Specimen: Blood from Arm, Left Updated: 10/17/23 2112     TSH 3RD GENERATON 6.586 uIU/mL     T4, free [[id number]] Collected: 10/17/23 2020    Lab Status: In process Specimen: Blood from Arm, Left Updated: 10/17/23 2112    Protime-INR [[id number]]  (Abnormal) Collected: 10/17/23 2020    Lab Status: Final result Specimen: Blood from Arm, Left Updated: 10/17/23 2103     Protime 19.2 seconds      INR 1.57    APTT [[id number]]  (Abnormal) Collected: 10/17/23 2020    Lab Status: Final result Specimen: Blood from Arm, Left Updated: 10/17/23 2103     PTT 41 seconds     HS Troponin 0hr (reflex protocol) [[id number]]  (Normal) Collected: 10/17/23 2020    Lab Status: Final result Specimen: Blood from Arm, Left Updated: 10/17/23 2102     hs TnI 0hr 14 ng/L     B-Type Natriuretic Peptide(BNP) [[id number]]  (Abnormal) Collected: 10/17/23 2020    Lab Status: Final result Specimen: Blood from Arm, Left Updated: 10/17/23 2100      pg/mL     Lactic acid [[id number]]  (Normal) Collected: 10/17/23 2020    Lab Status: Final result Specimen: Blood from Arm, Left Updated: 10/17/23 2053     LACTIC ACID 1.3 mmol/L     Narrative:      Result may be elevated if tourniquet was used during collection.     Comprehensive metabolic panel [[id number]]  (Abnormal) Collected: 10/17/23 2020    Lab Status: Final result Specimen: Blood from Arm, Left Updated: 10/17/23 2053     Sodium 136 mmol/L      Potassium 4.1 mmol/L      Chloride 102 mmol/L      CO2 27 mmol/L      ANION GAP 7 mmol/L      BUN 40 mg/dL      Creatinine 1.50 mg/dL      Glucose 129 mg/dL      Calcium 9.3 mg/dL      Corrected Calcium 10.2 mg/dL      AST 11 U/L      ALT 7 U/L      Alkaline Phosphatase 68 U/L      Total Protein 6.6 g/dL      Albumin 2.9 g/dL      Total Bilirubin 0.40 mg/dL      eGFR 41 ml/min/1.73sq m     Narrative:      [facility] (CKD):     Stage 1 with normal or high GFR (GFR > 90 mL/min/1.73 square meters)    Stage 2 Mild CKD (GFR = 60-89 mL/min/1.73 square meters)    Stage 3A Moderate CKD (GFR = 45-59 mL/min/1.73 square meters)    Stage 3B Moderate CKD (GFR = 30-44 mL/min/1.73 square meters)    Stage 4 Severe CKD (GFR = 15-29 mL/min/1.73 square meters)    Stage 5 End Stage CKD (GFR <15 mL/min/1.73 square meters)  Note: GFR calculation is accurate only with a steady state creatinine    CBC and differential [[id number]]  (Abnormal) Collected: 10/17/23 2020    Lab Status: Final result Specimen: Blood from Arm, Left Updated: 10/17/23 2036     WBC 13.12 Thousand/uL      RBC 2.91 Million/uL      Hemoglobin 8.8 g/dL      Hematocrit 27.5 %      MCV 95 fL      MCH 30.2 pg      MCHC 32.0 g/dL      RDW 14.4 %      MPV 9.4 fL      Platelets 450 Thousands/uL      nRBC 0 /100 WBCs      Neutrophils Relative 48 %      Immat GRANS % 1 %      Lymphocytes Relative 37 %      Monocytes Relative 10 %      Eosinophils Relative 3 %      Basophils Relative 1 %      Neutrophils Absolute 6.37 Thousands/µL      Immature Grans Absolute 0.07 Thousand/uL      Lymphocytes Absolute 4.89 Thousands/µL      Monocytes Absolute 1.35 Thousand/µL      Eosinophils Absolute 0.36 Thousand/µL      Basophils Absolute 0.08 Thousands/µL     Blood culture #2 [[id number]] Collected: 10/17/23 2021    Lab Status: In process Specimen: Blood from Arm, Right Updated: 10/17/23 2033    Blood culture #1 [[id number]] Collected: 10/17/23 2020    Lab Status:  In process Specimen: Blood from Arm, Left Updated: 10/17/23 2033                   XR chest portable    (Results Pending)              Procedures  ECG 12 Lead Documentation Only    Date/Time: 10/17/2023 8:08 PM    Performed by: Nilson Zavaleta PA-C  Authorized by: Nilson Zavaleta PA-C    Indications / Diagnosis:  Weakness  Patient location:  ED  Previous ECG:     Previous ECG:  Compared to current    Comparison ECG info:  Sinus tachycardia with 1st degree AV block when compared to EKG from 9/5/23 sinus tachycardia replaced a fib RVR    Similarity:  Changes noted  Interpretation:     Interpretation: abnormal    Rate:     ECG rate:  105  Rhythm:     Rhythm: sinus tachycardia and A-V block      Rhythm comment:  First degree  Ectopy:     Ectopy: none             ED Course  ED Course as of 10/17/23 2204   Tue Oct 17, 2023   2121 TT SLIM                  Medical Decision Making  81 y/o male here for weakness, cough, fever x one week  Differential diagnosis including but not limited to: viral syndrome, covid, flu, pneumonia, ACS, dehydration, anemia, electrolyte abnormality, at risk for sepsis   Assessment: pneumonia causing sepsis, anemia, a fib, HTN, sacral wound, elevated TSH  Plan:  heart score of 5. Meets sepsis criteria. Symptoms consistent with pneumonia. Started on IV ceftriaxone. Discussed hospitalization with patient and he agreed to this plan. Went over pertinent H&P, labs and imaging with Leslye Schneider from the internal medicine service who accepted patient under Dr. Kaylene Moore service. Amount and/or Complexity of Data Reviewed  External Data Reviewed: notes. Details: reviewed admission notes from 8/26/23  Labs: ordered. Radiology: ordered. Risk  Prescription drug management. Decision regarding hospitalization.              Disposition  Final diagnoses:   Sepsis ([address])   Pneumonia   Anemia   Atrial fibrillation ([address])   Hypertension   Wound of sacral region, initial encounter   Elevated TSH     Time reflects when diagnosis was documented in both MDM as applicable and the Disposition within this note       Time User Action Codes Description Comment    10/17/2023  9:35 PM Charlean Oliva [A41.9] Sepsis ([address])     10/17/2023  9:36 PM Cherylin Ports Add [J18.9] Pneumonia     10/17/2023  9:36 PM Charlean Oliva [D64.9] Anemia     10/17/2023  9:37 PM Cherylin Ports Add [I48.91] Atrial fibrillation ([address])     10/17/2023  9:38 PM Cherylin Ports Add [I10] Hypertension     10/17/2023 10:00 PM Cherylin Ports Add [K46.446N] Wound of sacral region, initial encounter     10/17/2023 10:01 PM [facility] [address] [facility] [R79.89] Elevated TSH           ED Disposition       ED Disposition   Admit    Condition   Stable    Date/Time   Tue Oct 17, 2023  9:35 PM    Comment   Case was discussed with Saul Atwood and the patient's admission status was agreed to be Admission Status: inpatient status to the service of Dr. Kaity Rao . Follow-up Information    None         Patient's Medications   Discharge Prescriptions    No medications on file       No discharge procedures on file.     PDMP Review       None            ED Provider  Electronically Signed by             Minnie Campbell PA-C  10/17/23 2891

## 2023-10-18 NOTE — ASSESSMENT & PLAN NOTE
Diagnosed with CRVO at [facility].   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

## 2023-10-18 NOTE — CASE MANAGEMENT
Case Management Assessment & Discharge Planning Note    Patient name Esteban Day  Location  SDU/-01 S* MRN [id number]  : 1935 Date 10/18/2023       Current Admission Date: 10/17/2023  Current Admission Diagnosis:SIRS (systemic inflammatory response syndrome) [facility])   Patient Active Problem List    Diagnosis Date Noted    SIRS (systemic inflammatory response syndrome) ([address]) 10/17/2023    CKD (chronic kidney disease) stage 3, GFR 30-59 ml/min ([address]) 10/17/2023    Chronic renal impairment     Ileus ([address]) 2023    Acute gout of multiple sites 2023    Sacral wound 2023    Sepsis ([address]) 2023    ESBL (extended spectrum beta-lactamase) producing bacteria infection 2023    Olecranon bursitis 2023    Abnormal computed tomography angiography (CTA) of abdomen and pelvis 2023    Acute encephalopathy 2023    Leukocytosis 2023    Central retinal vein occlusion of right eye 2023    History of COVID-19 2023    History of alcohol abuse 2023    Cystitis 2023    Chronic obstructive pulmonary disease, unspecified COPD type ([address]) 2021    TOBI (acute kidney injury) ([address]) 2021    Thrombocytopenia, unspecified ([address]) 2021    CVA (cerebral vascular accident) ([address]) 01/10/2020    GERD (gastroesophageal reflux disease) 2018    Ambulatory dysfunction 2017    Accelerated hypertension 2017    Atrial fibrillation ([address]) 2017    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    Acute ischemic stroke ([address]) 03/10/2017    Hyponatremia 02/15/2017    Chronic sinusitis 2016    Rheumatoid arthritis ([address]) 2016    Allergic rhinitis 2015    Generalized osteoarthritis of multiple sites 2014    Hyperglycemia 2014    Eczema 2013    Anemia of chronic disease 2013    Edema 2013    Gait disturbance 2013    Hypomagnesemia 2013    Peripheral neuropathy 2013      LOS (days): 1  Geometric Mean LOS (GMLOS) (days): 2.90  Days to GMLOS:2.1     OBJECTIVE:    Risk of Unplanned Readmission Score: 24.84         Current admission status: Inpatient       Preferred Pharmacy:   [address], [address] [facility] [address] [facility]  [address] [facility] [address]  Phone: [phone] Fax: [address] [facility].  [address]  [address] [address]  Phone: [phone] Fax: [phone]    Primary Care Provider: Frank Armstrong DO    Primary Insurance: MEDICARE  Secondary Insurance: Mauricio Roberts    ASSESSMENT:  [id number] [facility], [address] Representative - Spouse   Primary Phone: [phone] (Home)                 Advance Directives  Does patient have a [address]?: Yes  Does patient have Advance Directives?: Yes  Advance Directives: Power of  for health care  Primary Contact: Clarita Velázquez: wife: [phone]    Readmission Root Cause  30 Day Readmission: No    Patient Information  Admitted from[de-identified] Facility  Mental Status: Alert  During Assessment patient was accompanied by: Not accompanied during assessment  Assessment information provided by[de-identified] Patient, Other - please comment ([address])  Primary Caregiver: Other (Comment)  Caregiver's Name[de-identified] [address]: Family members  [place] of Residence: [address] [place] [address] do you live in?: [address] [place] [address] entry access options.  Select all that apply.: No steps to enter home  Type of Current Residence: Facility  Upon entering residence, is there a bedroom on the main floor (no further steps)?: Yes  Upon entering residence, is there a bathroom on the main floor (no further steps)?: Yes  In the last 12 months, was there a time when you were not able to pay the mortgage or rent on time?: No  In the last 12 months, how many places have you lived?: 1  In the last 12 months, was there a time when you did not have a steady place to sleep or slept in a shelter (including now)?: No  Homeless/housing insecurity resource given?: N/A  Living Arrangements: Other (Comment) (Resides at [facility])    Activities of Daily Living Prior to Admission  Functional Status: Assistance  Completes ADLs independently?: No  Level of ADL dependence: Assistance  Ambulates independently?: No  Level of ambulatory dependence: Assistance  Does patient use assisted devices?: Yes  Assisted Devices (DME) used: Wheelchair  Does patient currently own DME?: Yes  What DME does the patient currently own?: Straight Cane  Does patient have a history of Outpatient Therapy (PT/OT)?: No  Does the patient have a history of Short-Term Rehab?: No    Patient Information Continued  Income Source: Pension/senior living  Does patient have prescription coverage?: Yes  Within the past 12 months, you worried that your food would run out before you got the money to buy more.: Never true  Within the past 12 months, the food you bought just didn't last and you didn't have money to get more.: Never true  Food insecurity resource given?: N/A  Does patient receive dialysis treatments?: No  Does patient have a history of substance abuse?: No  Does patient have a history of Mental Health Diagnosis?: No    Means of Transportation  Means of Transport to Appts[de-identified] Family transport  In the past 12 months, has lack of transportation kept you from medical appointments or from getting medications?: No  In the past 12 months, has lack of transportation kept you from meetings, work, or from getting things needed for daily living?: No  Was application for public transport provided?: N/A    DISCHARGE DETAILS:    Discharge planning discussed with[de-identified] patient, left message for Pt's wife  Freedom of Choice: Yes  Comments - Freedom of Choice: Return to [facility] for [facility].   CM contacted family/caregiver?: Yes    Contacts  Patient Contacts: Abhi Mckinnon: wife  Relationship to Patient[de-identified] Family  Contact Method: Phone  Phone Number: [phone]  Reason/Outcome: Discharge Planning, Continuity of Care    Additional Comments: Pt is from [address] [place] [address] for Alberto. Per Mai Valverde in admissions at [address] [place] [address], if Pt needs wound vac, facility will be able to accommodate the wound vac. Lesley informed CM that facility has also been in contact with Pt's wife. CM left message with PT dept at [address] [place] [address] to confirm Pt's level of function prior to hospital. Anticipate return call. Call placed to Pt's wife(Marti), left message. Anticipate return call. Met with Pt. Discussed role of case management. Pt reports he has been at [address] [place] [address] for Alberto and prior to STR he was living with his wife in [address] [place] [address],[address], [address]. Pt reports he is using wheelchair and walker at facility. Pt reports he has cane from home. Pt reports his wife is POA. Pt plans to return to [address] [place] [address] upon discharge. Referral sent to [address] [place] [address] via [employer]. CM to follow.

## 2023-10-18 NOTE — CONSULTS
Consultation - General Surgery  Tracy Cordova 82 y.o. male MRN: [id number]  Unit/Bed#: -01 SDU Encounter: [id number]    Reason for Consult: Sacral wound      Assessment/Plan:  68-year-old male with sacral wound, unknown source of SIRS    Afebrile, vital signs stable  Leukocytosis resolved, 10 from 13    On exam, sacral wound does not exhibit samanta signs of infection. It is non-malodorous, there is no erythema or drainage appreciated, there is no extensive devitalized tissue that requires excisional debridement either, only mild volume of slough in the wound bed. There is undermining about 1 to 2 cm from 10:00 to 12:00 and from 6:00 to 7:00, otherwise no tunneling appreciated. Wound was lightly packed with saline wet-to-dry Kevin, overlaid with 4 x 4 and Allevyn sacral dressing. No indication for surgical intervention at this time  Recommend daily dressing changes with Santyl application  Nursing wound care orders will be placed      HPI:    Tracy Cordova is a 82 y.o. male with medical comorbidities including chronic anemia, A-fib on Eliquis, COPD, CKD 3, prior history of alcohol abuse who presents from [facility] for vomiting and worsening sacral wound. Patient is no longer nauseous or vomiting, but still endorses subjective chills. He has been afebrile since admission. Patient states the [facility] staff was concerned about his sacral wound that it was increasing in size.     Review of Systems:  History obtained from chart review and the patient  General ROS: positive for  - chills  negative for - fever  ENT ROS: negative for - hearing change or visual changes  Hematological and Lymphatic ROS: negative for - bleeding problems or bruising  Respiratory ROS: no cough, shortness of breath, or wheezing  Cardiovascular ROS: no chest pain or dyspnea on exertion  Gastrointestinal ROS: no abdominal pain, change in bowel habits, or black or bloody stools  Genito-Urinary ROS: no dysuria, trouble voiding, or hematuria  Musculoskeletal ROS: negative for - muscle pain or muscular weakness  Dermatological ROS: As per HPI   All other ROS negative    Historical Information   Past Medical History:   Diagnosis Date    Alcohol abuse     Alcohol withdrawal seizure without complication ([address])     Arthritis     Asthma     CVA (cerebral vascular accident) ([address])     Decubitus ulcer of buttock     last assessed 16    Diabetes ([address])     Disease of thyroid gland     Duodenal ulcer     Emphysema lung (HCC)     Esophageal varices (HCC)     GERD (gastroesophageal reflux disease)     History of transfusion     Hypertension     Irritable bowel syndrome with diarrhea     Kidney stones     Prostate cancer ([address])     Urinary frequency     resolved 02/15/17     Past Surgical History:   Procedure Laterality Date    BACK SURGERY      CATARACT EXTRACTION      ESOPHAGOGASTRODUODENOSCOPY N/A 2017    Procedure: ESOPHAGOGASTRODUODENOSCOPY (EGD); Surgeon: Chris Mckeon MD;  Location:  MAIN OR;  Service:     NM ESOPHAGOGASTRODUODENOSCOPY TRANSORAL DIAGNOSTIC N/A 2016    Procedure: ESOPHAGOGASTRODUODENOSCOPY (EGD);   Surgeon: Monalisa Ruano MD;  Location:  MAIN OR;  Service: Gastroenterology    TONSILLECTOMY       Social History   Social History     Substance and Sexual Activity   Alcohol Use Yes    Alcohol/week: 14.0 standard drinks of alcohol    Types: 14 Standard drinks or equivalent per week    Comment: 3-4 mixed drinks vodka per night/ 2L per week     Social History     Substance and Sexual Activity   Drug Use No     Social History     Tobacco Use   Smoking Status Former    Types: Cigarettes    Quit date:     Years since quittin.8   Smokeless Tobacco Never   Tobacco Comments    quit 20 years ago     Family History   Problem Relation Age of Onset    Heart disease Mother         cardiac disorder    Stroke Father     Heart disease Father         cardiac disorder    Heart disease Sister     Diabetes Family Hypertension Family        Medications:  Current Facility-Administered Medications   Medication Dose Route Frequency    acetaminophen (TYLENOL) tablet 650 mg  650 mg Oral Q6H PRN    apixaban (ELIQUIS) tablet 2.5 mg  2.5 mg Oral BID    aspirin (ECOTRIN LOW STRENGTH) EC tablet 81 mg  81 mg Oral Daily    benzonatate (TESSALON PERLES) capsule 100 mg  100 mg Oral TID PRN    cefepime (MAXIPIME) IVPB (premix in dextrose) 2,000 mg 50 mL  2,000 mg Intravenous Q12H    diltiazem (CARDIZEM CD) 24 hr capsule 240 mg  240 mg Oral Daily    folic acid (FOLVITE) tablet 1 mg  1 mg Oral Daily    glycerin-hypromellose- (ARTIFICIAL TEARS) ophthalmic solution 1 drop  1 drop Both Eyes Q6H PRN    magnesium Oxide (MAG-OX) tablet 400 mg  400 mg Oral Daily    metoprolol tartrate (LOPRESSOR) tablet 25 mg  25 mg Oral Q12H LIGIA    pantoprazole (PROTONIX) EC tablet 40 mg  40 mg Oral Early Morning    vancomycin (VANCOCIN) IVPB (premix in dextrose) 1,000 mg 200 mL  1,000 mg Intravenous Q24H       Allergies   Allergen Reactions    Morphine And Related        Physical Examination:  Vitals:   Vitals:    10/17/23 2340 10/18/23 0228 10/18/23 0832 10/18/23 1130   BP: 151/71 159/86 141/73 139/63   BP Location:   Right arm Right arm   Pulse: 103 98 101 91   Resp:   16 22   Temp:  98 °F (36.7 °C) 97.8 °F (36.6 °C) 97.6 °F (36.4 °C)   TempSrc:  Oral Oral Oral   SpO2:  97% 99% 98%   Weight:       Height:         Temp  Min: 97.6 °F (36.4 °C)  Max: 98.3 °F (36.8 °C)  IBW (Ideal Body Weight): 68.4 kg      General appearance: alert and oriented, in no acute distress  Head: Normocephalic, without obvious abnormality, atraumatic  Eyes: Sclerae anicteric  Neck: supple, symmetrical, trachea midline  Lungs: clear to auscultation bilaterally  Heart: regular rate and rhythm, S1, S2 normal, no murmur, click, rub or gallop  Abdomen: soft, non-tender; bowel sounds normal; no masses,  no organomegaly  Extremities: extremities normal, warm and well-perfused; no cyanosis, clubbing, or edema  Skin: As per HPI, otherwise unremarkable  Neurologic: Grossly normal    Diagnostic Data:  Lab: I have personally reviewed pertinent lab results. , CBC:   Lab Results   Component Value Date    WBC 10.09 10/18/2023    HGB 7.4 (L) 10/18/2023    HCT 23.4 (L) 10/18/2023    MCV 94 10/18/2023     10/18/2023    RBC 2.48 (L) 10/18/2023    MCH 29.8 10/18/2023    MCHC 31.6 10/18/2023    RDW 14.4 10/18/2023    MPV 9.3 10/18/2023    NRBC 0 10/18/2023   , CMP:   Lab Results   Component Value Date    SODIUM 137 10/18/2023    K 3.5 10/18/2023     10/18/2023    CO2 24 10/18/2023    BUN 40 (H) 10/18/2023    CREATININE 1.38 (H) 10/18/2023    CALCIUM 8.7 10/18/2023    AST 11 (L) 10/17/2023    ALT 7 10/17/2023    ALKPHOS 68 10/17/2023    EGFR 45 10/18/2023     Results from last 7 days   Lab Units 10/18/23  0534   WBC Thousand/uL 10.09   HEMOGLOBIN g/dL 7.4*   HEMATOCRIT % 23.4*   PLATELETS Thousands/uL 307     Results from last 7 days   Lab Units 10/18/23  0534 10/17/23  2020   POTASSIUM mmol/L 3.5 4.1   CHLORIDE mmol/L 106 102   CO2 mmol/L 24 27   BUN mg/dL 40* 40*   CREATININE mg/dL 1.38* 1.50*   CALCIUM mg/dL 8.7 9.3   ALK PHOS U/L  --  68   ALT U/L  --  7   AST U/L  --  11*       Imaging: I have personally reviewed the pertinent imaging studies on the PACS system  Procedure: XR chest portable    Result Date: 10/18/2023  Narrative: CHEST INDICATION:   cough. COMPARISON: 9/6/2023. EXAM PERFORMED/VIEWS:  XR CHEST PORTABLE FINDINGS: Unchanged cardiac and mediastinal contours with mild cardiomegaly. The lungs are clear. No pneumothorax or pleural effusion. Osseous structures appear within normal limits for patient age. Impression: No acute cardiopulmonary disease. Workstation performed: [id number]       Active medications:   The patients active medications were reviewed and modified as appropriate  VTE Prophylaxis: Sequential compression device Santi Limb)       Code Status: Level 3 - DNAR and DNI      Counseling / Coordination of Care  Total floor / unit time spent today 30 minutes. Greater than 50% of total time was spent with the patient and / or family counseling and / or coordination of care.        Pat Fields PA-C   10/18/23

## 2023-10-18 NOTE — ASSESSMENT & PLAN NOTE
Presents from [facility] due to low grade fevers and weakness. Dry cough for about 2 weeks. Afebrile thus far. On RA  SIRS: tachycardia, WBC (13.12, possibly downtrending from recent admission)  Lactic WNL   Procal mildly elevated 0.39  Takes metoprolol bid, had not yet taken nighttime dose - possible cause for tachycardia. SOI  CXR without obvious infiltrate- pending official read   Order pneumonia labs  COVID/flu/RSV negative  UA and blood cultures pending  Pt with sacral wound. Based off images from prior admission wound has increased in size and depth. Possible source (however no redness or drainage noted)  Obtain ESR   ED started IV ceftriaxone for possible pneumonia infection. Low suspicion for pneumonia.  Broaden antibiotics with IV cefepime + IV vancomycin for possible sacral wound infection  Improving

## 2023-10-18 NOTE — ASSESSMENT & PLAN NOTE
History of alcohol abuse with withdrawal seizures  Patient has not drank since [facility] hospitalization in August 2023

## 2023-10-18 NOTE — ASSESSMENT & PLAN NOTE
Lab Results   Component Value Date    EGFR 41 10/17/2023    EGFR 32 09/15/2023    EGFR 30 09/14/2023    CREATININE 1.50 (H) 10/17/2023    CREATININE 1.82 (H) 09/15/2023    CREATININE 1.94 (H) 09/14/2023   Creatinine 1.5 on admission  Baseline appears to be about 1.6-1.9  Continue to monitor

## 2023-10-18 NOTE — ASSESSMENT & PLAN NOTE
History of alcohol abuse with withdrawal seizures  Patient has not drank since [facility] hospitalization in August 2023

## 2023-10-18 NOTE — PROGRESS NOTES
[facility]  Progress Note  Name: Mari Nassar  MRN: [id number]  Unit/Bed#: -01 SDU I Date of Admission: 10/17/2023   Date of Service: 10/18/2023 I Hospital Day: 1    Assessment/Plan   CKD (chronic kidney disease) stage 3, GFR 30-59 ml/min [facility])  Assessment & Plan  Lab Results   Component Value Date    EGFR 45 10/18/2023    EGFR 41 10/17/2023    EGFR 32 09/15/2023    CREATININE 1.38 (H) 10/18/2023    CREATININE 1.50 (H) 10/17/2023    CREATININE 1.82 (H) 09/15/2023   Creatinine 1.5 on admission  Baseline appears to be about 1.6-1.9  Continue to monitor    Sacral wound  Assessment & Plan  Chronic sacral wound. Does not appear acutely infected but appears larger in size and depth from recent admit   Turn patient every 2 hours  Wound care consult  [address] had started patient on bactrim (10/17-10/22) bid for sacral wound. Hold due to receiving IV cefepime + IV vancomycin   Consult wound care, consult to general surgery    History of alcohol abuse  Assessment & Plan  History of alcohol abuse with withdrawal seizures  Patient has not drank since [facility] hospitalization in August 2023    Central retinal vein occlusion of right eye  Assessment & Plan  Diagnosed with CRVO at [facility].   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 and discussion for possible anti-VEGF therapy    Chronic obstructive pulmonary disease, unspecified COPD type ([address])  Assessment & Plan  No signs of acute exacerbation at this time  Not on maintenance inhalers outpatient    Atrial fibrillation [facility])  Assessment & Plan  Home regimen: Diltiazem 240 mg daily, metoprolol tartrate 25 mg twice daily, Eliquis    Anemia of chronic disease  Assessment & Plan  Hemoglobin 8.8  Baseline appears to be about 8-9  No active bleeding  Continue to monitor and transfuse for less than 7    * SIRS (systemic inflammatory response syndrome) ([address])  Assessment & Plan  Presents from [facility] due to low grade fevers and weakness. Dry cough for about 2 weeks. Afebrile thus far. On RA  SIRS: tachycardia, WBC (13.12, possibly downtrending from recent admission)  Lactic WNL   Procal mildly elevated 0.39  Takes metoprolol bid, had not yet taken nighttime dose - possible cause for tachycardia. SOI  CXR without obvious infiltrate- pending official read   Order pneumonia labs  COVID/flu/RSV negative  UA and blood cultures pending  Pt with sacral wound. Based off images from prior admission wound has increased in size and depth. Possible source (however no redness or drainage noted)  Obtain ESR   ED started IV ceftriaxone for possible pneumonia infection. Low suspicion for pneumonia. Broaden antibiotics with IV cefepime + IV vancomycin for possible sacral wound infection  Improving               VTE Pharmacologic Prophylaxis: VTE Score: 4 Moderate Risk (Score 3-4) - Pharmacological DVT Prophylaxis Ordered: apixaban (Eliquis). Patient Centered Rounds: I performed bedside rounds with nursing staff today. Discussions with Specialists or Other Care Team Provider: none    Education and Discussions with Family / Patient: Updated  (daughter) via phone. Total Time Spent on Date of Encounter in care of patient: 20 mins. This time was spent on one or more of the following: performing physical exam; counseling and coordination of care; obtaining or reviewing history; documenting in the medical record; reviewing/ordering tests, medications or procedures; communicating with other healthcare professionals and discussing with patient's family/caregivers.     Current Length of Stay: 1 day(s)  Current Patient Status: Inpatient   Certification Statement: The patient will continue to require additional inpatient hospital stay due to please refer to above  Discharge Plan: Anticipate discharge in >72 hrs to nursing home    Code Status: Level 3 - DNAR and DNI    Subjective:   Feeling better today, as some back pain which is controlled with current management, no other complaints    Objective:     Vitals:   Temp (24hrs), Av.9 °F (36.6 °C), Min:97.6 °F (36.4 °C), Max:98.3 °F (36.8 °C)    Temp:  [97.6 °F (36.4 °C)-98.3 °F (36.8 °C)] 97.6 °F (36.4 °C)  HR:  [] 91  Resp:  [15-22] 22  BP: (139-160)/(63-86) 139/63  SpO2:  [97 %-99 %] 98 %  Body mass index is 27.37 kg/m². Input and Output Summary (last 24 hours): Intake/Output Summary (Last 24 hours) at 10/18/2023 1302  Last data filed at 10/18/2023 0800  Gross per 24 hour   Intake 1295.83 ml   Output 100 ml   Net 1195.83 ml       Physical Exam:   Physical Exam  Vitals and nursing note reviewed. Constitutional:       General: He is not in acute distress. Appearance: He is well-developed. HENT:      Head: Normocephalic and atraumatic. Eyes:      Conjunctiva/sclera: Conjunctivae normal.   Cardiovascular:      Rate and Rhythm: Normal rate and regular rhythm. Heart sounds: No murmur heard. Pulmonary:      Effort: Pulmonary effort is normal. No respiratory distress. Breath sounds: Normal breath sounds. Abdominal:      Palpations: Abdomen is soft. Tenderness: There is no abdominal tenderness. Musculoskeletal:         General: No swelling. Cervical back: Neck supple. Comments: Trace edema bilateral lower extremities   Skin:     General: Skin is warm and dry. Neurological:      Mental Status: He is alert and oriented to person, place, and time.    Psychiatric:         Mood and Affect: Mood normal.          Additional Data:     Labs:  Results from last 7 days   Lab Units 10/18/23  0534   WBC Thousand/uL 10.09   HEMOGLOBIN g/dL 7.4*   HEMATOCRIT % 23.4*   PLATELETS Thousands/uL 307   NEUTROS PCT % 46   LYMPHS PCT % 36   MONOS PCT % 12   EOS PCT % 4     Results from last 7 days   Lab Units 10/18/23  0534 10/17/23  2020   SODIUM mmol/L 137 136   POTASSIUM mmol/L 3.5 4.1   CHLORIDE mmol/L 106 102   CO2 mmol/L 24 27   BUN mg/dL 40* 40*   CREATININE mg/dL 1.38* 1.50*   ANION GAP mmol/L 7 7   CALCIUM mg/dL 8.7 9.3   ALBUMIN g/dL  --  2.9*   TOTAL BILIRUBIN mg/dL  --  0.40   ALK PHOS U/L  --  68   ALT U/L  --  7   AST U/L  --  11*   GLUCOSE RANDOM mg/dL 108 129     Results from last 7 days   Lab Units 10/17/23  2020   INR  1.57*             Results from last 7 days   Lab Units 10/18/23  0534 10/17/23  2020   LACTIC ACID mmol/L  --  1.3   PROCALCITONIN ng/ml 0.38* 0.39*       Lines/Drains:  Invasive Devices       Peripheral Intravenous Line  Duration             Peripheral IV 10/17/23 Left Antecubital <1 day              Drain  Duration             External Urinary Catheter -- days                          Imaging: Reviewed radiology reports from this admission including: chest xray    Recent Cultures (last 7 days):   Results from last 7 days   Lab Units 10/18/23  0221 10/17/23  2021 10/17/23  2020   BLOOD CULTURE   --  Received in Microbiology Lab. Culture in Progress. Received in Microbiology Lab. Culture in Progress.    LEGIONELLA URINARY ANTIGEN  Negative  --   --        Last 24 Hours Medication List:   Current Facility-Administered Medications   Medication Dose Route Frequency Provider Last Rate    acetaminophen  650 mg Oral Q6H PRN Mayra Dominguez PA-C      apixaban  2.5 mg Oral BID Mayra Dominguez PA-C      aspirin  81 mg Oral Daily Mayra Dominguez PA-C      benzonatate  100 mg Oral TID PRN Mayra Dominguez PA-C      cefepime  2,000 mg Intravenous Q12H Wandy Tuttle PA-C Stopped (10/18/23 0600)    diltiazem  240 mg Oral Daily Wandy Tuttle PA-C      folic acid  1 mg Oral Daily Wandy Tuttle PA-C      glycerin-hypromellose-  1 drop Both Eyes Q6H PRN Mayra Dominguez PA-C      magnesium Oxide  400 mg Oral Daily Wandy Tuttle PA-C      metoprolol tartrate  25 mg Oral Q12H [address] Wandy Tuttle PA-C      pantoprazole  40 mg Oral Early Morning Wandy Tuttle PA-C      vancomycin  1,000 mg Intravenous Q24H Mayra Moots, PA-C          Today, Patient Was Seen By: Corbin León MD S[place]    **Please Note: This note may have been constructed using a voice recognition system. **

## 2023-10-18 NOTE — PROGRESS NOTES
Gerri Preston is a 82 y.o. male who is currently ordered Vancomycin IV with management by the Pharmacy Consult service. Relevant clinical data and objective / subjective history reviewed. Vancomycin Assessment:  Indication and Goal AUC/Trough: Soft tissue (goal -600, trough >10)  Clinical Status:  New  Micro:   Pending  Renal Function:  SCr: 1.5 mg/dL  CrCl: 34.2 mL/min  Renal replacement: Not on dialysis  Days of Therapy: 1  Current Dose: 1750mg once loading dose  Vancomycin Plan:  New Dosinmg every 24 hours  Estimated AUC: 519 mcg*hr/mL  Estimated Trough: 16.9 mcg/mL  Next Level: 10/20/23 at 0600  Renal Function Monitoring: Daily BMP and [place] will continue to follow closely for s/sx of nephrotoxicity, infusion reactions and appropriateness of therapy. BMP and CBC will be ordered per protocol. We will continue to follow the patient’s culture results and clinical progress daily. Also, cefepime will be adjusted renally if necessary.     Ariela Salomon, Pharmacist, PharmD, BCPS

## 2023-10-18 NOTE — ASSESSMENT & PLAN NOTE
Lab Results   Component Value Date    EGFR 45 10/18/2023    EGFR 41 10/17/2023    EGFR 32 09/15/2023    CREATININE 1.38 (H) 10/18/2023    CREATININE 1.50 (H) 10/17/2023    CREATININE 1.82 (H) 09/15/2023   Creatinine 1.5 on admission  Baseline appears to be about 1.6-1.9  Continue to monitor

## 2023-10-18 NOTE — ASSESSMENT & PLAN NOTE
Chronic sacral wound. Does not appear acutely infected but appears larger in size and depth from recent admit   Turn patient every 2 hours  Wound care consult  [address] had started patient on bactrim (10/17-10/22) bid for sacral wound.  Hold due to receiving IV cefepime + IV vancomycin   Consult wound care, consult to general surgery

## 2023-10-18 NOTE — ASSESSMENT & PLAN NOTE
Presents from [facility] due to low grade fevers and weakness. Dry cough for about 2 weeks. Afebrile thus far. On RA  SIRS: tachycardia, WBC (13.12, possibly downtrending from recent admission)  Lactic WNL   Procal mildly elevated 0.39  Takes metoprolol bid, had not yet taken nighttime dose - possible cause for tachycardia. SOI  CXR without obvious infiltrate- pending official read   Order pneumonia labs  COVID/flu/RSV negative  UA and blood cultures pending  Pt with sacral wound. Based off images from prior admission wound has increased in size and depth. Possible source (however no redness or drainage noted)  Obtain ESR   ED started IV ceftriaxone for possible pneumonia infection. Low suspicion for pneumonia. Broaden antibiotics with IV cefepime + IV vancomycin for possible sacral wound infection  Blood cultures pending   Consider ID consult if BC negative and worsening clinical status.

## 2023-10-18 NOTE — ASSESSMENT & PLAN NOTE
Chronic sacral wound. Does not appear acutely infected but appears larger in size and depth from recent admit   Turn patient every 2 hours  Wound care consult  [address] had started patient on bactrim (10/17-10/22) bid for sacral wound.  Hold due to receiving IV cefepime + IV vancomycin

## 2023-10-19 LAB
ANION GAP SERPL CALCULATED.3IONS-SCNC: 8 MMOL/L
BASOPHILS # BLD AUTO: 0.08 THOUSANDS/ÂΜL (ref 0–0.1)
BASOPHILS NFR BLD AUTO: 1 % (ref 0–1)
BUN SERPL-MCNC: 33 MG/DL (ref 5–25)
C DIFF TOX A+B STL QL IA: POSITIVE
C DIFF TOX B TCDB STL QL NAA+PROBE: POSITIVE
CALCIUM SERPL-MCNC: 9.1 MG/DL (ref 8.4–10.2)
CHLORIDE SERPL-SCNC: 104 MMOL/L (ref 96–108)
CO2 SERPL-SCNC: 24 MMOL/L (ref 21–32)
CREAT SERPL-MCNC: 1.51 MG/DL (ref 0.6–1.3)
EOSINOPHIL # BLD AUTO: 0.43 THOUSAND/ÂΜL (ref 0–0.61)
EOSINOPHIL NFR BLD AUTO: 4 % (ref 0–6)
ERYTHROCYTE [DISTWIDTH] IN BLOOD BY AUTOMATED COUNT: 14.4 % (ref 11.6–15.1)
GFR SERPL CREATININE-BSD FRML MDRD: 41 ML/MIN/1.73SQ M
GLUCOSE SERPL-MCNC: 117 MG/DL (ref 65–140)
HCT VFR BLD AUTO: 23.9 % (ref 36.5–49.3)
HGB BLD-MCNC: 7.6 G/DL (ref 12–17)
IMM GRANULOCYTES # BLD AUTO: 0.06 THOUSAND/UL (ref 0–0.2)
IMM GRANULOCYTES NFR BLD AUTO: 1 % (ref 0–2)
LYMPHOCYTES # BLD AUTO: 3.91 THOUSANDS/ÂΜL (ref 0.6–4.47)
LYMPHOCYTES NFR BLD AUTO: 35 % (ref 14–44)
MAGNESIUM SERPL-MCNC: 2.1 MG/DL (ref 1.9–2.7)
MCH RBC QN AUTO: 29.9 PG (ref 26.8–34.3)
MCHC RBC AUTO-ENTMCNC: 31.8 G/DL (ref 31.4–37.4)
MCV RBC AUTO: 94 FL (ref 82–98)
MONOCYTES # BLD AUTO: 1.08 THOUSAND/ÂΜL (ref 0.17–1.22)
MONOCYTES NFR BLD AUTO: 10 % (ref 4–12)
MRSA NOSE QL CULT: NORMAL
NEUTROPHILS # BLD AUTO: 5.68 THOUSANDS/ÂΜL (ref 1.85–7.62)
NEUTS SEG NFR BLD AUTO: 49 % (ref 43–75)
NRBC BLD AUTO-RTO: 0 /100 WBCS
PHOSPHATE SERPL-MCNC: 3.1 MG/DL (ref 2.3–4.1)
PLATELET # BLD AUTO: 308 THOUSANDS/UL (ref 149–390)
PMV BLD AUTO: 9.6 FL (ref 8.9–12.7)
POTASSIUM SERPL-SCNC: 3.7 MMOL/L (ref 3.5–5.3)
RBC # BLD AUTO: 2.54 MILLION/UL (ref 3.88–5.62)
SODIUM SERPL-SCNC: 136 MMOL/L (ref 135–147)
WBC # BLD AUTO: 11.24 THOUSAND/UL (ref 4.31–10.16)

## 2023-10-19 PROCEDURE — 84100 ASSAY OF PHOSPHORUS: CPT | Performed by: INTERNAL MEDICINE

## 2023-10-19 PROCEDURE — 99231 SBSQ HOSP IP/OBS SF/LOW 25: CPT | Performed by: PHYSICIAN ASSISTANT

## 2023-10-19 PROCEDURE — 83735 ASSAY OF MAGNESIUM: CPT | Performed by: INTERNAL MEDICINE

## 2023-10-19 PROCEDURE — 99232 SBSQ HOSP IP/OBS MODERATE 35: CPT | Performed by: INTERNAL MEDICINE

## 2023-10-19 PROCEDURE — 80048 BASIC METABOLIC PNL TOTAL CA: CPT | Performed by: INTERNAL MEDICINE

## 2023-10-19 PROCEDURE — 85025 COMPLETE CBC W/AUTO DIFF WBC: CPT | Performed by: INTERNAL MEDICINE

## 2023-10-19 RX ADMIN — FOLIC ACID 1 MG: 1 TABLET ORAL at 08:56

## 2023-10-19 RX ADMIN — ASPIRIN 81 MG: 81 TABLET, COATED ORAL at 08:56

## 2023-10-19 RX ADMIN — PANTOPRAZOLE SODIUM 40 MG: 40 TABLET, DELAYED RELEASE ORAL at 06:29

## 2023-10-19 RX ADMIN — METOPROLOL TARTRATE 25 MG: 25 TABLET, FILM COATED ORAL at 22:09

## 2023-10-19 RX ADMIN — ACETAMINOPHEN 650 MG: 325 TABLET, FILM COATED ORAL at 03:30

## 2023-10-19 RX ADMIN — APIXABAN 2.5 MG: 2.5 TABLET, FILM COATED ORAL at 08:56

## 2023-10-19 RX ADMIN — METOPROLOL TARTRATE 25 MG: 25 TABLET, FILM COATED ORAL at 08:56

## 2023-10-19 RX ADMIN — MAGNESIUM OXIDE TAB 400 MG (241.3 MG ELEMENTAL MG) 400 MG: 400 (241.3 MG) TAB at 08:56

## 2023-10-19 RX ADMIN — ACETAMINOPHEN 650 MG: 325 TABLET, FILM COATED ORAL at 23:50

## 2023-10-19 RX ADMIN — CEFEPIME HYDROCHLORIDE 2000 MG: 2 INJECTION, SOLUTION INTRAVENOUS at 21:59

## 2023-10-19 RX ADMIN — CEFEPIME HYDROCHLORIDE 2000 MG: 2 INJECTION, SOLUTION INTRAVENOUS at 08:56

## 2023-10-19 RX ADMIN — GLYCERIN, HYPROMELLOSE, POLYETHYLENE GLYCOL 1 DROP: .2; .2; 1 LIQUID OPHTHALMIC at 23:50

## 2023-10-19 RX ADMIN — VANCOMYCIN HYDROCHLORIDE 1000 MG: 1 INJECTION, SOLUTION INTRAVENOUS at 18:04

## 2023-10-19 RX ADMIN — DILTIAZEM HYDROCHLORIDE 240 MG: 240 CAPSULE, COATED, EXTENDED RELEASE ORAL at 08:56

## 2023-10-19 RX ADMIN — COLLAGENASE SANTYL 1 APPLICATION: 250 OINTMENT TOPICAL at 08:58

## 2023-10-19 RX ADMIN — APIXABAN 2.5 MG: 2.5 TABLET, FILM COATED ORAL at 18:04

## 2023-10-19 NOTE — PROGRESS NOTES
[facility]  Progress Note  Name: Naomie Cho  MRN: [id number]  Unit/Bed#: -01 I Date of Admission: 10/17/2023   Date of Service: 10/19/2023 I Hospital Day: 2    Assessment/Plan   * SIRS (systemic inflammatory response syndrome) (HCC)  Assessment & Plan  Presents from [facility] due to low grade fevers and weakness. Dry cough for about 2 weeks. Afebrile thus far. On RA  SIRS: tachycardia, WBC (13.12, possibly downtrending from recent admission)  Lactic WNL   Procal mildly elevated 0.39  Takes metoprolol bid, had not yet taken nighttime dose - possible cause for tachycardia. SOI  CXR without obvious infiltrate- pending official read   Order pneumonia labs  COVID/flu/RSV negative  UA and blood cultures pending  Pt with sacral wound. Based off images from prior admission wound has increased in size and depth. Possible source (however no redness or drainage noted)  ED started IV ceftriaxone for possible pneumonia infection. Low suspicion for pneumonia. Broaden antibiotics with IV cefepime + IV vancomycin for possible sacral wound infection  Patient remains afebrile. Complete 5 days of antibiotics    Atrial fibrillation [facility])  Assessment & Plan  Home regimen: Diltiazem 240 mg daily, metoprolol tartrate 25 mg twice daily, Eliquis    CKD (chronic kidney disease) stage 3, GFR 30-59 ml/min [facility])  Assessment & Plan  Lab Results   Component Value Date    EGFR 41 10/19/2023    EGFR 45 10/18/2023    EGFR 41 10/17/2023    CREATININE 1.51 (H) 10/19/2023    CREATININE 1.38 (H) 10/18/2023    CREATININE 1.50 (H) 10/17/2023   Creatinine 1.5 on admission  Baseline appears to be about 1.6-1.9  Continue to monitor    Sacral wound  Assessment & Plan  Chronic sacral wound.   Does not appear acutely infected but appears larger in size and depth from recent admit   Turn patient every 2 hours  Wound care consult  [address] had started patient on bactrim (10/17-10/22) bid for sacral wound. Hold due to receiving IV cefepime + IV vancomycin   Gen surgery following    History of alcohol abuse  Assessment & Plan  History of alcohol abuse with withdrawal seizures  Patient has not drank since [facility] hospitalization in August 2023    Central retinal vein occlusion of right eye  Assessment & Plan  Diagnosed with CRVO at [facility]. 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 Patient Centered Rounds: I performed bedside rounds with nursing staff today. Discussions with Specialists or Other Care Team Provider:     Education and Discussions with Family / Patient: Patient declined call to . Total Time Spent on Date of Encounter in care of patient:  mins. This time was spent on one or more of the following: performing physical exam; counseling and coordination of care; obtaining or reviewing history; documenting in the medical record; reviewing/ordering tests, medications or procedures; communicating with other healthcare professionals and discussing with patient's family/caregivers. Current Length of Stay: 2 day(s)  Current Patient Status: Inpatient   Certification Statement: The patient will continue to require additional inpatient hospital stay due to IV abx  Discharge Plan: Anticipate discharge in 24-48 hrs to rehab facility. Code Status: Level 3 - DNAR and DNI    Subjective:    Feels tired, no fever/chills.      Objective:     Vitals:   Temp (24hrs), Av.7 °F (36.5 °C), Min:97.5 °F (36.4 °C), Max:97.8 °F (36.6 °C)    Temp:  [97.5 °F (36.4 °C)-97.8 °F (36.6 °C)] 97.6 °F (36.4 °C)  HR:  [] 90  Resp:  [14-18] 16  BP: (125-175)/(67-87) 135/67  SpO2:  [98 %-99 %] 98 %  Body mass index is 27.37 kg/m². Input and Output Summary (last 24 hours): Intake/Output Summary (Last 24 hours) at 10/19/2023 1525  Last data filed at 10/19/2023 1410  Gross per 24 hour   Intake 210 ml   Output 400 ml   Net -190 ml       Physical Exam:   Physical Exam     Gen.-Patient comfortable   Neck- Supple. No thyromegaly or lymphadenopathy  Lungs-Clear bilaterally without any wheeze or rales   Heart S1-S2, regular rate and rhythm, no murmurs  Abdomen-soft nontender, no organomegaly. Bowel sounds present   Sacral wound images reviewed  Extremities-no cyanosi,  clubbing   Edema both legs  Skin- no rash  Neuro-nonocal    Additional Data:     Labs:  Results from last 7 days   Lab Units 10/19/23  0219   WBC Thousand/uL 11.24*   HEMOGLOBIN g/dL 7.6*   HEMATOCRIT % 23.9*   PLATELETS Thousands/uL 308   NEUTROS PCT % 49   LYMPHS PCT % 35   MONOS PCT % 10   EOS PCT % 4     Results from last 7 days   Lab Units 10/19/23  0219 10/18/23  0534 10/17/23  2020   SODIUM mmol/L 136   < > 136   POTASSIUM mmol/L 3.7   < > 4.1   CHLORIDE mmol/L 104   < > 102   CO2 mmol/L 24   < > 27   BUN mg/dL 33*   < > 40*   CREATININE mg/dL 1.51*   < > 1.50*   ANION GAP mmol/L 8   < > 7   CALCIUM mg/dL 9.1   < > 9.3   ALBUMIN g/dL  --   --  2.9*   TOTAL BILIRUBIN mg/dL  --   --  0.40   ALK PHOS U/L  --   --  68   ALT U/L  --   --  7   AST U/L  --   --  11*   GLUCOSE RANDOM mg/dL 117   < > 129    < > = values in this interval not displayed.      Results from last 7 days   Lab Units 10/17/23  2020   INR  1.57*             Results from last 7 days   Lab Units 10/18/23  0534 10/17/23  2020   LACTIC ACID mmol/L  --  1.3   PROCALCITONIN ng/ml 0.38* 0.39*       Lines/Drains:  Invasive Devices       Peripheral Intravenous Line  Duration             Peripheral IV 10/18/23 Dorsal (posterior); Right Forearm 1 day    Peripheral IV 10/19/23 Distal;Left;Ventral (anterior) Forearm <1 day                          Imaging:     Recent Cultures (last 7 days):   Results from last 7 days   Lab Units 10/18/23  1500 10/18/23  0221 10/17/23  2021 10/17/23  2020   BLOOD CULTURE   --   --  No Growth at 24 hrs. No Growth at 24 hrs. LEGIONELLA URINARY ANTIGEN   --  Negative  --   --    C DIFF TOXIN B BY PCR  Positive*  --   --   --        Last 24 Hours Medication List:   Current Facility-Administered Medications   Medication Dose Route Frequency Provider Last Rate    acetaminophen  650 mg Oral Q6H PRN Gagandeep Whitaker MD      apixaban  2.5 mg Oral BID Gagandeep Whitaker MD      aspirin  81 mg Oral Daily Gagandeep Whitaker MD      benzonatate  100 mg Oral TID PRN Gagandeep Whitaker MD      cefepime  2,000 mg Intravenous Q12H Gagandeep Whitaker MD 2,000 mg (10/19/23 0856)    collagenase   Topical Daily Gagandeep Whitaker MD      diltiazem  240 mg Oral Daily Gagandeep Whitaker MD      folic acid  1 mg Oral Daily Sadeigail Rizo MD      glycerin-hypromellose-  1 drop Both Eyes Q6H PRN Gagandeep Whitaker MD      magnesium Oxide  400 mg Oral Daily Gagandeep Whitaker MD      metoprolol tartrate  25 mg Oral Q12H [address] Gagandeep Whitaker MD      pantoprazole  40 mg Oral Early Morning Gagandeep Whitaker MD      vancomycin  1,000 mg Intravenous Q24H Gagandeep Whitaker MD Stopped (10/18/23 1507)        Today, Patient Was Seen By: Linda Hood MD    **Please Note: This note may have been constructed using a voice recognition system. **

## 2023-10-19 NOTE — PROGRESS NOTES
Progress Note - Stef Porras 82 y.o. male MRN: [id number]    Unit/Bed#: -01 Encounter: [id number]      Assessment/Plan:  Sacral wound  -wound is clean and granulating well. Seen with wound care today.   -anti pressure measures  -no surgical debridement necessary at this time.   -continue local wound care and santyl. Subjective: They took good care of me at the home. Objective:     Vitals: Blood pressure 125/70, pulse 95, temperature 97.5 °F (36.4 °C), resp. rate 18, height 5' 8" (1.727 m), weight 81.6 kg (180 lb), SpO2 98 %. ,Body mass index is 27.37 kg/m². Intake/Output Summary (Last 24 hours) at 10/19/2023 1025  Last data filed at 10/19/2023 0201  Gross per 24 hour   Intake 730 ml   Output 500 ml   Net 230 ml       Physical Exam: General appearance: alert and oriented, in no acute distress    Sacral wound is clean and granulating well. Photos taken by wound care, size: 8x3. 5x2.8cm. Invasive Devices       Peripheral Intravenous Line  Duration             Peripheral IV 10/18/23 Dorsal (posterior); Right Forearm <1 day                    Lab, Imaging and other studies: I have personally reviewed pertinent reports.     VTE Pharmacologic Prophylaxis: Sequential compression device (Venodyne)   VTE Mechanical Prophylaxis: sequential compression device

## 2023-10-19 NOTE — ASSESSMENT & PLAN NOTE
Lab Results   Component Value Date    EGFR 41 10/19/2023    EGFR 45 10/18/2023    EGFR 41 10/17/2023    CREATININE 1.51 (H) 10/19/2023    CREATININE 1.38 (H) 10/18/2023    CREATININE 1.50 (H) 10/17/2023   Creatinine 1.5 on admission  Baseline appears to be about 1.6-1.9  Continue to monitor

## 2023-10-19 NOTE — DISCHARGE INSTR - OTHER ORDERS
Wound Care Plan:   1-Apply Allevyn Life foam dressing to bilateral heels and elbows for prevention. Change dressing every 3 days and PRN. 2-Elevate heels off of bed/chair surface to offload pressure. 3-Offloading air cushion in chair if/when out of bed. 4-Moisturize skin daily with skin nourishing lotion. 5-Turn/reposition every 2 hours while in bed using positioning wedges; and weight shift frequently while in chair for pressure re-distribution on skin. 6-P500 low air-loss mattress. 7-Sacrum--cleanse with normal saline, pat dry. Apply skin prep to morro-wound skin. Place Santyl in base of wound in nickel thick layer. Loosely pack wound with 1 inch saline moistened jalen. Cover with Allevyn Life foam drssing. Change dressing daily and as needed with soilage. Follow-up at the [address] [facility].

## 2023-10-19 NOTE — ASSESSMENT & PLAN NOTE
Diagnosed with CRVO at [facility].   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

## 2023-10-19 NOTE — PLAN OF CARE
Problem: MOBILITY - ADULT  Goal: Maintain or return to baseline ADL function  Description: INTERVENTIONS:  -  Assess patient's ability to carry out ADLs; assess patient's baseline for ADL function and identify physical deficits which impact ability to perform ADLs (bathing, care of mouth/teeth, toileting, grooming, dressing, etc.)  - Assess/evaluate cause of self-care deficits   - Assess range of motion  - Assess patient's mobility; develop plan if impaired  - Assess patient's need for assistive devices and provide as appropriate  - Encourage maximum independence but intervene and supervise when necessary  - Involve family in performance of ADLs  - Assess for home care needs following discharge   - Consider OT consult to assist with ADL evaluation and planning for discharge  - Provide patient education as appropriate  Outcome: Progressing  Goal: Maintains/Returns to pre admission functional level  Description: INTERVENTIONS:  - Perform BMAT or MOVE assessment daily.   - Set and communicate daily mobility goal to care team and patient/family/caregiver. - Collaborate with rehabilitation services on mobility goals if consulted  - Perform Range of Motion 2 times a day. - Reposition patient every 2 hours.   - Dangle patient 3 times a day  - Stand patient 3 times a day  - Ambulate patient 3 times a day  - Out of bed to chair 3 times a day   - Out of bed for meals 3 times a day  - Out of bed for toileting  - Record patient progress and toleration of activity level   Outcome: Progressing     Problem: Prexisting or High Potential for Compromised Skin Integrity  Goal: Skin integrity is maintained or improved  Description: INTERVENTIONS:  - Identify patients at risk for skin breakdown  - Assess and monitor skin integrity  - Assess and monitor nutrition and hydration status  - Monitor labs   - Assess for incontinence   - Turn and reposition patient  - Assist with mobility/ambulation  - Relieve pressure over bony prominences  - Avoid friction and shearing  - Provide appropriate hygiene as needed including keeping skin clean and dry  - Evaluate need for skin moisturizer/barrier cream  - Collaborate with interdisciplinary team   - Patient/family teaching  - Consider wound care consult   Outcome: Progressing     Problem: Potential for Falls  Goal: Patient will remain free of falls  Description: INTERVENTIONS:  - Educate patient/family on patient safety including physical limitations  - Instruct patient to call for assistance with activity   - Consult OT/PT to assist with strengthening/mobility   - Keep Call bell within reach  - Keep bed low and locked with side rails adjusted as appropriate  - Keep care items and personal belongings within reach  - Initiate and maintain comfort rounds  - Make Fall Risk Sign visible to staff  - Offer Toileting every 2 Hours, in advance of need  - Initiate/Maintain bed alarm  - Obtain necessary fall risk management equipment:   - Apply yellow socks and bracelet for high fall risk patients  - Consider moving patient to room near nurses station  Outcome: Progressing

## 2023-10-19 NOTE — PLAN OF CARE
Problem: MOBILITY - ADULT  Goal: Maintain or return to baseline ADL function  Description: INTERVENTIONS:  -  Assess patient's ability to carry out ADLs; assess patient's baseline for ADL function and identify physical deficits which impact ability to perform ADLs (bathing, care of mouth/teeth, toileting, grooming, dressing, etc.)  - Assess/evaluate cause of self-care deficits   - Assess range of motion  - Assess patient's mobility; develop plan if impaired  - Assess patient's need for assistive devices and provide as appropriate  - Encourage maximum independence but intervene and supervise when necessary  - Involve family in performance of ADLs  - Assess for home care needs following discharge   - Consider OT consult to assist with ADL evaluation and planning for discharge  - Provide patient education as appropriate  Outcome: Progressing  Goal: Maintains/Returns to pre admission functional level  Description: INTERVENTIONS:  - Perform BMAT or MOVE assessment daily.   - Set and communicate daily mobility goal to care team and patient/family/caregiver. - Collaborate with rehabilitation services on mobility goals if consulted  - Perform Range of Motion 4 times a day. - Reposition patient every 2 hours.   - Dangle patient 4 times a day  - Stand patient  times a day  - Ambulate patient  times a day  - Out of bed to chair 4 times a day   - Out of bed for meals optional times a day  - Out of bed for toileting  - Record patient progress and toleration of activity level   Outcome: Progressing     Problem: Prexisting or High Potential for Compromised Skin Integrity  Goal: Skin integrity is maintained or improved  Description: INTERVENTIONS:  - Identify patients at risk for skin breakdown  - Assess and monitor skin integrity  - Assess and monitor nutrition and hydration status  - Monitor labs   - Assess for incontinence   - Turn and reposition patient  - Assist with mobility/ambulation  - Relieve pressure over bony prominences  - Avoid friction and shearing  - Provide appropriate hygiene as needed including keeping skin clean and dry  - Evaluate need for skin moisturizer/barrier cream  - Collaborate with interdisciplinary team   - Patient/family teaching  - Consider wound care consult   Outcome: Progressing

## 2023-10-19 NOTE — PROGRESS NOTES
Kellen Em is a 82 y.o. male who is currently ordered Vancomycin IV with management by the Pharmacy Consult service. Relevant clinical data and objective / subjective history reviewed. Vancomycin Assessment:  Indication and Goal AUC/Trough: Soft tissue (goal -600, trough >10)  Clinical Status: stable  Micro:     Renal Function:  SCr: 1.51 mg/dL  CrCl: 34 mL/min  Renal replacement: Not on dialysis  Days of Therapy: 2  Current Dose: 1000mg every 24 hours  Vancomycin Plan:  New Dosing: No change; continue 1000mg every 24 hours  Estimated AUC: 530 mcg*hr/mL  Estimated Trough: 17.3 mcg/mL  Next Level: Random level 0600 10/20/23  Renal Function Monitoring: Daily BMP and [place] will continue to follow closely for s/sx of nephrotoxicity, infusion reactions and appropriateness of therapy. BMP and CBC will be ordered per protocol. We will continue to follow the patient’s culture results and clinical progress daily. Patient is also receiving cefepime, which will be monitored for potential renal dosing adjustments.     Joyce Dill, Pharmacist

## 2023-10-19 NOTE — ASSESSMENT & PLAN NOTE
History of alcohol abuse with withdrawal seizures  Patient has not drank since [facility] hospitalization in August 2023

## 2023-10-19 NOTE — ASSESSMENT & PLAN NOTE
Presents from [facility] due to low grade fevers and weakness. Dry cough for about 2 weeks. Afebrile thus far. On RA  SIRS: tachycardia, WBC (13.12, possibly downtrending from recent admission)  Lactic WNL   Procal mildly elevated 0.39  Takes metoprolol bid, had not yet taken nighttime dose - possible cause for tachycardia. SOI  CXR without obvious infiltrate- pending official read   Order pneumonia labs  COVID/flu/RSV negative  UA and blood cultures pending  Pt with sacral wound. Based off images from prior admission wound has increased in size and depth. Possible source (however no redness or drainage noted)  ED started IV ceftriaxone for possible pneumonia infection. Low suspicion for pneumonia. Broaden antibiotics with IV cefepime + IV vancomycin for possible sacral wound infection  Patient remains afebrile.    Complete 5 days of antibiotics

## 2023-10-19 NOTE — ASSESSMENT & PLAN NOTE
Chronic sacral wound. Does not appear acutely infected but appears larger in size and depth from recent admit   Turn patient every 2 hours  Wound care consult  [address] had started patient on bactrim (10/17-10/22) bid for sacral wound.  Hold due to receiving IV cefepime + IV vancomycin   Gen surgery following

## 2023-10-19 NOTE — WOUND OSTOMY CARE
Consult Note - Wound   Esteban Day 82 y.o. male MRN: [id number]  Unit/Bed#: -01 Encounter: [id number]      History and Present Illness:  82year old male presented to the hospital with worsening sacral wound, low grade fever and weakness from [facility]. Patient's history significant for ETOH abuse, CVA, DM, CKD, COPD, anemia. Assessment Findings:   Patient agreeable to assessment. He requires assistance with turning in bed, positioning wedges in use. Occasionally incontinent of bowel and bladder. Nutrition consultation placed. Right heel intact with preventative foam dressing. Left heel--patient with recent history of deep tissue pressure injury to this area. Now appears fully epithelialized, erythematous, entirely blanchable. No induration. RESOLVED. Present on admission sacral wound--wound originally deep tissue pressure injury which has fully evolved to stage 3 pressure injury. There is beefy red/pink friable tissue and some yellow slough. Undermining from 8-4 o'clock. Berenice-wound with macerated light purple hyperpigmentation, blanchable. No induration. There is small serosanguinous drainage. Wound does not appear acutely infected. Wound assessed along with surgery AP. Agree with surgery recommendations for Santyl and saline moistened packing with foam dressing daily. See flowsheet for wound details. Wound Care Plan:   1-Apply Allevyn Life foam dressing to bilateral heels and elbows for prevention. [place]ucho with P.  Peel back at least daily for skin assessment and re-apply. Change dressing every 3 days and PRN. 2-Elevate heels off of bed/chair surface to offload pressure. 3-Offloading air cushion in chair if/when out of bed. 4-Moisturize skin daily with skin nourishing lotion. 5-Turn/reposition every 2 hours while in bed using positioning wedges; and weight shift frequently while in chair for pressure re-distribution on skin.    6-P500 low air-loss mattress. 7-Sacrum--cleanse with normal saline, pat dry. Apply skin prep to morro-wound skin. Place Santyl in base of wound in nickel thick layer. Loosely pack wound with 1 inch saline moistened jalen. Cover with Allevyn Life foam drssing. Change dressing daily and as needed with soilage. Wound care team to follow. Plan of care reviewed with primary RN. Patient should follow-up at the wound center on discharge. Wound 09/05/23 Heel Left (Active)   Wound Image   10/19/23 1029   Wound Description Intact;Dry;Clean;Pink;Epithelialization 10/19/23 1029   Morro-wound Assessment Intact 10/19/23 1029   Wound Length (cm) 0 cm 10/19/23 1029   Wound Width (cm) 0 cm 10/19/23 1029   Wound Depth (cm) 0 cm 10/19/23 1029   Wound Surface Area (cm^2) 0 cm^2 10/19/23 1029   Wound Volume (cm^3) 0 cm^3 10/19/23 1029   Calculated Wound Volume (cm^3) 0 cm^3 10/19/23 1029   Drainage Amount None 10/19/23 1029   Non-staged Wound Description Not applicable 49/60/50 4386   Treatments Elevated 10/19/23 1029   Dressing Foam, Silicon (eg. Allevyn, etc) 10/19/23 1029   Patient Tolerance Tolerated well 10/19/23 1029   Dressing Status Clean;Dry; Intact 10/19/23 1029       Wound 09/05/23 Pressure Injury Sacrum (Active)   Wound Image   10/19/23 1013   Wound Description Beefy red;Yellow;Pink 10/19/23 1013   Morro-wound Assessment Maceration; Hyperpigmented 10/19/23 1013   Wound Length (cm) 8 cm 10/19/23 1013   Wound Width (cm) 3.5 cm 10/19/23 1013   Wound Depth (cm) 2.8 cm 10/19/23 1013   Wound Surface Area (cm^2) 28 cm^2 10/19/23 1013   Wound Volume (cm^3) 78.4 cm^3 10/19/23 1013   Calculated Wound Volume (cm^3) 78.4 cm^3 10/19/23 1013   Change in Wound Size % -1106.15 10/19/23 1013   Tunneling 0 cm 10/19/23 1013   Tunneling in depth located at 0 10/19/23 1013   Undermining 1 10/19/23 1013   Undermining is depth extending from 8-4 o'clock 10/19/23 1013   Drainage Amount Small 10/19/23 1013   Drainage Description Serosanguineous 10/19/23 1013 Non-staged Wound Description Full thickness 10/19/23 1013   Treatments Irrigation with NSS 10/19/23 1013   Dressing Enzymatic debrider;Packings; Foam, Silicon (eg. Allevyn, etc) 10/19/23 1013   Wound packed? Yes 10/19/23 1013   Packing- # removed 1 10/19/23 1013   Packing- # inserted 1 10/19/23 1013   Dressing Changed Changed 10/19/23 1013   Patient Tolerance Tolerated well 10/19/23 1013   Dressing Status Clean;Dry; Intact 10/19/23 Farzad Wilson BSN, RN, [facility]

## 2023-10-20 PROBLEM — M25.531 ACUTE PAIN OF BOTH WRISTS: Status: ACTIVE | Noted: 2023-10-20

## 2023-10-20 PROBLEM — M25.532 ACUTE PAIN OF BOTH WRISTS: Status: ACTIVE | Noted: 2023-10-20

## 2023-10-20 LAB
ANION GAP SERPL CALCULATED.3IONS-SCNC: 6 MMOL/L
BASOPHILS # BLD AUTO: 0.06 THOUSANDS/ÂΜL (ref 0–0.1)
BASOPHILS NFR BLD AUTO: 1 % (ref 0–1)
BUN SERPL-MCNC: 30 MG/DL (ref 5–25)
CALCIUM SERPL-MCNC: 8.9 MG/DL (ref 8.4–10.2)
CHLORIDE SERPL-SCNC: 104 MMOL/L (ref 96–108)
CO2 SERPL-SCNC: 25 MMOL/L (ref 21–32)
CREAT SERPL-MCNC: 1.51 MG/DL (ref 0.6–1.3)
EOSINOPHIL # BLD AUTO: 0.63 THOUSAND/ÂΜL (ref 0–0.61)
EOSINOPHIL NFR BLD AUTO: 6 % (ref 0–6)
ERYTHROCYTE [DISTWIDTH] IN BLOOD BY AUTOMATED COUNT: 14.6 % (ref 11.6–15.1)
GFR SERPL CREATININE-BSD FRML MDRD: 41 ML/MIN/1.73SQ M
GLUCOSE SERPL-MCNC: 97 MG/DL (ref 65–140)
HCT VFR BLD AUTO: 22.1 % (ref 36.5–49.3)
HGB BLD-MCNC: 7 G/DL (ref 12–17)
IMM GRANULOCYTES # BLD AUTO: 0.05 THOUSAND/UL (ref 0–0.2)
IMM GRANULOCYTES NFR BLD AUTO: 1 % (ref 0–2)
LYMPHOCYTES # BLD AUTO: 3.32 THOUSANDS/ÂΜL (ref 0.6–4.47)
LYMPHOCYTES NFR BLD AUTO: 32 % (ref 14–44)
MCH RBC QN AUTO: 30.2 PG (ref 26.8–34.3)
MCHC RBC AUTO-ENTMCNC: 31.7 G/DL (ref 31.4–37.4)
MCV RBC AUTO: 95 FL (ref 82–98)
MONOCYTES # BLD AUTO: 1.2 THOUSAND/ÂΜL (ref 0.17–1.22)
MONOCYTES NFR BLD AUTO: 12 % (ref 4–12)
NEUTROPHILS # BLD AUTO: 5.08 THOUSANDS/ÂΜL (ref 1.85–7.62)
NEUTS SEG NFR BLD AUTO: 48 % (ref 43–75)
NRBC BLD AUTO-RTO: 0 /100 WBCS
PLATELET # BLD AUTO: 261 THOUSANDS/UL (ref 149–390)
PMV BLD AUTO: 9.3 FL (ref 8.9–12.7)
POTASSIUM SERPL-SCNC: 4 MMOL/L (ref 3.5–5.3)
RBC # BLD AUTO: 2.32 MILLION/UL (ref 3.88–5.62)
SODIUM SERPL-SCNC: 135 MMOL/L (ref 135–147)
VANCOMYCIN SERPL-MCNC: 22.3 UG/ML (ref 10–20)
WBC # BLD AUTO: 10.34 THOUSAND/UL (ref 4.31–10.16)

## 2023-10-20 PROCEDURE — 99232 SBSQ HOSP IP/OBS MODERATE 35: CPT | Performed by: INTERNAL MEDICINE

## 2023-10-20 PROCEDURE — 97163 PT EVAL HIGH COMPLEX 45 MIN: CPT

## 2023-10-20 PROCEDURE — 80048 BASIC METABOLIC PNL TOTAL CA: CPT | Performed by: INTERNAL MEDICINE

## 2023-10-20 PROCEDURE — 80202 ASSAY OF VANCOMYCIN: CPT | Performed by: INTERNAL MEDICINE

## 2023-10-20 PROCEDURE — 85025 COMPLETE CBC W/AUTO DIFF WBC: CPT | Performed by: INTERNAL MEDICINE

## 2023-10-20 PROCEDURE — 97167 OT EVAL HIGH COMPLEX 60 MIN: CPT

## 2023-10-20 PROCEDURE — 97530 THERAPEUTIC ACTIVITIES: CPT

## 2023-10-20 RX ORDER — VANCOMYCIN HYDROCHLORIDE 125 MG/1
125 CAPSULE ORAL EVERY 12 HOURS SCHEDULED
Status: DISCONTINUED | OUTPATIENT
Start: 2023-10-20 | End: 2023-10-21 | Stop reason: HOSPADM

## 2023-10-20 RX ORDER — ALLOPURINOL 100 MG/1
50 TABLET ORAL DAILY
Status: DISCONTINUED | OUTPATIENT
Start: 2023-10-20 | End: 2023-10-21 | Stop reason: HOSPADM

## 2023-10-20 RX ORDER — PREDNISONE 20 MG/1
20 TABLET ORAL 2 TIMES DAILY WITH MEALS
Status: DISCONTINUED | OUTPATIENT
Start: 2023-10-20 | End: 2023-10-21 | Stop reason: HOSPADM

## 2023-10-20 RX ADMIN — METOPROLOL TARTRATE 25 MG: 25 TABLET, FILM COATED ORAL at 08:46

## 2023-10-20 RX ADMIN — ALLOPURINOL 50 MG: 100 TABLET ORAL at 14:45

## 2023-10-20 RX ADMIN — ACETAMINOPHEN 650 MG: 325 TABLET, FILM COATED ORAL at 22:25

## 2023-10-20 RX ADMIN — DILTIAZEM HYDROCHLORIDE 240 MG: 240 CAPSULE, COATED, EXTENDED RELEASE ORAL at 08:56

## 2023-10-20 RX ADMIN — CEFEPIME HYDROCHLORIDE 2000 MG: 2 INJECTION, SOLUTION INTRAVENOUS at 08:46

## 2023-10-20 RX ADMIN — VANCOMYCIN HYDROCHLORIDE 125 MG: 125 CAPSULE ORAL at 22:25

## 2023-10-20 RX ADMIN — PREDNISONE 20 MG: 20 TABLET ORAL at 18:26

## 2023-10-20 RX ADMIN — VANCOMYCIN HYDROCHLORIDE 750 MG: 750 INJECTION, SOLUTION INTRAVENOUS at 18:26

## 2023-10-20 RX ADMIN — MAGNESIUM OXIDE TAB 400 MG (241.3 MG ELEMENTAL MG) 400 MG: 400 (241.3 MG) TAB at 08:46

## 2023-10-20 RX ADMIN — APIXABAN 2.5 MG: 2.5 TABLET, FILM COATED ORAL at 08:46

## 2023-10-20 RX ADMIN — PANTOPRAZOLE SODIUM 40 MG: 40 TABLET, DELAYED RELEASE ORAL at 05:16

## 2023-10-20 RX ADMIN — FOLIC ACID 1 MG: 1 TABLET ORAL at 08:46

## 2023-10-20 RX ADMIN — VANCOMYCIN HYDROCHLORIDE 125 MG: 125 CAPSULE ORAL at 12:23

## 2023-10-20 RX ADMIN — CEFEPIME HYDROCHLORIDE 2000 MG: 2 INJECTION, SOLUTION INTRAVENOUS at 22:26

## 2023-10-20 RX ADMIN — COLLAGENASE SANTYL: 250 OINTMENT TOPICAL at 09:38

## 2023-10-20 RX ADMIN — ASPIRIN 81 MG: 81 TABLET, COATED ORAL at 08:46

## 2023-10-20 RX ADMIN — APIXABAN 2.5 MG: 2.5 TABLET, FILM COATED ORAL at 18:26

## 2023-10-20 RX ADMIN — PREDNISONE 20 MG: 20 TABLET ORAL at 12:23

## 2023-10-20 RX ADMIN — METOPROLOL TARTRATE 25 MG: 25 TABLET, FILM COATED ORAL at 22:25

## 2023-10-20 NOTE — ASSESSMENT & PLAN NOTE
Presents from [facility] due to low grade fevers and weakness. Dry cough for about 2 weeks. Afebrile thus far. On RA  SIRS: tachycardia, WBC (13.12, possibly downtrending from recent admission)  Lactic WNL   Procal mildly elevated 0.39  Takes metoprolol bid, had not yet taken nighttime dose - possible cause for tachycardia. SOI  CXR without obvious infiltrate- pending official read   Order pneumonia labs  COVID/flu/RSV negative  UA and blood cultures pending  Pt with sacral wound. Based off images from prior admission wound has increased in size and depth. Possible source (however no redness or drainage noted)  ED started IV ceftriaxone for possible pneumonia infection. Low suspicion for pneumonia. Broaden antibiotics with IV cefepime + IV vancomycin for possible sacral wound infection  Patient remains afebrile. Complete 5 days of antibiotics (day 4/5). C-diff positive with no diarrhea possible colonization.  Will start c-diff prophylaxis

## 2023-10-20 NOTE — PLAN OF CARE
Problem: OCCUPATIONAL THERAPY ADULT  Goal: Performs self-care activities at highest level of function for planned discharge setting. See evaluation for individualized goals. Description: Treatment Interventions: ADL retraining, Functional transfer training, Equipment evaluation/education, Patient/family training, Compensatory technique education, Continued evaluation, Endurance training, UE strengthening/ROM          See flowsheet documentation for full assessment, interventions and recommendations. Note: Limitation: Decreased ADL status, Decreased endurance, Decreased self-care trans, Decreased high-level ADLs, Decreased UE strength  Prognosis: Fair  Assessment: Pt is a 82 y.o. male seen for OT evaluation at [facility], admitted 10/17/2023 w/ SIRS (systemic inflammatory response syndrome) ([address] OT completed extensive review of pt's medical and social history. Comorbidities affecting pt's functional performance at time of assessment include: anemia, a-fib, COPD, h/o ETOH abuse, sacral wound, CKD stage 3, acute pain of BL wrists, central retinal vein occlusion of R eye, ambulatory dysfunction, h/o CVA, HTN, h/o ileus, ESBL, sepsis. Personal factors affecting pt at time of IE include: behavioral pattern, difficulty performing ADLS, difficulty performing IADLS , decreased initiation and engagement , and environment. Prior to admission, pt was at [facility]. Pt was A w/  ADLS and A w/ IADLS, (-) drove, & required use of walker and wheelchair  PTA. Upon evaluation: Pt requires Max Ax2 for bed mobility, Max Ax2 for functional mobility/transfers, Min A for UB ADLs and Max A for LB ADLS 2* the following deficits impacting occupational performance: weakness, decreased strength, decreased balance, decreased tolerance, decreased safety awareness, and increased pain. Full objective findings from OT assessment regarding body systems outlined above.  Pt to benefit from continued skilled OT tx while in the hospital to address deficits as defined above and maximize level of functional independence w/ ADL's and functional mobility. Occupational Performance areas to address include: grooming, bathing/shower, toilet hygiene, dressing, functional mobility, and clothing management. Based on findings, pt is of high complexity. The patient's raw score on the -PAC Daily Activity inpatient short form is 16, standardized score is 35.96, less than 39.4. Patients at this level are likely to benefit from DC to post-acute rehabilitation services. However, please refer to therapist recommendation for discharge planning given other factors that may influence destination. At this time, OT recommendations at time of discharge are DC with Level II resources.

## 2023-10-20 NOTE — OCCUPATIONAL THERAPY NOTE
Occupational Therapy Evaluation     Patient Name: Zena Nieves  OYWRV'R Date: 10/20/2023  Problem List  Principal Problem:    SIRS (systemic inflammatory response syndrome) (HCC)  Active Problems:    Anemia    Atrial fibrillation (HCC)    Chronic obstructive pulmonary disease, unspecified COPD type ([address])    Central retinal vein occlusion of right eye    History of alcohol abuse    Sacral wound    CKD (chronic kidney disease) stage 3, GFR 30-59 ml/min (HCC)    Acute pain of both wrists with known gout    Past Medical History  Past Medical History:   Diagnosis Date    Alcohol abuse     Alcohol withdrawal seizure without complication ([address])     Arthritis     Asthma     CVA (cerebral vascular accident) ([address])     Decubitus ulcer of buttock     last assessed 07/20/16    Diabetes ([address])     Disease of thyroid gland     Duodenal ulcer     Emphysema lung (HCC)     Esophageal varices (HCC)     GERD (gastroesophageal reflux disease)     History of transfusion     Hypertension     Irritable bowel syndrome with diarrhea     Kidney stones     Prostate cancer ([address])     Urinary frequency     resolved 02/15/17     Past Surgical History  Past Surgical History:   Procedure Laterality Date    BACK SURGERY      CATARACT EXTRACTION      ESOPHAGOGASTRODUODENOSCOPY N/A 2/6/2017    Procedure: ESOPHAGOGASTRODUODENOSCOPY (EGD); Surgeon: Jearl Phalen, MD;  Location:  MAIN OR;  Service:     [place] ESOPHAGOGASTRODUODENOSCOPY TRANSORAL DIAGNOSTIC N/A 4/26/2016    Procedure: ESOPHAGOGASTRODUODENOSCOPY (EGD); Surgeon: Chloe Strickland MD;  Location:  MAIN OR;  Service: Gastroenterology    TONSILLECTOMY               10/20/23 1503   OT Last Visit   OT Visit Date 10/20/23   Note Type   Note type Evaluation   Restrictions/Precautions   Weight Bearing Precautions Per Order No   Other Precautions Bed Alarm;Pain; Fall Risk; Chair Alarm;Contact/isolation   Home Living   Type of Home Other (Comment)  (Pt currently at [facility] - has been at [facility] since August.)   Home Equipment Wheelchair-manual;Cane;Walker   Additional Comments Prior to STR- pt was living with spouse in a 3 SH with 2 ALPESH & was independent with mobility & ADLs   Prior Function   Level of [place] Needs assistance with ADLs; Needs assistance with IADLS;Needs assistance with functional mobility   Lives With Facility staff   Receives Help From Family;Personal care attendant   IADLs Family/Friend/Other provides transportation; Family/Friend/Other provides meals; Family/Friend/Other provides medication management   General   Family/Caregiver Present No   Subjective   Subjective "Wow that really stinks"   ADL   Eating Assistance 7  Independent   Grooming Assistance 5  Supervision/Setup   UB Bathing Assistance 5  Supervision/Setup   LB Bathing Assistance 3  Moderate Assistance   UB Dressing Assistance 5  Supervision/Setup   LB Dressing Assistance 2  Maximal [address]  2  Maximal Assistance   Bed Mobility   Supine to Sit 2  Maximal assistance   Additional items Assist x 2; Increased time required;Verbal cues;LE management   Sit to Supine 2  Maximal assistance   Additional items Assist x 2; Increased time required;Verbal cues;LE management   Transfers   Sit to Stand 2  Maximal assistance   Additional items Assist x 2; Increased time required;Verbal cues   Stand to Sit 2  Maximal assistance   Additional items Assist x 2; Increased time required;Verbal cues   Additional Comments 2 sit <> stand trials.  Pt unable to fully clear buttocks   Balance   Static Sitting Fair +   Dynamic Sitting Fair   Static Standing Poor   Dynamic Standing Poor -   Ambulatory Zero   Activity Tolerance   Activity Tolerance Patient limited by fatigue   Medical Staff Made Aware PT Nancy   Nurse Made Aware JANE GROVES Assessment   RUE Assessment WFL   LUE Assessment   LUE Assessment WFL   Cognition   Overall Cognitive Status WFL   Arousal/Participation Alert   Attention Within functional limits Orientation Level Oriented X4   Memory Decreased recall of recent events;Decreased recall of precautions   Following Commands Follows one step commands with increased time or repetition   Assessment   Limitation Decreased ADL status; Decreased endurance;Decreased self-care trans;Decreased high-level ADLs; Decreased UE strength   Prognosis Fair   Assessment Pt is a 82 y.o. male seen for OT evaluation at [facility], admitted 10/17/2023 w/ SIRS (systemic inflammatory response syndrome) ([address] OT completed extensive review of pt's medical and social history. Comorbidities affecting pt's functional performance at time of assessment include: anemia, a-fib, COPD, h/o ETOH abuse, sacral wound, CKD stage 3, acute pain of BL wrists, central retinal vein occlusion of R eye, ambulatory dysfunction, h/o CVA, HTN, h/o ileus, ESBL, sepsis. Personal factors affecting pt at time of IE include: behavioral pattern, difficulty performing ADLS, difficulty performing IADLS , decreased initiation and engagement , and environment. Prior to admission, pt was at [facility]. Pt was A w/  ADLS and A w/ IADLS, (-) drove, & required use of walker and wheelchair  PTA. Upon evaluation: Pt requires Max Ax2 for bed mobility, Max Ax2 for functional mobility/transfers, Min A for UB ADLs and Max A for LB ADLS 2* the following deficits impacting occupational performance: weakness, decreased strength, decreased balance, decreased tolerance, decreased safety awareness, and increased pain. Full objective findings from OT assessment regarding body systems outlined above. Pt to benefit from continued skilled OT tx while in the hospital to address deficits as defined above and maximize level of functional independence w/ ADL's and functional mobility. Occupational Performance areas to address include: grooming, bathing/shower, toilet hygiene, dressing, functional mobility, and clothing management. Based on findings, pt is of high complexity.  The patient's raw score on the -PAC Daily Activity inpatient short form is 16, standardized score is 35.96, less than 39.4. Patients at this level are likely to benefit from DC to post-acute rehabilitation services. However, please refer to therapist recommendation for discharge planning given other factors that may influence destination. At this time, OT recommendations at time of discharge are DC with Level II resources. Goals   Patient Goals Pt wants to be able to walk   Plan   Treatment Interventions ADL retraining;Functional transfer training;Equipment evaluation/education;Patient/family training; Compensatory technique education;Continued evaluation; Endurance training;UE strengthening/ROM   Goal Expiration Date 10/30/23   OT Treatment Day 0   OT Frequency 2-3x/wk   Discharge Recommendation   UB Rehab Resource Intensity Level II (Moderate Resource Intensity)   AM-PAC Daily Activity Inpatient   Lower Body Dressing 2   Bathing 2   Toileting 2   Upper Body Dressing 3   Grooming 3   Eating 4   Daily Activity Raw Score 16   Daily Activity Standardized Score (Calc for Raw Score >=11) 35.96   AM-PAC Applied Cognition Inpatient   Following a Speech/Presentation 3   Understanding Ordinary Conversation 4   Taking Medications 4   Remembering Where Things Are Placed or Put Away 3   Remembering List of 4-5 Errands 3   Taking Care of Complicated Tasks 3   Applied Cognition Raw Score 20   Applied Cognition Standardized Score 41.76   End of Consult   Education Provided Yes   Patient Position at End of Consult Supine;Bed/Chair alarm activated; All needs within reach   Nurse Communication Nurse aware of consult     Pt will achieve the following goals within 10 days. *Pt will complete UB bathing and dressing with set-up. *Pt will complete LB bathing and dressing with Mod A & DME PRN.     *Pt will complete toileting w/ Mod A w/ G hygiene/thoroughness using DME PRN    *Pt will complete bed mobility with Mod Ax1, with HOB elevated & use of side rails PRN to prep for purposeful tasks    *Pt will perform functional transfers with on/off all surfaces with Mod Ax1 using DME as needed w/ G balance/safety. *Pt will increase standing tolerance to 3+ minutes with use of AD PRN for increased activity tolerance in order to maximize independence in ADL/IADL performance. *Pt will participate in UE therapeutic exercise in order to maximize strength for ADL transfers.     Farshad Jay OTR/L

## 2023-10-20 NOTE — ASSESSMENT & PLAN NOTE
Will start a short course steroid for gouty arthritis. Uric acid level high at 16.1 in September. Will start allopurinol.  Noted recent synovial fluid (+) urate crystal

## 2023-10-20 NOTE — ASSESSMENT & PLAN NOTE
History of alcohol abuse with withdrawal seizures  Patient has not drank since [facility] hospitalization in August 2023

## 2023-10-20 NOTE — PHYSICAL THERAPY NOTE
PHYSICAL THERAPY EVALUATION NOTE    Patient Name: Will ARAUZW Date: 10/20/2023    AGE:   82 y.o. Mrn:   [id number]  ADMIT DX:  Atrial fibrillation ([address]) [I48.91]  Pneumonia [J18.9]  Anemia [D64.9]  Hypertension [I10]  Elevated TSH [R79.89]  Generalized weakness [R53.1]  Wound of sacral region, initial encounter [S31.000A]  Sepsis ([address]) [A41.9]    Past Medical History:   Diagnosis Date    Alcohol abuse     Alcohol withdrawal seizure without complication ([address])     Arthritis     Asthma     CVA (cerebral vascular accident) ([address])     Decubitus ulcer of buttock     last assessed 16    Diabetes ([address])     Disease of thyroid gland     Duodenal ulcer     Emphysema lung ([address])     Esophageal varices (HCC)     GERD (gastroesophageal reflux disease)     History of transfusion     Hypertension     Irritable bowel syndrome with diarrhea     Kidney stones     Prostate cancer ([address])     Urinary frequency     resolved 02/15/17     Length Of Stay: 3  PHYSICAL THERAPY EVALUATION :   Patient's identity confirmed via 2 patient identifiers (full name and ) at start of session       10/20/23 1509   PT Last Visit   PT Visit Date 10/20/23   Note Type   Note type Evaluation   Pain Assessment   Pain Assessment Tool 0-10   Pain Score No Pain   Restrictions/Precautions   Weight Bearing Precautions Per Order No   Other Precautions Contact/isolation;Cognitive; Bed Alarm;Multiple lines; Fall Risk   Home Living   Type of Home Other (Comment)  [facility])   Home Layout One level   Home Equipment Wheelchair-manual;Walker;Cane   Additional Comments Prior to STR pt resided w/ his wife in a 3 SH w/ 2 ALPESH, I w/ mobility. He reports he's been ambulating w/ A x 2 in parallel bars at STR   Prior Function   Level of [place] Needs assistance with ADLs; Needs assistance with IADLS;Needs assistance with functional mobility   Lives With Facility staff Receives Help From Family;Personal care attendant   IADLs Family/Friend/Other provides transportation; Family/Friend/Other provides meals; Family/Friend/Other provides medication management   Falls in the last 6 months   (+ hx of falls, none recently)   Vocational Retired   General   Family/Caregiver Present No   Cognition   Arousal/Participation Alert   Attention Within functional limits   Orientation Level Oriented X4   Memory Decreased recall of recent events;Decreased recall of precautions   Following Commands Follows one step commands with increased time or repetition   Comments Pt cooperative, redirectable. Randomly speaks in polish   Subjective   Subjective "My get up and go, got up and went"   RLE Assessment   RLE Assessment WFL   Strength RLE   RLE Overall Strength 3+/5   LLE Assessment   LLE Assessment WFL   Strength LLE   LLE Overall Strength 3+/5   Vision-Basic Assessment   Current Vision Wears glasses all the time   Bed Mobility   Supine to Sit 2  Maximal assistance   Additional items Assist x 2; Increased time required   Additional Comments Pt is able to sit EOB x 5 min w/ supervision   Transfers   Sit to Stand 2  Maximal assistance   Additional items Assist x 2; Increased time required;Verbal cues   Stand to Sit 2  Maximal assistance   Additional items Assist x 2; Increased time required;Verbal cues   Balance   Static Sitting Fair +   Static Standing Zero   Ambulatory Zero   Activity Tolerance   Activity Tolerance Patient limited by fatigue   Medical Staff Made Aware SUNIL Ford   Nurse Made Aware JANE Iglesias   Assessment   Problem List Decreased strength;Decreased endurance; Impaired balance;Decreased mobility; Decreased cognition;Decreased safety awareness; Impaired vision;Decreased skin integrity   Assessment Edmundo Gonzales is a 82 y.o. Male who presents to [address] [place] [address] on 10/17/2023 from [facility] STR w/ c/o wound infection and diagnosis of SIRS, sepsis. Orders for PT eval and treat received.  Pt presents w/ comorbidities of pneumonia, Afib, HTN, COPD, hx of ETOH abuse. At baseline, pt mobilizes Ax2 in parallel bars at [employer]. Upon evaluation, pt presents w/ the following deficits: weakness, impaired skin integrity, impaired balance, impaired vision, decreased endurance, and pain limiting functional mobility. Upon eval, pt requires max A x 2 for bed mobility, max A x 2 for transfers. Based on this PT evaluation today, patient's discharge recommendation is for Level II. During this admission, pt would benefit from continued skilled inpatient PT in the acute care setting in order to address the abovementioned deficits to maximize function and mobility before DC from acute care. Goals   Patient Goals to walk again   STG Expiration Date 10/30/23   Short Term Goal #1 Patient will: Perform all bed mobility tasks w/ maxx1 to improve pt's independence w/ repositioning for decrease risk of skin breakdown, Perform all transfers w/ maxx1 consistently from various height surfaces in order to improve I w/ engagement w/ real-world environments/situations, Increase all balance 1/2 grade to decrease risk for falls, Tolerate 3 hr OOB to faciliate upright tolerance, and Tolerate seated at EOB at least 20 minutes w/ supervision in order to work on trunk strength/endurance for functional tasks   Plan   Treatment/Interventions Functional transfer training;LE strengthening/ROM; Therapeutic exercise; Endurance training;Cognitive reorientation;Patient/family training;Equipment eval/education; Bed mobility   PT Frequency 2-3x/wk   Discharge Recommendation   UB Rehab Resource Intensity Level II (Moderate Resource Intensity)   AM-PAC Basic Mobility Inpatient   Turning in Flat Bed Without Bedrails 2   Lying on Back to Sitting on Edge of Flat Bed Without Bedrails 1   Moving Bed to Chair 1   Standing Up From Chair Using Arms 1   Walk in Room 1   Climb 3-5 Stairs With Railing 1   Basic Mobility Inpatient Raw Score 7   Turning Head Towards Sound 3 Follow Simple Instructions 3   Low Function Basic Mobility Raw Score  13   Low Function Basic Mobility Standardized Score  20.14   Highest Level Of Mobility   -HL Goal 2: Bed activities/Dependent transfer   -HL Achieved 3: Sit at edge of bed   Additional Treatment Session   Start Time 1524   End Time 1532   Treatment Assessment Pt seated EOB w/ OT, agreeable to participate in PT intervention. Pt performed STS for a second and third trial, again requiring max A x 2 and unable to maintain standing >10s to safely transfer OOB to recliner chair. Pt reports feeling fatigued. He is able to laterally scoot at EOB w/ mod A x 1 and cues, and return to supine w/ max A x2. Equipment Use B HHA   Additional Treatment Day 1   End of Consult   Patient Position at End of Consult Supine;Bed/Chair alarm activated; All needs within reach         The patient's AM-PAC Basic Mobility Inpatient Short Form Raw Score is 7, Standardized Score is  . A standardized score less than 38.32 (raw score of 16) suggests the patient may benefit from discharge to post-acute rehabilitation services which may not coincide with above PT recommendations. However please refer to therapist recommendation for discharge planning given other factors that may influence destination.     Pt would benefit from skilled inpatient PT during this admission in order to facilitate progress towards goals to maximize functional independence    Tushar Farmer, PT, DPT

## 2023-10-20 NOTE — PROGRESS NOTES
[facility]  Progress Note  Name: Natalya Cueva  MRN: [id number]  Unit/Bed#: -01 I Date of Admission: 10/17/2023   Date of Service: 10/20/2023 I Hospital Day: 3    Assessment/Plan   * SIRS (systemic inflammatory response syndrome) (HCC)  Assessment & Plan  [facility] due to low grade fevers and weakness. Dry cough for about 2 weeks. Afebrile thus far. On RA  SIRS: tachycardia, WBC (13.12, possibly downtrending from recent admission)  Lactic WNL   Procal mildly elevated 0.39  Takes metoprolol bid, had not yet taken nighttime dose - possible cause for tachycardia. SOI  CXR without obvious infiltrate- pending official read   Order pneumonia labs  COVID/flu/RSV negative  UA and blood cultures pending  Pt with sacral wound. Based off images from prior admission wound has increased in size and depth. Possible source (however no redness or drainage noted)  ED started IV ceftriaxone for possible pneumonia infection. Low suspicion for pneumonia. Broaden antibiotics with IV cefepime + IV vancomycin for possible sacral wound infection  Patient remains afebrile. Complete 5 days of antibiotics (day 4/5). C-diff positive with no diarrhea possible colonization. Will start c-diff prophylaxis     Sacral wound  Assessment & Plan  Chronic sacral wound. Does not appear acutely infected but appears larger in size and depth from recent admit   Turn patient every 2 hours  Wound care consult  [address] had started patient on bactrim (10/17-10/22) bid for sacral wound. Hold due to receiving IV cefepime + IV vancomycin   Gen surgery following    Acute pain of both wrists with known gout  Assessment & Plan  Will start a short course steroid for gouty arthritis. Uric acid level high at 16.1 in September. Will start allopurinol.  Noted recent synovial fluid (+) urate crystal       Atrial fibrillation (HCC)  Assessment & Plan  Home regimen: Diltiazem 240 mg daily, metoprolol tartrate 25 mg twice daily, Eliquis    Central retinal vein occlusion of right eye  Assessment & Plan  Diagnosed with CRVO at [facility]. 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 and discussion for possible anti-VEGF therapy    Chronic obstructive pulmonary disease, unspecified COPD type ([address])  Assessment & Plan  No signs of acute exacerbation at this time  Not on maintenance inhalers outpatient    History of alcohol abuse  Assessment & Plan  History of alcohol abuse with withdrawal seizures  Patient has not drank since [facility] hospitalization in 2023               VTE Pharmacologic Prophylaxis: VTE Score: 4 Moderate Risk (Score 3-4) - Pharmacological DVT Prophylaxis Ordered: apixaban (Eliquis). Patient Centered Rounds: I performed bedside rounds with nursing staff today. Discussions with Specialists or Other Care Team Provider:     Education and Discussions with Family / Patient: Attempted to update  (daughter) via phone. Left voicemail. Current Length of Stay: 3 day(s)  Current Patient Status: Inpatient   Certification Statement: The patient will continue to require additional inpatient hospital stay due to IV abx   Discharge Plan: Anticipate discharge tomorrow to rehab facility. Code Status: Level 3 - DNAR and DNI    Subjective:   C/O bilateral wrist pain. He had similar pain recently and was treated for gout. Denies diarrhea. Objective:     Vitals:   Temp (24hrs), Av.5 °F (36.9 °C), Min:97.6 °F (36.4 °C), Max:99.1 °F (37.3 °C)    Temp:  [97.6 °F (36.4 °C)-99.1 °F (37.3 °C)] 98.3 °F (36.8 °C)  HR:  [90-98] 90  Resp:  [16] 16  BP: (119-145)/(58-71) 131/58  SpO2:  [97 %-98 %] 98 %  Body mass index is 27.37 kg/m². Input and Output Summary (last 24 hours):      Intake/Output Summary (Last 24 hours) at 10/20/2023 1201  Last data filed at 10/20/2023 0701  Gross per 24 hour   Intake --   Output 877 ml   Net -877 ml       Physical Exam:   Physical Exam  Constitutional:       General: He is not in acute distress. Appearance: He is not ill-appearing, toxic-appearing or diaphoretic. Eyes:      General:         Right eye: No discharge. Left eye: No discharge. Cardiovascular:      Rate and Rhythm: Normal rate and regular rhythm. Pulmonary:      Effort: Pulmonary effort is normal. No respiratory distress. Abdominal:      General: Bowel sounds are normal.      Palpations: Abdomen is soft. Musculoskeletal:         General: Swelling and tenderness present. Neurological:      Mental Status: Mental status is at baseline. Additional Data:     Labs:  Results from last 7 days   Lab Units 10/20/23  0603   WBC Thousand/uL 10.34*   HEMOGLOBIN g/dL 7.0*   HEMATOCRIT % 22.1*   PLATELETS Thousands/uL 261   NEUTROS PCT % 48   LYMPHS PCT % 32   MONOS PCT % 12   EOS PCT % 6     Results from last 7 days   Lab Units 10/20/23  0437 10/18/23  0534 10/17/23  2020   SODIUM mmol/L 135   < > 136   POTASSIUM mmol/L 4.0   < > 4.1   CHLORIDE mmol/L 104   < > 102   CO2 mmol/L 25   < > 27   BUN mg/dL 30*   < > 40*   CREATININE mg/dL 1.51*   < > 1.50*   ANION GAP mmol/L 6   < > 7   CALCIUM mg/dL 8.9   < > 9.3   ALBUMIN g/dL  --   --  2.9*   TOTAL BILIRUBIN mg/dL  --   --  0.40   ALK PHOS U/L  --   --  68   ALT U/L  --   --  7   AST U/L  --   --  11*   GLUCOSE RANDOM mg/dL 97   < > 129    < > = values in this interval not displayed.      Results from last 7 days   Lab Units 10/17/23  2020   INR  1.57*             Results from last 7 days   Lab Units 10/18/23  0534 10/17/23  2020   LACTIC ACID mmol/L  --  1.3   PROCALCITONIN ng/ml 0.38* 0.39*       Lines/Drains:  Invasive Devices       Peripheral Intravenous Line  Duration             Peripheral IV 10/19/23 Distal;Left;Ventral (anterior) Forearm 1 day                          Imaging:     Recent Cultures (last 7 days):   Results from last 7 days   Lab Units 10/18/23  1500 10/18/23  0221 10/17/23  2021 10/17/23  2020   BLOOD CULTURE   --   --  No Growth at 48 hrs. No Growth at 48 hrs. LEGIONELLA URINARY ANTIGEN   --  Negative  --   --    C DIFF TOXIN B BY PCR  Positive*  --   --   --        Last 24 Hours Medication List:   Current Facility-Administered Medications   Medication Dose Route Frequency Provider Last Rate    acetaminophen  650 mg Oral Q6H PRN Glen Xie MD      allopurinol  100 mg Oral Daily Joyce Dunham MD      apixaban  2.5 mg Oral BID Glen Xie MD      aspirin  81 mg Oral Daily Glen Xie MD      benzonatate  100 mg Oral TID PRN Glen Xie MD      cefepime  2,000 mg Intravenous Q12H Shobha Gambino MD 2,000 mg (10/20/23 0846)    collagenase   Topical Daily Glen Xie MD      Diclofenac Sodium  2 g Topical 4x Daily Joyce Dunham MD      diltiazem  240 mg Oral Daily Glen Xie MD      folic acid  1 mg Oral Daily Sadie Rizo MD      glycerin-hypromellose-  1 drop Both Eyes Q6H PRN Glen Xie MD      magnesium Oxide  400 mg Oral Daily Glen Xie MD      metoprolol tartrate  25 mg Oral Q12H [address] Glen Xie MD      pantoprazole  40 mg Oral Early Morning Glen Xie MD      predniSONE  20 mg Oral BID With Meals Joyce Dunham MD      vancomycin oral (capsules or solution)  125 mg Oral Q12H [address] Joyce Dunham MD      vancomycin  750 mg Intravenous Q24H Shyam Zarate PA-C          Today, Patient Was Seen By: Joyce Dunahm MD    **Please Note: This note may have been constructed using a voice recognition system. **

## 2023-10-20 NOTE — PROGRESS NOTES
Patient:  Tracy Cordova    MRN:  [id number]    Aidin Request ID:  [id number]    Level of care reserved:  [address]    Partner Reserved:  [facility], [address] [phone]    Clinical needs requested:    Geography searched:  10 miles around [address] [place] [address]    Start of Service:    Request sent:  8:55am EDT on 10/18/2023 by Gloria De La Rosar    Partner reserved:  3:01pm EDT on 10/18/2023 by Gloria Benites    Choice list shared:

## 2023-10-20 NOTE — CASE MANAGEMENT
Case Management Discharge Planning Note    Patient name Luis E Herrera  Location /-11 MRN [id number]  : 1935 Date 10/20/2023       Current Admission Date: 10/17/2023  Current Admission Diagnosis:SIRS (systemic inflammatory response syndrome) [facility])   Patient Active Problem List    Diagnosis Date Noted    Acute pain of both wrists with known gout 10/20/2023    SIRS (systemic inflammatory response syndrome) ([address]) 10/17/2023    CKD (chronic kidney disease) stage 3, GFR 30-59 ml/min ([address]) 10/17/2023    Chronic renal impairment     Ileus ([address]) 2023    Acute gout of multiple sites 2023    Sacral wound 2023    Sepsis ([address]) 2023    ESBL (extended spectrum beta-lactamase) producing bacteria infection 2023    Olecranon bursitis 2023    Abnormal computed tomography angiography (CTA) of abdomen and pelvis 2023    Acute encephalopathy 2023    Leukocytosis 2023    Central retinal vein occlusion of right eye 2023    History of COVID-19 2023    History of alcohol abuse 2023    Cystitis 2023    Chronic obstructive pulmonary disease, unspecified COPD type ([address]) 2021    TOBI (acute kidney injury) ([address]) 2021    Thrombocytopenia, unspecified ([address]) 2021    CVA (cerebral vascular accident) ([address]) 01/10/2020    GERD (gastroesophageal reflux disease) 2018    Ambulatory dysfunction 2017    Accelerated hypertension 2017    Atrial fibrillation ([address]) 2017    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    Acute ischemic stroke ([address]) 03/10/2017    Hyponatremia 02/15/2017    Chronic sinusitis 2016    Rheumatoid arthritis ([address]) 2016    Allergic rhinitis 2015    Generalized osteoarthritis of multiple sites 2014    Hyperglycemia 2014    Eczema 2013    Anemia 2013    Edema 2013    Gait disturbance 2013    Hypomagnesemia 2013    Peripheral neuropathy 2013 LOS (days): 3  Geometric Mean LOS (GMLOS) (days): 2.90  Days to GMLOS:0.2     OBJECTIVE:  Risk of Unplanned Readmission Score: 29.48         Current admission status: Inpatient   Preferred Pharmacy:   [address], [address] [facility] [address] [facility]  [address] [facility] [address]  Phone: [phone] Fax: [phone]    [facility].  [address]  [address] [place] [address]  Phone: [phone] Fax: [phone]    Primary Care Provider: Isis Blandon DO    Primary Insurance: MEDICARE  Secondary Insurance: Fabio Trotter    DISCHARGE DETAILS:        Patient will be discharged to [facility] tomorrow. Updated [facility] in [facility]. CM requested BLS transport via [facility]. Awaiting confirmed  time.

## 2023-10-20 NOTE — PLAN OF CARE
Problem: MOBILITY - ADULT  Goal: Maintain or return to baseline ADL function  Description: INTERVENTIONS:  -  Assess patient's ability to carry out ADLs; assess patient's baseline for ADL function and identify physical deficits which impact ability to perform ADLs (bathing, care of mouth/teeth, toileting, grooming, dressing, etc.)  - Assess/evaluate cause of self-care deficits   - Assess range of motion  - Assess patient's mobility; develop plan if impaired  - Assess patient's need for assistive devices and provide as appropriate  - Encourage maximum independence but intervene and supervise when necessary  - Involve family in performance of ADLs  - Assess for home care needs following discharge   - Consider OT consult to assist with ADL evaluation and planning for discharge  - Provide patient education as appropriate  Outcome: Progressing  Goal: Maintains/Returns to pre admission functional level  Description: INTERVENTIONS:  - Perform BMAT or MOVE assessment daily.   - Set and communicate daily mobility goal to care team and patient/family/caregiver. - Collaborate with rehabilitation services on mobility goals if consulted  - Perform Range of Motion 5 times a day. - Reposition patient every 2 hours.   - Dangle patient 3 times a day  - Out of bed for toileting  - Record patient progress and toleration of activity level   Outcome: Progressing     Problem: Prexisting or High Potential for Compromised Skin Integrity  Goal: Skin integrity is maintained or improved  Description: INTERVENTIONS:  - Identify patients at risk for skin breakdown  - Assess and monitor skin integrity  - Assess and monitor nutrition and hydration status  - Monitor labs   - Assess for incontinence   - Turn and reposition patient  - Assist with mobility/ambulation  - Relieve pressure over bony prominences  - Avoid friction and shearing  - Provide appropriate hygiene as needed including keeping skin clean and dry  - Evaluate need for skin moisturizer/barrier cream  - Collaborate with interdisciplinary team   - Patient/family teaching  - Consider wound care consult   Outcome: Progressing     Problem: Potential for Falls  Goal: Patient will remain free of falls  Description: INTERVENTIONS:  - Educate patient/family on patient safety including physical limitations  - Instruct patient to call for assistance with activity   - Consult OT/PT to assist with strengthening/mobility   - Keep Call bell within reach  - Keep bed low and locked with side rails adjusted as appropriate  - Keep care items and personal belongings within reach  - Initiate and maintain comfort rounds  - Make Fall Risk Sign visible to staff  - Offer Toileting every 2 Hours, in advance of need  - Initiate/Maintain bed alarm  - Obtain necessary fall risk management equipment:   - Apply yellow socks and bracelet for high fall risk patients  - Consider moving patient to room near nurses station  Outcome: Progressing     Problem: SKIN/TISSUE INTEGRITY - ADULT  Goal: Skin Integrity remains intact(Skin Breakdown Prevention)  Description: Assess:  -Perform Joaquin assessment every shift  -Clean and moisturize skin every shift   -Inspect skin when repositioning, toileting, and assisting with ADLS  -Assess extremities for adequate circulation and sensation     Bed Management:  -Have minimal linens on bed & keep smooth, unwrinkled  -Change linens as needed when moist or perspiring  -Avoid sitting or lying in one position for more than 2 hours while in bed  -Keep HOB at 30 degrees     Toileting:  -Offer bedside commode  -Assess for incontinence every 2 hours  -Use incontinent care products after each incontinent episode such as calazime cream    Activity:  -Encourage or provide ROM exercises   -Turn and reposition patient every 2 Hours  -Use appropriate equipment to lift or move patient in bed    Skin Care:  -Avoid use of baby powder, tape, friction and shearing, hot water or constrictive clothing  -Relieve pressure over bony prominences using wedges and pillows  -Do not massage red bony areas    Next Steps:  -Teach patient strategies to minimize risks such as frequent repositioning and offloading   -Consider consults to  interdisciplinary teams such as wound care and nutrition  Outcome: Progressing  Goal: Incision(s), wounds(s) or drain site(s) healing without S/S of infection  Description: INTERVENTIONS  - Assess and document dressing, incision, wound bed, drain sites and surrounding tissue  - Provide patient and family education  - Perform skin care/dressing changes as ordered    Outcome: Progressing  Goal: Pressure injury heals and does not worsen  Description: Interventions:  - Implement low air loss mattress or specialty surface (Criteria met)  - Apply silicone foam dressing  - Perform passive or active ROM every 4 hours  - Turn and reposition patient & offload bony prominences every 2 hours hours   - Utilize friction reducing device or surface for transfers   - Consider consults to  interdisciplinary teams such as wound care and nutrition  - Use incontinent care products after each incontinent episode such as calazime cream  - Consider nutrition services referral as needed  Outcome: Progressing     Problem: MUSCULOSKELETAL - ADULT  Goal: Maintain or return mobility to safest level of function  Description: INTERVENTIONS:  - Assess patient's ability to carry out ADLs; assess patient's baseline for ADL function and identify physical deficits which impact ability to perform ADLs (bathing, care of mouth/teeth, toileting, grooming, dressing, etc.)  - Assess/evaluate cause of self-care deficits   - Assess range of motion  - Assess patient's mobility  - Assess patient's need for assistive devices and provide as appropriate  - Encourage maximum independence but intervene and supervise when necessary  - Involve family in performance of ADLs  - Assess for home care needs following discharge   - Consider OT consult to assist with ADL evaluation and planning for discharge  - Provide patient education as appropriate  Outcome: Progressing  Goal: Maintain proper alignment of affected body part  Description: INTERVENTIONS:  - Support, maintain and protect limb and body alignment  - Provide patient/ family with appropriate education  Outcome: Progressing

## 2023-10-20 NOTE — PLAN OF CARE
Problem: PHYSICAL THERAPY ADULT  Goal: Performs mobility at highest level of function for planned discharge setting. See evaluation for individualized goals. Description: Treatment/Interventions: Functional transfer training, LE strengthening/ROM, Therapeutic exercise, Endurance training, Cognitive reorientation, Patient/family training, Equipment eval/education, Bed mobility          See flowsheet documentation for full assessment, interventions and recommendations. 10/20/2023 1628 by Allyn Bates PT  Note:    Problem List: Decreased strength, Decreased endurance, Impaired balance, Decreased mobility, Decreased cognition, Decreased safety awareness, Impaired vision, Decreased skin integrity  Assessment: Antonio Rodriguez is a 82 y.o. Male who presents to [address] [place] [address] on 10/17/2023 from [facility] STR w/ c/o wound infection and diagnosis of SIRS, sepsis. Orders for PT eval and treat received. Pt presents w/ comorbidities of pneumonia, Afib, HTN, COPD, hx of ETOH abuse. At baseline, pt mobilizes Ax2 in parallel bars at [employer]. Upon evaluation, pt presents w/ the following deficits: weakness, impaired skin integrity, impaired balance, impaired vision, decreased endurance, and pain limiting functional mobility. Upon eval, pt requires max A x 2 for bed mobility, max A x 2 for transfers. Based on this PT evaluation today, patient's discharge recommendation is for Level II. During this admission, pt would benefit from continued skilled inpatient PT in the acute care setting in order to address the abovementioned deficits to maximize function and mobility before DC from acute care. See flowsheet documentation for full assessment.

## 2023-10-20 NOTE — PLAN OF CARE
Problem: MOBILITY - ADULT  Goal: Maintain or return to baseline ADL function  Description: INTERVENTIONS:  -  Assess patient's ability to carry out ADLs; assess patient's baseline for ADL function and identify physical deficits which impact ability to perform ADLs (bathing, care of mouth/teeth, toileting, grooming, dressing, etc.)  - Assess/evaluate cause of self-care deficits   - Assess range of motion  - Assess patient's mobility; develop plan if impaired  - Assess patient's need for assistive devices and provide as appropriate  - Encourage maximum independence but intervene and supervise when necessary  - Involve family in performance of ADLs  - Assess for home care needs following discharge   - Consider OT consult to assist with ADL evaluation and planning for discharge  - Provide patient education as appropriate  Outcome: Progressing  Goal: Maintains/Returns to pre admission functional level  Description: INTERVENTIONS:  - Perform BMAT or MOVE assessment daily.   - Set and communicate daily mobility goal to care team and patient/family/caregiver. - Collaborate with rehabilitation services on mobility goals if consulted  - Perform Range of Motion    times a day. - Reposition patient every    hours.   - Dangle patient    times a day  - Stand patient    times a day  - Ambulate patient    times a day  - Out of bed to chair    times a day   - Out of bed for meals    times a day  - Out of bed for toileting  - Record patient progress and toleration of activity level   Outcome: Progressing     Problem: Prexisting or High Potential for Compromised Skin Integrity  Goal: Skin integrity is maintained or improved  Description: INTERVENTIONS:  - Identify patients at risk for skin breakdown  - Assess and monitor skin integrity  - Assess and monitor nutrition and hydration status  - Monitor labs   - Assess for incontinence   - Turn and reposition patient  - Assist with mobility/ambulation  - Relieve pressure over bony prominences  - Avoid friction and shearing  - Provide appropriate hygiene as needed including keeping skin clean and dry  - Evaluate need for skin moisturizer/barrier cream  - Collaborate with interdisciplinary team   - Patient/family teaching  - Consider wound care consult   Outcome: Progressing     Problem: SKIN/TISSUE INTEGRITY - ADULT  Goal: Skin Integrity remains intact(Skin Breakdown Prevention)  Description: Assess:  -Perform Joaquin assessment every     -Clean and moisturize skin every     -Inspect skin when repositioning, toileting, and assisting with ADLS  -Assess under medical devices such as    every     -Assess extremities for adequate circulation and sensation     Bed Management:  -Have minimal linens on bed & keep smooth, unwrinkled  -Change linens as needed when moist or perspiring  -Avoid sitting or lying in one position for more than    hours while in bed  -Keep HOB at   degrees     Toileting:  -Offer bedside commode  -Assess for incontinence every     -Use incontinent care products after each incontinent episode such as       Activity:  -Mobilize patient    times a day  -Encourage activity and walks on unit  -Encourage or provide ROM exercises   -Turn and reposition patient every    Hours  -Use appropriate equipment to lift or move patient in bed  -Instruct/ Assist with weight shifting every    when out of bed in chair  -Consider limitation of chair time      hour intervals    Skin Care:  -Avoid use of baby powder, tape, friction and shearing, hot water or constrictive clothing  -Relieve pressure over bony prominences using     -Do not massage red bony areas    Next Steps:  -Teach patient strategies to minimize risks such as      -Consider consults to  interdisciplinary teams such as   Outcome: Progressing  Goal: Incision(s), wounds(s) or drain site(s) healing without S/S of infection  Description: INTERVENTIONS  - Assess and document dressing, incision, wound bed, drain sites and surrounding tissue  - Provide patient and family education  - Perform skin care/dressing changes every     Outcome: Progressing  Goal: Pressure injury heals and does not worsen  Description: Interventions:  - Implement low air loss mattress or specialty surface (Criteria met)  - Apply silicone foam dressing  - Instruct/assist with weight shifting every    minutes when in chair   - Limit chair time to    hour intervals  - Use special pressure reducing interventions such as    when in chair   - Apply fecal or urinary incontinence containment device   - Perform passive or active ROM every     - Turn and reposition patient & offload bony prominences every    hours   - Utilize friction reducing device or surface for transfers   - Consider consults to  interdisciplinary teams such as     - Use incontinent care products after each incontinent episode such as     - Consider nutrition services referral as needed  Outcome: Progressing

## 2023-10-20 NOTE — CASE MANAGEMENT
Case Management Discharge Planning Note    Patient name Gordy Price  Location /-86 MRN [id number]  : 1935 Date 10/20/2023       Current Admission Date: 10/17/2023  Current Admission Diagnosis:SIRS (systemic inflammatory response syndrome) [facility])   Patient Active Problem List    Diagnosis Date Noted    Acute pain of both wrists with known gout 10/20/2023    SIRS (systemic inflammatory response syndrome) ([address]) 10/17/2023    CKD (chronic kidney disease) stage 3, GFR 30-59 ml/min ([address]) 10/17/2023    Chronic renal impairment     Ileus ([address]) 2023    Acute gout of multiple sites 2023    Sacral wound 2023    Sepsis ([address]) 2023    ESBL (extended spectrum beta-lactamase) producing bacteria infection 2023    Olecranon bursitis 2023    Abnormal computed tomography angiography (CTA) of abdomen and pelvis 2023    Acute encephalopathy 2023    Leukocytosis 2023    Central retinal vein occlusion of right eye 2023    History of COVID-19 2023    History of alcohol abuse 2023    Cystitis 2023    Chronic obstructive pulmonary disease, unspecified COPD type ([address]) 2021    TOBI (acute kidney injury) ([address]) 2021    Thrombocytopenia, unspecified ([address]) 2021    CVA (cerebral vascular accident) ([address]) 01/10/2020    GERD (gastroesophageal reflux disease) 2018    Ambulatory dysfunction 2017    Accelerated hypertension 2017    Atrial fibrillation ([address]) 2017    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    Acute ischemic stroke ([address]) 03/10/2017    Hyponatremia 02/15/2017    Chronic sinusitis 2016    Rheumatoid arthritis ([address]) 2016    Allergic rhinitis 2015    Generalized osteoarthritis of multiple sites 2014    Hyperglycemia 2014    Eczema 2013    Anemia 2013    Edema 2013    Gait disturbance 2013    Hypomagnesemia 2013    Peripheral neuropathy 2013 LOS (days): 3  Geometric Mean LOS (GMLOS) (days): 2.90  Days to GMLOS:0.1     OBJECTIVE:  Risk of Unplanned Readmission Score: 29.54         Current admission status: Inpatient   Preferred Pharmacy:   [address], [address] [facility] [address]  [address] [facility] [address]  Phone: [phone] Fax: [phone]    [facility]  [address]  [address] [address]  Phone: [phone] Fax: [phone]    Primary Care Provider: Lilia Reina DO    Primary Insurance: MEDICARE  Secondary Insurance: Jose M Hernandes DETAILS:     CM got a 1p confirmed  for tomorrow to go to [address] [place] [address]. Notified MD and RN via TT, facility via Aidin and pt in person. Treatment Team Recommendation: Short Term Rehab  Discharge Destination Plan[de-identified] Short Term Rehab  Transport at Discharge : BLS Ambulance        Transported by [employer] and Unit #): [facility]LA  ETA of Transport (Date): 10/21/23  ETA of Transport (Time): 1300     Transfer Mode: [address] [facility] Name, [address] [facility] [address] : [address] [place] [address]  Receiving Facility/Agency Phone Number: Supervisor Phone is [phone] for the weekend.   Facility/Agency Fax Number: [phone]

## 2023-10-20 NOTE — ASSESSMENT & PLAN NOTE
Diagnosed with CRVO at [facility].   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

## 2023-10-20 NOTE — PROGRESS NOTES
Will Berumen is a 82 y.o. male who is currently ordered Vancomycin IV with management by the Pharmacy Consult service. Relevant clinical data and objective / subjective history reviewed. Vancomycin Assessment:  Indication and Goal AUC/Trough: Soft tissue (goal -600, trough >10)  Clinical Status: stable  Micro:     Renal Function:  SCr: 1.51 mg/dL  CrCl: 34 mL/min  Renal replacement: Not on dialysis  Days of Therapy: 3  Current Dose: 1000mg every 24 hours  Vancomycin Plan: random level resulted 22.3- extrapolated trough is ~19. Dose reduced as below to keep trough level between 10-20. New Dosinmg every 24 hours  Estimated AUC: 439 mcg*hr/mL  Estimated Trough: 14.3 mcg/mL  Next Level: 10/22/23 at 0600  Renal Function Monitoring: Daily BMP and [place] will continue to follow closely for s/sx of nephrotoxicity, infusion reactions and appropriateness of therapy. BMP and CBC will be ordered per protocol. Per SLIM, continue antibiotics for 5 days. We will continue to follow the patient’s culture results and clinical progress daily. Also, cefepime will be adjusted renally if necessary.     Xochitl Frederick, Pharmacist, PharmD, BCPS

## 2023-10-21 VITALS
OXYGEN SATURATION: 99 % | DIASTOLIC BLOOD PRESSURE: 66 MMHG | SYSTOLIC BLOOD PRESSURE: 139 MMHG | WEIGHT: 180 LBS | BODY MASS INDEX: 27.28 KG/M2 | RESPIRATION RATE: 14 BRPM | HEART RATE: 91 BPM | TEMPERATURE: 97.6 F | HEIGHT: 68 IN

## 2023-10-21 LAB
ANION GAP SERPL CALCULATED.3IONS-SCNC: 8 MMOL/L
BUN SERPL-MCNC: 31 MG/DL (ref 5–25)
CALCIUM SERPL-MCNC: 8.8 MG/DL (ref 8.4–10.2)
CHLORIDE SERPL-SCNC: 103 MMOL/L (ref 96–108)
CO2 SERPL-SCNC: 22 MMOL/L (ref 21–32)
CREAT SERPL-MCNC: 1.61 MG/DL (ref 0.6–1.3)
GFR SERPL CREATININE-BSD FRML MDRD: 38 ML/MIN/1.73SQ M
GLUCOSE SERPL-MCNC: 239 MG/DL (ref 65–140)
HCT VFR BLD AUTO: 22.8 % (ref 36.5–49.3)
HGB BLD-MCNC: 7.4 G/DL (ref 12–17)
POTASSIUM SERPL-SCNC: 4 MMOL/L (ref 3.5–5.3)
SODIUM SERPL-SCNC: 133 MMOL/L (ref 135–147)
VANCOMYCIN SERPL-MCNC: 24.6 UG/ML (ref 10–20)

## 2023-10-21 PROCEDURE — 87205 SMEAR GRAM STAIN: CPT | Performed by: INTERNAL MEDICINE

## 2023-10-21 PROCEDURE — 87186 SC STD MICRODIL/AGAR DIL: CPT | Performed by: INTERNAL MEDICINE

## 2023-10-21 PROCEDURE — 85018 HEMOGLOBIN: CPT | Performed by: INTERNAL MEDICINE

## 2023-10-21 PROCEDURE — 80202 ASSAY OF VANCOMYCIN: CPT | Performed by: INTERNAL MEDICINE

## 2023-10-21 PROCEDURE — 80048 BASIC METABOLIC PNL TOTAL CA: CPT | Performed by: INTERNAL MEDICINE

## 2023-10-21 PROCEDURE — 85014 HEMATOCRIT: CPT | Performed by: INTERNAL MEDICINE

## 2023-10-21 PROCEDURE — 87070 CULTURE OTHR SPECIMN AEROBIC: CPT | Performed by: INTERNAL MEDICINE

## 2023-10-21 PROCEDURE — 99239 HOSP IP/OBS DSCHRG MGMT >30: CPT | Performed by: INTERNAL MEDICINE

## 2023-10-21 PROCEDURE — 87077 CULTURE AEROBIC IDENTIFY: CPT | Performed by: INTERNAL MEDICINE

## 2023-10-21 RX ORDER — PREDNISONE 20 MG/1
20 TABLET ORAL DAILY
Refills: 0
Start: 2023-10-21 | End: 2023-10-25

## 2023-10-21 RX ORDER — ALLOPURINOL 100 MG/1
50 TABLET ORAL DAILY
Refills: 0
Start: 2023-10-22

## 2023-10-21 RX ORDER — ACETAMINOPHEN 325 MG/1
650 TABLET ORAL EVERY 6 HOURS PRN
Refills: 0
Start: 2023-10-21

## 2023-10-21 RX ORDER — VANCOMYCIN HYDROCHLORIDE 125 MG/1
125 CAPSULE ORAL EVERY 12 HOURS SCHEDULED
Refills: 0
Start: 2023-10-21 | End: 2023-10-25

## 2023-10-21 RX ADMIN — CEFEPIME HYDROCHLORIDE 2000 MG: 2 INJECTION, SOLUTION INTRAVENOUS at 09:08

## 2023-10-21 RX ADMIN — ALLOPURINOL 50 MG: 100 TABLET ORAL at 09:08

## 2023-10-21 RX ADMIN — GLYCERIN, HYPROMELLOSE, POLYETHYLENE GLYCOL 1 DROP: .2; .2; 1 LIQUID OPHTHALMIC at 09:07

## 2023-10-21 RX ADMIN — MAGNESIUM OXIDE TAB 400 MG (241.3 MG ELEMENTAL MG) 400 MG: 400 (241.3 MG) TAB at 09:08

## 2023-10-21 RX ADMIN — METOPROLOL TARTRATE 25 MG: 25 TABLET, FILM COATED ORAL at 09:08

## 2023-10-21 RX ADMIN — COLLAGENASE SANTYL: 250 OINTMENT TOPICAL at 09:07

## 2023-10-21 RX ADMIN — APIXABAN 2.5 MG: 2.5 TABLET, FILM COATED ORAL at 09:08

## 2023-10-21 RX ADMIN — PREDNISONE 20 MG: 20 TABLET ORAL at 09:12

## 2023-10-21 RX ADMIN — DILTIAZEM HYDROCHLORIDE 240 MG: 240 CAPSULE, COATED, EXTENDED RELEASE ORAL at 09:12

## 2023-10-21 RX ADMIN — ASPIRIN 81 MG: 81 TABLET, COATED ORAL at 09:08

## 2023-10-21 RX ADMIN — FOLIC ACID 1 MG: 1 TABLET ORAL at 09:08

## 2023-10-21 RX ADMIN — VANCOMYCIN HYDROCHLORIDE 125 MG: 125 CAPSULE ORAL at 09:08

## 2023-10-21 RX ADMIN — PANTOPRAZOLE SODIUM 40 MG: 40 TABLET, DELAYED RELEASE ORAL at 06:03

## 2023-10-21 NOTE — ASSESSMENT & PLAN NOTE
History of alcohol abuse with withdrawal seizures  Patient has not drank since [facility] hospitalization in August 2023

## 2023-10-21 NOTE — PROGRESS NOTES
Gerri Preston is a 82 y.o. male who is currently ordered Vancomycin IV with management by the Pharmacy Consult service. Relevant clinical data and objective / subjective history reviewed. Vancomycin Assessment:  Indication and Goal AUC/Trough: Soft tissue (goal -600, trough >10)  Clinical Status: stable  Micro:     Renal Function:  SCr: 1.61 mg/dL  CrCl: 31.9 mL/min  Renal replacement: Not on dialysis  Days of Therapy: 4  Current Dose:   750MG every 24 hours  Vancomycin Plan: random level was drawn today instead of 10/22/23 and resulted 24.6- extrapolated trough is ~18. Continue current dose. New Dosing: No change  Estimated AUC: 491 mcg*hr/mL  Estimated Trough: 16.4 mcg/mL  Next Level: 10/23/23 at 0600  Renal Function Monitoring: Daily BMP and [place] will continue to follow closely for s/sx of nephrotoxicity, infusion reactions and appropriateness of therapy. BMP and CBC will be ordered per protocol. We will continue to follow the patient’s culture results and clinical progress daily. Also, cefepime will be adjusted renally if necessary.     Ariela Salomon, Pharmacist, PharmD, BCPS

## 2023-10-21 NOTE — ASSESSMENT & PLAN NOTE
Presents from [facility] due to low grade fevers and weakness. Dry cough for about 2 weeks. Afebrile thus far. On RA  SIRS: tachycardia, WBC (13.12, possibly downtrending from recent admission)  Lactic WNL   Procal mildly elevated 0.39  Takes metoprolol bid, had not yet taken nighttime dose - possible cause for tachycardia. SOI  CXR without obvious infiltrate- pending official read   Order pneumonia labs  COVID/flu/RSV negative  UA and blood cultures pending  Pt with sacral wound. Based off images from prior admission wound has increased in size and depth. Possible source (however no redness or drainage noted)  Received 5 days of IV abx . C-diff positive with no diarrhea possible colonization.   Continue vancomycin p.o. for c-diff prophylaxis

## 2023-10-21 NOTE — DISCHARGE SUMMARY
[facility]  Discharge- Yusra Moselye 1935, 82 y.o. male MRN: [id number]  Unit/Bed#: -Carmen Encounter: [id number]  Primary Care Provider: Deven Lee DO   Date and time admitted to hospital: 10/17/2023  7:59 PM    Sacral wound  Assessment & Plan  Chronic sacral wound. Does not appear acutely infected but appears larger in size and depth from recent admit   Turn patient every 2 hours  Seen by general surgeon. Overall does not seem acutely infected per surgery. Received a short course of IV antibiotics. Continue vancomycin prophylaxis for c diff colonization for 4 days . Continue wound care instruction at the facility. Outpatient wound care follow-up    * SIRS (systemic inflammatory response syndrome) (H[facility])  Assessment & Plan  Presents from [facility] due to low grade fevers and weakness. Dry cough for about 2 weeks. Afebrile thus far. On RA  SIRS: tachycardia, WBC (13.12, possibly downtrending from recent admission)  Lactic WNL   Procal mildly elevated 0.39  Takes metoprolol bid, had not yet taken nighttime dose - possible cause for tachycardia. SOI  CXR without obvious infiltrate- pending official read   Order pneumonia labs  COVID/flu/RSV negative  UA and blood cultures pending  Pt with sacral wound. Based off images from prior admission wound has increased in size and depth. Possible source (however no redness or drainage noted)  Received 5 days of IV abx . C-diff positive with no diarrhea possible colonization. Continue vancomycin p.o. for c-diff prophylaxis     Acute pain of both wrists with known gout  Assessment & Plan  Uric acid level high at 16.1 in September. Started allopurinol. Noted recent synovial fluid (+) urate crystal   Improved symptoms today after steroid.   We will continue steroid taper outpatient      Atrial fibrillation [facility])  Assessment & Plan  Home regimen: Diltiazem 240 mg daily, metoprolol tartrate 25 mg twice daily, Eliquis    Chronic obstructive pulmonary disease, unspecified COPD type ([address])  Assessment & Plan  No signs of acute exacerbation at this time  Not on maintenance inhalers outpatient    History of alcohol abuse  Assessment & Plan  History of alcohol abuse with withdrawal seizures  Patient has not drank since [facility] hospitalization in August 2023              Medical Problems       Resolved Problems  Date Reviewed: 10/19/2023   None         Admission Date:   Admission Orders (From admission, onward)       Ordered        10/17/23 2138  INPATIENT ADMISSION  Once                            Primary diagnosis    Chronic sacral wound  Acute gouty arthritis in bilateral wrists      Procedures Performed:   Orders Placed This Encounter   Procedures    ED ECG Documentation Only       Summary of Hospital Course: 45-year-old male with past medical history of A-fib, COPD, sacral wound brought from the facility due to concern for low-grade fever and generalized weakness as well as possibly worsening sacral wound. Patient was seen by general surgery who felt no clear signs of wound infection. IV antibiotics was discontinued. Patient will need to continue wound care and outpatient follow-up by wound care. He was treated for acute gouty arthritis of bilateral wrists. Improved with low-dose steroid. Will discharge with tapering dose of a short course of steroid. Allopurinol was started as patient was noted to have high uric acid level according to recent blood work. He is stable for discharge back to rehab today    Significant Findings, Care, Treatment and Services Provided:   Negative blood cultures      Discharge instructions/Information to patient and family:   See after visit summary for information provided to patient and family. Provisions for Follow-Up Care:  See after visit summary for information related to follow-up care and any pertinent home health orders.       PCP: Isis Blandon DO    Disposition: Skilled nursing facility at [facility]    Planned Readmission: No      Discharge Medications:  See after visit summary for reconciled discharge medications provided to patient and family.

## 2023-10-21 NOTE — ASSESSMENT & PLAN NOTE
Uric acid level high at 16.1 in September. Started allopurinol. Noted recent synovial fluid (+) urate crystal   Improved symptoms today after steroid.   We will continue steroid taper outpatient

## 2023-10-21 NOTE — PLAN OF CARE
Problem: MOBILITY - ADULT  Goal: Maintain or return to baseline ADL function  Description: INTERVENTIONS:  -  Assess patient's ability to carry out ADLs; assess patient's baseline for ADL function and identify physical deficits which impact ability to perform ADLs (bathing, care of mouth/teeth, toileting, grooming, dressing, etc.)  - Assess/evaluate cause of self-care deficits   - Assess range of motion  - Assess patient's mobility; develop plan if impaired  - Assess patient's need for assistive devices and provide as appropriate  - Encourage maximum independence but intervene and supervise when necessary  - Involve family in performance of ADLs  - Assess for home care needs following discharge   - Consider OT consult to assist with ADL evaluation and planning for discharge  - Provide patient education as appropriate  Outcome: Progressing  Goal: Maintains/Returns to pre admission functional level  Description: INTERVENTIONS:  - Perform BMAT or MOVE assessment daily.   - Set and communicate daily mobility goal to care team and patient/family/caregiver. - Collaborate with rehabilitation services on mobility goals if consulted  - Perform Range of Motion 4 times a day. - Reposition patient every 2 hours.   - Dangle patient  times a day  - Stand patient  times a day  - Ambulate patient  times a day  - Out of bed to chair  times a day   - Out of bed for meals  times a day  - Out of bed for toileting  - Record patient progress and toleration of activity level   Outcome: Progressing     Problem: Prexisting or High Potential for Compromised Skin Integrity  Goal: Skin integrity is maintained or improved  Description: INTERVENTIONS:  - Identify patients at risk for skin breakdown  - Assess and monitor skin integrity  - Assess and monitor nutrition and hydration status  - Monitor labs   - Assess for incontinence   - Turn and reposition patient  - Assist with mobility/ambulation  - Relieve pressure over bony prominences  - Avoid friction and shearing  - Provide appropriate hygiene as needed including keeping skin clean and dry  - Evaluate need for skin moisturizer/barrier cream  - Collaborate with interdisciplinary team   - Patient/family teaching  - Consider wound care consult   Outcome: Progressing     Problem: Potential for Falls  Goal: Patient will remain free of falls  Description: INTERVENTIONS:  - Educate patient/family on patient safety including physical limitations  - Instruct patient to call for assistance with activity   - Consult OT/PT to assist with strengthening/mobility   - Keep Call bell within reach  - Keep bed low and locked with side rails adjusted as appropriate  - Keep care items and personal belongings within reach  - Initiate and maintain comfort rounds  - Make Fall Risk Sign visible to staff  - Offer Toileting every 2 Hours, in advance of need  - Initiate/Maintain bed alarm  - Obtain necessary fall risk management equipment:   - Apply yellow socks and bracelet for high fall risk patients  - Consider moving patient to room near nurses station  Outcome: Progressing        Problem: MUSCULOSKELETAL - ADULT  Goal: Maintain or return mobility to safest level of function  Description: INTERVENTIONS:  - Assess patient's ability to carry out ADLs; assess patient's baseline for ADL function and identify physical deficits which impact ability to perform ADLs (bathing, care of mouth/teeth, toileting, grooming, dressing, etc.)  - Assess/evaluate cause of self-care deficits   - Assess range of motion  - Assess patient's mobility  - Assess patient's need for assistive devices and provide as appropriate  - Encourage maximum independence but intervene and supervise when necessary  - Involve family in performance of ADLs  - Assess for home care needs following discharge   - Consider OT consult to assist with ADL evaluation and planning for discharge  - Provide patient education as appropriate  Outcome: Progressing  Goal: Maintain proper alignment of affected body part  Description: INTERVENTIONS:  - Support, maintain and protect limb and body alignment  - Provide patient/ family with appropriate education  Outcome: Progressing     Problem: Nutrition/Hydration-ADULT  Goal: Nutrient/Hydration intake appropriate for improving, restoring or maintaining nutritional needs  Description: Monitor and assess patient's nutrition/hydration status for malnutrition. Collaborate with interdisciplinary team and initiate plan and interventions as ordered. Monitor patient's weight and dietary intake as ordered or per policy. Utilize nutrition screening tool and intervene as necessary. Determine patient's food preferences and provide high-protein, high-caloric foods as appropriate.      INTERVENTIONS:  - Monitor oral intake, urinary output, labs, and treatment plans  - Assess nutrition and hydration status and recommend course of action  - Evaluate amount of meals eaten  - Assist patient with eating if necessary   - Allow adequate time for meals  - Recommend/ encourage appropriate diets, oral nutritional supplements, and vitamin/mineral supplements  - Order, calculate, and assess calorie counts as needed  - Recommend, monitor, and adjust tube feedings and TPN/PPN based on assessed needs  - Assess need for intravenous fluids  - Provide specific nutrition/hydration education as appropriate  - Include patient/family/caregiver in decisions related to nutrition  Outcome: Progressing

## 2023-10-21 NOTE — DISCHARGE INSTR - AVS FIRST PAGE
Dear Will Berumen,     It was our pleasure to care for you here at [facility]. It is our hope that we were always able to exceed the expected standards for your care during your stay. You were hospitalized due to chronic sacral wound. Acute gouty arthritis in bilateral wrists. you were cared for on the third floor under the service of Velia Butcher MD with the [facility] who covers for your primary care physician (PCP), Cristy Vizcarra DO, while you were hospitalized. If you have any questions or concerns related to this hospitalization, you may contact us at [phone]. For follow up as well as medication refills, we recommend that you follow up with your primary care physician. A registered nurse will reach out to you by phone within a few days after your discharge to answer any additional questions that you may have after going home. However, at this time we provide for you here, the most important instructions / recommendations at discharge:     Notable Medication Adjustments -   Steroid taper for 4 days ( acute gout)  Continue allopurinol  Continue vancomycin prophylactic dose for C diff for 4 days  Testing Required after Discharge -   ** Please contact your PCP to request testing orders for any of the testing recommended here **  Important follow up information -   Continue wound care/outpatient wound care follow-up  Other Instructions -   Please review this entire after visit summary as additional general instructions including medication list, appointments, activity, diet, any pertinent wound care, and other additional recommendations from your care team that may be provided for you.       Sincerely,     Velia Butcher MD

## 2023-10-21 NOTE — ASSESSMENT & PLAN NOTE
Chronic sacral wound. Does not appear acutely infected but appears larger in size and depth from recent admit   Turn patient every 2 hours  Seen by general surgeon. Overall does not seem acutely infected per surgery. Received a short course of IV antibiotics. Continue vancomycin prophylaxis for c diff colonization for 4 days . Continue wound care instruction at the facility.   Outpatient wound care follow-up

## 2023-10-23 LAB
BACTERIA BLD CULT: NORMAL
BACTERIA BLD CULT: NORMAL

## 2023-10-25 LAB
BACTERIA SPT RESP CULT: ABNORMAL
BACTERIA SPT RESP CULT: ABNORMAL
GRAM STN SPEC: ABNORMAL

## 2023-11-03 PROBLEM — A41.9 SEPSIS (HCC): Status: RESOLVED | Noted: 2023-09-04 | Resolved: 2023-11-03

## 2023-11-14 ENCOUNTER — TELEPHONE (OUTPATIENT)
Dept: OTHER | Facility: HOSPITAL | Age: 88
End: 2023-11-14

## 2023-11-14 NOTE — TELEPHONE ENCOUNTER
Called pt to discuss sputum culture results. Wife answered, said he is still in the rehab. Called rehab and spoke with the nurse. Told her the sputum culture result and sensitivity, and the recommendation to follow up with pulmonology. ( Spoke with pulm prior to this, who recommended pulm follow up, because this might be treated if the patient has pulmonary symptoms).  The nurse verbalized understanding and said she will let patient's doctor in the facility know and he will place pulm referral

## 2023-12-02 ENCOUNTER — HOME HEALTH ADMISSION (OUTPATIENT)
Dept: HOME HEALTH SERVICES | Facility: HOME HEALTHCARE | Age: 88
End: 2023-12-02
Payer: MEDICARE

## 2023-12-05 ENCOUNTER — HOME CARE VISIT (OUTPATIENT)
Dept: HOME HEALTH SERVICES | Facility: HOME HEALTHCARE | Age: 88
End: 2023-12-05
Payer: MEDICARE

## 2023-12-05 ENCOUNTER — TRANSITIONAL CARE MANAGEMENT (OUTPATIENT)
Dept: FAMILY MEDICINE CLINIC | Facility: CLINIC | Age: 88
End: 2023-12-05

## 2023-12-05 ENCOUNTER — TELEPHONE (OUTPATIENT)
Dept: FAMILY MEDICINE CLINIC | Facility: CLINIC | Age: 88
End: 2023-12-05

## 2023-12-05 VITALS
HEART RATE: 78 BPM | DIASTOLIC BLOOD PRESSURE: 60 MMHG | OXYGEN SATURATION: 99 % | RESPIRATION RATE: 20 BRPM | HEIGHT: 70 IN | SYSTOLIC BLOOD PRESSURE: 130 MMHG | BODY MASS INDEX: 25.2 KG/M2 | TEMPERATURE: 98.2 F | WEIGHT: 176 LBS

## 2023-12-05 PROCEDURE — 400013 VN SOC

## 2023-12-05 PROCEDURE — 10330081 VN NO-PAY CLAIM PROCEDURE

## 2023-12-05 PROCEDURE — G0299 HHS/HOSPICE OF RN EA 15 MIN: HCPCS

## 2023-12-05 NOTE — TELEPHONE ENCOUNTER
----- Message from Priya Benitez sent at 12/5/2023 12:58 PM EST -----  Regarding: TCM visit  Patient needing TCM follow-up:      Patient discharged from [address] [place] [address] to [place] on 10/21/23 with primary diagnosis of SIRS (systemic inflammatory response syndrome). Discharged from facility on 12/4/23 to home with home care. Please call patient to set up a TCM visit if you have not already done so. Thank you.

## 2023-12-06 ENCOUNTER — HOME CARE VISIT (OUTPATIENT)
Dept: HOME HEALTH SERVICES | Facility: HOME HEALTHCARE | Age: 88
End: 2023-12-06
Payer: MEDICARE

## 2023-12-06 VITALS
OXYGEN SATURATION: 99 % | SYSTOLIC BLOOD PRESSURE: 165 MMHG | HEART RATE: 94 BPM | RESPIRATION RATE: 16 BRPM | DIASTOLIC BLOOD PRESSURE: 85 MMHG

## 2023-12-06 VITALS — SYSTOLIC BLOOD PRESSURE: 160 MMHG | OXYGEN SATURATION: 99 % | HEART RATE: 93 BPM | DIASTOLIC BLOOD PRESSURE: 90 MMHG

## 2023-12-06 PROCEDURE — G0299 HHS/HOSPICE OF RN EA 15 MIN: HCPCS

## 2023-12-06 PROCEDURE — G0151 HHCP-SERV OF PT,EA 15 MIN: HCPCS

## 2023-12-07 ENCOUNTER — HOME CARE VISIT (OUTPATIENT)
Dept: HOME HEALTH SERVICES | Facility: HOME HEALTHCARE | Age: 88
End: 2023-12-07
Payer: MEDICARE

## 2023-12-07 VITALS
DIASTOLIC BLOOD PRESSURE: 80 MMHG | HEART RATE: 80 BPM | OXYGEN SATURATION: 94 % | RESPIRATION RATE: 18 BRPM | SYSTOLIC BLOOD PRESSURE: 158 MMHG

## 2023-12-07 PROCEDURE — G0155 HHCP-SVS OF CSW,EA 15 MIN: HCPCS

## 2023-12-07 PROCEDURE — G0156 HHCP-SVS OF AIDE,EA 15 MIN: HCPCS

## 2023-12-07 PROCEDURE — G0299 HHS/HOSPICE OF RN EA 15 MIN: HCPCS

## 2023-12-08 ENCOUNTER — HOME CARE VISIT (OUTPATIENT)
Dept: HOME HEALTH SERVICES | Facility: HOME HEALTHCARE | Age: 88
End: 2023-12-08
Payer: MEDICARE

## 2023-12-08 PROCEDURE — G0299 HHS/HOSPICE OF RN EA 15 MIN: HCPCS

## 2023-12-08 PROCEDURE — G0156 HHCP-SVS OF AIDE,EA 15 MIN: HCPCS

## 2023-12-08 PROCEDURE — G0151 HHCP-SERV OF PT,EA 15 MIN: HCPCS

## 2023-12-09 ENCOUNTER — HOME CARE VISIT (OUTPATIENT)
Dept: HOME HEALTH SERVICES | Facility: HOME HEALTHCARE | Age: 88
End: 2023-12-09
Payer: MEDICARE

## 2023-12-09 VITALS — DIASTOLIC BLOOD PRESSURE: 90 MMHG | HEART RATE: 78 BPM | SYSTOLIC BLOOD PRESSURE: 160 MMHG | OXYGEN SATURATION: 98 %

## 2023-12-09 VITALS
TEMPERATURE: 98.8 F | SYSTOLIC BLOOD PRESSURE: 158 MMHG | HEART RATE: 92 BPM | DIASTOLIC BLOOD PRESSURE: 80 MMHG | OXYGEN SATURATION: 99 %

## 2023-12-09 VITALS
SYSTOLIC BLOOD PRESSURE: 138 MMHG | RESPIRATION RATE: 18 BRPM | OXYGEN SATURATION: 100 % | HEART RATE: 98 BPM | DIASTOLIC BLOOD PRESSURE: 76 MMHG

## 2023-12-09 PROCEDURE — G0299 HHS/HOSPICE OF RN EA 15 MIN: HCPCS

## 2023-12-10 ENCOUNTER — HOME CARE VISIT (OUTPATIENT)
Dept: HOME HEALTH SERVICES | Facility: HOME HEALTHCARE | Age: 88
End: 2023-12-10
Payer: MEDICARE

## 2023-12-10 VITALS
RESPIRATION RATE: 20 BRPM | TEMPERATURE: 98.6 F | DIASTOLIC BLOOD PRESSURE: 70 MMHG | OXYGEN SATURATION: 100 % | HEART RATE: 78 BPM | SYSTOLIC BLOOD PRESSURE: 140 MMHG

## 2023-12-10 PROCEDURE — G0299 HHS/HOSPICE OF RN EA 15 MIN: HCPCS

## 2023-12-11 ENCOUNTER — HOME CARE VISIT (OUTPATIENT)
Dept: HOME HEALTH SERVICES | Facility: HOME HEALTHCARE | Age: 88
End: 2023-12-11
Payer: MEDICARE

## 2023-12-11 ENCOUNTER — TELEMEDICINE (OUTPATIENT)
Dept: FAMILY MEDICINE CLINIC | Facility: CLINIC | Age: 88
End: 2023-12-11
Payer: MEDICARE

## 2023-12-11 VITALS
HEART RATE: 90 BPM | DIASTOLIC BLOOD PRESSURE: 85 MMHG | SYSTOLIC BLOOD PRESSURE: 160 MMHG | OXYGEN SATURATION: 97 % | RESPIRATION RATE: 16 BRPM

## 2023-12-11 VITALS — DIASTOLIC BLOOD PRESSURE: 72 MMHG | SYSTOLIC BLOOD PRESSURE: 140 MMHG | HEART RATE: 96 BPM | OXYGEN SATURATION: 96 %

## 2023-12-11 DIAGNOSIS — S31.000D WOUND OF SACRAL REGION, SUBSEQUENT ENCOUNTER: ICD-10-CM

## 2023-12-11 DIAGNOSIS — M25.531 ACUTE PAIN OF BOTH WRISTS: ICD-10-CM

## 2023-12-11 DIAGNOSIS — Z76.89 ENCOUNTER FOR SUPPORT AND COORDINATION OF TRANSITION OF CARE: Primary | ICD-10-CM

## 2023-12-11 DIAGNOSIS — I10 ESSENTIAL HYPERTENSION: ICD-10-CM

## 2023-12-11 DIAGNOSIS — I48.91 ATRIAL FIBRILLATION, UNSPECIFIED TYPE (HCC): ICD-10-CM

## 2023-12-11 DIAGNOSIS — M25.532 ACUTE PAIN OF BOTH WRISTS: ICD-10-CM

## 2023-12-11 PROCEDURE — G0299 HHS/HOSPICE OF RN EA 15 MIN: HCPCS

## 2023-12-11 PROCEDURE — G0152 HHCP-SERV OF OT,EA 15 MIN: HCPCS

## 2023-12-11 PROCEDURE — 99214 OFFICE O/P EST MOD 30 MIN: CPT | Performed by: PHYSICIAN ASSISTANT

## 2023-12-11 PROCEDURE — G0156 HHCP-SVS OF AIDE,EA 15 MIN: HCPCS

## 2023-12-11 RX ORDER — NYSTATIN 100000 [USP'U]/G
POWDER TOPICAL
COMMUNITY
Start: 2023-12-04

## 2023-12-11 RX ORDER — FOLIC ACID 1 MG/1
1 TABLET ORAL DAILY
Qty: 30 TABLET | Refills: 0 | Status: SHIPPED | OUTPATIENT
Start: 2023-12-11

## 2023-12-11 RX ORDER — ALLOPURINOL 100 MG/1
50 TABLET ORAL DAILY
Qty: 30 TABLET | Refills: 1 | Status: SHIPPED | OUTPATIENT
Start: 2023-12-11

## 2023-12-11 NOTE — PROGRESS NOTES
Virtual TCM Visit:    Verification of patient location:    Patient is located at Home in the following state in which I hold an active license [place]    Assessment/Plan:        Problem List Items Addressed This Visit          Cardiovascular and Mediastinum    Atrial fibrillation ([address])       Other    Sacral wound    Acute pain of both wrists with known gout    Relevant Medications    allopurinol (ZYLOPRIM) 000 mg tablet    folic acid (FOLVITE) 1 mg tablet     Other Visit Diagnoses       Encounter for support and coordination of transition of care    -  Primary    Essential hypertension             Family members-patient to call with any questions or concerns. States they will call towards the end of December for other medication refills. Recommended an office follow-up appointment. Daughter states will make appointment with the wound care. Patient has home nursing. Reason for visit is TCM    Encounter provider Ten Mckee PA-C     Provider located at [address] [place] [address]. [place] [id number] [phone]    Recent Visits  Date Type Provider Dept   12/05/23 Telephone Michelle Reardon [facility] Geraldine   Showing recent visits within past 7 days and meeting all other requirements  Today's Visits  Date Type Provider Dept   12/11/23 [facility] [address], PA-C [facility] Geraldine   Showing today's visits and meeting all other requirements  Future Appointments  No visits were found meeting these conditions. Showing future appointments within next 150 days and meeting all other requirements       After connecting through [employer], the patient was identified by name and date of birth. Abby Ayon was informed that this is a telemedicine visit and that the visit is being conducted through the [email or web address] He agrees to proceed. .  My office door was closed. No one else was in the room.   He acknowledged consent and understanding of privacy and security of the video platform. The patient has agreed to participate and understands they can discontinue the visit at any time. Patient is aware this is a billable service. Transitional Care Management Review:  Zena Nieves is a 82 y.o. male here for TCM follow up. During the TCM phone call patient stated:    TCM Call       Date and time call was made  12/5/2023  5:57 PM    Hospital care reviewed  Records reviewed    Patient was hospitialized at  [facility]    Date of Admission  10/17/23    Date of discharge  10/21/23    Diagnosis  SIRS (systemic inflammatory response syndrome) ([address])    Disposition  Home    Were the patients medications reviewed and updated  Yes    Current Symptoms  Weakness    Weakness severity  Moderate          TCM Call       Post hospital issues  Reduced activity    Should patient be enrolled in anticoag monitoring? No    Scheduled for follow up? Yes    Did you obtain your prescribed medications  Yes    Do you need help managing your prescriptions or medications  No    Is transportation to your appointment needed  No    I have advised the patient to call PCP with any new or worsening symptoms  Dick OREILLY    Comments  Patient discharged to [facility] SNF/TCU/SNU TCM not needed          Subjective:     Patient ID: Zena Nieves is a 82 y.o. male. HPI  53-year-old male today for transition of medical care. Patient was hospitalized at [facility] October 17 till October 21 and then transferred to [facility]. Patient was being evaluated for sacral wound, SIRS, acute pain of both wrists with known gout, A-fib, COPD and history of alcohol abuse. Patient currently home and has daily visits from home nurse. Daughter states will reestablish care with wound care due to sacral wound. Medication refills for allopurinol and folic acid provided. Patient continues his Eliquis, metoprolol and Cardizem for A-fib.   Review of Systems Constitutional: Negative. Respiratory: Negative. Cardiovascular: Negative. Gastrointestinal: Negative. Neurological: Negative. All other systems reviewed and are negative. Objective:    Vitals:       Physical Exam  Vitals (Vital signs from home nursing notes reviewed.) and nursing note reviewed. Constitutional:       General: He is not in acute distress. Appearance: He is not ill-appearing, toxic-appearing or diaphoretic. Comments: Sitting up in chair in no apparent distress. Pulmonary:      Effort: Pulmonary effort is normal.   Neurological:      Mental Status: He is alert and oriented to person, place, and time. Medications have been reviewed by provider in current encounter    I spent 25 minutes with the patient during this visit. Raulito Marti PA-C      VIRTUAL VISIT [facility] verbally agrees to participate in [facility]. Pt is aware that [facility] could be limited without vital signs or the ability to perform a full hands-on physical exam. Krystinadean Barton understands he or the provider may request at any time to terminate the video visit and request the patient to seek care or treatment in person.

## 2023-12-12 ENCOUNTER — HOME CARE VISIT (OUTPATIENT)
Dept: HOME HEALTH SERVICES | Facility: HOME HEALTHCARE | Age: 88
End: 2023-12-12
Payer: MEDICARE

## 2023-12-12 VITALS
TEMPERATURE: 96.9 F | HEART RATE: 90 BPM | OXYGEN SATURATION: 98 % | RESPIRATION RATE: 20 BRPM | SYSTOLIC BLOOD PRESSURE: 160 MMHG | DIASTOLIC BLOOD PRESSURE: 85 MMHG

## 2023-12-12 VITALS
SYSTOLIC BLOOD PRESSURE: 160 MMHG | OXYGEN SATURATION: 100 % | DIASTOLIC BLOOD PRESSURE: 78 MMHG | RESPIRATION RATE: 18 BRPM | HEART RATE: 94 BPM | TEMPERATURE: 97.6 F

## 2023-12-12 PROCEDURE — G0299 HHS/HOSPICE OF RN EA 15 MIN: HCPCS

## 2023-12-12 PROCEDURE — G0157 HHC PT ASSISTANT EA 15: HCPCS

## 2023-12-13 ENCOUNTER — HOME CARE VISIT (OUTPATIENT)
Dept: HOME HEALTH SERVICES | Facility: HOME HEALTHCARE | Age: 88
End: 2023-12-13
Payer: MEDICARE

## 2023-12-13 VITALS
RESPIRATION RATE: 16 BRPM | HEART RATE: 88 BPM | DIASTOLIC BLOOD PRESSURE: 70 MMHG | SYSTOLIC BLOOD PRESSURE: 142 MMHG | OXYGEN SATURATION: 97 %

## 2023-12-13 PROCEDURE — G0299 HHS/HOSPICE OF RN EA 15 MIN: HCPCS

## 2023-12-14 ENCOUNTER — HOME CARE VISIT (OUTPATIENT)
Dept: HOME HEALTH SERVICES | Facility: HOME HEALTHCARE | Age: 88
End: 2023-12-14
Payer: MEDICARE

## 2023-12-14 VITALS
OXYGEN SATURATION: 97 % | TEMPERATURE: 95.1 F | HEART RATE: 102 BPM | DIASTOLIC BLOOD PRESSURE: 70 MMHG | SYSTOLIC BLOOD PRESSURE: 152 MMHG

## 2023-12-14 PROCEDURE — G0157 HHC PT ASSISTANT EA 15: HCPCS

## 2023-12-14 PROCEDURE — G0152 HHCP-SERV OF OT,EA 15 MIN: HCPCS | Performed by: OCCUPATIONAL THERAPIST

## 2023-12-15 ENCOUNTER — HOME CARE VISIT (OUTPATIENT)
Dept: HOME HEALTH SERVICES | Facility: HOME HEALTHCARE | Age: 88
End: 2023-12-15
Payer: MEDICARE

## 2023-12-15 ENCOUNTER — TELEPHONE (OUTPATIENT)
Dept: HOME HEALTH SERVICES | Facility: HOME HEALTHCARE | Age: 88
End: 2023-12-15

## 2023-12-15 PROCEDURE — G0156 HHCP-SVS OF AIDE,EA 15 MIN: HCPCS

## 2023-12-16 ENCOUNTER — HOME CARE VISIT (OUTPATIENT)
Dept: HOME HEALTH SERVICES | Facility: HOME HEALTHCARE | Age: 88
End: 2023-12-16
Payer: MEDICARE

## 2023-12-16 VITALS
RESPIRATION RATE: 18 BRPM | HEART RATE: 78 BPM | OXYGEN SATURATION: 90 % | TEMPERATURE: 97.3 F | SYSTOLIC BLOOD PRESSURE: 160 MMHG | DIASTOLIC BLOOD PRESSURE: 80 MMHG

## 2023-12-16 PROCEDURE — G0299 HHS/HOSPICE OF RN EA 15 MIN: HCPCS

## 2023-12-17 ENCOUNTER — HOME CARE VISIT (OUTPATIENT)
Dept: HOME HEALTH SERVICES | Facility: HOME HEALTHCARE | Age: 88
End: 2023-12-17
Payer: MEDICARE

## 2023-12-17 PROCEDURE — G0299 HHS/HOSPICE OF RN EA 15 MIN: HCPCS

## 2023-12-18 ENCOUNTER — HOME CARE VISIT (OUTPATIENT)
Dept: HOME HEALTH SERVICES | Facility: HOME HEALTHCARE | Age: 88
End: 2023-12-18
Payer: MEDICARE

## 2023-12-18 ENCOUNTER — TELEPHONE (OUTPATIENT)
Dept: FAMILY MEDICINE CLINIC | Facility: CLINIC | Age: 88
End: 2023-12-18

## 2023-12-18 ENCOUNTER — HOSPICE ADMISSION (OUTPATIENT)
Dept: HOME HOSPICE | Facility: HOSPICE | Age: 88
End: 2023-12-18
Payer: MEDICARE

## 2023-12-18 VITALS
DIASTOLIC BLOOD PRESSURE: 80 MMHG | OXYGEN SATURATION: 98 % | RESPIRATION RATE: 16 BRPM | SYSTOLIC BLOOD PRESSURE: 158 MMHG | HEART RATE: 88 BPM

## 2023-12-18 VITALS
DIASTOLIC BLOOD PRESSURE: 68 MMHG | SYSTOLIC BLOOD PRESSURE: 120 MMHG | RESPIRATION RATE: 18 BRPM | OXYGEN SATURATION: 98 % | HEART RATE: 82 BPM

## 2023-12-18 DIAGNOSIS — Z16.12 ESBL (EXTENDED SPECTRUM BETA-LACTAMASE) PRODUCING BACTERIA INFECTION: ICD-10-CM

## 2023-12-18 DIAGNOSIS — G93.40 ACUTE ENCEPHALOPATHY: Primary | ICD-10-CM

## 2023-12-18 DIAGNOSIS — A49.9 ESBL (EXTENDED SPECTRUM BETA-LACTAMASE) PRODUCING BACTERIA INFECTION: ICD-10-CM

## 2023-12-18 DIAGNOSIS — R65.10 SIRS (SYSTEMIC INFLAMMATORY RESPONSE SYNDROME) (HCC): ICD-10-CM

## 2023-12-18 PROCEDURE — G0156 HHCP-SVS OF AIDE,EA 15 MIN: HCPCS

## 2023-12-18 PROCEDURE — G0299 HHS/HOSPICE OF RN EA 15 MIN: HCPCS

## 2023-12-18 PROCEDURE — G0157 HHC PT ASSISTANT EA 15: HCPCS

## 2023-12-18 NOTE — TELEPHONE ENCOUNTER
Marquis is refusing any further care and Rebecca from hospice called statng his daughter called to say she believes it is time for hospice- can you place an order to [facility] for a Hospice consult?  Thank you

## 2023-12-19 ENCOUNTER — HOME CARE VISIT (OUTPATIENT)
Dept: HOME HEALTH SERVICES | Facility: HOME HEALTHCARE | Age: 88
End: 2023-12-19
Payer: MEDICARE

## 2023-12-19 VITALS — DIASTOLIC BLOOD PRESSURE: 88 MMHG | SYSTOLIC BLOOD PRESSURE: 150 MMHG

## 2023-12-19 PROCEDURE — G0152 HHCP-SERV OF OT,EA 15 MIN: HCPCS | Performed by: OCCUPATIONAL THERAPIST

## 2023-12-20 ENCOUNTER — HOME CARE VISIT (OUTPATIENT)
Dept: HOME HEALTH SERVICES | Facility: HOME HEALTHCARE | Age: 88
End: 2023-12-20
Payer: MEDICARE

## 2023-12-20 VITALS
TEMPERATURE: 97.3 F | HEART RATE: 76 BPM | SYSTOLIC BLOOD PRESSURE: 124 MMHG | RESPIRATION RATE: 18 BRPM | DIASTOLIC BLOOD PRESSURE: 76 MMHG

## 2023-12-20 PROCEDURE — G0299 HHS/HOSPICE OF RN EA 15 MIN: HCPCS

## 2023-12-20 NOTE — CASE COMMUNICATION
For RLOC Marquis Issa  [birth year] 87 yo male at home. Dxs Afib, SIRS, COPD, and HX of Alcohol abuse-quit in 2023, Acute Metabolic Encephalopathy, Hx CVA, CKD3, Stage 4 sacral wound. Was hospitalized 10.17 to 10.21 with PNA, SIRS and his sacral wound went to [facility] on [address] and is now home. Last labs I can see 10.21 BUN 31 Creat 1.61 Family reports he is declining, weaker, sleeping most of the day and refusing to go to the hosp ital.     For RLOC by Dr Cortez 23  DX Sepsis from wound

## 2023-12-21 ENCOUNTER — HOME CARE VISIT (OUTPATIENT)
Dept: HOME HEALTH SERVICES | Facility: HOME HEALTHCARE | Age: 88
End: 2023-12-21
Payer: MEDICARE

## 2023-12-21 PROCEDURE — G0299 HHS/HOSPICE OF RN EA 15 MIN: HCPCS

## 2023-12-22 ENCOUNTER — HOME CARE VISIT (OUTPATIENT)
Dept: HOME HOSPICE | Facility: HOSPICE | Age: 88
End: 2023-12-22
Payer: MEDICARE

## 2023-12-22 PROCEDURE — 10330057 MEDICATION, GENERAL

## 2023-12-22 PROCEDURE — G0155 HHCP-SVS OF CSW,EA 15 MIN: HCPCS

## 2023-12-23 ENCOUNTER — HOME CARE VISIT (OUTPATIENT)
Dept: HOME HEALTH SERVICES | Facility: HOME HEALTHCARE | Age: 88
End: 2023-12-23
Payer: MEDICARE

## 2023-12-23 VITALS
OXYGEN SATURATION: 99 % | HEART RATE: 73 BPM | TEMPERATURE: 97.8 F | DIASTOLIC BLOOD PRESSURE: 80 MMHG | RESPIRATION RATE: 18 BRPM | SYSTOLIC BLOOD PRESSURE: 152 MMHG

## 2023-12-25 ENCOUNTER — HOME CARE VISIT (OUTPATIENT)
Dept: HOME HEALTH SERVICES | Facility: HOME HEALTHCARE | Age: 88
End: 2023-12-25
Payer: MEDICARE

## 2023-12-25 PROCEDURE — G0156 HHCP-SVS OF AIDE,EA 15 MIN: HCPCS

## 2023-12-26 ENCOUNTER — HOME CARE VISIT (OUTPATIENT)
Dept: HOME HEALTH SERVICES | Facility: HOME HEALTHCARE | Age: 88
End: 2023-12-26
Payer: MEDICARE

## 2023-12-26 PROCEDURE — 10330057 MEDICATION, GENERAL

## 2023-12-27 ENCOUNTER — HOME CARE VISIT (OUTPATIENT)
Dept: HOME HEALTH SERVICES | Facility: HOME HEALTHCARE | Age: 88
End: 2023-12-27
Payer: MEDICARE

## 2023-12-27 PROCEDURE — G0299 HHS/HOSPICE OF RN EA 15 MIN: HCPCS

## 2023-12-28 ENCOUNTER — HOME CARE VISIT (OUTPATIENT)
Dept: HOME HEALTH SERVICES | Facility: HOME HEALTHCARE | Age: 88
End: 2023-12-28
Payer: MEDICARE

## 2023-12-29 ENCOUNTER — HOME CARE VISIT (OUTPATIENT)
Dept: HOME HOSPICE | Facility: HOSPICE | Age: 88
End: 2023-12-29
Payer: MEDICARE

## 2023-12-29 ENCOUNTER — HOME CARE VISIT (OUTPATIENT)
Dept: HOME HEALTH SERVICES | Facility: HOME HEALTHCARE | Age: 88
End: 2023-12-29
Payer: MEDICARE

## 2023-12-29 PROCEDURE — G0299 HHS/HOSPICE OF RN EA 15 MIN: HCPCS

## 2023-12-30 ENCOUNTER — HOME CARE VISIT (OUTPATIENT)
Dept: HOME HEALTH SERVICES | Facility: HOME HEALTHCARE | Age: 88
End: 2023-12-30
Payer: MEDICARE

## 2023-12-30 PROCEDURE — G0156 HHCP-SVS OF AIDE,EA 15 MIN: HCPCS

## 2023-12-31 ENCOUNTER — HOME CARE VISIT (OUTPATIENT)
Dept: HOME HEALTH SERVICES | Facility: HOME HEALTHCARE | Age: 88
End: 2023-12-31
Payer: MEDICARE

## 2023-12-31 VITALS — HEART RATE: 76 BPM | DIASTOLIC BLOOD PRESSURE: 98 MMHG | RESPIRATION RATE: 18 BRPM | SYSTOLIC BLOOD PRESSURE: 150 MMHG

## 2023-12-31 PROCEDURE — G0156 HHCP-SVS OF AIDE,EA 15 MIN: HCPCS

## 2023-12-31 PROCEDURE — G0299 HHS/HOSPICE OF RN EA 15 MIN: HCPCS

## 2024-01-01 ENCOUNTER — HOME CARE VISIT (OUTPATIENT)
Dept: HOME HOSPICE | Facility: HOSPICE | Age: 89
End: 2024-01-01
Payer: MEDICARE

## 2024-01-01 ENCOUNTER — HOME CARE VISIT (OUTPATIENT)
Dept: HOME HEALTH SERVICES | Facility: HOME HEALTHCARE | Age: 89
End: 2024-01-01
Payer: MEDICARE

## 2024-01-01 ENCOUNTER — HOME CARE VISIT (OUTPATIENT)
Dept: HOME HEALTH SERVICES | Facility: HOME HEALTHCARE | Age: 89
End: 2024-01-01

## 2024-01-01 VITALS
DIASTOLIC BLOOD PRESSURE: 82 MMHG | TEMPERATURE: 97.8 F | SYSTOLIC BLOOD PRESSURE: 144 MMHG | RESPIRATION RATE: 18 BRPM | HEART RATE: 70 BPM

## 2024-01-01 DIAGNOSIS — Z51.5 HOSPICE CARE PATIENT: Primary | ICD-10-CM

## 2024-01-01 DIAGNOSIS — I63.9 ACUTE ISCHEMIC STROKE (HCC): ICD-10-CM

## 2024-01-01 PROCEDURE — G0299 HHS/HOSPICE OF RN EA 15 MIN: HCPCS

## 2024-01-01 PROCEDURE — G0156 HHCP-SVS OF AIDE,EA 15 MIN: HCPCS

## 2024-01-02 ENCOUNTER — HOME CARE VISIT (OUTPATIENT)
Dept: HOME HEALTH SERVICES | Facility: HOME HEALTHCARE | Age: 89
End: 2024-01-02
Payer: MEDICARE

## 2024-01-02 PROCEDURE — G0156 HHCP-SVS OF AIDE,EA 15 MIN: HCPCS

## 2024-01-02 PROCEDURE — G0299 HHS/HOSPICE OF RN EA 15 MIN: HCPCS

## 2024-01-03 ENCOUNTER — HOME CARE VISIT (OUTPATIENT)
Dept: HOME HEALTH SERVICES | Facility: HOME HEALTHCARE | Age: 89
End: 2024-01-03
Payer: MEDICARE

## 2024-01-03 PROCEDURE — G0156 HHCP-SVS OF AIDE,EA 15 MIN: HCPCS

## 2024-01-04 ENCOUNTER — HOME CARE VISIT (OUTPATIENT)
Dept: HOME HEALTH SERVICES | Facility: HOME HEALTHCARE | Age: 89
End: 2024-01-04
Payer: MEDICARE

## 2024-01-04 PROCEDURE — G0156 HHCP-SVS OF AIDE,EA 15 MIN: HCPCS

## 2024-01-04 PROCEDURE — G0300 HHS/HOSPICE OF LPN EA 15 MIN: HCPCS

## 2024-01-05 ENCOUNTER — HOME CARE VISIT (OUTPATIENT)
Dept: HOME HEALTH SERVICES | Facility: HOME HEALTHCARE | Age: 89
End: 2024-01-05
Payer: MEDICARE

## 2024-01-05 PROCEDURE — G0156 HHCP-SVS OF AIDE,EA 15 MIN: HCPCS

## 2024-01-06 ENCOUNTER — HOME CARE VISIT (OUTPATIENT)
Dept: HOME HEALTH SERVICES | Facility: HOME HEALTHCARE | Age: 89
End: 2024-01-06
Payer: MEDICARE

## 2024-01-06 PROCEDURE — G0156 HHCP-SVS OF AIDE,EA 15 MIN: HCPCS

## 2024-01-07 ENCOUNTER — HOME CARE VISIT (OUTPATIENT)
Dept: HOME HEALTH SERVICES | Facility: HOME HEALTHCARE | Age: 89
End: 2024-01-07
Payer: MEDICARE

## 2024-01-07 PROCEDURE — G0156 HHCP-SVS OF AIDE,EA 15 MIN: HCPCS

## 2024-01-08 ENCOUNTER — HOME CARE VISIT (OUTPATIENT)
Dept: HOME HOSPICE | Facility: HOSPICE | Age: 89
End: 2024-01-08
Payer: MEDICARE

## 2024-01-08 ENCOUNTER — HOME CARE VISIT (OUTPATIENT)
Dept: HOME HEALTH SERVICES | Facility: HOME HEALTHCARE | Age: 89
End: 2024-01-08
Payer: MEDICARE

## 2024-01-08 PROCEDURE — G0156 HHCP-SVS OF AIDE,EA 15 MIN: HCPCS

## 2024-01-08 NOTE — ASSESSMENT & PLAN NOTE
· History of alcohol abuse with withdrawal seizures, has not drink since prior to [facility] hospitalization 8/8 FAMILY HISTORY:  Family history of lung cancer

## 2024-01-09 ENCOUNTER — HOME CARE VISIT (OUTPATIENT)
Dept: HOME HEALTH SERVICES | Facility: HOME HEALTHCARE | Age: 89
End: 2024-01-09
Payer: MEDICARE

## 2024-01-09 PROCEDURE — G0299 HHS/HOSPICE OF RN EA 15 MIN: HCPCS

## 2024-01-09 PROCEDURE — G0156 HHCP-SVS OF AIDE,EA 15 MIN: HCPCS

## 2024-01-10 ENCOUNTER — HOME CARE VISIT (OUTPATIENT)
Dept: HOME HEALTH SERVICES | Facility: HOME HEALTHCARE | Age: 89
End: 2024-01-10
Payer: MEDICARE

## 2024-01-10 VITALS
HEART RATE: 92 BPM | RESPIRATION RATE: 20 BRPM | TEMPERATURE: 98.1 F | OXYGEN SATURATION: 94 % | SYSTOLIC BLOOD PRESSURE: 178 MMHG | DIASTOLIC BLOOD PRESSURE: 92 MMHG

## 2024-01-10 PROCEDURE — G0156 HHCP-SVS OF AIDE,EA 15 MIN: HCPCS

## 2024-01-11 ENCOUNTER — HOME CARE VISIT (OUTPATIENT)
Dept: HOME HEALTH SERVICES | Facility: HOME HEALTHCARE | Age: 89
End: 2024-01-11
Payer: MEDICARE

## 2024-01-11 VITALS
RESPIRATION RATE: 18 BRPM | DIASTOLIC BLOOD PRESSURE: 80 MMHG | TEMPERATURE: 97.6 F | SYSTOLIC BLOOD PRESSURE: 172 MMHG | HEART RATE: 80 BPM

## 2024-01-11 PROCEDURE — G0156 HHCP-SVS OF AIDE,EA 15 MIN: HCPCS

## 2024-01-11 PROCEDURE — G0299 HHS/HOSPICE OF RN EA 15 MIN: HCPCS

## 2024-01-12 ENCOUNTER — HOME CARE VISIT (OUTPATIENT)
Dept: HOME HEALTH SERVICES | Facility: HOME HEALTHCARE | Age: 89
End: 2024-01-12
Payer: MEDICARE

## 2024-01-12 PROCEDURE — G0156 HHCP-SVS OF AIDE,EA 15 MIN: HCPCS

## 2024-01-13 ENCOUNTER — HOME CARE VISIT (OUTPATIENT)
Dept: HOME HEALTH SERVICES | Facility: HOME HEALTHCARE | Age: 89
End: 2024-01-13
Payer: MEDICARE

## 2024-01-13 PROCEDURE — G0156 HHCP-SVS OF AIDE,EA 15 MIN: HCPCS

## 2024-01-14 ENCOUNTER — HOME CARE VISIT (OUTPATIENT)
Dept: HOME HEALTH SERVICES | Facility: HOME HEALTHCARE | Age: 89
End: 2024-01-14
Payer: MEDICARE

## 2024-01-14 PROCEDURE — G0156 HHCP-SVS OF AIDE,EA 15 MIN: HCPCS

## 2024-01-15 ENCOUNTER — HOME CARE VISIT (OUTPATIENT)
Dept: HOME HEALTH SERVICES | Facility: HOME HEALTHCARE | Age: 89
End: 2024-01-15
Payer: MEDICARE

## 2024-01-15 PROCEDURE — G0299 HHS/HOSPICE OF RN EA 15 MIN: HCPCS

## 2024-01-15 PROCEDURE — G0156 HHCP-SVS OF AIDE,EA 15 MIN: HCPCS

## 2024-01-16 ENCOUNTER — HOME CARE VISIT (OUTPATIENT)
Dept: HOME HEALTH SERVICES | Facility: HOME HEALTHCARE | Age: 89
End: 2024-01-16
Payer: MEDICARE

## 2024-01-16 PROCEDURE — G0156 HHCP-SVS OF AIDE,EA 15 MIN: HCPCS

## 2024-01-17 ENCOUNTER — HOME CARE VISIT (OUTPATIENT)
Dept: HOME HEALTH SERVICES | Facility: HOME HEALTHCARE | Age: 89
End: 2024-01-17
Payer: MEDICARE

## 2024-01-17 PROCEDURE — G0156 HHCP-SVS OF AIDE,EA 15 MIN: HCPCS

## 2024-01-17 PROCEDURE — G0299 HHS/HOSPICE OF RN EA 15 MIN: HCPCS

## 2024-01-18 ENCOUNTER — HOME CARE VISIT (OUTPATIENT)
Dept: HOME HOSPICE | Facility: HOSPICE | Age: 89
End: 2024-01-18
Payer: MEDICARE

## 2024-01-18 ENCOUNTER — HOME CARE VISIT (OUTPATIENT)
Dept: HOME HEALTH SERVICES | Facility: HOME HEALTHCARE | Age: 89
End: 2024-01-18
Payer: MEDICARE

## 2024-01-18 PROCEDURE — G0156 HHCP-SVS OF AIDE,EA 15 MIN: HCPCS

## 2024-01-19 ENCOUNTER — HOME CARE VISIT (OUTPATIENT)
Dept: HOME HEALTH SERVICES | Facility: HOME HEALTHCARE | Age: 89
End: 2024-01-19
Payer: MEDICARE

## 2024-01-19 PROCEDURE — G0299 HHS/HOSPICE OF RN EA 15 MIN: HCPCS

## 2024-01-20 ENCOUNTER — HOME CARE VISIT (OUTPATIENT)
Dept: HOME HEALTH SERVICES | Facility: HOME HEALTHCARE | Age: 89
End: 2024-01-20
Payer: MEDICARE

## 2024-01-20 PROCEDURE — G0156 HHCP-SVS OF AIDE,EA 15 MIN: HCPCS

## 2024-01-21 ENCOUNTER — HOME CARE VISIT (OUTPATIENT)
Dept: HOME HOSPICE | Facility: HOSPICE | Age: 89
End: 2024-01-21
Payer: MEDICARE

## 2024-01-21 ENCOUNTER — HOME CARE VISIT (OUTPATIENT)
Dept: HOME HEALTH SERVICES | Facility: HOME HEALTHCARE | Age: 89
End: 2024-01-21
Payer: MEDICARE

## 2024-01-21 VITALS — DIASTOLIC BLOOD PRESSURE: 78 MMHG | RESPIRATION RATE: 18 BRPM | SYSTOLIC BLOOD PRESSURE: 150 MMHG | HEART RATE: 78 BPM

## 2024-01-21 PROCEDURE — G0299 HHS/HOSPICE OF RN EA 15 MIN: HCPCS

## 2024-01-21 PROCEDURE — G0156 HHCP-SVS OF AIDE,EA 15 MIN: HCPCS

## 2024-01-22 ENCOUNTER — HOME CARE VISIT (OUTPATIENT)
Dept: HOME HEALTH SERVICES | Facility: HOME HEALTHCARE | Age: 89
End: 2024-01-22
Payer: MEDICARE

## 2024-01-22 PROCEDURE — G0156 HHCP-SVS OF AIDE,EA 15 MIN: HCPCS

## 2024-01-23 ENCOUNTER — HOME CARE VISIT (OUTPATIENT)
Dept: HOME HEALTH SERVICES | Facility: HOME HEALTHCARE | Age: 89
End: 2024-01-23
Payer: MEDICARE

## 2024-01-23 PROCEDURE — G0156 HHCP-SVS OF AIDE,EA 15 MIN: HCPCS

## 2024-01-24 ENCOUNTER — HOME CARE VISIT (OUTPATIENT)
Dept: HOME HEALTH SERVICES | Facility: HOME HEALTHCARE | Age: 89
End: 2024-01-24
Payer: MEDICARE

## 2024-01-24 PROCEDURE — G0156 HHCP-SVS OF AIDE,EA 15 MIN: HCPCS

## 2024-01-25 ENCOUNTER — HOME CARE VISIT (OUTPATIENT)
Dept: HOME HOSPICE | Facility: HOSPICE | Age: 89
End: 2024-01-25
Payer: MEDICARE

## 2024-01-25 ENCOUNTER — HOME CARE VISIT (OUTPATIENT)
Dept: HOME HEALTH SERVICES | Facility: HOME HEALTHCARE | Age: 89
End: 2024-01-25
Payer: MEDICARE

## 2024-01-25 PROCEDURE — G0156 HHCP-SVS OF AIDE,EA 15 MIN: HCPCS

## 2024-01-26 ENCOUNTER — HOME CARE VISIT (OUTPATIENT)
Dept: HOME HEALTH SERVICES | Facility: HOME HEALTHCARE | Age: 89
End: 2024-01-26
Payer: MEDICARE

## 2024-01-26 PROCEDURE — G0299 HHS/HOSPICE OF RN EA 15 MIN: HCPCS

## 2024-01-26 PROCEDURE — G0156 HHCP-SVS OF AIDE,EA 15 MIN: HCPCS

## 2024-01-29 ENCOUNTER — HOME CARE VISIT (OUTPATIENT)
Dept: HOME HEALTH SERVICES | Facility: HOME HEALTHCARE | Age: 89
End: 2024-01-29
Payer: MEDICARE

## 2024-01-29 PROCEDURE — G0156 HHCP-SVS OF AIDE,EA 15 MIN: HCPCS

## 2024-01-29 PROCEDURE — G0300 HHS/HOSPICE OF LPN EA 15 MIN: HCPCS

## 2024-01-30 ENCOUNTER — HOME CARE VISIT (OUTPATIENT)
Dept: HOME HEALTH SERVICES | Facility: HOME HEALTHCARE | Age: 89
End: 2024-01-30
Payer: MEDICARE

## 2024-01-30 ENCOUNTER — HOME CARE VISIT (OUTPATIENT)
Dept: HOME HOSPICE | Facility: HOSPICE | Age: 89
End: 2024-01-30
Payer: MEDICARE

## 2024-01-30 PROCEDURE — G0156 HHCP-SVS OF AIDE,EA 15 MIN: HCPCS

## 2024-01-31 ENCOUNTER — HOME CARE VISIT (OUTPATIENT)
Dept: HOME HEALTH SERVICES | Facility: HOME HEALTHCARE | Age: 89
End: 2024-01-31
Payer: MEDICARE

## 2024-01-31 PROCEDURE — G0156 HHCP-SVS OF AIDE,EA 15 MIN: HCPCS

## 2024-02-01 ENCOUNTER — HOME CARE VISIT (OUTPATIENT)
Dept: HOME HEALTH SERVICES | Facility: HOME HEALTHCARE | Age: 89
End: 2024-02-01
Payer: MEDICARE

## 2024-02-01 ENCOUNTER — HOME CARE VISIT (OUTPATIENT)
Dept: HOME HOSPICE | Facility: HOSPICE | Age: 89
End: 2024-02-01
Payer: MEDICARE

## 2024-02-01 PROCEDURE — G0299 HHS/HOSPICE OF RN EA 15 MIN: HCPCS

## 2024-02-01 PROCEDURE — G0155 HHCP-SVS OF CSW,EA 15 MIN: HCPCS

## 2024-02-01 PROCEDURE — G0156 HHCP-SVS OF AIDE,EA 15 MIN: HCPCS

## 2024-02-02 ENCOUNTER — HOME CARE VISIT (OUTPATIENT)
Dept: HOME HEALTH SERVICES | Facility: HOME HEALTHCARE | Age: 89
End: 2024-02-02
Payer: MEDICARE

## 2024-02-02 PROCEDURE — G0156 HHCP-SVS OF AIDE,EA 15 MIN: HCPCS

## 2024-02-03 ENCOUNTER — HOME CARE VISIT (OUTPATIENT)
Dept: HOME HEALTH SERVICES | Facility: HOME HEALTHCARE | Age: 89
End: 2024-02-03
Payer: MEDICARE

## 2024-02-03 PROCEDURE — G0156 HHCP-SVS OF AIDE,EA 15 MIN: HCPCS

## 2024-02-04 ENCOUNTER — HOME CARE VISIT (OUTPATIENT)
Dept: HOME HEALTH SERVICES | Facility: HOME HEALTHCARE | Age: 89
End: 2024-02-04
Payer: MEDICARE

## 2024-02-04 PROCEDURE — G0156 HHCP-SVS OF AIDE,EA 15 MIN: HCPCS

## 2024-02-05 ENCOUNTER — HOME CARE VISIT (OUTPATIENT)
Dept: HOME HEALTH SERVICES | Facility: HOME HEALTHCARE | Age: 89
End: 2024-02-05
Payer: MEDICARE

## 2024-02-05 VITALS — RESPIRATION RATE: 18 BRPM | DIASTOLIC BLOOD PRESSURE: 76 MMHG | SYSTOLIC BLOOD PRESSURE: 156 MMHG | HEART RATE: 66 BPM

## 2024-02-05 PROCEDURE — G0299 HHS/HOSPICE OF RN EA 15 MIN: HCPCS

## 2024-02-06 ENCOUNTER — HOME CARE VISIT (OUTPATIENT)
Dept: HOME HEALTH SERVICES | Facility: HOME HEALTHCARE | Age: 89
End: 2024-02-06
Payer: MEDICARE

## 2024-02-06 ENCOUNTER — HOME CARE VISIT (OUTPATIENT)
Dept: HOME HOSPICE | Facility: HOSPICE | Age: 89
End: 2024-02-06
Payer: MEDICARE

## 2024-02-06 PROCEDURE — G0156 HHCP-SVS OF AIDE,EA 15 MIN: HCPCS

## 2024-02-07 ENCOUNTER — HOME CARE VISIT (OUTPATIENT)
Dept: HOME HEALTH SERVICES | Facility: HOME HEALTHCARE | Age: 89
End: 2024-02-07
Payer: MEDICARE

## 2024-02-07 PROCEDURE — G0156 HHCP-SVS OF AIDE,EA 15 MIN: HCPCS

## 2024-02-07 PROCEDURE — G0300 HHS/HOSPICE OF LPN EA 15 MIN: HCPCS

## 2024-02-07 RX ADMIN — Medication 400 MG: at 13:40

## 2024-02-07 NOTE — CASE COMMUNICATION
Ship to Pt. Home  Branch: [facility] [employer]  16fr 5cc cath [id number] 1  Syringe 60cc [id number] 2  Syringe 10cc [id number] 2  10cc cath tray [id number] 2  Drain bag 2000cc [id number]  2  Statlock  NOT STOCKED 794125  2   Saline 100ml 979180  2      Alginate with Silver [id number] 6    Sub for Mepilex:  Silicone Foam with border 4x4 NOT STOCKED [id number] 1 box

## 2024-02-08 ENCOUNTER — HOME CARE VISIT (OUTPATIENT)
Dept: HOME HEALTH SERVICES | Facility: HOME HEALTHCARE | Age: 89
End: 2024-02-08
Payer: MEDICARE

## 2024-02-08 PROCEDURE — G0156 HHCP-SVS OF AIDE,EA 15 MIN: HCPCS

## 2024-02-09 ENCOUNTER — HOME CARE VISIT (OUTPATIENT)
Dept: HOME HEALTH SERVICES | Facility: HOME HEALTHCARE | Age: 89
End: 2024-02-09
Payer: MEDICARE

## 2024-02-09 PROCEDURE — G0156 HHCP-SVS OF AIDE,EA 15 MIN: HCPCS

## 2024-02-10 ENCOUNTER — HOME CARE VISIT (OUTPATIENT)
Dept: HOME HEALTH SERVICES | Facility: HOME HEALTHCARE | Age: 89
End: 2024-02-10
Payer: MEDICARE

## 2024-02-10 PROCEDURE — G0156 HHCP-SVS OF AIDE,EA 15 MIN: HCPCS

## 2024-02-12 ENCOUNTER — HOME CARE VISIT (OUTPATIENT)
Dept: HOME HEALTH SERVICES | Facility: HOME HEALTHCARE | Age: 89
End: 2024-02-12
Payer: MEDICARE

## 2024-02-12 ENCOUNTER — HOME CARE VISIT (OUTPATIENT)
Dept: HOME HOSPICE | Facility: HOSPICE | Age: 89
End: 2024-02-12
Payer: MEDICARE

## 2024-02-12 VITALS — DIASTOLIC BLOOD PRESSURE: 78 MMHG | RESPIRATION RATE: 16 BRPM | HEART RATE: 56 BPM | SYSTOLIC BLOOD PRESSURE: 135 MMHG

## 2024-02-12 PROCEDURE — G0299 HHS/HOSPICE OF RN EA 15 MIN: HCPCS

## 2024-02-12 PROCEDURE — G0156 HHCP-SVS OF AIDE,EA 15 MIN: HCPCS

## 2024-02-12 NOTE — CASE COMMUNICATION
Ship to Pt. Home  Branch:  [place]   Alginate 4x4 238826  5  Sub for Mepilex:  Silicone Foam with border 4x4 NOT STOCKED 085533  1 box

## 2024-02-13 ENCOUNTER — HOME CARE VISIT (OUTPATIENT)
Dept: HOME HOSPICE | Facility: HOSPICE | Age: 89
End: 2024-02-13
Payer: MEDICARE

## 2024-02-14 ENCOUNTER — HOME CARE VISIT (OUTPATIENT)
Dept: HOME HEALTH SERVICES | Facility: HOME HEALTHCARE | Age: 89
End: 2024-02-14
Payer: MEDICARE

## 2024-02-14 PROCEDURE — G0156 HHCP-SVS OF AIDE,EA 15 MIN: HCPCS

## 2024-02-15 ENCOUNTER — HOME CARE VISIT (OUTPATIENT)
Dept: HOME HEALTH SERVICES | Facility: HOME HEALTHCARE | Age: 89
End: 2024-02-15
Payer: MEDICARE

## 2024-02-15 PROCEDURE — G0156 HHCP-SVS OF AIDE,EA 15 MIN: HCPCS

## 2024-02-16 ENCOUNTER — HOME CARE VISIT (OUTPATIENT)
Dept: HOME HEALTH SERVICES | Facility: HOME HEALTHCARE | Age: 89
End: 2024-02-16
Payer: MEDICARE

## 2024-02-16 PROCEDURE — G0156 HHCP-SVS OF AIDE,EA 15 MIN: HCPCS

## 2024-02-19 ENCOUNTER — HOME CARE VISIT (OUTPATIENT)
Dept: HOME HEALTH SERVICES | Facility: HOME HEALTHCARE | Age: 89
End: 2024-02-19
Payer: MEDICARE

## 2024-02-19 PROCEDURE — G0156 HHCP-SVS OF AIDE,EA 15 MIN: HCPCS

## 2024-02-20 ENCOUNTER — HOME CARE VISIT (OUTPATIENT)
Dept: HOME HOSPICE | Facility: HOSPICE | Age: 89
End: 2024-02-20
Payer: MEDICARE

## 2024-02-21 ENCOUNTER — HOME CARE VISIT (OUTPATIENT)
Dept: HOME HEALTH SERVICES | Facility: HOME HEALTHCARE | Age: 89
End: 2024-02-21
Payer: MEDICARE

## 2024-02-21 PROCEDURE — G0156 HHCP-SVS OF AIDE,EA 15 MIN: HCPCS

## 2024-02-22 ENCOUNTER — HOME CARE VISIT (OUTPATIENT)
Dept: HOME HEALTH SERVICES | Facility: HOME HEALTHCARE | Age: 89
End: 2024-02-22
Payer: MEDICARE

## 2024-02-22 VITALS — DIASTOLIC BLOOD PRESSURE: 76 MMHG | SYSTOLIC BLOOD PRESSURE: 132 MMHG | HEART RATE: 71 BPM | RESPIRATION RATE: 16 BRPM

## 2024-02-22 PROCEDURE — G0299 HHS/HOSPICE OF RN EA 15 MIN: HCPCS

## 2024-02-23 ENCOUNTER — HOME CARE VISIT (OUTPATIENT)
Dept: HOME HEALTH SERVICES | Facility: HOME HEALTHCARE | Age: 89
End: 2024-02-23
Payer: MEDICARE

## 2024-02-23 PROCEDURE — G0156 HHCP-SVS OF AIDE,EA 15 MIN: HCPCS

## 2024-02-24 ENCOUNTER — HOME CARE VISIT (OUTPATIENT)
Dept: HOME HEALTH SERVICES | Facility: HOME HEALTHCARE | Age: 89
End: 2024-02-24
Payer: MEDICARE

## 2024-02-24 PROCEDURE — G0156 HHCP-SVS OF AIDE,EA 15 MIN: HCPCS

## 2024-02-25 ENCOUNTER — HOME CARE VISIT (OUTPATIENT)
Dept: HOME HEALTH SERVICES | Facility: HOME HEALTHCARE | Age: 89
End: 2024-02-25
Payer: MEDICARE

## 2024-02-25 VITALS — SYSTOLIC BLOOD PRESSURE: 160 MMHG | RESPIRATION RATE: 20 BRPM | DIASTOLIC BLOOD PRESSURE: 90 MMHG | HEART RATE: 88 BPM

## 2024-02-25 PROCEDURE — G0299 HHS/HOSPICE OF RN EA 15 MIN: HCPCS

## 2024-02-25 PROCEDURE — G0156 HHCP-SVS OF AIDE,EA 15 MIN: HCPCS

## 2024-02-27 ENCOUNTER — HOME CARE VISIT (OUTPATIENT)
Dept: HOME HEALTH SERVICES | Facility: HOME HEALTHCARE | Age: 89
End: 2024-02-27
Payer: MEDICARE

## 2024-02-27 PROCEDURE — G0156 HHCP-SVS OF AIDE,EA 15 MIN: HCPCS

## 2024-02-27 PROCEDURE — G0299 HHS/HOSPICE OF RN EA 15 MIN: HCPCS

## 2024-02-28 ENCOUNTER — HOME CARE VISIT (OUTPATIENT)
Dept: HOME HEALTH SERVICES | Facility: HOME HEALTHCARE | Age: 89
End: 2024-02-28
Payer: MEDICARE

## 2024-02-28 ENCOUNTER — HOME CARE VISIT (OUTPATIENT)
Dept: HOME HOSPICE | Facility: HOSPICE | Age: 89
End: 2024-02-28
Payer: MEDICARE

## 2024-02-28 PROCEDURE — G0156 HHCP-SVS OF AIDE,EA 15 MIN: HCPCS

## 2024-02-28 NOTE — CASE COMMUNICATION
Ship to  . Home  Branch: [place]   [employer]ans[employer]  [place] [id number]  1  Gauze 4x4 N/S 200 4x4s per unit  [id number] 1   Calcium Alginates    Alginate 4x4 482537 3    Sub for Mepilex:  Silicone adhesive foam 3x3 border NOT STOCKED 215942  1 box

## 2024-02-29 ENCOUNTER — HOME CARE VISIT (OUTPATIENT)
Dept: HOME HEALTH SERVICES | Facility: HOME HEALTHCARE | Age: 89
End: 2024-02-29
Payer: MEDICARE

## 2024-02-29 PROCEDURE — G0299 HHS/HOSPICE OF RN EA 15 MIN: HCPCS

## 2024-02-29 PROCEDURE — G0156 HHCP-SVS OF AIDE,EA 15 MIN: HCPCS

## 2024-03-01 ENCOUNTER — HOME CARE VISIT (OUTPATIENT)
Dept: HOME HOSPICE | Facility: HOSPICE | Age: 89
End: 2024-03-01
Payer: MEDICARE

## 2024-03-01 ENCOUNTER — HOME CARE VISIT (OUTPATIENT)
Dept: HOME HEALTH SERVICES | Facility: HOME HEALTHCARE | Age: 89
End: 2024-03-01
Payer: MEDICARE

## 2024-03-01 PROCEDURE — G0299 HHS/HOSPICE OF RN EA 15 MIN: HCPCS

## 2024-03-01 PROCEDURE — G0156 HHCP-SVS OF AIDE,EA 15 MIN: HCPCS

## 2024-03-01 NOTE — ASSESSMENT & PLAN NOTE
1830: Pt to [facility] 24 for PH II recovery. Report from Latisha PRETTY. Pt stable. Pt resting quietly. Pt tolerating water and crackers. Pt able to move toes. Bruno called for pt ride.   1900:  Discharge instructions and medications reviewed/given. Patient verbalizes understanding. All questions answered. No distress noted, patient stable. Patient confirmed will call to make appointment in 10-14 days with Dr. Richardson.    1905: Pt getting dressed. Pt declined assistance. Pt awaiting ride.   1915: Pt discharged via wheelchair to private vehicle with family member.   Urine CX from 8/27- grew ESBL  Abx as per ID

## 2024-03-02 ENCOUNTER — HOME CARE VISIT (OUTPATIENT)
Dept: HOME HEALTH SERVICES | Facility: HOME HEALTHCARE | Age: 89
End: 2024-03-02
Payer: MEDICARE

## 2024-03-02 PROCEDURE — G0156 HHCP-SVS OF AIDE,EA 15 MIN: HCPCS

## 2024-03-03 ENCOUNTER — HOME CARE VISIT (OUTPATIENT)
Dept: HOME HEALTH SERVICES | Facility: HOME HEALTHCARE | Age: 89
End: 2024-03-03
Payer: MEDICARE

## 2024-03-03 VITALS — DIASTOLIC BLOOD PRESSURE: 82 MMHG | RESPIRATION RATE: 16 BRPM | HEART RATE: 80 BPM | SYSTOLIC BLOOD PRESSURE: 154 MMHG

## 2024-03-03 PROCEDURE — G0156 HHCP-SVS OF AIDE,EA 15 MIN: HCPCS

## 2024-03-03 PROCEDURE — G0299 HHS/HOSPICE OF RN EA 15 MIN: HCPCS

## 2024-03-04 ENCOUNTER — HOME CARE VISIT (OUTPATIENT)
Dept: HOME HEALTH SERVICES | Facility: HOME HEALTHCARE | Age: 89
End: 2024-03-04
Payer: MEDICARE

## 2024-03-04 PROCEDURE — G0156 HHCP-SVS OF AIDE,EA 15 MIN: HCPCS

## 2024-03-05 ENCOUNTER — HOME CARE VISIT (OUTPATIENT)
Dept: HOME HEALTH SERVICES | Facility: HOME HEALTHCARE | Age: 89
End: 2024-03-05
Payer: MEDICARE

## 2024-03-05 ENCOUNTER — HOME CARE VISIT (OUTPATIENT)
Dept: HOME HOSPICE | Facility: HOSPICE | Age: 89
End: 2024-03-05
Payer: MEDICARE

## 2024-03-05 VITALS — HEART RATE: 82 BPM | DIASTOLIC BLOOD PRESSURE: 80 MMHG | RESPIRATION RATE: 16 BRPM | SYSTOLIC BLOOD PRESSURE: 148 MMHG

## 2024-03-05 PROCEDURE — G0299 HHS/HOSPICE OF RN EA 15 MIN: HCPCS

## 2024-03-05 PROCEDURE — G0156 HHCP-SVS OF AIDE,EA 15 MIN: HCPCS

## 2024-03-06 ENCOUNTER — HOME CARE VISIT (OUTPATIENT)
Dept: HOME HEALTH SERVICES | Facility: HOME HEALTHCARE | Age: 89
End: 2024-03-06
Payer: MEDICARE

## 2024-03-06 PROCEDURE — G0156 HHCP-SVS OF AIDE,EA 15 MIN: HCPCS

## 2024-03-07 ENCOUNTER — HOME CARE VISIT (OUTPATIENT)
Dept: HOME HEALTH SERVICES | Facility: HOME HEALTHCARE | Age: 89
End: 2024-03-07
Payer: MEDICARE

## 2024-03-07 PROCEDURE — G0156 HHCP-SVS OF AIDE,EA 15 MIN: HCPCS

## 2024-03-08 ENCOUNTER — HOME CARE VISIT (OUTPATIENT)
Dept: HOME HEALTH SERVICES | Facility: HOME HEALTHCARE | Age: 89
End: 2024-03-08
Payer: MEDICARE

## 2024-03-08 PROCEDURE — G0156 HHCP-SVS OF AIDE,EA 15 MIN: HCPCS

## 2024-03-09 ENCOUNTER — HOME CARE VISIT (OUTPATIENT)
Dept: HOME HEALTH SERVICES | Facility: HOME HEALTHCARE | Age: 89
End: 2024-03-09
Payer: MEDICARE

## 2024-03-09 PROCEDURE — G0156 HHCP-SVS OF AIDE,EA 15 MIN: HCPCS

## 2024-03-10 ENCOUNTER — HOME CARE VISIT (OUTPATIENT)
Dept: HOME HEALTH SERVICES | Facility: HOME HEALTHCARE | Age: 89
End: 2024-03-10
Payer: MEDICARE

## 2024-03-10 PROCEDURE — G0156 HHCP-SVS OF AIDE,EA 15 MIN: HCPCS

## 2024-03-11 ENCOUNTER — HOME CARE VISIT (OUTPATIENT)
Dept: HOME HOSPICE | Facility: HOSPICE | Age: 89
End: 2024-03-11
Payer: MEDICARE

## 2024-03-11 ENCOUNTER — TELEPHONE (OUTPATIENT)
Dept: HOME HEALTH SERVICES | Facility: HOME HEALTHCARE | Age: 89
End: 2024-03-11

## 2024-03-11 ENCOUNTER — HOME CARE VISIT (OUTPATIENT)
Dept: HOME HEALTH SERVICES | Facility: HOME HEALTHCARE | Age: 89
End: 2024-03-11
Payer: MEDICARE

## 2024-03-11 VITALS — RESPIRATION RATE: 15 BRPM | DIASTOLIC BLOOD PRESSURE: 90 MMHG | HEART RATE: 66 BPM | SYSTOLIC BLOOD PRESSURE: 158 MMHG

## 2024-03-11 PROCEDURE — G0299 HHS/HOSPICE OF RN EA 15 MIN: HCPCS

## 2024-03-11 PROCEDURE — G0156 HHCP-SVS OF AIDE,EA 15 MIN: HCPCS

## 2024-03-12 ENCOUNTER — HOME CARE VISIT (OUTPATIENT)
Dept: HOME HEALTH SERVICES | Facility: HOME HEALTHCARE | Age: 89
End: 2024-03-12
Payer: MEDICARE

## 2024-03-12 PROCEDURE — G0156 HHCP-SVS OF AIDE,EA 15 MIN: HCPCS

## 2024-03-13 ENCOUNTER — HOME CARE VISIT (OUTPATIENT)
Dept: HOME HOSPICE | Facility: HOSPICE | Age: 89
End: 2024-03-13
Payer: MEDICARE

## 2024-03-13 ENCOUNTER — TELEPHONE (OUTPATIENT)
Dept: HOME HEALTH SERVICES | Facility: HOME HEALTHCARE | Age: 89
End: 2024-03-13

## 2024-03-13 ENCOUNTER — HOME CARE VISIT (OUTPATIENT)
Dept: HOME HEALTH SERVICES | Facility: HOME HEALTHCARE | Age: 89
End: 2024-03-13
Payer: MEDICARE

## 2024-03-13 PROCEDURE — G0156 HHCP-SVS OF AIDE,EA 15 MIN: HCPCS

## 2024-03-14 ENCOUNTER — HOME CARE VISIT (OUTPATIENT)
Dept: HOME HEALTH SERVICES | Facility: HOME HEALTHCARE | Age: 89
End: 2024-03-14
Payer: MEDICARE

## 2024-03-14 PROCEDURE — G0299 HHS/HOSPICE OF RN EA 15 MIN: HCPCS

## 2024-03-14 PROCEDURE — G0156 HHCP-SVS OF AIDE,EA 15 MIN: HCPCS

## 2024-03-15 ENCOUNTER — HOME CARE VISIT (OUTPATIENT)
Dept: HOME HEALTH SERVICES | Facility: HOME HEALTHCARE | Age: 89
End: 2024-03-15
Payer: MEDICARE

## 2024-03-15 VITALS — RESPIRATION RATE: 20 BRPM | DIASTOLIC BLOOD PRESSURE: 80 MMHG | HEART RATE: 72 BPM | SYSTOLIC BLOOD PRESSURE: 150 MMHG

## 2024-03-15 PROCEDURE — G0156 HHCP-SVS OF AIDE,EA 15 MIN: HCPCS

## 2024-03-22 NOTE — PROGRESS NOTES
3/22/2024 12:38 PM  [facility] home patient requests PCA started for improved symptom management.  Filled electronically via Epic as per [place] State Law.    Requested Prescriptions     Signed Prescriptions Disp Refills    HYDROmorphone (Dilaudid) 1 mg/mL 100 mL 0     Sig: Route of Administration: Subcutaneous;  Basal Rate: 0.1 mg/hr;  Bolus Dose: 0.3 mg;  Bolus Interval: 15 minutes;  Max Bolus Doses/hr: 4       TOD Oleary  [facility]  Hospice Answering Service: [phone]  You can find me on [employer]!

## 2024-04-05 ENCOUNTER — HOME CARE VISIT (OUTPATIENT)
Dept: HOME HOSPICE | Facility: HOSPICE | Age: 89
End: 2024-04-05
Payer: MEDICARE

## 2025-05-05 NOTE — NURSING NOTE
Discussed delrium assessed with patient to SLIM. How Severe Are Your Spot(S)?: moderate What Type Of Note Output Would You Prefer (Optional)?: Standard Output What Is The Reason For Today's Visit?: Full Body Skin Examination What Is The Reason For Today's Visit? (Being Monitored For X): concerning skin lesions on an annual basis
